# Patient Record
Sex: MALE | Race: WHITE | NOT HISPANIC OR LATINO | Employment: OTHER | URBAN - NONMETROPOLITAN AREA
[De-identification: names, ages, dates, MRNs, and addresses within clinical notes are randomized per-mention and may not be internally consistent; named-entity substitution may affect disease eponyms.]

---

## 2017-01-02 ENCOUNTER — HOSPITAL ENCOUNTER (EMERGENCY)
Facility: HOSPITAL | Age: 82
Discharge: HOME | End: 2017-01-02
Attending: NURSE PRACTITIONER | Admitting: NURSE PRACTITIONER
Payer: MEDICARE

## 2017-01-02 VITALS
TEMPERATURE: 96.6 F | HEART RATE: 61 BPM | SYSTOLIC BLOOD PRESSURE: 152 MMHG | RESPIRATION RATE: 18 BRPM | OXYGEN SATURATION: 98 % | DIASTOLIC BLOOD PRESSURE: 73 MMHG

## 2017-01-02 DIAGNOSIS — S93.401A SPRAIN OF RIGHT ANKLE, UNSPECIFIED LIGAMENT, INITIAL ENCOUNTER: ICD-10-CM

## 2017-01-02 LAB
BASOPHILS # BLD AUTO: 0.1 10E9/L (ref 0–0.2)
BASOPHILS NFR BLD AUTO: 0.5 %
CRP SERPL-MCNC: <2.9 MG/L (ref 0–8)
DIFFERENTIAL METHOD BLD: NORMAL
EOSINOPHIL # BLD AUTO: 0.2 10E9/L (ref 0–0.7)
EOSINOPHIL NFR BLD AUTO: 2.1 %
ERYTHROCYTE [DISTWIDTH] IN BLOOD BY AUTOMATED COUNT: 13.1 % (ref 10–15)
GLUCOSE BLDC GLUCOMTR-MCNC: 67 MG/DL (ref 70–99)
HCT VFR BLD AUTO: 44.3 % (ref 40–53)
HGB BLD-MCNC: 15 G/DL (ref 13.3–17.7)
IMM GRANULOCYTES # BLD: 0 10E9/L (ref 0–0.4)
IMM GRANULOCYTES NFR BLD: 0.3 %
LYMPHOCYTES # BLD AUTO: 3.6 10E9/L (ref 0.8–5.3)
LYMPHOCYTES NFR BLD AUTO: 37.4 %
MCH RBC QN AUTO: 31 PG (ref 26.5–33)
MCHC RBC AUTO-ENTMCNC: 33.9 G/DL (ref 31.5–36.5)
MCV RBC AUTO: 92 FL (ref 78–100)
MONOCYTES # BLD AUTO: 1.1 10E9/L (ref 0–1.3)
MONOCYTES NFR BLD AUTO: 11.8 %
NEUTROPHILS # BLD AUTO: 4.5 10E9/L (ref 1.6–8.3)
NEUTROPHILS NFR BLD AUTO: 47.9 %
NRBC # BLD AUTO: 0 10*3/UL
NRBC BLD AUTO-RTO: 0 /100
PLATELET # BLD AUTO: 251 10E9/L (ref 150–450)
RBC # BLD AUTO: 4.84 10E12/L (ref 4.4–5.9)
URATE SERPL-MCNC: 3.2 MG/DL (ref 3.5–7.2)
WBC # BLD AUTO: 9.5 10E9/L (ref 4–11)

## 2017-01-02 PROCEDURE — 85025 COMPLETE CBC W/AUTO DIFF WBC: CPT | Performed by: NURSE PRACTITIONER

## 2017-01-02 PROCEDURE — 99214 OFFICE O/P EST MOD 30 MIN: CPT

## 2017-01-02 PROCEDURE — 36415 COLL VENOUS BLD VENIPUNCTURE: CPT | Performed by: NURSE PRACTITIONER

## 2017-01-02 PROCEDURE — 84550 ASSAY OF BLOOD/URIC ACID: CPT | Performed by: NURSE PRACTITIONER

## 2017-01-02 PROCEDURE — 86140 C-REACTIVE PROTEIN: CPT | Performed by: NURSE PRACTITIONER

## 2017-01-02 PROCEDURE — 73630 X-RAY EXAM OF FOOT: CPT | Mod: TC,RT

## 2017-01-02 PROCEDURE — 99213 OFFICE O/P EST LOW 20 MIN: CPT | Performed by: NURSE PRACTITIONER

## 2017-01-02 PROCEDURE — 73610 X-RAY EXAM OF ANKLE: CPT | Mod: TC,RT

## 2017-01-02 PROCEDURE — 00000146 ZZHCL STATISTIC GLUCOSE BY METER IP

## 2017-01-02 NOTE — ED PROVIDER NOTES
History     Chief Complaint   Patient presents with     Foot Pain     right sided. middle of foot. no injury. awoke with sx this am of aching     The history is provided by the patient. No  was used.     James Grant is a 89 year old male who presents with right ankle pain. Pain started this am when he got out of bed. Denies injury. He is able to bear weight. No interventions for symptoms. Denies fever, chills, or night sweats. Eating and drinking well. Bowel and bladder are working well.     I have reviewed the Medications, Allergies, Past Medical and Surgical History, and Social History in the Epic system.    Review of Systems   Constitutional: Positive for activity change. Negative for fever, chills and appetite change.   Gastrointestinal: Negative for nausea and vomiting.   Genitourinary: Negative for dysuria.   Musculoskeletal: Negative for joint swelling.        Right ankle pain.    Skin: Negative for rash and wound.   Psychiatric/Behavioral: Negative.        Physical Exam   BP: 152/73 mmHg  Pulse: 61  Temp: 96.6  F (35.9  C)  Resp: 18  SpO2: 98 %  Physical Exam   Constitutional: He is oriented to person, place, and time. He appears well-developed and well-nourished. No distress.   HENT:   Mouth/Throat: Oropharynx is clear and moist.   Cardiovascular: Normal rate, regular rhythm, normal heart sounds and intact distal pulses.    Pulmonary/Chest: Effort normal. No respiratory distress. He has no wheezes. He has no rales.   Abdominal: Soft.   Musculoskeletal: He exhibits tenderness. He exhibits no edema.   Right medial ankle pain with movement. ROM and CMS intact to right ankle/foot. No swelling or redness to right ankle or foot. Dorsalis pedal pulse +2 to right foot.    Neurological: He is alert and oriented to person, place, and time.   Skin: Skin is warm and dry. No rash noted. He is not diaphoretic. No erythema.   Psychiatric: He has a normal mood and affect. His behavior is normal.    Nursing note and vitals reviewed.      ED Course   Procedures  I personally reviewed the x-rays and there is NO fracture or dislocation. Radiology review pending and nurse will notify patient if there is any change in the treatment plan.    Results for orders placed or performed during the hospital encounter of 01/02/17   Foot  XR, G/E 3 views, right    Narrative    RIGHT FOOT THREE VIEWS    FINDINGS:  No acute fracture or dislocation is seen.  There is no  focal osseous lesion.  Exam Date: Jan 02, 2017 10:12:00 AM  Author: ZANE RIVERA  This report is final and signed     Ankle XR, G/E 3 views, right    Narrative    RIGHT ANKLE THREE VIEWS    FINDINGS:  No acute fracture or dislocation is seen.  Ankle mortise  appears congruent and no focal bony lesion is seen.  Exam Date: Jan 02, 2017 10:50:00 AM  Author: ZANE RIVERA  This report is final and signed     CRP inflammation   Result Value Ref Range    CRP Inflammation <2.9 0.0 - 8.0 mg/L   CBC with platelets differential   Result Value Ref Range    WBC 9.5 4.0 - 11.0 10e9/L    RBC Count 4.84 4.4 - 5.9 10e12/L    Hemoglobin 15.0 13.3 - 17.7 g/dL    Hematocrit 44.3 40.0 - 53.0 %    MCV 92 78 - 100 fl    MCH 31.0 26.5 - 33.0 pg    MCHC 33.9 31.5 - 36.5 g/dL    RDW 13.1 10.0 - 15.0 %    Platelet Count 251 150 - 450 10e9/L    Diff Method Automated Method     % Neutrophils 47.9 %    % Lymphocytes 37.4 %    % Monocytes 11.8 %    % Eosinophils 2.1 %    % Basophils 0.5 %    % Immature Granulocytes 0.3 %    Nucleated RBCs 0 0 /100    Absolute Neutrophil 4.5 1.6 - 8.3 10e9/L    Absolute Lymphocytes 3.6 0.8 - 5.3 10e9/L    Absolute Monocytes 1.1 0.0 - 1.3 10e9/L    Absolute Eosinophils 0.2 0.0 - 0.7 10e9/L    Absolute Basophils 0.1 0.0 - 0.2 10e9/L    Abs Immature Granulocytes 0.0 0 - 0.4 10e9/L    Absolute Nucleated RBC 0.0    Glucose by meter   Result Value Ref Range    Glucose 67 (L) 70 - 99 mg/dL   Uric acid   Result Value Ref Range    Uric Acid 3.2 (L) 3.5 - 7.2  mg/dL      Apple juice and a snack given for glucose.       Assessments & Plan (with Medical Decision Making)     Discussed XRAY and lab findings. Discussed that pain is a sprain or possible arthritis that is causing the pain. Recommended wearing an ankle stir-up to support his ankle and decrease fall risk. He will follow up with PCP in 10 days for re-evaluation. He verbalized understanding of plan.     I have reviewed the nursing notes.    I have reviewed the findings, diagnosis, plan and need for follow up with the patient.  Discharged in stable condition.     Discharge Medication List as of 1/2/2017 11:34 AM          Final diagnoses:   Sprain of right ankle, unspecified ligament, initial encounter     Wear ankle splint when ambulating.   You can take tylenol for discomfort.   Elevate right leg as much as able.   Use ice for 20 minutes every 1-2 hours.   Follow up with PCP in 10 days for re-evaluation. Sooner with an increase in symptoms.   Return to urgent care and or emergency department with an increase in symptoms or concerns.     JAMES Simpson  1/2/2017  10:25 AM  [facility] CL[facility]        Mely Hyde NP  01/05/17 [id number]

## 2017-01-02 NOTE — DISCHARGE INSTRUCTIONS
Wear splint when ambulating.   You can take tylenol for discomfort.   Elevate right leg as much as able.   Use ice for 20 minutes every 1-2 hours.   Follow up with PCP in 10 days for re-evaluation. Sooner with an increase in symptoms.   Return to urgent care and or emergency department with an increase in symptoms or concerns.

## 2017-01-02 NOTE — ED AVS SNAPSHOT
[facility]    [address]    [place] [address]    Phone:  [phone]                                       James Grant   MRN: [id number]    Department:  [facility]   Date of Visit:  1/2/2017           After Visit Summary Signature Page     I have received my discharge instructions, and my questions have been answered. I have discussed any challenges I see with this plan with the nurse or doctor.    ..........................................................................................................................................  Patient/Patient Representative Signature      ..........................................................................................................................................  Patient Representative Print Name and Relationship to Patient    ..................................................               ................................................  Date                                            Time    ..........................................................................................................................................  Reviewed by Signature/Title    ...................................................              ..............................................  Date                                                            Time

## 2017-01-02 NOTE — ED AVS SNAPSHOT
[facility]    [address]    [place] [address]    Phone:  [phone]                                       James Grant   MRN: [id number]    Department:  [facility]   Date of Visit:  1/2/2017           Patient Information     Date Of Birth          3/5/1927        Your diagnoses for this visit were:     Sprain of right ankle, unspecified ligament, initial encounter        You were seen by Mely Hyde NP.      Follow-up Information     Follow up with Broderick Flowers MD In 10 days.    Specialty:  Family Practice    Why:  For re-evaluation.     Contact information:    [facility]  [address] [place] [address] [phone]          Follow up with [facility].    Specialty:  EMERGENCY MEDICINE    Why:  As needed, If symptoms worsen    Contact information:    [address]  [place] [address] [phone]    Additional information:    From [facility]: Take [address]. Turn left at [address]. Turn left at the first stoplight on [address]. At the first stop sign, take a right onto [address]. Take a left into the parking lot and continue through until you reach the North enterance of the building.       From [place]: Take [address]. Take the [address] ramp towards [place]. Turn left onto [address]. Take a slight right onto [address]. Turn left at the first stoplight on [address]. At the first stop sign, take a right onto [address]. Take a left into the parking lot and continue through until you reach the North enterance of the building.       From [place]: Take [address]. Take a right at [address]. At the first stop sign, take a right onto [address]. Take a left into the parking lot and continue through until you reach the North enterance of the building.         Discharge Instructions       Wear splint when ambulating.   You can take  tylenol for discomfort.   Elevate right leg as much as able.   Use ice for 20 minutes every 1-2 hours.   Follow up with PCP in 10 days for re-evaluation. Sooner with an increase in symptoms.   Return to urgent care and or emergency department with an increase in symptoms or concerns.     Discharge References/Attachments     AIRCAST SPLINT AND BOOT (ENGLISH)    SPRAIN, ANKLE (ADULT) (ENGLISH)         Review of your medicines      Our records show that you are taking the medicines listed below. If these are incorrect, please call your family doctor or clinic.        Dose / Directions Last dose taken    aspirin 325 MG tablet   Dose:  0.5 tablet        Take 0.5 tablets by mouth daily.   Refills:  0        famotidine 20 MG tablet   Commonly known as:  PEPCID   Dose:  20 mg   Quantity:  90 tablet        Take 1 tablet (20 mg) by mouth daily   Refills:  2        losartan 50 MG tablet   Commonly known as:  COZAAR   Dose:  50 mg   Quantity:  30 tablet        Take 1 tablet (50 mg) by mouth daily   Refills:  7        metoprolol 25 MG 24 hr tablet   Commonly known as:  TOPROL-XL   Quantity:  45 tablet        Take 0.5 Tabs by mouth at bedtime. Do not crush or chew.   Refills:  1        simvastatin 20 MG tablet   Commonly known as:  ZOCOR   Dose:  20 mg   Quantity:  90 tablet        Take 1 tablet (20 mg) by mouth every evening   Refills:  2        vitamin B complex with vitamin C Tabs tablet   Dose:  1 tablet        Take 1 tablet by mouth daily   Refills:  0                Procedures and tests performed during your visit     Ankle XR, G/E 3 views, right    CBC with platelets differential    CRP inflammation    Foot  XR, G/E 3 views, right    Glucose by meter    Glucose monitor nursing POCT    Uric acid      Orders Needing Specimen Collection     None      Pending Results     Date and Time Order Name Status Description    1/2/2017 1033 Ankle XR, G/E 3 views, right In process     1/2/2017 1004 Foot  XR, G/E 3 views, right In process  "            Thank you for choosing [facility]       Thank you for choosing [facility] for your care. Our goal is always to provide you with excellent care. Hearing back from our patients is one way we can continue to improve our services. Please take a few minutes to complete the written survey that you may receive in the mail after you visit with us. Thank you!        [employer] Information     [employer] lets you send messages to your doctor, view your test results, renew your prescriptions, schedule appointments and more. To sign up, go to [email or web address] . Click on \"Log in\" on the left side of the screen, which will take you to the Welcome page. Then click on \"Sign up Now\" on the right side of the page.     You will be asked to enter the access code listed below, as well as some personal information. Please follow the directions to create your username and password.     Your access code is: [email or web address]  Expires: 3/26/2017  6:18 AM     Your access code will  in 90 days. If you need help or a new code, please call your [facility] clinic or [phone].        After Visit Summary       This is your record. Keep this with you and show to your community pharmacist(s) and doctor(s) at your next visit.                  "

## 2017-01-04 ENCOUNTER — OFFICE VISIT (OUTPATIENT)
Dept: OTOLARYNGOLOGY | Facility: OTHER | Age: 82
End: 2017-01-04
Attending: PHYSICIAN ASSISTANT
Payer: MEDICARE

## 2017-01-04 VITALS
HEART RATE: 77 BPM | SYSTOLIC BLOOD PRESSURE: 118 MMHG | BODY MASS INDEX: 25.9 KG/M2 | HEIGHT: 71 IN | TEMPERATURE: 95.5 F | DIASTOLIC BLOOD PRESSURE: 60 MMHG | WEIGHT: 185 LBS | OXYGEN SATURATION: 97 %

## 2017-01-04 DIAGNOSIS — H90.5 SNHL (SENSORINEURAL HEARING LOSS): ICD-10-CM

## 2017-01-04 DIAGNOSIS — H61.23 IMPACTED CERUMEN OF BOTH EARS: ICD-10-CM

## 2017-01-04 DIAGNOSIS — H69.91 ETD (EUSTACHIAN TUBE DYSFUNCTION), RIGHT: ICD-10-CM

## 2017-01-04 PROCEDURE — 99213 OFFICE O/P EST LOW 20 MIN: CPT | Mod: 25 | Performed by: PHYSICIAN ASSISTANT

## 2017-01-04 PROCEDURE — 92504 EAR MICROSCOPY EXAMINATION: CPT

## 2017-01-04 PROCEDURE — 99212 OFFICE O/P EST SF 10 MIN: CPT

## 2017-01-04 PROCEDURE — 92504 EAR MICROSCOPY EXAMINATION: CPT | Performed by: PHYSICIAN ASSISTANT

## 2017-01-04 ASSESSMENT — PAIN SCALES - GENERAL: PAINLEVEL: NO PAIN (0)

## 2017-01-04 NOTE — MR AVS SNAPSHOT
"              After Visit Summary   1/4/2017    James Grant    MRN: [id number]           Patient Information     Date Of Birth          3/5/1927        Visit Information        Provider Department      1/4/2017 11:30 AM Lizzeth Harris PA-C [facility]        Today's Diagnoses     Other infective acute otitis externa of right ear    -  1     SNHL (sensorineural hearing loss)         ETD (eustachian tube dysfunction), right         Impacted cerumen of both ears           Care Instructions    Follow up with Rae for audiogram and hearing aid check    If there are concerns or questions, Call [phone] and ask for nurse        Follow-ups after your visit        Who to contact     If you have questions or need follow up information about today's clinic visit or your schedule please contact [facility] directly at [phone].  Normal or non-critical lab and imaging results will be communicated to you by [employer], letter or phone within 4 business days after the clinic has received the results. If you do not hear from us within 7 days, please contact the clinic through [employer] or phone. If you have a critical or abnormal lab result, we will notify you by phone as soon as possible.  Submit refill requests through [employer] or call your pharmacy and they will forward the refill request to us. Please allow 3 business days for your refill to be completed.          Additional Information About Your Visit        [employer] Information     [employer] lets you send messages to your doctor, view your test results, renew your prescriptions, schedule appointments and more. To sign up, go to [email or web address] . Click on \"Log in\" on the left side of the screen, which will take you to the Welcome page. Then click on \"Sign up Now\" on the right side of the page.     You will be asked to enter the access code listed below, as well as some personal information. Please follow the directions to create your username and " "password.     Your access code is: [email or web address]  Expires: 3/26/2017  6:18 AM     Your access code will  in 90 days. If you need help or a new code, please call your [facility] clinic or [phone].        Care EveryWhere ID     This is your Care EveryWhere ID. This could be used by other organizations to access your [facility] medical records  [id number]        Your Vitals Were     Pulse Temperature Height BMI (Body Mass Index) Pulse Oximetry       77 95.5  F (35.3  C) (Tympanic) 5' 11\" (1.803 m) 25.81 kg/m2 97%        Blood Pressure from Last 3 Encounters:   17 118/60   17 152/73   16 118/60    Weight from Last 3 Encounters:   17 185 lb (83.915 kg)   16 188 lb (85.276 kg)   16 192 lb (87.091 kg)              Today, you had the following     No orders found for display       Primary Care Provider Office Phone # Fax #    Broderick Flowers [phone]       [facility] [address]  [place] [address]        Thank you!     Thank you for choosing [facility]  for your care. Our goal is always to provide you with excellent care. Hearing back from our patients is one way we can continue to improve our services. Please take a few minutes to complete the written survey that you may receive in the mail after your visit with us. Thank you!             Your Updated Medication List - Protect others around you: Learn how to safely use, store and throw away your medicines at www.disposemymeds.org.          This list is accurate as of: 17 11:30 AM.  Always use your most recent med list.                   Brand Name Dispense Instructions for use    aspirin 325 MG tablet      Take 0.5 tablets by mouth daily.       famotidine 20 MG tablet    PEPCID    90 tablet    Take 1 tablet (20 mg) by mouth daily       losartan 50 MG tablet    COZAAR    30 tablet    Take 1 tablet (50 mg) by mouth daily       metoprolol 25 MG 24 hr tablet    TOPROL-XL    45 tablet    " Take 0.5 Tabs by mouth at bedtime. Do not crush or chew.       simvastatin 20 MG tablet    ZOCOR    90 tablet    Take 1 tablet (20 mg) by mouth every evening       vitamin B complex with vitamin C Tabs tablet      Take 1 tablet by mouth daily

## 2017-01-04 NOTE — PROGRESS NOTES
"Chief Complaint   Patient presents with     Cerumen (impacted)     ear cleaning     Tomasz presents with right fullness ear. He feels like drainage and aid not working well. Reports no otalgia. Denies recent illness.  he did have granulation tissue and used drops after hisvisit last year.   Denies otorrhea. His aid is giving him feedback      Past Medical History   Diagnosis Date     Other symptoms involving digestive system(787.99) 10/20/2006     HTN (hypertension) 11/26/2010     Dyslipidemia 11/26/2010     GERD (gastroesophageal reflux disease) 11/26/2010     Benign localized hyperplasia of prostate without urinary obstruction and other lower urinary tract symptoms (LUTS) 11/26/2010     Old myocardial infarction 11/26/2010     CHD (coronary heart disease) 11/26/2010        Allergies   Allergen Reactions     Lisinopril Cough     Morphine      Current Outpatient Prescriptions   Medication     losartan (COZAAR) 50 MG tablet     simvastatin (ZOCOR) 20 MG tablet     famotidine (PEPCID) 20 MG tablet     metoprolol (TOPROL-XL) 25 MG 24 hr tablet     vitamin  B complex with vitamin C (VITAMIN  B COMPLEX) TABS     aspirin 325 MG tablet     No current facility-administered medications for this visit.      ROS: 10 point ROS neg other than the symptoms noted above in the HPI.  /60 mmHg  Pulse 77  Temp(Src) 95.5  F (35.3  C) (Tympanic)  Ht 5' 11\" (1.803 m)  Wt 185 lb (83.915 kg)  BMI 25.81 kg/m2  SpO2 97%    General Appearance: healthy, alert, active and no distress  Head: Normocephalic. No masses, lesions, tenderness or abnormalities  Eyes: conjuctiva clear, PERRL, EOM intact  Ears: External ears normal. Canals with edema, right. Otorrhea. Left canal intact with mild cerumen.   Nose: Nares normal  Mouth: normal  Neck: Supple, no cervical adenopathy, no thyromegaly, Full range of motion in all planes      The lips appear normal and without lesion. The dentition is in good condition The patient has a normal appearing " and functioning hard and soft palate without bifid uvula or submucosal cleft. The tongue is normal in appearance without erosive lesion or fungating mass. The oral mucosa is soft and normal in appearance. The patient also has a soft floor of mouth and base of tongue.     The ear canals were examined underneath the operating microscope and with an otologic speculum. The cavity is cleaned of all debris with gently suctioning and use of alligator forceps. There is no granulation tissue or sign of cholesteatoma. The patient tolerates this well and has a brief amount of dizziness.   Canals are with cerumen, R>L. Canal was cleaned. Tolerated well. TM's are intact without effusion. AC>BC      ASSESSMENT:    ICD-10-CM    1. SNHL (sensorineural hearing loss) H90.5    2. ETD (eustachian tube dysfunction), right H69.81    3. Impacted cerumen of both ears H61.23        Ears were cleaned. He noted improvement.       Audiogram to be completed. HAC with audiology. He had resolution of hearing aid feedback following cleaning.   Follow up in 6 months- 1 year       Lizzeth Harris PA-C  ENT  [facility], [place]  [phone]

## 2017-01-04 NOTE — PATIENT INSTRUCTIONS
Follow up with Rae for audiogram and hearing aid check    If there are concerns or questions, Call [phone] and ask for nurse

## 2017-01-04 NOTE — NURSING NOTE
"Chief Complaint   Patient presents with     Cerumen (impacted)     ear cleaning       Initial /60 mmHg  Pulse 77  Temp(Src) 95.5  F (35.3  C) (Tympanic)  Ht 5' 11\" (1.803 m)  Wt 185 lb (83.915 kg)  BMI 25.81 kg/m2  SpO2 97% Estimated body mass index is 25.81 kg/(m^2) as calculated from the following:    Height as of this encounter: 5' 11\" (1.803 m).    Weight as of this encounter: 185 lb (83.915 kg).  BP completed using cuff size: duong Gonzalez LPN         "

## 2017-01-05 ASSESSMENT — ENCOUNTER SYMPTOMS
APPETITE CHANGE: 0
JOINT SWELLING: 0
ACTIVITY CHANGE: 1
DYSURIA: 0
NAUSEA: 0
PSYCHIATRIC NEGATIVE: 1
FEVER: 0
WOUND: 0
CHILLS: 0
VOMITING: 0

## 2017-01-12 ENCOUNTER — OFFICE VISIT (OUTPATIENT)
Dept: AUDIOLOGY | Facility: OTHER | Age: 82
End: 2017-01-12
Attending: AUDIOLOGIST
Payer: MEDICARE

## 2017-01-12 DIAGNOSIS — H90.3 SENSORINEURAL HEARING LOSS, BILATERAL: Primary | ICD-10-CM

## 2017-01-12 PROCEDURE — 92593 ZZHC HEARING AID CHECK, BINAURAL: CPT | Performed by: AUDIOLOGIST

## 2017-01-12 NOTE — PROGRESS NOTES
Background; Patient seen for hearing aid check reporting not hearing well in groups. He reports they are not dead and are functioning.  Results: Hearing aid reprogramming recommended to include hearing aid conformity evaluation. ABN form reviewed and patient in agreement/signed. Both hearing aids [employer] Latitude 16 FS Full Shell In-The-Ear  Aids right #[id number] and left # [id number] are out of warranty and are 6 years old. Hearing adjusted to soft, average and MPO targets/real ear measurement.  Recommendations: If further difficulties recommend repair or consider updated amplificaiton.  Rae Terry M.S., [facility]  Audiologist #[id number]

## 2017-02-06 DIAGNOSIS — K21.9 GASTROESOPHAGEAL REFLUX DISEASE WITHOUT ESOPHAGITIS: Primary | ICD-10-CM

## 2017-02-07 RX ORDER — FAMOTIDINE 20 MG/1
TABLET, FILM COATED ORAL
Qty: 90 TABLET | Refills: 2 | Status: SHIPPED | OUTPATIENT
Start: 2017-02-07 | End: 2017-12-01

## 2017-03-06 DIAGNOSIS — I10 BENIGN ESSENTIAL HYPERTENSION: ICD-10-CM

## 2017-03-06 NOTE — TELEPHONE ENCOUNTER
Metoprolol      Last Written Prescription Date: 3/4/16  Last Fill Quantity: 45, # refills: 1    Last Office Visit with G, P or [facility] prescribing provider:  12/20/16   Future Office Visit:        BP Readings from Last 3 Encounters:   01/04/17 118/60   01/02/17 152/73   12/20/16 118/60

## 2017-03-07 DIAGNOSIS — I10 BENIGN ESSENTIAL HYPERTENSION: ICD-10-CM

## 2017-03-07 RX ORDER — METOPROLOL SUCCINATE 25 MG/1
TABLET, EXTENDED RELEASE ORAL
Qty: 45 TABLET | Refills: 1 | Status: SHIPPED | OUTPATIENT
Start: 2017-03-07 | End: 2017-09-09

## 2017-03-08 RX ORDER — METOPROLOL SUCCINATE 25 MG/1
TABLET, EXTENDED RELEASE ORAL
Refills: 0 | OUTPATIENT
Start: 2017-03-08

## 2017-03-14 ENCOUNTER — OFFICE VISIT (OUTPATIENT)
Dept: FAMILY MEDICINE | Facility: OTHER | Age: 82
End: 2017-03-14
Attending: FAMILY MEDICINE
Payer: MEDICARE

## 2017-03-14 VITALS
WEIGHT: 190 LBS | RESPIRATION RATE: 18 BRPM | SYSTOLIC BLOOD PRESSURE: 124 MMHG | TEMPERATURE: 97.5 F | BODY MASS INDEX: 26.5 KG/M2 | DIASTOLIC BLOOD PRESSURE: 72 MMHG | OXYGEN SATURATION: 95 % | HEART RATE: 53 BPM

## 2017-03-14 DIAGNOSIS — I87.2 VENOUS STASIS DERMATITIS OF BOTH LOWER EXTREMITIES: Primary | ICD-10-CM

## 2017-03-14 PROCEDURE — 99212 OFFICE O/P EST SF 10 MIN: CPT

## 2017-03-14 PROCEDURE — 99213 OFFICE O/P EST LOW 20 MIN: CPT | Performed by: FAMILY MEDICINE

## 2017-03-14 RX ORDER — BENZOCAINE/MENTHOL 6 MG-10 MG
LOZENGE MUCOUS MEMBRANE AT BEDTIME
Qty: 30 G | Refills: 0 | Status: SHIPPED | OUTPATIENT
Start: 2017-03-14 | End: 2018-10-12

## 2017-03-14 ASSESSMENT — PAIN SCALES - GENERAL: PAINLEVEL: NO PAIN (0)

## 2017-03-14 NOTE — NURSING NOTE
"Chief Complaint   Patient presents with     Derm Problem     itching on bilateral legs       Initial /72 (BP Location: Left arm, Patient Position: Chair, Cuff Size: Adult Regular)  Pulse 53  Temp 97.5  F (36.4  C)  Resp 18  Wt 190 lb (86.2 kg)  SpO2 95%  BMI 26.5 kg/m2 Estimated body mass index is 26.5 kg/(m^2) as calculated from the following:    Height as of 1/4/17: 5' 11\" (1.803 m).    Weight as of this encounter: 190 lb (86.2 kg).  Medication Reconciliation: complete     Betsy Lan        "

## 2017-03-14 NOTE — MR AVS SNAPSHOT
"              After Visit Summary   3/14/2017    James Grant    MRN: [id number]           Patient Information     Date Of Birth          3/5/1927        Visit Information        Provider Department      3/14/2017 10:40 AM Broderick Flowers MD [facility]        Today's Diagnoses     Venous stasis dermatitis of both lower extremities    -  1      Care Instructions    Apply hydrocortisone cream at bedtime.        Follow-ups after your visit        Follow-up notes from your care team     Return if symptoms worsen or fail to improve.      Your next 10 appointments already scheduled     Mar 31, 2017 10:45 AM CDT   (Arrive by 10:30 AM)   Return Visit with Lizzeth Harris PA-C   [facility] ([facility])    [address]  [facility] [address]   [phone]              Who to contact     If you have questions or need follow up information about today's clinic visit or your schedule please contact [facility] directly at [phone].  Normal or non-critical lab and imaging results will be communicated to you by MyChart, letter or phone within 4 business days after the clinic has received the results. If you do not hear from us within 7 days, please contact the clinic through Fisgohart or phone. If you have a critical or abnormal lab result, we will notify you by phone as soon as possible.  Submit refill requests through Functional Neuromodulation or call your pharmacy and they will forward the refill request to us. Please allow 3 business days for your refill to be completed.          Additional Information About Your Visit        MyChart Information     Functional Neuromodulation lets you send messages to your doctor, view your test results, renew your prescriptions, schedule appointments and more. To sign up, go to [email or web address] . Click on \"Log in\" on the left side of the screen, which will take you to the Welcome page. Then click on \"Sign up Now\" on the right side of the page.     You will be asked to " enter the access code listed below, as well as some personal information. Please follow the directions to create your username and password.     Your access code is: [email or web address]  Expires: 3/26/2017  7:18 AM     Your access code will  in 90 days. If you need help or a new code, please call your [place] clinic or [phone].        Care EveryWhere ID     This is your Care EveryWhere ID. This could be used by other organizations to access your [place] medical records  [id number]        Your Vitals Were     Pulse Temperature Respirations Pulse Oximetry BMI (Body Mass Index)       53 97.5  F (36.4  C) 18 95% 26.5 kg/m2        Blood Pressure from Last 3 Encounters:   17 124/72   17 118/60   17 152/73    Weight from Last 3 Encounters:   17 190 lb (86.2 kg)   17 185 lb (83.9 kg)   16 188 lb (85.3 kg)              Today, you had the following     No orders found for display         Today's Medication Changes          These changes are accurate as of: 3/14/17 11:59 PM.  If you have any questions, ask your nurse or doctor.               Start taking these medicines.        Dose/Directions    hydrocortisone 1 % cream   Commonly known as:  CORTAID   Used for:  Venous stasis dermatitis of both lower extremities   Started by:  Broderick Flowers MD        Apply topically At Bedtime   Quantity:  30 g   Refills:  0            Where to get your medicines      These medications were sent to [facility] [place], [address] [address]     Phone:  [phone]     hydrocortisone 1 % cream                Primary Care Provider Office Phone # Fax #    Broderick Flowers [phone]       [address]  [place] [address]        Thank you!     [facility]. Our goal is always to provide you with excellent care. Hearing back from our patients is one way we can continue to  improve our services. Please take a few minutes to complete the written survey that you may receive in the mail after your visit with us. Thank you!             Your Updated Medication List - Protect others around you: Learn how to safely use, store and throw away your medicines at www.disposemymeds.org.          This list is accurate as of: 3/14/17 11:59 PM.  Always use your most recent med list.                   Brand Name Dispense Instructions for use    aspirin 325 MG tablet      Take 0.5 tablets by mouth daily.       famotidine 20 MG tablet    PEPCID    90 tablet    Take one (1) tablet BY MOUTH daily       hydrocortisone 1 % cream    CORTAID    30 g    Apply topically At Bedtime       losartan 50 MG tablet    COZAAR    30 tablet    Take 1 tablet (50 mg) by mouth daily       metoprolol 25 MG 24 hr tablet    TOPROL-XL    45 tablet    Take 0.5 Tabs by mouth at bedtime. Do not crush or chew.       simvastatin 20 MG tablet    ZOCOR    90 tablet    Take 1 tablet (20 mg) by mouth every evening       vitamin B complex with vitamin C Tabs tablet      Take 1 tablet by mouth daily

## 2017-03-14 NOTE — PROGRESS NOTES
SUBJECTIVE:  James Grant, 89 year old, male is seen with bilateral lower leg itching.  Present over the last several months.  Located primarily on the ankles and tibia.  He originally thought this was secondary to his Lipitor so he switched the timing with minimal improvement.    Denies fever, hives, hair loss, sun sensitivity, or nail changes.      Current Outpatient Prescriptions   Medication Sig Dispense Refill     metoprolol (TOPROL-XL) 25 MG 24 hr tablet Take 0.5 Tabs by mouth at bedtime. Do not crush or chew. 45 tablet 1     famotidine (PEPCID) 20 MG tablet Take one (1) tablet BY MOUTH daily 90 tablet 2     losartan (COZAAR) 50 MG tablet Take 1 tablet (50 mg) by mouth daily 30 tablet 7     simvastatin (ZOCOR) 20 MG tablet Take 1 tablet (20 mg) by mouth every evening 90 tablet 2     vitamin  B complex with vitamin C (VITAMIN  B COMPLEX) TABS Take 1 tablet by mouth daily       aspirin 325 MG tablet Take 0.5 tablets by mouth daily.          Allergies   Allergen Reactions     Lisinopril Cough     Morphine        Past Medical History   Diagnosis Date     Benign localized hyperplasia of prostate without urinary obstruction and other lower urinary tract symptoms (LUTS) 11/26/2010     CHD (coronary heart disease) 11/26/2010     Dyslipidemia 11/26/2010     GERD (gastroesophageal reflux disease) 11/26/2010     HTN (hypertension) 11/26/2010     Old myocardial infarction 11/26/2010     Other symptoms involving digestive system(787.99) 10/20/2006     Past Surgical History   Procedure Laterality Date     Hernia repair       Left arm surgery       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 Sister 91     Heart Failure Mother 86     Congestive - Cause of Death     CEREBROVASCULAR DISEASE Father      Social History     Social History     Marital status:      Spouse name: N/A     Number of children: N/A     Years of education: N/A     Occupational  History      and  [employer]     01/01/1987 - RETIRED ([place] [employer])     Social History Main Topics     Smoking status: Former Smoker     Types: Cigarettes     Smokeless tobacco: Never Used      Comment: Tried to Quit (YES); YR QUIT (1962); Passive Exposure (NO)     Alcohol use No      Comment: 3 Drinks (BEER & LIQUOR) ~ OCCASIONALLY (QUIT IN 2000)     Drug use: No     Sexual activity: Not on file     Other Topics Concern     Blood Transfusions Yes     PERMITS     Caffeine Concern Yes     COFFEE - 4 CUPS DAILY     Exercise Yes     WALKING / HOUSEWORK - OCCASIONAL     Parent/Sibling W/ Cabg, Mi Or Angioplasty Before 65f 55m? No     Social History Narrative         Review Of Systems  Constitutional, HEENT, cardiovascular, pulmonary, gi and gu systems are negative, except as otherwise noted.    OBJECTIVE:  Vitals: B/P: 124/72, T: 97.5, P: 53, R: 18    Exam:  Physical Exam   Constitutional: He is oriented to person, place, and time. He appears well-developed and well-nourished. No distress.   Musculoskeletal:   There are varicose veins noted bilaterally   Neurological: He is alert and oriented to person, place, and time.   Skin: Skin is warm and dry.   There are venous stasis changes noted bilaterally   Psychiatric: He has a normal mood and affect.     Other exam not repeated    Labs:  Admission on 01/02/2017, Discharged on 01/02/2017   Component Date Value Ref Range Status     CRP Inflammation 01/02/2017 <2.9  0.0 - 8.0 mg/L Final     WBC 01/02/2017 9.5  4.0 - 11.0 10e9/L Final     RBC Count 01/02/2017 4.84  4.4 - 5.9 10e12/L Final     Hemoglobin 01/02/2017 15.0  13.3 - 17.7 g/dL Final     Hematocrit 01/02/2017 44.3  40.0 - 53.0 % Final     MCV 01/02/2017 92  78 - 100 fl Final     MCH 01/02/2017 31.0  26.5 - 33.0 pg Final     MCHC 01/02/2017 33.9  31.5 - 36.5 g/dL Final     RDW 01/02/2017 13.1  10.0 - 15.0 % Final     Platelet Count 01/02/2017 251  150 - 450 10e9/L Final     Diff Method  01/02/2017 Automated Method   Final     % Neutrophils 01/02/2017 47.9  % Final     % Lymphocytes 01/02/2017 37.4  % Final     % Monocytes 01/02/2017 11.8  % Final     % Eosinophils 01/02/2017 2.1  % Final     % Basophils 01/02/2017 0.5  % Final     % Immature Granulocytes 01/02/2017 0.3  % Final     Nucleated RBCs 01/02/2017 0  0 /100 Final     Absolute Neutrophil 01/02/2017 4.5  1.6 - 8.3 10e9/L Final     Absolute Lymphocytes 01/02/2017 3.6  0.8 - 5.3 10e9/L Final     Absolute Monocytes 01/02/2017 1.1  0.0 - 1.3 10e9/L Final     Absolute Eosinophils 01/02/2017 0.2  0.0 - 0.7 10e9/L Final     Absolute Basophils 01/02/2017 0.1  0.0 - 0.2 10e9/L Final     Abs Immature Granulocytes 01/02/2017 0.0  0 - 0.4 10e9/L Final     Absolute Nucleated RBC 01/02/2017 0.0   Final     Glucose 01/02/2017 67* 70 - 99 mg/dL Final     Uric Acid 01/02/2017 3.2* 3.5 - 7.2 mg/dL Final       ASSESSMENT/PLAN:  Venous stasis dermatitis of both lower extremities  Discussed natural history.  Begin hydrocortisone cream at bedtime.  Follow up with persistent symptoms.  - hydrocortisone (CORTAID) 1 % cream; Apply topically At Bedtime            Broderick Flowers MD

## 2017-03-16 PROBLEM — I87.2 VENOUS STASIS DERMATITIS OF BOTH LOWER EXTREMITIES: Status: ACTIVE | Noted: 2017-03-16

## 2017-03-31 ENCOUNTER — OFFICE VISIT (OUTPATIENT)
Dept: OTOLARYNGOLOGY | Facility: OTHER | Age: 82
End: 2017-03-31
Attending: PHYSICIAN ASSISTANT
Payer: MEDICARE

## 2017-03-31 VITALS
HEART RATE: 80 BPM | SYSTOLIC BLOOD PRESSURE: 118 MMHG | DIASTOLIC BLOOD PRESSURE: 58 MMHG | TEMPERATURE: 97.7 F | HEIGHT: 71 IN | WEIGHT: 190 LBS | BODY MASS INDEX: 26.6 KG/M2

## 2017-03-31 DIAGNOSIS — H73.20 MYRINGITIS: ICD-10-CM

## 2017-03-31 DIAGNOSIS — H60.391 OTHER INFECTIVE ACUTE OTITIS EXTERNA OF RIGHT EAR: Primary | ICD-10-CM

## 2017-03-31 DIAGNOSIS — L92.9 GRANULATION TISSUE OF EAR CANAL: ICD-10-CM

## 2017-03-31 DIAGNOSIS — H90.5 SNHL (SENSORINEURAL HEARING LOSS): ICD-10-CM

## 2017-03-31 DIAGNOSIS — H69.91 ETD (EUSTACHIAN TUBE DYSFUNCTION), RIGHT: ICD-10-CM

## 2017-03-31 LAB
GRAM STN SPEC: ABNORMAL
MICRO REPORT STATUS: ABNORMAL
SPECIMEN SOURCE: ABNORMAL

## 2017-03-31 PROCEDURE — 31231 NASAL ENDOSCOPY DX: CPT | Performed by: PHYSICIAN ASSISTANT

## 2017-03-31 PROCEDURE — 87205 SMEAR GRAM STAIN: CPT | Performed by: PHYSICIAN ASSISTANT

## 2017-03-31 PROCEDURE — 87102 FUNGUS ISOLATION CULTURE: CPT | Performed by: PHYSICIAN ASSISTANT

## 2017-03-31 PROCEDURE — 92504 EAR MICROSCOPY EXAMINATION: CPT | Mod: TC | Performed by: PHYSICIAN ASSISTANT

## 2017-03-31 PROCEDURE — 99214 OFFICE O/P EST MOD 30 MIN: CPT | Mod: 25 | Performed by: PHYSICIAN ASSISTANT

## 2017-03-31 PROCEDURE — 99214 OFFICE O/P EST MOD 30 MIN: CPT | Mod: 25

## 2017-03-31 PROCEDURE — 87070 CULTURE OTHR SPECIMN AEROBIC: CPT | Performed by: PHYSICIAN ASSISTANT

## 2017-03-31 RX ORDER — NEOMYCIN SULFATE, POLYMYXIN B SULFATE AND HYDROCORTISONE 10; 3.5; 1 MG/ML; MG/ML; [USP'U]/ML
4 SUSPENSION/ DROPS AURICULAR (OTIC) 3 TIMES DAILY
Qty: 9 ML | Refills: 0 | Status: SHIPPED | OUTPATIENT
Start: 2017-03-31 | End: 2017-04-04

## 2017-03-31 ASSESSMENT — PAIN SCALES - GENERAL: PAINLEVEL: NO PAIN (0)

## 2017-03-31 NOTE — PATIENT INSTRUCTIONS
Keep right ear dry  Leave hearing aid out on right.   Follow up in 2-3 weeks  Ear culture is pending. Will call with results    Start Cortisporin ear drops 3 times a day for 2 weeks    If there are concerns or questions, Call [phone] and ask for nurse

## 2017-03-31 NOTE — MR AVS SNAPSHOT
"              After Visit Summary   3/31/2017    James Grant    MRN: [id number]           Patient Information     Date Of Birth          3/5/1927        Visit Information        Provider Department      3/31/2017 10:45 AM Lizzeth Harris PA-C [facility]        Today's Diagnoses     Other infective acute otitis externa of right ear    -  1    Myringitis        Granulation tissue of ear canal          Care Instructions    Keep right ear dry  Leave hearing aid out on right.   Follow up in 2-3 weeks  Ear culture is pending. Will call with results    Start Cortisporin ear drops 3 times a day for 2 weeks    If there are concerns or questions, Call [phone] and ask for nurse        Follow-ups after your visit        Who to contact     If you have questions or need follow up information about today's clinic visit or your schedule please contact [facility] at [phone].  Normal or non-critical lab and imaging results will be communicated to you by NodePinghart, letter or phone within 4 business days after the clinic has received the results. If you do not hear from us within 7 days, please contact the clinic through NodePinghart or phone. If you have a critical or abnormal lab result, we will notify you by phone as soon as possible.  Submit refill requests through Writer.ly or call your pharmacy and they will forward the refill request to us. Please allow 3 business days for your refill to be completed.          Additional Information About Your Visit        MyChart Information     Writer.ly lets you send messages to your doctor, view your test results, renew your prescriptions, schedule appointments and more. To sign up, go to [email or web address] . Click on \"Log in\" on the left side of the screen, which will take you to the Welcome page. Then click on \"Sign up Now\" on the right side of the page.     You will be asked to enter the access code listed below, as well as some personal information. " "Please follow the directions to create your username and password.     Your access code is: [email or web address]  Expires: 2017 10:59 AM     Your access code will  in 90 days. If you need help or a new code, please call your [facility] or [phone].        Care EveryWhere ID     This is your Care EveryWhere ID. This could be used by other organizations to access your [place] medical records  [id number]        Your Vitals Were     Pulse Temperature Height BMI (Body Mass Index)          80 97.7  F (36.5  C) (Tympanic) 5' 11\" (1.803 m) 26.5 kg/m2         Blood Pressure from Last 3 Encounters:   17 118/58   17 124/72   17 118/60    Weight from Last 3 Encounters:   17 190 lb (86.2 kg)   17 190 lb (86.2 kg)   17 185 lb (83.9 kg)              We Performed the Following     Ear Culture Aerobic Bacterial     Fungus Culture, non-blood     Gram stain          Today's Medication Changes          These changes are accurate as of: 3/31/17 10:59 AM.  If you have any questions, ask your nurse or doctor.               Start taking these medicines.        Dose/Directions    neomycin-polymyxin-hydrocortisone 3.5-73785-7 otic suspension   Commonly known as:  CORTISPORIN   Used for:  Other infective acute otitis externa of right ear, Myringitis, Granulation tissue of ear canal        Dose:  4 drop   Place 4 drops into the right ear 3 times daily for 14 days   Quantity:  9 mL   Refills:  0            Where to get your medicines      These medications were sent to [facility] [place], [address] [address]     Phone:  [phone]     neomycin-polymyxin-hydrocortisone 3.5-03438-5 otic suspension                Primary Care Provider Office Phone # Fax #    Broderick Flowers [phone]       [facility] [address]  [place] [address]        Thank you!     Thank you for choosing [facility]  for your care. Our " goal is always to provide you with excellent care. Hearing back from our patients is one way we can continue to improve our services. Please take a few minutes to complete the written survey that you may receive in the mail after your visit with us. Thank you!             Your Updated Medication List - Protect others around you: Learn how to safely use, store and throw away your medicines at www.disposemymeds.org.          This list is accurate as of: 3/31/17 10:59 AM.  Always use your most recent med list.                   Brand Name Dispense Instructions for use    aspirin 325 MG tablet      Take 0.5 tablets by mouth daily.       famotidine 20 MG tablet    PEPCID    90 tablet    Take one (1) tablet BY MOUTH daily       hydrocortisone 1 % cream    CORTAID    30 g    Apply topically At Bedtime       losartan 50 MG tablet    COZAAR    30 tablet    Take 1 tablet (50 mg) by mouth daily       metoprolol 25 MG 24 hr tablet    TOPROL-XL    45 tablet    Take 0.5 Tabs by mouth at bedtime. Do not crush or chew.       neomycin-polymyxin-hydrocortisone 3.5-44471-5 otic suspension    CORTISPORIN    9 mL    Place 4 drops into the right ear 3 times daily for 14 days       simvastatin 20 MG tablet    ZOCOR    90 tablet    Take 1 tablet (20 mg) by mouth every evening       vitamin B complex with vitamin C Tabs tablet      Take 1 tablet by mouth daily

## 2017-03-31 NOTE — PROGRESS NOTES
"Chief Complaint   Patient presents with     Cerumen Impaction     ear cleaning      Patient has noticed increase in ear drainage from right ear. He has decreased hearing, aid feels plugged.   Denies otalgia. No vertigo.   No recent Audiogram. Last audiogram was in 2016.   He had hx of OE in the past few years, treated with otics and clears.     Past Medical History:   Diagnosis Date     Benign localized hyperplasia of prostate without urinary obstruction and other lower urinary tract symptoms (LUTS) 11/26/2010     CHD (coronary heart disease) 11/26/2010     Dyslipidemia 11/26/2010     GERD (gastroesophageal reflux disease) 11/26/2010     HTN (hypertension) 11/26/2010     Old myocardial infarction 11/26/2010     Other symptoms involving digestive system(787.99) 10/20/2006        Allergies   Allergen Reactions     Lisinopril Cough     Morphine      Current Outpatient Prescriptions   Medication     hydrocortisone (CORTAID) 1 % cream     metoprolol (TOPROL-XL) 25 MG 24 hr tablet     famotidine (PEPCID) 20 MG tablet     losartan (COZAAR) 50 MG tablet     simvastatin (ZOCOR) 20 MG tablet     vitamin  B complex with vitamin C (VITAMIN  B COMPLEX) TABS     aspirin 325 MG tablet     No current facility-administered medications for this visit.       ROS: 10 point ROS neg other than the symptoms noted above in the HPI.  /58 (BP Location: Right arm, Patient Position: Chair, Cuff Size: Adult Regular)  Pulse 80  Temp 97.7  F (36.5  C) (Tympanic)  Ht 5' 11\" (1.803 m)  Wt 190 lb (86.2 kg)  BMI 26.5 kg/m2       General Appearance: healthy, alert, active and no distress  Head: Normocephalic. No masses, lesions, tenderness or abnormalities  Eyes: conjuctiva clear, PERRL, EOM intact  Ears: External ears normal. Canals with edema, right. Otorrhea. Left canal intact with mild cerumen.   Nose: Nares normal  Mouth: normal  Neck: Supple, no cervical adenopathy, no thyromegaly, Full range of motion in all planes        The lips " appear normal and without lesion. The dentition is in good condition The patient has a normal appearing and functioning hard and soft palate without bifid uvula or submucosal cleft. The tongue is normal in appearance without erosive lesion or fungating mass. The oral mucosa is soft and normal in appearance. The patient also has a soft floor of mouth and base of tongue.      The ear canals were examined underneath the operating microscope and with an otologic speculum. The cavity is cleaned of all debris with gently suctioning and use of alligator forceps. There is no granulation tissue or sign of cholesteatoma. The patient tolerates this well and has a brief amount of dizziness.   Canals cleaned of cerumen. Left TM appears intact without effusion. No retraction  RIGHT TM with otorrhea, granulation tissue, polyps, hypervascular. No effusion noted, however limited due to myringitis   Culture obtained.       To further evaluate the nasal cavity, I performed rigid nasal endoscopy, and color photographs were taken for the permanent medical record.  I first sprayed the nasal cavity bilaterally with a mix of lidocaine and neosynephrine.  I then began on the left side using a 2.7mm, 30 degree rigid nasal endoscope.  The septum was fairly straight and the nasal airway was open.  No abnormal secretions, purulence, or polyps were noted. The left middle turbinate and middle meatus were clearly visualized and normal in appearance.  Looking up, the olfactory cleft was unobstructed.  Going further back, the sphenoethmoid recess was normal in appearance, with healthy appearing mucosa on the face of the sphenoid.  The nasopharynx was unremarkable, and the eustachian tube opening on this side was unobstructed.    I then turned my attention to the right side.  Once again, the septum was fairly straight, and the airway was open.  No abnormal secretions, purulence, polyps were noted.  The right middle turbinate and middle meatus were  clearly visualized and normal in appearance.  Looking up, the olfactory cleft was unobstructed.  Going further back the right sphenoethmoid recess was normal in appearance, and eustachian tube opening was unobstructed.               ASSESSMENT:    ICD-10-CM    1. Other infective acute otitis externa of right ear H60.391 Ear Culture Aerobic Bacterial     Fungus Culture, non-blood     Gram stain     neomycin-polymyxin-hydrocortisone (CORTISPORIN) 3.5-07219-7 otic suspension   2. Myringitis H73.20 Ear Culture Aerobic Bacterial     Fungus Culture, non-blood     Gram stain     neomycin-polymyxin-hydrocortisone (CORTISPORIN) 3.5-06226-4 otic suspension   3. Granulation tissue of ear canal L92.9 Ear Culture Aerobic Bacterial     Fungus Culture, non-blood     Gram stain     neomycin-polymyxin-hydrocortisone (CORTISPORIN) 3.5-95063-2 otic suspension   4. SNHL (sensorineural hearing loss) H90.5    5. ETD (eustachian tube dysfunction), right H69.81      Keep right ear dry  Leave hearing aid out on right.   Follow up in 2-3 weeks  Ear culture is pending. Will call with results    Start Cortisporin ear drops 3 times a day for 2 weeks    Audiogram after completion of otics.   He was informed regarding granulation tissue, and should resolve with otics.       Lizzeth Harris PA-C  ENT  [facility], [place]  [phone]

## 2017-04-02 LAB
BACTERIA SPEC CULT: ABNORMAL
MICRO REPORT STATUS: ABNORMAL
SPECIMEN SOURCE: ABNORMAL

## 2017-04-04 RX ORDER — NEOMYCIN SULFATE, POLYMYXIN B SULFATE AND HYDROCORTISONE 10; 3.5; 1 MG/ML; MG/ML; [USP'U]/ML
4 SUSPENSION/ DROPS AURICULAR (OTIC) 3 TIMES DAILY
Qty: 9 ML | Refills: 1 | Status: SHIPPED | OUTPATIENT
Start: 2017-04-04 | End: 2017-04-17

## 2017-04-17 ENCOUNTER — OFFICE VISIT (OUTPATIENT)
Dept: OTOLARYNGOLOGY | Facility: OTHER | Age: 82
End: 2017-04-17
Attending: PHYSICIAN ASSISTANT
Payer: MEDICARE

## 2017-04-17 VITALS
BODY MASS INDEX: 25.9 KG/M2 | WEIGHT: 185 LBS | HEIGHT: 71 IN | HEART RATE: 54 BPM | SYSTOLIC BLOOD PRESSURE: 130 MMHG | DIASTOLIC BLOOD PRESSURE: 64 MMHG | TEMPERATURE: 97.6 F

## 2017-04-17 DIAGNOSIS — H90.5 SNHL (SENSORINEURAL HEARING LOSS): ICD-10-CM

## 2017-04-17 DIAGNOSIS — H91.93 DECREASED HEARING OF BOTH EARS: Primary | ICD-10-CM

## 2017-04-17 DIAGNOSIS — H60.391 OTHER INFECTIVE ACUTE OTITIS EXTERNA OF RIGHT EAR: ICD-10-CM

## 2017-04-17 DIAGNOSIS — H73.20 MYRINGITIS: Primary | ICD-10-CM

## 2017-04-17 PROCEDURE — 92504 EAR MICROSCOPY EXAMINATION: CPT | Performed by: PHYSICIAN ASSISTANT

## 2017-04-17 PROCEDURE — 92504 EAR MICROSCOPY EXAMINATION: CPT

## 2017-04-17 PROCEDURE — 99212 OFFICE O/P EST SF 10 MIN: CPT

## 2017-04-17 PROCEDURE — 99213 OFFICE O/P EST LOW 20 MIN: CPT | Mod: 25 | Performed by: PHYSICIAN ASSISTANT

## 2017-04-17 ASSESSMENT — PAIN SCALES - GENERAL: PAINLEVEL: NO PAIN (0)

## 2017-04-17 NOTE — PATIENT INSTRUCTIONS
Canals are clear  Right ear improved   No infection remaining.      Follow up for audiogram.     If there are concerns or questions, Call [phone] and ask for nurse

## 2017-04-17 NOTE — PROGRESS NOTES
"Chief Complaint   Patient presents with     Ear Problem     Pt is here for a f/u right otitis externa.  Pt had an ear culture completed and was placed on cortisporin.  Pt is having a hard time hearing.     Doing well.   Reports sneezing attack following his last exam (patient had nasopharyngoscope). No current concerns.     He has continued loss with right ear. He has not been using aid due to drainage, drops.   Last audiogram was in 2/2016.       Past Medical History:   Diagnosis Date     Benign localized hyperplasia of prostate without urinary obstruction and other lower urinary tract symptoms (LUTS) 11/26/2010     CHD (coronary heart disease) 11/26/2010     Dyslipidemia 11/26/2010     GERD (gastroesophageal reflux disease) 11/26/2010     HTN (hypertension) 11/26/2010     Old myocardial infarction 11/26/2010     Other symptoms involving digestive system(787.99) 10/20/2006        Allergies   Allergen Reactions     Lisinopril Cough     Morphine      Current Outpatient Prescriptions   Medication     hydrocortisone (CORTAID) 1 % cream     famotidine (PEPCID) 20 MG tablet     losartan (COZAAR) 50 MG tablet     simvastatin (ZOCOR) 20 MG tablet     vitamin  B complex with vitamin C (VITAMIN  B COMPLEX) TABS     aspirin 325 MG tablet     metoprolol (TOPROL-XL) 25 MG 24 hr tablet     No current facility-administered medications for this visit.       ROS: 10 point ROS neg other than the symptoms noted above in the HPI.  /64 (BP Location: Left arm, Cuff Size: Adult Regular)  Pulse 54  Temp 97.6  F (36.4  C) (Tympanic)  Ht 5' 11\" (1.803 m)  Wt 185 lb (83.9 kg)  BMI 25.8 kg/m2    General Appearance: healthy, alert, active and no distress  Head: Normocephalic. No masses, lesions, tenderness or abnormalities  Eyes: conjuctiva clear, PERRL, EOM intact  Ears: External ears normal. Canals clear. TM's are intact.   Nose: Nares normal  Mouth: normal  Neck: Supple, no cervical adenopathy, no thyromegaly, Full range of " motion in all planes        The lips appear normal and without lesion. The dentition is in good condition The patient has a normal appearing and functioning hard and soft palate without bifid uvula or submucosal cleft. The tongue is normal in appearance without erosive lesion or fungating mass. The oral mucosa is soft and normal in appearance. The patient also has a soft floor of mouth and base of tongue.      The ear canals were examined underneath the operating microscope and with an otologic speculum.   Right canal scant moisture/ residue from otics removed. Tolerated well.   Right TM appears without polyp/ granulation tissue. Otorrhea resolved. There is a small hypervascular area in posterior-inferior quadrant.   Tympanosclerosis.     ASSESSMENT:    ICD-10-CM    1. Myringitis- Resolved H73.20    2. Other infective acute otitis externa of right ear- REsolved H60.391    3. SNHL (sensorineural hearing loss) H90.5      Right ear improved. NO evidence of OE/OM.   Negative nasal exam at his last visit.     He will proceed with audiogram as he is due for his HAC and audiogram.       Lizzeth Harris PA-C  ENT  [facility]  [phone]

## 2017-04-17 NOTE — NURSING NOTE
"Chief Complaint   Patient presents with     Ear Problem     Pt is here for a f/u right otitis externa.  Pt had an ear culture completed and was placed on cortisporin.  Pt is having a hard time hearing.       Initial /64 (BP Location: Left arm, Cuff Size: Adult Regular)  Pulse 54  Temp 97.6  F (36.4  C) (Tympanic)  Ht 5' 11\" (1.803 m)  Wt 185 lb (83.9 kg)  BMI 25.8 kg/m2 Estimated body mass index is 25.8 kg/(m^2) as calculated from the following:    Height as of this encounter: 5' 11\" (1.803 m).    Weight as of this encounter: 185 lb (83.9 kg).  Medication Reconciliation: complete   Mili Guzman LPN      "

## 2017-04-17 NOTE — MR AVS SNAPSHOT
"              After Visit Summary   4/17/2017    James Grant    MRN: [id number]           Patient Information     Date Of Birth          3/5/1927        Visit Information        Provider Department      4/17/2017 11:15 AM Lizzeth Harris PA-C [facility]        Today's Diagnoses     Myringitis- Resolved    -  1    Other infective acute otitis externa of right ear- REsolved        SNHL (sensorineural hearing loss)          Care Instructions    Canals are clear  Right ear improved   No infection remaining.      Follow up for audiogram.     If there are concerns or questions, Call [phone] and ask for nurse        Follow-ups after your visit        Who to contact     If you have questions or need follow up information about today's clinic visit or your schedule please contact [facility] directly at [phone].  Normal or non-critical lab and imaging results will be communicated to you by MyChart, letter or phone within 4 business days after the clinic has received the results. If you do not hear from us within 7 days, please contact the clinic through MyChart or phone. If you have a critical or abnormal lab result, we will notify you by phone as soon as possible.  Submit refill requests through Klout or call your pharmacy and they will forward the refill request to us. Please allow 3 business days for your refill to be completed.          Additional Information About Your Visit        Nuvotronicshart Information     Klout lets you send messages to your doctor, view your test results, renew your prescriptions, schedule appointments and more. To sign up, go to [email or web address] . Click on \"Log in\" on the left side of the screen, which will take you to the Welcome page. Then click on \"Sign up Now\" on the right side of the page.     You will be asked to enter the access code listed below, as well as some personal information. Please follow the directions to create your username and " "password.     Your access code is: [email or web address]  Expires: 2017 10:59 AM     Your access code will  in 90 days. If you need help or a new code, please call your [place] clinic or [phone].        Care EveryWhere ID     This is your Care EveryWhere ID. This could be used by other organizations to access your [place] medical records  [id number]        Your Vitals Were     Pulse Temperature Height BMI (Body Mass Index)          54 97.6  F (36.4  C) (Tympanic) 5' 11\" (1.803 m) 25.8 kg/m2         Blood Pressure from Last 3 Encounters:   17 130/64   17 118/58   17 124/72    Weight from Last 3 Encounters:   17 185 lb (83.9 kg)   17 190 lb (86.2 kg)   17 190 lb (86.2 kg)              Today, you had the following     No orders found for display       Primary Care Provider Office Phone # Fax #    Broderick Flowers [phone]       [facility] [address] [address]        Thank you!     Thank you for choosing [facility]  for your care. Our goal is always to provide you with excellent care. Hearing back from our patients is one way we can continue to improve our services. Please take a few minutes to complete the written survey that you may receive in the mail after your visit with us. Thank you!             Your Updated Medication List - Protect others around you: Learn how to safely use, store and throw away your medicines at www.disposemymeds.org.          This list is accurate as of: 17 11:15 AM.  Always use your most recent med list.                   Brand Name Dispense Instructions for use    aspirin 325 MG tablet      Take 0.5 tablets by mouth daily.       famotidine 20 MG tablet    PEPCID    90 tablet    Take one (1) tablet BY MOUTH daily       hydrocortisone 1 % cream    CORTAID    30 g    Apply topically At Bedtime       losartan 50 MG tablet    COZAAR    30 tablet    Take 1 tablet (50 mg) by mouth daily       " metoprolol 25 MG 24 hr tablet    TOPROL-XL    45 tablet    Take 0.5 Tabs by mouth at bedtime. Do not crush or chew.       simvastatin 20 MG tablet    ZOCOR    90 tablet    Take 1 tablet (20 mg) by mouth every evening       vitamin B complex with vitamin C Tabs tablet      Take 1 tablet by mouth daily

## 2017-04-28 LAB
FUNGUS SPEC CULT: NORMAL
MICRO REPORT STATUS: NORMAL
SPECIMEN SOURCE: NORMAL

## 2017-05-09 DIAGNOSIS — E78.5 DYSLIPIDEMIA: ICD-10-CM

## 2017-05-09 DIAGNOSIS — I10 BENIGN ESSENTIAL HYPERTENSION: ICD-10-CM

## 2017-05-09 RX ORDER — SIMVASTATIN 20 MG
20 TABLET ORAL EVERY EVENING
Qty: 90 TABLET | Refills: 0 | Status: SHIPPED | OUTPATIENT
Start: 2017-05-09 | End: 2017-08-02

## 2017-05-09 RX ORDER — LOSARTAN POTASSIUM 50 MG/1
50 TABLET ORAL DAILY
Qty: 30 TABLET | Refills: 5 | Status: SHIPPED | OUTPATIENT
Start: 2017-05-09 | End: 2017-10-27

## 2017-05-31 ENCOUNTER — OFFICE VISIT (OUTPATIENT)
Dept: AUDIOLOGY | Facility: OTHER | Age: 82
End: 2017-05-31
Attending: AUDIOLOGIST
Payer: MEDICARE

## 2017-05-31 DIAGNOSIS — H90.3 SENSORINEURAL HEARING LOSS, BILATERAL: Primary | ICD-10-CM

## 2017-05-31 PROCEDURE — V5299 HEARING SERVICE: HCPCS | Performed by: AUDIOLOGIST

## 2017-05-31 NOTE — PROGRESS NOTES
Background: Patient reports both aids stopped working. He is struggling to hear. He was last seen 1/12/17 and hearing aid conformity performed.  Results: Hearing aid coverage and ABN reviewed. He chose to proceed with in-office hearing aid repair. Otoscopy shows clear ear canals. Hearing aid inspection with magnification suggests cerumen which was suctioned/vacuumed. Batteries test good. There is a hole in the helix are of left shell. No feedback at this time. Reviewed remake costs but patient does not want to proceed at this time. Patient reports he is hearing really well now.  Recommendations: Follow up as needed or per schedule.  Rae Terry M.S., [facility]  Audiologist #[id number]

## 2017-05-31 NOTE — MR AVS SNAPSHOT
"              After Visit Summary   5/31/2017    James Grant    MRN: [id number]           Patient Information     Date Of Birth          3/5/1927        Visit Information        Provider Department      5/31/2017 11:15 AM Rae Terry AuD [facility]        Today's Diagnoses     Sensorineural hearing loss, bilateral    -  1       Follow-ups after your visit        Your next 10 appointments already scheduled     May 31, 2017  6:15 PM CDT   Xray with HC XRAY   [facility] [place] [facility] [place] [facility]    [address]  [place] [address]   [phone]              Who to contact     If you have questions or need follow up information about today's clinic visit or your schedule please contact [facility] directly at [phone].  Normal or non-critical lab and imaging results will be communicated to you by MyChart, letter or phone within 4 business days after the clinic has received the results. If you do not hear from us within 7 days, please contact the clinic through MyChart or phone. If you have a critical or abnormal lab result, we will notify you by phone as soon as possible.  Submit refill requests through Kimble or call your pharmacy and they will forward the refill request to us. Please allow 3 business days for your refill to be completed.          Additional Information About Your Visit        Symplerhart Information     Kimble lets you send messages to your doctor, view your test results, renew your prescriptions, schedule appointments and more. To sign up, go to [email or web address] . Click on \"Log in\" on the left side of the screen, which will take you to the Welcome page. Then click on \"Sign up Now\" on the right side of the page.     You will be asked to enter the access code listed below, as well as some personal information. Please follow the directions to create your username and password.     Your access code is: [email or web address]  Expires: 6/29/2017 10:59 AM   "   Your access code will  in 90 days. If you need help or a new code, please call your [place] clinic or [phone].        Care EveryWhere ID     This is your Care EveryWhere ID. This could be used by other organizations to access your [place] medical records  [id number]         Blood Pressure from Last 3 Encounters:   17 130/64   17 118/58   17 124/72    Weight from Last 3 Encounters:   17 185 lb (83.9 kg)   17 190 lb (86.2 kg)   17 190 lb (86.2 kg)              Today, you had the following     No orders found for display       Primary Care Provider Office Phone # Fax #    Broderick Flowers [phone]       [facility] [address]  [place] [address]        Thank you!     Thank you for choosing [facility]  for your care. Our goal is always to provide you with excellent care. Hearing back from our patients is one way we can continue to improve our services. Please take a few minutes to complete the written survey that you may receive in the mail after your visit with us. Thank you!             Your Updated Medication List - Protect others around you: Learn how to safely use, store and throw away your medicines at www.disposemymeds.org.          This list is accurate as of: 17 11:15 AM.  Always use your most recent med list.                   Brand Name Dispense Instructions for use    aspirin 325 MG tablet      Take 0.5 tablets by mouth daily.       famotidine 20 MG tablet    PEPCID    90 tablet    Take one (1) tablet BY MOUTH daily       hydrocortisone 1 % cream    CORTAID    30 g    Apply topically At Bedtime       losartan 50 MG tablet    COZAAR    30 tablet    Take 1 tablet (50 mg) by mouth daily       metoprolol 25 MG 24 hr tablet    TOPROL-XL    45 tablet    Take 0.5 Tabs by mouth at bedtime. Do not crush or chew.       simvastatin 20 MG tablet    ZOCOR    90 tablet    Take 1 tablet (20 mg) by mouth every evening        vitamin B complex with vitamin C Tabs tablet      Take 1 tablet by mouth daily

## 2017-08-04 ENCOUNTER — OFFICE VISIT (OUTPATIENT)
Dept: OTOLARYNGOLOGY | Facility: OTHER | Age: 82
End: 2017-08-04
Attending: NURSE PRACTITIONER
Payer: MEDICARE

## 2017-08-04 VITALS
HEIGHT: 72 IN | OXYGEN SATURATION: 98 % | HEART RATE: 68 BPM | SYSTOLIC BLOOD PRESSURE: 126 MMHG | WEIGHT: 185 LBS | BODY MASS INDEX: 25.06 KG/M2 | DIASTOLIC BLOOD PRESSURE: 74 MMHG | TEMPERATURE: 97 F

## 2017-08-04 DIAGNOSIS — H92.11 OTORRHEA OF RIGHT EAR: Primary | ICD-10-CM

## 2017-08-04 DIAGNOSIS — H60.391 OTHER INFECTIVE ACUTE OTITIS EXTERNA OF RIGHT EAR: ICD-10-CM

## 2017-08-04 DIAGNOSIS — H90.5 SENSORINEURAL HEARING LOSS (SNHL), UNSPECIFIED LATERALITY: ICD-10-CM

## 2017-08-04 PROCEDURE — 92504 EAR MICROSCOPY EXAMINATION: CPT | Performed by: NURSE PRACTITIONER

## 2017-08-04 PROCEDURE — 99213 OFFICE O/P EST LOW 20 MIN: CPT | Mod: 25 | Performed by: NURSE PRACTITIONER

## 2017-08-04 PROCEDURE — 69210 REMOVE IMPACTED EAR WAX UNI: CPT | Performed by: NURSE PRACTITIONER

## 2017-08-04 PROCEDURE — 99213 OFFICE O/P EST LOW 20 MIN: CPT

## 2017-08-04 ASSESSMENT — PAIN SCALES - GENERAL: PAINLEVEL: NO PAIN (0)

## 2017-08-04 NOTE — MR AVS SNAPSHOT
"              After Visit Summary   8/4/2017    James Grant    MRN: [id number]           Patient Information     Date Of Birth          3/5/1927        Visit Information        Provider Department      8/4/2017 1:15 PM Tamie Muller APRN CNP [facility]        Today's Diagnoses     Otorrhea of right ear    -  1    Sensorineural hearing loss (SNHL), unspecified laterality          Care Instructions    Use your Cortisporin ear drops to right ear as directed x 1 week.    Keep hearing aide out of right ear as able for the next 1-2 weeks.    Keep right ear clean and dry.    Follow up in 1-2 weeks for re-evaluation, sooner as needed.    If there are questions or concerns, call [phone] and ask for nurse.            Follow-ups after your visit        Follow-up notes from your care team     Return for 1-2 weeks recheck right ear.      Who to contact     If you have questions or need follow up information about today's clinic visit or your schedule please contact [facility] directly at [phone].  Normal or non-critical lab and imaging results will be communicated to you by [employer], letter or phone within 4 business days after the clinic has received the results. If you do not hear from us within 7 days, please contact the clinic through [employer] or phone. If you have a critical or abnormal lab result, we will notify you by phone as soon as possible.  Submit refill requests through Chunyu or call your pharmacy and they will forward the refill request to us. Please allow 3 business days for your refill to be completed.          Additional Information About Your Visit        [employer] Information     Chunyu lets you send messages to your doctor, view your test results, renew your prescriptions, schedule appointments and more. To sign up, go to [email or web address] . Click on \"Log in\" on the left side of the screen, which will take you to the Welcome page. Then click on \"Sign up Now\" on " the right side of the page.     You will be asked to enter the access code listed below, as well as some personal information. Please follow the directions to create your username and password.     Your access code is: [email or web address]  Expires: 2017  1:44 PM     Your access code will  in 90 days. If you need help or a new code, please call your [facility] or [phone].        Care EveryWhere ID     This is your Care EveryWhere ID. This could be used by other organizations to access your [place] medical records  [id number]        Your Vitals Were     Pulse Temperature Height Pulse Oximetry BMI (Body Mass Index)       68 97  F (36.1  C) (Tympanic) 6' (1.829 m) 98% 25.09 kg/m2        Blood Pressure from Last 3 Encounters:   17 126/74   17 130/64   17 118/58    Weight from Last 3 Encounters:   17 185 lb (83.9 kg)   17 185 lb (83.9 kg)   17 190 lb (86.2 kg)              Today, you had the following     No orders found for display       Primary Care Provider Office Phone # Fax #    Broderick Flowers [phone]       [facility] [address] [address]        Equal Access to Services     TRISTA CHRISTIANSEN : Hadii aad ku hadasho Soomaali, waaxda luqadaha, qaybta kaalmada adeegyada, clayton kaufman . So [facility] [phone].    ATENCIÓN: Si habla español, tiene a mendes disposición servicios gratuitos de asistencia lingüística. Llame al [phone].    We comply with applicable federal civil rights laws and [place] laws. We do not discriminate on the basis of race, color, national origin, age, disability sex, sexual orientation or gender identity.            Thank you!     Thank you for choosing [facility]  for your care. Our goal is always to provide you with excellent care. Hearing back from our patients is one way we can continue to improve our services. Please take a few minutes to complete the written  survey that you may receive in the mail after your visit with us. Thank you!             Your Updated Medication List - Protect others around you: Learn how to safely use, store and throw away your medicines at www.disposemymeds.org.          This list is accurate as of: 8/4/17  1:44 PM.  Always use your most recent med list.                   Brand Name Dispense Instructions for use Diagnosis    aspirin 325 MG tablet      Take 0.5 tablets by mouth daily.        famotidine 20 MG tablet    PEPCID    90 tablet    Take one (1) tablet BY MOUTH daily    Gastroesophageal reflux disease without esophagitis       hydrocortisone 1 % cream    CORTAID    30 g    Apply topically At Bedtime    Venous stasis dermatitis of both lower extremities       losartan 50 MG tablet    COZAAR    30 tablet    Take 1 tablet (50 mg) by mouth daily    Benign essential hypertension       metoprolol 25 MG 24 hr tablet    TOPROL-XL    45 tablet    Take 0.5 Tabs by mouth at bedtime. Do not crush or chew.    Benign essential hypertension       simvastatin 20 MG tablet    ZOCOR    90 tablet    Take 1 tablet (20 mg) by mouth every evening    Dyslipidemia       vitamin B complex with vitamin C Tabs tablet      Take 1 tablet by mouth daily

## 2017-08-04 NOTE — PATIENT INSTRUCTIONS
Use your Cortisporin ear drops to right ear as directed x 1 week.    Keep hearing aide out of right ear as able for the next 1-2 weeks.    Keep right ear clean and dry.    Follow up in 1-2 weeks for re-evaluation, sooner as needed.    If there are questions or concerns, call [phone] and ask for nurse.

## 2017-08-04 NOTE — NURSING NOTE
Chief Complaint   Patient presents with     Cerumen Impaction     Ear cleaning List        Initial /74 (BP Location: Right arm, Patient Position: Chair, Cuff Size: Adult Regular)  Pulse 68  Temp 97  F (36.1  C) (Tympanic)  Ht 6' (1.829 m)  Wt 185 lb (83.9 kg)  SpO2 98%  BMI 25.09 kg/m2 Estimated body mass index is 25.09 kg/(m^2) as calculated from the following:    Height as of this encounter: 6' (1.829 m).    Weight as of this encounter: 185 lb (83.9 kg).  Medication Reconciliation: complete     Dory Hummel

## 2017-08-04 NOTE — PROGRESS NOTES
Otolaryngology Progress Note           Chief Complaint:     Patient presents with:  Cerumen Impaction: Ear cleaning List          History of Present Illness:     James Grant is a 89 year old male here today for ear cleaning. Last seen in ENT by JONI Seth 4/17/17, right OE and myringitis resolved. He denies any otalgia or otorrhea. No changes in his hearing. No fluctuating hearing loss. Wears bilateral hearing aides. No vertigo or tinnitus. No aural fullness. No facial pain, weakness, paresthesia or dysphagia.          Physical Exam:     /74 (BP Location: Right arm, Patient Position: Chair, Cuff Size: Adult Regular)  Pulse 68  Temp 97  F (36.1  C) (Tympanic)  Ht 6' (1.829 m)  Wt 185 lb (83.9 kg)  SpO2 98%  BMI 25.09 kg/m2  General - The patient is well nourished and well developed, and appears to have good nutritional status.  Alert and oriented to person and place, interactive.  Head and Face - Normocephalic and atraumatic, with no gross asymmetry noted of the contour of the facial features.  The facial nerve is intact, with strong symmetric movements.  Neck-no palpable lymphadenopathy or thyroid mass.  Trachea is midline.  Eyes - Extraocular movements intact.   Ears- External ears normal. Ears examined under microscope. Left EAC with minimal cerumen removed with cerumen loop. TM intact without effusion, retraction, perforation or mass. Right EAC with minimal white/yellow drainage, suctioned with #5 suction. Mild canal erythema, no swelling. TM intact with the hypervascular area posterior-inferior quadrant. TM otherwise intact without worrisome findings.   Nose - Nares normal.  Mouth - Examination of the oral cavity shows pink, healthy, moist mucosa. Dentition in good condition.  No lesions or ulceration noted. The tongue is mobile and midline.  Throat - The walls of the oropharynx were smooth, pink, moist, symmetric, and had no lesions or ulcerations.  The tonsillar pillars and soft palate were  symmetric. The uvula was midline on elevation.           Assessment and Plan:       ICD-10-CM    1. Otorrhea of right ear H92.11    2. Other infective acute otitis externa of right ear H60.391    3. Sensorineural hearing loss (SNHL), unspecified laterality H90.5      Ear suctioned today.  He still has Cortisporin drops at home. Instructed to use as directed to right ear x 1 week.  Keep right hearing aide out as able. Keep ear clean and dry.  Follow up in 1-2 weeks for re-evaluation, sooner as needed.     Tamie Muller NP  ENT  [facility] [place]  [phone]

## 2017-09-09 DIAGNOSIS — E78.5 DYSLIPIDEMIA: ICD-10-CM

## 2017-09-09 DIAGNOSIS — I10 BENIGN ESSENTIAL HYPERTENSION: ICD-10-CM

## 2017-09-11 RX ORDER — METOPROLOL SUCCINATE 25 MG/1
TABLET, EXTENDED RELEASE ORAL
Qty: 45 TABLET | Refills: 1 | Status: SHIPPED | OUTPATIENT
Start: 2017-09-11 | End: 2018-02-24

## 2017-09-11 RX ORDER — SIMVASTATIN 20 MG
TABLET ORAL
Qty: 30 TABLET | Refills: 0 | Status: SHIPPED | OUTPATIENT
Start: 2017-09-11 | End: 2017-10-09

## 2017-10-09 DIAGNOSIS — E78.5 DYSLIPIDEMIA: ICD-10-CM

## 2017-10-09 NOTE — TELEPHONE ENCOUNTER
Zocor      Last Written Prescription Date: 09/11/2017  Last Fill Quantity: 30, # refills: 0  Last Office Visit with G, [facility] or [facility] prescribing provider: 03/14/2017       Lab Results   Component Value Date    CHOL 108 06/07/2016     Lab Results   Component Value Date    HDL 36 06/07/2016     Lab Results   Component Value Date    LDL 54 06/07/2016     Lab Results   Component Value Date    TRIG 92 06/07/2016     Lab Results   Component Value Date    CHOLHDLRATIO 2.7 01/08/2014

## 2017-10-10 RX ORDER — SIMVASTATIN 20 MG
TABLET ORAL
Qty: 30 TABLET | Refills: 0 | Status: SHIPPED | OUTPATIENT
Start: 2017-10-10 | End: 2017-10-27

## 2017-10-27 DIAGNOSIS — I10 BENIGN ESSENTIAL HYPERTENSION: ICD-10-CM

## 2017-10-27 DIAGNOSIS — E78.5 DYSLIPIDEMIA: ICD-10-CM

## 2017-10-31 RX ORDER — LOSARTAN POTASSIUM 50 MG/1
TABLET ORAL
Qty: 30 TABLET | Refills: 2 | Status: SHIPPED | OUTPATIENT
Start: 2017-10-31 | End: 2018-01-28

## 2017-10-31 RX ORDER — SIMVASTATIN 20 MG
TABLET ORAL
Qty: 30 TABLET | Refills: 0 | Status: SHIPPED | OUTPATIENT
Start: 2017-10-31 | End: 2017-12-01

## 2017-11-14 ENCOUNTER — ALLIED HEALTH/NURSE VISIT (OUTPATIENT)
Dept: FAMILY MEDICINE | Facility: OTHER | Age: 82
End: 2017-11-14
Attending: FAMILY MEDICINE
Payer: MEDICARE

## 2017-11-14 DIAGNOSIS — Z23 NEED FOR PROPHYLACTIC VACCINATION AND INOCULATION AGAINST INFLUENZA: Primary | ICD-10-CM

## 2017-11-14 PROCEDURE — 90662 IIV NO PRSV INCREASED AG IM: CPT

## 2017-11-14 PROCEDURE — 90471 IMMUNIZATION ADMIN: CPT

## 2017-11-14 NOTE — PROGRESS NOTES

## 2017-11-14 NOTE — MR AVS SNAPSHOT
"              After Visit Summary   2017    James Grant    MRN: [id number]           Patient Information     Date Of Birth          3/5/1927        Visit Information        Provider Department      2017 9:45 AM HC FP NURSE Loretto Dandy Ramírez        Today's Diagnoses     Need for prophylactic vaccination and inoculation against influenza    -  1       Follow-ups after your visit        Who to contact     If you have questions or need follow up information about today's clinic visit or your schedule please contact [facility] directly at [phone].  Normal or non-critical lab and imaging results will be communicated to you by [employer], letter or phone within 4 business days after the clinic has received the results. If you do not hear from us within 7 days, please contact the clinic through [employer] or phone. If you have a critical or abnormal lab result, we will notify you by phone as soon as possible.  Submit refill requests through [employer] or call your pharmacy and they will forward the refill request to us. Please allow 3 business days for your refill to be completed.          Additional Information About Your Visit        MyChart Information     [employer] lets you send messages to your doctor, view your test results, renew your prescriptions, schedule appointments and more. To sign up, go to [email or web address] . Click on \"Log in\" on the left side of the screen, which will take you to the Welcome page. Then click on \"Sign up Now\" on the right side of the page.     You will be asked to enter the access code listed below, as well as some personal information. Please follow the directions to create your username and password.     Your access code is: [email or web address]  Expires: 2018  9:47 AM     Your access code will  in 90 days. If you need help or a new code, please call your [facility] or [phone].        Care EveryWhere ID     This is your Care EveryWhere ID. " This could be used by other organizations to access your [facility] medical records  [id number]         Blood Pressure from Last 3 Encounters:   08/04/17 126/74   04/17/17 130/64   03/31/17 118/58    Weight from Last 3 Encounters:   08/04/17 185 lb (83.9 kg)   04/17/17 185 lb (83.9 kg)   03/31/17 190 lb (86.2 kg)              We Performed the Following     HC FLU VACCINE, INCREASED ANTIGEN, PRESV FREE     Vaccine Administration, Initial [[id number]]        Primary Care Provider Office Phone # Fax #    Broderick Flowers [phone]       [facility] [address]  [facility] [address]        Equal Access to Services     JOSE DE JESUS CHRISTIANSEN : Hadii asya rodriguezo Soюлия, waaxda luqadaha, qaybta kaalmada adeegyada, clayton kaufman . So [facility] [phone].    ATENCIÓN: Si habla español, tiene a mendes disposición servicios gratuitos de asistencia lingüística. [facility] [phone].    We comply with applicable federal civil rights laws and [place] laws. We do not discriminate on the basis of race, color, national origin, age, disability, sex, sexual orientation, or gender identity.            Thank you!     Thank you for choosing [facility]  for your care. Our goal is always to provide you with excellent care. Hearing back from our patients is one way we can continue to improve our services. Please take a few minutes to complete the written survey that you may receive in the mail after your visit with us. Thank you!             Your Updated Medication List - Protect others around you: Learn how to safely use, store and throw away your medicines at www.disposemymeds.org.          This list is accurate as of: 11/14/17  9:47 AM.  Always use your most recent med list.                   Brand Name Dispense Instructions for use Diagnosis    aspirin 325 MG tablet      Take 0.5 tablets by mouth daily.        famotidine 20 MG tablet    PEPCID    90 tablet    Take one (1) tablet BY  MOUTH daily    Gastroesophageal reflux disease without esophagitis       hydrocortisone 1 % cream    CORTAID    30 g    Apply topically At Bedtime    Venous stasis dermatitis of both lower extremities       losartan 50 MG tablet    COZAAR    30 tablet    Take 1 tablet (50 mg) by mouth daily COZAAR    Benign essential hypertension       metoprolol 25 MG 24 hr tablet    TOPROL-XL    45 tablet    Take 0.5 Tabs by mouth at bedtime. Do not crush or chew.    Benign essential hypertension       simvastatin 20 MG tablet    ZOCOR    30 tablet    Take 1 tablet (20 mg) by mouth every evening    Dyslipidemia       vitamin B complex with vitamin C Tabs tablet      Take 1 tablet by mouth daily

## 2017-12-01 DIAGNOSIS — K21.9 GASTROESOPHAGEAL REFLUX DISEASE WITHOUT ESOPHAGITIS: ICD-10-CM

## 2017-12-01 DIAGNOSIS — E78.5 DYSLIPIDEMIA: ICD-10-CM

## 2017-12-06 RX ORDER — SIMVASTATIN 20 MG
TABLET ORAL
Qty: 30 TABLET | Refills: 0 | Status: SHIPPED | OUTPATIENT
Start: 2017-12-06 | End: 2017-12-30

## 2017-12-06 RX ORDER — FAMOTIDINE 20 MG/1
TABLET, FILM COATED ORAL
Qty: 30 TABLET | Refills: 0 | Status: SHIPPED | OUTPATIENT
Start: 2017-12-06 | End: 2017-12-30

## 2017-12-11 ENCOUNTER — OFFICE VISIT (OUTPATIENT)
Dept: FAMILY MEDICINE | Facility: OTHER | Age: 82
End: 2017-12-11
Attending: FAMILY MEDICINE
Payer: MEDICARE

## 2017-12-11 VITALS
SYSTOLIC BLOOD PRESSURE: 122 MMHG | TEMPERATURE: 97.7 F | OXYGEN SATURATION: 98 % | WEIGHT: 185 LBS | HEIGHT: 71 IN | BODY MASS INDEX: 25.9 KG/M2 | DIASTOLIC BLOOD PRESSURE: 62 MMHG | HEART RATE: 58 BPM

## 2017-12-11 DIAGNOSIS — K62.5 RECTAL BLEEDING: Primary | ICD-10-CM

## 2017-12-11 LAB
BASOPHILS # BLD AUTO: 0 10E9/L (ref 0–0.2)
BASOPHILS NFR BLD AUTO: 0.4 %
DIFFERENTIAL METHOD BLD: NORMAL
EOSINOPHIL # BLD AUTO: 0.2 10E9/L (ref 0–0.7)
EOSINOPHIL NFR BLD AUTO: 2.2 %
ERYTHROCYTE [DISTWIDTH] IN BLOOD BY AUTOMATED COUNT: 13.2 % (ref 10–15)
HCT VFR BLD AUTO: 44.7 % (ref 40–53)
HGB BLD-MCNC: 15.2 G/DL (ref 13.3–17.7)
IMM GRANULOCYTES # BLD: 0 10E9/L (ref 0–0.4)
IMM GRANULOCYTES NFR BLD: 0.1 %
LYMPHOCYTES # BLD AUTO: 2.9 10E9/L (ref 0.8–5.3)
LYMPHOCYTES NFR BLD AUTO: 37.8 %
MCH RBC QN AUTO: 30.9 PG (ref 26.5–33)
MCHC RBC AUTO-ENTMCNC: 34 G/DL (ref 31.5–36.5)
MCV RBC AUTO: 91 FL (ref 78–100)
MONOCYTES # BLD AUTO: 1 10E9/L (ref 0–1.3)
MONOCYTES NFR BLD AUTO: 12.7 %
NEUTROPHILS # BLD AUTO: 3.5 10E9/L (ref 1.6–8.3)
NEUTROPHILS NFR BLD AUTO: 46.8 %
NRBC # BLD AUTO: 0 10*3/UL
NRBC BLD AUTO-RTO: 0 /100
PLATELET # BLD AUTO: 234 10E9/L (ref 150–450)
RBC # BLD AUTO: 4.92 10E12/L (ref 4.4–5.9)
WBC # BLD AUTO: 7.6 10E9/L (ref 4–11)

## 2017-12-11 PROCEDURE — 36415 COLL VENOUS BLD VENIPUNCTURE: CPT | Mod: ZL | Performed by: FAMILY MEDICINE

## 2017-12-11 PROCEDURE — 99212 OFFICE O/P EST SF 10 MIN: CPT

## 2017-12-11 PROCEDURE — 99213 OFFICE O/P EST LOW 20 MIN: CPT | Performed by: FAMILY MEDICINE

## 2017-12-11 PROCEDURE — 85025 COMPLETE CBC W/AUTO DIFF WBC: CPT | Mod: ZL | Performed by: FAMILY MEDICINE

## 2017-12-11 ASSESSMENT — ANXIETY QUESTIONNAIRES
1. FEELING NERVOUS, ANXIOUS, OR ON EDGE: SEVERAL DAYS
IF YOU CHECKED OFF ANY PROBLEMS ON THIS QUESTIONNAIRE, HOW DIFFICULT HAVE THESE PROBLEMS MADE IT FOR YOU TO DO YOUR WORK, TAKE CARE OF THINGS AT HOME, OR GET ALONG WITH OTHER PEOPLE: NOT DIFFICULT AT ALL
GAD7 TOTAL SCORE: 4
7. FEELING AFRAID AS IF SOMETHING AWFUL MIGHT HAPPEN: NOT AT ALL
5. BEING SO RESTLESS THAT IT IS HARD TO SIT STILL: NOT AT ALL
2. NOT BEING ABLE TO STOP OR CONTROL WORRYING: SEVERAL DAYS
3. WORRYING TOO MUCH ABOUT DIFFERENT THINGS: SEVERAL DAYS
6. BECOMING EASILY ANNOYED OR IRRITABLE: NOT AT ALL

## 2017-12-11 ASSESSMENT — PATIENT HEALTH QUESTIONNAIRE - PHQ9
5. POOR APPETITE OR OVEREATING: SEVERAL DAYS
SUM OF ALL RESPONSES TO PHQ QUESTIONS 1-9: 11

## 2017-12-11 ASSESSMENT — PAIN SCALES - GENERAL: PAINLEVEL: NO PAIN (0)

## 2017-12-11 NOTE — NURSING NOTE
"Chief Complaint   Patient presents with     Rectal Problem     Thought he was bleeding from rectum (looked like a pinkish red on the stool), but now he does not think so (pinkish red on stool wasn't all over about the size of a dime) - happened twice       Initial /62  Pulse 58  Temp 97.7  F (36.5  C) (Tympanic)  Ht 5' 11\" (1.803 m)  Wt 185 lb (83.9 kg)  SpO2 98%  BMI 25.8 kg/m2 Estimated body mass index is 25.8 kg/(m^2) as calculated from the following:    Height as of this encounter: 5' 11\" (1.803 m).    Weight as of this encounter: 185 lb (83.9 kg).  Medication Reconciliation: complete   MARTINE SANTIAGO LPN  "

## 2017-12-11 NOTE — PROGRESS NOTES
SUBJECTIVE:  James Grant, 89 year old, male is seen with the following medical problems.    Rectal bleeding.  Tomasz had two episodes of blood in the stool. This was a small amount and has not recurred.  No associated symptoms.    Denies fever, shaking, chills, dysphagia, change in appetite, weight loss, nausea, vomiting, diarrhea, melena. No change in bowel habits.    Current Outpatient Prescriptions   Medication Sig Dispense Refill     simvastatin (ZOCOR) 20 MG tablet Take 1 tablet (20 mg) by mouth every evening 30 tablet 0     famotidine (PEPCID) 20 MG tablet Take one (1) tablet BY MOUTH daily 30 tablet 0     losartan (COZAAR) 50 MG tablet Take 1 tablet (50 mg) by mouth daily COZAAR 30 tablet 2     metoprolol (TOPROL-XL) 25 MG 24 hr tablet Take 0.5 Tabs by mouth at bedtime. Do not crush or chew. 45 tablet 1     hydrocortisone (CORTAID) 1 % cream Apply topically At Bedtime 30 g 0     vitamin  B complex with vitamin C (VITAMIN  B COMPLEX) TABS Take 1 tablet by mouth daily       aspirin 325 MG tablet Take 0.5 tablets by mouth daily.          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 Sister 91     Social History     Social History     Marital status:  "     Spouse name: N/A     Number of children: N/A     Years of education: N/A     Occupational History      and  [employer]     01/01/1987 - RETIRED ([place] [employer])     Social History Main Topics     Smoking status: Former Smoker     Types: Cigarettes     Smokeless tobacco: Never Used      Comment: Tried to Quit (YES); YR QUIT (1962); Passive Exposure (NO)     Alcohol use No      Comment: 3 Drinks (BEER & LIQUOR) ~ OCCASIONALLY (QUIT IN 2000)     Drug use: No     Sexual activity: Not on file     Other Topics Concern     Blood Transfusions Yes     PERMITS     Caffeine Concern Yes     COFFEE - 4 CUPS DAILY     Exercise Yes     WALKING / HOUSEWORK - OCCASIONAL     Parent/Sibling W/ Cabg, Mi Or Angioplasty Before 65f 55m? No     Social History Narrative         Review Of Systems  Constitutional, HEENT, cardiovascular, pulmonary, gi and gu systems are negative, except as otherwise noted.      OBJECTIVE:/62  Pulse 58  Temp 97.7  F (36.5  C) (Tympanic)  Ht 5' 11\" (1.803 m)  Wt 185 lb (83.9 kg)  SpO2 98%  BMI 25.8 kg/m2      Exam:  Physical Exam   Constitutional: He is oriented to person, place, and time. He appears well-developed and well-nourished. No distress.   Neurological: He is alert and oriented to person, place, and time.   Psychiatric: He has a normal mood and affect.     Other exam not repeated    Labs:  Office Visit on 03/31/2017   Component Date Value Ref Range Status     Specimen Description 03/31/2017 Right Ear   Final     Culture Micro 03/31/2017 *  Final                    Value:Heavy growth Diphtheroids No further identification or sensitivity done  Susceptibility testing not routinely done       Micro Report Status 03/31/2017 FINAL 04/02/2017   Final     Specimen Description 03/31/2017 Right Ear   Final     Culture Micro 03/31/2017 Culture negative after 4 weeks   Final     Micro Report Status 03/31/2017 FINAL 04/28/2017   Final     Specimen Description " 03/31/2017 Right Ear   Final     Gram Stain 03/31/2017 *  Final                    Value:Many Epithelial cells seen  Few PMNs seen  Many Gram positive rods  Moderate Gram positive cocci       Micro Report Status 03/31/2017 FINAL 03/31/2017   Final       ASSESSMENT/PLAN:  Rectal bleeding  Hemoglobin stable.  Discussed potential causes.  He is not a candidate for colonoscopy at this time and we have elected to observe.  Follow up with persistent symptoms.  - CBC with platelets differential            Broderick Flowers MD

## 2017-12-11 NOTE — MR AVS SNAPSHOT
"              After Visit Summary   2017    James Grant    MRN: [id number]           Patient Information     Date Of Birth          3/5/1927        Visit Information        Provider Department      2017 11:20 AM Broderick Flowers MD [facility]        Today's Diagnoses     Rectal bleeding    -  1      Care Instructions    Observe rectal bleeding          Follow-ups after your visit        Follow-up notes from your care team     Return if symptoms worsen or fail to improve.      Who to contact     If you have questions or need follow up information about today's clinic visit or your schedule please contact [facility] directly at [phone].  Normal or non-critical lab and imaging results will be communicated to you by MyChart, letter or phone within 4 business days after the clinic has received the results. If you do not hear from us within 7 days, please contact the clinic through [employer] or phone. If you have a critical or abnormal lab result, we will notify you by phone as soon as possible.  Submit refill requests through D[employer] or call your pharmacy and they will forward the refill request to us. Please allow 3 business days for your refill to be completed.          Additional Information About Your Visit        MyChart Information     [employer] lets you send messages to your doctor, view your test results, renew your prescriptions, schedule appointments and more. To sign up, go to [email or web address] . Click on \"Log in\" on the left side of the screen, which will take you to the Welcome page. Then click on \"Sign up Now\" on the right side of the page.     You will be asked to enter the access code listed below, as well as some personal information. Please follow the directions to create your username and password.     Your access code is: [email or web address]  Expires: 2018  9:47 AM     Your access code will  in 90 days. If you need help or a new code, please call " "your [facility] clinic or [phone].        Care EveryWhere ID     This is your Care EveryWhere ID. This could be used by other organizations to access your [facility] medical records  [id number]        Your Vitals Were     Pulse Temperature Height Pulse Oximetry BMI (Body Mass Index)       58 97.7  F (36.5  C) (Tympanic) 5' 11\" (1.803 m) 98% 25.8 kg/m2        Blood Pressure from Last 3 Encounters:   12/11/17 122/62   08/04/17 126/74   04/17/17 130/64    Weight from Last 3 Encounters:   12/11/17 185 lb (83.9 kg)   08/04/17 185 lb (83.9 kg)   04/17/17 185 lb (83.9 kg)              We Performed the Following     CBC with platelets differential        Primary Care Provider Office Phone # Fax #    Broderick Flowers [phone]       [facility] [address]  [place] [address]        Equal Access to Services     [facility] Hospital[facility] : Hadii aad ku hadasho Soomaali, waaxda luqadaha, qaybta kaalmada adeegyada, waxay idiin hayaan fatuma kaufman . So [facility] [phone].    ATENCIÓN: Si habla español, tiene a mendes disposición servicios gratuitos de asistencia lingüística. Llame al [phone].    We comply with applicable federal civil rights laws and [place] laws. We do not discriminate on the basis of race, color, national origin, age, disability, sex, sexual orientation, or gender identity.            Thank you!     Thank you for choosing [facility] [place]  for your care. Our goal is always to provide you with excellent care. Hearing back from our patients is one way we can continue to improve our services. Please take a few minutes to complete the written survey that you may receive in the mail after your visit with us. Thank you!             Your Updated Medication List - Protect others around you: Learn how to safely use, store and throw away your medicines at www.disposemymeds.org.          This list is accurate as of: 12/11/17 11:59 PM.  Always use your most recent med list.             "       Brand Name Dispense Instructions for use Diagnosis    aspirin 325 MG tablet      Take 0.5 tablets by mouth daily.        famotidine 20 MG tablet    PEPCID    30 tablet    Take one (1) tablet BY MOUTH daily    Gastroesophageal reflux disease without esophagitis       hydrocortisone 1 % cream    CORTAID    30 g    Apply topically At Bedtime    Venous stasis dermatitis of both lower extremities       losartan 50 MG tablet    COZAAR    30 tablet    Take 1 tablet (50 mg) by mouth daily COZAAR    Benign essential hypertension       metoprolol 25 MG 24 hr tablet    TOPROL-XL    45 tablet    Take 0.5 Tabs by mouth at bedtime. Do not crush or chew.    Benign essential hypertension       simvastatin 20 MG tablet    ZOCOR    30 tablet    Take 1 tablet (20 mg) by mouth every evening    Dyslipidemia       vitamin B complex with vitamin C Tabs tablet      Take 1 tablet by mouth daily

## 2017-12-12 ASSESSMENT — ANXIETY QUESTIONNAIRES: GAD7 TOTAL SCORE: 4

## 2017-12-30 DIAGNOSIS — E78.5 DYSLIPIDEMIA: ICD-10-CM

## 2017-12-30 DIAGNOSIS — K21.9 GASTROESOPHAGEAL REFLUX DISEASE WITHOUT ESOPHAGITIS: ICD-10-CM

## 2018-01-02 NOTE — TELEPHONE ENCOUNTER
pepcid  Last Written Prescription Date: 12/6/17  Last Fill Quantity: 30,  # refills: 0   Last Office Visit with [facility] or [facility] prescribing provider: 12/11/17    zocor   Last Written Prescription Date: 12/6/17  Last Fill Quantity: 30,  # refills: 0   Last Office Visit with [facility], [facility] or [facility] prescribing provider: 12/11/17

## 2018-01-03 RX ORDER — FAMOTIDINE 20 MG/1
TABLET, FILM COATED ORAL
Qty: 30 TABLET | Refills: 5 | Status: SHIPPED | OUTPATIENT
Start: 2018-01-03 | End: 2018-07-11

## 2018-01-03 RX ORDER — SIMVASTATIN 20 MG
TABLET ORAL
Qty: 30 TABLET | Refills: 0 | Status: SHIPPED | OUTPATIENT
Start: 2018-01-03 | End: 2018-01-28

## 2018-01-03 NOTE — TELEPHONE ENCOUNTER
Zocor  Please see note below.  Patient due for lab.    LDL on file in past 12 months           Recent Labs   Lab Test  06/07/16   0923   LDL  54           Lab pended for you if you would like.  Please advise.  Thank you.

## 2018-02-02 DIAGNOSIS — E78.5 DYSLIPIDEMIA: ICD-10-CM

## 2018-02-02 LAB
CHOLEST SERPL-MCNC: 126 MG/DL
HDLC SERPL-MCNC: 46 MG/DL
LDLC SERPL CALC-MCNC: 62 MG/DL
NONHDLC SERPL-MCNC: 80 MG/DL
TRIGL SERPL-MCNC: 90 MG/DL

## 2018-02-02 PROCEDURE — 36415 COLL VENOUS BLD VENIPUNCTURE: CPT | Mod: ZL | Performed by: FAMILY MEDICINE

## 2018-02-02 PROCEDURE — 80061 LIPID PANEL: CPT | Mod: ZL | Performed by: FAMILY MEDICINE

## 2018-02-24 DIAGNOSIS — I10 BENIGN ESSENTIAL HYPERTENSION: ICD-10-CM

## 2018-02-24 DIAGNOSIS — E78.5 DYSLIPIDEMIA: ICD-10-CM

## 2018-02-26 RX ORDER — METOPROLOL SUCCINATE 25 MG/1
TABLET, EXTENDED RELEASE ORAL
Qty: 45 TABLET | Refills: 1 | Status: SHIPPED | OUTPATIENT
Start: 2018-02-26 | End: 2018-08-13

## 2018-02-26 RX ORDER — SIMVASTATIN 20 MG
TABLET ORAL
Qty: 30 TABLET | Refills: 5 | Status: SHIPPED | OUTPATIENT
Start: 2018-02-26 | End: 2018-07-11

## 2018-03-25 ENCOUNTER — HOSPITAL ENCOUNTER (EMERGENCY)
Facility: HOSPITAL | Age: 83
Discharge: HOME | End: 2018-03-25
Attending: PHYSICIAN ASSISTANT | Admitting: PHYSICIAN ASSISTANT
Payer: MEDICARE

## 2018-03-25 VITALS
SYSTOLIC BLOOD PRESSURE: 153 MMHG | OXYGEN SATURATION: 97 % | RESPIRATION RATE: 18 BRPM | TEMPERATURE: 98 F | HEART RATE: 72 BPM | DIASTOLIC BLOOD PRESSURE: 96 MMHG

## 2018-03-25 DIAGNOSIS — M54.50 ACUTE RIGHT-SIDED LOW BACK PAIN WITHOUT SCIATICA: ICD-10-CM

## 2018-03-25 LAB
ALBUMIN UR-MCNC: 10 MG/DL
APPEARANCE UR: CLEAR
BACTERIA #/AREA URNS HPF: ABNORMAL /HPF
BILIRUB UR QL STRIP: NEGATIVE
COLOR UR AUTO: YELLOW
GLUCOSE UR STRIP-MCNC: NEGATIVE MG/DL
HGB UR QL STRIP: NEGATIVE
KETONES UR STRIP-MCNC: NEGATIVE MG/DL
LEUKOCYTE ESTERASE UR QL STRIP: NEGATIVE
MUCOUS THREADS #/AREA URNS LPF: PRESENT /LPF
NITRATE UR QL: NEGATIVE
PH UR STRIP: 6.5 PH (ref 4.7–8)
RBC #/AREA URNS AUTO: 5 /HPF (ref 0–2)
SOURCE: ABNORMAL
SP GR UR STRIP: 1.02 (ref 1–1.03)
UROBILINOGEN UR STRIP-MCNC: NORMAL MG/DL (ref 0–2)
WBC #/AREA URNS AUTO: 1 /HPF (ref 0–5)

## 2018-03-25 PROCEDURE — 99283 EMERGENCY DEPT VISIT LOW MDM: CPT

## 2018-03-25 PROCEDURE — 99283 EMERGENCY DEPT VISIT LOW MDM: CPT | Performed by: PHYSICIAN ASSISTANT

## 2018-03-25 PROCEDURE — 81001 URINALYSIS AUTO W/SCOPE: CPT | Performed by: FAMILY MEDICINE

## 2018-03-25 RX ORDER — MOMETASONE FUROATE MONOHYDRATE 50 UG/1
2 SPRAY, METERED NASAL DAILY
COMMUNITY
End: 2018-07-24

## 2018-03-25 RX ORDER — METHOCARBAMOL 500 MG/1
1000 TABLET, FILM COATED ORAL 3 TIMES DAILY PRN
Qty: 21 TABLET | Refills: 1 | Status: SHIPPED | OUTPATIENT
Start: 2018-03-25 | End: 2018-07-24

## 2018-03-25 ASSESSMENT — ENCOUNTER SYMPTOMS
VOMITING: 0
BACK PAIN: 1
CHILLS: 0
NECK PAIN: 0
SHORTNESS OF BREATH: 0
NAUSEA: 0
FEVER: 0
ABDOMINAL PAIN: 0

## 2018-03-25 NOTE — ED PROVIDER NOTES
History     Chief Complaint   Patient presents with     Back Pain     states he bent over to shave on friday and felt a catch in his mid to lower mid back area. states hes toughed it out for a couple of days and its starting to be more painful.STATES FEELS LIKE WHEN HE HAD GALLSTONES, wondering if he has kidney stones, no urinary sxs     The history is provided by the patient.     James Grant is a 89 year old male who presents to the emergency department ambulatory for evaluation of right-sided low back pain.  The pain began 2 days ago abruptly while bending over to shave.  Denies any fevers or chills.  Denies any abdominal pain.  Denies any lower urinary tract symptoms.  Denies any bowel or bladder dysfunction.  Denies any history of cancer.  Denies any falls.    Problem List:    Patient Active Problem List    Diagnosis Date Noted     Venous stasis dermatitis of both lower extremities 03/16/2017     Priority: Medium     ACP (advance care planning) 06/06/2016     Priority: Medium     Advance Care Planning 6/6/2016: ACP Review of Chart / Resources Provided:  Reviewed chart for advance care plan.  James Grant has no plan or code status on file. Discussed available resources and provided with information. Confirmed code status reflects current choices pending further ACP discussions.  Confirmed/documented legally designated decision makers.  Added by Yani Anderson             Advance care planning 02/08/2016     Priority: Medium     Advance Care Planning 2/8/2016: Receipt of ACP document:  Received: POLST which was signed and dated by provider on 1/18/16.  Document previously scanned on 1/22/16.  Order reviewed and found to be valid.  Code Status reflects choices in most recent ACP document.  Confirmed/documented designated decision maker(s).  Added by Trinh Garces             BPH (benign prostatic hyperplasia) 01/08/2014     Priority: Medium     SNHL (sensorineural hearing loss) 07/17/2013      Priority: Medium     ETD (eustachian tube dysfunction) 07/17/2013     Priority: Medium     HTN (hypertension) 11/26/2010     Priority: Medium     Dyslipidemia 11/26/2010     Priority: Medium     GERD (gastroesophageal reflux disease) 11/26/2010     Priority: Medium     CHD (coronary heart disease) 11/26/2010     Priority: Medium     07/2010 S/P inferior wall MI  Angioplasty and stenting X 2 RCA  08/2010 angioplasty and stenting (LIDIA) LAD          Past Medical History:    Past Medical History:   Diagnosis Date     Benign localized hyperplasia of prostate without urinary obstruction and other lower urinary tract symptoms (LUTS) 11/26/2010     CHD (coronary heart disease) 11/26/2010     Dyslipidemia 11/26/2010     GERD (gastroesophageal reflux disease) 11/26/2010     HTN (hypertension) 11/26/2010     Old myocardial infarction 11/26/2010     Other symptoms involving digestive system(787.99) 10/20/2006       Past Surgical History:    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 Sister 91       Social History:  Marital Status:   [2]  Social History   Substance Use Topics     Smoking status: Former Smoker     Types: Cigarettes     Smokeless tobacco: Never Used      Comment: Tried to Quit (YES); YR QUIT (1962); Passive Exposure (NO)     Alcohol use Yes      Comment: 3 Drinks (BEER & LIQUOR) ~ OCCASIONALLY (QUIT IN 2000)        Medications:      Acetaminophen (TYLENOL PO)   Fluticasone Propionate (FLONASE NA)   Loratadine (CLARITIN PO)   mometasone (NASONEX) 50 MCG/ACT spray   Nitroglycerin (NITROSTAT SL)   methocarbamol (ROBAXIN) 500 MG tablet   metoprolol succinate (TOPROL-XL) 25 MG 24 hr tablet   simvastatin (ZOCOR) 20 MG tablet    losartan (COZAAR) 50 MG tablet   famotidine (PEPCID) 20 MG tablet   vitamin  B complex with vitamin C (VITAMIN  B COMPLEX) TABS   aspirin 325 MG tablet   hydrocortisone (CORTAID) 1 % cream         Review of Systems   Constitutional: Negative for chills and fever.   Respiratory: Negative for shortness of breath.    Gastrointestinal: Negative for abdominal pain, nausea and vomiting.   Genitourinary: Negative.    Musculoskeletal: Positive for back pain. Negative for neck pain.   Skin: Negative.        Physical Exam   BP: 132/71  Pulse: 72  Heart Rate: 81  Temp: 98  F (36.7  C)  Resp: 18  SpO2: 96 %      Physical Exam   Constitutional: He is oriented to person, place, and time. He appears well-developed and well-nourished.   Resting comfortably on bed.   Cardiovascular: Normal rate and regular rhythm.    Pulmonary/Chest: Effort normal.   Musculoskeletal:   He has acute focal tenderness with right paravertebral spasms at approximately L2.  No midline spinal tenderness. He has normal active and passive range of motion of the right leg.  Normal sensation.   Neurological: He is alert and oriented to person, place, and time.   Skin: Skin is warm and dry.   Psychiatric: He has a normal mood and affect.   Nursing note and vitals reviewed.      ED Course     ED Course     Procedures               Critical Care time:  none               Results for orders placed or performed during the hospital encounter of 03/25/18 (from the past 24 hour(s))   UA reflex to Microscopic   Result Value Ref Range    Color Urine Yellow     Appearance Urine Clear     Glucose Urine Negative NEG^Negative mg/dL    Bilirubin Urine Negative NEG^Negative    Ketones Urine Negative NEG^Negative mg/dL    Specific Gravity Urine 1.016 1.003 - 1.035    Blood Urine Negative NEG^Negative    pH Urine 6.5 4.7 - 8.0 pH    Protein Albumin Urine 10 (A) NEG^Negative mg/dL    Urobilinogen mg/dL Normal 0.0 - 2.0 mg/dL    Nitrite Urine Negative NEG^Negative    Leukocyte  Esterase Urine Negative NEG^Negative    Source Midstream Urine     RBC Urine 5 (H) 0 - 2 /HPF    WBC Urine 1 0 - 5 /HPF    Bacteria Urine None (A) NEG^Negative /HPF    Mucous Urine Present (A) NEG^Negative /LPF       Medications - No data to display    Assessments & Plan (with Medical Decision Making)   I had a long detailed discussion with James regarding his pain.  The pain began abruptly while bending over shaving 2 days ago.  It is sharp and nonradiating.  He has focal tenderness to the right paravertebral area of his lumbar spine, but no midline spinal pain, and tells me that this is the exact location of the pain.  The pain entirely resolves when he is not moving or bending.  He has no abdominal pain or vomiting.  He has no leg weakness or paresthesias.  No history of cancer. No long term steroid use. His risk of spinal fracture, epidural abscess, and other spinal catastrophe is very low. A urine was obtained at triage, that showed a 5 RBCs.  However in reviewing his previous urinalysis, the microscopic hematuria is not new.  He has no symptoms that are consistent with ureteral colic.  He has no red flags.  I can find no reasonable or compelling medical indication for imaging at this time.  He is comfortable, pleasant, and talkative. We long detailed discussion regarding low back pain.  We will trial a small dose of Robaxin and he can use ibuprofen and Tylenol as needed.  I stressed that he return to the emergency department for any changes in his pain, intolerable pain, difficulty walking, bowel or bladder dysfunction, gross hematuria, fevers, or any other questions or concerns.    Please see discharge instructions.     (Please note that this note was completed with a voice recognition program.  Every attempt was made to edit the dictations, but inevitably there remain words that are mis-transcribed.)    I have reviewed the nursing notes.    I have reviewed the findings, diagnosis, plan and need for follow  up with the patient.       New Prescriptions    METHOCARBAMOL (ROBAXIN) 500 MG TABLET    Take 2 tablets (1,000 mg) by mouth 3 times daily as needed (back pain)       Final diagnoses:   Acute right-sided low back pain without sciatica       3/25/2018   [facility]     Clarke Atkins PA-C  03/25/18 1022       Clarke Atkins PA-C  03/25/18 1033

## 2018-03-25 NOTE — ED NOTES
Condition stable, understands discharge instructions.  Prescription x 1 e-scribed to Pharmacy of choice, discharged home.

## 2018-03-25 NOTE — ED AVS SNAPSHOT
[facility]    [address]    [place] [address]    Phone:  [phone]                                       James Grant   MRN: [id number]    Department:  [facility]   Date of Visit:  3/25/2018           After Visit Summary Signature Page     I have received my discharge instructions, and my questions have been answered. I have discussed any challenges I see with this plan with the nurse or doctor.    ..........................................................................................................................................  Patient/Patient Representative Signature      ..........................................................................................................................................  Patient Representative Print Name and Relationship to Patient    ..................................................               ................................................  Date                                            Time    ..........................................................................................................................................  Reviewed by Signature/Title    ...................................................              ..............................................  Date                                                            Time

## 2018-03-25 NOTE — DISCHARGE INSTRUCTIONS
Your symptoms are consistent with pain that is originating from your back.      You have no symptoms of kidney stones or other pathology.     There has been more research done on acute low back pain than nearly anything else in medicine.    It is one of the most common complaints seen in the ER and clinic.    They have found that there is virtually no proven treatment that is any better than simply time.     You may trial Robaxin for muscle spasms.  You may take Tylenol and Ibuprofen for pain.      Follow-up with Dr. Flowers for continued pain.      Return here for any intolerable pain, fevers, leg weakness, or any other concerns.

## 2018-03-25 NOTE — ED AVS SNAPSHOT
[facility]    [address] [address]    Phone:  [phone]                                       James Grant   MRN: [id number]    Department:  [facility]   Date of Visit:  3/25/2018           Patient Information     Date Of Birth          3/5/1927        Your diagnoses for this visit were:     Acute right-sided low back pain without sciatica        You were seen by Clarke Atkins PA-C.      Follow-up Information     Follow up with Broderick Flowers MD.    Specialty:  Family Practice    Why:  As needed    Contact information:    [address]  [facility] [address] [phone]          Follow up with [facility].    Specialty:  EMERGENCY MEDICINE    Why:  If symptoms worsen    Contact information:    [address] [address] [phone]    Additional information:    From [facility]: Take [address]. Turn left at [address]. Turn left at the first stoplight on [address]. At the first stop sign, take a right onto [address]. Take a left into the parking lot and continue through until you reach the North enterance of the building.       From [place]: Take [address]. Take the [address] ramp towards [place]. Turn left onto [address]. Take a slight right onto [address]. Turn left at the first stoplight on [address]. At the first stop sign, take a right onto [address]. Take a left into the parking lot and continue through until you reach the North enterance of the building.       From [place]: Take [address]. Take a right at [address]. At the first stop sign, take a right onto [address]. Take a left into the parking lot and continue through until you reach the North enterance of the building.         Discharge Instructions       Your symptoms are consistent with pain that is originating from your back.      You have no symptoms of  kidney stones or other pathology.     There has been more research done on acute low back pain than nearly anything else in medicine.    It is one of the most common complaints seen in the ER and clinic.    They have found that there is virtually no proven treatment that is any better than simply time.     You may trial Robaxin for muscle spasms.  You may take Tylenol and Ibuprofen for pain.      Follow-up with Dr. Flowers for continued pain.      Return here for any intolerable pain, fevers, leg weakness, or any other concerns.     Discharge References/Attachments     BACK PAIN (ACUTE OR CHRONIC) (ENGLISH)         Review of your medicines      START taking        Dose / Directions Last dose taken    methocarbamol 500 MG tablet   Commonly known as:  ROBAXIN   Dose:  1000 mg   Quantity:  21 tablet        Take 2 tablets (1,000 mg) by mouth 3 times daily as needed (back pain)   Refills:  1          Our records show that you are taking the medicines listed below. If these are incorrect, please call your family doctor or clinic.        Dose / Directions Last dose taken    aspirin 325 MG tablet   Dose:  0.5 tablet        Take 0.5 tablets by mouth daily.   Refills:  0        CLARITIN PO   Dose:  10 mg        Take 10 mg by mouth daily   Refills:  0        famotidine 20 MG tablet   Commonly known as:  PEPCID   Quantity:  30 tablet        Take one (1) tablet BY MOUTH daily   Refills:  5        FLONASE NA        50mcq/act : 1 spray into both nostrils daily   Refills:  0        hydrocortisone 1 % cream   Commonly known as:  CORTAID   Quantity:  30 g        Apply topically At Bedtime   Refills:  0        losartan 50 MG tablet   Commonly known as:  COZAAR   Quantity:  30 tablet        Take 1 tablet (50 mg) by mouth daily COZAAR   Refills:  1        metoprolol succinate 25 MG 24 hr tablet   Commonly known as:  TOPROL-XL   Quantity:  45 tablet        Take 0.5 Tabs by mouth at bedtime. Do not crush or chew.   Refills:  1         "mometasone 50 MCG/ACT spray   Commonly known as:  NASONEX   Dose:  2 spray        Spray 2 sprays into both nostrils daily   Refills:  0        NITROSTAT SL   Dose:  0.4 mg        Place 0.4 mg under the tongue every 5 minutes as needed for chest pain   Refills:  0        simvastatin 20 MG tablet   Commonly known as:  ZOCOR   Quantity:  30 tablet        Take 1 tablet (20 mg) by mouth every evening   Refills:  5        TYLENOL PO   Dose:  650 mg        Take 650 mg by mouth every 4 hours as needed for mild pain or fever   Refills:  0        vitamin B complex with vitamin C Tabs tablet   Dose:  1 tablet        Take 1 tablet by mouth daily   Refills:  0                Prescriptions were sent or printed at these locations (1 Prescription)                   [facility] - [place], [place] - [address] AT [facility] [address], [place] [address]    Telephone:  [phone]   Fax:  [phone]   Hours:                  E-Prescribed (1 of 1)         methocarbamol (ROBAXIN) 500 MG tablet                Procedures and tests performed during your visit     UA reflex to Microscopic      Orders Needing Specimen Collection     None      Pending Results     No orders found from 3/23/2018 to 3/26/2018.            Pending Culture Results     No orders found from 3/23/2018 to 3/26/2018.            Thank you for choosing [facility]       Thank you for choosing [facility] for your care. Our goal is always to provide you with excellent care. Hearing back from our patients is one way we can continue to improve our services. Please take a few minutes to complete the written survey that you may receive in the mail after you visit with us. Thank you!        [facility] Information     [facility] lets you send messages to your doctor, view your test results, renew your prescriptions, schedule appointments and more. To sign up, go to [email or web address] . Click on \"Log in\" on the left side of the screen, which will " "take you to the Welcome page. Then click on \"Sign up Now\" on the right side of the page.     You will be asked to enter the access code listed below, as well as some personal information. Please follow the directions to create your username and password.     Your access code is: [email or web address]  Expires: 2018 10:15 AM     Your access code will  in 90 days. If you need help or a new code, please call your [facility] clinic or [phone].        Care EveryWhere ID     This is your Care EveryWhere ID. This could be used by other organizations to access your [facility] medical records  [id number]        Equal Access to Services     JOSE DE JESUS CHRISTIANSEN : Paolo Trevino, jacqueline cee, wilfredo lo, clayton lainez. So [facility] [phone].    ATENCIÓN: Si habla español, tiene a mendes disposición servicios gratuitos de asistencia lingüística. Llame al [phone].    We comply with applicable federal civil rights laws and [place] laws. We do not discriminate on the basis of race, color, national origin, age, disability, sex, sexual orientation, or gender identity.            After Visit Summary       This is your record. Keep this with you and show to your community pharmacist(s) and doctor(s) at your next visit.                  "

## 2018-03-25 NOTE — ED NOTES
"In ED for \"having a back ache which started this past Friday morning while bending over shaving.\"  "

## 2018-03-29 DIAGNOSIS — I10 BENIGN ESSENTIAL HYPERTENSION: ICD-10-CM

## 2018-03-29 RX ORDER — LOSARTAN POTASSIUM 50 MG/1
TABLET ORAL
Qty: 30 TABLET | Refills: 3 | Status: SHIPPED | OUTPATIENT
Start: 2018-03-29 | End: 2018-06-29

## 2018-06-29 DIAGNOSIS — I10 BENIGN ESSENTIAL HYPERTENSION: ICD-10-CM

## 2018-07-02 RX ORDER — LOSARTAN POTASSIUM 50 MG/1
TABLET ORAL
Qty: 30 TABLET | Refills: 1 | Status: SHIPPED | OUTPATIENT
Start: 2018-07-02 | End: 2018-10-01

## 2018-07-02 NOTE — TELEPHONE ENCOUNTER
Please advise on Losartan.  Last office visit: 12/11/17  Last signed: 3/29/18 #30, 3 R  Patient due for labs.  Please advise.  Thank you.       Normal serum creatinine on file in past 12 months           Recent Labs   Lab Test  01/18/16   0605   CR  0.80                  Normal serum potassium on file in past 12 months           Recent Labs   Lab Test  01/18/16   0605   POTASSIUM  3.7

## 2018-07-05 DIAGNOSIS — H92.11 OTORRHEA OF RIGHT EAR: Primary | ICD-10-CM

## 2018-07-05 NOTE — TELEPHONE ENCOUNTER
Cortisporin  Last Written Prescription Date: 9/1/15,   Last Fill Quantity: 2ml # of Refills: 0  Last Office Visit: 17

## 2018-07-06 NOTE — TELEPHONE ENCOUNTER
Please see note below on Cortisporin.  Medication discontinued on 4/17/17 for reason therapy completed.  Please advise.  Thank you.

## 2018-07-09 RX ORDER — NEOMYCIN SULFATE, POLYMYXIN B SULFATE AND HYDROCORTISONE 10; 3.5; 1 MG/ML; MG/ML; [USP'U]/ML
SUSPENSION/ DROPS AURICULAR (OTIC)
Qty: 10 ML | Refills: 1 | OUTPATIENT
Start: 2018-07-09

## 2018-07-10 RX ORDER — NEOMYCIN SULFATE, POLYMYXIN B SULFATE AND HYDROCORTISONE 10; 3.5; 1 MG/ML; MG/ML; [USP'U]/ML
4 SUSPENSION/ DROPS AURICULAR (OTIC) 3 TIMES DAILY
Qty: 10 ML | Refills: 0 | Status: SHIPPED | OUTPATIENT
Start: 2018-07-10 | End: 2018-10-12

## 2018-07-24 ENCOUNTER — OFFICE VISIT (OUTPATIENT)
Dept: OTOLARYNGOLOGY | Facility: OTHER | Age: 83
End: 2018-07-24
Attending: NURSE PRACTITIONER
Payer: MEDICARE

## 2018-07-24 VITALS
WEIGHT: 185 LBS | SYSTOLIC BLOOD PRESSURE: 128 MMHG | HEART RATE: 55 BPM | DIASTOLIC BLOOD PRESSURE: 60 MMHG | BODY MASS INDEX: 25.9 KG/M2 | HEIGHT: 71 IN | TEMPERATURE: 98.1 F

## 2018-07-24 DIAGNOSIS — R05.9 COUGH: ICD-10-CM

## 2018-07-24 DIAGNOSIS — J31.0 CHRONIC RHINITIS, UNSPECIFIED TYPE: ICD-10-CM

## 2018-07-24 DIAGNOSIS — J34.3 NASAL TURBINATE HYPERTROPHY: Primary | ICD-10-CM

## 2018-07-24 DIAGNOSIS — K21.9 LPRD (LARYNGOPHARYNGEAL REFLUX DISEASE): ICD-10-CM

## 2018-07-24 DIAGNOSIS — R09.82 PND (POST-NASAL DRIP): ICD-10-CM

## 2018-07-24 DIAGNOSIS — H69.93 DYSFUNCTION OF BOTH EUSTACHIAN TUBES: ICD-10-CM

## 2018-07-24 DIAGNOSIS — H73.892 RETRACTION OF TYMPANIC MEMBRANE, LEFT: ICD-10-CM

## 2018-07-24 DIAGNOSIS — H90.3 SENSORINEURAL HEARING LOSS (SNHL) OF BOTH EARS: ICD-10-CM

## 2018-07-24 PROCEDURE — G0463 HOSPITAL OUTPT CLINIC VISIT: HCPCS | Mod: 25

## 2018-07-24 PROCEDURE — 99213 OFFICE O/P EST LOW 20 MIN: CPT | Mod: 25 | Performed by: NURSE PRACTITIONER

## 2018-07-24 PROCEDURE — G0463 HOSPITAL OUTPT CLINIC VISIT: HCPCS

## 2018-07-24 PROCEDURE — 31575 DIAGNOSTIC LARYNGOSCOPY: CPT | Performed by: NURSE PRACTITIONER

## 2018-07-24 RX ORDER — FLUTICASONE PROPIONATE 50 MCG
2 SPRAY, SUSPENSION (ML) NASAL DAILY
Qty: 1 BOTTLE | Refills: 11 | Status: ON HOLD | OUTPATIENT
Start: 2018-07-24 | End: 2019-10-23

## 2018-07-24 RX ORDER — CETIRIZINE HYDROCHLORIDE 10 MG/1
10 TABLET ORAL EVERY EVENING
Qty: 30 TABLET | Refills: 11 | Status: ON HOLD | OUTPATIENT
Start: 2018-07-24 | End: 2019-10-23

## 2018-07-24 ASSESSMENT — PAIN SCALES - GENERAL: PAINLEVEL: NO PAIN (0)

## 2018-07-24 NOTE — PATIENT INSTRUCTIONS
Start Zyrtec and Flonase, use as directed.    Use high volume kobe med saline irrigation.  Use warm distilled water and 2 packets of the salt solution that comes with the bottle, dissolve in bottle up to 240 ml pauly.  Irrigate each side of your nose leaning over the sink, using 1/3 to 1/2 the volume of the bottle in each nostril every irrigation.  Irrigate 2-3 times daily and as needed.    Follow up in 4-6 weeks with Tamie in ENT with an audiogram.    Recommends annual audiograms.    Thank you for allowing Tamie Muller CNP and our ENT team to participate in your care.  If your medications are too expensive, please give the nurse a call.  We can possibly change this medication.  If you have a scheduling or an appointment question please contact [facility] Coordinator at their direct line [phone].   ALL nursing questions or concerns can be directed to your ENT nurse at: [phone] - [place]

## 2018-07-24 NOTE — LETTER
7/24/2018         RE: James Grant  [address] [facility] [address]        Dear Colleague,    Thank you for referring your patient, James Grant, to the [facility]. Please see a copy of my visit note below.    Otolaryngology Progress Note           Chief Complaint:     Patient presents with:  Ear Problem: Pt is her for a f/u right otorrhea and right OE.  Pt was placed on cortisporin.  Pt states that at times it feels like there is water in his ears.         History of Present Illness:     James Grant is a 89 year old male here with concerns regarding his right ear. He called into clinic 7/5/18 wanting a refill of his Cortisporin otic drops, that were previously prescribed 8/4/17. He wanted them refilled as he felt he has had water in his right ear.    Today James tells me he completed the week of the Cortisporin drops to his right ear, made no difference. He is wondering if his ear symptoms are due to his 'sinuses'. Tells me that he gets a lot of PND, causing him to cough. He will constantly blow his nose, clear secretions. He denies sinus pain/pressure/fever. Denies history of chronic sinusitis. No past history of sinus surgery or nasal trauma. Does get some sneezing, intermittent seasonal allergies. Has never been allergy tested. States he has been seen for this before and told he has allergies and to do salt water nasal rinses, which he does once daily. Not taking any antihistamines or nasal steroids.    History of GERD, on daily famotidine. Reports controlled reflux. Throat clearing, thinks due to drainage. No dysphagia or referred otalgia. No hoarseness. No globus sensation. Intermittent pharyngitis after coughing. Drinks 1 pot coffee/day. Quit smoking > 20 years ago. Denies ETOH use. Minimal water intake. Denies unintentional weight loss, fever or night sweats. No recent EGD.    Audiogram 2/26/18: Mild to profound bilateral SNHL. WRS 80% right and 88% left. SRT 70 dB HL right  "and 60 dB HL left.          Review of Systems:     See HPI         Physical Exam:     /60 (BP Location: Right arm, Cuff Size: Adult Regular)  Pulse 55  Temp 98.1  F (36.7  C) (Tympanic)  Ht 5' 11\" (1.803 m)  Wt 185 lb (83.9 kg)  BMI 25.8 kg/m2  General - The patient is well nourished and well developed, and appears to have good nutritional status.  Alert and oriented to person and place, interactive.  Head and Face - Normocephalic and atraumatic, with no gross asymmetry noted of the contour of the facial features.  The facial nerve is intact, with strong symmetric movements.  Neck-no palpable lymphadenopathy or thyroid mass.  Trachea is midline.  Eyes - Extraocular movements intact.   Ears- External ears normal. The ears were examined under microscopy bilaterally. Right EAC patent, TM intact without effusion/retraction. Left EAC patent, TM with pars flaccida retraction, no signs of cholesteatoma, no effusion.   Nose - External nasal contour symmetric. See below for nasal examination.  Mouth - Examination of the oral cavity shows pink, healthy, moist mucosa. Dentition in good condition.  No lesions or ulceration noted. The tongue is mobile and midline.    Throat - The walls of the oropharynx were smooth, pink, moist, symmetric, and had no lesions or ulcerations.  The tonsillar pillars and soft palate were symmetric.  The uvula was midline on elevation.  Clear secretions seeing in posterior oropharynx.     Attempts at mirror laryngoscopy were not possible due to gag reflex.  Therefore I proceeded with a fiberoptic examination after informed consent.  First I sprayed both sides of the nose with a mixture of lidocaine and neosynephrine.  I then passed the scope through the nasal cavity.     The nasal cavity was remarkable for pale/boggy mucosa L>R, bilateral turbinate hypertrophy, caudal DNS left. No purulence or polyps.    The nasopharynx was mucosally covered and symmetric. Increased amount of thick/clear " secretions in nasopharynx.  The eustachian tube openings were edematous with clear secretions surrounding, but no obstructing mass/turmmor noted.  Going further down I had a clear view of the base of tongue which had normal appearing lingual tonsillar tissue.  The base of tongue was free of lesions, and the vallecula was open.  The epiglottis was smooth and mucosally covered.  Bilateral arytenoids with erythema, no swelling. The supraglottic larynx was then clearly visualized.  The vocal cords moved smoothly and symmetrically and were pearly white.  The pyriform sinuses were open and without surjit mass or pooling of secretions upon valsalva, and the limited view of the postcricoid region did not show any lesions.  The patient tolerated the procedure well.         Assessment and Plan:       ICD-10-CM    1. Nasal turbinate hypertrophy J34.3 cetirizine (ZYRTEC) 10 MG tablet     fluticasone (FLONASE) 50 MCG/ACT spray   2. PND (post-nasal drip) R09.82 fluticasone (FLONASE) 50 MCG/ACT spray   3. Chronic rhinitis, unspecified type J31.0 cetirizine (ZYRTEC) 10 MG tablet     fluticasone (FLONASE) 50 MCG/ACT spray   4. Sensorineural hearing loss (SNHL) of both ears H90.3 AUDIOLOGY ADULT REFERRAL   5. LPRD (laryngopharyngeal reflux disease) K21.9    6. Cough R05    7. Dysfunction of both eustachian tubes H69.83    8. 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    Cough related to PND vs LPR.  Discussed starting omeprazole daily x 4-6 weeks, but patient deferred, stating symptoms are due to PND, which is causing the cough, which I agree could be, so we will focus on the allergic rhinitis/PND factor first.    Increase Dick Med sinus irrigations to 2-3 times daily.  Start Zyrtec 10 mg daily, stop and notify me if any symptoms of urinary retention.  Start Flonase 2 sprays to each nostril once daily.    Follow LPR precautions.    Return in 4-6 weeks for re-evaluation, sooner as  needed.     If no improvement in PND, consider serum allergy/IgE testing or trial of Astelin.  Consider trial of omeprazole if any further concerns regarding LPRD.    Adult lifestyle changes to prevent LPR reviewed      Avoid eating and drinking within two to three hours prior to bedtime    Do not drink alcohol    Eat small meals and slowly    Limit problem foods:    o Caffeine  o Carbonated drinks  o Chocolate  o Peppermint  o Tomato  o Citrus fruits  o Fatty and fried foods      Lose weight    Quit smoking    Wear loose clothing      Tamie Muller NP  [facility], [place]  [phone]      Again, thank you for allowing me to participate in the care of your patient.        Sincerely,        PRATIMA Lott CNP

## 2018-07-24 NOTE — NURSING NOTE
"Chief Complaint   Patient presents with     Ear Problem     Pt is her for a f/u right otorrhea and right OE.  Pt was placed on cortisporin.  Pt states that at times it feels like there is water in his ears.       Initial /60 (BP Location: Right arm, Cuff Size: Adult Regular)  Pulse 55  Temp 98.1  F (36.7  C) (Tympanic)  Ht 5' 11\" (1.803 m)  Wt 185 lb (83.9 kg)  BMI 25.8 kg/m2 Estimated body mass index is 25.8 kg/(m^2) as calculated from the following:    Height as of this encounter: 5' 11\" (1.803 m).    Weight as of this encounter: 185 lb (83.9 kg).  Medication Reconciliation: complete    Mili Guzman LPN    "

## 2018-07-24 NOTE — PROGRESS NOTES
"Otolaryngology Progress Note           Chief Complaint:     Patient presents with:  Ear Problem: Pt is her for a f/u right otorrhea and right OE.  Pt was placed on cortisporin.  Pt states that at times it feels like there is water in his ears.         History of Present Illness:     James Grant is a 89 year old male here with concerns regarding his right ear. He called into clinic 7/5/18 wanting a refill of his Cortisporin otic drops, that were previously prescribed 8/4/17. He wanted them refilled as he felt he has had water in his right ear.    Today James tells me he completed the week of the Cortisporin drops to his right ear, made no difference. He is wondering if his ear symptoms are due to his 'sinuses'. Tells me that he gets a lot of PND, causing him to cough. He will constantly blow his nose, clear secretions. He denies sinus pain/pressure/fever. Denies history of chronic sinusitis. No past history of sinus surgery or nasal trauma. Does get some sneezing, intermittent seasonal allergies. Has never been allergy tested. States he has been seen for this before and told he has allergies and to do salt water nasal rinses, which he does once daily. Not taking any antihistamines or nasal steroids.    History of GERD, on daily famotidine. Reports controlled reflux. Throat clearing, thinks due to drainage. No dysphagia or referred otalgia. No hoarseness. No globus sensation. Intermittent pharyngitis after coughing. Drinks 1 pot coffee/day. Quit smoking > 20 years ago. Denies ETOH use. Minimal water intake. Denies unintentional weight loss, fever or night sweats. No recent EGD.    Audiogram 2/26/18: Mild to profound bilateral SNHL. WRS 80% right and 88% left. SRT 70 dB HL right and 60 dB HL left.          Review of Systems:     See HPI         Physical Exam:     /60 (BP Location: Right arm, Cuff Size: Adult Regular)  Pulse 55  Temp 98.1  F (36.7  C) (Tympanic)  Ht 5' 11\" (1.803 m)  Wt 185 lb (83.9 kg)  " BMI 25.8 kg/m2  General - The patient is well nourished and well developed, and appears to have good nutritional status.  Alert and oriented to person and place, interactive.  Head and Face - Normocephalic and atraumatic, with no gross asymmetry noted of the contour of the facial features.  The facial nerve is intact, with strong symmetric movements.  Neck-no palpable lymphadenopathy or thyroid mass.  Trachea is midline.  Eyes - Extraocular movements intact.   Ears- External ears normal. The ears were examined under microscopy bilaterally. Right EAC patent, TM intact without effusion/retraction. Left EAC patent, TM with pars flaccida retraction, no signs of cholesteatoma, no effusion.   Nose - External nasal contour symmetric. See below for nasal examination.  Mouth - Examination of the oral cavity shows pink, healthy, moist mucosa. Dentition in good condition.  No lesions or ulceration noted. The tongue is mobile and midline.    Throat - The walls of the oropharynx were smooth, pink, moist, symmetric, and had no lesions or ulcerations.  The tonsillar pillars and soft palate were symmetric.  The uvula was midline on elevation.  Clear secretions seeing in posterior oropharynx.     Attempts at mirror laryngoscopy were not possible due to gag reflex.  Therefore I proceeded with a fiberoptic examination after informed consent.  First I sprayed both sides of the nose with a mixture of lidocaine and neosynephrine.  I then passed the scope through the nasal cavity.     The nasal cavity was remarkable for pale/boggy mucosa L>R, bilateral turbinate hypertrophy, caudal DNS left. No purulence or polyps.    The nasopharynx was mucosally covered and symmetric. Increased amount of thick/clear secretions in nasopharynx.  The eustachian tube openings were edematous with clear secretions surrounding, but no obstructing mass/turmmor noted.  Going further down I had a clear view of the base of tongue which had normal appearing lingual  tonsillar tissue.  The base of tongue was free of lesions, and the vallecula was open.  The epiglottis was smooth and mucosally covered.  Bilateral arytenoids with erythema, no swelling. The supraglottic larynx was then clearly visualized.  The vocal cords moved smoothly and symmetrically and were pearly white.  The pyriform sinuses were open and without surjit mass or pooling of secretions upon valsalva, and the limited view of the postcricoid region did not show any lesions.  The patient tolerated the procedure well.         Assessment and Plan:       ICD-10-CM    1. Nasal turbinate hypertrophy J34.3 cetirizine (ZYRTEC) 10 MG tablet     fluticasone (FLONASE) 50 MCG/ACT spray   2. PND (post-nasal drip) R09.82 fluticasone (FLONASE) 50 MCG/ACT spray   3. Chronic rhinitis, unspecified type J31.0 cetirizine (ZYRTEC) 10 MG tablet     fluticasone (FLONASE) 50 MCG/ACT spray   4. Sensorineural hearing loss (SNHL) of both ears H90.3 AUDIOLOGY ADULT REFERRAL   5. LPRD (laryngopharyngeal reflux disease) K21.9    6. Cough R05    7. Dysfunction of both eustachian tubes H69.83    8. 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    Cough related to PND vs LPR.  Discussed starting omeprazole daily x 4-6 weeks, but patient deferred, stating symptoms are due to PND, which is causing the cough, which I agree could be, so we will focus on the allergic rhinitis/PND factor first.    Increase Dick Med sinus irrigations to 2-3 times daily.  Start Zyrtec 10 mg daily, stop and notify me if any symptoms of urinary retention.  Start Flonase 2 sprays to each nostril once daily.    Follow LPR precautions.    Return in 4-6 weeks for re-evaluation, sooner as needed.     If no improvement in PND, consider serum allergy/IgE testing or trial of Astelin.  Consider trial of omeprazole if any further concerns regarding LPRD.    Adult lifestyle changes to prevent LPR reviewed      Avoid eating and drinking  within two to three hours prior to bedtime    Do not drink alcohol    Eat small meals and slowly    Limit problem foods:    o Caffeine  o Carbonated drinks  o Chocolate  o Peppermint  o Tomato  o Citrus fruits  o Fatty and fried foods      Lose weight    Quit smoking    Wear loose clothing      Tamie Muller NP  [facility], [place]  [phone]

## 2018-07-24 NOTE — MR AVS SNAPSHOT
After Visit Summary   7/24/2018    James Grant    MRN: [id number]           Patient Information     Date Of Birth          3/5/1927        Visit Information        Provider Department      7/24/2018 10:45 AM Tamie Muller APRN CNP [facility]        Care Instructions    Start Zyrtec and Flonase, use as directed.    Use high volume kobe med saline irrigation.  Use warm distilled water and 2 packets of the salt solution that comes with the bottle, dissolve in bottle up to 240 ml pauly.  Irrigate each side of your nose leaning over the sink, using 1/3 to 1/2 the volume of the bottle in each nostril every irrigation.  Irrigate 2-3 times daily and as needed.    Follow up in 4-6 weeks with Tamie in ENT with an audiogram.    Recommends annual audiograms.    Thank you for allowing Tamie Muller CNP and our ENT team to participate in your care.  If your medications are too expensive, please give the nurse a call.  We can possibly change this medication.  If you have a scheduling or an appointment question please contact [facility] Coordinator at their direct line [phone].   ALL nursing questions or concerns can be directed to your ENT nurse at: [phone] - alex            Follow-ups after your visit        Who to contact     If you have questions or need follow up information about today's clinic visit or your schedule please contact [facility] directly at [phone].  Normal or non-critical lab and imaging results will be communicated to you by MyChart, letter or phone within 4 business days after the clinic has received the results. If you do not hear from us within 7 days, please contact the clinic through MyChart or phone. If you have a critical or abnormal lab result, we will notify you by phone as soon as possible.  Submit refill requests through [facility] or call your pharmacy and they will forward the refill request to us. Please allow 3  "business days for your refill to be completed.          Additional Information About Your Visit        Care EveryWhere ID     This is your Care EveryWhere ID. This could be used by other organizations to access your [facility] medical records  [id number]        Your Vitals Were     Pulse Temperature Height BMI (Body Mass Index)          55 98.1  F (36.7  C) (Tympanic) 5' 11\" (1.803 m) 25.8 kg/m2         Blood Pressure from Last 3 Encounters:   07/24/18 128/60   03/25/18 153/96   12/11/17 122/62    Weight from Last 3 Encounters:   07/24/18 185 lb (83.9 kg)   12/11/17 185 lb (83.9 kg)   08/04/17 185 lb (83.9 kg)              Today, you had the following     No orders found for display       Primary Care Provider Office Phone # Fax #    Broderick Flowers [phone]       [address] [place] [address]        Equal Access to Services     [facility]: Hadii aad ku hadasho Soomaali, waaxda luqadaha, qaybta kaalmada adeegyada, waxay joannein hayanalisan fatuma kaufman . So [facility] [phone].    ATENCIÓN: Si habla español, tiene a mendes disposición servicios gratuitos de asistencia lingüística. Llame al [phone].    We comply with applicable federal civil rights laws and [place] laws. We do not discriminate on the basis of race, color, national origin, age, disability, sex, sexual orientation, or gender identity.            Thank you!     Thank you for choosing [facility]  for your care. Our goal is always to provide you with excellent care. Hearing back from our patients is one way we can continue to improve our services. Please take a few minutes to complete the written survey that you may receive in the mail after your visit with us. Thank you!             Your Updated Medication List - Protect others around you: Learn how to safely use, store and throw away your medicines at www.disposemymeds.org.          This list is accurate as of 7/24/18 11:32 AM.  Always use your most " recent med list.                   Brand Name Dispense Instructions for use Diagnosis    aspirin 325 MG tablet      Take 0.5 tablets by mouth daily.        famotidine 20 MG tablet    PEPCID    30 tablet    TAKE ONE (1) TABLET BY MOUTH DAILY    Gastroesophageal reflux disease without esophagitis       FLONASE NA      50mcq/act : 1 spray into both nostrils daily        hydrocortisone 1 % cream    CORTAID    30 g    Apply topically At Bedtime    Venous stasis dermatitis of both lower extremities       losartan 50 MG tablet    COZAAR    30 tablet    TAKE 1 TABLET (50 MG) BY MOUTH DAILY COZAAR    Benign essential hypertension       metoprolol succinate 25 MG 24 hr tablet    TOPROL-XL    45 tablet    Take 0.5 Tabs by mouth at bedtime. Do not crush or chew.    Benign essential hypertension       neomycin-polymyxin-hydrocortisone 3.5-15798-9 otic suspension    CORTISPORIN    10 mL    Place 4 drops into the right ear 3 times daily    Otorrhea of right ear       simvastatin 20 MG tablet    ZOCOR    30 tablet    TAKE 1 TABLET (20 MG) BY MOUTH EVERY EVENING    Dyslipidemia       vitamin B complex with vitamin C Tabs tablet      Take 1 tablet by mouth daily

## 2018-08-13 DIAGNOSIS — I10 BENIGN ESSENTIAL HYPERTENSION: ICD-10-CM

## 2018-08-14 RX ORDER — METOPROLOL SUCCINATE 25 MG/1
TABLET, EXTENDED RELEASE ORAL
Qty: 45 TABLET | Refills: 0 | Status: SHIPPED | OUTPATIENT
Start: 2018-08-14 | End: 2018-12-14

## 2018-08-14 NOTE — TELEPHONE ENCOUNTER
metoprolol      Last Written Prescription Date:  2/26/18  Last Fill Quantity: 45,   # refills: 1  Last Office Visit: 12/11/17  Future Office visit:  none

## 2018-10-01 DIAGNOSIS — I10 BENIGN ESSENTIAL HYPERTENSION: ICD-10-CM

## 2018-10-03 RX ORDER — LOSARTAN POTASSIUM 50 MG/1
TABLET ORAL
Qty: 30 TABLET | Refills: 0 | Status: SHIPPED | OUTPATIENT
Start: 2018-10-03 | End: 2018-10-26

## 2018-10-03 NOTE — TELEPHONE ENCOUNTER
LOSARTAN POTASSIUM 50 MG TABLET        Last Written Prescription Date:  7/2/18  Last Fill Quantity: 30,   # refills: 1  Last Office Visit: 12/11/17  Future Office visit:

## 2018-10-08 ENCOUNTER — TELEPHONE (OUTPATIENT)
Dept: FAMILY MEDICINE | Facility: OTHER | Age: 83
End: 2018-10-08

## 2018-10-08 ENCOUNTER — HOSPITAL ENCOUNTER (EMERGENCY)
Facility: HOSPITAL | Age: 83
Discharge: HOME | End: 2018-10-08
Attending: PHYSICIAN ASSISTANT | Admitting: PHYSICIAN ASSISTANT
Payer: MEDICARE

## 2018-10-08 VITALS — HEART RATE: 69 BPM | OXYGEN SATURATION: 98 % | TEMPERATURE: 98.4 F | RESPIRATION RATE: 18 BRPM

## 2018-10-08 DIAGNOSIS — K13.79: ICD-10-CM

## 2018-10-08 PROCEDURE — G0463 HOSPITAL OUTPT CLINIC VISIT: HCPCS

## 2018-10-08 PROCEDURE — 99213 OFFICE O/P EST LOW 20 MIN: CPT | Performed by: PHYSICIAN ASSISTANT

## 2018-10-08 ASSESSMENT — ENCOUNTER SYMPTOMS
FEVER: 0
COUGH: 0
ABDOMINAL PAIN: 0
CHILLS: 0

## 2018-10-08 NOTE — TELEPHONE ENCOUNTER
Please schedule patient for date/time: Please call patient and schedule for next available this week. Thank you    Have patient go to ER/Urgent Care Center. Urgent Care hours are 9:30 am to 8 pm, open 7 days a week. No.    Provider will call patient.No.    Other:

## 2018-10-08 NOTE — ED AVS SNAPSHOT
[facility]    [address]    [place] [address]    Phone:  [phone]                                       James Grant   MRN: [id number]    Department:  [facility]   Date of Visit:  10/8/2018           After Visit Summary Signature Page     I have received my discharge instructions, and my questions have been answered. I have discussed any challenges I see with this plan with the nurse or doctor.    ..........................................................................................................................................  Patient/Patient Representative Signature      ..........................................................................................................................................  Patient Representative Print Name and Relationship to Patient    ..................................................               ................................................  Date                                   Time    ..........................................................................................................................................  Reviewed by Signature/Title    ...................................................              ..............................................  Date                                               Time          22EPIC Rev 08/18

## 2018-10-08 NOTE — ED AVS SNAPSHOT
[facility]    [address] [address]    Phone:  [phone]                                       James Grant   MRN: [id number]    Department:  [facility]   Date of Visit:  10/8/2018           Patient Information     Date Of Birth          3/5/1927        Your diagnoses for this visit were:     Stomatorrhagia        You were seen by Clarke Atkins PA-C.      Follow-up Information     Follow up with Broderick Flowers MD.    Specialty:  Family Practice    Why:  As needed    Contact information:    [address]  [facility] [address]  [phone]          Follow up with [facility].    Specialty:  EMERGENCY MEDICINE    Why:  If symptoms worsen    Contact information:    [address]  [facility] [address] [phone]    Additional information:    From [facility]: Take [address]. Turn left at [address]. Turn left at the first stoplight on [address] [place] [address]. At the first stop sign, take a right onto [address]. Take a left into the parking lot and continue through until you reach the North enterance of the building.       From [place]: Take [address]. Take the [address] ramp towards [place]. Turn left onto [address]. Take a slight right onto [address]. Turn left at the first stoplight on [address]. At the first stop sign, take a right onto [address]. Take a left into the parking lot and continue through until you reach the North enterance of the building.       From [place]: Take [address]. Take a right at [address]. At the first stop sign, take a right onto [address]. Take a left into the parking lot and continue through until you reach the North enterance of the building.         Discharge Instructions       As we discussed, your exam did not reveal any evidence of bleeding or other lesions.    There are a multitude of etiologies that can cause  bloody spit.     There is no intervention required at this time.     Monitor for any changes.     Follow-up in the clinic as needed.     Return here for any other concerns.     Your next 10 appointments already scheduled     Oct 12, 2018 11:00 AM CDT   (Arrive by 10:45 AM)   SHORT with Broderick Flowers MD   [facility] )    [place] [address]   [phone]                 Review of your medicines      Our records show that you are taking the medicines listed below. If these are incorrect, please call your family doctor or clinic.        Dose / Directions Last dose taken    aspirin 325 MG tablet   Dose:  0.5 tablet        Take 0.5 tablets by mouth daily.   Refills:  0        cetirizine 10 MG tablet   Commonly known as:  zyrTEC   Dose:  10 mg   Quantity:  30 tablet        Take 1 tablet (10 mg) by mouth every evening   Refills:  11        famotidine 20 MG tablet   Commonly known as:  PEPCID   Quantity:  30 tablet        TAKE ONE (1) TABLET BY MOUTH DAILY   Refills:  4        * FLONASE NA        50mcq/act : 1 spray into both nostrils daily   Refills:  0        * fluticasone 50 MCG/ACT spray   Commonly known as:  FLONASE   Dose:  2 spray   Quantity:  1 Bottle        Spray 2 sprays into both nostrils daily   Refills:  11        hydrocortisone 1 % cream   Commonly known as:  CORTAID   Quantity:  30 g        Apply topically At Bedtime   Refills:  0        losartan 50 MG tablet   Commonly known as:  COZAAR   Quantity:  30 tablet        TAKE 1 TABLET (50 MG) BY MOUTH DAILY COZAAR   Refills:  0        metoprolol succinate 25 MG 24 hr tablet   Commonly known as:  TOPROL-XL   Quantity:  45 tablet        TAKE 0.5 TABS BY MOUTH AT BEDTIME. DO NOT CRUSH OR CHEW.   Refills:  0        neomycin-polymyxin-hydrocortisone 3.5-97286-0 otic suspension   Commonly known as:  CORTISPORIN   Dose:  4 drop   Quantity:  10 mL        Place 4 drops into the right ear 3 times  daily   Refills:  0        simvastatin 20 MG tablet   Commonly known as:  ZOCOR   Quantity:  30 tablet        TAKE 1 TABLET (20 MG) BY MOUTH EVERY EVENING   Refills:  5        vitamin B complex with vitamin C Tabs tablet   Dose:  1 tablet        Take 1 tablet by mouth daily   Refills:  0        * Notice:  This list has 2 medication(s) that are the same as other medications prescribed for you. Read the directions carefully, and ask your doctor or other care provider to review them with you.            Orders Needing Specimen Collection     None      Pending Results     No orders found from 10/6/2018 to 10/9/2018.            Pending Culture Results     No orders found from 10/6/2018 to 10/9/2018.            Thank you for choosing [facility]       Thank you for choosing [facility] for your care. Our goal is always to provide you with excellent care. Hearing back from our patients is one way we can continue to improve our services. Please take a few minutes to complete the written survey that you may receive in the mail after you visit with us. Thank you!        Care EveryWhere ID     This is your Care EveryWhere ID. This could be used by other organizations to access your [facility] medical records  [id number]        Equal Access to Services     JOSE DE JESUS CHRISTIANSEN : Hadsade Trevino, jacqueline cee, wilfredo lo, clayton lainez. So [facility] [phone].    ATENCIÓN: Si habla español, tiene a mendes disposición servicios gratuitos de asistencia lingüística. Llame al [phone].    We comply with applicable federal civil rights laws and [place] laws. We do not discriminate on the basis of race, color, national origin, age, disability, sex, sexual orientation, or gender identity.            After Visit Summary       This is your record. Keep this with you and show to your community pharmacist(s) and doctor(s) at your next visit.

## 2018-10-08 NOTE — TELEPHONE ENCOUNTER
10:30 AM    Reason for Call: OVERBOOK    Patient is having the following symptoms: leg cramping for about a week    The patient is requesting an appointment for this week with Dr Flowers    Was an appointment offered for this call?no   If yes : Appointment type              Date    Preferred method for responding to this message: telephone  What is your phone number ?[phone]    If we cannot reach you directly, may we leave a detailed response at the number you provided? {YES /     Can this message wait until your PCP/provider returns, if unavailable today? NO    Joselyn Leal

## 2018-10-09 NOTE — DISCHARGE INSTRUCTIONS
As we discussed, your exam did not reveal any evidence of bleeding or other lesions.    There are a multitude of etiologies that can cause bloody spit.     There is no intervention required at this time.     Monitor for any changes.     Follow-up in the clinic as needed.     Return here for any other concerns.    While pt is in MRI conversation is had with daughter in law of pt.  She states she lives at home with her son and his wife who help her from time to time in the house.  She feels that the pt wont be able to return home safely as she uses a walker, has to step into a tub that is not safe for her, and is a serious fall risk due to her weakness especially in the left leg. Daughter is also unaware of anyone having a conversation about her code status with the pt.  This discussion will be started with the pt returns from MRI.   MD to be notified.

## 2018-10-09 NOTE — ED TRIAGE NOTES
Pt presents today with c/o pt was spitting blood PTA. Denies any feeling of cuts in his mouth or tooth pain.

## 2018-10-09 NOTE — ED PROVIDER NOTES
History     Chief Complaint   Patient presents with     Dental Problem     The history is provided by the patient.     James Grant is a 89 year old male who presented to the urgent care ambulatory along with son for evaluation of possible mouth bleeding.  James tells me that he ate pork chops for supper and went to the bathroom to urinate, when he spit in the toilet and noticed blood-tinged sputum.  This happened twice.  Denies any mouth pain.  Bleeding has stopped.  No blood thinners other than regular strength aspirin.    Problem List:    Patient Active Problem List    Diagnosis Date Noted     Venous stasis dermatitis of both lower extremities 03/16/2017     Priority: Medium     ACP (advance care planning) 06/06/2016     Priority: Medium     Advance Care Planning 6/6/2016: ACP Review of Chart / Resources Provided:  Reviewed chart for advance care plan.  James Grant has no plan or code status on file. Discussed available resources and provided with information. Confirmed code status reflects current choices pending further ACP discussions.  Confirmed/documented legally designated decision makers.  Added by Yani Anderson             Advance care planning 02/08/2016     Priority: Medium     Advance Care Planning 2/8/2016: Receipt of ACP document:  Received: POLST which was signed and dated by provider on 1/18/16.  Document previously scanned on 1/22/16.  Order reviewed and found to be valid.  Code Status reflects choices in most recent ACP document.  Confirmed/documented designated decision maker(s).  Added by Trinh Garces             BPH (benign prostatic hyperplasia) 01/08/2014     Priority: Medium     SNHL (sensorineural hearing loss) 07/17/2013     Priority: Medium     ETD (eustachian tube dysfunction) 07/17/2013     Priority: Medium     HTN (hypertension) 11/26/2010     Priority: Medium     Dyslipidemia 11/26/2010     Priority: Medium     GERD (gastroesophageal reflux disease) 11/26/2010      Priority: Medium     CHD (coronary heart disease) 11/26/2010     Priority: Medium     07/2010 S/P inferior wall MI  Angioplasty and stenting X 2 RCA  08/2010 angioplasty and stenting (LIDIA) LAD          Past Medical History:    Past Medical History:   Diagnosis Date     Benign localized hyperplasia of prostate without urinary obstruction and other lower urinary tract symptoms (LUTS) 11/26/2010     CHD (coronary heart disease) 11/26/2010     Dyslipidemia 11/26/2010     GERD (gastroesophageal reflux disease) 11/26/2010     HTN (hypertension) 11/26/2010     Old myocardial infarction 11/26/2010     Other symptoms involving digestive system(787.99) 10/20/2006       Past Surgical History:    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 Sister 91       Social History:  Marital Status:   [2]  Social History   Substance Use Topics     Smoking status: Former Smoker     Types: Cigarettes     Smokeless tobacco: Never Used      Comment: Tried to Quit (YES); YR QUIT (1962); Passive Exposure (NO)     Alcohol use Yes      Comment: 3 Drinks (BEER & LIQUOR) ~ OCCASIONALLY (QUIT IN 2000)        Medications:      aspirin 325 MG tablet   cetirizine (ZYRTEC) 10 MG tablet   famotidine (PEPCID) 20 MG tablet   fluticasone (FLONASE) 50 MCG/ACT spray   Fluticasone Propionate (FLONASE NA)   hydrocortisone (CORTAID) 1 % cream   losartan (COZAAR) 50 MG tablet   metoprolol succinate (TOPROL-XL) 25 MG 24 hr tablet   neomycin-polymyxin-hydrocortisone (CORTISPORIN) 3.5-02977-7 otic suspension   simvastatin (ZOCOR) 20 MG tablet   vitamin  B complex with vitamin C (VITAMIN  B COMPLEX) TABS         Review of Systems   Constitutional: Negative for chills and fever.    HENT: Negative for dental problem and drooling.         See HPI   Respiratory: Negative for cough.    Gastrointestinal: Negative for abdominal pain.   Skin: Negative.    Hematological:        On 325 mg aspirin       Physical Exam   Pulse: 69  Temp: 98.4  F (36.9  C)  Resp: 18  SpO2: 98 %      Physical Exam   Constitutional: He is oriented to person, place, and time. He appears well-developed and well-nourished.   HENT:   Examination of the oropharynx shows no evidence of lesions, ulcers, or other areas of bleeding.  Dentition is stable and shows no gingival bleeding.  Tongue is unremarkable.  Posterior pharynx is unremarkable.  I can find no evidence of any bleeding whatsoever.   Cardiovascular: Normal rate and regular rhythm.    Pulmonary/Chest: Effort normal.   Neurological: He is alert and oriented to person, place, and time.   Skin: Skin is warm and dry.   Psychiatric: He has a normal mood and affect.   Nursing note and vitals reviewed.      ED Course     ED Course     Procedures               Critical Care time:  none               No results found for this or any previous visit (from the past 24 hour(s)).    Medications - No data to display    Assessments & Plan (with Medical Decision Making)   Long and detailed discussion.  I can find no evidence of active bleeding whatsoever.  I can find no evidence of prior bleeding.  There is no reasonable or compelling medical indication for workup at this point.  There was 2 episodes of blood-tinged spit in the toilet that is now resolved.  He can follow-up in the clinic as needed.  Return here for any other concerns or questions.    (Please note that this note was completed with a voice recognition program.  Every attempt was made to edit the dictations, but inevitably there remain words that are mis-transcribed.)    I have reviewed the nursing notes.    I have reviewed the findings, diagnosis, plan and need for follow up with the patient.       Discharge Medication  List as of 10/8/2018  7:28 PM          Final diagnoses:   Stomatorrhagia       10/8/2018   [facility]     Clarke Atkins PA-C  10/08/18 1936

## 2018-10-12 ENCOUNTER — OFFICE VISIT (OUTPATIENT)
Dept: FAMILY MEDICINE | Facility: OTHER | Age: 83
End: 2018-10-12
Attending: FAMILY MEDICINE
Payer: MEDICARE

## 2018-10-12 VITALS
DIASTOLIC BLOOD PRESSURE: 72 MMHG | WEIGHT: 188.8 LBS | TEMPERATURE: 98.1 F | HEART RATE: 59 BPM | BODY MASS INDEX: 26.33 KG/M2 | OXYGEN SATURATION: 98 % | SYSTOLIC BLOOD PRESSURE: 132 MMHG

## 2018-10-12 DIAGNOSIS — M79.661 RIGHT CALF PAIN: ICD-10-CM

## 2018-10-12 DIAGNOSIS — Z23 NEED FOR PROPHYLACTIC VACCINATION AND INOCULATION AGAINST INFLUENZA: Primary | ICD-10-CM

## 2018-10-12 PROCEDURE — G0463 HOSPITAL OUTPT CLINIC VISIT: HCPCS | Mod: 25

## 2018-10-12 PROCEDURE — 90682 RIV4 VACC RECOMBINANT DNA IM: CPT | Performed by: FAMILY MEDICINE

## 2018-10-12 PROCEDURE — 90471 IMMUNIZATION ADMIN: CPT | Performed by: FAMILY MEDICINE

## 2018-10-12 PROCEDURE — 99213 OFFICE O/P EST LOW 20 MIN: CPT | Performed by: FAMILY MEDICINE

## 2018-10-12 PROCEDURE — G0008 ADMIN INFLUENZA VIRUS VAC: HCPCS | Performed by: FAMILY MEDICINE

## 2018-10-12 RX ORDER — NABUMETONE 500 MG/1
500-1000 TABLET, FILM COATED ORAL 2 TIMES DAILY PRN
Qty: 30 TABLET | Refills: 1 | Status: ON HOLD | OUTPATIENT
Start: 2018-10-12 | End: 2019-10-23

## 2018-10-12 ASSESSMENT — ANXIETY QUESTIONNAIRES
IF YOU CHECKED OFF ANY PROBLEMS ON THIS QUESTIONNAIRE, HOW DIFFICULT HAVE THESE PROBLEMS MADE IT FOR YOU TO DO YOUR WORK, TAKE CARE OF THINGS AT HOME, OR GET ALONG WITH OTHER PEOPLE: NOT DIFFICULT AT ALL
7. FEELING AFRAID AS IF SOMETHING AWFUL MIGHT HAPPEN: MORE THAN HALF THE DAYS
1. FEELING NERVOUS, ANXIOUS, OR ON EDGE: NOT AT ALL
6. BECOMING EASILY ANNOYED OR IRRITABLE: NOT AT ALL
GAD7 TOTAL SCORE: 3
5. BEING SO RESTLESS THAT IT IS HARD TO SIT STILL: NOT AT ALL
3. WORRYING TOO MUCH ABOUT DIFFERENT THINGS: SEVERAL DAYS
2. NOT BEING ABLE TO STOP OR CONTROL WORRYING: NOT AT ALL

## 2018-10-12 ASSESSMENT — PAIN SCALES - GENERAL: PAINLEVEL: MODERATE PAIN (5)

## 2018-10-12 ASSESSMENT — PATIENT HEALTH QUESTIONNAIRE - PHQ9: 5. POOR APPETITE OR OVEREATING: NOT AT ALL

## 2018-10-12 NOTE — MR AVS SNAPSHOT
After Visit Summary   10/12/2018    James Grant    MRN: [id number]           Patient Information     Date Of Birth          3/5/1927        Visit Information        Provider Department      10/12/2018 11:00 AM Broderick Flowers MD [facility]        Today's Diagnoses     Need for prophylactic vaccination and inoculation against influenza    -  1    Right calf pain          Care Instructions    Take nabumetone twice daily.  Observe calf pain          Follow-ups after your visit        Follow-up notes from your care team     Return if symptoms worsen or fail to improve.      Who to contact     If you have questions or need follow up information about today's clinic visit or your schedule please contact [facility] directly at [phone].  Normal or non-critical lab and imaging results will be communicated to you by MyChart, letter or phone within 4 business days after the clinic has received the results. If you do not hear from us within 7 days, please contact the clinic through MyChart or phone. If you have a critical or abnormal lab result, we will notify you by phone as soon as possible.  Submit refill requests through [employer] or call your pharmacy and they will forward the refill request to us. Please allow 3 business days for your refill to be completed.          Additional Information About Your Visit        Care EveryWhere ID     This is your Care EveryWhere ID. This could be used by other organizations to access your [facility] medical records  [id number]        Your Vitals Were     Pulse Temperature Pulse Oximetry BMI (Body Mass Index)          59 98.1  F (36.7  C) (Tympanic) 98% 26.33 kg/m2         Blood Pressure from Last 3 Encounters:   10/12/18 132/72   07/24/18 128/60   03/25/18 153/96    Weight from Last 3 Encounters:   10/12/18 188 lb 12.8 oz (85.6 kg)   07/24/18 185 lb (83.9 kg)   12/11/17 185 lb (83.9 kg)              We Performed the  Following     ADMIN INFLUENZA (For MEDICARE Patients ONLY) []     C RIV4 (FLUBLOK) VACCINE RECOMBINANT DNA PRSRV ANTIBIO FREE, IM     Vaccine Administration, Initial [10345]          Today's Medication Changes          These changes are accurate as of 10/12/18 11:59 PM.  If you have any questions, ask your nurse or doctor.               Start taking these medicines.        Dose/Directions    nabumetone 500 MG tablet   Commonly known as:  RELAFEN   Used for:  Right calf pain   Started by:  Broderick Flowers MD        Dose:  500-1000 mg   Take 1-2 tablets (500-1,000 mg) by mouth 2 times daily as needed for moderate pain   Quantity:  30 tablet   Refills:  1         These medicines have changed or have updated prescriptions.        Dose/Directions    fluticasone 50 MCG/ACT spray   Commonly known as:  FLONASE   This may have changed:  Another medication with the same name was removed. Continue taking this medication, and follow the directions you see here.   Used for:  Nasal turbinate hypertrophy, PND (post-nasal drip), Chronic rhinitis, unspecified type   Changed by:  Broderick Flowers MD        Dose:  2 spray   Spray 2 sprays into both nostrils daily   Quantity:  1 Bottle   Refills:  11            Where to get your medicines      [place] [facility] [place] [address] [address]     Phone:  [phone]     nabumetone 500 MG tablet                Primary Care Provider Office Phone # Fax #    Broderick Flowers [phone]       [facility] [address]        Equal Access to Services     [facility] Hospital[facility] AH: Hadii asya francisco Soюлия, waaxda luqadaha, qaybta kaalmada adekimyaada, clayton lainez. So [facility] [phone].    ATENCIÓN: Si habla español, tiene a mendes disposición servicios gratuitos de asistencia lingüística. Llame al [phone].    We comply with applicable federal  civil rights laws and [place] laws. We do not discriminate on the basis of race, color, national origin, age, disability, sex, sexual orientation, or gender identity.            Thank you!     Thank you for choosing [facility]  for your care. Our goal is always to provide you with excellent care. Hearing back from our patients is one way we can continue to improve our services. Please take a few minutes to complete the written survey that you may receive in the mail after your visit with us. Thank you!             Your Updated Medication List - Protect others around you: Learn how to safely use, store and throw away your medicines at www.disposemymeds.org.          This list is accurate as of 10/12/18 11:59 PM.  Always use your most recent med list.                   Brand Name Dispense Instructions for use Diagnosis    aspirin 325 MG tablet      Take 0.5 tablets by mouth daily.        cetirizine 10 MG tablet    zyrTEC    30 tablet    Take 1 tablet (10 mg) by mouth every evening    Nasal turbinate hypertrophy, Chronic rhinitis, unspecified type       famotidine 20 MG tablet    PEPCID    30 tablet    TAKE ONE (1) TABLET BY MOUTH DAILY    Gastroesophageal reflux disease without esophagitis       fluticasone 50 MCG/ACT spray    FLONASE    1 Bottle    Spray 2 sprays into both nostrils daily    Nasal turbinate hypertrophy, PND (post-nasal drip), Chronic rhinitis, unspecified type       losartan 50 MG tablet    COZAAR    30 tablet    TAKE 1 TABLET (50 MG) BY MOUTH DAILY COZAAR    Benign essential hypertension       metoprolol succinate 25 MG 24 hr tablet    TOPROL-XL    45 tablet    TAKE 0.5 TABS BY MOUTH AT BEDTIME. DO NOT CRUSH OR CHEW.    Benign essential hypertension       nabumetone 500 MG tablet    RELAFEN    30 tablet    Take 1-2 tablets (500-1,000 mg) by mouth 2 times daily as needed for moderate pain    Right calf pain       simvastatin 20 MG tablet    ZOCOR    30 tablet    TAKE 1 TABLET  (20 MG) BY MOUTH EVERY EVENING    Dyslipidemia       vitamin B complex with vitamin C Tabs tablet      Take 1 tablet by mouth daily

## 2018-10-12 NOTE — PROGRESS NOTES
"  SUBJECTIVE:   James Grant is a 89 year old male who presents to clinic today for the following health issues:    Musculoskeletal problem/pain      Duration: 2 weeks prior    Description  Location: Right lower extremity; medial calf/shin, just below knee    Intensity:  Severe depending on position and use of leg.    Accompanying signs and symptoms: weakness of right leg and painful, \"nat horse\" type cramping    History  Previous similar problem: no   Previous evaluation:  none    Precipitating or alleviating factors:  Trauma or overuse: no, just walking from car to house and got a cramp   Aggravating factors include: walking and climbing stairs    Therapies tried and outcome: rest/inactivity, heat and massage; which hurts    Tomasz has at the symptoms as described above. There was no trauma.      Problem list and histories reviewed & adjusted, as indicated.  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 Sister 91         Current Outpatient " Prescriptions   Medication Sig Dispense Refill     aspirin 325 MG tablet Take 0.5 tablets by mouth daily.       cetirizine (ZYRTEC) 10 MG tablet Take 1 tablet (10 mg) by mouth every evening 30 tablet 11     famotidine (PEPCID) 20 MG tablet TAKE ONE (1) TABLET BY MOUTH DAILY 30 tablet 4     fluticasone (FLONASE) 50 MCG/ACT spray Spray 2 sprays into both nostrils daily 1 Bottle 11     Fluticasone Propionate (FLONASE NA) 50mcq/act : 1 spray into both nostrils daily       hydrocortisone (CORTAID) 1 % cream Apply topically At Bedtime 30 g 0     losartan (COZAAR) 50 MG tablet TAKE 1 TABLET (50 MG) BY MOUTH DAILY COZAAR 30 tablet 0     metoprolol succinate (TOPROL-XL) 25 MG 24 hr tablet TAKE 0.5 TABS BY MOUTH AT BEDTIME. DO NOT CRUSH OR CHEW. 45 tablet 0     neomycin-polymyxin-hydrocortisone (CORTISPORIN) 3.5-02367-6 otic suspension Place 4 drops into the right ear 3 times daily 10 mL 0     simvastatin (ZOCOR) 20 MG tablet TAKE 1 TABLET (20 MG) BY MOUTH EVERY EVENING 30 tablet 5     vitamin  B complex with vitamin C (VITAMIN  B COMPLEX) TABS Take 1 tablet by mouth daily       Allergies   Allergen Reactions     Lisinopril Cough     Morphine      BP Readings from Last 3 Encounters:   07/24/18 128/60   03/25/18 153/96   12/11/17 122/62    Wt Readings from Last 3 Encounters:   07/24/18 185 lb (83.9 kg)   12/11/17 185 lb (83.9 kg)   08/04/17 185 lb (83.9 kg)                    Reviewed and updated as needed this visit by clinical staff       Reviewed and updated as needed this visit by Provider         ROS:  Constitutional, HEENT, cardiovascular, pulmonary, gi and gu systems are negative, except as otherwise noted.    OBJECTIVE:     There were no vitals taken for this visit.  There is no height or weight on file to calculate BMI.  Physical Exam   Constitutional: He is oriented to person, place, and time. He appears well-developed and well-nourished. No distress.   Neurological: He is alert and oriented to person, place, and  time.   Psychiatric: He has a normal mood and affect.       Other exam not repeated.  Diagnostic Test Results:  Results for orders placed or performed during the hospital encounter of 03/25/18   UA reflex to Microscopic   Result Value Ref Range    Color Urine Yellow     Appearance Urine Clear     Glucose Urine Negative NEG^Negative mg/dL    Bilirubin Urine Negative NEG^Negative    Ketones Urine Negative NEG^Negative mg/dL    Specific Gravity Urine 1.016 1.003 - 1.035    Blood Urine Negative NEG^Negative    pH Urine 6.5 4.7 - 8.0 pH    Protein Albumin Urine 10 (A) NEG^Negative mg/dL    Urobilinogen mg/dL Normal 0.0 - 2.0 mg/dL    Nitrite Urine Negative NEG^Negative    Leukocyte Esterase Urine Negative NEG^Negative    Source Midstream Urine     RBC Urine 5 (H) 0 - 2 /HPF    WBC Urine 1 0 - 5 /HPF    Bacteria Urine None (A) NEG^Negative /HPF    Mucous Urine Present (A) NEG^Negative /LPF       ASSESSMENT/PLAN:     Right calf pain  Suspect partial tear of gastrocnemius or plantaris musculature.  Begin cautious nabumetone as written.  Follow up with persistent symptoms.  - nabumetone (RELAFEN) 500 MG tablet; Take 1-2 tablets (500-1,000 mg) by mouth 2 times daily as needed for moderate pain    Need for prophylactic vaccination and inoculation against influenza  Immunization updated  - C RIV4 (FLUBLOK) VACCINE RECOMBINANT DNA PRSRV ANTIBIO FREE, IM  - Vaccine Administration, Initial [86797]  - ADMIN INFLUENZA (For MEDICARE Patients ONLY) []            Broderick Flowers MD  [facility] - [place]

## 2018-10-12 NOTE — NURSING NOTE
"Chief Complaint   Patient presents with     Musculoskeletal Problem     Leg Cramping       Initial /72 (BP Location: Left arm, Patient Position: Sitting, Cuff Size: Adult Regular)  Pulse 59  Temp 98.1  F (36.7  C) (Tympanic)  Wt 188 lb 12.8 oz (85.6 kg)  SpO2 98%  BMI 26.33 kg/m2 Estimated body mass index is 26.33 kg/(m^2) as calculated from the following:    Height as of 7/24/18: 5' 11\" (1.803 m).    Weight as of this encounter: 188 lb 12.8 oz (85.6 kg).  Medication Reconciliation: complete     Receiving Influenza vaccine, declined PVC 13    Lizzeth Rai LPN  "

## 2018-10-12 NOTE — PROGRESS NOTES

## 2018-10-13 ASSESSMENT — PATIENT HEALTH QUESTIONNAIRE - PHQ9: SUM OF ALL RESPONSES TO PHQ QUESTIONS 1-9: 8

## 2018-10-13 ASSESSMENT — ANXIETY QUESTIONNAIRES: GAD7 TOTAL SCORE: 3

## 2018-10-26 DIAGNOSIS — I10 BENIGN ESSENTIAL HYPERTENSION: ICD-10-CM

## 2018-10-26 RX ORDER — LOSARTAN POTASSIUM 50 MG/1
TABLET ORAL
Qty: 30 TABLET | Refills: 0 | Status: SHIPPED | OUTPATIENT
Start: 2018-10-26 | End: 2018-11-27

## 2018-10-26 NOTE — TELEPHONE ENCOUNTER
Losartan  Last office visit: 10/12/18  Last refill: 10/3/18 #30, 0 R   Normal serum creatinine on file in past 12 months           Recent Labs   Lab Test  01/18/16   0605   CR  0.80                  Normal serum potassium on file in past 12 months           Recent Labs   Lab Test  01/18/16   0605   POTASSIUM  3.7             Thank you.

## 2018-11-27 DIAGNOSIS — I10 BENIGN ESSENTIAL HYPERTENSION: ICD-10-CM

## 2018-11-28 NOTE — TELEPHONE ENCOUNTER
losartan (COZAAR) 50 MG tablet    Last Written Prescription Date:  10/26/18  Last Fill Quantity: 30,   # refills: 0  Last Office Visit: 10/12/18  Future Office visit:

## 2018-11-29 RX ORDER — LOSARTAN POTASSIUM 50 MG/1
TABLET ORAL
Qty: 30 TABLET | Refills: 0 | Status: SHIPPED | OUTPATIENT
Start: 2018-11-29 | End: 2018-12-29

## 2018-11-29 NOTE — TELEPHONE ENCOUNTER
Protocol failed due to    Normal serum creatinine on file in past 12 months    Normal serum potassium on file in past 12 months     Please advise. Thank you!

## 2018-12-10 NOTE — TELEPHONE ENCOUNTER
Left message, awaiting call back. Please notify patient he is due for a BMP lab. Please place order if he calls back   WALLY PERSAUD

## 2018-12-14 DIAGNOSIS — I10 BENIGN ESSENTIAL HYPERTENSION: ICD-10-CM

## 2018-12-17 RX ORDER — METOPROLOL SUCCINATE 25 MG/1
TABLET, EXTENDED RELEASE ORAL
Qty: 45 TABLET | Refills: 0 | Status: SHIPPED | OUTPATIENT
Start: 2018-12-17 | End: 2019-02-05

## 2018-12-29 DIAGNOSIS — E78.5 DYSLIPIDEMIA: ICD-10-CM

## 2018-12-29 DIAGNOSIS — I10 BENIGN ESSENTIAL HYPERTENSION: ICD-10-CM

## 2018-12-31 RX ORDER — LOSARTAN POTASSIUM 50 MG/1
TABLET ORAL
Qty: 30 TABLET | Refills: 1 | Status: SHIPPED | OUTPATIENT
Start: 2018-12-31 | End: 2019-02-04

## 2018-12-31 RX ORDER — SIMVASTATIN 20 MG
TABLET ORAL
Qty: 30 TABLET | Refills: 2 | Status: SHIPPED | OUTPATIENT
Start: 2018-12-31 | End: 2019-02-04

## 2018-12-31 RX ORDER — METOPROLOL SUCCINATE 25 MG/1
TABLET, EXTENDED RELEASE ORAL
Qty: 45 TABLET | Refills: 0 | OUTPATIENT
Start: 2018-12-31

## 2018-12-31 NOTE — TELEPHONE ENCOUNTER
LOSARTAN POTASSIUM 50 MG TABLET      Last Written Prescription Date:  11/29/18  Last Fill Quantity: 30,   # refills: 0  Last Office Visit: 10/12/18  Future Office visit:

## 2018-12-31 NOTE — TELEPHONE ENCOUNTER
METOPROLOL SUCCINATE 25 MG TAB ER 24H      Last Written Prescription Date:  12/17/18  Last Fill Quantity: 45,   # refills: 0  Last Office Visit: 10/12/18  Future Office visit:       SIMVASTATIN 20 MG TABLET      Last Written Prescription Date:  7/12/18  Last Fill Quantity: 30,   # refills: 5  Last Office Visit: 10/12/18  Future Office visit:

## 2019-02-04 ENCOUNTER — OFFICE VISIT (OUTPATIENT)
Dept: FAMILY MEDICINE | Facility: OTHER | Age: 84
End: 2019-02-04
Attending: FAMILY MEDICINE
Payer: MEDICARE

## 2019-02-04 VITALS
WEIGHT: 194 LBS | DIASTOLIC BLOOD PRESSURE: 78 MMHG | RESPIRATION RATE: 18 BRPM | TEMPERATURE: 97.3 F | HEART RATE: 55 BPM | BODY MASS INDEX: 27.16 KG/M2 | HEIGHT: 71 IN | OXYGEN SATURATION: 98 % | SYSTOLIC BLOOD PRESSURE: 132 MMHG

## 2019-02-04 DIAGNOSIS — I10 BENIGN ESSENTIAL HYPERTENSION: ICD-10-CM

## 2019-02-04 DIAGNOSIS — I10 BENIGN ESSENTIAL HYPERTENSION: Primary | ICD-10-CM

## 2019-02-04 DIAGNOSIS — E78.2 MIXED HYPERLIPIDEMIA: ICD-10-CM

## 2019-02-04 DIAGNOSIS — I25.10 CORONARY ARTERY DISEASE INVOLVING NATIVE CORONARY ARTERY OF NATIVE HEART WITHOUT ANGINA PECTORIS: ICD-10-CM

## 2019-02-04 PROCEDURE — G0463 HOSPITAL OUTPT CLINIC VISIT: HCPCS

## 2019-02-04 PROCEDURE — 99214 OFFICE O/P EST MOD 30 MIN: CPT | Performed by: FAMILY MEDICINE

## 2019-02-04 RX ORDER — SIMVASTATIN 20 MG
20 TABLET ORAL AT BEDTIME
Qty: 90 TABLET | Refills: 3 | Status: SHIPPED | OUTPATIENT
Start: 2019-02-04 | End: 2020-01-10

## 2019-02-04 ASSESSMENT — MIFFLIN-ST. JEOR: SCORE: 1557.11

## 2019-02-04 ASSESSMENT — PAIN SCALES - GENERAL: PAINLEVEL: NO PAIN (0)

## 2019-02-04 NOTE — NURSING NOTE
"Chief Complaint   Patient presents with     Lipids     Hypertension     Heart Problem       Initial /78 (BP Location: Right arm, Patient Position: Sitting, Cuff Size: Adult Regular)   Pulse 55   Temp 97.3  F (36.3  C) (Tympanic)   Resp 18   Ht 1.803 m (5' 11\")   Wt 88 kg (194 lb)   SpO2 98%   BMI 27.06 kg/m   Estimated body mass index is 27.06 kg/m  as calculated from the following:    Height as of this encounter: 1.803 m (5' 11\").    Weight as of this encounter: 88 kg (194 lb).  Medication Reconciliation: complete    Dasha Salazar LPN  "

## 2019-02-04 NOTE — TELEPHONE ENCOUNTER
Losartan  Last Written Prescription Date: 12/31*/18  Last Fill Quantity: 30 # of Refills: 1  Last Office Visit: 2/4/19

## 2019-02-04 NOTE — PROGRESS NOTES
SUBJECTIVE:   James Grant is a 89 year old male who presents to clinic today for the following health issues:    Hyperlipidemia Follow-Up      Rate your low fat/cholesterol diet?: not monitoring fat    Taking statin?  Yes, no muscle aches from statin    Other lipid medications/supplements?:  none    Hypertension Follow-up      Outpatient blood pressures are not being checked.    Low Salt Diet: not monitoring salt    Vascular Disease Follow-up:  Coronary Artery Disease (CAD)      Chest pain or pressure, left side neck or arm pain: No    Shortness of breath/increased sweats/nausea with exertion: No    Pain in calves walking 1-2 blocks: No    Worsened or new symptoms since last visit: No    Nitroglycerin use: no    Daily aspirin use: Yes      Amount of exercise or physical activity: 4-5 days/week for an average of 15-30 minutes    Problems taking medications regularly: No    Medication side effects: none    Diet: regular (no restrictions)    Problem list and histories reviewed & adjusted, as indicated.  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 Sister 91         Current Outpatient Medications   Medication Sig Dispense Refill     aspirin 325 MG tablet Take 0.5 tablets by mouth daily.       cetirizine (ZYRTEC) 10 MG tablet Take 1 tablet (10 mg) by mouth every evening 30 tablet 11     famotidine (PEPCID) 20 MG tablet TAKE ONE (1) TABLET BY MOUTH DAILY 30 tablet 5     fluticasone (FLONASE) 50 MCG/ACT spray Spray 2 sprays into both nostrils daily 1 Bottle 11     losartan (COZAAR) 50 MG tablet TAKE 1 TABLET (50 MG) BY MOUTH DAILY COZAAR 30 tablet 1     metoprolol succinate ER (TOPROL-XL) 25 MG 24 hr tablet TAKE 1/2 TAB BY MOUTH AT BEDTIME. DO NOT CRUSH OR CHEW. 45 tablet 0     nabumetone (RELAFEN) 500 MG tablet Take 1-2 tablets (500-1,000 mg) by mouth 2 times daily as needed for moderate pain 30 tablet 1     simvastatin (ZOCOR) 20 MG tablet Take 1 tablet (20 mg) by mouth At Bedtime 90 tablet 3     vitamin  B complex with vitamin C (VITAMIN  B COMPLEX) TABS Take 1 tablet by mouth daily       Allergies   Allergen Reactions     Lisinopril Cough     Morphine      BP Readings from Last 3 Encounters:   02/04/19 132/78   10/12/18 132/72   07/24/18 128/60    Wt Readings from Last 3 Encounters:   02/04/19 88 kg (194 lb)   10/12/18 85.6 kg (188 lb 12.8 oz)   07/24/18 83.9 kg (185 lb)                    Reviewed and updated as needed this visit by clinical staff  Tobacco  Allergies  Meds  Problems  Med Hx  Surg Hx  Fam Hx       Reviewed and updated as needed this visit by Provider         ROS:  Constitutional, HEENT, cardiovascular, pulmonary, gi and gu systems are negative, except as otherwise noted.    OBJECTIVE:     /78 (BP Location: Right arm, Patient Position: Sitting, Cuff Size: Adult Regular)   Pulse 55   Temp 97.3  F (36.3  C) (Tympanic)   Resp 18   Ht 1.803 m (5' 11\")   Wt 88 kg (194 lb)   SpO2 98%   BMI 27.06 " kg/m    Body mass index is 27.06 kg/m .  Physical Exam   Constitutional: He is oriented to person, place, and time. He appears well-developed and well-nourished. No distress.   HENT:   Head: Normocephalic.   Right Ear: Tympanic membrane, external ear and ear canal normal.   Left Ear: Tympanic membrane, external ear and ear canal normal.   Nose: Nose normal.   Mouth/Throat: Oropharynx is clear and moist.   Eyes: Conjunctivae are normal.   Neck: Neck supple. No JVD present. No thyromegaly present.   Cardiovascular: Normal rate, regular rhythm, normal heart sounds and intact distal pulses. Exam reveals no gallop and no friction rub.   No murmur heard.  Pulmonary/Chest: Effort normal and breath sounds normal. He has no rales.   Musculoskeletal: He exhibits no edema.   Neurological: He is alert and oriented to person, place, and time.   Skin: Skin is warm and dry.   Psychiatric: He has a normal mood and affect.       Other exam not repeated.  Diagnostic Test Results:  none     ASSESSMENT/PLAN:     HTN (hypertension)  Goals reviewed.  Continue home BP monitoring and same medication regimen.  Follow up 6 months.   - metoprolol succinate ER (TOPROL-XL) 25 MG 24 hr tablet; TAKE 1/2 TAB BY MOUTH AT BEDTIME. DO NOT CRUSH OR CHEW.    CHD (coronary heart disease)  Stable. Warning symptoms of recurrent CHD and risk factor reduction discussed.  Continue same medication regimen.  Instructed in the use of SL NTG should symptoms recur.  Follow up 12 months.   - CBC with platelets differential; Future    Dyslipidemia  Fasting labs reviewed.  Goals discussed.  Discussed the importance of diet, exercise, and weight reduction.  Follow up 12 months.   - simvastatin (ZOCOR) 20 MG tablet; Take 1 tablet (20 mg) by mouth At Bedtime  - Comprehensive metabolic panel; Future  - Lipid Profile; Future  - TSH with free T4 reflex; Future  - metoprolol succinate ER (TOPROL-XL) 25 MG 24 hr tablet; TAKE 1/2 TAB BY MOUTH AT BEDTIME. DO NOT CRUSH OR  CYN.            Broderick Flowers MD  [facility] [place]

## 2019-02-05 RX ORDER — METOPROLOL SUCCINATE 25 MG/1
TABLET, EXTENDED RELEASE ORAL
Qty: 45 TABLET | Refills: 0 | Status: SHIPPED | OUTPATIENT
Start: 2019-02-05 | End: 2019-07-12

## 2019-02-05 RX ORDER — LOSARTAN POTASSIUM 50 MG/1
TABLET ORAL
Qty: 30 TABLET | Refills: 1 | Status: SHIPPED | OUTPATIENT
Start: 2019-02-05 | End: 2019-04-12

## 2019-02-05 NOTE — TELEPHONE ENCOUNTER
Failed protocol.  Needs labs.    Creatinine   Date Value Ref Range Status   01/18/2016 0.80 0.66 - 1.25 mg/dL Final     Potassium   Date Value Ref Range Status   01/18/2016 3.7 3.4 - 5.3 mmol/L Final       losartan (COZAAR) 50 MG table      Last Written Prescription Date:  12/31/18  Last Fill Quantity: 30,   # refills: 1  Last Office Visit: 2/4/19  Future Office visit:       Routing refill request to provider for review/approval because:  Drug not on the G, P or [facility] refill protocol or controlled substance

## 2019-02-07 DIAGNOSIS — E78.2 MIXED HYPERLIPIDEMIA: ICD-10-CM

## 2019-02-07 DIAGNOSIS — I25.10 CORONARY ARTERY DISEASE INVOLVING NATIVE CORONARY ARTERY OF NATIVE HEART WITHOUT ANGINA PECTORIS: ICD-10-CM

## 2019-02-07 LAB
ALBUMIN SERPL-MCNC: 3.7 G/DL (ref 3.4–5)
ALP SERPL-CCNC: 64 U/L (ref 40–150)
ALT SERPL W P-5'-P-CCNC: 29 U/L (ref 0–70)
ANION GAP SERPL CALCULATED.3IONS-SCNC: 3 MMOL/L (ref 3–14)
AST SERPL W P-5'-P-CCNC: 24 U/L (ref 0–45)
BASOPHILS # BLD AUTO: 0.1 10E9/L (ref 0–0.2)
BASOPHILS NFR BLD AUTO: 0.6 %
BILIRUB SERPL-MCNC: 0.6 MG/DL (ref 0.2–1.3)
BUN SERPL-MCNC: 16 MG/DL (ref 7–30)
CALCIUM SERPL-MCNC: 8.5 MG/DL (ref 8.5–10.1)
CHLORIDE SERPL-SCNC: 109 MMOL/L (ref 94–109)
CHOLEST SERPL-MCNC: 118 MG/DL
CO2 SERPL-SCNC: 30 MMOL/L (ref 20–32)
CREAT SERPL-MCNC: 0.85 MG/DL (ref 0.66–1.25)
DIFFERENTIAL METHOD BLD: NORMAL
EOSINOPHIL # BLD AUTO: 0.3 10E9/L (ref 0–0.7)
EOSINOPHIL NFR BLD AUTO: 3.8 %
ERYTHROCYTE [DISTWIDTH] IN BLOOD BY AUTOMATED COUNT: 13 % (ref 10–15)
GFR SERPL CREATININE-BSD FRML MDRD: 76 ML/MIN/{1.73_M2}
GLUCOSE SERPL-MCNC: 98 MG/DL (ref 70–99)
HCT VFR BLD AUTO: 44.3 % (ref 40–53)
HDLC SERPL-MCNC: 39 MG/DL
HGB BLD-MCNC: 14.8 G/DL (ref 13.3–17.7)
IMM GRANULOCYTES # BLD: 0 10E9/L (ref 0–0.4)
IMM GRANULOCYTES NFR BLD: 0.5 %
LDLC SERPL CALC-MCNC: 62 MG/DL
LYMPHOCYTES # BLD AUTO: 3.6 10E9/L (ref 0.8–5.3)
LYMPHOCYTES NFR BLD AUTO: 46.4 %
MCH RBC QN AUTO: 30.7 PG (ref 26.5–33)
MCHC RBC AUTO-ENTMCNC: 33.4 G/DL (ref 31.5–36.5)
MCV RBC AUTO: 92 FL (ref 78–100)
MONOCYTES # BLD AUTO: 1 10E9/L (ref 0–1.3)
MONOCYTES NFR BLD AUTO: 13 %
NEUTROPHILS # BLD AUTO: 2.8 10E9/L (ref 1.6–8.3)
NEUTROPHILS NFR BLD AUTO: 35.7 %
NONHDLC SERPL-MCNC: 79 MG/DL
NRBC # BLD AUTO: 0 10*3/UL
NRBC BLD AUTO-RTO: 0 /100
PLATELET # BLD AUTO: 239 10E9/L (ref 150–450)
POTASSIUM SERPL-SCNC: 4.4 MMOL/L (ref 3.4–5.3)
PROT SERPL-MCNC: 6.8 G/DL (ref 6.8–8.8)
RBC # BLD AUTO: 4.82 10E12/L (ref 4.4–5.9)
SODIUM SERPL-SCNC: 142 MMOL/L (ref 133–144)
TRIGL SERPL-MCNC: 85 MG/DL
TSH SERPL DL<=0.005 MIU/L-ACNC: 2.95 MU/L (ref 0.4–4)
WBC # BLD AUTO: 7.8 10E9/L (ref 4–11)

## 2019-02-07 PROCEDURE — 36415 COLL VENOUS BLD VENIPUNCTURE: CPT | Mod: ZL | Performed by: FAMILY MEDICINE

## 2019-02-07 PROCEDURE — 80053 COMPREHEN METABOLIC PANEL: CPT | Mod: ZL | Performed by: FAMILY MEDICINE

## 2019-02-07 PROCEDURE — 80061 LIPID PANEL: CPT | Mod: ZL | Performed by: FAMILY MEDICINE

## 2019-02-07 PROCEDURE — 84443 ASSAY THYROID STIM HORMONE: CPT | Mod: ZL | Performed by: FAMILY MEDICINE

## 2019-02-07 PROCEDURE — 85025 COMPLETE CBC W/AUTO DIFF WBC: CPT | Mod: ZL | Performed by: FAMILY MEDICINE

## 2019-02-16 ENCOUNTER — APPOINTMENT (OUTPATIENT)
Dept: GENERAL RADIOLOGY | Facility: HOSPITAL | Age: 84
End: 2019-02-16
Attending: PHYSICIAN ASSISTANT
Payer: MEDICARE

## 2019-02-16 ENCOUNTER — HOSPITAL ENCOUNTER (OUTPATIENT)
Facility: HOSPITAL | Age: 84
Setting detail: OBSERVATION
Discharge: HOME | End: 2019-02-17
Attending: PHYSICIAN ASSISTANT | Admitting: STUDENT IN AN ORGANIZED HEALTH CARE EDUCATION/TRAINING PROGRAM
Payer: MEDICARE

## 2019-02-16 DIAGNOSIS — R68.83 SHAKING CHILLS: ICD-10-CM

## 2019-02-16 DIAGNOSIS — M62.81 GENERALIZED MUSCLE WEAKNESS: ICD-10-CM

## 2019-02-16 PROBLEM — R68.89 RIGORS: Status: ACTIVE | Noted: 2019-02-16

## 2019-02-16 LAB
ALBUMIN SERPL-MCNC: 3.7 G/DL (ref 3.4–5)
ALP SERPL-CCNC: 57 U/L (ref 40–150)
ALT SERPL W P-5'-P-CCNC: 32 U/L (ref 0–70)
ANION GAP SERPL CALCULATED.3IONS-SCNC: 10 MMOL/L (ref 3–14)
AST SERPL W P-5'-P-CCNC: ABNORMAL U/L (ref 0–45)
BASOPHILS # BLD AUTO: 0.2 10E9/L (ref 0–0.2)
BASOPHILS NFR BLD AUTO: 2 %
BILIRUB SERPL-MCNC: 0.4 MG/DL (ref 0.2–1.3)
BUN SERPL-MCNC: 21 MG/DL (ref 7–30)
CALCIUM SERPL-MCNC: 8.6 MG/DL (ref 8.5–10.1)
CHLORIDE SERPL-SCNC: 107 MMOL/L (ref 94–109)
CO2 SERPL-SCNC: 25 MMOL/L (ref 20–32)
CREAT SERPL-MCNC: 0.71 MG/DL (ref 0.66–1.25)
DIFFERENTIAL METHOD BLD: ABNORMAL
EOSINOPHIL # BLD AUTO: 0.3 10E9/L (ref 0–0.7)
EOSINOPHIL NFR BLD AUTO: 3 %
ERYTHROCYTE [DISTWIDTH] IN BLOOD BY AUTOMATED COUNT: 13 % (ref 10–15)
FLUAV+FLUBV RNA SPEC QL NAA+PROBE: NEGATIVE
FLUAV+FLUBV RNA SPEC QL NAA+PROBE: NEGATIVE
GFR SERPL CREATININE-BSD FRML MDRD: 80 ML/MIN/{1.73_M2}
GLUCOSE SERPL-MCNC: 111 MG/DL (ref 70–99)
HCT VFR BLD AUTO: 43.8 % (ref 40–53)
HGB BLD-MCNC: 15 G/DL (ref 13.3–17.7)
LACTATE BLD-SCNC: 1.5 MMOL/L (ref 0.7–2)
LACTATE BLD-SCNC: 2.6 MMOL/L (ref 0.7–2)
LYMPHOCYTES # BLD AUTO: 6.3 10E9/L (ref 0.8–5.3)
LYMPHOCYTES NFR BLD AUTO: 66 %
MCH RBC QN AUTO: 31.1 PG (ref 26.5–33)
MCHC RBC AUTO-ENTMCNC: 34.2 G/DL (ref 31.5–36.5)
MCV RBC AUTO: 91 FL (ref 78–100)
MONOCYTES # BLD AUTO: 0.9 10E9/L (ref 0–1.3)
MONOCYTES NFR BLD AUTO: 9 %
NEUTROPHILS # BLD AUTO: 1.9 10E9/L (ref 1.6–8.3)
NEUTROPHILS NFR BLD AUTO: 20 %
PLATELET # BLD AUTO: 249 10E9/L (ref 150–450)
PLATELET # BLD EST: ABNORMAL 10*3/UL
POTASSIUM SERPL-SCNC: ABNORMAL MMOL/L (ref 3.4–5.3)
PROCALCITONIN SERPL-MCNC: <0.05 NG/ML
PROT SERPL-MCNC: 7.2 G/DL (ref 6.8–8.8)
RBC # BLD AUTO: 4.83 10E12/L (ref 4.4–5.9)
RBC MORPH BLD: NORMAL
RSV RNA SPEC NAA+PROBE: NEGATIVE
SODIUM SERPL-SCNC: 142 MMOL/L (ref 133–144)
SPECIMEN SOURCE: NORMAL
TROPONIN I SERPL-MCNC: <0.015 UG/L (ref 0–0.04)
VARIANT LYMPHS BLD QL SMEAR: PRESENT
WBC # BLD AUTO: 9.5 10E9/L (ref 4–11)

## 2019-02-16 PROCEDURE — 25000128 H RX IP 250 OP 636: Performed by: PHYSICIAN ASSISTANT

## 2019-02-16 PROCEDURE — 93005 ELECTROCARDIOGRAM TRACING: CPT

## 2019-02-16 PROCEDURE — G0378 HOSPITAL OBSERVATION PER HR: HCPCS

## 2019-02-16 PROCEDURE — 80053 COMPREHEN METABOLIC PANEL: CPT | Performed by: PHYSICIAN ASSISTANT

## 2019-02-16 PROCEDURE — 85025 COMPLETE CBC W/AUTO DIFF WBC: CPT | Performed by: PHYSICIAN ASSISTANT

## 2019-02-16 PROCEDURE — 25800030 ZZH RX IP 258 OP 636: Performed by: PHYSICIAN ASSISTANT

## 2019-02-16 PROCEDURE — 93010 ELECTROCARDIOGRAM REPORT: CPT | Performed by: INTERNAL MEDICINE

## 2019-02-16 PROCEDURE — 99220 ZZC INITIAL OBSERVATION CARE,LEVL III: CPT | Performed by: STUDENT IN AN ORGANIZED HEALTH CARE EDUCATION/TRAINING PROGRAM

## 2019-02-16 PROCEDURE — 84484 ASSAY OF TROPONIN QUANT: CPT | Performed by: PHYSICIAN ASSISTANT

## 2019-02-16 PROCEDURE — 87040 BLOOD CULTURE FOR BACTERIA: CPT | Mod: 59 | Performed by: PHYSICIAN ASSISTANT

## 2019-02-16 PROCEDURE — 83605 ASSAY OF LACTIC ACID: CPT | Mod: 91 | Performed by: PHYSICIAN ASSISTANT

## 2019-02-16 PROCEDURE — 84145 PROCALCITONIN (PCT): CPT | Performed by: PHYSICIAN ASSISTANT

## 2019-02-16 PROCEDURE — 71046 X-RAY EXAM CHEST 2 VIEWS: CPT | Mod: TC

## 2019-02-16 PROCEDURE — 96375 TX/PRO/DX INJ NEW DRUG ADDON: CPT

## 2019-02-16 PROCEDURE — 99284 EMERGENCY DEPT VISIT MOD MDM: CPT | Mod: Z6 | Performed by: PHYSICIAN ASSISTANT

## 2019-02-16 PROCEDURE — 96361 HYDRATE IV INFUSION ADD-ON: CPT

## 2019-02-16 PROCEDURE — 96374 THER/PROPH/DIAG INJ IV PUSH: CPT

## 2019-02-16 PROCEDURE — 87631 RESP VIRUS 3-5 TARGETS: CPT | Performed by: PHYSICIAN ASSISTANT

## 2019-02-16 PROCEDURE — 36415 COLL VENOUS BLD VENIPUNCTURE: CPT | Performed by: PHYSICIAN ASSISTANT

## 2019-02-16 PROCEDURE — 40000786 ZZHCL STATISTIC ACTIVE MRSA SURVEILLANCE CULTURE: Performed by: STUDENT IN AN ORGANIZED HEALTH CARE EDUCATION/TRAINING PROGRAM

## 2019-02-16 PROCEDURE — 99285 EMERGENCY DEPT VISIT HI MDM: CPT | Mod: 25

## 2019-02-16 RX ORDER — NABUMETONE 500 MG/1
500-1000 TABLET, FILM COATED ORAL 2 TIMES DAILY PRN
Status: DISCONTINUED | OUTPATIENT
Start: 2019-02-16 | End: 2019-02-17 | Stop reason: HOSPADM

## 2019-02-16 RX ORDER — NALOXONE HYDROCHLORIDE 0.4 MG/ML
.1-.4 INJECTION, SOLUTION INTRAMUSCULAR; INTRAVENOUS; SUBCUTANEOUS
Status: DISCONTINUED | OUTPATIENT
Start: 2019-02-16 | End: 2019-02-17 | Stop reason: HOSPADM

## 2019-02-16 RX ORDER — SODIUM CHLORIDE 9 MG/ML
1000 INJECTION, SOLUTION INTRAVENOUS CONTINUOUS
Status: DISCONTINUED | OUTPATIENT
Start: 2019-02-16 | End: 2019-02-17 | Stop reason: HOSPADM

## 2019-02-16 RX ORDER — CETIRIZINE HYDROCHLORIDE 10 MG/1
10 TABLET ORAL EVERY EVENING
Status: DISCONTINUED | OUTPATIENT
Start: 2019-02-16 | End: 2019-02-17 | Stop reason: HOSPADM

## 2019-02-16 RX ORDER — METOPROLOL SUCCINATE 50 MG/1
50 TABLET, EXTENDED RELEASE ORAL DAILY
Status: DISCONTINUED | OUTPATIENT
Start: 2019-02-17 | End: 2019-02-16

## 2019-02-16 RX ORDER — ONDANSETRON 2 MG/ML
4 INJECTION INTRAMUSCULAR; INTRAVENOUS EVERY 6 HOURS PRN
Status: DISCONTINUED | OUTPATIENT
Start: 2019-02-16 | End: 2019-02-17 | Stop reason: HOSPADM

## 2019-02-16 RX ORDER — ACETAMINOPHEN 650 MG/1
650 SUPPOSITORY RECTAL EVERY 4 HOURS PRN
Status: DISCONTINUED | OUTPATIENT
Start: 2019-02-16 | End: 2019-02-17 | Stop reason: HOSPADM

## 2019-02-16 RX ORDER — FLUTICASONE PROPIONATE 50 MCG
2 SPRAY, SUSPENSION (ML) NASAL DAILY
Status: DISCONTINUED | OUTPATIENT
Start: 2019-02-17 | End: 2019-02-17 | Stop reason: HOSPADM

## 2019-02-16 RX ORDER — ONDANSETRON 4 MG/1
4 TABLET, ORALLY DISINTEGRATING ORAL EVERY 6 HOURS PRN
Status: DISCONTINUED | OUTPATIENT
Start: 2019-02-16 | End: 2019-02-17 | Stop reason: HOSPADM

## 2019-02-16 RX ORDER — ACETAMINOPHEN 325 MG/1
650 TABLET ORAL EVERY 4 HOURS PRN
Status: DISCONTINUED | OUTPATIENT
Start: 2019-02-16 | End: 2019-02-17 | Stop reason: HOSPADM

## 2019-02-16 RX ORDER — ASPIRIN 81 MG/1
162 TABLET, CHEWABLE ORAL DAILY
Status: DISCONTINUED | OUTPATIENT
Start: 2019-02-17 | End: 2019-02-17 | Stop reason: HOSPADM

## 2019-02-16 RX ORDER — ONDANSETRON 2 MG/ML
4 INJECTION INTRAMUSCULAR; INTRAVENOUS EVERY 30 MIN PRN
Status: DISCONTINUED | OUTPATIENT
Start: 2019-02-16 | End: 2019-02-17 | Stop reason: HOSPADM

## 2019-02-16 RX ORDER — SIMVASTATIN 20 MG
20 TABLET ORAL AT BEDTIME
Status: DISCONTINUED | OUTPATIENT
Start: 2019-02-16 | End: 2019-02-17 | Stop reason: HOSPADM

## 2019-02-16 RX ORDER — KETOROLAC TROMETHAMINE 15 MG/ML
15 INJECTION, SOLUTION INTRAMUSCULAR; INTRAVENOUS ONCE
Status: COMPLETED | OUTPATIENT
Start: 2019-02-16 | End: 2019-02-16

## 2019-02-16 RX ADMIN — KETOROLAC TROMETHAMINE 15 MG: 15 INJECTION, SOLUTION INTRAMUSCULAR; INTRAVENOUS at 18:33

## 2019-02-16 RX ADMIN — SODIUM CHLORIDE 1000 ML: 9 INJECTION, SOLUTION INTRAVENOUS at 19:21

## 2019-02-16 RX ADMIN — ONDANSETRON 4 MG: 2 INJECTION INTRAMUSCULAR; INTRAVENOUS at 17:46

## 2019-02-16 RX ADMIN — SODIUM CHLORIDE 1000 ML: 9 INJECTION, SOLUTION INTRAVENOUS at 18:23

## 2019-02-16 ASSESSMENT — ENCOUNTER SYMPTOMS
WEAKNESS: 1
FEVER: 0
BRUISES/BLEEDS EASILY: 1
LIGHT-HEADEDNESS: 1
CHILLS: 1
DIARRHEA: 0
BACK PAIN: 0
NAUSEA: 1
SHORTNESS OF BREATH: 0
VOMITING: 0
PALPITATIONS: 0
SORE THROAT: 0
DIZZINESS: 1
ABDOMINAL PAIN: 0
COUGH: 1
CHEST TIGHTNESS: 0

## 2019-02-16 ASSESSMENT — MIFFLIN-ST. JEOR: SCORE: 1512.5

## 2019-02-16 NOTE — ED NOTES
DATE:  2/16/2019   TIME OF RECEIPT FROM LAB:  175  LAB TEST: lactic acid  LAB VALUE:  Lactic acid 2.6  RESULTS GIVEN WITH READ-BACK TO (PROVIDER):  shawna HILL LAB VALUE REPORTED TO PROVIDER:   1750

## 2019-02-16 NOTE — ED NOTES
Pt to ED via ambulance. Pt stated about 2 hours he felt like he was going to puke, pt has been nauseated but no emesis. Pt denies any diarrhea or fever.  Deneis any pains except a slight headache.  Rigors are noted.  IV was started with labs and blood cultures. Provider at bedside on pt arrival

## 2019-02-16 NOTE — ED PROVIDER NOTES
"  History     Chief Complaint   Patient presents with     Nausea     Chills     Flu Symptoms     The history is provided by the patient.     James Grant is a 89 year old male who presented to the emergency department via EMS for evaluation of approximate 2-hour history of nausea, chills, nonproductive cough, as well as \"not feeling well.\"  Symptoms began abruptly.  Denies any chest pains or pressures.  Denies any abdominal pain.  Denies any diarrhea.  Denies any vomiting.  Denies any melena, hematochezia, hematemesis.  Denies any unusual rashes.  Denies sore throat.  Denies any recent falls.    Allergies:  Allergies   Allergen Reactions     Lisinopril Cough     Morphine        Problem List:    Patient Active Problem List    Diagnosis Date Noted     Venous stasis dermatitis of both lower extremities 03/16/2017     Priority: Medium     ACP (advance care planning) 06/06/2016     Priority: Medium     Advance Care Planning 6/6/2016: ACP Review of Chart / Resources Provided:  Reviewed chart for advance care plan.  James Grant has no plan or code status on file. Discussed available resources and provided with information. Confirmed code status reflects current choices pending further ACP discussions.  Confirmed/documented legally designated decision makers.  Added by Yani Anderson             Advance care planning 02/08/2016     Priority: Medium     Advance Care Planning 2/8/2016: Receipt of ACP document:  Received: POLST which was signed and dated by provider on 1/18/16.  Document previously scanned on 1/22/16.  Order reviewed and found to be valid.  Code Status reflects choices in most recent ACP document.  Confirmed/documented designated decision maker(s).  Added by Trinh Garces             BPH (benign prostatic hyperplasia) 01/08/2014     Priority: Medium     SNHL (sensorineural hearing loss) 07/17/2013     Priority: Medium     ETD (eustachian tube dysfunction) 07/17/2013     Priority: Medium     HTN " (hypertension) 11/26/2010     Priority: Medium     Dyslipidemia 11/26/2010     Priority: Medium     GERD (gastroesophageal reflux disease) 11/26/2010     Priority: Medium     CHD (coronary heart disease) 11/26/2010     Priority: Medium     07/2010 S/P inferior wall MI  Angioplasty and stenting X 2 RCA  08/2010 angioplasty and stenting (LIDIA) LAD          Past Medical History:    Past Medical History:   Diagnosis Date     Benign localized hyperplasia of prostate without urinary obstruction and other lower urinary tract symptoms (LUTS) 11/26/2010     CHD (coronary heart disease) 11/26/2010     Dyslipidemia 11/26/2010     GERD (gastroesophageal reflux disease) 11/26/2010     HTN (hypertension) 11/26/2010     Old myocardial infarction 11/26/2010     Other symptoms involving digestive system(787.99) 10/20/2006       Past Surgical History:    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 Sister 91       Social History:  Marital Status:   [2]  Social History     Tobacco Use     Smoking status: Former Smoker     Types: Cigarettes     Smokeless tobacco: Never Used     Tobacco comment: Tried to Quit (YES); YR QUIT (1962); Passive Exposure (NO)   Substance Use Topics     Alcohol use: Yes     Comment: 3 Drinks (BEER & LIQUOR) ~ OCCASIONALLY (QUIT IN 2000)     Drug use: No        Medications:      aspirin 325 MG tablet   cetirizine (ZYRTEC) 10 MG tablet   famotidine (PEPCID) 20 MG tablet   fluticasone (FLONASE) 50 MCG/ACT spray   losartan (COZAAR) 50 MG tablet   metoprolol succinate ER (TOPROL-XL) 25 MG 24 hr tablet   nabumetone (RELAFEN) 500 MG tablet   simvastatin (ZOCOR) 20 MG tablet   vitamin  B complex with vitamin C (VITAMIN  B COMPLEX)  TABS         Review of Systems   Constitutional: Positive for chills. Negative for fever.   HENT: Negative for congestion and sore throat.    Respiratory: Positive for cough. Negative for chest tightness and shortness of breath.    Cardiovascular: Negative for chest pain and palpitations.   Gastrointestinal: Positive for nausea. Negative for abdominal pain, diarrhea and vomiting.   Genitourinary: Negative.    Musculoskeletal: Negative for back pain.   Skin: Negative.    Neurological: Positive for dizziness, weakness and light-headedness.   Hematological: Bruises/bleeds easily.        ASA       Physical Exam   BP: 173/93  Heart Rate: 66  Temp: 97.7  F (36.5  C)  Resp: 18  SpO2: 96 %      Physical Exam   Constitutional: He is oriented to person, place, and time. He appears well-developed and well-nourished. No distress.   Cardiovascular: Normal rate.   Pulmonary/Chest: Effort normal and breath sounds normal.   Abdominal: Soft. He exhibits no distension. There is no tenderness. There is no guarding.   Neurological: He is alert and oriented to person, place, and time.   Skin: Skin is warm and dry. Capillary refill takes less than 2 seconds.   Psychiatric: He has a normal mood and affect.   Nursing note and vitals reviewed.      ED Course        Procedures  EKG shows a sinus rhythm with first-degree 63.  Prolonged CT interval of 218 ms.  Normal QRS duration.  Normal QTC.  Normal axis.  Slightly prolonged P wave duration of 144 ms.  I can find no significant ST segment changes.  There are no concerning T waves.        Chest x-ray is negative for focal infiltrate.    Critical Care time:  none     The Lactic acid level is elevated due to a multitude of etiologies, at this time there is no sign of severe sepsis or septic shock.           Results for orders placed or performed during the hospital encounter of 02/16/19 (from the past 24 hour(s))   CBC with platelets differential   Result Value Ref Range    WBC 9.5 4.0 - 11.0  10e9/L    RBC Count 4.83 4.4 - 5.9 10e12/L    Hemoglobin 15.0 13.3 - 17.7 g/dL    Hematocrit 43.8 40.0 - 53.0 %    MCV 91 78 - 100 fl    MCH 31.1 26.5 - 33.0 pg    MCHC 34.2 31.5 - 36.5 g/dL    RDW 13.0 10.0 - 15.0 %    Platelet Count 249 150 - 450 10e9/L    Diff Method Manual Differential     % Neutrophils 20.0 %    % Lymphocytes 66.0 %    % Monocytes 9.0 %    % Eosinophils 3.0 %    % Basophils 2.0 %    Absolute Neutrophil 1.9 1.6 - 8.3 10e9/L    Absolute Lymphocytes 6.3 (H) 0.8 - 5.3 10e9/L    Absolute Monocytes 0.9 0.0 - 1.3 10e9/L    Absolute Eosinophils 0.3 0.0 - 0.7 10e9/L    Absolute Basophils 0.2 0.0 - 0.2 10e9/L    Reactive Lymphs Present     RBC Morphology Normal     Platelet Estimate       Automated count confirmed.  Platelet morphology is normal.   Comprehensive metabolic panel   Result Value Ref Range    Sodium 142 133 - 144 mmol/L    Potassium Specimen slightly hemolyzed 3.4 - 5.3 mmol/L    Chloride 107 94 - 109 mmol/L    Carbon Dioxide 25 20 - 32 mmol/L    Anion Gap 10 3 - 14 mmol/L    Glucose 111 (H) 70 - 99 mg/dL    Urea Nitrogen 21 7 - 30 mg/dL    Creatinine 0.71 0.66 - 1.25 mg/dL    GFR Estimate 80 >60 mL/min/[1.73_m2]    GFR Estimate If Black 92 >60 mL/min/[1.73_m2]    Calcium 8.6 8.5 - 10.1 mg/dL    Bilirubin Total 0.4 0.2 - 1.3 mg/dL    Albumin 3.7 3.4 - 5.0 g/dL    Protein Total 7.2 6.8 - 8.8 g/dL    Alkaline Phosphatase 57 40 - 150 U/L    ALT 32 0 - 70 U/L    AST  0 - 45 U/L     Specimen is hemolyzed which can falsely elevate AST. Analysis of a non-hemolyzed specimen   may result in a lower value.     Lactic acid whole blood   Result Value Ref Range    Lactic Acid 2.6 (H) 0.7 - 2.0 mmol/L   Influenza A and B and RSV PCR   Result Value Ref Range    Specimen Description Nasopharyngeal     Influenza A PCR Negative NEG^Negative    Influenza B PCR Negative NEG^Negative    Resp Syncytial Virus Negative NEG^Negative   Troponin I   Result Value Ref Range    Troponin I ES <0.015 0.000 - 0.045 ug/L    Procalcitonin   Result Value Ref Range    Procalcitonin <0.05 ng/ml   XR Chest 2 Views    Narrative    PROCEDURE:  XR CHEST 2 VW    HISTORY:  chills and cough.     COMPARISON:  2016    FINDINGS:   The cardiac silhouette is normal in size. The pulmonary vasculature is  normal.  The lungs are clear. No pleural effusion or pneumothorax.      Impression    IMPRESSION:  No acute cardiopulmonary disease.      NIKOLAS MUNOZ MD       Medications   ondansetron (ZOFRAN) injection 4 mg (4 mg Intravenous Given 2/16/19 1746)   0.9% sodium chloride BOLUS (0 mLs Intravenous Stopped 2/16/19 1920)     Followed by   sodium chloride 0.9% infusion (not administered)   ketorolac (TORADOL) injection 15 mg (15 mg Intravenous Given 2/16/19 1833)       Assessments & Plan (with Medical Decision Making)   Concerning symptoms of abrupt onset of shaking chills and profound weakness.  Workup is otherwise negative other than the slight elevation in acid which can certainly be multifactorial.  However the patient's age and the fact that he lives alone, including his persistent symptoms that have not improved after IV hydration would likely fit any reasonable criteria for observation.  Graciously accepted by Dr. Eagle.     I have reviewed the nursing notes.    I have reviewed the findings, diagnosis, plan and need for follow up with the patient.          Medication List      There are no discharge medications for this visit.         Final diagnoses:   Shaking chills   Generalized muscle weakness       2/16/2019   [facility]     Clarke Atkins PA-C  02/16/19 1922

## 2019-02-17 ENCOUNTER — APPOINTMENT (OUTPATIENT)
Dept: CT IMAGING | Facility: HOSPITAL | Age: 84
End: 2019-02-17
Attending: INTERNAL MEDICINE
Payer: MEDICARE

## 2019-02-17 VITALS
DIASTOLIC BLOOD PRESSURE: 60 MMHG | TEMPERATURE: 98.5 F | SYSTOLIC BLOOD PRESSURE: 125 MMHG | WEIGHT: 194.67 LBS | BODY MASS INDEX: 29.5 KG/M2 | OXYGEN SATURATION: 93 % | HEIGHT: 68 IN | RESPIRATION RATE: 16 BRPM

## 2019-02-17 LAB
ALBUMIN UR-MCNC: NEGATIVE MG/DL
ANION GAP SERPL CALCULATED.3IONS-SCNC: 7 MMOL/L (ref 3–14)
APPEARANCE UR: CLEAR
BILIRUB UR QL STRIP: NEGATIVE
BUN SERPL-MCNC: 20 MG/DL (ref 7–30)
CALCIUM SERPL-MCNC: 7.6 MG/DL (ref 8.5–10.1)
CHLORIDE SERPL-SCNC: 112 MMOL/L (ref 94–109)
CO2 SERPL-SCNC: 27 MMOL/L (ref 20–32)
COLOR UR AUTO: YELLOW
CREAT SERPL-MCNC: 0.73 MG/DL (ref 0.66–1.25)
ERYTHROCYTE [DISTWIDTH] IN BLOOD BY AUTOMATED COUNT: 12.9 % (ref 10–15)
GFR SERPL CREATININE-BSD FRML MDRD: 81 ML/MIN/{1.73_M2}
GLUCOSE SERPL-MCNC: 93 MG/DL (ref 70–99)
GLUCOSE UR STRIP-MCNC: NEGATIVE MG/DL
HCT VFR BLD AUTO: 37.1 % (ref 40–53)
HGB BLD-MCNC: 12.5 G/DL (ref 13.3–17.7)
HGB UR QL STRIP: NEGATIVE
KETONES UR STRIP-MCNC: NEGATIVE MG/DL
LEUKOCYTE ESTERASE UR QL STRIP: NEGATIVE
MCH RBC QN AUTO: 30.9 PG (ref 26.5–33)
MCHC RBC AUTO-ENTMCNC: 33.7 G/DL (ref 31.5–36.5)
MCV RBC AUTO: 92 FL (ref 78–100)
NITRATE UR QL: NEGATIVE
PH UR STRIP: 6.5 PH (ref 4.7–8)
PLATELET # BLD AUTO: 223 10E9/L (ref 150–450)
POTASSIUM SERPL-SCNC: 4.1 MMOL/L (ref 3.4–5.3)
RBC # BLD AUTO: 4.05 10E12/L (ref 4.4–5.9)
SODIUM SERPL-SCNC: 146 MMOL/L (ref 133–144)
SOURCE: NORMAL
SP GR UR STRIP: 1.02 (ref 1–1.03)
UROBILINOGEN UR STRIP-MCNC: NORMAL MG/DL (ref 0–2)
WBC # BLD AUTO: 7.1 10E9/L (ref 4–11)

## 2019-02-17 PROCEDURE — 99217 ZZC OBSERVATION CARE DISCHARGE: CPT | Performed by: INTERNAL MEDICINE

## 2019-02-17 PROCEDURE — 70450 CT HEAD/BRAIN W/O DYE: CPT | Mod: TC

## 2019-02-17 PROCEDURE — 36415 COLL VENOUS BLD VENIPUNCTURE: CPT | Performed by: STUDENT IN AN ORGANIZED HEALTH CARE EDUCATION/TRAINING PROGRAM

## 2019-02-17 PROCEDURE — 80048 BASIC METABOLIC PNL TOTAL CA: CPT | Performed by: STUDENT IN AN ORGANIZED HEALTH CARE EDUCATION/TRAINING PROGRAM

## 2019-02-17 PROCEDURE — 25000132 ZZH RX MED GY IP 250 OP 250 PS 637: Mod: GY | Performed by: STUDENT IN AN ORGANIZED HEALTH CARE EDUCATION/TRAINING PROGRAM

## 2019-02-17 PROCEDURE — A9270 NON-COVERED ITEM OR SERVICE: HCPCS | Mod: GY | Performed by: STUDENT IN AN ORGANIZED HEALTH CARE EDUCATION/TRAINING PROGRAM

## 2019-02-17 PROCEDURE — 25800030 ZZH RX IP 258 OP 636: Performed by: PHYSICIAN ASSISTANT

## 2019-02-17 PROCEDURE — 81003 URINALYSIS AUTO W/O SCOPE: CPT | Performed by: INTERNAL MEDICINE

## 2019-02-17 PROCEDURE — G0378 HOSPITAL OBSERVATION PER HR: HCPCS

## 2019-02-17 PROCEDURE — 85027 COMPLETE CBC AUTOMATED: CPT | Performed by: STUDENT IN AN ORGANIZED HEALTH CARE EDUCATION/TRAINING PROGRAM

## 2019-02-17 RX ADMIN — ASPIRIN 81 MG 162 MG: 81 TABLET ORAL at 09:02

## 2019-02-17 RX ADMIN — METOPROLOL SUCCINATE 12.5 MG: 25 TABLET, EXTENDED RELEASE ORAL at 09:02

## 2019-02-17 RX ADMIN — SODIUM CHLORIDE 1000 ML: 9 INJECTION, SOLUTION INTRAVENOUS at 03:27

## 2019-02-17 RX ADMIN — RANITIDINE 150 MG: 150 TABLET ORAL at 09:02

## 2019-02-17 RX ADMIN — ACETAMINOPHEN 650 MG: 325 TABLET, FILM COATED ORAL at 09:16

## 2019-02-17 NOTE — PROGRESS NOTES
Assessment completed see flowsheet.    LOC: alert, oriented   Others present: Patient      Dx: chills, weakness     Lives with: alone   Living at:  Home   Transportation: YES Self, reliable vehicle     Primary PCP: Broderick Flowers  Support System:  Family   Homecare/PCA: not connected   /[facility]:   Not connected   : YES [employer]; does not have [facility]. Obtains hearing aides      [facility] Referral line called: n/a     Health Care Directive: NO      Pharmacy: [facility]   Meds management: YES independently manages and utilizes pill box      Adequate Resources for needs (housing, utilities, food/med): YES  Household chores: self, family does help as well with things like snow removal   Work/community/social activity: YES active in the community as desired     ADLs: independent   Ambulation:independent   Falls: denies   Nutrition: no concern   Sleep: no concerns   Equipment used: none       Able to Return to Prior Living Arrangements: YES      Plan: return home via family member     Barriers: no barriers identified     SONIA: none

## 2019-02-17 NOTE — PLAN OF CARE
Pt admitted last evening around 2030 for chills/rigors and an elevated lactic acid that was WNL prior to arrival to floor. Pt and daughter unable to do PTA med list on admit as pt does not know names and dosages of his medications. Daughter was asked to bring in a med list, will also pass along to call pharmacy today for medication list. Upon arrival pt had a headache he was rating a 5/10 but denied any medication to help. Continuously nauseous but after a few hours of sleep pt stated he felt a little better. Otherwise he is a healthy looking man. Steady on feet, stand by assist. Using call light appropriately. Lung sounds are clear. Bowel sounds active. NS at 125mL/hr    Face to face report given with opportunity to observe patient.    Report given to SARA Allen   2/17/2019  6:58 AM

## 2019-02-17 NOTE — PROGRESS NOTES
Name: James Grant    MRN#: [id number]    Reason for Hospitalization: Shaking chills [R68.83]  Generalized muscle weakness [M62.81]    SONIA: none     Discharge Date: 2/17/2019    Patient / Family response to discharge plan: agreeable     Follow-Up Appt: No future appointments.    Other Providers (Care Coordinator, County Services, PCA services etc): No    Discharge Disposition: home    Tamie Bonilla

## 2019-02-17 NOTE — ED NOTES
"Denies headache at this time.   C/o \"either hunger pains or the nausea is coming back.\"  Daughter at bedside. Personal belongings sent up with pt.   "

## 2019-02-17 NOTE — DISCHARGE SUMMARY
"Admit Date:     02/16/2019   Discharge Date:           PRIMARY CARE PHYSICIAN:  Broderick Flowers MD       ADMISSION DIAGNOSIS:  Nausea, chills.      DISCHARGE DIAGNOSES:   1.  Nausea, chills, etiology unknown.   2.  Headache, normal head CT, resolved.   3.  History of myocardial infarction.   4.  History of hypertension.   5.  History of gastroesophageal reflux disease.    6.  History of dyslipidemia.      PENDING TEST RESULTS AT TIME OF DISCHARGE:  None.      PROCEDURES AND CONSULTATIONS:   1.  Head CT.   2.  Chest x-ray.   3.  EKG.      DISPOSITION:  Home, self-care.      HISTORY OF PRESENT ILLNESS:  Please see admission H and P.      PAST MEDICAL HISTORY:  Please see admission H and P.      HOSPITAL COURSE:  Mr. Grant is a 89-year-old male who presented to the Emergency Department yesterday evening due to a report of nausea, chills, nonproductive cough, \"not feeling well.\"  Due to his age and the fact that he lives home alone, it was decided the patient would be hospitalized overnight for observation.  No antibiotics were started.  The patient has not had recurrent nausea and no record of fever during this stay.  His procalcitonin was less than 0.05, essentially negative.  The patient's cardiac status remained stable here.  EKG was normal sinus with first-degree AV block.  Troponin was negative.  Chest x-ray was clear of any infiltrate.  Urinalysis was bland.  In addition, influenza was negative and no white count was noted during this hospital stay.      Earlier this morning, Mr. Grant did complain of slight headache or tight sensation in his posterior scalp.  Hence, we did do a CT of the head which was also normal.  The patient will be discharged home and followup with primary care physician as needed.      DISCHARGE MEDICATIONS:  No changes were made to his medications during this hospitalization and he will resume his medications as previous.      PHYSICAL EXAMINATION AT TIME OF DISCHARGE:   VITAL SIGNS:  " Blood pressure is 125/60, respirations 16, O2 sat 93%, temperature 98.5, pulse of 64.   GENERAL:  The patient is alert and oriented x 3.   HEART:  Regular rate and rhythm, S1, S2, no murmurs, rubs or gallops.   LUNGS:  Clear to auscultation.   ABDOMEN:  Soft with positive bowel sounds.   EXTREMITIES:  Without any edema.   NEUROLOGIC:  No focal neurologic deficits noted.   PSYCHIATRIC:  Pleasant mood appropriate.      DISCHARGE INSTRUCTIONS:   1.  CODE STATUS:  DNR/DNI.   2.  Activity as tolerated.  Diet regular.      FOLLOWUP APPOINTMENTS AND REFERRALS:  The patient should follow up with his primary care physician, Dr. Broderick Flowers is any recurrence of symptoms are noted.         LADARIUS LOPEZ DO             D: 2019   T: 2019   MT: MALLY      Name:     JENNIFER RODRIGUEZ   MRN:      [id number]        Account:        [id number]   :      1927           Admit Date:     2019                                  Discharge Date:       Document: [id number]       cc: Broderick Flowers MD

## 2019-02-17 NOTE — H&P
"[facility]    History and Physical - Hospitalist Service       Date of Admission:  2/16/2019    Assessment & Plan   James Grant is a 89 year old male admitted on 2/16/2019. He presented with nausea, chills and a dry cough. His initia llactate was mildly elevated.  Out of concern for the presence of \" chills\", mildly elevated lactic acid concerning for an early presentation of sepsis, we will admit the patient overnight. The duration of his hospital stay will be determined by his clinical trajectory and lab results. Given the presentation, with a relative hemodynamic stability and unrevealing initial labs, it is not unreasonable to await additional studies before instating any antibiotic treatment. This is to determine the clinical picture. The patient will be observed overnight, receive IV fluids and close monitoring while his cultures are pending.       Diet: Regular Diet Adult    DVT Prophylaxis: Enoxaparin (Lovenox) SQ  Prater Catheter: not present  Code Status: full code.    Disposition Plan   Expected discharge: 2 - 3 days, recommended to prior living arrangement once antibiotic plan established and SIRS/Sepsis treated.  Entered: Marge Eagle MD 02/16/2019, 10:32 PM     The patient's care was discussed with the Bedside Nurse.    Marge Eagle MD  [facility]    ______________________________________________________________________    Chief Complaint   Chills.    History is obtained from the patient and the patient's chart review.    History of Present Illness  This is a 89 year old male who presented to the emergency department via EMS for evaluation of approximate 2-hour history of nausea, chills, nonproductive cough, as well as generally \"not feeling well.\" The patient appears to have been in his usual state of health until this afternoon. His symptoms began abruptly.  He denies any other significant symptoms. He denies any chest pains or pressures. There is no abdominal pain, " diarrhea, vomiting. He denies any melena, hematochezia, hematemesis.  Denies any unusual rashes.  Denies sore throat.  Denies any recent falls. His labs were remarkable for a mild elevation of the lactic acid. A repeat lactate came back normal.            Review of Systems    The 5 point Review of Systems is negative other than noted in the HPI.    Past Medical History    I have reviewed this patient's medical history and updated it with pertinent information if needed.   Past Medical History:   Diagnosis Date     Benign localized hyperplasia of prostate without urinary obstruction and other lower urinary tract symptoms (LUTS) 11/26/2010     CHD (coronary heart disease) 11/26/2010     Dyslipidemia 11/26/2010     GERD (gastroesophageal reflux disease) 11/26/2010     HTN (hypertension) 11/26/2010     Old myocardial infarction 11/26/2010     Other symptoms involving digestive system(787.99) 10/20/2006       Past Surgical History   I have reviewed this patient's surgical history and updated it with pertinent information if needed.  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  Social History     Tobacco Use     Smoking status: Former Smoker     Types: Cigarettes     Smokeless tobacco: Never Used     Tobacco comment: Tried to Quit (YES); YR QUIT (1962); Passive Exposure (NO)   Substance Use Topics     Alcohol use: Yes     Comment: 3 Drinks (BEER & LIQUOR) ~ OCCASIONALLY (QUIT IN 2000)     Drug use: No       Family History   I have reviewed this patient's family history and updated it with pertinent information if needed.   Family History   Problem Relation Age of Onset     Heart Failure Mother 86        Congestive - Cause of Death     Cerebrovascular Disease Father      C.A.D. Sister 91       Prior  to Admission Medications   Prior to Admission Medications   Prescriptions Last Dose Informant Patient Reported? Taking?   aspirin 325 MG tablet  Self Yes No   Sig: Take 0.5 tablets by mouth daily.   cetirizine (ZYRTEC) 10 MG tablet  Self No No   Sig: Take 1 tablet (10 mg) by mouth every evening   famotidine (PEPCID) 20 MG tablet  Self No No   Sig: TAKE ONE (1) TABLET BY MOUTH DAILY   fluticasone (FLONASE) 50 MCG/ACT spray  Self No No   Sig: Spray 2 sprays into both nostrils daily   losartan (COZAAR) 50 MG tablet   No No   Sig: TAKE 1 TABLET (50 MG) BY MOUTH DAILY COZAAR   metoprolol succinate ER (TOPROL-XL) 25 MG 24 hr tablet   No No   Sig: TAKE 1/2 TAB BY MOUTH AT BEDTIME. DO NOT CRUSH OR CHEW.   nabumetone (RELAFEN) 500 MG tablet  Self No No   Sig: Take 1-2 tablets (500-1,000 mg) by mouth 2 times daily as needed for moderate pain   simvastatin (ZOCOR) 20 MG tablet   No No   Sig: Take 1 tablet (20 mg) by mouth At Bedtime   vitamin  B complex with vitamin C (VITAMIN  B COMPLEX) TABS  Self Yes No   Sig: Take 1 tablet by mouth daily      Facility-Administered Medications: None     Allergies   Allergies   Allergen Reactions     Lisinopril Cough     Morphine        Physical Exam   Vital Signs: Temp: 97  F (36.1  C) Temp src: Oral BP: 123/68   Heart Rate: 66 Resp: 16 SpO2: 95 % O2 Device: None (Room air)    Weight: 194 lbs 10.66 oz    GENERAL:  The patient is alert and oriented x 3. No acute distress.  HEART:  Regular rate and rhythm, S1, S2, normal. no murmurs, rubs or gallops.   LUNGS:  Clear to auscultation. No wheezing, crackles or rhonchi.  ABDOMEN:  Soft, not tender with positive bowel sounds.   EXTREMITIES:  No edema.   NEUROLOGIC:  Grossly: no focal neurologic deficits noted.   PSYCHIATRIC:  Pleasant mood; affect appropriate        Data   Data reviewed today: I reviewed all medications, new labs and imaging results over the last 24 hours. I personally reviewed the abdominal x-ray image(s) showing noacute  disease..    Recent Labs   Lab 02/16/19  1741   WBC 9.5   HGB 15.0   MCV 91         POTASSIUM Specimen slightly hemolyzed   CHLORIDE 107   CO2 25   BUN 21   CR 0.71   ANIONGAP 10   VANIA 8.6   *   ALBUMIN 3.7   PROTTOTAL 7.2   BILITOTAL 0.4   ALKPHOS 57   ALT 32   AST Specimen is hemolyzed which can falsely elevate AST. Analysis of a non-hemolyzed specimen   may result in a lower value.     TROPI <0.015     Recent Results (from the past 24 hour(s))   XR Chest 2 Views    Narrative    PROCEDURE:  XR CHEST 2 VW    HISTORY:  chills and cough.     COMPARISON:  2016    FINDINGS:   The cardiac silhouette is normal in size. The pulmonary vasculature is  normal.  The lungs are clear. No pleural effusion or pneumothorax.      Impression    IMPRESSION:  No acute cardiopulmonary disease.      NIKOLAS MUNOZ MD

## 2019-02-17 NOTE — PLAN OF CARE
"[facility] Observation Note:  Patient is registered to observation and is in 3218/3218-1 at approximately 2025 via cart accompanied by daughter from emergency room . Report received from Rossana in SBAR format at 2000 via telephone. Patient transferred to bed via self.. Patient is alert and oriented X 3, reports pain; rates at 5 on 0-10 scale.  Patient oriented to room, unit, hourly rounding, and plan of care. Explained the admission packet including \"What is Observation Status\" and patient handbook with patient bill of rights brochure. Will continue to monitor and document as needed.  Nursing Observation criteria listed below was met:  Health care directives status obtained and documented: No  Care Everywhere authorization completed Yes    MRSA swab completed for patient 65 years and older: Yes    If initial lactic acid >2.0, repeat lactic acid drawn within one hour of arrival to unit: NA. If no, state reason:     Patient identifies a surrogate decision maker: Yes If yes, who:priya Campbell Contact Information:see facesheet  Vaccination assessment and education completed: Yes   Vaccinations received prior to hospitalization: Pneumovax yes  Influenza(seasonal)  YES   Vaccination(s) ordered: does not meet criteria    Skin issues/needs documented:NA  Isolation Patient: no Education given and documented, correct sign in place and documentation row added to PCS:      Fall Prevention: Observation fall risk completed:  Yes Education given and documented, sticker and magnet in place: Yes    General Care Plan initiated with observation goal(s): Yes  Education (including assessment) Documented: Yes  New medication information given to patient and documented: n/a    Patient elects to use own medications from home during hospitalization:  No If yes, a MD order was obtained to use Medications from Home:  No and home medications were sent to Pharmacy for verification for use during hospitalization: No  Patient has " discharge needs (If yes, please explain): No

## 2019-02-17 NOTE — PLAN OF CARE
Patient discharged at 1244 PM via ambulation accompanied by son and staff. Prescriptions - None ordered for discharge. All belongings sent with patient.     Discharge instructions reviewed with pateint. Listed belongings gathered and returned to patient. clothing    Patient discharged to home.   Report called to n/a    Core Measures and Vaccines  Core Measures applicable during stay: No. If yes, state diagnosis: n/a  Pneumonia and Influenza given prior to discharge, if indicated: N/A    Surgical Patient   Surgical Procedures during stay: no    Did patient receive discharge instruction on wound care and recognition of infection symptoms? N/A    MISC  Follow up appointment made:  Yes  Home and hospital aquired medications returned to patient: N/A  Patient reports pain was well managed at discharge: Yes

## 2019-02-17 NOTE — PHARMACY
Vitamin B Stress Tabs discontinued per [facility] policy.     Kleber Cotton, Pharm D.

## 2019-02-17 NOTE — PLAN OF CARE
VSS.  Afebrile.  Tylenol tabs two for c/o headache rated 2/10 with relief.  Ambulated in hallway entire 3rd floor loop without problems.

## 2019-02-17 NOTE — ED NOTES
EKG attempted.  Unobtainable at this time due to patients uncontrollable tremors.  Provider notified

## 2019-02-18 LAB
BACTERIA SPEC CULT: NORMAL
SPECIMEN SOURCE: NORMAL

## 2019-02-19 ENCOUNTER — TELEPHONE (OUTPATIENT)
Dept: CASE MANAGEMENT | Facility: HOSPITAL | Age: 84
End: 2019-02-19

## 2019-02-19 NOTE — TELEPHONE ENCOUNTER
James Grant, was discharged to home on  2/17/19  from [facility]. I spoke today with him regarding his  discharge.   He indicates he has receive(d) sufficient information upon discharge. Medications were reviewed in full on discharge, including:  medications to be continued from preadmission and any side effects. Prescriptions  - None received at discharge.  Medications are being taken as prescribed.   He indicates he has an appointment scheduled for Feb 22  and has  transportation available. He was  able to confirm his  appointment time and has  it written down.  He did not have any questions regarding discharge instructions or condition.  Per their request, the following employee (s) can be recognized for their outstanding services delivered:  Staff did well with my care. No complaints.   Suggestions to improve service: Nothing indicated.   He was informed he  may receive a survey in the mail from the hospital. Asked if he  would kindly complete the survey in order for [facility] to know if services fully met patient needs.

## 2019-02-20 NOTE — PROGRESS NOTES
SUBJECTIVE:   James Grant is a 89 year old male who presents to clinic today for the following health issues:        Hospital Follow-up Visit:    Hospital/Nursing Home/IP Rehab Facility: [facility]  Date of Admission: 02/16/19  Date of Discharge:02/17/19  Reason(s) for Admission: nausea, chills            Problems taking medications regularly:  None       Medication changes since discharge: None       Problems adhering to non-medication therapy:  None    Summary of hospitalization:  [facility] discharge summary reviewed  Diagnostic Tests/Treatments reviewed.  Follow up needed: none  Other Healthcare Providers Involved in Patient s Care:         None  Update since discharge: improved.       Post Discharge Medication Reconciliation: discharge medications reconciled, continue medications without change.  Plan of care communicated with patient     Coding guidelines for this visit:  Type of Medical   Decision Making Face-to-Face Visit       within 7 Days of discharge Face-to-Face Visit        within 14 days of discharge   Moderate Complexity [id number]   High Complexity 33285 [id number]              Viral syndrome      Duration: symptoms last for a few days but have improved    Description (location/character/radiation): nausea, chills, weak and headache    Intensity:  moderate    Accompanying signs and symptoms: see above    History (similar episodes/previous evaluation): None    Precipitating or alleviating factors: None    Therapies tried and outcome: stayed overnight in hospital. Resolved in a few days.       Problem list and histories reviewed & adjusted, as indicated.  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 Sister 91         Current Outpatient Medications   Medication Sig Dispense Refill     aspirin 325 MG tablet Take 0.5 tablets by mouth daily.       cetirizine (ZYRTEC) 10 MG tablet Take 1 tablet (10 mg) by mouth every evening 30 tablet 11     famotidine (PEPCID) 20 MG tablet TAKE ONE (1) TABLET BY MOUTH DAILY 30 tablet 5     fluticasone (FLONASE) 50 MCG/ACT spray Spray 2 sprays into both nostrils daily 1 Bottle 11     losartan (COZAAR) 50 MG tablet TAKE 1 TABLET (50 MG) BY MOUTH DAILY COZAAR 30 tablet 1     metoprolol succinate ER (TOPROL-XL) 25 MG 24 hr tablet TAKE 1/2 TAB BY MOUTH AT BEDTIME. DO NOT CRUSH OR CHEW. 45 tablet 0     nabumetone (RELAFEN) 500 MG tablet Take 1-2 tablets (500-1,000 mg) by mouth 2 times daily as needed for moderate pain 30 tablet 1     simvastatin (ZOCOR) 20 MG tablet Take 1 tablet (20 mg) by mouth At Bedtime 90 tablet 3     vitamin  B complex with vitamin C (VITAMIN  B COMPLEX) TABS Take 1 tablet by mouth daily       Allergies   Allergen Reactions     Lisinopril Cough     Morphine      BP Readings from Last 3 Encounters:   19 102/58   19 125/60   19 132/78    Wt Readings from  "Last 3 Encounters:   02/22/19 88 kg (194 lb)   02/16/19 88.3 kg (194 lb 10.7 oz)   02/04/19 88 kg (194 lb)                    Reviewed and updated as needed this visit by clinical staff  Tobacco  Allergies  Meds  Problems  Med Hx  Surg Hx  Fam Hx       Reviewed and updated as needed this visit by Provider         ROS:  Constitutional, HEENT, cardiovascular, pulmonary, gi and gu systems are negative, except as otherwise noted.    OBJECTIVE:     /58 (BP Location: Left arm, Patient Position: Sitting, Cuff Size: Adult Regular)   Pulse 67   Temp 97.7  F (36.5  C) (Tympanic)   Resp 16   Ht 1.727 m (5' 8\")   Wt 88 kg (194 lb)   SpO2 96%   BMI 29.50 kg/m    Body mass index is 29.5 kg/m .  Physical Exam   Constitutional: He is oriented to person, place, and time. He appears well-developed and well-nourished. No distress.   Neurological: He is alert and oriented to person, place, and time.   Psychiatric: He has a normal mood and affect.     Other exam not repeated    Diagnostic Test Results:  Results for orders placed or performed during the hospital encounter of 02/16/19   XR Chest 2 Views    Narrative    PROCEDURE:  XR CHEST 2 VW    HISTORY:  chills and cough.     COMPARISON:  2016    FINDINGS:   The cardiac silhouette is normal in size. The pulmonary vasculature is  normal.  The lungs are clear. No pleural effusion or pneumothorax.      Impression    IMPRESSION:  No acute cardiopulmonary disease.      NIKOLAS MUNOZ MD   CT Head w/o Contrast    Narrative    PROCEDURE: CT HEAD W/O CONTRAST     HISTORY: Headache, acute, normal neuro exam.    COMPARISON: None.    TECHNIQUE:  Helical images of the head from the foramen magnum to the  vertex were obtained without contrast.    FINDINGS: The ventricles and sulci are normal in volume. No acute  intracranial hemorrhage, mass effect, midline shift, hydrocephalus or  basilar cystern effacement are present.    The grey-white matter interface is preserved.    The " calvarium is intact. The mastoid air cells are clear.  There is  mild mucosal thickening in the left maxillary sinus      Impression    IMPRESSION: Normal brain      NIKOLAS MUNOZ MD   CBC with platelets differential   Result Value Ref Range    WBC 9.5 4.0 - 11.0 10e9/L    RBC Count 4.83 4.4 - 5.9 10e12/L    Hemoglobin 15.0 13.3 - 17.7 g/dL    Hematocrit 43.8 40.0 - 53.0 %    MCV 91 78 - 100 fl    MCH 31.1 26.5 - 33.0 pg    MCHC 34.2 31.5 - 36.5 g/dL    RDW 13.0 10.0 - 15.0 %    Platelet Count 249 150 - 450 10e9/L    Diff Method Manual Differential     % Neutrophils 20.0 %    % Lymphocytes 66.0 %    % Monocytes 9.0 %    % Eosinophils 3.0 %    % Basophils 2.0 %    Absolute Neutrophil 1.9 1.6 - 8.3 10e9/L    Absolute Lymphocytes 6.3 (H) 0.8 - 5.3 10e9/L    Absolute Monocytes 0.9 0.0 - 1.3 10e9/L    Absolute Eosinophils 0.3 0.0 - 0.7 10e9/L    Absolute Basophils 0.2 0.0 - 0.2 10e9/L    Reactive Lymphs Present     RBC Morphology Normal     Platelet Estimate       Automated count confirmed.  Platelet morphology is normal.   Comprehensive metabolic panel   Result Value Ref Range    Sodium 142 133 - 144 mmol/L    Potassium Specimen slightly hemolyzed 3.4 - 5.3 mmol/L    Chloride 107 94 - 109 mmol/L    Carbon Dioxide 25 20 - 32 mmol/L    Anion Gap 10 3 - 14 mmol/L    Glucose 111 (H) 70 - 99 mg/dL    Urea Nitrogen 21 7 - 30 mg/dL    Creatinine 0.71 0.66 - 1.25 mg/dL    GFR Estimate 80 >60 mL/min/[1.73_m2]    GFR Estimate If Black 92 >60 mL/min/[1.73_m2]    Calcium 8.6 8.5 - 10.1 mg/dL    Bilirubin Total 0.4 0.2 - 1.3 mg/dL    Albumin 3.7 3.4 - 5.0 g/dL    Protein Total 7.2 6.8 - 8.8 g/dL    Alkaline Phosphatase 57 40 - 150 U/L    ALT 32 0 - 70 U/L    AST  0 - 45 U/L     Specimen is hemolyzed which can falsely elevate AST. Analysis of a non-hemolyzed specimen   may result in a lower value.     Lactic acid whole blood   Result Value Ref Range    Lactic Acid 2.6 (H) 0.7 - 2.0 mmol/L   Troponin I   Result Value Ref Range     Troponin I ES <0.015 0.000 - 0.045 ug/L   Procalcitonin   Result Value Ref Range    Procalcitonin <0.05 ng/ml   Lactic acid whole blood   Result Value Ref Range    Lactic Acid 1.5 0.7 - 2.0 mmol/L   CBC with platelets   Result Value Ref Range    WBC 7.1 4.0 - 11.0 10e9/L    RBC Count 4.05 (L) 4.4 - 5.9 10e12/L    Hemoglobin 12.5 (L) 13.3 - 17.7 g/dL    Hematocrit 37.1 (L) 40.0 - 53.0 %    MCV 92 78 - 100 fl    MCH 30.9 26.5 - 33.0 pg    MCHC 33.7 31.5 - 36.5 g/dL    RDW 12.9 10.0 - 15.0 %    Platelet Count 223 150 - 450 10e9/L   Basic metabolic panel   Result Value Ref Range    Sodium 146 (H) 133 - 144 mmol/L    Potassium 4.1 3.4 - 5.3 mmol/L    Chloride 112 (H) 94 - 109 mmol/L    Carbon Dioxide 27 20 - 32 mmol/L    Anion Gap 7 3 - 14 mmol/L    Glucose 93 70 - 99 mg/dL    Urea Nitrogen 20 7 - 30 mg/dL    Creatinine 0.73 0.66 - 1.25 mg/dL    GFR Estimate 81 >60 mL/min/[1.73_m2]    GFR Estimate If Black >90 >60 mL/min/[1.73_m2]    Calcium 7.6 (L) 8.5 - 10.1 mg/dL   UA reflex to Microscopic and Culture   Result Value Ref Range    Color Urine Yellow     Appearance Urine Clear     Glucose Urine Negative NEG^Negative mg/dL    Bilirubin Urine Negative NEG^Negative    Ketones Urine Negative NEG^Negative mg/dL    Specific Gravity Urine 1.022 1.003 - 1.035    Blood Urine Negative NEG^Negative    pH Urine 6.5 4.7 - 8.0 pH    Protein Albumin Urine Negative NEG^Negative mg/dL    Urobilinogen mg/dL Normal 0.0 - 2.0 mg/dL    Nitrite Urine Negative NEG^Negative    Leukocyte Esterase Urine Negative NEG^Negative    Source Midstream Urine    Influenza A and B and RSV PCR   Result Value Ref Range    Specimen Description Nasopharyngeal     Influenza A PCR Negative NEG^Negative    Influenza B PCR Negative NEG^Negative    Resp Syncytial Virus Negative NEG^Negative   Blood culture   Result Value Ref Range    Specimen Description Blood Right Arm     Culture Micro No growth after 6 days    Blood culture   Result Value Ref Range    Specimen  Description Blood     Special Requests Left Arm     Culture Micro No growth after 6 days    Active MRSA Surveillance Culture   Result Value Ref Range    Specimen Description Nares     Culture Micro No MRSA isolated        ASSESSMENT/PLAN:     Viral syndrome  Suspect KIRK.  His symptoms have now resolved.  He will continue same medications.  Routine follow up.      Broderick Flowers MD  [facility] [place]

## 2019-02-22 ENCOUNTER — OFFICE VISIT (OUTPATIENT)
Dept: FAMILY MEDICINE | Facility: OTHER | Age: 84
End: 2019-02-22
Attending: FAMILY MEDICINE
Payer: MEDICARE

## 2019-02-22 VITALS
DIASTOLIC BLOOD PRESSURE: 58 MMHG | OXYGEN SATURATION: 96 % | RESPIRATION RATE: 16 BRPM | SYSTOLIC BLOOD PRESSURE: 102 MMHG | BODY MASS INDEX: 29.4 KG/M2 | HEART RATE: 67 BPM | WEIGHT: 194 LBS | HEIGHT: 68 IN | TEMPERATURE: 97.7 F

## 2019-02-22 DIAGNOSIS — B34.9 VIRAL SYNDROME: Primary | ICD-10-CM

## 2019-02-22 LAB
BACTERIA SPEC CULT: NORMAL
BACTERIA SPEC CULT: NORMAL
Lab: NORMAL
SPECIMEN SOURCE: NORMAL
SPECIMEN SOURCE: NORMAL

## 2019-02-22 PROCEDURE — 99213 OFFICE O/P EST LOW 20 MIN: CPT | Performed by: FAMILY MEDICINE

## 2019-02-22 PROCEDURE — G0463 HOSPITAL OUTPT CLINIC VISIT: HCPCS

## 2019-02-22 ASSESSMENT — MIFFLIN-ST. JEOR: SCORE: 1509.48

## 2019-02-22 ASSESSMENT — PAIN SCALES - GENERAL: PAINLEVEL: NO PAIN (0)

## 2019-02-22 NOTE — NURSING NOTE
"Chief Complaint   Patient presents with     Hospital F/U       Initial /58 (BP Location: Left arm, Patient Position: Sitting, Cuff Size: Adult Regular)   Pulse 67   Temp 97.7  F (36.5  C) (Tympanic)   Resp 16   Ht 1.727 m (5' 8\")   Wt 88 kg (194 lb)   SpO2 96%   BMI 29.50 kg/m   Estimated body mass index is 29.5 kg/m  as calculated from the following:    Height as of this encounter: 1.727 m (5' 8\").    Weight as of this encounter: 88 kg (194 lb).  Medication Reconciliation: complete    Dasha Salazar LPN  "

## 2019-04-12 DIAGNOSIS — I10 BENIGN ESSENTIAL HYPERTENSION: ICD-10-CM

## 2019-04-12 RX ORDER — LOSARTAN POTASSIUM 50 MG/1
TABLET ORAL
Qty: 30 TABLET | Refills: 8 | Status: SHIPPED | OUTPATIENT
Start: 2019-04-12 | End: 2020-01-10

## 2019-06-10 DIAGNOSIS — K21.9 GASTROESOPHAGEAL REFLUX DISEASE WITHOUT ESOPHAGITIS: ICD-10-CM

## 2019-06-10 NOTE — TELEPHONE ENCOUNTER
Pepcid       Last Written Prescription Date:  11/27/2018  Last Fill Quantity: 30,   # refills: 5  Last Office Visit: 2/22/2019  Future Office visit:

## 2019-06-11 RX ORDER — FAMOTIDINE 20 MG/1
TABLET, FILM COATED ORAL
Qty: 30 TABLET | Refills: 3 | Status: SHIPPED | OUTPATIENT
Start: 2019-06-11 | End: 2019-09-04

## 2019-07-12 DIAGNOSIS — I10 BENIGN ESSENTIAL HYPERTENSION: ICD-10-CM

## 2019-07-12 RX ORDER — METOPROLOL SUCCINATE 25 MG/1
TABLET, EXTENDED RELEASE ORAL
Qty: 45 TABLET | Refills: 2 | Status: SHIPPED | OUTPATIENT
Start: 2019-07-12 | End: 2020-04-06

## 2019-07-12 NOTE — TELEPHONE ENCOUNTER
Metoprolol  Last Written Prescription Date: 2/5/19  Last Fill Quantity: 45 # of Refills: 0  Last Office Visit: 2/22/19

## 2019-09-04 DIAGNOSIS — K21.9 GASTROESOPHAGEAL REFLUX DISEASE WITHOUT ESOPHAGITIS: ICD-10-CM

## 2019-09-05 RX ORDER — FAMOTIDINE 20 MG/1
TABLET, FILM COATED ORAL
Qty: 30 TABLET | Refills: 3 | Status: SHIPPED | OUTPATIENT
Start: 2019-09-05 | End: 2020-01-10

## 2019-09-09 DIAGNOSIS — I10 BENIGN ESSENTIAL HYPERTENSION: ICD-10-CM

## 2019-09-11 RX ORDER — METOPROLOL SUCCINATE 25 MG/1
TABLET, EXTENDED RELEASE ORAL
OUTPATIENT
Start: 2019-09-11

## 2019-09-24 ENCOUNTER — TELEPHONE (OUTPATIENT)
Dept: FAMILY MEDICINE | Facility: OTHER | Age: 84
End: 2019-09-24

## 2019-09-24 ENCOUNTER — HOSPITAL ENCOUNTER (EMERGENCY)
Facility: HOSPITAL | Age: 84
Discharge: HOME | End: 2019-09-24
Attending: NURSE PRACTITIONER | Admitting: NURSE PRACTITIONER
Payer: MEDICARE

## 2019-09-24 VITALS
TEMPERATURE: 98.3 F | DIASTOLIC BLOOD PRESSURE: 90 MMHG | OXYGEN SATURATION: 98 % | RESPIRATION RATE: 16 BRPM | SYSTOLIC BLOOD PRESSURE: 166 MMHG

## 2019-09-24 DIAGNOSIS — R31.0 GROSS HEMATURIA: Primary | ICD-10-CM

## 2019-09-24 LAB
ALBUMIN SERPL-MCNC: 4.1 G/DL (ref 3.4–5)
ALBUMIN UR-MCNC: 10 MG/DL
ALP SERPL-CCNC: 67 U/L (ref 40–150)
ALT SERPL W P-5'-P-CCNC: 37 U/L (ref 0–70)
ANION GAP SERPL CALCULATED.3IONS-SCNC: 5 MMOL/L (ref 3–14)
APPEARANCE UR: ABNORMAL
AST SERPL W P-5'-P-CCNC: 22 U/L (ref 0–45)
BACTERIA #/AREA URNS HPF: ABNORMAL /HPF
BASOPHILS # BLD AUTO: 0.1 10E9/L (ref 0–0.2)
BASOPHILS NFR BLD AUTO: 0.7 %
BILIRUB SERPL-MCNC: 0.3 MG/DL (ref 0.2–1.3)
BILIRUB UR QL STRIP: NEGATIVE
BUN SERPL-MCNC: 18 MG/DL (ref 7–30)
CALCIUM SERPL-MCNC: 8.8 MG/DL (ref 8.5–10.1)
CHLORIDE SERPL-SCNC: 108 MMOL/L (ref 94–109)
CO2 SERPL-SCNC: 30 MMOL/L (ref 20–32)
COLOR UR AUTO: ABNORMAL
CREAT SERPL-MCNC: 0.79 MG/DL (ref 0.66–1.25)
DIFFERENTIAL METHOD BLD: NORMAL
EOSINOPHIL # BLD AUTO: 0.3 10E9/L (ref 0–0.7)
EOSINOPHIL NFR BLD AUTO: 3.6 %
ERYTHROCYTE [DISTWIDTH] IN BLOOD BY AUTOMATED COUNT: 13.2 % (ref 10–15)
GFR SERPL CREATININE-BSD FRML MDRD: 78 ML/MIN/{1.73_M2}
GLUCOSE SERPL-MCNC: 116 MG/DL (ref 70–99)
GLUCOSE UR STRIP-MCNC: NEGATIVE MG/DL
HCT VFR BLD AUTO: 45.5 % (ref 40–53)
HGB BLD-MCNC: 15.3 G/DL (ref 13.3–17.7)
HGB UR QL STRIP: ABNORMAL
IMM GRANULOCYTES # BLD: 0 10E9/L (ref 0–0.4)
IMM GRANULOCYTES NFR BLD: 0.3 %
KETONES UR STRIP-MCNC: NEGATIVE MG/DL
LEUKOCYTE ESTERASE UR QL STRIP: NEGATIVE
LYMPHOCYTES # BLD AUTO: 3.2 10E9/L (ref 0.8–5.3)
LYMPHOCYTES NFR BLD AUTO: 36.9 %
MCH RBC QN AUTO: 31 PG (ref 26.5–33)
MCHC RBC AUTO-ENTMCNC: 33.6 G/DL (ref 31.5–36.5)
MCV RBC AUTO: 92 FL (ref 78–100)
MONOCYTES # BLD AUTO: 1.1 10E9/L (ref 0–1.3)
MONOCYTES NFR BLD AUTO: 12.3 %
NEUTROPHILS # BLD AUTO: 4 10E9/L (ref 1.6–8.3)
NEUTROPHILS NFR BLD AUTO: 46.2 %
NITRATE UR QL: NEGATIVE
NRBC # BLD AUTO: 0 10*3/UL
NRBC BLD AUTO-RTO: 0 /100
PH UR STRIP: 5.5 PH (ref 4.7–8)
PLATELET # BLD AUTO: 254 10E9/L (ref 150–450)
POTASSIUM SERPL-SCNC: 4 MMOL/L (ref 3.4–5.3)
PROT SERPL-MCNC: 7.5 G/DL (ref 6.8–8.8)
PSA SERPL-ACNC: 6.9 UG/L (ref 0–4)
RBC # BLD AUTO: 4.94 10E12/L (ref 4.4–5.9)
RBC #/AREA URNS AUTO: >182 /HPF (ref 0–2)
SODIUM SERPL-SCNC: 143 MMOL/L (ref 133–144)
SOURCE: ABNORMAL
SP GR UR STRIP: 1.01 (ref 1–1.03)
UROBILINOGEN UR STRIP-MCNC: NORMAL MG/DL (ref 0–2)
WBC # BLD AUTO: 8.7 10E9/L (ref 4–11)
WBC #/AREA URNS AUTO: 1 /HPF (ref 0–5)

## 2019-09-24 PROCEDURE — 81001 URINALYSIS AUTO W/SCOPE: CPT | Performed by: NURSE PRACTITIONER

## 2019-09-24 PROCEDURE — 36415 COLL VENOUS BLD VENIPUNCTURE: CPT | Performed by: NURSE PRACTITIONER

## 2019-09-24 PROCEDURE — 85025 COMPLETE CBC W/AUTO DIFF WBC: CPT | Performed by: NURSE PRACTITIONER

## 2019-09-24 PROCEDURE — 80053 COMPREHEN METABOLIC PANEL: CPT | Performed by: NURSE PRACTITIONER

## 2019-09-24 PROCEDURE — 99213 OFFICE O/P EST LOW 20 MIN: CPT | Mod: Z6 | Performed by: NURSE PRACTITIONER

## 2019-09-24 PROCEDURE — 84153 ASSAY OF PSA TOTAL: CPT | Performed by: NURSE PRACTITIONER

## 2019-09-24 PROCEDURE — G0103 PSA SCREENING: HCPCS | Performed by: NURSE PRACTITIONER

## 2019-09-24 PROCEDURE — G0463 HOSPITAL OUTPT CLINIC VISIT: HCPCS

## 2019-09-24 PROCEDURE — G0463 HOSPITAL OUTPT CLINIC VISIT: HCPCS | Mod: 25

## 2019-09-24 NOTE — ED TRIAGE NOTES
Pt is here today with c/o blood in urine, says its more red than anything. Noticed this yesterday. No other symptoms pt states.

## 2019-09-24 NOTE — TELEPHONE ENCOUNTER
1:41 PM    Reason for Call: OVERBOOK    Patient is having the following symptoms: physical  for 1 year.    The patient is requesting an appointment for ASAP with .    Was an appointment offered for this call? Yes  If yes : Appointment type              Date 11/04/19 does not want to wait this long     Preferred method for responding to this message: Telephone Call  What is your phone number ?[phone]    If we cannot reach you directly, may we leave a detailed response at the number you provided? Yes    Can this message wait until your PCP/provider returns, if unavailable today? Not applicable, pcp is in     [facility]

## 2019-09-24 NOTE — ED AVS SNAPSHOT
[facility]  [address]  [place] [address]  Phone:  [phone]                                    James Grant   MRN: [id number]    Department:  [facility]   Date of Visit:  9/24/2019           After Visit Summary Signature Page    I have received my discharge instructions, and my questions have been answered. I have discussed any challenges I see with this plan with the nurse or doctor.    ..........................................................................................................................................  Patient/Patient Representative Signature      ..........................................................................................................................................  Patient Representative Print Name and Relationship to Patient    ..................................................               ................................................  Date                                   Time    ..........................................................................................................................................  Reviewed by Signature/Title    ...................................................              ..............................................  Date                                               Time          22EPIC Rev 08/18

## 2019-09-24 NOTE — ED PROVIDER NOTES
History     Chief Complaint   Patient presents with     Hematuria     notes blood in his urine. states symptoms since yesterday     HPI  James Grant is a 89 year old male who presents ambulatory accompanied by his sons with reports of gross hematuria.  Hematuria started yesterday.  He denies associated dysuria.  He has had long-standing issues with urinary frequency and incomplete emptying of bladder.  Reports that he will have a large void, wait a few seconds, and be able to void more.  He denies abdominal pain or pressure.  Denies nausea, vomiting, diarrhea, constipation, fever, chills, lightheadedness, dizziness. He used to see Dr. Mendieta in [place] his prostate--been at least 10 years ago.  He is uncertain of specifics regarding this.    Allergies:  Allergies   Allergen Reactions     Lisinopril Cough     Morphine        Problem List:    Patient Active Problem List    Diagnosis Date Noted     Rigors 02/16/2019     Priority: Medium     Venous stasis dermatitis of both lower extremities 03/16/2017     Priority: Medium     ACP (advance care planning) 06/06/2016     Priority: Medium     Advance Care Planning 6/6/2016: ACP Review of Chart / Resources Provided:  Reviewed chart for advance care plan.  James Grant has no plan or code status on file. Discussed available resources and provided with information. Confirmed code status reflects current choices pending further ACP discussions.  Confirmed/documented legally designated decision makers.  Added by Yani Anderson             Advance care planning 02/08/2016     Priority: Medium     Advance Care Planning 2/8/2016: Receipt of ACP document:  Received: POLST which was signed and dated by provider on 1/18/16.  Document previously scanned on 1/22/16.  Order reviewed and found to be valid.  Code Status reflects choices in most recent ACP document.  Confirmed/documented designated decision maker(s).  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  Angioplasty and stenting X 2 RCA  2010 angioplasty and stenting (LIDIA) LAD          Past Medical History:    Past Medical History:   Diagnosis Date     Benign localized hyperplasia of prostate without urinary obstruction and other lower urinary tract symptoms (LUTS) 2010     CHD (coronary heart disease) 2010     Dyslipidemia 2010     GERD (gastroesophageal reflux disease) 2010     HTN (hypertension) 2010     Old myocardial infarction 2010     Other symptoms involving digestive system(787.99) 10/20/2006       Past Surgical History:    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 Sister 91       Social History:  Marital Status:   [2]  Social History     Tobacco Use     Smoking status: Former Smoker     Packs/day: 1.00     Years: 13.00     Pack years: 13.00     Types: Cigarettes     Start date: 1949     Last attempt to quit: 1962     Years since quittin.1     Smokeless tobacco: Never Used   Substance Use Topics     Alcohol use: Yes     Comment: 3 Drinks (BEER & LIQUOR) ~ OCCASIONALLY (QUIT IN )     Drug use: No        Medications:    famotidine (PEPCID) 20 MG tablet  losartan (COZAAR) 50 MG tablet  metoprolol  succinate ER (TOPROL-XL) 25 MG 24 hr tablet  simvastatin (ZOCOR) 20 MG tablet  aspirin 325 MG tablet  cetirizine (ZYRTEC) 10 MG tablet  fluticasone (FLONASE) 50 MCG/ACT spray  nabumetone (RELAFEN) 500 MG tablet  vitamin  B complex with vitamin C (VITAMIN  B COMPLEX) TABS          Review of Systems   Constitutional: Negative for chills and fever.   Gastrointestinal: Negative for abdominal pain, constipation, diarrhea, nausea and vomiting.   Genitourinary: Positive for frequency, hematuria and urgency. Negative for dysuria.   Neurological: Negative for dizziness, syncope, weakness and light-headedness.       Physical Exam   BP: 166/90  Heart Rate: 67  Temp: 98.3  F (36.8  C)  Resp: 16  SpO2: 98 %      Physical Exam  Constitutional:       Appearance: He is not ill-appearing, toxic-appearing or diaphoretic.   HENT:      Right Ear: Decreased hearing noted.      Left Ear: Decreased hearing noted.   Cardiovascular:      Rate and Rhythm: Normal rate and regular rhythm.      Heart sounds: S1 normal and S2 normal.   Pulmonary:      Effort: Pulmonary effort is normal. No tachypnea or respiratory distress.      Breath sounds: Normal breath sounds and air entry. No wheezing, rhonchi or rales.   Abdominal:      General: Abdomen is flat. Bowel sounds are normal. There is no distension.      Palpations: Abdomen is soft. There is no hepatomegaly, splenomegaly or mass.      Tenderness: There is no tenderness. There is no right CVA tenderness, left CVA tenderness, guarding or rebound.   Skin:     General: Skin is warm and dry.      Capillary Refill: Capillary refill takes less than 2 seconds.      Coloration: Skin is not ashen or pale.   Neurological:      Mental Status: He is alert and oriented to person, place, and time.   Psychiatric:         Mood and Affect: Mood normal.         Speech: Speech normal.         Behavior: Behavior normal. Behavior is cooperative.         ED Course        Procedures               Results for orders  placed or performed during the hospital encounter of 09/24/19 (from the past 24 hour(s))   CBC with platelets differential   Result Value Ref Range    WBC 8.7 4.0 - 11.0 10e9/L    RBC Count 4.94 4.4 - 5.9 10e12/L    Hemoglobin 15.3 13.3 - 17.7 g/dL    Hematocrit 45.5 40.0 - 53.0 %    MCV 92 78 - 100 fl    MCH 31.0 26.5 - 33.0 pg    MCHC 33.6 31.5 - 36.5 g/dL    RDW 13.2 10.0 - 15.0 %    Platelet Count 254 150 - 450 10e9/L    Diff Method Automated Method     % Neutrophils 46.2 %    % Lymphocytes 36.9 %    % Monocytes 12.3 %    % Eosinophils 3.6 %    % Basophils 0.7 %    % Immature Granulocytes 0.3 %    Nucleated RBCs 0 0 /100    Absolute Neutrophil 4.0 1.6 - 8.3 10e9/L    Absolute Lymphocytes 3.2 0.8 - 5.3 10e9/L    Absolute Monocytes 1.1 0.0 - 1.3 10e9/L    Absolute Eosinophils 0.3 0.0 - 0.7 10e9/L    Absolute Basophils 0.1 0.0 - 0.2 10e9/L    Abs Immature Granulocytes 0.0 0 - 0.4 10e9/L    Absolute Nucleated RBC 0.0    Comprehensive metabolic panel   Result Value Ref Range    Sodium 143 133 - 144 mmol/L    Potassium 4.0 3.4 - 5.3 mmol/L    Chloride 108 94 - 109 mmol/L    Carbon Dioxide 30 20 - 32 mmol/L    Anion Gap 5 3 - 14 mmol/L    Glucose 116 (H) 70 - 99 mg/dL    Urea Nitrogen 18 7 - 30 mg/dL    Creatinine 0.79 0.66 - 1.25 mg/dL    GFR Estimate 78 >60 mL/min/[1.73_m2]    GFR Estimate If Black 90 >60 mL/min/[1.73_m2]    Calcium 8.8 8.5 - 10.1 mg/dL    Bilirubin Total 0.3 0.2 - 1.3 mg/dL    Albumin 4.1 3.4 - 5.0 g/dL    Protein Total 7.5 6.8 - 8.8 g/dL    Alkaline Phosphatase 67 40 - 150 U/L    ALT 37 0 - 70 U/L    AST 22 0 - 45 U/L   Prostate spec antigen screen   Result Value Ref Range    PSA 6.90 (H) 0 - 4 ug/L       Medications - No data to display    Assessments & Plan (with Medical Decision Making)     I have reviewed the nursing notes.    I have reviewed the findings, diagnosis, plan and need for follow up with the patient.  (R31.0) Gross hematuria  (primary encounter diagnosis)  Comment: Acute,  asymptomatic  Plan: UROLOGY ADULT REFERRAL- Arrive at 8:35 am on 9/24/2019 to register for Urology appointment with Dr. Olivia        UA negative for infection as etiology. No abdominal pain or flank pain- low suspicion for renal calculi. He does have a history of BPH and used to follow Dr. Mendieta in [place]. It has been several years since he has seen urologist. He is hemodynamically stable, hemoglobin normal. Appointment made for him to be evaluated by urology.      Discharge Medication List as of 9/24/2019  4:39 PM          Final diagnoses:   Gross hematuria     Lesly Valverde CNP   9/24/2019   [facility]     Lesly Valverde, [place]  09/25/19 [id number]

## 2019-09-24 NOTE — DISCHARGE INSTRUCTIONS
(R31.0) Gross hematuria  (primary encounter diagnosis)  Comment: Acute, asymptomatic  Plan: UROLOGY ADULT REFERRAL- Arrive at 8:35 am on 9/24/2019 to register for Urology appointment with Dr. Olivia        UA negative for infection as etiology. No abdominal pain or flank pain- low suspicion for renal calculi. He does have a history of BPH and used to follow Dr. Mendieta in [place]. It has been several years since he has seen urologist. He is hemodynamically stable, hemoglobin normal. Appointment made for him to be evaluated by urology.      Lesly Valverde, CNP

## 2019-09-25 ENCOUNTER — OFFICE VISIT (OUTPATIENT)
Dept: UROLOGY | Facility: OTHER | Age: 84
End: 2019-09-25
Attending: UROLOGY
Payer: MEDICARE

## 2019-09-25 VITALS
SYSTOLIC BLOOD PRESSURE: 136 MMHG | HEART RATE: 66 BPM | OXYGEN SATURATION: 96 % | BODY MASS INDEX: 28.13 KG/M2 | RESPIRATION RATE: 16 BRPM | WEIGHT: 185 LBS | TEMPERATURE: 98.4 F | DIASTOLIC BLOOD PRESSURE: 72 MMHG

## 2019-09-25 DIAGNOSIS — R31.0 GROSS HEMATURIA: Primary | ICD-10-CM

## 2019-09-25 PROCEDURE — 99204 OFFICE O/P NEW MOD 45 MIN: CPT | Performed by: UROLOGY

## 2019-09-25 PROCEDURE — G0463 HOSPITAL OUTPT CLINIC VISIT: HCPCS

## 2019-09-25 ASSESSMENT — PAIN SCALES - GENERAL: PAINLEVEL: NO PAIN (0)

## 2019-09-25 ASSESSMENT — ENCOUNTER SYMPTOMS
LIGHT-HEADEDNESS: 0
ABDOMINAL PAIN: 0
WEAKNESS: 0
FREQUENCY: 1
FEVER: 0
DIARRHEA: 0
DYSURIA: 0
CHILLS: 0
HEMATURIA: 1
CONSTIPATION: 0
DIZZINESS: 0
NAUSEA: 0
VOMITING: 0

## 2019-09-25 NOTE — PROGRESS NOTES
Type of Visit  NPV    Chief Complaint  Hematuria    HPI  Mr. Grant is a 89 year old male who presents with hematuria.  Gross hematuria started 1 week ago.  Episode lasted about 1-2 voids and then resolve spontaneously.  This cycle has repeated multiple times  Patient denies associated dysuria at the time of onset.  Patient has passed clots associated with hematuria.    Hematuria-related signs/symptoms  History of smoking?    Yes - remote  History of chemotherapy?   No  History of pelvic radiation?   No  History of kidney or bladder stones?  No  History of frequent urinary tract infections? No      Past Medical History  He  has a past medical history of Benign localized hyperplasia of prostate without urinary obstruction and other lower urinary tract symptoms (LUTS) (11/26/2010), CHD (coronary heart disease) (11/26/2010), Dyslipidemia (11/26/2010), GERD (gastroesophageal reflux disease) (11/26/2010), HTN (hypertension) (11/26/2010), Old myocardial infarction (11/26/2010), and Other symptoms involving digestive system(787.99) (10/20/2006).  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    Medications  He has a current medication list which includes the following prescription(s): aspirin, cetirizine, famotidine, fluticasone, losartan, metoprolol succinate er, nabumetone, simvastatin, and vitamin b complex with vitamin c.    Allergies  Allergies   Allergen Reactions     Lisinopril Cough     Morphine        Social  "History  He  reports that he quit smoking about 57 years ago. His smoking use included cigarettes. He started smoking about 70 years ago. He has a 13.00 pack-year smoking history. He has never used smokeless tobacco. He reports current alcohol use. He reports that he does not use drugs.  No drug abuse.    Family History  Family History   Problem Relation Age of Onset     Heart Failure Mother 86        Congestive - Cause of Death     Cerebrovascular Disease Father      C.A.D. Sister 91       Review of Systems  I personally reviewed the ROS with the patient.    Nursing Notes:   Fauzia Livingston LPN  9/25/2019  8:41 AM  Signed  Chief Complaint   Patient presents with     Consult     hematuria per urgent care.       Initial /72   Pulse 66   Temp 98.4  F (36.9  C) (Tympanic)   Resp 16   Wt 83.9 kg (185 lb)   SpO2 96%   BMI 28.13 kg/m    Estimated body mass index is 28.13 kg/m  as calculated from the following:    Height as of 2/22/19: 1.727 m (5' 8\").    Weight as of this encounter: 83.9 kg (185 lb).  Medication Reconciliation: complete   Review of Systems:    Weight loss:    No     Recent fever/chills:  No   Night sweats:   No  Current skin rash:  No   Recent hair loss:  No  Heat intolerance:  No   Cold intolerance:  No  Chest pain:   No   Palpitations:   No  Shortness of breath:  No   Wheezing:   No  Constipation:    No   Diarrhea:   No   Nausea:   No   Vomiting:   No   Kidney/side pain:  No   Back pain:   No  Frequent headaches:  No   Dizziness:     No  Leg swelling:   No   Calf pain:    yes    Parents, brothers or sisters with history of kidney cancer:   No  Parents, brothers or sisters with history of bladder cancer: no    Fauzia Livingston LPN      Physical Exam  Vitals:    09/25/19 0833   BP: 136/72   Pulse: 66   Resp: 16   Temp: 98.4  F (36.9  C)   TempSrc: Tympanic   SpO2: 96%   Weight: 83.9 kg (185 lb)     Constitutional: No acute distress.  Alert and cooperative   Head: NCAT  Eyes: " Conjunctivae normal  Cardiovascular: Regular rate.  Pulmonary/Chest: Respirations are even and non-labored bilaterally, no audible wheezing  Abdominal: Soft. No distension, tenderness, masses or guarding.   Neurological: A + O x 3.  Cranial Nerves II-XII grossly intact.  Extremities: TERRANCE x 4, Warm. No clubbing.  No cyanosis.    Skin: Pink, warm and dry.  No visible rashes noted.  Psychiatric:  Normal mood and affect  Back:  No left CVA tenderness.  No right CVA tenderness.  Genitourinary:  Nonpalpable bladder    Labs  Results for orders placed or performed during the hospital encounter of 09/24/19   UA with Microscopic reflex to Culture   Result Value Ref Range    Color Urine Orange     Appearance Urine Slightly Cloudy     Glucose Urine Negative NEG^Negative mg/dL    Bilirubin Urine Negative NEG^Negative    Ketones Urine Negative NEG^Negative mg/dL    Specific Gravity Urine 1.013 1.003 - 1.035    Blood Urine Moderate (A) NEG^Negative    pH Urine 5.5 4.7 - 8.0 pH    Protein Albumin Urine 10 (A) NEG^Negative mg/dL    Urobilinogen mg/dL Normal 0.0 - 2.0 mg/dL    Nitrite Urine Negative NEG^Negative    Leukocyte Esterase Urine Negative NEG^Negative    Source Midstream Urine     WBC Urine 1 0 - 5 /HPF    RBC Urine >182 (H) 0 - 2 /HPF    Bacteria Urine None (A) NEG^Negative /HPF   CBC with platelets differential   Result Value Ref Range    WBC 8.7 4.0 - 11.0 10e9/L    RBC Count 4.94 4.4 - 5.9 10e12/L    Hemoglobin 15.3 13.3 - 17.7 g/dL    Hematocrit 45.5 40.0 - 53.0 %    MCV 92 78 - 100 fl    MCH 31.0 26.5 - 33.0 pg    MCHC 33.6 31.5 - 36.5 g/dL    RDW 13.2 10.0 - 15.0 %    Platelet Count 254 150 - 450 10e9/L    Diff Method Automated Method     % Neutrophils 46.2 %    % Lymphocytes 36.9 %    % Monocytes 12.3 %    % Eosinophils 3.6 %    % Basophils 0.7 %    % Immature Granulocytes 0.3 %    Nucleated RBCs 0 0 /100    Absolute Neutrophil 4.0 1.6 - 8.3 10e9/L    Absolute Lymphocytes 3.2 0.8 - 5.3 10e9/L    Absolute Monocytes  1.1 0.0 - 1.3 10e9/L    Absolute Eosinophils 0.3 0.0 - 0.7 10e9/L    Absolute Basophils 0.1 0.0 - 0.2 10e9/L    Abs Immature Granulocytes 0.0 0 - 0.4 10e9/L    Absolute Nucleated RBC 0.0    Comprehensive metabolic panel   Result Value Ref Range    Sodium 143 133 - 144 mmol/L    Potassium 4.0 3.4 - 5.3 mmol/L    Chloride 108 94 - 109 mmol/L    Carbon Dioxide 30 20 - 32 mmol/L    Anion Gap 5 3 - 14 mmol/L    Glucose 116 (H) 70 - 99 mg/dL    Urea Nitrogen 18 7 - 30 mg/dL    Creatinine 0.79 0.66 - 1.25 mg/dL    GFR Estimate 78 >60 mL/min/[1.73_m2]    GFR Estimate If Black 90 >60 mL/min/[1.73_m2]    Calcium 8.8 8.5 - 10.1 mg/dL    Bilirubin Total 0.3 0.2 - 1.3 mg/dL    Albumin 4.1 3.4 - 5.0 g/dL    Protein Total 7.5 6.8 - 8.8 g/dL    Alkaline Phosphatase 67 40 - 150 U/L    ALT 37 0 - 70 U/L    AST 22 0 - 45 U/L   Prostate spec antigen screen   Result Value Ref Range    PSA 6.90 (H) 0 - 4 ug/L       Imaging  None    Assessment  Mr. Grant is a 89 year old male with gross hematuria    Discussed rationale for work up.  Discussed potential findings of hematuria work up including, but not limited to, kidney stones, bladder tumors and/or kidney tumors.  Also discussed the potential for a normal work up.    Plan  CT Urogram with follow up for cystoscopy

## 2019-09-25 NOTE — NURSING NOTE
"Chief Complaint   Patient presents with     Consult     hematuria per urgent care.       Initial /72   Pulse 66   Temp 98.4  F (36.9  C) (Tympanic)   Resp 16   Wt 83.9 kg (185 lb)   SpO2 96%   BMI 28.13 kg/m   Estimated body mass index is 28.13 kg/m  as calculated from the following:    Height as of 2/22/19: 1.727 m (5' 8\").    Weight as of this encounter: 83.9 kg (185 lb).  Medication Reconciliation: complete   Review of Systems:    Weight loss:    No     Recent fever/chills:  No   Night sweats:   No  Current skin rash:  No   Recent hair loss:  No  Heat intolerance:  No   Cold intolerance:  No  Chest pain:   No   Palpitations:   No  Shortness of breath:  No   Wheezing:   No  Constipation:    No   Diarrhea:   No   Nausea:   No   Vomiting:   No   Kidney/side pain:  No   Back pain:   No  Frequent headaches:  No   Dizziness:     No  Leg swelling:   No   Calf pain:    yes    Parents, brothers or sisters with history of kidney cancer:   No  Parents, brothers or sisters with history of bladder cancer: no    Fauzia Livingston LPN    "

## 2019-10-02 ENCOUNTER — HOSPITAL ENCOUNTER (OUTPATIENT)
Dept: CT IMAGING | Facility: HOSPITAL | Age: 84
Discharge: HOME | End: 2019-10-02
Attending: UROLOGY | Admitting: UROLOGY
Payer: MEDICARE

## 2019-10-02 DIAGNOSIS — R31.0 GROSS HEMATURIA: ICD-10-CM

## 2019-10-02 PROCEDURE — 25500064 ZZH RX 255 OP 636: Performed by: RADIOLOGY

## 2019-10-02 PROCEDURE — 74178 CT ABD&PLV WO CNTR FLWD CNTR: CPT | Mod: TC

## 2019-10-02 RX ORDER — IOPAMIDOL 612 MG/ML
30 INJECTION, SOLUTION INTRAVASCULAR ONCE
Status: COMPLETED | OUTPATIENT
Start: 2019-10-02 | End: 2019-10-02

## 2019-10-02 RX ADMIN — IOPAMIDOL 30 ML: 612 INJECTION, SOLUTION INTRAVENOUS at 14:35

## 2019-10-02 RX ADMIN — IOHEXOL 100 ML: 300 INJECTION, SOLUTION INTRAVENOUS at 14:35

## 2019-10-08 NOTE — TELEPHONE ENCOUNTER
Please call patient and schedule physical with him for next available appointment. Please do not use 1:20/1:40 spots.

## 2019-10-08 NOTE — TELEPHONE ENCOUNTER
LVM for patient advising no earlier physicals available. Reminded him of the date and time of the physical he has scheduled on 11/04/19.

## 2019-10-09 ENCOUNTER — OFFICE VISIT (OUTPATIENT)
Dept: UROLOGY | Facility: OTHER | Age: 84
End: 2019-10-09
Attending: UROLOGY
Payer: MEDICARE

## 2019-10-09 VITALS
WEIGHT: 194 LBS | BODY MASS INDEX: 27.16 KG/M2 | SYSTOLIC BLOOD PRESSURE: 120 MMHG | OXYGEN SATURATION: 93 % | HEART RATE: 85 BPM | HEIGHT: 71 IN | DIASTOLIC BLOOD PRESSURE: 70 MMHG | TEMPERATURE: 99.1 F

## 2019-10-09 DIAGNOSIS — R31.0 GROSS HEMATURIA: ICD-10-CM

## 2019-10-09 DIAGNOSIS — D49.4 BLADDER TUMOR: Primary | ICD-10-CM

## 2019-10-09 PROCEDURE — 99214 OFFICE O/P EST MOD 30 MIN: CPT | Mod: 25 | Performed by: UROLOGY

## 2019-10-09 PROCEDURE — G0463 HOSPITAL OUTPT CLINIC VISIT: HCPCS | Mod: 25

## 2019-10-09 PROCEDURE — 52000 CYSTOURETHROSCOPY: CPT | Performed by: UROLOGY

## 2019-10-09 PROCEDURE — G0463 HOSPITAL OUTPT CLINIC VISIT: HCPCS

## 2019-10-09 ASSESSMENT — MIFFLIN-ST. JEOR: SCORE: 1552.11

## 2019-10-09 ASSESSMENT — PAIN SCALES - GENERAL: PAINLEVEL: NO PAIN (0)

## 2019-10-09 NOTE — NURSING NOTE
"Chief Complaint   Patient presents with     Cystoscopy     Gross Hematuria       Initial /70 (BP Location: Left arm, Patient Position: Chair, Cuff Size: Adult Regular)   Pulse 85   Temp 99.1  F (37.3  C) (Tympanic)   Ht 1.803 m (5' 11\")   Wt 88 kg (194 lb)   SpO2 93%   BMI 27.06 kg/m   Estimated body mass index is 27.06 kg/m  as calculated from the following:    Height as of this encounter: 1.803 m (5' 11\").    Weight as of this encounter: 88 kg (194 lb).  Medication Reconciliation: complete  KEARA CHARLES LPN    Review of Systems:    Weight loss:    No     Recent fever/chills:  No   Night sweats:   No  Current skin rash:  No   Recent hair loss:  No  Heat intolerance:  No   Cold intolerance:  No  Chest pain:   No   Palpitations:   No  Shortness of breath:  No   Wheezing:   No  Constipation:    No   Diarrhea:   No   Nausea:   No   Vomiting:   No   Kidney/side pain:  No   Back pain:   yes  Frequent headaches:  No   Dizziness:     No  Leg swelling:   No   Calf pain:    yes    KEARA CHARLES LPN      "

## 2019-10-09 NOTE — NURSING NOTE
Patient positioned in supine position, perineum area prepped with chlorhexidene Gluconate and patient draped per sterile technique. Per verbal order read back by Andrzej Olivia MD, Urojet 10mL 2% lidocaine jelly to be instilled into urethra.  Urojet- 10ml 2% Lidocaine jelly instilled into the urethra.    Urojet 2%  Lot#: [id number]  Expiration date: 2/21  : [employer]  NDC: [address]-2    Eddy Protocol    A. Pre-procedure verification complete Yes  1-relevant information / documentation available, reviewed and properly matched to the patient; 2-consent accurate and complete, 3-equipment and supplies available    B. Site marking complete N/A  Site marked if not in continuous attendance with patient    C. TIME OUT completed Yes  Time Out was conducted just prior to starting procedure to verify the eight required elements: 1-patient identity, 2-consent accurate and complete, 3-position, 4-correct side/site marked (if applicable), 5-procedure, 6-relevant images / results properly labeled and displayed (if applicable), 7-antibiotics / irrigation fluids (if applicable), 8-safety precautions.    After procedure perineum area rinsed. Discharge instructions reviewed with patient. Patient verbalized understanding of discharge instructions and discharged ambulatory.  Maria Antonia Lan RN..................10/9/2019  9:47 AM

## 2019-10-09 NOTE — PATIENT INSTRUCTIONS

## 2019-10-09 NOTE — PROGRESS NOTES
"Type of Visit  EST    Chief Complaint  Hematuria    HPI  Mr. Grant is a 89 year old male who follows up to complete a gross hematuria work-up.  Patient recently underwent a CT urogram revealing some bladder calcification.  He plans to undergo cystoscopy today to complete the work-up.  He continues to deny fevers and dysuria.      Family History  Family History   Problem Relation Age of Onset     Heart Failure Mother 86        Congestive - Cause of Death     Cerebrovascular Disease Father      C.A.D. Sister 91       Review of Systems  I personally reviewed the ROS with the patient.    Nursing Notes:   Keara Charles LPN  10/9/2019  9:32 AM  Signed  Chief Complaint   Patient presents with     Cystoscopy     Gross Hematuria       Initial /70 (BP Location: Left arm, Patient Position: Chair, Cuff Size: Adult Regular)   Pulse 85   Temp 99.1  F (37.3  C) (Tympanic)   Ht 1.803 m (5' 11\")   Wt 88 kg (194 lb)   SpO2 93%   BMI 27.06 kg/m    Estimated body mass index is 27.06 kg/m  as calculated from the following:    Height as of this encounter: 1.803 m (5' 11\").    Weight as of this encounter: 88 kg (194 lb).  Medication Reconciliation: complete  KEARA CHARLES LPN    Review of Systems:    Weight loss:    No     Recent fever/chills:  No   Night sweats:   No  Current skin rash:  No   Recent hair loss:  No  Heat intolerance:  No   Cold intolerance:  No  Chest pain:   No   Palpitations:   No  Shortness of breath:  No   Wheezing:   No  Constipation:    No   Diarrhea:   No   Nausea:   No   Vomiting:   No   Kidney/side pain:  No   Back pain:   yes  Frequent headaches:  No   Dizziness:     No  Leg swelling:   No   Calf pain:    yes    NATHALIA CHATMAN Kayla K, RN  10/9/2019  9:48 AM  Signed  Patient positioned in supine position, perineum area prepped with chlorhexidene Gluconate and patient draped per sterile technique. Per verbal order read back by Andrzej Olivia MD, Urojet 10mL 2% lidocaine jelly " "to be instilled into urethra.  Urojet- 10ml 2% Lidocaine jelly instilled into the urethra.    Urojet 2%  Lot#: [id number]  Expiration date: 2/21  : Patricia  NDC: [address]-7    [place] Protocol    A. Pre-procedure verification complete Yes  1-relevant information / documentation available, reviewed and properly matched to the patient; 2-consent accurate and complete, 3-equipment and supplies available    B. Site marking complete N/A  Site marked if not in continuous attendance with patient    C. TIME OUT completed Yes  Time Out was conducted just prior to starting procedure to verify the eight required elements: 1-patient identity, 2-consent accurate and complete, 3-position, 4-correct side/site marked (if applicable), 5-procedure, 6-relevant images / results properly labeled and displayed (if applicable), 7-antibiotics / irrigation fluids (if applicable), 8-safety precautions.    After procedure perineum area rinsed. Discharge instructions reviewed with patient. Patient verbalized understanding of discharge instructions and discharged ambulatory.  Maria Antonia Lan RN..................10/9/2019  9:47 AM    Physical Exam  Vitals:    10/09/19 0904   BP: 120/70   BP Location: Left arm   Patient Position: Chair   Cuff Size: Adult Regular   Pulse: 85   Temp: 99.1  F (37.3  C)   TempSrc: Tympanic   SpO2: 93%   Weight: 88 kg (194 lb)   Height: 1.803 m (5' 11\")     Constitutional: No acute distress.  Alert and cooperative   Head: NCAT  Eyes: Conjunctivae normal  Cardiovascular: Regular rate.  Pulmonary/Chest: Respirations are even and non-labored bilaterally, no audible wheezing  Abdominal: Soft. No distension, tenderness, masses or guarding.   Neurological: A + O x 3.  Cranial Nerves II-XII grossly intact.  Extremities: TERRANCE x 4, Warm. No clubbing.  No cyanosis.    Skin: Pink, warm and dry.  No visible rashes noted.  Psychiatric:  Normal mood and affect  Back:  No left CVA tenderness.  No right CVA " tenderness.  Genitourinary:  Nonpalpable bladder    ^^^^^^^^^^^^^^^^^^^^^^^^^^^^^^^^^^^^^^^^^^^^^^^^^^^^^^^^^^^^^^^^^^^^^^^^^^^^^^^^  Preprocedure diagnosis  Hematuria    Postprocedure diagnosis  Hematuria  Bladder tumor    Procedure  Flexible Cystourethroscopy    Surgeon  Andrzej Olivia MD    Anesthesia  2% lidocaine jelly intraurethrally    Complications  None    Indications  89 year old male undergoing a flexible cystoscopy for the above mentioned indications.    Findings  Cystoscopic findings included a normal anterior urethra.    The bladder appeared to be normal capacity.    He has a bladder tumor with associated calcifications located at the base of the bladder.  There were no stones or foreign bodies.    The orifices were slit-shaped and in their normal location.    Procedure  The patient was placed in supine position and prepped and draped in sterile fashion with lidocaine jelly per urethra for anesthesia.    I passed a lubricated 14F flexible cystoscope through the penile urethra and into the bladder and the bladder was completely visualized.  The cystoscope was retroflexed and the bladder neck and prostate visualized.    The cystoscope was slowly withdrawn while visualizing the urethra and the procedure terminated.    The patient tolerated the procedure well.      ^^^^^^^^^^^^^^^^^^^^^^^^^^^^^^^^^^^^^^^^^^^^^^^^^^^^^^^^^^^^^^^^^^^^^^^^^^^^^^^^    Imaging  Results for orders placed or performed during the hospital encounter of 10/02/19   CT Urogram wo & w Contrast    Narrative    PROCEDURE: CT UROGRAM WO & W CONTRAST 10/2/2019 2:56 PM    HISTORY: gross hematuria; Gross hematuria    COMPARISONS: None.    Meds/Dose Given: Omnipaque 300 130ml Given    TECHNIQUE: CT scan of the abdomen and pelvis with both with and  without IV contrast    FINDINGS: Moderate coronary artery calcifications are noted. There is  dependent atelectasis or scarring at the lung bases. The liver is free  of masses or biliary ductal  enlargement. The gallbladder has been  removed. Spleen and pancreas appear normal. There are some duodenal  diverticulum seen arising from the second and third portions of the  duodenum. The adrenal glands are normal. There are no renal or  ureteric calculi. There is a small cyst seen in the right kidney. The  calyces are delicate and nondisplaced. The ureters show no evidence of  obstruction following normal course the bladder. The prostate is  markedly enlarged. There is some focal thickening along the left  lateral wall the bladder the possibility of gallbladder malignancy  should be considered. Multiple of bladder calculi are noted. The  periaortic lymph nodes are normal in caliber. No intraperitoneal  masses or inflammatory changes are noted. Degenerative changes are  present in the thoracic and lumbar spine         Impression    IMPRESSION: Markedly enlarged prostate. Multiple bladder calculi. Some  focal thickening along the left lateral bladder wall. The possibility  of bladder malignancy exists.    NIKOLAS MUNOZ MD       Assessment  Mr. Grant is a 89 year old male with hematuria who presents to undergo cystoscopy to complete his hematuria work up today which was notable for bladder tumor.  I recommended TURBT for treatment of the tumor.    Discussed rationale for work up.  Discussed potential findings of hematuria work up including, but not limited to, kidney stones, bladder tumors and/or kidney tumors.  Also discussed the potential for a normal work up.    The transurethral procedure was explained to the patient.    The tumor is then resected using a loop.   The specimen will be sent for pathological examination that usually takes about 3-5 days.     Possible complications and side effects were discussed with the patient, including but not limited to, infection or sepsis, bleeding, bladder perforation that may need for a more extensive surgery, injury to the ureter, injury to adjacent organs,  "incontinence, urinary retention and cardiovascular/pulmonary complications.    All patient's questions were answered and the patient was consented.     Plan  The patient was scheduled and consented for \"transurethral resection of bladder tumor and bilateral retrograde pyelograms\"   Muscle paralysis is not requested during anesthesia due to the size/location of tumor.   We will plan for extended stay   Through PCP for perioperative medication management is appreciated  "

## 2019-10-15 ENCOUNTER — OFFICE VISIT (OUTPATIENT)
Dept: FAMILY MEDICINE | Facility: OTHER | Age: 84
End: 2019-10-15
Attending: FAMILY MEDICINE
Payer: MEDICARE

## 2019-10-15 VITALS
BODY MASS INDEX: 27.72 KG/M2 | SYSTOLIC BLOOD PRESSURE: 118 MMHG | RESPIRATION RATE: 16 BRPM | HEART RATE: 74 BPM | OXYGEN SATURATION: 98 % | WEIGHT: 198 LBS | DIASTOLIC BLOOD PRESSURE: 70 MMHG | HEIGHT: 71 IN

## 2019-10-15 DIAGNOSIS — I25.10 CORONARY ARTERY DISEASE INVOLVING NATIVE CORONARY ARTERY OF NATIVE HEART WITHOUT ANGINA PECTORIS: ICD-10-CM

## 2019-10-15 DIAGNOSIS — Z01.818 PREOP GENERAL PHYSICAL EXAM: Primary | ICD-10-CM

## 2019-10-15 DIAGNOSIS — I10 BENIGN ESSENTIAL HYPERTENSION: ICD-10-CM

## 2019-10-15 DIAGNOSIS — N32.89 BLADDER MASS: ICD-10-CM

## 2019-10-15 DIAGNOSIS — E78.2 MIXED HYPERLIPIDEMIA: ICD-10-CM

## 2019-10-15 LAB
ANION GAP SERPL CALCULATED.3IONS-SCNC: 5 MMOL/L (ref 3–14)
BASOPHILS # BLD AUTO: 0.1 10E9/L (ref 0–0.2)
BASOPHILS NFR BLD AUTO: 0.6 %
BUN SERPL-MCNC: 17 MG/DL (ref 7–30)
CALCIUM SERPL-MCNC: 9 MG/DL (ref 8.5–10.1)
CHLORIDE SERPL-SCNC: 108 MMOL/L (ref 94–109)
CO2 SERPL-SCNC: 28 MMOL/L (ref 20–32)
CREAT SERPL-MCNC: 0.77 MG/DL (ref 0.66–1.25)
DIFFERENTIAL METHOD BLD: NORMAL
EOSINOPHIL # BLD AUTO: 0.2 10E9/L (ref 0–0.7)
EOSINOPHIL NFR BLD AUTO: 2.7 %
ERYTHROCYTE [DISTWIDTH] IN BLOOD BY AUTOMATED COUNT: 13.1 % (ref 10–15)
GFR SERPL CREATININE-BSD FRML MDRD: 79 ML/MIN/{1.73_M2}
GLUCOSE SERPL-MCNC: 72 MG/DL (ref 70–99)
HCT VFR BLD AUTO: 45.3 % (ref 40–53)
HGB BLD-MCNC: 15.2 G/DL (ref 13.3–17.7)
IMM GRANULOCYTES # BLD: 0 10E9/L (ref 0–0.4)
IMM GRANULOCYTES NFR BLD: 0.2 %
LYMPHOCYTES # BLD AUTO: 3.5 10E9/L (ref 0.8–5.3)
LYMPHOCYTES NFR BLD AUTO: 39.4 %
MCH RBC QN AUTO: 31.1 PG (ref 26.5–33)
MCHC RBC AUTO-ENTMCNC: 33.6 G/DL (ref 31.5–36.5)
MCV RBC AUTO: 93 FL (ref 78–100)
MONOCYTES # BLD AUTO: 1.3 10E9/L (ref 0–1.3)
MONOCYTES NFR BLD AUTO: 14.4 %
NEUTROPHILS # BLD AUTO: 3.8 10E9/L (ref 1.6–8.3)
NEUTROPHILS NFR BLD AUTO: 42.7 %
NRBC # BLD AUTO: 0 10*3/UL
NRBC BLD AUTO-RTO: 0 /100
PLATELET # BLD AUTO: 239 10E9/L (ref 150–450)
POTASSIUM SERPL-SCNC: 3.8 MMOL/L (ref 3.4–5.3)
RBC # BLD AUTO: 4.89 10E12/L (ref 4.4–5.9)
SODIUM SERPL-SCNC: 141 MMOL/L (ref 133–144)
WBC # BLD AUTO: 8.8 10E9/L (ref 4–11)

## 2019-10-15 PROCEDURE — 85025 COMPLETE CBC W/AUTO DIFF WBC: CPT | Mod: ZL | Performed by: FAMILY MEDICINE

## 2019-10-15 PROCEDURE — 99214 OFFICE O/P EST MOD 30 MIN: CPT | Performed by: FAMILY MEDICINE

## 2019-10-15 PROCEDURE — 80048 BASIC METABOLIC PNL TOTAL CA: CPT | Mod: ZL | Performed by: FAMILY MEDICINE

## 2019-10-15 PROCEDURE — G0463 HOSPITAL OUTPT CLINIC VISIT: HCPCS

## 2019-10-15 PROCEDURE — 36415 COLL VENOUS BLD VENIPUNCTURE: CPT | Mod: ZL | Performed by: FAMILY MEDICINE

## 2019-10-15 ASSESSMENT — PAIN SCALES - GENERAL: PAINLEVEL: NO PAIN (0)

## 2019-10-15 ASSESSMENT — PATIENT HEALTH QUESTIONNAIRE - PHQ9: SUM OF ALL RESPONSES TO PHQ QUESTIONS 1-9: 11

## 2019-10-15 ASSESSMENT — MIFFLIN-ST. JEOR: SCORE: 1570.25

## 2019-10-15 NOTE — NURSING NOTE
"Chief Complaint   Patient presents with     Pre-Op Exam       Initial /70 (BP Location: Left arm, Patient Position: Sitting, Cuff Size: Adult Regular)   Pulse 74   Resp 16   Ht 1.803 m (5' 11\")   Wt 89.8 kg (198 lb)   SpO2 98%   BMI 27.62 kg/m   Estimated body mass index is 27.62 kg/m  as calculated from the following:    Height as of this encounter: 1.803 m (5' 11\").    Weight as of this encounter: 89.8 kg (198 lb).  Medication Reconciliation: complete  Dasha Salazar LPN  "

## 2019-10-15 NOTE — H&P (VIEW-ONLY)
[facility] [place]  [address]  [place] [address]  [phone]  Dept: [phone]    PRE-OP EVALUATION:  Today's date: 10/15/2019    James Grant (: 3/5/1927) presents for pre-operative evaluation assessment as requested by Dr. Olivia.  He requires evaluation and anesthesia risk assessment prior to undergoing surgery/procedure for treatment of bladder tumor .    Proposed Surgery/ Procedure: Transurethral resection of bladder tumor  Date of Surgery/ Procedure: 10/22/19  Time of Surgery/ Procedure: to be determined  Hospital/Surgical Facility: [facility]  Fax number for surgical facility:2  Primary Physician: Broderick Flowers  Type of Anesthesia Anticipated: to be determined    Patient has a Health Care Directive or Living Will:  YES     1. YES - DO YOU HAVE A HISTORY OF HEART ATTACK, STROKE, STENT, BYPASS OR SURGERY ON AN ARTERY IN THE HEAD, NECK, HEART OR LEG? Previous MI  2. NO - Do you ever have any pain or discomfort in your chest?  3. NO - Do you have a history of  Heart Failure?  4. NO - Are you troubled by shortness of breath when: walking on the level, up a slight hill or at night?  5. NO - Do you currently have a cold, bronchitis or other respiratory infection?  6. YES - DO YOU HAVE A COUGH, SHORTNESS OF BREATH OR WHEEZING? Occasional  7. YES - DO YOU SOMETIMES GET PAINS IN THE CALVES OF YOUR LEGS WHEN YOU WALK? Occasional  8. NO - Do you or anyone in your family have previous history of blood clots?  9. NO - Do you or does anyone in your family have a serious bleeding problem such as prolonged bleeding following surgeries or cuts?  10. NO - Have you ever had problems with anemia or been told to take iron pills?  11. NO - Have you had any abnormal blood loss such as black, tarry or bloody stools, or abnormal vaginal bleeding?  12. NO - Have you ever had a blood transfusion?  13. NO - Have you or any of your relatives ever had problems with anesthesia?  14. NO - Do you have sleep  apnea, excessive snoring or daytime drowsiness?  15. NO - Do you have any prosthetic heart valves?  16. NO - Do you have prosthetic joints?  17. NO - Is there any chance that you may be pregnant?      HPI:     HPI related to upcoming procedure: Tomasz has a history of hematuria as well as benign prostatic hypertrophy and has had cystoscopy which showed a bladder mass.  He was seen by Urology where it was felt the patient would benefit from surgical management.  I was asked to see him for preoperative medical clearance.        CAD - Patient has a longstanding history of moderate-severe CAD. Patient denies recent chest pain or NTG use, denies exercise induced dyspnea or PND. Last Stress test prior, EKG reviewed.     HYPERLIPIDEMIA - Patient has a long history of significant Hyperlipidemia requiring medication for treatment with recent good control. Patient reports no problems or side effects with the medication.     HYPERTENSION - Patient has longstanding history of HTN , currently denies any symptoms referable to elevated blood pressure. Specifically denies chest pain, palpitations, dyspnea, orthopnea, PND or peripheral edema. Blood pressure readings have been in normal range. Current medication regimen is as listed below. Patient denies any side effects of medication.       MEDICAL HISTORY:     Patient Active Problem List    Diagnosis Date Noted     Rigors 02/16/2019     Priority: Medium     Venous stasis dermatitis of both lower extremities 03/16/2017     Priority: Medium     ACP (advance care planning) 06/06/2016     Priority: Medium     Advance Care Planning 6/6/2016: ACP Review of Chart / Resources Provided:  Reviewed chart for advance care plan.  James DILEEP Grant has no plan or code status on file. Discussed available resources and provided with information. Confirmed code status reflects current choices pending further ACP discussions.  Confirmed/documented legally designated decision makers.  Added by Yani Lucas  Duker             Advance care planning 02/08/2016     Priority: Medium     Advance Care Planning 2/8/2016: Receipt of ACP document:  Received: POLST which was signed and dated by provider on 1/18/16.  Document previously scanned on 1/22/16.  Order reviewed and found to be valid.  Code Status reflects choices in most recent ACP document.  Confirmed/documented designated decision maker(s).  Added by Trinh Garces             BPH (benign prostatic hyperplasia) 01/08/2014     Priority: Medium     SNHL (sensorineural hearing loss) 07/17/2013     Priority: Medium     ETD (eustachian tube dysfunction) 07/17/2013     Priority: Medium     HTN (hypertension) 11/26/2010     Priority: Medium     Dyslipidemia 11/26/2010     Priority: Medium     GERD (gastroesophageal reflux disease) 11/26/2010     Priority: Medium     CHD (coronary heart disease) 11/26/2010     Priority: Medium     07/2010 S/P inferior wall MI  Angioplasty and stenting X 2 RCA  08/2010 angioplasty and stenting (LIDIA) LAD        Past Medical History:   Diagnosis Date     Benign localized hyperplasia of prostate without urinary obstruction and other lower urinary tract symptoms (LUTS) 11/26/2010     CHD (coronary heart disease) 11/26/2010     Dyslipidemia 11/26/2010     GERD (gastroesophageal reflux disease) 11/26/2010     HTN (hypertension) 11/26/2010     Old myocardial infarction 11/26/2010     Other symptoms involving digestive system(787.99) 10/20/2006     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       cetirizine (ZYRTEC) 10 MG tablet Take 1 tablet (10 mg) by mouth every evening 30 tablet 11     famotidine (PEPCID) 20 MG tablet TAKE ONE (1) TABLET BY MOUTH DAILY 30 tablet 3     fluticasone (FLONASE) 50 MCG/ACT spray Spray 2  "sprays into both nostrils daily 1 Bottle 11     losartan (COZAAR) 50 MG tablet TAKE 1 TABLET (50 MG) BY MOUTH DAILY COZAAR 30 tablet 8     metoprolol succinate ER (TOPROL-XL) 25 MG 24 hr tablet TAKE 1/2 TAB BY MOUTH AT BEDTIME. DO NOT CRUSH OR CHEW. 45 tablet 2     nabumetone (RELAFEN) 500 MG tablet Take 1-2 tablets (500-1,000 mg) by mouth 2 times daily as needed for moderate pain 30 tablet 1     simvastatin (ZOCOR) 20 MG tablet Take 1 tablet (20 mg) by mouth At Bedtime 90 tablet 3     vitamin  B complex with vitamin C (VITAMIN  B COMPLEX) TABS Take 1 tablet by mouth daily       OTC products: None, except as noted above    Allergies   Allergen Reactions     Lisinopril Cough     Morphine       Latex Allergy: NO    Social History     Tobacco Use     Smoking status: Former Smoker     Packs/day: 1.00     Years: 13.00     Pack years: 13.00     Types: Cigarettes     Start date: 1949     Last attempt to quit: 1962     Years since quittin.2     Smokeless tobacco: Never Used   Substance Use Topics     Alcohol use: Yes     Comment: 3 Drinks (BEER & LIQUOR) ~ OCCASIONALLY (QUIT IN )     History   Drug Use No       REVIEW OF SYSTEMS:   Constitutional, neuro, ENT, endocrine, pulmonary, cardiac, gastrointestinal, genitourinary, musculoskeletal, integument and psychiatric systems are negative, except as otherwise noted.    EXAM:   /70 (BP Location: Left arm, Patient Position: Sitting, Cuff Size: Adult Regular)   Pulse 74   Resp 16   Ht 1.803 m (5' 11\")   Wt 89.8 kg (198 lb)   SpO2 98%   BMI 27.62 kg/m      GENERAL APPEARANCE: healthy, alert and no distress     EYES: EOMI,  PERRL     HENT: ear canals and TM's normal and nose and mouth without ulcers or lesions     NECK: no adenopathy, no asymmetry, masses, or scars and thyroid normal to palpation     RESP: lungs clear to auscultation - no rales, rhonchi or wheezes     CV: regular rates and rhythm, normal S1 S2, no S3 or S4 and no murmur, click or rub    "  ABDOMEN:  soft, nontender, no HSM or masses and bowel sounds normal     MS: extremities normal- no gross deformities noted, no evidence of inflammation in joints, FROM in all extremities.     SKIN: no suspicious lesions or rashes     NEURO: Normal strength and tone, sensory exam grossly normal, mentation intact and speech normal     PSYCH: mentation appears normal. and affect normal/bright     LYMPHATICS: No cervical adenopathy    DIAGNOSTICS:     EKG: appears normal, NSR, normal axis, normal intervals, no acute ST/T changes c/w ischemia, no LVH by voltage criteria, unchanged from previous tracings  Labs Resulted Today:   Results for orders placed or performed in visit on 10/15/19   CBC with platelets differential   Result Value Ref Range    WBC 8.8 4.0 - 11.0 10e9/L    RBC Count 4.89 4.4 - 5.9 10e12/L    Hemoglobin 15.2 13.3 - 17.7 g/dL    Hematocrit 45.3 40.0 - 53.0 %    MCV 93 78 - 100 fl    MCH 31.1 26.5 - 33.0 pg    MCHC 33.6 31.5 - 36.5 g/dL    RDW 13.1 10.0 - 15.0 %    Platelet Count 239 150 - 450 10e9/L    Diff Method Automated Method     % Neutrophils 42.7 %    % Lymphocytes 39.4 %    % Monocytes 14.4 %    % Eosinophils 2.7 %    % Basophils 0.6 %    % Immature Granulocytes 0.2 %    Nucleated RBCs 0 0 /100    Absolute Neutrophil 3.8 1.6 - 8.3 10e9/L    Absolute Lymphocytes 3.5 0.8 - 5.3 10e9/L    Absolute Monocytes 1.3 0.0 - 1.3 10e9/L    Absolute Eosinophils 0.2 0.0 - 0.7 10e9/L    Absolute Basophils 0.1 0.0 - 0.2 10e9/L    Abs Immature Granulocytes 0.0 0 - 0.4 10e9/L    Absolute Nucleated RBC 0.0    Basic metabolic panel   Result Value Ref Range    Sodium 141 133 - 144 mmol/L    Potassium 3.8 3.4 - 5.3 mmol/L    Chloride 108 94 - 109 mmol/L    Carbon Dioxide 28 20 - 32 mmol/L    Anion Gap 5 3 - 14 mmol/L    Glucose 72 70 - 99 mg/dL    Urea Nitrogen 17 7 - 30 mg/dL    Creatinine 0.77 0.66 - 1.25 mg/dL    GFR Estimate 79 >60 mL/min/[1.73_m2]    GFR Estimate If Black >90 >60 mL/min/[1.73_m2]    Calcium 9.0  8.5 - 10.1 mg/dL       Recent Labs   Lab Test 09/24/19  1627 02/17/19  0524  02/08/14  1200   HGB 15.3 12.5*   < > 16.0    223   < > 248   INR  --   --   --  1.06    146*   < > 139   POTASSIUM 4.0 4.1   < > 3.6   CR 0.79 0.73   < > 0.94    < > = values in this interval not displayed.        IMPRESSION:   Reason for surgery/procedure: Bladder mass    The proposed surgical procedure is considered INTERMEDIATE risk.    REVISED CARDIAC RISK INDEX  The patient has the following serious cardiovascular risks for perioperative complications such as (MI, PE, VFib and 3  AV Block):  Coronary Artery Disease (MI, positive stress test, angina, Qs on EKG)  INTERPRETATION: 1 risks: Class II (low risk - 0.9% complication rate)    The patient has the following additional risks for perioperative complications:  No identified additional risks      ICD-10-CM    1. Preop general physical exam Z01.818        RECOMMENDATIONS:       Cardiovascular Risk  Performs 4 METs exercise without symptoms (Light housework (dusting, washing dishes), Climb a flight of stairs and Walk on level ground at 15 minutes per mile (4 miles/hour)) .       --Patient is to take all scheduled medications on the day of surgery EXCEPT for modifications listed below.    Anticoagulant or Antiplatelet Medication Use  ASPIRIN: Discontinue ASA 7-10 days prior to procedure to reduce bleeding risk.  It should be resumed post-operatively.        APPROVAL GIVEN to proceed with proposed procedure, without further diagnostic evaluation       Signed Electronically by: Broderick Flowers MD    Copy of this evaluation report is provided to requesting physician.    [facility] Preop Guidelines    Revised Cardiac Risk Index

## 2019-10-15 NOTE — PROGRESS NOTES
[facility] - [place]  [address]  [place] [address]  [phone]  Dept: [phone]    PRE-OP EVALUATION:  Today's date: 10/15/2019    James Grant (: 3/5/1927) presents for pre-operative evaluation assessment as requested by Dr. Olivia.  He requires evaluation and anesthesia risk assessment prior to undergoing surgery/procedure for treatment of bladder tumor .    Proposed Surgery/ Procedure: Transurethral resection of bladder tumor  Date of Surgery/ Procedure: 10/22/19  Time of Surgery/ Procedure: to be determined  Hospital/Surgical Facility: [facility] [place]  Fax number for surgical facility:[phone]  Primary Physician: Broderick Flowers  Type of Anesthesia Anticipated: to be determined    Patient has a Health Care Directive or Living Will:  YES     1. YES - DO YOU HAVE A HISTORY OF HEART ATTACK, STROKE, STENT, BYPASS OR SURGERY ON AN ARTERY IN THE HEAD, NECK, HEART OR LEG? Previous MI  2. NO - Do you ever have any pain or discomfort in your chest?  3. NO - Do you have a history of  Heart Failure?  4. NO - Are you troubled by shortness of breath when: walking on the level, up a slight hill or at night?  5. NO - Do you currently have a cold, bronchitis or other respiratory infection?  6. YES - DO YOU HAVE A COUGH, SHORTNESS OF BREATH OR WHEEZING? Occasional  7. YES - DO YOU SOMETIMES GET PAINS IN THE CALVES OF YOUR LEGS WHEN YOU WALK? Occasional  8. NO - Do you or anyone in your family have previous history of blood clots?  9. NO - Do you or does anyone in your family have a serious bleeding problem such as prolonged bleeding following surgeries or cuts?  10. NO - Have you ever had problems with anemia or been told to take iron pills?  11. NO - Have you had any abnormal blood loss such as black, tarry or bloody stools, or abnormal vaginal bleeding?  12. NO - Have you ever had a blood transfusion?  13. NO - Have you or any of your relatives ever had problems with anesthesia?  14. NO - Do you have sleep  apnea, excessive snoring or daytime drowsiness?  15. NO - Do you have any prosthetic heart valves?  16. NO - Do you have prosthetic joints?  17. NO - Is there any chance that you may be pregnant?      HPI:     HPI related to upcoming procedure: Tomasz has a history of hematuria as well as benign prostatic hypertrophy and has had cystoscopy which showed a bladder mass.  He was seen by Urology where it was felt the patient would benefit from surgical management.  I was asked to see him for preoperative medical clearance.        CAD - Patient has a longstanding history of moderate-severe CAD. Patient denies recent chest pain or NTG use, denies exercise induced dyspnea or PND. Last Stress test prior, EKG reviewed.     HYPERLIPIDEMIA - Patient has a long history of significant Hyperlipidemia requiring medication for treatment with recent good control. Patient reports no problems or side effects with the medication.     HYPERTENSION - Patient has longstanding history of HTN , currently denies any symptoms referable to elevated blood pressure. Specifically denies chest pain, palpitations, dyspnea, orthopnea, PND or peripheral edema. Blood pressure readings have been in normal range. Current medication regimen is as listed below. Patient denies any side effects of medication.       MEDICAL HISTORY:     Patient Active Problem List    Diagnosis Date Noted     Rigors 02/16/2019     Priority: Medium     Venous stasis dermatitis of both lower extremities 03/16/2017     Priority: Medium     ACP (advance care planning) 06/06/2016     Priority: Medium     Advance Care Planning 6/6/2016: ACP Review of Chart / Resources Provided:  Reviewed chart for advance care plan.  James DILEEP Grant has no plan or code status on file. Discussed available resources and provided with information. Confirmed code status reflects current choices pending further ACP discussions.  Confirmed/documented legally designated decision makers.  Added by Yani Lucas  Duker             Advance care planning 02/08/2016     Priority: Medium     Advance Care Planning 2/8/2016: Receipt of ACP document:  Received: POLST which was signed and dated by provider on 1/18/16.  Document previously scanned on 1/22/16.  Order reviewed and found to be valid.  Code Status reflects choices in most recent ACP document.  Confirmed/documented designated decision maker(s).  Added by Trinh Garces             BPH (benign prostatic hyperplasia) 01/08/2014     Priority: Medium     SNHL (sensorineural hearing loss) 07/17/2013     Priority: Medium     ETD (eustachian tube dysfunction) 07/17/2013     Priority: Medium     HTN (hypertension) 11/26/2010     Priority: Medium     Dyslipidemia 11/26/2010     Priority: Medium     GERD (gastroesophageal reflux disease) 11/26/2010     Priority: Medium     CHD (coronary heart disease) 11/26/2010     Priority: Medium     07/2010 S/P inferior wall MI  Angioplasty and stenting X 2 RCA  08/2010 angioplasty and stenting (LIDIA) LAD        Past Medical History:   Diagnosis Date     Benign localized hyperplasia of prostate without urinary obstruction and other lower urinary tract symptoms (LUTS) 11/26/2010     CHD (coronary heart disease) 11/26/2010     Dyslipidemia 11/26/2010     GERD (gastroesophageal reflux disease) 11/26/2010     HTN (hypertension) 11/26/2010     Old myocardial infarction 11/26/2010     Other symptoms involving digestive system(787.99) 10/20/2006     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       cetirizine (ZYRTEC) 10 MG tablet Take 1 tablet (10 mg) by mouth every evening 30 tablet 11     famotidine (PEPCID) 20 MG tablet TAKE ONE (1) TABLET BY MOUTH DAILY 30 tablet 3     fluticasone (FLONASE) 50 MCG/ACT spray Spray 2  "sprays into both nostrils daily 1 Bottle 11     losartan (COZAAR) 50 MG tablet TAKE 1 TABLET (50 MG) BY MOUTH DAILY COZAAR 30 tablet 8     metoprolol succinate ER (TOPROL-XL) 25 MG 24 hr tablet TAKE 1/2 TAB BY MOUTH AT BEDTIME. DO NOT CRUSH OR CHEW. 45 tablet 2     nabumetone (RELAFEN) 500 MG tablet Take 1-2 tablets (500-1,000 mg) by mouth 2 times daily as needed for moderate pain 30 tablet 1     simvastatin (ZOCOR) 20 MG tablet Take 1 tablet (20 mg) by mouth At Bedtime 90 tablet 3     vitamin  B complex with vitamin C (VITAMIN  B COMPLEX) TABS Take 1 tablet by mouth daily       OTC products: None, except as noted above    Allergies   Allergen Reactions     Lisinopril Cough     Morphine       Latex Allergy: NO    Social History     Tobacco Use     Smoking status: Former Smoker     Packs/day: 1.00     Years: 13.00     Pack years: 13.00     Types: Cigarettes     Start date: 1949     Last attempt to quit: 1962     Years since quittin.2     Smokeless tobacco: Never Used   Substance Use Topics     Alcohol use: Yes     Comment: 3 Drinks (BEER & LIQUOR) ~ OCCASIONALLY (QUIT IN )     History   Drug Use No       REVIEW OF SYSTEMS:   Constitutional, neuro, ENT, endocrine, pulmonary, cardiac, gastrointestinal, genitourinary, musculoskeletal, integument and psychiatric systems are negative, except as otherwise noted.    EXAM:   /70 (BP Location: Left arm, Patient Position: Sitting, Cuff Size: Adult Regular)   Pulse 74   Resp 16   Ht 1.803 m (5' 11\")   Wt 89.8 kg (198 lb)   SpO2 98%   BMI 27.62 kg/m      GENERAL APPEARANCE: healthy, alert and no distress     EYES: EOMI,  PERRL     HENT: ear canals and TM's normal and nose and mouth without ulcers or lesions     NECK: no adenopathy, no asymmetry, masses, or scars and thyroid normal to palpation     RESP: lungs clear to auscultation - no rales, rhonchi or wheezes     CV: regular rates and rhythm, normal S1 S2, no S3 or S4 and no murmur, click or rub    "  ABDOMEN:  soft, nontender, no HSM or masses and bowel sounds normal     MS: extremities normal- no gross deformities noted, no evidence of inflammation in joints, FROM in all extremities.     SKIN: no suspicious lesions or rashes     NEURO: Normal strength and tone, sensory exam grossly normal, mentation intact and speech normal     PSYCH: mentation appears normal. and affect normal/bright     LYMPHATICS: No cervical adenopathy    DIAGNOSTICS:     EKG: appears normal, NSR, normal axis, normal intervals, no acute ST/T changes c/w ischemia, no LVH by voltage criteria, unchanged from previous tracings  Labs Resulted Today:   Results for orders placed or performed in visit on 10/15/19   CBC with platelets differential   Result Value Ref Range    WBC 8.8 4.0 - 11.0 10e9/L    RBC Count 4.89 4.4 - 5.9 10e12/L    Hemoglobin 15.2 13.3 - 17.7 g/dL    Hematocrit 45.3 40.0 - 53.0 %    MCV 93 78 - 100 fl    MCH 31.1 26.5 - 33.0 pg    MCHC 33.6 31.5 - 36.5 g/dL    RDW 13.1 10.0 - 15.0 %    Platelet Count 239 150 - 450 10e9/L    Diff Method Automated Method     % Neutrophils 42.7 %    % Lymphocytes 39.4 %    % Monocytes 14.4 %    % Eosinophils 2.7 %    % Basophils 0.6 %    % Immature Granulocytes 0.2 %    Nucleated RBCs 0 0 /100    Absolute Neutrophil 3.8 1.6 - 8.3 10e9/L    Absolute Lymphocytes 3.5 0.8 - 5.3 10e9/L    Absolute Monocytes 1.3 0.0 - 1.3 10e9/L    Absolute Eosinophils 0.2 0.0 - 0.7 10e9/L    Absolute Basophils 0.1 0.0 - 0.2 10e9/L    Abs Immature Granulocytes 0.0 0 - 0.4 10e9/L    Absolute Nucleated RBC 0.0    Basic metabolic panel   Result Value Ref Range    Sodium 141 133 - 144 mmol/L    Potassium 3.8 3.4 - 5.3 mmol/L    Chloride 108 94 - 109 mmol/L    Carbon Dioxide 28 20 - 32 mmol/L    Anion Gap 5 3 - 14 mmol/L    Glucose 72 70 - 99 mg/dL    Urea Nitrogen 17 7 - 30 mg/dL    Creatinine 0.77 0.66 - 1.25 mg/dL    GFR Estimate 79 >60 mL/min/[1.73_m2]    GFR Estimate If Black >90 >60 mL/min/[1.73_m2]    Calcium 9.0  8.5 - 10.1 mg/dL       Recent Labs   Lab Test 09/24/19  1627 02/17/19  0524  02/08/14  1200   HGB 15.3 12.5*   < > 16.0    223   < > 248   INR  --   --   --  1.06    146*   < > 139   POTASSIUM 4.0 4.1   < > 3.6   CR 0.79 0.73   < > 0.94    < > = values in this interval not displayed.        IMPRESSION:   Reason for surgery/procedure: Bladder mass    The proposed surgical procedure is considered INTERMEDIATE risk.    REVISED CARDIAC RISK INDEX  The patient has the following serious cardiovascular risks for perioperative complications such as (MI, PE, VFib and 3  AV Block):  Coronary Artery Disease (MI, positive stress test, angina, Qs on EKG)  INTERPRETATION: 1 risks: Class II (low risk - 0.9% complication rate)    The patient has the following additional risks for perioperative complications:  No identified additional risks      ICD-10-CM    1. Preop general physical exam Z01.818        RECOMMENDATIONS:       Cardiovascular Risk  Performs 4 METs exercise without symptoms (Light housework (dusting, washing dishes), Climb a flight of stairs and Walk on level ground at 15 minutes per mile (4 miles/hour)) .       --Patient is to take all scheduled medications on the day of surgery EXCEPT for modifications listed below.    Anticoagulant or Antiplatelet Medication Use  ASPIRIN: Discontinue ASA 7-10 days prior to procedure to reduce bleeding risk.  It should be resumed post-operatively.        APPROVAL GIVEN to proceed with proposed procedure, without further diagnostic evaluation       Signed Electronically by: Broderick Flowers MD    Copy of this evaluation report is provided to requesting physician.    [facility] Preop Guidelines    Revised Cardiac Risk Index

## 2019-10-21 ENCOUNTER — ANESTHESIA EVENT (OUTPATIENT)
Dept: SURGERY | Facility: OTHER | Age: 84
End: 2019-10-21
Payer: MEDICARE

## 2019-10-21 RX ORDER — HYDROCODONE BITARTRATE AND ACETAMINOPHEN 5; 325 MG/1; MG/1
1-2 TABLET ORAL EVERY 4 HOURS PRN
Status: CANCELLED | OUTPATIENT
Start: 2019-10-21

## 2019-10-22 ENCOUNTER — HOSPITAL ENCOUNTER (OUTPATIENT)
Facility: OTHER | Age: 84
Discharge: HOME | End: 2019-10-23
Attending: UROLOGY | Admitting: UROLOGY
Payer: MEDICARE

## 2019-10-22 ENCOUNTER — ANESTHESIA (OUTPATIENT)
Dept: SURGERY | Facility: OTHER | Age: 84
End: 2019-10-22
Payer: MEDICARE

## 2019-10-22 ENCOUNTER — HOSPITAL ENCOUNTER (OUTPATIENT)
Dept: GENERAL RADIOLOGY | Facility: OTHER | Age: 84
End: 2019-10-22
Attending: UROLOGY | Admitting: UROLOGY
Payer: MEDICARE

## 2019-10-22 DIAGNOSIS — D49.4 BLADDER TUMOR: Primary | ICD-10-CM

## 2019-10-22 DIAGNOSIS — D49.4 BLADDER TUMOR: ICD-10-CM

## 2019-10-22 PROCEDURE — 40000278 XR SURGERY CARM FLUORO LESS THAN 5 MIN

## 2019-10-22 PROCEDURE — 37000009 ZZH ANESTHESIA TECHNICAL FEE, EACH ADDTL 15 MIN: Performed by: UROLOGY

## 2019-10-22 PROCEDURE — 37000008 ZZH ANESTHESIA TECHNICAL FEE, 1ST 30 MIN: Performed by: UROLOGY

## 2019-10-22 PROCEDURE — 74420 UROGRAPHY RTRGR +-KUB: CPT

## 2019-10-22 PROCEDURE — 25800030 ZZH RX IP 258 OP 636: Performed by: NURSE ANESTHETIST, CERTIFIED REGISTERED

## 2019-10-22 PROCEDURE — 25000128 H RX IP 250 OP 636: Performed by: UROLOGY

## 2019-10-22 PROCEDURE — 88305 TISSUE EXAM BY PATHOLOGIST: CPT

## 2019-10-22 PROCEDURE — 40000306 ZZH STATISTIC PRE PROC ASSESS II: Performed by: UROLOGY

## 2019-10-22 PROCEDURE — 25000132 ZZH RX MED GY IP 250 OP 250 PS 637: Mod: GY | Performed by: UROLOGY

## 2019-10-22 PROCEDURE — 52317 REMOVE BLADDER STONE: CPT | Mod: 51 | Performed by: UROLOGY

## 2019-10-22 PROCEDURE — C1758 CATHETER, URETERAL: HCPCS | Performed by: UROLOGY

## 2019-10-22 PROCEDURE — 96374 THER/PROPH/DIAG INJ IV PUSH: CPT | Mod: XU

## 2019-10-22 PROCEDURE — 27210794 ZZH OR GENERAL SUPPLY STERILE: Performed by: UROLOGY

## 2019-10-22 PROCEDURE — 88307 TISSUE EXAM BY PATHOLOGIST: CPT

## 2019-10-22 PROCEDURE — 74420 UROGRAPHY RTRGR +-KUB: CPT | Mod: 26 | Performed by: UROLOGY

## 2019-10-22 PROCEDURE — 52235 CYSTOSCOPY AND TREATMENT: CPT | Mod: 51 | Performed by: UROLOGY

## 2019-10-22 PROCEDURE — 25000128 H RX IP 250 OP 636: Performed by: NURSE ANESTHETIST, CERTIFIED REGISTERED

## 2019-10-22 PROCEDURE — 52601 PROSTATECTOMY (TURP): CPT | Performed by: UROLOGY

## 2019-10-22 PROCEDURE — 71000014 ZZH RECOVERY PHASE 1 LEVEL 2 FIRST HR: Performed by: UROLOGY

## 2019-10-22 PROCEDURE — 25000125 ZZHC RX 250: Performed by: UROLOGY

## 2019-10-22 PROCEDURE — 36000058 ZZH SURGERY LEVEL 3 EA 15 ADDTL MIN: Performed by: UROLOGY

## 2019-10-22 PROCEDURE — 25500064 ZZH RX 255 OP 636: Performed by: UROLOGY

## 2019-10-22 PROCEDURE — 36000056 ZZH SURGERY LEVEL 3 1ST 30 MIN: Performed by: UROLOGY

## 2019-10-22 PROCEDURE — 76499 UNLISTED DX RADIOGRAPHIC PX: CPT

## 2019-10-22 PROCEDURE — 25800030 ZZH RX IP 258 OP 636: Performed by: UROLOGY

## 2019-10-22 PROCEDURE — 25800025 ZZH RX 258: Performed by: UROLOGY

## 2019-10-22 PROCEDURE — 25000125 ZZHC RX 250: Performed by: NURSE ANESTHETIST, CERTIFIED REGISTERED

## 2019-10-22 PROCEDURE — 25000564 ZZH DESFLURANE, EA 15 MIN: Performed by: UROLOGY

## 2019-10-22 RX ORDER — FENTANYL CITRATE 50 UG/ML
25-50 INJECTION, SOLUTION INTRAMUSCULAR; INTRAVENOUS EVERY 5 MIN PRN
Status: DISCONTINUED | OUTPATIENT
Start: 2019-10-22 | End: 2019-10-22 | Stop reason: HOSPADM

## 2019-10-22 RX ORDER — SODIUM CHLORIDE 9 MG/ML
INJECTION, SOLUTION INTRAVENOUS CONTINUOUS
Status: DISCONTINUED | OUTPATIENT
Start: 2019-10-22 | End: 2019-10-23 | Stop reason: HOSPADM

## 2019-10-22 RX ORDER — NALOXONE HYDROCHLORIDE 0.4 MG/ML
.1-.4 INJECTION, SOLUTION INTRAMUSCULAR; INTRAVENOUS; SUBCUTANEOUS
Status: DISCONTINUED | OUTPATIENT
Start: 2019-10-22 | End: 2019-10-23 | Stop reason: HOSPADM

## 2019-10-22 RX ORDER — SODIUM CHLORIDE 9 MG/ML
INJECTION, SOLUTION INTRAVENOUS CONTINUOUS
Status: DISCONTINUED | OUTPATIENT
Start: 2019-10-22 | End: 2019-10-22 | Stop reason: HOSPADM

## 2019-10-22 RX ORDER — ONDANSETRON 4 MG/1
4 TABLET, ORALLY DISINTEGRATING ORAL EVERY 30 MIN PRN
Status: DISCONTINUED | OUTPATIENT
Start: 2019-10-22 | End: 2019-10-22 | Stop reason: HOSPADM

## 2019-10-22 RX ORDER — ONDANSETRON 2 MG/ML
INJECTION INTRAMUSCULAR; INTRAVENOUS PRN
Status: DISCONTINUED | OUTPATIENT
Start: 2019-10-22 | End: 2019-10-22

## 2019-10-22 RX ORDER — ONDANSETRON 2 MG/ML
4 INJECTION INTRAMUSCULAR; INTRAVENOUS EVERY 30 MIN PRN
Status: DISCONTINUED | OUTPATIENT
Start: 2019-10-22 | End: 2019-10-22 | Stop reason: HOSPADM

## 2019-10-22 RX ORDER — NALOXONE HYDROCHLORIDE 0.4 MG/ML
.1-.4 INJECTION, SOLUTION INTRAMUSCULAR; INTRAVENOUS; SUBCUTANEOUS
Status: DISCONTINUED | OUTPATIENT
Start: 2019-10-22 | End: 2019-10-22 | Stop reason: HOSPADM

## 2019-10-22 RX ORDER — CEFTRIAXONE SODIUM 1 G/50ML
1 INJECTION, SOLUTION INTRAVENOUS
Status: COMPLETED | OUTPATIENT
Start: 2019-10-22 | End: 2019-10-22

## 2019-10-22 RX ORDER — HYDROMORPHONE HYDROCHLORIDE 1 MG/ML
0.2 INJECTION, SOLUTION INTRAMUSCULAR; INTRAVENOUS; SUBCUTANEOUS
Status: DISCONTINUED | OUTPATIENT
Start: 2019-10-22 | End: 2019-10-23 | Stop reason: HOSPADM

## 2019-10-22 RX ORDER — ATROPA BELLADONNA AND OPIUM 16.2; 3 MG/1; MG/1
30 SUPPOSITORY RECTAL 3 TIMES DAILY PRN
Status: DISCONTINUED | OUTPATIENT
Start: 2019-10-22 | End: 2019-10-23 | Stop reason: HOSPADM

## 2019-10-22 RX ORDER — FENTANYL CITRATE 50 UG/ML
INJECTION, SOLUTION INTRAMUSCULAR; INTRAVENOUS PRN
Status: DISCONTINUED | OUTPATIENT
Start: 2019-10-22 | End: 2019-10-22

## 2019-10-22 RX ORDER — LIDOCAINE 40 MG/G
CREAM TOPICAL
Status: DISCONTINUED | OUTPATIENT
Start: 2019-10-22 | End: 2019-10-23 | Stop reason: HOSPADM

## 2019-10-22 RX ORDER — NEOMYCIN/BACITRACIN/POLYMYXINB 3.5-400-5K
OINTMENT (GRAM) TOPICAL 4 TIMES DAILY PRN
Status: DISCONTINUED | OUTPATIENT
Start: 2019-10-22 | End: 2019-10-23 | Stop reason: HOSPADM

## 2019-10-22 RX ORDER — MEPERIDINE HYDROCHLORIDE 50 MG/ML
12.5 INJECTION INTRAMUSCULAR; INTRAVENOUS; SUBCUTANEOUS
Status: DISCONTINUED | OUTPATIENT
Start: 2019-10-22 | End: 2019-10-22 | Stop reason: HOSPADM

## 2019-10-22 RX ORDER — DEXAMETHASONE SODIUM PHOSPHATE 4 MG/ML
INJECTION, SOLUTION INTRA-ARTICULAR; INTRALESIONAL; INTRAMUSCULAR; INTRAVENOUS; SOFT TISSUE PRN
Status: DISCONTINUED | OUTPATIENT
Start: 2019-10-22 | End: 2019-10-22

## 2019-10-22 RX ORDER — ATROPA BELLADONNA AND OPIUM 16.2; 3 MG/1; MG/1
SUPPOSITORY RECTAL PRN
Status: DISCONTINUED | OUTPATIENT
Start: 2019-10-22 | End: 2019-10-22 | Stop reason: HOSPADM

## 2019-10-22 RX ORDER — PROPOFOL 10 MG/ML
INJECTION, EMULSION INTRAVENOUS PRN
Status: DISCONTINUED | OUTPATIENT
Start: 2019-10-22 | End: 2019-10-22

## 2019-10-22 RX ORDER — LIDOCAINE 40 MG/G
CREAM TOPICAL
Status: DISCONTINUED | OUTPATIENT
Start: 2019-10-22 | End: 2019-10-22 | Stop reason: HOSPADM

## 2019-10-22 RX ORDER — EPHEDRINE SULFATE 50 MG/ML
INJECTION, SOLUTION INTRAMUSCULAR; INTRAVENOUS; SUBCUTANEOUS PRN
Status: DISCONTINUED | OUTPATIENT
Start: 2019-10-22 | End: 2019-10-22

## 2019-10-22 RX ORDER — METOPROLOL TARTRATE 25 MG/1
25 TABLET, FILM COATED ORAL AT BEDTIME
Status: DISCONTINUED | OUTPATIENT
Start: 2019-10-22 | End: 2019-10-23 | Stop reason: HOSPADM

## 2019-10-22 RX ORDER — HYDROCODONE BITARTRATE AND ACETAMINOPHEN 5; 325 MG/1; MG/1
1-2 TABLET ORAL EVERY 4 HOURS PRN
Status: DISCONTINUED | OUTPATIENT
Start: 2019-10-22 | End: 2019-10-23 | Stop reason: HOSPADM

## 2019-10-22 RX ORDER — ONDANSETRON 4 MG/1
4 TABLET, ORALLY DISINTEGRATING ORAL EVERY 6 HOURS PRN
Status: DISCONTINUED | OUTPATIENT
Start: 2019-10-22 | End: 2019-10-23 | Stop reason: HOSPADM

## 2019-10-22 RX ORDER — LIDOCAINE HYDROCHLORIDE 20 MG/ML
INJECTION, SOLUTION INFILTRATION; PERINEURAL PRN
Status: DISCONTINUED | OUTPATIENT
Start: 2019-10-22 | End: 2019-10-22

## 2019-10-22 RX ORDER — ONDANSETRON 2 MG/ML
4 INJECTION INTRAMUSCULAR; INTRAVENOUS EVERY 6 HOURS PRN
Status: DISCONTINUED | OUTPATIENT
Start: 2019-10-22 | End: 2019-10-23 | Stop reason: HOSPADM

## 2019-10-22 RX ADMIN — FENTANYL CITRATE 50 MCG: 50 INJECTION, SOLUTION INTRAMUSCULAR; INTRAVENOUS at 08:26

## 2019-10-22 RX ADMIN — SODIUM CHLORIDE 3000 ML: 900 IRRIGANT IRRIGATION at 15:15

## 2019-10-22 RX ADMIN — SODIUM CHLORIDE 3000 ML: 900 IRRIGANT IRRIGATION at 12:21

## 2019-10-22 RX ADMIN — FENTANYL CITRATE 50 MCG: 50 INJECTION, SOLUTION INTRAMUSCULAR; INTRAVENOUS at 08:40

## 2019-10-22 RX ADMIN — FENTANYL CITRATE 50 MCG: 50 INJECTION, SOLUTION INTRAMUSCULAR; INTRAVENOUS at 09:15

## 2019-10-22 RX ADMIN — PROPOFOL 180 MG: 10 INJECTION, EMULSION INTRAVENOUS at 08:26

## 2019-10-22 RX ADMIN — SODIUM CHLORIDE: 9 INJECTION, SOLUTION INTRAVENOUS at 22:00

## 2019-10-22 RX ADMIN — SODIUM CHLORIDE 3000 ML: 900 IRRIGANT IRRIGATION at 12:01

## 2019-10-22 RX ADMIN — LIDOCAINE HYDROCHLORIDE 60 MG: 20 INJECTION, SOLUTION INFILTRATION; PERINEURAL at 08:26

## 2019-10-22 RX ADMIN — SODIUM CHLORIDE 3000 ML: 900 IRRIGANT IRRIGATION at 22:35

## 2019-10-22 RX ADMIN — Medication 10 MG: at 08:45

## 2019-10-22 RX ADMIN — ATROPA BELLADONNA AND OPIUM 1 SUPPOSITORY: 16.2; 3 SUPPOSITORY RECTAL at 22:41

## 2019-10-22 RX ADMIN — WATER 3000 ML: 1 IRRIGANT IRRIGATION at 10:02

## 2019-10-22 RX ADMIN — HYDROCODONE BITARTRATE AND ACETAMINOPHEN 1 TABLET: 5; 325 TABLET ORAL at 21:50

## 2019-10-22 RX ADMIN — ONDANSETRON 4 MG: 2 INJECTION INTRAMUSCULAR; INTRAVENOUS at 08:57

## 2019-10-22 RX ADMIN — SODIUM CHLORIDE 3000 ML: 900 IRRIGANT IRRIGATION at 16:29

## 2019-10-22 RX ADMIN — SODIUM CHLORIDE: 9 INJECTION, SOLUTION INTRAVENOUS at 08:00

## 2019-10-22 RX ADMIN — SODIUM CHLORIDE 3000 ML: 900 IRRIGANT IRRIGATION at 13:41

## 2019-10-22 RX ADMIN — SODIUM CHLORIDE 3000 ML: 900 IRRIGANT IRRIGATION at 11:09

## 2019-10-22 RX ADMIN — SODIUM CHLORIDE 3000 ML: 900 IRRIGANT IRRIGATION at 23:53

## 2019-10-22 RX ADMIN — SODIUM CHLORIDE 3000 ML: 900 IRRIGANT IRRIGATION at 19:12

## 2019-10-22 RX ADMIN — SODIUM CHLORIDE 3000 ML: 900 IRRIGANT IRRIGATION at 12:54

## 2019-10-22 RX ADMIN — SODIUM CHLORIDE 3000 ML: 900 IRRIGANT IRRIGATION at 21:53

## 2019-10-22 RX ADMIN — HYDROMORPHONE HYDROCHLORIDE 0.2 MG: 1 INJECTION, SOLUTION INTRAMUSCULAR; INTRAVENOUS; SUBCUTANEOUS at 22:35

## 2019-10-22 RX ADMIN — LIDOCAINE HYDROCHLORIDE 40 MG: 20 INJECTION, SOLUTION INFILTRATION; PERINEURAL at 09:30

## 2019-10-22 RX ADMIN — SODIUM CHLORIDE: 9 INJECTION, SOLUTION INTRAVENOUS at 11:29

## 2019-10-22 RX ADMIN — METOPROLOL TARTRATE 25 MG: 25 TABLET ORAL at 21:54

## 2019-10-22 RX ADMIN — SODIUM CHLORIDE 3000 ML: 900 IRRIGANT IRRIGATION at 18:20

## 2019-10-22 RX ADMIN — SODIUM CHLORIDE 3000 ML: 900 IRRIGANT IRRIGATION at 17:02

## 2019-10-22 RX ADMIN — SODIUM CHLORIDE 3000 ML: 900 IRRIGANT IRRIGATION at 14:26

## 2019-10-22 RX ADMIN — DEXAMETHASONE SODIUM PHOSPHATE 4 MG: 4 INJECTION, SOLUTION INTRA-ARTICULAR; INTRALESIONAL; INTRAMUSCULAR; INTRAVENOUS; SOFT TISSUE at 08:57

## 2019-10-22 RX ADMIN — SODIUM CHLORIDE 3000 ML: 900 IRRIGANT IRRIGATION at 17:39

## 2019-10-22 RX ADMIN — CEFTRIAXONE SODIUM 1 G: 1 INJECTION, SOLUTION INTRAVENOUS at 08:21

## 2019-10-22 RX ADMIN — Medication 10 MG: at 08:34

## 2019-10-22 RX ADMIN — Medication 10 MG: at 08:59

## 2019-10-22 RX ADMIN — SODIUM CHLORIDE 3000 ML: 900 IRRIGANT IRRIGATION at 20:22

## 2019-10-22 ASSESSMENT — ACTIVITIES OF DAILY LIVING (ADL)
AMBULATION: 0-->INDEPENDENT
DRESS: 0-->INDEPENDENT
TRANSFERRING: 0-->INDEPENDENT
RETIRED_COMMUNICATION: 0-->UNDERSTANDS/COMMUNICATES WITHOUT DIFFICULTY
RETIRED_EATING: 0-->INDEPENDENT
BATHING: 0-->INDEPENDENT
SWALLOWING: 0-->SWALLOWS FOODS/LIQUIDS WITHOUT DIFFICULTY
FALL_HISTORY_WITHIN_LAST_SIX_MONTHS: YES
COGNITION: 0 - NO COGNITION ISSUES REPORTED
TOILETING: 0-->INDEPENDENT
NUMBER_OF_TIMES_PATIENT_HAS_FALLEN_WITHIN_LAST_SIX_MONTHS: 1

## 2019-10-22 NOTE — OP NOTE
Preoperative diagnosis  Bladder tumor  Bladder stones    Postoperative diagnosis  Bladder tumor  Obstructive prostate  Bladder stones    Procedure performed  Transurethral resection of bladder tumor, 2-5cm  Transurethral resection of prostate  Removal of bladder stones  Cystoscopy with bilateral  retrograde pyelogram  Interpretation of retrograde, bilateral    Surgeon  Andrzej Olivia MD    Surgeon(s)/Proceduralist(s) and Assistants (if any)  Surgeon(s):  Andrzej Olivia MD  Circulator: Arleen Godfrey RN  Relief Circulator: Giuliana Kaye RN  Scrub Person: Deb Galvan  X-Ray Technologist: Margy Sun  2nd Scrub: Arleen Lan    Specimen(s)  Yes- Bladder tumor and prostate    (EBL) Estimated blood loss (ml)  50    Anesthesia  General    Complications  None    Findings  3cm papillary bladder tumor along the left lateral wall posteriorly.    Left retrograde pyelogram revealed a delicate system with normal caliber ureter with no filling defects.    No filling defects with the renal pelvis or calyces.    Right retrograde pyelogram revealed a delicate system with normal caliber ureter with no filling defects.    No filling defects with the renal pelvis or calyces.    Indications  89 year old male agreed to undergo the above named procedure after discussion of the alternatives, risks and benefits.    The patient was found to have a bladder tumor on cystoscopy for work up of gross hematuria.  Informed consent was obtained.      Procedure  The patient was taken to the operating room and placed supine on the operating table.  Pre-operative antibiotics were administered and bilateral lower extremity SCDs were placed.    After induction of general anesthesia the patient was positioned in dorsal lithotomy.    A time-out was performed and the patient was prepped and draped in a sterile fashion.      Serial dilation of the meatus was performed to 30 [place] in order to allow the scope to safely be passed through the  urethra.    A 22 Danish rigid cystoscope was introduced into the bladder under direct vision and cystoscopy was performed.    A Sensor wire was passed through the right  ureteral orifice and a 5 Danish catheter was passed over the wire and the wire was removed.    A retrograde pyelogram was performed by slowly injecting Omnipaque contrast, 5 mL, through the 5 Danish catheter.    The entire collecting system was fluoroscopically visualized with findings described above.      The 5 Danish catheter was then passed through the left ureteral orifice and the same procedure was performed as described above.    The entire collecting system was fluoroscopically visualized with findings described above.      A 28 Danish continuous flow resectoscope was introduced into the bladder.   I was unable to reach the bladder tumor due to prostate median lobe steric hindrance.    Using the loop working element, the prostate was treated in a systematic fashion.  First the left and right lateral lobes were treated followed by the median lobe.  Hemostasis was assured throughout the case.  The bilateral ureteral orifices and verumontanum were used as landmarks and repeatedly visualized throughout the procedure.  Care was taken to carry the treatment of the prostate up to, but not distal to, the verumontanum.    The numerous bladder stones were next.  Using the loop and Ellik Evacuator the stones were removed.    Next the bladder tumor was treated.  Using a working element, the described mass was resected.    This was felt to be a complete resection of visible tumor.    Hemostasis was confirmed and a 22F three way catheter was placed in a sterile fashion.      The patient tolerated the procedure well, was awakened, extubated and transferred to the recovery room in stable condition.    Plan  Follow up in clinic this upcoming Monday for a void trial and pathology review.

## 2019-10-22 NOTE — ANESTHESIA PREPROCEDURE EVALUATION
Anesthesia Pre-Procedure Evaluation    Patient: James Grant   MRN: [id number] : 3/5/1927          Preoperative Diagnosis: Bladder tumor [D49.4]    Procedure(s):  Transurethral Resection of Bladder Tumor  Bilateral Retrograde Pyelograms    Past Medical History:   Diagnosis Date     Benign localized hyperplasia of prostate without urinary obstruction and other lower urinary tract symptoms (LUTS) 2010     CHD (coronary heart disease) 2010     Dyslipidemia 2010     GERD (gastroesophageal reflux disease) 2010     HTN (hypertension) 2010     Old myocardial infarction 2010     Other symptoms involving digestive system(787.99) 10/20/2006     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 Pt has had prior anesthetic. Type: General and MAC           ROS/MED HX    ENT/Pulmonary:  - neg pulmonary ROS     Neurologic:  - neg neurologic ROS     Cardiovascular:     (+) hypertension--CAD, --. : . . . :. .       METS/Exercise Tolerance:  4 - Raking leaves, gardening   Hematologic:         Musculoskeletal:  - neg musculoskeletal ROS       GI/Hepatic:     (+) GERD       Renal/Genitourinary: Comment: Bladder cancer        Endo:  - neg endo ROS       Psychiatric:  - neg psychiatric ROS       Infectious Disease:  - neg infectious disease ROS       Malignancy:   (+) Malignancy History of Other          Other:    - neg other ROS                      Physical Exam  Normal systems: cardiovascular, pulmonary and dental    Airway   Mallampati: II  TM distance: >3 FB  Neck ROM: full    Dental   (+) caps    Cardiovascular   Rhythm and rate: regular and normal      Pulmonary    breath sounds clear to auscultation            Lab Results   Component Value Date    WBC 8.8 10/15/2019    HGB 15.2 10/15/2019    HCT 45.3 10/15/2019     10/15/2019    CRP <2.9 2017     " 10/15/2019    POTASSIUM 3.8 10/15/2019    CHLORIDE 108 10/15/2019    CO2 28 10/15/2019    BUN 17 10/15/2019    CR 0.77 10/15/2019    GLC 72 10/15/2019    VANIA 9.0 10/15/2019    MAG 2.1 02/08/2014    ALBUMIN 4.1 09/24/2019    PROTTOTAL 7.5 09/24/2019    ALT 37 09/24/2019    AST 22 09/24/2019    ALKPHOS 67 09/24/2019    BILITOTAL 0.3 09/24/2019    LIPASE 186 02/08/2014    PTT 33 02/08/2014    INR 1.06 02/08/2014    TSH 2.95 02/07/2019    TROPN 0.06 02/08/2014       Preop Vitals  BP Readings from Last 3 Encounters:   10/15/19 118/70   10/09/19 120/70   09/25/19 136/72    Pulse Readings from Last 3 Encounters:   10/15/19 74   10/09/19 85   09/25/19 66      Resp Readings from Last 3 Encounters:   10/15/19 16   09/25/19 16   09/24/19 16    SpO2 Readings from Last 3 Encounters:   10/15/19 98%   10/09/19 93%   09/25/19 96%      Temp Readings from Last 1 Encounters:   10/09/19 99.1  F (37.3  C) (Tympanic)    Ht Readings from Last 1 Encounters:   10/15/19 1.803 m (5' 11\")      Wt Readings from Last 1 Encounters:   10/15/19 89.8 kg (198 lb)    Estimated body mass index is 27.62 kg/m  as calculated from the following:    Height as of 10/15/19: 1.803 m (5' 11\").    Weight as of 10/15/19: 89.8 kg (198 lb).       Anesthesia Plan      History & Physical Review      ASA Status:  3 .    NPO Status:  > 8 hours    Plan for General and LMA with Propofol and Intravenous induction. Maintenance will be Balanced.           Postoperative Care      Consents  Anesthetic plan, risks, benefits and alternatives discussed with:  Patient..                 David Kellerman, PRATIMA CRNA  "

## 2019-10-22 NOTE — PROGRESS NOTES
"0350/0350-01  James Grant 89 year old male   Admission date:10/22/2019   Residence: Home alone  Code status: Prior   Isolation:No active isolations   Principal Problem: Transurethral resection of bladder tumor, Transurethral resection of prostate  Removal of bladder stones  Post Op Day #: 0  Diet: regular  Mobility Status: SBA  Discharge timeline & plan: unsure of discharge date and/or discharge needs at this time.  Vital signs:  Temp: 98.8  F (37.1  C) Temp src: Tympanic BP: 121/60 Pulse: 68 Heart Rate: 72 Resp: 18 SpO2: 92 % O2 Device: None (Room air)     Weight: 89.8 kg (198 lb)  Estimated body mass index is 27.62 kg/m  as calculated from the following:    Height as of 10/15/19: 1.803 m (5' 11\").    Weight as of this encounter: 89.8 kg (198 lb).  Abnormal Physical Assessment:Urine cherry red, small clots present. Moderate bloody drainage around tip of penis, gauze wrapped around.   Last Pain/PRN Medication given:na    Labs: CBC  Telemetry: No  Pending tests/procedures planned: Continuous bladder irrigation, on bag # 12  Comments:      SAFETY CHECKLIST  Arm Bands/Risk clasps correct and in place (DNR, Fall risk, Allergy, Latex, Limb):  Yes  IVF and rate ordered correct: Yes  Physical assessment verification(IV site, wounds, dressing intact, incisions, LDA's, neuro, CIWA...): Yes  Environmental assessment (bed/chair alarm on, call light, side rails, restraints, sitter....): Yes  Whiteboard updated by oncoming RN:Yes  Luke Hall RN on 10/22/2019 at 6:15 PM    Bedside Handoff complete, safety checks verified by writer and are correct.  Report by Luke Ratliff RN on 10/22/2019 at 7:18 PM          "

## 2019-10-22 NOTE — OR NURSING
PACU Transfer Note    James Grant was transferred to [facility] via cart.  Equipment used for transport:  none.  Accompanied by:  RN x2  Prescriptions were: none    PACU Respiratory Event Documentation     1) Episodes of Apnea greater than or equal to 10 seconds: 0    2) Bradypnea - less than 8 breaths per minute: 0    3) Pain score on 0 to 10 scale: 3    4) Pain-sedation mismatch (yes or no): no    5) Repeated 02 desaturation less than 90% (yes or no): no    Anesthesia notified? (yes or no): no    Any of the above events occuring repeatedly in separate 30 minute intervals may be considered recurrent PACU respiratory events.    Patient stable and meets phase 1 discharge criteria for transport from PACU.

## 2019-10-22 NOTE — PROGRESS NOTES
"NS ADMISSION NOTE    Patient admitted to room 350 at approximately 1045 via cart from surgery. Patient was accompanied by son.     Verbal SBAR report received from SARA Talamantes prior to patient arrival.     Patient ambulated to bed slip. Patient alert and oriented X 3. The patient is not having any pain. 0-10 Pain Scale: 3(\"tolerable\"). Admission vital signs: Blood pressure (!) 140/75, pulse 68, temperature 96.5  F (35.8  C), temperature source Tympanic, resp. rate 20, weight 89.8 kg (198 lb), SpO2 93 %. Patient and son was oriented to plan of care, call light, bed controls, tv, telephone, bathroom and visiting hours.     Risk Assessment    The following safety risks were identified during admission: fall. Yellow risk band applied: YES.       Luke Hall RN    "

## 2019-10-23 VITALS
WEIGHT: 198 LBS | OXYGEN SATURATION: 92 % | RESPIRATION RATE: 18 BRPM | TEMPERATURE: 98.8 F | BODY MASS INDEX: 27.62 KG/M2 | HEART RATE: 55 BPM | DIASTOLIC BLOOD PRESSURE: 50 MMHG | SYSTOLIC BLOOD PRESSURE: 106 MMHG

## 2019-10-23 PROCEDURE — 25800030 ZZH RX IP 258 OP 636: Performed by: UROLOGY

## 2019-10-23 PROCEDURE — 25000128 H RX IP 250 OP 636: Performed by: UROLOGY

## 2019-10-23 PROCEDURE — G0008 ADMIN INFLUENZA VIRUS VAC: HCPCS

## 2019-10-23 PROCEDURE — 90662 IIV NO PRSV INCREASED AG IM: CPT | Performed by: UROLOGY

## 2019-10-23 PROCEDURE — 25000132 ZZH RX MED GY IP 250 OP 250 PS 637: Mod: GY | Performed by: UROLOGY

## 2019-10-23 PROCEDURE — 25800025 ZZH RX 258: Performed by: UROLOGY

## 2019-10-23 RX ORDER — HYDROCODONE BITARTRATE AND ACETAMINOPHEN 5; 325 MG/1; MG/1
1 TABLET ORAL EVERY 4 HOURS PRN
Qty: 20 TABLET | Refills: 0 | Status: SHIPPED | OUTPATIENT
Start: 2019-10-23 | End: 2019-10-28

## 2019-10-23 RX ADMIN — SODIUM CHLORIDE 3000 ML: 900 IRRIGANT IRRIGATION at 05:51

## 2019-10-23 RX ADMIN — INFLUENZA A VIRUS A/MICHIGAN/45/2015 X-275 (H1N1) ANTIGEN (FORMALDEHYDE INACTIVATED), INFLUENZA A VIRUS A/SINGAPORE/INFIMH-16-0019/2016 IVR-186 (H3N2) ANTIGEN (FORMALDEHYDE INACTIVATED), AND INFLUENZA B VIRUS B/MARYLAND/15/2016 BX-69A (A B/COLORADO/6/2017-LIKE VIRUS) ANTIGEN (FORMALDEHYDE INACTIVATED) 0.5 ML: 60; 60; 60 INJECTION, SUSPENSION INTRAMUSCULAR at 09:23

## 2019-10-23 RX ADMIN — SODIUM CHLORIDE: 9 INJECTION, SOLUTION INTRAVENOUS at 08:02

## 2019-10-23 RX ADMIN — SODIUM CHLORIDE 3000 ML: 900 IRRIGANT IRRIGATION at 04:08

## 2019-10-23 RX ADMIN — SODIUM CHLORIDE 3000 ML: 900 IRRIGANT IRRIGATION at 02:00

## 2019-10-23 RX ADMIN — BACITRACIN, NEOMYCIN, POLYMYXIN B 1 G: 400; 3.5; 5 OINTMENT TOPICAL at 12:17

## 2019-10-23 NOTE — PLAN OF CARE
Pt. CBI is functioning correctly, light pink output. Moderate amounts of bloody drainage from tip of penis after ambulating to bathroom on two occasions. Pt reported pain in groin and abdomen controlled by pain meds. Pt. Was fearful and shaking in association with pain, managed well with dilaudid on 10/22 @ 2235, been sleeping on and off since then.

## 2019-10-23 NOTE — PLAN OF CARE
Pt and son educated on catheter care, leg bag to bedside bag and , applying ointment to tip of penis, both pt and son verbalized understanding and educational  handouts given. All questions answered, will continue to monitor. Luke Hall RN on 10/23/2019 at 11:30 AM

## 2019-10-23 NOTE — PHARMACY-ADMISSION MEDICATION HISTORY
Pharmacy -- Admission Medication Reconciliation    Prior to admission (PTA) medications were reviewed and the patient's PTA medication list was updated.    Sources Consulted: [employer] Scripts, notes, patient    The reliability of this Medication Reconciliation is: Reliability: Reliable    The following significant changes were made:  Removed cetirizine  Removed fluticasone  Removed nabumetone  Did not add ipratropium    In addition, the patient's allergies were reviewed with the patient and left as follows:   Allergies: Lisinopril and Morphine    The pharmacist has reviewed with the patient that all personal medications should be removed from the building or locked in the belongings safe.  Patient shall only take medications ordered by the physician and administered by the nursing staff.  Patient does have medications with him. He is leaving today. Dory Khan RPH and nurse repeated that he is not to take his own medications while here.     Medication barriers identified: none noted   Medication adherence concerns: none noted   Understanding of emergency medications: n/a    Dory Khan RPH, 10/23/2019,  10:43 AM

## 2019-10-23 NOTE — PROGRESS NOTES
[facility] DISCHARGE NOTE    Patient discharged to home at 12:40 PM via wheel chair. Accompanied by son and staff. Discharge instructions reviewed with patient and son, opportunity offered to ask questions. Prescriptions sent with patient to fill Percocet . All belongings sent with patient.    Luke Hall RN

## 2019-10-23 NOTE — PLAN OF CARE
Pt A & O x 4, afebrile, vital signs stable. Lung sounds clear,no sob noted, O2 92% on RA. Bowel sounds active, denies nausea, tolerating regular diet. Bladder irrigation running slow, urine light to pale pink/red, pt tolerating well, reports urgency to urinate at times. Moderate bright red bloody drainage from tip of penis, new 4 x4 applied, will continue to monitor. Luke Hall RN on 10/23/2019 at 9:18 AM

## 2019-10-23 NOTE — PROGRESS NOTES
"0350/0350-01  James Grant 89 year old male   Admission date:10/22/2019   Residence: Unknown   Code status: Prior   Isolation:No active isolations   Principal Problem:<principal problem not specified>   Post Op Day #: 1  Peds:Not applicable  [place]: NA  Diet: advance as tolerated  Mobility Status: Assist of 1   Discharge timeline & plan: unsure of discharge date and/or discharge needs at this time.  Vital signs:  Temp: 98.9  F (37.2  C) Temp src: Tympanic BP: 100/46 Pulse: 55 Heart Rate: 72 Resp: 16 SpO2: 92 % O2 Device: None (Room air)     Weight: 89.8 kg (198 lb)  Estimated body mass index is 27.62 kg/m  as calculated from the following:    Height as of 10/15/19: 1.803 m (5' 11\").    Weight as of this encounter: 89.8 kg (198 lb).  Abnormal Physical Assessment:  Minor bleeding from tip of penis at beginning of shift. CBI continuous with light pink drainage.      Last Pain/PRN Medication given: Narco on 10/22 at 2150. Dilaudid on 10/22 at 3335   Finger stick POCT blood sugars:NA  Labs: None   Telemetry: No  Pending tests/procedures planned: unknown   Comments: CBI draining well, light pink. Patient reported pain in abd at beginning of shift but currently reports no pain.     SAFETY CHECKLIST  Arm Bands/Risk clasps correct and in place (DNR, Fall risk, Allergy, Latex, Limb):  Yes  IVF and rate ordered correct: Yes  Physical assessment verification(IV site, wounds, dressing intact, incisions, LDA's, neuro, CIWA...): Yes  Environmental assessment (bed/chair alarm on, call light, side rails, restraints, sitter....): Yes  Whiteboard updated by oncoming RN:Yes    Antonio Ratliff RN on 10/23/2019 at 6:55 AM  Bedside Handoff complete, safety checks verified by writer and are correct.  Luke Hall RN on 10/23/2019 at 7:07 AM              "

## 2019-10-23 NOTE — PROGRESS NOTES
Patient now resting comfortably, eyes closed.  CBI running moderate pace with light, pink-tinged urine.  PRN pain medication was effective for relief.  Will continue to monitor.

## 2019-10-25 ENCOUNTER — HOSPITAL ENCOUNTER (EMERGENCY)
Facility: HOSPITAL | Age: 84
Discharge: HOME | End: 2019-10-25
Attending: NURSE PRACTITIONER | Admitting: NURSE PRACTITIONER
Payer: MEDICARE

## 2019-10-25 VITALS
OXYGEN SATURATION: 98 % | TEMPERATURE: 98.1 F | RESPIRATION RATE: 16 BRPM | SYSTOLIC BLOOD PRESSURE: 128 MMHG | DIASTOLIC BLOOD PRESSURE: 75 MMHG

## 2019-10-25 DIAGNOSIS — R31.0 GROSS HEMATURIA: Primary | ICD-10-CM

## 2019-10-25 DIAGNOSIS — N48.89 PENILE PAIN: ICD-10-CM

## 2019-10-25 PROCEDURE — 51798 US URINE CAPACITY MEASURE: CPT

## 2019-10-25 PROCEDURE — 99213 OFFICE O/P EST LOW 20 MIN: CPT | Mod: Z6 | Performed by: NURSE PRACTITIONER

## 2019-10-25 PROCEDURE — G0463 HOSPITAL OUTPT CLINIC VISIT: HCPCS

## 2019-10-25 NOTE — ED NOTES
Pt is was bladder scanned post void and the scan showed a volume of 39ml, scanned 3 times with same results.

## 2019-10-25 NOTE — ED TRIAGE NOTES
"Pt is here today with family member with c/o moderated bleeding and unsure of the origin of bleeding \"thinks coming from around the catheter\" mild pain on the \"penis\" feels \"sore. Pt states he has a feeling when he urinates that he also feels like he needs to have a BM at the same time. Pt stated he had bladder surgery last tuesday.   "

## 2019-10-25 NOTE — DISCHARGE INSTRUCTIONS
(R31.0) Gross hematuria  (primary encounter diagnosis)  Comment: Acute, symptomatic  Plan: Red urine without clots or sediment in leg bag.  Bladder scan showed 39 mL. Recommend increasing fluids. Keep post-op follow-up appointment on Monday, October 28.       (N48.89) Penile pain  Comment: Acute, symptomatic  Plan: Suspecting this is related to procedure and having haji catheter in place. There is some sanguinous drainage in brief. You can take Tylenol 500-1,000 mg four times daily to see if this helps discomfort.      Keep appointment with urology on Monday, October 28 2019.     Lesly Valverde, CNP

## 2019-10-25 NOTE — ED PROVIDER NOTES
"  History     Chief Complaint   Patient presents with     Post-op Problem     notes bladder surg on tuesday with dr tello, has questions if the pain and bleeding are normal. notes \"minor pain\" at present. wearing a leg bag . vitals stable.      HPI  James Grant is a 89 year old male who presents ambulatory 3 days postop transurethral resection of bladder tumor, transurethral resection of prostate with concerns of \"every once in a while I get the urge to urinate and then I have to have a bowel movement.\" He is feeling the urge to have a bowel movement several times per day.  Prior to surgery he had more of an issue with constipation.  He denies fever, chills, abdominal pain.  He has had decreased appetite but this is not new since surgery this is been ongoing.  He drinks coffee but does not drink much fluids otherwise.         Allergies:  Allergies   Allergen Reactions     Lisinopril Cough     Morphine        Problem List:    Patient Active Problem List    Diagnosis Date Noted     Bladder tumor 10/22/2019     Priority: Medium     Rigors 02/16/2019     Priority: Medium     Venous stasis dermatitis of both lower extremities 03/16/2017     Priority: Medium     ACP (advance care planning) 06/06/2016     Priority: Medium     Advance Care Planning 6/6/2016: ACP Review of Chart / Resources Provided:  Reviewed chart for advance care plan.  James Grant has no plan or code status on file. Discussed available resources and provided with information. Confirmed code status reflects current choices pending further ACP discussions.  Confirmed/documented legally designated decision makers.  Added by Yani Anderson             Advance care planning 02/08/2016     Priority: Medium     Advance Care Planning 2/8/2016: Receipt of ACP document:  Received: POLST which was signed and dated by provider on 1/18/16.  Document previously scanned on 1/22/16.  Order reviewed and found to be valid.  Code Status reflects choices in most " recent ACP document.  Confirmed/documented designated decision maker(s).  Added by Trinh Garces             BPH (benign prostatic hyperplasia) 01/08/2014     Priority: Medium     SNHL (sensorineural hearing loss) 07/17/2013     Priority: Medium     ETD (eustachian tube dysfunction) 07/17/2013     Priority: Medium     HTN (hypertension) 11/26/2010     Priority: Medium     Dyslipidemia 11/26/2010     Priority: Medium     GERD (gastroesophageal reflux disease) 11/26/2010     Priority: Medium     CHD (coronary heart disease) 11/26/2010     Priority: Medium     07/2010 S/P inferior wall MI  Angioplasty and stenting X 2 RCA  08/2010 angioplasty and stenting (LIDIA) LAD          Past Medical History:    Past Medical History:   Diagnosis Date     Benign localized hyperplasia of prostate without urinary obstruction and other lower urinary tract symptoms (LUTS) 11/26/2010     CHD (coronary heart disease) 11/26/2010     Dyslipidemia 11/26/2010     GERD (gastroesophageal reflux disease) 11/26/2010     HTN (hypertension) 11/26/2010     Old myocardial infarction 11/26/2010     Other symptoms involving digestive system(787.99) 10/20/2006       Past Surgical History:    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       CHOLECYSTECTOMY       CYSTOSCOPY, RETROGRADES, COMBINED Bilateral 10/22/2019    Procedure: Bilateral Retrograde Pyelograms;  Surgeon: Andrzej Olivia MD;  Location:  OR     CYSTOSCOPY, TRANSURETHRAL RESECTION (TUR) TUMOR BLADDER, COMBINED N/A 10/22/2019    Procedure: Transurethral Resection of Bladder Tumor and transurethral resection of prostate and bladder stone removal;  Surgeon: Andrzej Olivia MD;  Location:  OR     HERNIA REPAIR       left arm surgery         Family History:    Family History   Problem Relation Age of Onset     Heart Failure Mother 86        Congestive - Cause of Death     Cerebrovascular Disease Father      C.A.D. Sister 91       Social  History:  Marital Status:   [2]  Social History     Tobacco Use     Smoking status: Former Smoker     Packs/day: 1.00     Years: 13.00     Pack years: 13.00     Types: Cigarettes     Start date: 1949     Last attempt to quit: 1962     Years since quittin.2     Smokeless tobacco: Never Used   Substance Use Topics     Alcohol use: Yes     Comment: 3 Drinks (BEER & LIQUOR) ~ OCCASIONALLY (QUIT IN )     Drug use: No        Medications:    famotidine (PEPCID) 20 MG tablet  losartan (COZAAR) 50 MG tablet  metoprolol succinate ER (TOPROL-XL) 25 MG 24 hr tablet  simvastatin (ZOCOR) 20 MG tablet  aspirin 325 MG tablet  HYDROcodone-acetaminophen (NORCO) 5-325 MG tablet  vitamin  B complex with vitamin C (VITAMIN  B COMPLEX) TABS          Review of Systems   All other systems reviewed and are negative.      Physical Exam   BP: 128/75  Heart Rate: 92  Temp: 98.1  F (36.7  C)  Resp: 16  SpO2: 98 %      Physical Exam  Constitutional:       General: He is not in acute distress.     Appearance: He is not ill-appearing, toxic-appearing or diaphoretic.   Cardiovascular:      Rate and Rhythm: Normal rate and regular rhythm.      Heart sounds: S1 normal and S2 normal. No murmur. No friction rub. No gallop.    Pulmonary:      Effort: Pulmonary effort is normal. No tachypnea.      Breath sounds: Normal breath sounds. No decreased breath sounds, wheezing, rhonchi or rales.   Abdominal:      General: Abdomen is flat. Bowel sounds are normal. There is no distension.      Palpations: Abdomen is soft. There is no mass.      Tenderness: There is no tenderness. There is no right CVA tenderness, left CVA tenderness, guarding or rebound.   Genitourinary:     Penis: Discharge (small amount of bloody discharge around meatus and catheter) present. No erythema, tenderness, swelling or lesions.       Comments: Catheter drainage light red urine without clots or sediment  Skin:     General: Skin is warm and dry.      Capillary  Refill: Capillary refill takes less than 2 seconds.      Coloration: Skin is not pale.   Neurological:      Mental Status: He is alert and oriented to person, place, and time.   Psychiatric:         Mood and Affect: Mood normal.         Speech: Speech normal.         Behavior: Behavior normal. Behavior is cooperative.         ED Course        Procedures         No results found for this or any previous visit (from the past 24 hour(s)).    Medications - No data to display    Assessments & Plan (with Medical Decision Making)     I have reviewed the nursing notes.    I have reviewed the findings, diagnosis, plan and need for follow up with the patient.  (R31.0) Gross hematuria  (primary encounter diagnosis)  Comment: Acute, symptomatic  Plan: Red urine without clots or sediment in leg bag.  Bladder scan showed 39 mL, urinary catheter is draining - no concerns for retention contributing to sensation to have bowel movement. Discussed with patient this could be due to inflammation from recent prostate/bladder surgery. Recommend increasing fluids. Keep post-op follow-up appointment on Monday, October 28.       (N48.89) Penile pain  Comment: Acute, symptomatic  Plan: Suspecting this is related to procedure and having haji catheter in place. There is some sanguinous drainage in brief. You can take Tylenol 500-1,000 mg four times daily to see if this helps discomfort.    Unable to reach urology at [facility] or [facility].  Consulted with ED provider who did not have any further recommendations on evaluation or treatment.    Keep appointment with urology on Monday, October 28 2019    Discharge Medication List as of 10/25/2019 12:43 PM          Final diagnoses:   Gross hematuria   Penile pain     Lesly Valverde CNP   10/25/2019   [facility]     Lesly Valverde CNP  10/26/19 2036     Detail Level: Zone Text: The above diagnosis and findings were noted on the exam but not discussed at this time with the patient.

## 2019-10-25 NOTE — ED AVS SNAPSHOT
[facility]  [address]  [place] [address]  Phone:  [phone]                                    James Grant   MRN: [id number]    Department:  [facility]   Date of Visit:  10/25/2019           After Visit Summary Signature Page    I have received my discharge instructions, and my questions have been answered. I have discussed any challenges I see with this plan with the nurse or doctor.    ..........................................................................................................................................  Patient/Patient Representative Signature      ..........................................................................................................................................  Patient Representative Print Name and Relationship to Patient    ..................................................               ................................................  Date                                   Time    ..........................................................................................................................................  Reviewed by Signature/Title    ...................................................              ..............................................  Date                                               Time          22EPIC Rev 08/18

## 2019-10-28 ENCOUNTER — OFFICE VISIT (OUTPATIENT)
Dept: UROLOGY | Facility: OTHER | Age: 84
End: 2019-10-28
Attending: UROLOGY
Payer: MEDICARE

## 2019-10-28 ENCOUNTER — HOSPITAL ENCOUNTER (EMERGENCY)
Facility: HOSPITAL | Age: 84
Discharge: HOME | End: 2019-10-28
Attending: NURSE PRACTITIONER | Admitting: NURSE PRACTITIONER
Payer: MEDICARE

## 2019-10-28 ENCOUNTER — TELEPHONE (OUTPATIENT)
Dept: UROLOGY | Facility: OTHER | Age: 84
End: 2019-10-28

## 2019-10-28 VITALS
RESPIRATION RATE: 16 BRPM | DIASTOLIC BLOOD PRESSURE: 76 MMHG | WEIGHT: 193.2 LBS | HEART RATE: 64 BPM | SYSTOLIC BLOOD PRESSURE: 118 MMHG | BODY MASS INDEX: 26.95 KG/M2

## 2019-10-28 VITALS
SYSTOLIC BLOOD PRESSURE: 130 MMHG | OXYGEN SATURATION: 95 % | TEMPERATURE: 97.8 F | BODY MASS INDEX: 25.8 KG/M2 | RESPIRATION RATE: 16 BRPM | DIASTOLIC BLOOD PRESSURE: 72 MMHG | WEIGHT: 185 LBS

## 2019-10-28 DIAGNOSIS — K59.00 CONSTIPATION: Primary | ICD-10-CM

## 2019-10-28 DIAGNOSIS — Z85.51 HISTORY OF BLADDER CANCER: Primary | ICD-10-CM

## 2019-10-28 DIAGNOSIS — R39.15 URINARY URGENCY: ICD-10-CM

## 2019-10-28 PROBLEM — D49.4 BLADDER TUMOR: Status: RESOLVED | Noted: 2019-10-22 | Resolved: 2019-10-28

## 2019-10-28 PROCEDURE — 51700 IRRIGATION OF BLADDER: CPT | Performed by: UROLOGY

## 2019-10-28 PROCEDURE — G0463 HOSPITAL OUTPT CLINIC VISIT: HCPCS | Mod: 25

## 2019-10-28 PROCEDURE — 51798 US URINE CAPACITY MEASURE: CPT

## 2019-10-28 PROCEDURE — 99214 OFFICE O/P EST MOD 30 MIN: CPT | Mod: 24 | Performed by: UROLOGY

## 2019-10-28 PROCEDURE — G0463 HOSPITAL OUTPT CLINIC VISIT: HCPCS

## 2019-10-28 PROCEDURE — 99213 OFFICE O/P EST LOW 20 MIN: CPT | Mod: Z6 | Performed by: NURSE PRACTITIONER

## 2019-10-28 RX ORDER — TAMSULOSIN HYDROCHLORIDE 0.4 MG/1
0.4 CAPSULE ORAL DAILY
Qty: 10 CAPSULE | Refills: 0 | Status: SHIPPED | OUTPATIENT
Start: 2019-10-28 | End: 2020-01-02

## 2019-10-28 ASSESSMENT — ENCOUNTER SYMPTOMS
CHILLS: 0
DIFFICULTY URINATING: 1
DIARRHEA: 0
FEVER: 0
CONSTIPATION: 1
ABDOMINAL PAIN: 0
VOMITING: 0
APPETITE CHANGE: 1
NAUSEA: 0

## 2019-10-28 ASSESSMENT — PAIN SCALES - GENERAL: PAINLEVEL: NO PAIN (0)

## 2019-10-28 NOTE — ED TRIAGE NOTES
Pt presents with being unable to urinate since he had his catheter out this morning. States it feels like he has to urinate but he can't. States he feels as though he has to have a bm also but can't.

## 2019-10-28 NOTE — TELEPHONE ENCOUNTER
Patient states they have the urge to urinate, although are unable, and have pain associated with this. Catheter was removed this morning, please call.

## 2019-10-28 NOTE — ED AVS SNAPSHOT
[facility]  [address]  [place] [address]  Phone:  [phone]                                    James Grant   MRN: [id number]    Department:  [facility]   Date of Visit:  10/28/2019           After Visit Summary Signature Page    I have received my discharge instructions, and my questions have been answered. I have discussed any challenges I see with this plan with the nurse or doctor.    ..........................................................................................................................................  Patient/Patient Representative Signature      ..........................................................................................................................................  Patient Representative Print Name and Relationship to Patient    ..................................................               ................................................  Date                                   Time    ..........................................................................................................................................  Reviewed by Signature/Title    ...................................................              ..............................................  Date                                               Time          22EPIC Rev 08/18

## 2019-10-28 NOTE — DISCHARGE INSTRUCTIONS
Take the Flomax prescribed.  Take magnesium citrate to help with constipation.    Return to emergency department for worsening or concerning symptoms within the next 24 hours.    Call and update Dr. Olivia tomorrow on your current symptoms.

## 2019-10-28 NOTE — NURSING NOTE
Void Trial  Verbal order read back by Andrzej Olivia MD to perform void trial and post void residual bladder scan.  Patient's bladder was filled under gravity with 75mL of sterile saline.  Patient voided 25mL.  Post-void residual was measured by ultrasonic bladder scanner: 63 mL

## 2019-10-28 NOTE — NURSING NOTE
Pt presents to clinic for  Void trial and path review    Review of Systems:    Weight loss:    No     Recent fever/chills:  No   Night sweats:   No  Current skin rash:  No   Recent hair loss:  No  Heat intolerance:  No   Cold intolerance:  Yes  Chest pain:   No   Palpitations:   No  Shortness of breath:  No   Wheezing:   No  Constipation:    No   Diarrhea:   No   Nausea:   No   Vomiting:   No   Kidney/side pain:  No   Back pain:   No  Frequent headaches:  No   Dizziness:     No  Leg swelling:   No   Calf pain:    No

## 2019-10-28 NOTE — ED PROVIDER NOTES
History     Chief Complaint   Patient presents with     Urinary Retention     The history is provided by the patient.     James Grant is a 89 year old male who presents with his son to  for urinary retention.  He had transurethral resection of bladder tumor and transurethral resection of prostate and bladder stone removal on 10/22/2019.  He had a Prater catheter removed earlier today at the urology office.  He states that he was not able to urinate at all prior to leaving the urology office.  He reports an urge to urinate but is unable to get any urine out.  He reports intermittent intermittent pain to penis.  His last bowel movement was several days ago.  He is scheduled to see Dr. Olivia again in about 4 months.  He did call Dr. Olivia's office prior to coming here to update him on his symptoms.  Denies fever/chills, N/V, abdominal pain or flank pain.  He does have a decreased appetite but is drinking fluids okay.  He reports having had one cup of water and 2 cups of coffee today.    Allergies:  Allergies   Allergen Reactions     Lisinopril Cough     Morphine        Problem List:    Patient Active Problem List    Diagnosis Date Noted     History of bladder cancer 10/28/2019     Priority: Medium     Rigors 02/16/2019     Priority: Medium     Venous stasis dermatitis of both lower extremities 03/16/2017     Priority: Medium     ACP (advance care planning) 06/06/2016     Priority: Medium     Advance Care Planning 6/6/2016: ACP Review of Chart / Resources Provided:  Reviewed chart for advance care plan.  James Grant has no plan or code status on file. Discussed available resources and provided with information. Confirmed code status reflects current choices pending further ACP discussions.  Confirmed/documented legally designated decision makers.  Added by Yani Anderson             Advance care planning 02/08/2016     Priority: Medium     Advance Care Planning 2/8/2016: Receipt of ACP document:  Received:  POLST which was signed and dated by provider on 1/18/16.  Document previously scanned on 1/22/16.  Order reviewed and found to be valid.  Code Status reflects choices in most recent ACP document.  Confirmed/documented designated decision maker(s).  Added by Trinh Garces             BPH (benign prostatic hyperplasia) 01/08/2014     Priority: Medium     SNHL (sensorineural hearing loss) 07/17/2013     Priority: Medium     ETD (eustachian tube dysfunction) 07/17/2013     Priority: Medium     HTN (hypertension) 11/26/2010     Priority: Medium     Dyslipidemia 11/26/2010     Priority: Medium     GERD (gastroesophageal reflux disease) 11/26/2010     Priority: Medium     CHD (coronary heart disease) 11/26/2010     Priority: Medium     07/2010 S/P inferior wall MI  Angioplasty and stenting X 2 RCA  08/2010 angioplasty and stenting (LIDIA) LAD          Past Medical History:    Past Medical History:   Diagnosis Date     Benign localized hyperplasia of prostate without urinary obstruction and other lower urinary tract symptoms (LUTS) 11/26/2010     CHD (coronary heart disease) 11/26/2010     Dyslipidemia 11/26/2010     GERD (gastroesophageal reflux disease) 11/26/2010     HTN (hypertension) 11/26/2010     Old myocardial infarction 11/26/2010     Other symptoms involving digestive system(787.99) 10/20/2006       Past Surgical History:    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       CHOLECYSTECTOMY       CYSTOSCOPY, RETROGRADES, COMBINED Bilateral 10/22/2019    Procedure: Bilateral Retrograde Pyelograms;  Surgeon: Andrzej Olivia MD;  Location:  OR     CYSTOSCOPY, TRANSURETHRAL RESECTION (TUR) TUMOR BLADDER, COMBINED N/A 10/22/2019    Procedure: Transurethral Resection of Bladder Tumor and transurethral resection of prostate and bladder stone removal;  Surgeon: Andrzej Olivia MD;  Location:  OR     HERNIA REPAIR       left arm surgery         Family History:    Family History    Problem Relation Age of Onset     Heart Failure Mother 86        Congestive - Cause of Death     Cerebrovascular Disease Father      C.A.D. Sister 91       Social History:  Marital Status:   [2]  Social History     Tobacco Use     Smoking status: Former Smoker     Packs/day: 1.00     Years: 13.00     Pack years: 13.00     Types: Cigarettes     Start date: 1949     Last attempt to quit: 1962     Years since quittin.2     Smokeless tobacco: Never Used   Substance Use Topics     Alcohol use: Yes     Comment: 3 Drinks (BEER & LIQUOR) ~ OCCASIONALLY (QUIT IN )     Drug use: No        Medications:    aspirin 325 MG tablet  famotidine (PEPCID) 20 MG tablet  losartan (COZAAR) 50 MG tablet  magnesium citrate solution  metoprolol succinate ER (TOPROL-XL) 25 MG 24 hr tablet  simvastatin (ZOCOR) 20 MG tablet  tamsulosin (FLOMAX) 0.4 MG capsule      Review of Systems   Constitutional: Positive for appetite change. Negative for chills and fever.   Gastrointestinal: Positive for constipation. Negative for abdominal pain, diarrhea, nausea and vomiting.   Genitourinary: Positive for difficulty urinating, penile pain and urgency.   All other systems reviewed and are negative.      Physical Exam   BP: 130/72  Heart Rate: 72  Temp: 97.8  F (36.6  C)  Resp: 16  Weight: 83.9 kg (185 lb)  SpO2: 95 %      Physical Exam  Vitals signs and nursing note reviewed.   Constitutional:       Appearance: He is not ill-appearing, toxic-appearing or diaphoretic.   Cardiovascular:      Rate and Rhythm: Normal rate.      Heart sounds: Normal heart sounds.   Pulmonary:      Effort: Pulmonary effort is normal.      Breath sounds: Normal breath sounds.   Abdominal:      General: Bowel sounds are normal.      Palpations: Abdomen is soft.      Tenderness: There is tenderness in the right lower quadrant and suprapubic area. There is no guarding or rebound.   Genitourinary:     Penis: Normal.    Skin:     General: Skin is warm and  dry.   Neurological:      Mental Status: He is alert and oriented to person, place, and time.         ED Course     ED Course as of Oct 28 2308   Mon Oct 28, 2019   1722 This provider called to the room.  Patient did apparently urinate when he stated he was having pain to his penis, states he was unaware that he did it while he was lying on the table.        Procedures          No results found for this or any previous visit (from the past 24 hour(s)).    Medications - No data to display    Assessments & Plan (with Medical Decision Making)   Urinary urgency:  Patient presents to urgent care for urinary urgency and inability to urinate.  Prater catheter removed earlier in the day at the urology office and patient reports inability to void ever since.    Bladder scan done in urgent care showing 237ml.    Patient appears to be having penile pain when he is about to urinate.  He did have an episode of the intermittent penile pain during my assessment.  When patient did get up from the table he noticed that he had actually urinated without being aware of it.  Discussed with patient that he most likely is having some irritation to his penis following removal of a Prater catheter.  It may take some time for his body to readjust to not having a Prater catheter in place.  Questioning if patient is also likely constipated which may be interfering with him being able to urinate.  Will prescribe Flomax.    Discussed with patient and son to call Dr. Olivia's office tomorrow morning and update him on his current symptoms and today's visit.  Will also treat for constipation (see below).  Advised patient to return to emergency department over the next 24 hours if symptoms worsen.    Constipation  Patient reports not having had a bowel movement for several days.  Questioning if this is part of the reason he feels he is unable to urinate.  Will prescribe magnesium citrate.  Discussed with patient to drink magnesium citrate in order to  have a good bowel movement.  See if this will also help him be able to urinate.  Return to emergency department in the next 24 hours if no improvement in symptoms or worsening of symptoms.    Patient and son verbalized understanding and agreeable with plan of care.    I have reviewed the nursing notes.    I have reviewed the findings, diagnosis, plan and need for follow up with the patient.    Final diagnoses:   Constipation   Urinary urgency       10/28/2019   [facility]     Tena, Desiree, CNP  10/28/19 [id number]

## 2019-10-28 NOTE — TELEPHONE ENCOUNTER
Patient lives an hour away and [facility] is closest.  Per Andrzej Olivia MD patient should go to the [facility] now to be evaluated for retention.  If patient gets a catheter put in he would need another void trial in 1-2 weeks.  Will call patient tomorrow to coordinate appointment if needed.  Maria Antonia Lan RN......October 28, 2019...2:59 PM

## 2019-10-28 NOTE — PROGRESS NOTES
Type of Visit  Established    Chief Complaint  Bladder cancer    HPI  Mr. Grant is a 89 year old male who is status post TURBT of a bladder mass.  He is doing well and denies significant gross hematuria.   He denies fevers or chills.  Patient follows up today for pathology review.      Review of Systems  I reviewed the ROS with the patient.    Nursing Notes:   Lesly Manzanares LPN  10/28/2019  9:05 AM  Signed  Pt presents to clinic for  Void trial and path review    Review of Systems:    Weight loss:    No     Recent fever/chills:  No   Night sweats:   No  Current skin rash:  No   Recent hair loss:  No  Heat intolerance:  No   Cold intolerance:  Yes  Chest pain:   No   Palpitations:   No  Shortness of breath:  No   Wheezing:   No  Constipation:    No   Diarrhea:   No   Nausea:   No   Vomiting:   No   Kidney/side pain:  No   Back pain:   No  Frequent headaches:  No   Dizziness:     No  Leg swelling:   No   Calf pain:    No          Lesly Manzanares LPN  10/28/2019  9:10 AM  Sign at exiting of workspace  Void Trial  Verbal order read back by Andrzej Olivia MD to perform void trial and post void residual bladder scan.  Patient's bladder was filled under gravity with 75mL of sterile saline.  Patient voided 25mL.  Post-void residual was measured by ultrasonic bladder scanner: 63 mL      Family History  Family History   Problem Relation Age of Onset     Heart Failure Mother 86        Congestive - Cause of Death     Cerebrovascular Disease Father      C.A.D. Sister 91       Physical Exam  /76 (BP Location: Right arm, Patient Position: Sitting, Cuff Size: Adult Regular)   Pulse 64   Resp 16   Wt 87.6 kg (193 lb 3.2 oz)   BMI 26.95 kg/m    Constitutional: NAD, WDWN.  Cardiovascular: Regular rate.  Pulmonary/Chest: Respirations are even and non-labored bilaterally.  Abdominal: Soft. No distension, tenderness, masses or guarding. No CVA tenderness.  Extremities: TERRANCE x 4, Warm. No clubbing.  No cyanosis.     Skin: Pink, warm and dry.  No rashes noted.    Labs  Results for GLORIA RODRIGUEZ (MRN [id number]) as of 10/28/2019 09:05   10/15/2019 11:45   Sodium 141   Potassium 3.8   Chloride 108   Carbon Dioxide 28   Urea Nitrogen 17   Creatinine 0.77   GFR Estimate 79   GFR Estimate If Black >90   Calcium 9.0   Anion Gap 5       Pathology  I personally reviewed the pathology report  10/22/2019  Low grade Ta (muscularis propria was present and uninvolved in specimen)    Assessment  Mr. Rodriguez is a 89 year old male who is status post TURBT revealing bladder cancer.    Based on the EORTC tables I quoted the following risks:     Recurrence                 1 year              5 years                                      24%                 46%     Progression                 1 year              5 years                                      1%                   6%    Plan  Follow up for surveillance cystoscopy, 4 months following date of TURBT.          I spent 26 minutes on this patient's visit and over 50% of this time was spent in face-to-face discussion and counseling regarding the diagnosis of bladder cancer (completely unrelated to the TURP procedure), risks of recurrence and progression, rationale for surveillance with cystoscopy, indications for intravesical therapy, risks and benefits of each option.

## 2019-10-30 NOTE — TELEPHONE ENCOUNTER
Per Andrzej Olivia MD patient doesn't need to follow back up any sooner than his original plan unless patient is requesting.  Patient notified of this.  He states he has been taking medication for his constipation and recently he has been shivering and has had burning with urination.  I notified him that he should be seen to be evaluated for possible UTI.  He said his temp this morning was 98.6 and he will wait it out a couple more hours before deciding what to do.  Maria Antonia Lan RN......October 30, 2019...9:46 AM

## 2019-11-04 ENCOUNTER — OFFICE VISIT (OUTPATIENT)
Dept: FAMILY MEDICINE | Facility: OTHER | Age: 84
End: 2019-11-04
Attending: FAMILY MEDICINE
Payer: MEDICARE

## 2019-11-04 VITALS
OXYGEN SATURATION: 96 % | SYSTOLIC BLOOD PRESSURE: 112 MMHG | HEART RATE: 74 BPM | WEIGHT: 189.6 LBS | HEIGHT: 69 IN | TEMPERATURE: 99.1 F | DIASTOLIC BLOOD PRESSURE: 66 MMHG | BODY MASS INDEX: 28.08 KG/M2

## 2019-11-04 DIAGNOSIS — C67.8 MALIGNANT NEOPLASM OF OVERLAPPING SITES OF BLADDER (H): ICD-10-CM

## 2019-11-04 DIAGNOSIS — K13.0 LIP LESION: Primary | ICD-10-CM

## 2019-11-04 PROCEDURE — G0463 HOSPITAL OUTPT CLINIC VISIT: HCPCS

## 2019-11-04 PROCEDURE — 99213 OFFICE O/P EST LOW 20 MIN: CPT | Performed by: FAMILY MEDICINE

## 2019-11-04 RX ORDER — HYDROCODONE BITARTRATE AND ACETAMINOPHEN 5; 325 MG/1; MG/1
1 TABLET ORAL EVERY 6 HOURS PRN
COMMUNITY
End: 2019-12-29

## 2019-11-04 RX ORDER — MAGNESIUM CARB/ALUMINUM HYDROX 105-160MG
TABLET,CHEWABLE ORAL
Refills: 0 | COMMUNITY
Start: 2019-10-28 | End: 2019-12-29

## 2019-11-04 RX ORDER — TRIAMCINOLONE ACETONIDE 5 MG/G
CREAM TOPICAL 2 TIMES DAILY
Qty: 15 G | Refills: 0 | Status: SHIPPED | OUTPATIENT
Start: 2019-11-04 | End: 2020-01-02

## 2019-11-04 ASSESSMENT — PAIN SCALES - GENERAL: PAINLEVEL: NO PAIN (0)

## 2019-11-04 ASSESSMENT — MIFFLIN-ST. JEOR: SCORE: 1492.46

## 2019-11-04 NOTE — NURSING NOTE
"Chief Complaint   Patient presents with     Physical       Initial /66 (BP Location: Right arm, Patient Position: Sitting, Cuff Size: Adult Large)   Pulse 74   Temp 99.1  F (37.3  C) (Tympanic)   Ht 1.74 m (5' 8.5\")   Wt 86 kg (189 lb 9.6 oz)   SpO2 96%   BMI 28.41 kg/m   Estimated body mass index is 28.41 kg/m  as calculated from the following:    Height as of this encounter: 1.74 m (5' 8.5\").    Weight as of this encounter: 86 kg (189 lb 9.6 oz).  Medication Reconciliation: complete  Nicol Dubois LPN  "

## 2019-11-04 NOTE — PROGRESS NOTES
"Subjective     James Grant is a 89 year old male who presents to clinic today for the following health issues:    HPI   Annual Wellness Visit    Are you in the first 12 months of your Medicare Part B coverage?  No    Physical Health:    In general, how would you rate your overall physical health? good    If you drink alcohol do you typically have >3 drinks per day or >7 drinks per week? No    Do you usually eat at least 4 servings of fruit and vegetables a day, include whole grains & fiber and avoid regularly eating high fat or \"junk\" foods? Yes    Needs assistance for the following daily activities: no assistance needed    Which of the following safety concerns are present in your home?  throw rugs in the hallway and lack of grab bars in the bathroom     Hearing impairment: Yes, Need to ask people to speak up or repeat themselves.    In the past 6 months, have you been bothered by leaking of urine? yes    Mental Health:    In general, how would you rate your overall mental or emotional health? good  PHQ-2 Score:      Do you feel safe in your environment? Yes    Do you have a Health Care Directive? No, advance care planning information given to patient to review.  Patient declined advance care planning discussion at this time.    Fall risk:  Fallen 2 or more times in the past year?: No  Any fall with injury in the past year?: No  click delete button to remove this line now  Cognitive Screening: { :576366}    Current providers sharing in care for this patient include:   Patient Care Team:  Broderick Flowers MD as PCP - General  Broderick Flowers MD as Assigned PCP    {Rooming staff:  Please add the appropriate gender phrase <dot>AWVPATIENTINSTRUCTIONS to Patient Instructions and then delete this statement.}    {Do you have sleep apnea, excessive snoring or daytime drowsiness? (Optional):568895}  {additonal problems for provider to add (Optional):589302}    {HIST REVIEW/ LINKS 2 (Optional):800192}    Reviewed and " "updated as needed this visit by Provider         Review of Systems   {ROS COMP (Optional):322570}      Objective    /66 (BP Location: Right arm, Patient Position: Sitting, Cuff Size: Adult Large)   Pulse 74   Temp 99.1  F (37.3  C) (Tympanic)   Ht 1.74 m (5' 8.5\")   Wt 86 kg (189 lb 9.6 oz)   SpO2 96%   BMI 28.41 kg/m    Body mass index is 28.41 kg/m .  Physical Exam   {Exam List (Optional):796973}    {Diagnostic Test Results (Optional):273134::\"Diagnostic Test Results:\",\"Labs reviewed in Epic\"}        {PROVIDER CHARTING PREFERENCE:846128}    "

## 2019-11-14 DIAGNOSIS — I25.10 CORONARY ARTERY DISEASE INVOLVING NATIVE CORONARY ARTERY OF NATIVE HEART WITHOUT ANGINA PECTORIS: Primary | ICD-10-CM

## 2019-11-14 RX ORDER — NITROGLYCERIN 0.4 MG/1
TABLET SUBLINGUAL
Qty: 25 TABLET | Refills: 3 | Status: SHIPPED | OUTPATIENT
Start: 2019-11-14 | End: 2023-12-05

## 2019-11-14 NOTE — TELEPHONE ENCOUNTER
nitroglycerin (NITROSTAT) 0.4 MG SL   Last visit date with prescribing provider: 10-  Last refill date: 6-6-2016  Quantity: 25, Refills: 0    Candis Lan LPN

## 2019-11-15 NOTE — PROGRESS NOTES
SUBJECTIVE:   Jmaes Grant is a 89 year old male who presents to clinic today for the following health issues:    Bladder cancer    Adrian recently was diagnosed with bladder cancer and underwent transurethral resection of bladder tumor by Dr Henson at [facility].  His post op course was complicated by hematuria and was seen in the Emergency Room.  His symptoms are improved.  Pathology is reviewed with patient and son in detail.        Problem list and histories reviewed & adjusted, as indicated.  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       CHOLECYSTECTOMY       CYSTOSCOPY, RETROGRADES, COMBINED Bilateral 10/22/2019    Procedure: Bilateral Retrograde Pyelograms;  Surgeon: Andrzej Olivia MD;  Location:  OR     CYSTOSCOPY, TRANSURETHRAL RESECTION (TUR) TUMOR BLADDER, COMBINED N/A 10/22/2019    Procedure: Transurethral Resection of Bladder Tumor and transurethral resection of prostate and bladder stone removal;  Surgeon: Andrzej Olivia MD;  Location:  OR     HERNIA REPAIR       left arm surgery         Social History     Tobacco Use     Smoking status: Former Smoker     Packs/day: 1.00     Years: 13.00     Pack years: 13.00     Types: Cigarettes     Start date: 1949     Last attempt to quit: 1962     Years since quittin.3     Smokeless tobacco: Never Used   Substance Use Topics     Alcohol use: Yes     Comment: 3 Drinks (BEER & LIQUOR) ~ OCCASIONALLY (QUIT IN )     Family History   Problem  "Relation Age of Onset     Heart Failure Mother 86        Congestive - Cause of Death     Cerebrovascular Disease Father      LASHAY.A.D. Sister 91         Current Outpatient Medications   Medication Sig Dispense Refill     famotidine (PEPCID) 20 MG tablet TAKE ONE (1) TABLET BY MOUTH DAILY 30 tablet 3     losartan (COZAAR) 50 MG tablet TAKE 1 TABLET (50 MG) BY MOUTH DAILY COZAAR 30 tablet 8     metoprolol succinate ER (TOPROL-XL) 25 MG 24 hr tablet TAKE 1/2 TAB BY MOUTH AT BEDTIME. DO NOT CRUSH OR CHEW. 45 tablet 2     simvastatin (ZOCOR) 20 MG tablet Take 1 tablet (20 mg) by mouth At Bedtime 90 tablet 3     triamcinolone (ARISTOCORT HP) 0.5 % external cream Apply topically 2 times daily 15 g 0     aspirin 325 MG tablet Take 0.5 tablets by mouth daily.       HYDROcodone-acetaminophen (NORCO) 5-325 MG tablet Take 1 tablet by mouth every 6 hours as needed for severe pain       magnesium citrate 1.745 GM/30ML solution TK 296ML PO ONCE FOR 1 DOSE  0     NITROSTAT 0.4 MG sublingual tablet PLACE 1 TABLET (0.4 MG) UNDER THE TONGUE EVERY 5 MINUTES AS NEEDED 25 tablet 3     Allergies   Allergen Reactions     Lisinopril Cough     Morphine      BP Readings from Last 3 Encounters:   11/04/19 112/66   10/28/19 130/72   10/28/19 118/76    Wt Readings from Last 3 Encounters:   11/04/19 86 kg (189 lb 9.6 oz)   10/28/19 83.9 kg (185 lb)   10/28/19 87.6 kg (193 lb 3.2 oz)                    Reviewed and updated as needed this visit by clinical staff  Tobacco  Allergies  Meds  Med Hx  Surg Hx  Fam Hx  Soc Hx      Reviewed and updated as needed this visit by Provider         ROS:  Constitutional, HEENT, cardiovascular, pulmonary, gi and gu systems are negative, except as otherwise noted.    OBJECTIVE:     /66 (BP Location: Right arm, Patient Position: Sitting, Cuff Size: Adult Large)   Pulse 74   Temp 99.1  F (37.3  C) (Tympanic)   Ht 1.74 m (5' 8.5\")   Wt 86 kg (189 lb 9.6 oz)   SpO2 96%   BMI 28.41 kg/m    Body mass " index is 28.41 kg/m .  Physical Exam   Constitutional: He is oriented to person, place, and time. He appears well-developed and well-nourished. No distress.   Neurological: He is alert and oriented to person, place, and time.   Psychiatric: He has a normal mood and affect.       Other exam not repeated.  Diagnostic Test Results:  none     ASSESSMENT/PLAN:     Malignant neoplasm of overlapping sites of bladder (H)  Records are reviewed.  His hematuria is resolving.  Adrian is scheduled for follow up with Urology.      Lip lesion  Refilled.  - triamcinolone (ARISTOCORT HP) 0.5 % external cream; Apply topically 2 times daily            Broderick Flowers MD  [facility]

## 2019-11-22 PROBLEM — C67.8 MALIGNANT NEOPLASM OF OVERLAPPING SITES OF BLADDER (H): Status: ACTIVE | Noted: 2019-11-22

## 2019-12-29 ENCOUNTER — HOSPITAL ENCOUNTER (EMERGENCY)
Facility: HOSPITAL | Age: 84
Discharge: HOME | End: 2019-12-29
Attending: NURSE PRACTITIONER | Admitting: NURSE PRACTITIONER
Payer: MEDICARE

## 2019-12-29 VITALS
SYSTOLIC BLOOD PRESSURE: 155 MMHG | DIASTOLIC BLOOD PRESSURE: 82 MMHG | RESPIRATION RATE: 16 BRPM | TEMPERATURE: 98.4 F | OXYGEN SATURATION: 97 %

## 2019-12-29 VITALS
TEMPERATURE: 98.4 F | SYSTOLIC BLOOD PRESSURE: 165 MMHG | HEIGHT: 71 IN | DIASTOLIC BLOOD PRESSURE: 96 MMHG | BODY MASS INDEX: 25.9 KG/M2 | OXYGEN SATURATION: 95 % | WEIGHT: 185 LBS | RESPIRATION RATE: 16 BRPM | HEART RATE: 72 BPM

## 2019-12-29 DIAGNOSIS — I49.9 IRREGULARLY IRREGULAR CARDIAC RHYTHM: ICD-10-CM

## 2019-12-29 DIAGNOSIS — R31.9 HEMATURIA: ICD-10-CM

## 2019-12-29 DIAGNOSIS — N30.01 ACUTE CYSTITIS WITH HEMATURIA: ICD-10-CM

## 2019-12-29 LAB
ALBUMIN SERPL-MCNC: 3.5 G/DL (ref 3.4–5)
ALBUMIN UR-MCNC: 30 MG/DL
ALP SERPL-CCNC: 69 U/L (ref 40–150)
ALT SERPL W P-5'-P-CCNC: 28 U/L (ref 0–70)
ANION GAP SERPL CALCULATED.3IONS-SCNC: 4 MMOL/L (ref 3–14)
APPEARANCE UR: ABNORMAL
AST SERPL W P-5'-P-CCNC: 20 U/L (ref 0–45)
BACTERIA #/AREA URNS HPF: ABNORMAL /HPF
BASOPHILS # BLD AUTO: 0.1 10E9/L (ref 0–0.2)
BASOPHILS NFR BLD AUTO: 0.6 %
BILIRUB SERPL-MCNC: 0.3 MG/DL (ref 0.2–1.3)
BILIRUB UR QL STRIP: NEGATIVE
BUN SERPL-MCNC: 18 MG/DL (ref 7–30)
CALCIUM SERPL-MCNC: 8.6 MG/DL (ref 8.5–10.1)
CHLORIDE SERPL-SCNC: 110 MMOL/L (ref 94–109)
CO2 SERPL-SCNC: 28 MMOL/L (ref 20–32)
COLOR UR AUTO: YELLOW
CREAT SERPL-MCNC: 0.73 MG/DL (ref 0.66–1.25)
DIFFERENTIAL METHOD BLD: NORMAL
EOSINOPHIL # BLD AUTO: 0.6 10E9/L (ref 0–0.7)
EOSINOPHIL NFR BLD AUTO: 5.9 %
ERYTHROCYTE [DISTWIDTH] IN BLOOD BY AUTOMATED COUNT: 13.1 % (ref 10–15)
GFR SERPL CREATININE-BSD FRML MDRD: 80 ML/MIN/{1.73_M2}
GLUCOSE SERPL-MCNC: 101 MG/DL (ref 70–99)
GLUCOSE UR STRIP-MCNC: NEGATIVE MG/DL
HCT VFR BLD AUTO: 43.4 % (ref 40–53)
HGB BLD-MCNC: 14.4 G/DL (ref 13.3–17.7)
HGB UR QL STRIP: ABNORMAL
IMM GRANULOCYTES # BLD: 0 10E9/L (ref 0–0.4)
IMM GRANULOCYTES NFR BLD: 0.4 %
INR PPP: 1.07 (ref 0.86–1.14)
KETONES UR STRIP-MCNC: NEGATIVE MG/DL
LEUKOCYTE ESTERASE UR QL STRIP: ABNORMAL
LYMPHOCYTES # BLD AUTO: 3.5 10E9/L (ref 0.8–5.3)
LYMPHOCYTES NFR BLD AUTO: 36.9 %
MCH RBC QN AUTO: 30.1 PG (ref 26.5–33)
MCHC RBC AUTO-ENTMCNC: 33.2 G/DL (ref 31.5–36.5)
MCV RBC AUTO: 91 FL (ref 78–100)
MONOCYTES # BLD AUTO: 1.2 10E9/L (ref 0–1.3)
MONOCYTES NFR BLD AUTO: 12.9 %
MUCOUS THREADS #/AREA URNS LPF: PRESENT /LPF
NEUTROPHILS # BLD AUTO: 4.1 10E9/L (ref 1.6–8.3)
NEUTROPHILS NFR BLD AUTO: 43.3 %
NITRATE UR QL: NEGATIVE
NRBC # BLD AUTO: 0 10*3/UL
NRBC BLD AUTO-RTO: 0 /100
PH UR STRIP: 6.5 PH (ref 4.7–8)
PLATELET # BLD AUTO: 244 10E9/L (ref 150–450)
POTASSIUM SERPL-SCNC: 4.2 MMOL/L (ref 3.4–5.3)
PROT SERPL-MCNC: 7.1 G/DL (ref 6.8–8.8)
RBC # BLD AUTO: 4.78 10E12/L (ref 4.4–5.9)
RBC #/AREA URNS AUTO: >182 /HPF (ref 0–2)
SODIUM SERPL-SCNC: 142 MMOL/L (ref 133–144)
SOURCE: ABNORMAL
SP GR UR STRIP: 1.02 (ref 1–1.03)
UROBILINOGEN UR STRIP-MCNC: NORMAL MG/DL (ref 0–2)
WBC # BLD AUTO: 9.4 10E9/L (ref 4–11)
WBC #/AREA URNS AUTO: >182 /HPF (ref 0–5)

## 2019-12-29 PROCEDURE — 80053 COMPREHEN METABOLIC PANEL: CPT | Performed by: NURSE PRACTITIONER

## 2019-12-29 PROCEDURE — 99213 OFFICE O/P EST LOW 20 MIN: CPT | Mod: Z6 | Performed by: NURSE PRACTITIONER

## 2019-12-29 PROCEDURE — 85025 COMPLETE CBC W/AUTO DIFF WBC: CPT | Performed by: NURSE PRACTITIONER

## 2019-12-29 PROCEDURE — 85610 PROTHROMBIN TIME: CPT | Performed by: NURSE PRACTITIONER

## 2019-12-29 PROCEDURE — 36415 COLL VENOUS BLD VENIPUNCTURE: CPT | Performed by: NURSE PRACTITIONER

## 2019-12-29 PROCEDURE — 99284 EMERGENCY DEPT VISIT MOD MDM: CPT | Mod: Z6 | Performed by: NURSE PRACTITIONER

## 2019-12-29 PROCEDURE — 99283 EMERGENCY DEPT VISIT LOW MDM: CPT

## 2019-12-29 PROCEDURE — 87086 URINE CULTURE/COLONY COUNT: CPT | Performed by: NURSE PRACTITIONER

## 2019-12-29 PROCEDURE — 81001 URINALYSIS AUTO W/SCOPE: CPT | Performed by: NURSE PRACTITIONER

## 2019-12-29 PROCEDURE — 25000132 ZZH RX MED GY IP 250 OP 250 PS 637: Mod: GY | Performed by: NURSE PRACTITIONER

## 2019-12-29 PROCEDURE — G0463 HOSPITAL OUTPT CLINIC VISIT: HCPCS

## 2019-12-29 RX ORDER — CEFDINIR 300 MG/1
300 CAPSULE ORAL 2 TIMES DAILY
Qty: 8 CAPSULE | Refills: 0 | Status: SHIPPED | OUTPATIENT
Start: 2019-12-29 | End: 2019-12-29

## 2019-12-29 RX ORDER — CEFDINIR 300 MG/1
300 CAPSULE ORAL EVERY 12 HOURS SCHEDULED
Status: DISCONTINUED | OUTPATIENT
Start: 2019-12-29 | End: 2019-12-29

## 2019-12-29 RX ORDER — CEFDINIR 300 MG/1
300 CAPSULE ORAL EVERY 12 HOURS
Status: DISCONTINUED | OUTPATIENT
Start: 2019-12-29 | End: 2019-12-29 | Stop reason: HOSPADM

## 2019-12-29 RX ORDER — CEFDINIR 300 MG/1
300 CAPSULE ORAL 2 TIMES DAILY
Qty: 8 CAPSULE | Refills: 0 | Status: SHIPPED | OUTPATIENT
Start: 2019-12-29 | End: 2020-01-02

## 2019-12-29 RX ADMIN — CEFDINIR 300 MG: 300 CAPSULE ORAL at 03:13

## 2019-12-29 ASSESSMENT — ENCOUNTER SYMPTOMS
FEVER: 0
DIFFICULTY URINATING: 0
WEAKNESS: 0
FATIGUE: 0
CHILLS: 0
APPETITE CHANGE: 0
HEADACHES: 0
ABDOMINAL PAIN: 0
DYSURIA: 0
DIARRHEA: 0
VOMITING: 0
DIZZINESS: 0
LIGHT-HEADEDNESS: 0
FLANK PAIN: 0
PALPITATIONS: 0
NAUSEA: 0
HEMATURIA: 1
SHORTNESS OF BREATH: 0
CONSTIPATION: 1
FREQUENCY: 0

## 2019-12-29 ASSESSMENT — MIFFLIN-ST. JEOR: SCORE: 1511.28

## 2019-12-29 NOTE — DISCHARGE INSTRUCTIONS
(R31.9) Hematuria  Comment: Acute, symptomatic  Plan: Discussed possible etiologies including renal and/or bladder calculi given his history of these, acute cystitis with hematuria. Given how far post operative he is and no pain low suspicion of any necrosis. Discussed option of obtaining CT scan - declined at this time.  We will treat for acute cystitis with omnicef 300 mg twice daily x 5 days (first dose given in the ED)  - Ensure you are drinking plenty of fluids, emptying your bladder when you feel the urge versus holding urine, after urinating you can bear-down to empty bladder completely, after peeing wipe front to back to avoid introducing bacteria towards vaginal area.     - Take entire course of antibiotic even if you start to feel better.  - Antibiotics can cause stomach upset including nausea and diarrhea. Read your bottle or ask the pharmacist if antibiotic can be taken with food to help prevent nausea. If you have symptoms of diarrhea you can take an over-the-counter probiotic and/or increase foods with probiotics such as yogurt, Arg[place], sauerkraut.      - RETURN TO THE ED WITH NEW OR WORSENING SYMPTOMS -  You could also call and see if Dr. Olivia would like you to follow-up sooner than scheduled follow-up appointment.       (I49.9) Irregularly irregular cardiac rhythm  Comment: New, asymptomatic - no history of irregular heart beat/atrial fibrillation  Plan: Declined EKG today. Risks and benefits discussed. Can follow-up with primary care provider.         Lesly Valverde, CNP

## 2019-12-29 NOTE — ED PROVIDER NOTES
"  History     Chief Complaint   Patient presents with     Hematuria     \"tumor removed from his bladder approx 1 month ago and tonight he noted bright red blood with urination\"      HPI  James Grant is a 89 year old male who presents with concerns of hematuria. Earlier today he passed 1 small blood clot in his urine. This evening after sleeping for about 3 hours he woke up and voided bright red blood - no clots. When he got to ED he voided and urine had clear. He denies fever, chills, dysuria, urinary frequency, urinary urgency, diarrhea. He does have issues with chronic constipation. Last BM was today. \"I feel fine other than the blood.\" Denies weakness, lightheadedness, near-syncope, syncope, palpitations, chest pain, shortness of breath, dyspnea on exertion. Denies abdominal or flank pain currently or in the last few days. Denies history of renal calculi.      He had TURP and transurethral resection of bladder tumor with Dr. Olivia in 10/2019. Next urology follow-up is scheduled for March 2019.       Allergies:  Allergies   Allergen Reactions     Lisinopril Cough     Morphine        Problem List:    Patient Active Problem List    Diagnosis Date Noted     Malignant neoplasm of overlapping sites of bladder (H) 11/22/2019     Priority: Medium     History of bladder cancer 10/28/2019     Priority: Medium     Rigors 02/16/2019     Priority: Medium     Venous stasis dermatitis of both lower extremities 03/16/2017     Priority: Medium     ACP (advance care planning) 06/06/2016     Priority: Medium     Advance Care Planning 6/6/2016: ACP Review of Chart / Resources Provided:  Reviewed chart for advance care plan.  James Grant has no plan or code status on file. Discussed available resources and provided with information. Confirmed code status reflects current choices pending further ACP discussions.  Confirmed/documented legally designated decision makers.  Added by Yani Anderson             Advance care " planning 02/08/2016     Priority: Medium     Advance Care Planning 2/8/2016: Receipt of ACP document:  Received: POLST which was signed and dated by provider on 1/18/16.  Document previously scanned on 1/22/16.  Order reviewed and found to be valid.  Code Status reflects choices in most recent ACP document.  Confirmed/documented designated decision maker(s).  Added by Trinh Garces             BPH (benign prostatic hyperplasia) 01/08/2014     Priority: Medium     SNHL (sensorineural hearing loss) 07/17/2013     Priority: Medium     ETD (eustachian tube dysfunction) 07/17/2013     Priority: Medium     HTN (hypertension) 11/26/2010     Priority: Medium     Dyslipidemia 11/26/2010     Priority: Medium     GERD (gastroesophageal reflux disease) 11/26/2010     Priority: Medium     CHD (coronary heart disease) 11/26/2010     Priority: Medium     07/2010 S/P inferior wall MI  Angioplasty and stenting X 2 RCA  08/2010 angioplasty and stenting ([facility]IDAI) LAD          Past Medical History:    Past Medical History:   Diagnosis Date     Benign localized hyperplasia of prostate without urinary obstruction and other lower urinary tract symptoms (LUTS) 11/26/2010     CHD (coronary heart disease) 11/26/2010     Dyslipidemia 11/26/2010     GERD (gastroesophageal reflux disease) 11/26/2010     HTN (hypertension) 11/26/2010     Old myocardial infarction 11/26/2010     Other symptoms involving digestive system(787.99) 10/20/2006       Past Surgical History:    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       CHOLECYSTECTOMY       CYSTOSCOPY, RETROGRADES, COMBINED Bilateral 10/22/2019    Procedure: Bilateral Retrograde Pyelograms;  Surgeon: Andrzej Olivia MD;  Location:  OR     CYSTOSCOPY, TRANSURETHRAL RESECTION (TUR) TUMOR BLADDER, COMBINED N/A 10/22/2019    Procedure: Transurethral Resection of Bladder Tumor and transurethral resection of prostate and bladder stone removal;  Surgeon: Corwin  "Andrzej MOJICA MD;  Location: [facility] OR     HERNIA REPAIR       left arm surgery         Family History:    Family History   Problem Relation Age of Onset     Heart Failure Mother 86        Congestive - Cause of Death     Cerebrovascular Disease Father      C.A.D. Sister 91       Social History:  Marital Status:   [2]  Social History     Tobacco Use     Smoking status: Former Smoker     Packs/day: 1.00     Years: 13.00     Pack years: 13.00     Types: Cigarettes     Start date: 1949     Last attempt to quit: 1962     Years since quittin.4     Smokeless tobacco: Never Used   Substance Use Topics     Alcohol use: Yes     Comment: 3 Drinks (BEER & LIQUOR) ~ OCCASIONALLY (QUIT IN )     Drug use: No        Medications:    aspirin 325 MG tablet  famotidine (PEPCID) 20 MG tablet  losartan (COZAAR) 50 MG tablet  metoprolol succinate ER (TOPROL-XL) 25 MG 24 hr tablet  simvastatin (ZOCOR) 20 MG tablet  NITROSTAT 0.4 MG sublingual tablet  triamcinolone (ARISTOCORT HP) 0.5 % external cream          Review of Systems   Constitutional: Negative for appetite change, chills, fatigue and fever.   Respiratory: Negative for shortness of breath.    Cardiovascular: Negative for chest pain and palpitations.   Gastrointestinal: Positive for constipation. Negative for abdominal pain, diarrhea, nausea and vomiting.   Genitourinary: Positive for hematuria. Negative for difficulty urinating, dysuria, flank pain and frequency.   Neurological: Negative for dizziness, syncope, weakness, light-headedness and headaches.       Physical Exam   BP: 156/87  Heart Rate: 76  Temp: 97.7  F (36.5  C)  Resp: 16  Height: 180.3 cm (5' 11\")  Weight: 83.9 kg (185 lb)  SpO2: 94 %      Physical Exam  Constitutional:       General: He is not in acute distress.     Appearance: He is not ill-appearing, toxic-appearing or diaphoretic.      Comments: Pleasant, elderly   Cardiovascular:      Rate and Rhythm: Normal rate. Rhythm irregularly irregular.      " Heart sounds: S1 normal and S2 normal. No murmur. No friction rub. No gallop.    Pulmonary:      Effort: Pulmonary effort is normal. No tachypnea.      Breath sounds: Normal breath sounds. No wheezing, rhonchi or rales.   Abdominal:      General: Abdomen is flat. Bowel sounds are normal. There is no distension.      Palpations: Abdomen is soft.      Tenderness: There is no abdominal tenderness. There is no right CVA tenderness, left CVA tenderness, guarding or rebound.   Skin:     General: Skin is warm and dry.      Capillary Refill: Capillary refill takes less than 2 seconds.      Coloration: Skin is not pale.   Neurological:      Mental Status: He is alert and oriented to person, place, and time.      Motor: Motor function is intact. No weakness.      Coordination: Coordination is intact.      Gait: Gait is intact.   Psychiatric:         Mood and Affect: Mood normal.         Speech: Speech normal.         Behavior: Behavior normal. Behavior is cooperative.         ED Course     ED Course as of Dec 29 0303   Sun Dec 29, 2019   0233 Discussed irregularly irregular rhythm auscultated with patient. He declines EKG at this time. He is asymptomatic, normotensive.   Lesly Valverde CNP on 12/29/2019 at 2:34 AM        Procedures          EKG - patient declined       Results for orders placed or performed during the hospital encounter of 12/29/19 (from the past 24 hour(s))   CBC with platelets differential   Result Value Ref Range    WBC 9.4 4.0 - 11.0 10e9/L    RBC Count 4.78 4.4 - 5.9 10e12/L    Hemoglobin 14.4 13.3 - 17.7 g/dL    Hematocrit 43.4 40.0 - 53.0 %    MCV 91 78 - 100 fl    MCH 30.1 26.5 - 33.0 pg    MCHC 33.2 31.5 - 36.5 g/dL    RDW 13.1 10.0 - 15.0 %    Platelet Count 244 150 - 450 10e9/L    Diff Method Automated Method     % Neutrophils 43.3 %    % Lymphocytes 36.9 %    % Monocytes 12.9 %    % Eosinophils 5.9 %    % Basophils 0.6 %    % Immature Granulocytes 0.4 %    Nucleated RBCs 0 0 /100    Absolute  Neutrophil 4.1 1.6 - 8.3 10e9/L    Absolute Lymphocytes 3.5 0.8 - 5.3 10e9/L    Absolute Monocytes 1.2 0.0 - 1.3 10e9/L    Absolute Eosinophils 0.6 0.0 - 0.7 10e9/L    Absolute Basophils 0.1 0.0 - 0.2 10e9/L    Abs Immature Granulocytes 0.0 0 - 0.4 10e9/L    Absolute Nucleated RBC 0.0        Medications - No data to display    Assessments & Plan (with Medical Decision Making)     I have reviewed the nursing notes.    I have reviewed the findings, diagnosis, plan and need for follow up with the patient.  (R31.9) Hematuria  Comment: Acute, symptomatic  Plan: Discussed possible etiologies including renal and/or bladder calculi given his history of bladder calculi, acute cystitis with hematuria, others. Given how far post operative he is and no pain low suspicion of any necrosis. Discussed option of obtaining CT scan - declined at this time. CBC normal - no elevation in WBC, hemoglobin normal.   We will treat for acute cystitis with omnicef 300 mg twice daily x 5 days (first dose given in the ED)  - Ensure you are drinking plenty of fluids, emptying your bladder when you feel the urge versus holding urine, after urinating you can bear-down to empty bladder completely, after peeing wipe front to back to avoid introducing bacteria towards vaginal area.     - Take entire course of antibiotic even if you start to feel better.  - Antibiotics can cause stomach upset including nausea and diarrhea. Read your bottle or ask the pharmacist if antibiotic can be taken with food to help prevent nausea. If you have symptoms of diarrhea you can take an over-the-counter probiotic and/or increase foods with probiotics such as yogurt, Juan, sauerkraut.      - RETURN TO THE ED WITH NEW OR WORSENING SYMPTOMS -  You could also call and see if Dr. Olivia would like you to follow-up sooner than scheduled follow-up appointment.       (I49.9) Irregularly irregular cardiac rhythm  Comment: New, asymptomatic - no history of irregular heart  beat/atrial fibrillation  Plan: Declined EKG today. Risks and benefits discussed. Can follow-up with primary care provider.       New Prescriptions    No medications on file       Final diagnoses:   Hematuria     Lesly Valverde CNP   12/29/2019   [facility]     Lesly Valverde CNP  12/29/19 030

## 2019-12-29 NOTE — ED AVS SNAPSHOT
[facility]  [address]  [place] [address]  Phone:  [phone]                                    James Grant   MRN: [id number]    Department:  [facility]   Date of Visit:  12/29/2019           After Visit Summary Signature Page    I have received my discharge instructions, and my questions have been answered. I have discussed any challenges I see with this plan with the nurse or doctor.    ..........................................................................................................................................  Patient/Patient Representative Signature      ..........................................................................................................................................  Patient Representative Print Name and Relationship to Patient    ..................................................               ................................................  Date                                   Time    ..........................................................................................................................................  Reviewed by Signature/Title    ...................................................              ..............................................  Date                                               Time          22EPIC Rev 08/18

## 2019-12-29 NOTE — ED AVS SNAPSHOT
[facility]  [address]  [place] [address]  Phone:  [phone]                                    James Grant   MRN: [id number]    Department:  [facility]   Date of Visit:  12/29/2019           After Visit Summary Signature Page    I have received my discharge instructions, and my questions have been answered. I have discussed any challenges I see with this plan with the nurse or doctor.    ..........................................................................................................................................  Patient/Patient Representative Signature      ..........................................................................................................................................  Patient Representative Print Name and Relationship to Patient    ..................................................               ................................................  Date                                   Time    ..........................................................................................................................................  Reviewed by Signature/Title    ...................................................              ..............................................  Date                                               Time          22EPIC Rev 08/18

## 2019-12-29 NOTE — ED NOTES
Patient discharged home at this time. Patient given written and verbal instructions regaring f/u, home care and medications. Patient verbalized understanding of all discharge instructions.Copy of AVS provided prior to discharge. Instructed to  prescriptions at [facility] pharmacy.

## 2019-12-29 NOTE — ED NOTES
"Pt presents with c/o hematuria without pain, pt is on antibiotics for UTI. Pt reports the hematuria, \"went away but came back after I was snow blowing.\"  "

## 2019-12-29 NOTE — ED NOTES
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 While waiting in lobby pt states he went to bathroom and his \"urine was almost clear, with one small blood clot.\"Also reports he coughed up \" a small blood clot yesterday and has been having no resp symptoms.\"  See assessments.   "

## 2019-12-31 ENCOUNTER — TELEPHONE (OUTPATIENT)
Dept: UROLOGY | Facility: OTHER | Age: 84
End: 2019-12-31

## 2019-12-31 LAB
BACTERIA SPEC CULT: ABNORMAL
SPECIMEN SOURCE: ABNORMAL

## 2019-12-31 NOTE — TELEPHONE ENCOUNTER
Patient has been scheduled for 01/02/20 at 3:00. Patient is aware.    Cherie Linder on 12/31/2019 at 10:45 AM

## 2019-12-31 NOTE — PROGRESS NOTES
"Subjective     James Grant is a 89 year old male who presents to clinic today for the following health issues:    HPI   Musculoskeletal problem/pain      Duration: a couple weeks    Description  Location: right lower leg    Intensity:  mild    Accompanying signs and symptoms: swelling, itches and \"feels like lead\"    History  Previous similar problem: no   Previous evaluation:  none    Precipitating or alleviating factors:  Trauma or overuse: no   Aggravating factors include: bothers him mostly at bedtime     Therapies tried and outcome: cortisone 10- no relief     Adiran has had right lower leg swelling overt the last several weeks.  No associated symptoms.  Denies chest pain, dyspnea, decreased exercise tolerance, dizzyness, nausea, vomiting, diaphoresis, palpitations, numbness, tingling, or paresthesias.      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       CHOLECYSTECTOMY       CYSTOSCOPY, RETROGRADES, COMBINED Bilateral 10/22/2019    Procedure: Bilateral Retrograde Pyelograms;  Surgeon: Andrzej Olivia MD;  Location:  OR     CYSTOSCOPY, TRANSURETHRAL RESECTION (TUR) TUMOR BLADDER, COMBINED N/A 10/22/2019    Procedure: Transurethral Resection of Bladder Tumor and transurethral resection of prostate and bladder stone removal;  Surgeon: Andrzej Olivia MD;  Location:  OR     HERNIA REPAIR       left arm surgery         Social History     Tobacco Use     Smoking status: Former Smoker "     Packs/day: 1.00     Years: 13.00     Pack years: 13.00     Types: Cigarettes     Start date: 1949     Last attempt to quit: 1962     Years since quittin.4     Smokeless tobacco: Never Used   Substance Use Topics     Alcohol use: Not Currently     Comment: 3 Drinks (BEER & LIQUOR) ~ OCCASIONALLY (QUIT IN )     Family History   Problem Relation Age of Onset     Heart Failure Mother 86        Congestive - Cause of Death     Cerebrovascular Disease Father      C.A.D. Sister 91         Current Outpatient Medications   Medication Sig Dispense Refill     cefdinir (OMNICEF) 300 MG capsule Take 1 capsule (300 mg) by mouth 2 times daily 6 capsule 0     famotidine (PEPCID) 20 MG tablet TAKE ONE (1) TABLET BY MOUTH DAILY 30 tablet 3     losartan (COZAAR) 50 MG tablet TAKE 1 TABLET (50 MG) BY MOUTH DAILY COZAAR 30 tablet 8     metoprolol succinate ER (TOPROL-XL) 25 MG 24 hr tablet TAKE 1/2 TAB BY MOUTH AT BEDTIME. DO NOT CRUSH OR CHEW. 45 tablet 2     simvastatin (ZOCOR) 20 MG tablet Take 1 tablet (20 mg) by mouth At Bedtime 90 tablet 3     NITROSTAT 0.4 MG sublingual tablet PLACE 1 TABLET (0.4 MG) UNDER THE TONGUE EVERY 5 MINUTES AS NEEDED (Patient not taking: Reported on 1/3/2020) 25 tablet 3     Allergies   Allergen Reactions     Lisinopril Cough     Morphine      BP Readings from Last 3 Encounters:   20 120/70   20 (!) 150/80   19 155/82    Wt Readings from Last 3 Encounters:   20 87.1 kg (192 lb)   20 86.5 kg (190 lb 9.6 oz)   19 83.9 kg (185 lb)           Reviewed and updated as needed this visit by Provider         Review of Systems   ROS COMP: Constitutional, HEENT, cardiovascular, pulmonary, gi and gu systems are negative, except as otherwise noted.      Objective    There were no vitals taken for this visit.  There is no height or weight on file to calculate BMI.  Physical Exam  Vitals signs and nursing note reviewed.   Constitutional:       Appearance: He is  "well-developed.   Musculoskeletal:      Comments: There is 2+ pitting edema of the right lower extremity. 1+ on the left.  There in no tenderness or palpable cords.  Nany sign is negative   Neurological:      Mental Status: He is alert and oriented to person, place, and time.   Psychiatric:         Mood and Affect: Mood normal.         Behavior: Behavior normal.         Thought Content: Thought content normal.          Diagnostic Test Results:  Labs reviewed in Epic  Venous duplex or right lower extremity is negative for DVT        Assessment & Plan     1. Leg edema, right  Discussed probable venous insufficiency. Will observe.  Warning signs and symptoms discussed.   - US Lower Extremity Venous Duplex Right; Future     BMI:   Estimated body mass index is 26.78 kg/m  as calculated from the following:    Height as of 12/29/19: 1.803 m (5' 11\").    Weight as of this encounter: 87.1 kg (192 lb).           See Patient Instructions    No follow-ups on file.    Broderick Flowers MD  [facility] [place]      "

## 2019-12-31 NOTE — TELEPHONE ENCOUNTER
Attempted to reach pt.  Line is busy    Should be seen by Dr Olivia,  Thursday 1/2 there are available apts

## 2020-01-02 ENCOUNTER — OFFICE VISIT (OUTPATIENT)
Dept: UROLOGY | Facility: OTHER | Age: 85
End: 2020-01-02
Attending: UROLOGY
Payer: MEDICARE

## 2020-01-02 VITALS
DIASTOLIC BLOOD PRESSURE: 80 MMHG | WEIGHT: 190.6 LBS | BODY MASS INDEX: 26.58 KG/M2 | SYSTOLIC BLOOD PRESSURE: 150 MMHG | HEART RATE: 64 BPM | RESPIRATION RATE: 16 BRPM

## 2020-01-02 DIAGNOSIS — R31.9 URINARY TRACT INFECTION WITH HEMATURIA, SITE UNSPECIFIED: ICD-10-CM

## 2020-01-02 DIAGNOSIS — N39.0 URINARY TRACT INFECTION WITH HEMATURIA, SITE UNSPECIFIED: ICD-10-CM

## 2020-01-02 DIAGNOSIS — Z85.51 HISTORY OF BLADDER CANCER: Primary | ICD-10-CM

## 2020-01-02 DIAGNOSIS — R31.0 GROSS HEMATURIA: ICD-10-CM

## 2020-01-02 DIAGNOSIS — K59.00 CONSTIPATION, UNSPECIFIED CONSTIPATION TYPE: ICD-10-CM

## 2020-01-02 PROCEDURE — G0463 HOSPITAL OUTPT CLINIC VISIT: HCPCS

## 2020-01-02 PROCEDURE — 99214 OFFICE O/P EST MOD 30 MIN: CPT | Mod: 24 | Performed by: UROLOGY

## 2020-01-02 RX ORDER — CEFDINIR 300 MG/1
300 CAPSULE ORAL 2 TIMES DAILY
Qty: 6 CAPSULE | Refills: 0 | Status: SHIPPED | OUTPATIENT
Start: 2020-01-02 | End: 2020-02-12

## 2020-01-02 ASSESSMENT — ENCOUNTER SYMPTOMS
CHILLS: 0
FEVER: 0
NEUROLOGICAL NEGATIVE: 1
MUSCULOSKELETAL NEGATIVE: 1
ACTIVITY CHANGE: 1
DYSURIA: 0
HEMATURIA: 1
GASTROINTESTINAL NEGATIVE: 1

## 2020-01-02 ASSESSMENT — PAIN SCALES - GENERAL: PAINLEVEL: NO PAIN (0)

## 2020-01-02 NOTE — NURSING NOTE
James Grant is a 89 year old male presenting today for: follow up on blood in the urine  Medication Reconciliation: complete    Sara Post LPN 1/2/2020 2:58 PM          Review of Systems:    Weight loss:    No     Recent fever/chills:  No   Night sweats:   No  Current skin rash:  yes   Recent hair loss:  No  Heat intolerance:  No   Cold intolerance:  yes  Chest pain:   No   Palpitations:   No  Shortness of breath:  No   Wheezing:   No  Constipation:    yes   Diarrhea:   No   Nausea:   No   Vomiting:   No   Kidney/side pain:  No   Back pain:   No  Frequent headaches:  No   Dizziness:     No  Leg swelling:   yes   Calf pain:    yes

## 2020-01-02 NOTE — ED PROVIDER NOTES
History     Chief Complaint   Patient presents with     Hematuria     c/o hematuria, notes seen for same early this am. denies pain. pt notes he was sent home with antibiotics and took one today at 0300 and a second tablet at 1500. pt denies difficulty with voiding.      HPI  James Grant is a 89 year old male who is accompanied per his son. He is currently being treated for UTI. He had bloody urine that had cleared up with treatment but after snow-blowing his sidewalks this morning, his urine is again bloody. He had a TURP in October of this year for bladder cancer. Denies fevers, chills, nausea, vomiting, diarrhea, and shortness of breath.      Allergies:  Allergies   Allergen Reactions     Lisinopril Cough     Morphine        Problem List:    Patient Active Problem List    Diagnosis Date Noted     Malignant neoplasm of overlapping sites of bladder (H) 11/22/2019     Priority: Medium     History of bladder cancer 10/28/2019     Priority: Medium     Rigors 02/16/2019     Priority: Medium     Venous stasis dermatitis of both lower extremities 03/16/2017     Priority: Medium     ACP (advance care planning) 06/06/2016     Priority: Medium     Advance Care Planning 6/6/2016: ACP Review of Chart / Resources Provided:  Reviewed chart for advance care plan.  James Grant has no plan or code status on file. Discussed available resources and provided with information. Confirmed code status reflects current choices pending further ACP discussions.  Confirmed/documented legally designated decision makers.  Added by Yani Anderson             Advance care planning 02/08/2016     Priority: Medium     Advance Care Planning 2/8/2016: Receipt of ACP document:  Received: POLST which was signed and dated by provider on 1/18/16.  Document previously scanned on 1/22/16.  Order reviewed and found to be valid.  Code Status reflects choices in most recent ACP document.  Confirmed/documented designated decision maker(s).  Added  by Trinh Garces             BPH (benign prostatic hyperplasia) 01/08/2014     Priority: Medium     SNHL (sensorineural hearing loss) 07/17/2013     Priority: Medium     ETD (eustachian tube dysfunction) 07/17/2013     Priority: Medium     HTN (hypertension) 11/26/2010     Priority: Medium     Dyslipidemia 11/26/2010     Priority: Medium     GERD (gastroesophageal reflux disease) 11/26/2010     Priority: Medium     CHD (coronary heart disease) 11/26/2010     Priority: Medium     07/2010 S/P inferior wall MI  Angioplasty and stenting X 2 RCA  08/2010 angioplasty and stenting (LIDIA) LAD          Past Medical History:    Past Medical History:   Diagnosis Date     Benign localized hyperplasia of prostate without urinary obstruction and other lower urinary tract symptoms (LUTS) 11/26/2010     CHD (coronary heart disease) 11/26/2010     Dyslipidemia 11/26/2010     GERD (gastroesophageal reflux disease) 11/26/2010     HTN (hypertension) 11/26/2010     Old myocardial infarction 11/26/2010     Other symptoms involving digestive system(787.99) 10/20/2006       Past Surgical History:    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       CHOLECYSTECTOMY       CYSTOSCOPY, RETROGRADES, COMBINED Bilateral 10/22/2019    Procedure: Bilateral Retrograde Pyelograms;  Surgeon: Andrzej Olivia MD;  Location:  OR     CYSTOSCOPY, TRANSURETHRAL RESECTION (TUR) TUMOR BLADDER, COMBINED N/A 10/22/2019    Procedure: Transurethral Resection of Bladder Tumor and transurethral resection of prostate and bladder stone removal;  Surgeon: Andrzej Olivia MD;  Location: [facility] OR     HERNIA REPAIR       left arm surgery         Family History:    Family History   Problem Relation Age of Onset     Heart Failure Mother 86        Congestive - Cause of Death     Cerebrovascular Disease Father      C.A.D. Sister 91       Social History:  Marital Status:   [2]  Social History     Tobacco Use     Smoking status:  Former Smoker     Packs/day: 1.00     Years: 13.00     Pack years: 13.00     Types: Cigarettes     Start date: 1949     Last attempt to quit: 1962     Years since quittin.4     Smokeless tobacco: Never Used   Substance Use Topics     Alcohol use: Yes     Comment: 3 Drinks (BEER & LIQUOR) ~ OCCASIONALLY (QUIT IN )     Drug use: No        Medications:    aspirin 325 MG tablet  cefdinir (OMNICEF) 300 MG capsule  famotidine (PEPCID) 20 MG tablet  losartan (COZAAR) 50 MG tablet  metoprolol succinate ER (TOPROL-XL) 25 MG 24 hr tablet  NITROSTAT 0.4 MG sublingual tablet  simvastatin (ZOCOR) 20 MG tablet  triamcinolone (ARISTOCORT HP) 0.5 % external cream          Review of Systems   Constitutional: Positive for activity change. Negative for chills and fever.   Gastrointestinal: Negative.    Genitourinary: Positive for hematuria. Negative for dysuria.   Musculoskeletal: Negative.    Neurological: Negative.        Physical Exam   BP: 155/82  Heart Rate: 75  Temp: 98.4  F (36.9  C)  Resp: 16  SpO2: 97 %      Physical Exam  Vitals signs and nursing note reviewed.   Constitutional:       General: He is in acute distress.      Appearance: He is normal weight.   Cardiovascular:      Rate and Rhythm: Normal rate.   Pulmonary:      Effort: Pulmonary effort is normal.   Abdominal:      General: Bowel sounds are normal. There is no distension.      Palpations: Abdomen is soft.      Tenderness: There is no abdominal tenderness.   Skin:     General: Skin is warm and dry.   Neurological:      Mental Status: He is alert and oriented to person, place, and time.   Psychiatric:         Behavior: Behavior normal.         ED Course        Procedures            No results found for this or any previous visit (from the past 24 hour(s)).    Medications - No data to display    Assessments & Plan (with Medical Decision Making)     I have reviewed the nursing notes.    I have reviewed the findings, diagnosis, plan and need for  follow up with the patient.  (N30.01) Acute cystitis with hematuria    Comment: currently being treated for UTI.Head hematuria initially, and that had cleared up. He noted hematuria again after snowblowing. He had a TURP in October of this year for bladder cancer. He and his son are very distraught when informed his hematuria could be related to his current UTI, he could have caused physical damage to his current surgical interventions while he was snowblowing, or his cancer could have returned.    Plan: he was verbally instructed to follow-up with PCP and urology. Stop aspirin at this time. Complete current antibiotic. Increase fluids. Return for worsening of symptoms, high fevers, confusion, and inability to void.  These discharge instructions and medications were reviewed with him and understanding verbalized.           Discharge Medication List as of 12/29/2019  5:54 PM          Final diagnoses:   Acute cystitis with hematuria       12/29/2019   [facility]       Ami Estes, CNP  01/02/20 0859

## 2020-01-02 NOTE — PROGRESS NOTES
Type of Visit  Established    Chief Complaint  Gross hematuria   History of bladder cancer  Constipation    HPI  Mr. Grant is a 89 year old male who follows up with gross hematuria.  Approximately 2 months ago the patient underwent bladder tumor resection for bladder cancer.  Postoperatively his course was uneventful and he was doing well.  About a week ago he developed gross hematuria for 2 days.  He was seen in the ED for this complaint.  He was prescribed a 4-day course of Ceftin.  He also has constipation.  This is not being treated.  The gross hematuria was worse with Valsalva.  He denies fevers and chills.      Review of Systems  I reviewed the ROS with the patient.    Nursing Notes:   Sara Mojica LPN  1/2/2020  3:22 PM  Signed  James Grant is a 89 year old male presenting today for: follow up on blood in the urine  Medication Reconciliation: complete    Sara Post LPN 1/2/2020 2:58 PM          Review of Systems:    Weight loss:    No     Recent fever/chills:  No   Night sweats:   No  Current skin rash:  yes   Recent hair loss:  No  Heat intolerance:  No   Cold intolerance:  yes  Chest pain:   No   Palpitations:   No  Shortness of breath:  No   Wheezing:   No  Constipation:    yes   Diarrhea:   No   Nausea:   No   Vomiting:   No   Kidney/side pain:  No   Back pain:   No  Frequent headaches:  No   Dizziness:     No  Leg swelling:   yes   Calf pain:    yes              Family History  Family History   Problem Relation Age of Onset     Heart Failure Mother 86        Congestive - Cause of Death     Cerebrovascular Disease Father      C.A.D. Sister 91       Physical Exam  BP (!) 150/80   Pulse 64   Resp 16   Wt 86.5 kg (190 lb 9.6 oz)   BMI 26.58 kg/m    Constitutional: NAD, WDWN.  Cardiovascular: Regular rate.  Pulmonary/Chest: Respirations are even and non-labored bilaterally.  Abdominal: Soft. No distension, tenderness, masses or guarding. No CVA tenderness.  Extremities: TERRANCE  x 4, Warm. No clubbing.  No cyanosis.    Skin: Pink, warm and dry.  No rashes noted.    Labs  Results for GLORIA RODRIGUEZ (MRN [id number]) as of 10/28/2019 09:05   10/15/2019 11:45   Sodium 141   Potassium 3.8   Chloride 108   Carbon Dioxide 28   Urea Nitrogen 17   Creatinine 0.77   GFR Estimate 79   GFR Estimate If Black >90   Calcium 9.0   Anion Gap 5     Results for GLORIA RODRIGUEZ (MRN [id number]) as of 1/2/2020 15:22   12/29/2019 02:30   Color Urine Yellow   Appearance Urine Slightly Cloudy   Glucose Urine Negative   Bilirubin Urine Negative   Ketones Urine Negative   Specific Gravity Urine 1.016   pH Urine 6.5   Protein Albumin Urine 30 (A)   Urobilinogen mg/dL Normal   Nitrite Urine Negative   Blood Urine Moderate (A)   Leukocyte Esterase Urine Large (A)   Source Midstream Urine   WBC Urine >182 (H)   RBC Urine >182 (H)   Bacteria Urine Few (A)   Mucous Urine Present (A)   Specimen Description Midstream Urine   Culture Micro >100,000 colonies... (A)       Pathology  I personally reviewed the pathology report  10/22/2019  Low grade Ta (muscularis propria was present and uninvolved in specimen)    Assessment  Mr. Rodriguez is a 89 year old male with a history of bladder cancer status post TURBT who has recurrent gross hematuria associated with a Valsalva.  The patient has constipation.  We discussed treatment of constipation with MiraLAX and the dosing strategy was described.    We also discussed bladder cancer and how this clinical piece fits into his overall clinical picture.  I have recommended we extend the course of antibiotics so he has a full week of Ceftin.    Plan  Extended course of Ceftin for 3 additional days for a total of 1 week.  MiraLAX for constipation-dosing strategy described  Follow up for surveillance cystoscopy, 4 months following date of TURBT.

## 2020-01-03 ENCOUNTER — HOSPITAL ENCOUNTER (OUTPATIENT)
Dept: ULTRASOUND IMAGING | Facility: HOSPITAL | Age: 85
Discharge: HOME | End: 2020-01-03
Attending: FAMILY MEDICINE | Admitting: FAMILY MEDICINE
Payer: MEDICARE

## 2020-01-03 ENCOUNTER — OFFICE VISIT (OUTPATIENT)
Dept: FAMILY MEDICINE | Facility: OTHER | Age: 85
End: 2020-01-03
Attending: FAMILY MEDICINE
Payer: MEDICARE

## 2020-01-03 VITALS
SYSTOLIC BLOOD PRESSURE: 120 MMHG | OXYGEN SATURATION: 97 % | WEIGHT: 192 LBS | BODY MASS INDEX: 26.78 KG/M2 | DIASTOLIC BLOOD PRESSURE: 70 MMHG | HEART RATE: 85 BPM | TEMPERATURE: 98.1 F

## 2020-01-03 DIAGNOSIS — R60.0 LEG EDEMA, RIGHT: Primary | ICD-10-CM

## 2020-01-03 DIAGNOSIS — R60.0 LEG EDEMA, RIGHT: ICD-10-CM

## 2020-01-03 PROCEDURE — G0463 HOSPITAL OUTPT CLINIC VISIT: HCPCS | Mod: 25

## 2020-01-03 PROCEDURE — 99214 OFFICE O/P EST MOD 30 MIN: CPT | Performed by: FAMILY MEDICINE

## 2020-01-03 PROCEDURE — 93971 EXTREMITY STUDY: CPT | Mod: TC,RT

## 2020-01-03 ASSESSMENT — PATIENT HEALTH QUESTIONNAIRE - PHQ9: SUM OF ALL RESPONSES TO PHQ QUESTIONS 1-9: 0

## 2020-01-03 ASSESSMENT — ANXIETY QUESTIONNAIRES
5. BEING SO RESTLESS THAT IT IS HARD TO SIT STILL: NOT AT ALL
3. WORRYING TOO MUCH ABOUT DIFFERENT THINGS: NOT AT ALL
6. BECOMING EASILY ANNOYED OR IRRITABLE: NOT AT ALL
7. FEELING AFRAID AS IF SOMETHING AWFUL MIGHT HAPPEN: NOT AT ALL
GAD7 TOTAL SCORE: 0
2. NOT BEING ABLE TO STOP OR CONTROL WORRYING: NOT AT ALL
4. TROUBLE RELAXING: NOT AT ALL
1. FEELING NERVOUS, ANXIOUS, OR ON EDGE: NOT AT ALL

## 2020-01-03 ASSESSMENT — PAIN SCALES - GENERAL: PAINLEVEL: NO PAIN (0)

## 2020-01-03 NOTE — NURSING NOTE
"Chief Complaint   Patient presents with     Musculoskeletal Problem       Initial /70   Pulse 85   Temp 98.1  F (36.7  C) (Tympanic)   Wt 87.1 kg (192 lb)   SpO2 97%   BMI 26.78 kg/m   Estimated body mass index is 26.78 kg/m  as calculated from the following:    Height as of 12/29/19: 1.803 m (5' 11\").    Weight as of this encounter: 87.1 kg (192 lb).  Medication Reconciliation: complete  Estee Patel LPN    "

## 2020-01-04 ASSESSMENT — ANXIETY QUESTIONNAIRES: GAD7 TOTAL SCORE: 0

## 2020-01-08 DIAGNOSIS — K21.9 GASTROESOPHAGEAL REFLUX DISEASE WITHOUT ESOPHAGITIS: ICD-10-CM

## 2020-01-08 DIAGNOSIS — I10 BENIGN ESSENTIAL HYPERTENSION: ICD-10-CM

## 2020-01-08 DIAGNOSIS — E78.2 MIXED HYPERLIPIDEMIA: ICD-10-CM

## 2020-01-10 RX ORDER — FAMOTIDINE 20 MG/1
TABLET, FILM COATED ORAL
Qty: 30 TABLET | Refills: 4 | Status: SHIPPED | OUTPATIENT
Start: 2020-01-10 | End: 2020-04-07

## 2020-01-10 RX ORDER — LOSARTAN POTASSIUM 50 MG/1
TABLET ORAL
Qty: 30 TABLET | Refills: 1 | Status: SHIPPED | OUTPATIENT
Start: 2020-01-10 | End: 2020-04-06

## 2020-01-10 RX ORDER — SIMVASTATIN 20 MG
20 TABLET ORAL AT BEDTIME
Qty: 90 TABLET | Refills: 0 | Status: SHIPPED | OUTPATIENT
Start: 2020-01-10 | End: 2020-05-05

## 2020-02-12 ENCOUNTER — TELEPHONE (OUTPATIENT)
Dept: UROLOGY | Facility: OTHER | Age: 85
End: 2020-02-12

## 2020-02-12 ENCOUNTER — HOSPITAL ENCOUNTER (EMERGENCY)
Facility: HOSPITAL | Age: 85
Discharge: ANOTHER HOSPITAL | End: 2020-02-13
Attending: INTERNAL MEDICINE | Admitting: INTERNAL MEDICINE
Payer: MEDICARE

## 2020-02-12 DIAGNOSIS — R31.9 HEMATURIA, UNSPECIFIED TYPE: ICD-10-CM

## 2020-02-12 PROCEDURE — 25000125 ZZHC RX 250

## 2020-02-12 PROCEDURE — 51702 INSERT TEMP BLADDER CATH: CPT

## 2020-02-12 PROCEDURE — 99284 EMERGENCY DEPT VISIT MOD MDM: CPT | Mod: Z6 | Performed by: INTERNAL MEDICINE

## 2020-02-12 PROCEDURE — 99283 EMERGENCY DEPT VISIT LOW MDM: CPT

## 2020-02-12 RX ORDER — LIDOCAINE HYDROCHLORIDE 20 MG/ML
JELLY TOPICAL
Status: COMPLETED
Start: 2020-02-12 | End: 2020-02-12

## 2020-02-12 RX ADMIN — LIDOCAINE HYDROCHLORIDE: 20 JELLY TOPICAL at 22:43

## 2020-02-12 NOTE — TELEPHONE ENCOUNTER
Patient states he has blood in his urine.  States he had surgery in November to remove a tumor from his bladder.

## 2020-02-13 ENCOUNTER — APPOINTMENT (OUTPATIENT)
Dept: CARDIOLOGY | Facility: OTHER | Age: 85
End: 2020-02-13
Attending: FAMILY MEDICINE
Payer: MEDICARE

## 2020-02-13 ENCOUNTER — HOSPITAL ENCOUNTER (EMERGENCY)
Facility: OTHER | Age: 85
End: 2020-02-13
Payer: MEDICARE

## 2020-02-13 ENCOUNTER — HOSPITAL ENCOUNTER (OUTPATIENT)
Facility: OTHER | Age: 85
Setting detail: OBSERVATION
Discharge: HOME | End: 2020-02-18
Attending: FAMILY MEDICINE | Admitting: INTERNAL MEDICINE
Payer: MEDICARE

## 2020-02-13 VITALS
HEART RATE: 62 BPM | DIASTOLIC BLOOD PRESSURE: 81 MMHG | TEMPERATURE: 97.9 F | SYSTOLIC BLOOD PRESSURE: 111 MMHG | RESPIRATION RATE: 20 BRPM | OXYGEN SATURATION: 95 %

## 2020-02-13 DIAGNOSIS — R33.8 CLOT RETENTION OF URINE: ICD-10-CM

## 2020-02-13 DIAGNOSIS — R33.8 CLOT RETENTION OF URINE: Primary | ICD-10-CM

## 2020-02-13 PROBLEM — R31.9 HEMATURIA: Status: ACTIVE | Noted: 2020-02-13

## 2020-02-13 PROBLEM — Z85.51 HISTORY OF BLADDER CANCER: Status: ACTIVE | Noted: 2019-10-28

## 2020-02-13 LAB
ANION GAP SERPL CALCULATED.3IONS-SCNC: 6 MMOL/L (ref 3–14)
BASOPHILS # BLD AUTO: 0 10E9/L (ref 0–0.2)
BASOPHILS NFR BLD AUTO: 0.4 %
BUN SERPL-MCNC: 17 MG/DL (ref 7–30)
CALCIUM SERPL-MCNC: 8.6 MG/DL (ref 8.5–10.1)
CHLORIDE SERPL-SCNC: 108 MMOL/L (ref 94–109)
CO2 SERPL-SCNC: 26 MMOL/L (ref 20–32)
CREAT SERPL-MCNC: 0.78 MG/DL (ref 0.66–1.25)
DIFFERENTIAL METHOD BLD: ABNORMAL
EOSINOPHIL # BLD AUTO: 0.1 10E9/L (ref 0–0.7)
EOSINOPHIL NFR BLD AUTO: 1.2 %
ERYTHROCYTE [DISTWIDTH] IN BLOOD BY AUTOMATED COUNT: 13.4 % (ref 10–15)
GFR SERPL CREATININE-BSD FRML MDRD: 78 ML/MIN/{1.73_M2}
GLUCOSE SERPL-MCNC: 115 MG/DL (ref 70–99)
HCT VFR BLD AUTO: 39.3 % (ref 40–53)
HGB BLD-MCNC: 13.1 G/DL (ref 13.3–17.7)
HGB BLD-MCNC: 13.2 G/DL (ref 13.3–17.7)
IMM GRANULOCYTES # BLD: 0.1 10E9/L (ref 0–0.4)
IMM GRANULOCYTES NFR BLD: 0.5 %
LYMPHOCYTES # BLD AUTO: 1.6 10E9/L (ref 0.8–5.3)
LYMPHOCYTES NFR BLD AUTO: 17.2 %
MAGNESIUM SERPL-MCNC: 2.1 MG/DL (ref 1.9–2.7)
MCH RBC QN AUTO: 30.2 PG (ref 26.5–33)
MCHC RBC AUTO-ENTMCNC: 33.6 G/DL (ref 31.5–36.5)
MCV RBC AUTO: 90 FL (ref 78–100)
MONOCYTES # BLD AUTO: 1 10E9/L (ref 0–1.3)
MONOCYTES NFR BLD AUTO: 10.9 %
NEUTROPHILS # BLD AUTO: 6.7 10E9/L (ref 1.6–8.3)
NEUTROPHILS NFR BLD AUTO: 69.8 %
NRBC # BLD AUTO: 0 10*3/UL
NRBC BLD AUTO-RTO: 0 /100
PLATELET # BLD AUTO: 207 10E9/L (ref 150–450)
POTASSIUM SERPL-SCNC: 3.9 MMOL/L (ref 3.5–5.1)
POTASSIUM SERPL-SCNC: 4.2 MMOL/L (ref 3.4–5.3)
RBC # BLD AUTO: 4.37 10E12/L (ref 4.4–5.9)
SODIUM SERPL-SCNC: 140 MMOL/L (ref 133–144)
WBC # BLD AUTO: 9.6 10E9/L (ref 4–11)

## 2020-02-13 PROCEDURE — 93306 TTE W/DOPPLER COMPLETE: CPT

## 2020-02-13 PROCEDURE — 93306 TTE W/DOPPLER COMPLETE: CPT | Mod: 26 | Performed by: INTERNAL MEDICINE

## 2020-02-13 PROCEDURE — 36415 COLL VENOUS BLD VENIPUNCTURE: CPT | Performed by: INTERNAL MEDICINE

## 2020-02-13 PROCEDURE — 36415 COLL VENOUS BLD VENIPUNCTURE: CPT | Performed by: FAMILY MEDICINE

## 2020-02-13 PROCEDURE — 51700 IRRIGATION OF BLADDER: CPT

## 2020-02-13 PROCEDURE — 25000132 ZZH RX MED GY IP 250 OP 250 PS 637: Mod: GY | Performed by: FAMILY MEDICINE

## 2020-02-13 PROCEDURE — 12000000 ZZH R&B MED SURG/OB

## 2020-02-13 PROCEDURE — 99220 ZZC INITIAL OBSERVATION CARE,LEVL III: CPT | Performed by: FAMILY MEDICINE

## 2020-02-13 PROCEDURE — 85025 COMPLETE CBC W/AUTO DIFF WBC: CPT | Performed by: INTERNAL MEDICINE

## 2020-02-13 PROCEDURE — 83735 ASSAY OF MAGNESIUM: CPT | Performed by: FAMILY MEDICINE

## 2020-02-13 PROCEDURE — 51700 IRRIGATION OF BLADDER: CPT | Performed by: UROLOGY

## 2020-02-13 PROCEDURE — 85018 HEMOGLOBIN: CPT | Performed by: FAMILY MEDICINE

## 2020-02-13 PROCEDURE — 84132 ASSAY OF SERUM POTASSIUM: CPT | Performed by: FAMILY MEDICINE

## 2020-02-13 PROCEDURE — 99214 OFFICE O/P EST MOD 30 MIN: CPT | Mod: 25 | Performed by: UROLOGY

## 2020-02-13 PROCEDURE — 80048 BASIC METABOLIC PNL TOTAL CA: CPT | Performed by: INTERNAL MEDICINE

## 2020-02-13 PROCEDURE — 25800030 ZZH RX IP 258 OP 636: Performed by: FAMILY MEDICINE

## 2020-02-13 PROCEDURE — 25800025 ZZH RX 258: Performed by: UROLOGY

## 2020-02-13 RX ORDER — NALOXONE HYDROCHLORIDE 0.4 MG/ML
.1-.4 INJECTION, SOLUTION INTRAMUSCULAR; INTRAVENOUS; SUBCUTANEOUS
Status: DISCONTINUED | OUTPATIENT
Start: 2020-02-13 | End: 2020-02-18 | Stop reason: HOSPADM

## 2020-02-13 RX ORDER — MAGNESIUM SULFATE HEPTAHYDRATE 40 MG/ML
4 INJECTION, SOLUTION INTRAVENOUS EVERY 4 HOURS PRN
Status: DISCONTINUED | OUTPATIENT
Start: 2020-02-13 | End: 2020-02-18 | Stop reason: HOSPADM

## 2020-02-13 RX ORDER — AMOXICILLIN 250 MG
2 CAPSULE ORAL 2 TIMES DAILY PRN
Status: DISCONTINUED | OUTPATIENT
Start: 2020-02-13 | End: 2020-02-18 | Stop reason: HOSPADM

## 2020-02-13 RX ORDER — ATORVASTATIN CALCIUM 10 MG/1
10 TABLET, FILM COATED ORAL AT BEDTIME
Status: DISCONTINUED | OUTPATIENT
Start: 2020-02-13 | End: 2020-02-18 | Stop reason: HOSPADM

## 2020-02-13 RX ORDER — AMOXICILLIN 250 MG
1 CAPSULE ORAL 2 TIMES DAILY PRN
Status: DISCONTINUED | OUTPATIENT
Start: 2020-02-13 | End: 2020-02-18 | Stop reason: HOSPADM

## 2020-02-13 RX ORDER — POLYETHYLENE GLYCOL 3350 17 G/17G
17 POWDER, FOR SOLUTION ORAL DAILY PRN
Status: DISCONTINUED | OUTPATIENT
Start: 2020-02-13 | End: 2020-02-18 | Stop reason: HOSPADM

## 2020-02-13 RX ORDER — PROCHLORPERAZINE MALEATE 5 MG
5 TABLET ORAL EVERY 6 HOURS PRN
Status: DISCONTINUED | OUTPATIENT
Start: 2020-02-13 | End: 2020-02-18 | Stop reason: HOSPADM

## 2020-02-13 RX ORDER — LOSARTAN POTASSIUM 50 MG/1
50 TABLET ORAL DAILY
Status: DISCONTINUED | OUTPATIENT
Start: 2020-02-13 | End: 2020-02-18 | Stop reason: HOSPADM

## 2020-02-13 RX ORDER — ACETAMINOPHEN 325 MG/1
650 TABLET ORAL EVERY 4 HOURS PRN
Status: DISCONTINUED | OUTPATIENT
Start: 2020-02-13 | End: 2020-02-18 | Stop reason: HOSPADM

## 2020-02-13 RX ORDER — ONDANSETRON 2 MG/ML
4 INJECTION INTRAMUSCULAR; INTRAVENOUS EVERY 6 HOURS PRN
Status: DISCONTINUED | OUTPATIENT
Start: 2020-02-13 | End: 2020-02-18 | Stop reason: HOSPADM

## 2020-02-13 RX ORDER — PROCHLORPERAZINE 25 MG
12.5 SUPPOSITORY, RECTAL RECTAL EVERY 12 HOURS PRN
Status: DISCONTINUED | OUTPATIENT
Start: 2020-02-13 | End: 2020-02-18 | Stop reason: HOSPADM

## 2020-02-13 RX ORDER — LIDOCAINE 40 MG/G
CREAM TOPICAL
Status: DISCONTINUED | OUTPATIENT
Start: 2020-02-13 | End: 2020-02-18 | Stop reason: HOSPADM

## 2020-02-13 RX ORDER — POTASSIUM CHLORIDE 7.45 MG/ML
10 INJECTION INTRAVENOUS
Status: DISCONTINUED | OUTPATIENT
Start: 2020-02-13 | End: 2020-02-18 | Stop reason: HOSPADM

## 2020-02-13 RX ORDER — POTASSIUM CHLORIDE 1500 MG/1
20-40 TABLET, EXTENDED RELEASE ORAL
Status: DISCONTINUED | OUTPATIENT
Start: 2020-02-13 | End: 2020-02-18 | Stop reason: HOSPADM

## 2020-02-13 RX ORDER — SODIUM CHLORIDE 9 MG/ML
INJECTION, SOLUTION INTRAVENOUS CONTINUOUS
Status: DISCONTINUED | OUTPATIENT
Start: 2020-02-13 | End: 2020-02-13

## 2020-02-13 RX ORDER — ONDANSETRON 4 MG/1
4 TABLET, ORALLY DISINTEGRATING ORAL EVERY 6 HOURS PRN
Status: DISCONTINUED | OUTPATIENT
Start: 2020-02-13 | End: 2020-02-18 | Stop reason: HOSPADM

## 2020-02-13 RX ADMIN — SODIUM CHLORIDE, PRESERVATIVE FREE: 5 INJECTION INTRAVENOUS at 05:36

## 2020-02-13 RX ADMIN — LOSARTAN POTASSIUM 50 MG: 50 TABLET, FILM COATED ORAL at 10:36

## 2020-02-13 RX ADMIN — SODIUM CHLORIDE 3000 ML: 900 IRRIGANT IRRIGATION at 13:00

## 2020-02-13 RX ADMIN — ATORVASTATIN CALCIUM 10 MG: 10 TABLET, FILM COATED ORAL at 21:26

## 2020-02-13 RX ADMIN — SODIUM CHLORIDE 3000 ML: 900 IRRIGANT IRRIGATION at 19:30

## 2020-02-13 RX ADMIN — SODIUM CHLORIDE 3000 ML: 900 IRRIGANT IRRIGATION at 13:50

## 2020-02-13 RX ADMIN — SODIUM CHLORIDE 3000 ML: 900 IRRIGANT IRRIGATION at 18:04

## 2020-02-13 RX ADMIN — SODIUM CHLORIDE 3000 ML: 900 IRRIGANT IRRIGATION at 11:27

## 2020-02-13 RX ADMIN — SODIUM CHLORIDE 3000 ML: 900 IRRIGANT IRRIGATION at 15:34

## 2020-02-13 RX ADMIN — SODIUM CHLORIDE 3000 ML: 900 IRRIGANT IRRIGATION at 09:40

## 2020-02-13 ASSESSMENT — ACTIVITIES OF DAILY LIVING (ADL)
FALL_HISTORY_WITHIN_LAST_SIX_MONTHS: YES
RETIRED_COMMUNICATION: 0-->UNDERSTANDS/COMMUNICATES WITHOUT DIFFICULTY
TOILETING: 0-->INDEPENDENT
ADLS_ACUITY_SCORE: 14
RETIRED_EATING: 0-->INDEPENDENT
NUMBER_OF_TIMES_PATIENT_HAS_FALLEN_WITHIN_LAST_SIX_MONTHS: 1
ADLS_ACUITY_SCORE: 15
ADLS_ACUITY_SCORE: 14
AMBULATION: 0-->INDEPENDENT
DRESS: 0-->INDEPENDENT
COGNITION: 0 - NO COGNITION ISSUES REPORTED
SWALLOWING: 0-->SWALLOWS FOODS/LIQUIDS WITHOUT DIFFICULTY
ADLS_ACUITY_SCORE: 14
TRANSFERRING: 0-->INDEPENDENT
BATHING: 0-->INDEPENDENT

## 2020-02-13 ASSESSMENT — ENCOUNTER SYMPTOMS
PALPITATIONS: 0
BLOOD IN STOOL: 0
CONFUSION: 0
ABDOMINAL DISTENTION: 0
ANAL BLEEDING: 0
HEADACHES: 0
SLEEP DISTURBANCE: 0
SHORTNESS OF BREATH: 0
NUMBNESS: 0
WHEEZING: 0
DYSURIA: 0
VOMITING: 0
COLOR CHANGE: 0
LIGHT-HEADEDNESS: 0
VOICE CHANGE: 0
FREQUENCY: 0
CHILLS: 0
MYALGIAS: 0
COUGH: 0
BACK PAIN: 0
DIZZINESS: 0
NAUSEA: 0
ABDOMINAL PAIN: 0
FLANK PAIN: 0
CHEST TIGHTNESS: 0
DIAPHORESIS: 0
FEVER: 0
WEAKNESS: 0
DIFFICULTY URINATING: 1
NECK PAIN: 0
HEMATURIA: 1

## 2020-02-13 NOTE — ED PROVIDER NOTES
History     Chief Complaint   Patient presents with     Hematuria     states had bladder surgery in November.  now has bright red blood when he goes to the bathroom and is concerned there is no urine there. States blood has been since this morning. C/o groin pain rated 9/10.     The history is provided by the patient.   Hematuria   This is a recurrent problem. The current episode started today. The problem has been rapidly worsening since onset. He describes the hematuria as gross hematuria. The hematuria occurs during the initial portion of his urinary stream. The pain is moderate. He describes his urine color as dark red. Irritative symptoms do not include frequency. Obstructive symptoms include incomplete emptying. Associated symptoms include inability to urinate. Pertinent negatives include no abdominal pain, chills, dysuria, fever, flank pain, nausea or vomiting.       Allergies:  Allergies   Allergen Reactions     Lisinopril Cough     Morphine Other (See Comments) and Visual Disturbance     confusion       Problem List:    Patient Active Problem List    Diagnosis Date Noted     Anemia due to blood loss, acute 02/14/2020     Priority: Medium     Hematuria 02/13/2020     Priority: Medium     Clot retention of urine 02/13/2020     Priority: Medium     Added automatically from request for surgery [id number]       Malignant neoplasm of overlapping sites of bladder (H) 11/22/2019     Priority: Medium     History of bladder cancer 10/28/2019     Priority: Medium     Rigors 02/16/2019     Priority: Medium     Venous stasis dermatitis of both lower extremities 03/16/2017     Priority: Medium     ACP (advance care planning) 06/06/2016     Priority: Medium     Advance Care Planning 6/6/2016: ACP Review of Chart / Resources Provided:  Reviewed chart for advance care plan.  James Grant has no plan or code status on file. Discussed available resources and provided with information. Confirmed code status reflects  current choices pending further ACP discussions.  Confirmed/documented legally designated decision makers.  Added by Yani Anderson             Advance care planning 02/08/2016     Priority: Medium     Advance Care Planning 2/8/2016: Receipt of ACP document:  Received: POLST which was signed and dated by provider on 1/18/16.  Document previously scanned on 1/22/16.  Order reviewed and found to be valid.  Code Status reflects choices in most recent ACP document.  Confirmed/documented designated decision maker(s).  Added by Trinh Garces             BPH (benign prostatic hyperplasia) 01/08/2014     Priority: Medium     SNHL (sensorineural hearing loss) 07/17/2013     Priority: Medium     ETD (eustachian tube dysfunction) 07/17/2013     Priority: Medium     Dyslipidemia 11/26/2010     Priority: Medium     GERD (gastroesophageal reflux disease) 11/26/2010     Priority: Medium     CHD (coronary heart disease) 11/26/2010     Priority: Medium     07/2010 S/P inferior wall MI  Angioplasty and stenting X 2 RCA  08/2010 angioplasty and stenting (LIDIA) LAD       Essential hypertension, benign 11/08/2010     Priority: Medium     Overview:   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       CHOLECYSTECTOMY       CYSTOSCOPY, FULGURATE BLEEDERS, EVACUATE CLOT(S), COMBINED N/A 2/17/2020    Procedure: Clot Evacuation;  Surgeon: Andrzej Olivia MD;  Location: [facility] OR     CYSTOSCOPY,  RETROGRADES, COMBINED Bilateral 10/22/2019    Procedure: Bilateral Retrograde Pyelograms;  Surgeon: Andrzej Olivia MD;  Location: [facility]     CYSTOSCOPY, TRANSURETHRAL RESECTION (TUR) TUMOR BLADDER, COMBINED N/A 10/22/2019    Procedure: Transurethral Resection of Bladder Tumor and transurethral resection of prostate and bladder stone removal;  Surgeon: Andrzej Olivia MD;  Location: [facility]     HERNIA REPAIR       left arm surgery         Family History:    Family History   Problem Relation Age of Onset     Heart Failure Mother 86        Congestive - Cause of Death     Cerebrovascular Disease Father      C.A.D. Sister 91       Social History:  Marital Status:   [2]  Social History     Tobacco Use     Smoking status: Former Smoker     Packs/day: 1.00     Years: 13.00     Pack years: 13.00     Types: Cigarettes     Start date: 1949     Last attempt to quit: 1962     Years since quittin.5     Smokeless tobacco: Never Used   Substance Use Topics     Alcohol use: Not Currently     Comment: 3 Drinks (BEER & LIQUOR) ~ OCCASIONALLY (QUIT IN )     Drug use: No        Medications:    famotidine (PEPCID) 20 MG tablet  losartan (COZAAR) 50 MG tablet  metoprolol succinate ER (TOPROL-XL) 25 MG 24 hr tablet  simvastatin (ZOCOR) 20 MG tablet  NITROSTAT 0.4 MG sublingual tablet          Review of Systems   Constitutional: Negative for chills, diaphoresis and fever.   HENT: Negative for voice change.    Eyes: Negative for visual disturbance.   Respiratory: Negative for cough, chest tightness, shortness of breath and wheezing.    Cardiovascular: Negative for chest pain, palpitations and leg swelling.   Gastrointestinal: Negative for abdominal distention, abdominal pain, anal bleeding, blood in stool, nausea and vomiting.   Genitourinary: Positive for decreased urine volume, difficulty urinating, hematuria and incomplete emptying. Negative for dysuria, flank pain and frequency.   Musculoskeletal: Negative for back  pain, gait problem, myalgias and neck pain.   Skin: Negative for color change, pallor and rash.   Neurological: Negative for dizziness, syncope, weakness, light-headedness, numbness and headaches.   Psychiatric/Behavioral: Negative for confusion, sleep disturbance and suicidal ideas.       Physical Exam   BP: 126/78  Pulse: (!) 46  Temp: 98  F (36.7  C)  Resp: 18  SpO2: 98 %      Physical Exam  Vitals signs and nursing note reviewed.   Constitutional:       Appearance: He is well-developed.   HENT:      Head: Normocephalic and atraumatic.   Eyes:      Conjunctiva/sclera: Conjunctivae normal.      Pupils: Pupils are equal, round, and reactive to light.   Neck:      Musculoskeletal: Normal range of motion and neck supple.      Thyroid: No thyromegaly.      Vascular: No JVD.      Trachea: No tracheal deviation.   Cardiovascular:      Rate and Rhythm: Normal rate and regular rhythm.      Heart sounds: Normal heart sounds. No murmur. No gallop.    Pulmonary:      Effort: Pulmonary effort is normal. No respiratory distress.      Breath sounds: Normal breath sounds. No stridor. No wheezing or rales.   Chest:      Chest wall: No tenderness.   Abdominal:      General: Bowel sounds are normal. There is no distension.      Palpations: Abdomen is soft. There is no mass.      Tenderness: There is abdominal tenderness in the suprapubic area. There is no guarding or rebound.   Musculoskeletal: Normal range of motion.         General: No tenderness.   Lymphadenopathy:      Cervical: No cervical adenopathy.   Skin:     General: Skin is warm.      Coloration: Skin is not pale.      Findings: No erythema or rash.   Neurological:      Mental Status: He is alert and oriented to person, place, and time.   Psychiatric:         Behavior: Behavior normal.         ED Course        Procedures                   No results found for this or any previous visit (from the past 24 hour(s)).    Medications   lidocaine (XYLOCAINE) 2 % external gel (   Given 2/12/20 2243)       Assessments & Plan (with Medical Decision Making)   Gross hematuria, hx of Bladder CA, surgery was done on November  Pt not able to urinate   Bladder scan showed 500 ml urine in bladder  3 way catheter placed, irrigated with 6 lit NS, pt continued to have red blood urine  Labs reviewed, Hb 13 , 1 unit(s) drop compared to his baseline      Spoke to Dr Faith , recommned transfer to a center with urology coverage, spoke to Dr Olivia accepted for transfer, spoke to Dr Armstrong accepted for transfer  I have reviewed the nursing notes.    I have reviewed the findings, diagnosis, plan and need for follow up with the patient.      Discharge Medication List as of 2/13/2020  3:54 AM          Final diagnoses:   Hematuria, unspecified type       2/12/2020   [facility]     Pepe Herrera MD  02/13/20 0221       Pepe Herrera MD  02/19/20 0660

## 2020-02-13 NOTE — ED NOTES
States he had a bladder surgery in November 2019. Had been voiding wdl after surgery until Monday, had blood clots but urine would clear intermittently but pt has not been able to void today and only passing clots. Pain in penis and groin.

## 2020-02-13 NOTE — CONSULTS
I was asked to see this patient by Dr Alberto and provide my opinion about the following:  Gross hematuria  Clot retention    Type of Visit  Consult    Chief Complaint  Gross hematuria  Clot retention    HPI  Mr. Grant is a 89 year old male with history of bladder cancer s/p resection 4 months ago who was admitted through the emergency department for gross hematuria and clot retention.  The patient initially presented to his local emergency department in [place] and was subsequently transferred overnight to [place].  The patient reports spontaneous development of gross hematuria and inability to void which prompted his visit to the emergency department.  A catheter was placed and hand irrigation and CBI was started.  They were unable to clear the patient and they were concerned he may need ongoing urology management and so he was transferred.    The patient has been using an exercise bike at home and he also reports a significant cough that he has been dealing with for the last few days.  The patient denies any other associated symptoms such as nausea vomiting fevers or chills.      Past Medical History  He  has a past medical history of Benign localized hyperplasia of prostate without urinary obstruction and other lower urinary tract symptoms (LUTS) (11/26/2010), CHD (coronary heart disease) (11/26/2010), Dyslipidemia (11/26/2010), GERD (gastroesophageal reflux disease) (11/26/2010), HTN (hypertension) (11/26/2010), Old myocardial infarction (11/26/2010), and Other symptoms involving digestive system(787.99) (10/20/2006).  Patient Active Problem List   Diagnosis     SNHL (sensorineural hearing loss)     ETD (eustachian tube dysfunction)     Essential hypertension, benign     Dyslipidemia     GERD (gastroesophageal reflux disease)     CHD (coronary heart disease)     BPH (benign prostatic hyperplasia)     Advance care planning     ACP (advance care planning)     Venous stasis dermatitis of both lower extremities      Rigors     History of bladder cancer     Malignant neoplasm of overlapping sites of bladder (H)     Hematuria       Past Surgical History  He  has a past surgical history that includes hernia repair; left arm surgery; 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    Medications    Current Facility-Administered Medications:      acetaminophen (TYLENOL) tablet 650 mg, 650 mg, Oral, Q4H PRN, Sheba Alberto, DO     atorvastatin (LIPITOR) tablet 10 mg, 10 mg, Oral, At Bedtime, Sheba Alberto, DO     lidocaine (LMX4) cream, , Topical, Q1H PRN, Sheba Alberto, DO     lidocaine 1 % 0.1-1 mL, 0.1-1 mL, Other, Q1H PRN, Sheba Alberto, DO     losartan (COZAAR) tablet 50 mg, 50 mg, Oral, Daily, Sheba Alberto, DO     magnesium sulfate 4 g in 100 mL sterile water (premade), 4 g, Intravenous, Q4H PRN, Sheba Alberto, DO     naloxone (NARCAN) injection 0.1-0.4 mg, 0.1-0.4 mg, Intravenous, Q2 Min PRN, Sheba Alberto,      ondansetron (ZOFRAN-ODT) ODT tab 4 mg, 4 mg, Oral, Q6H PRN **OR** ondansetron (ZOFRAN) injection 4 mg, 4 mg, Intravenous, Q6H PRN, Sheba Alberto, DO     polyethylene glycol (MIRALAX/GLYCOLAX) Packet 17 g, 17 g, Oral, Daily PRN, Sheba Alberto, DO     potassium chloride 10 mEq in 100 mL sterile water intermittent infusion (premix), 10 mEq, Intravenous, Q1H PRN, Sheba Alberto, DO     potassium chloride ER (K-DUR/KLOR-CON M) CR tablet 20-40 mEq, 20-40 mEq, Oral, Q2H PRN, Sheba Alberto, DO     prochlorperazine (COMPAZINE) injection 5 mg, 5 mg, Intravenous, Q6H PRN **OR** prochlorperazine (COMPAZINE) tablet 5 mg, 5 mg, Oral, Q6H PRN **OR** prochlorperazine (COMPAZINE) Suppository 12.5 mg, 12.5 mg, Rectal, Q12H PRN, Sheba Alberto DO     senna-docusate (SENOKOT-S/PERICOLACE) 8.6-50 MG per tablet 1 tablet, 1 tablet, Oral, BID PRN **OR** senna-docusate  (SENOKOT-S/PERICOLACE) 8.6-50 MG per tablet 2 tablet, 2 tablet, Oral, BID PRN, Sheba Alberto, DO     sodium chloride (PF) 0.9% PF flush 3 mL, 3 mL, Intracatheter, q1 min prn, Cory Albertoika M, DO     sodium chloride (PF) 0.9% PF flush 3 mL, 3 mL, Intracatheter, Q8H, Sheba Alberto,      sodium chloride 0.9% (bag) irrigation, , Irrigation, Continuous, Andrzej Olivia MD, 3,000 mL at 02/13/20 0940     sodium chloride 0.9% infusion, , Intravenous, Continuous, Sheba Alberto DO, Last Rate: 75 mL/hr at 02/13/20 0536      Allergies  Allergies   Allergen Reactions     Lisinopril Cough     Morphine        Social History  He  reports that he quit smoking about 57 years ago. His smoking use included cigarettes. He started smoking about 71 years ago. He has a 13.00 pack-year smoking history. He has never used smokeless tobacco. He reports previous alcohol use. He reports that he does not use drugs.  No drug abuse.    Family History  Family History   Problem Relation Age of Onset     Heart Failure Mother 86        Congestive - Cause of Death     Cerebrovascular Disease Father      C.A.D. Sister 91       Review of Systems  I personally reviewed the ROS with the patient.    Unintentional weight loss:  No  Recent fever/chills: No  Night sweats: No   Current skin rash: No   Recent hair loss: No   Heat intolerance: No   Cold intolerance: Yes  Chest pain: No   Palpitations: No   Shortness of breath: No  Wheezing: No   Constipation: No   Diarrhea: No   Nausea: No    Vomiting: No   Kidney/side pain: No    Back pain: No  Frequent headaches: No  Dizziness: No   Leg swelling: Yes  Calf pain: Yes        Physical Exam  Vitals:    02/13/20 0445 02/13/20 0731   BP: (!) 156/70 (!) 179/81   Pulse: (!) 37 60   Resp: 20 18   Temp: 98  F (36.7  C) 97.8  F (36.6  C)   TempSrc: Tympanic Tympanic   SpO2: 96% 96%   Weight: 87.7 kg (193 lb 5.5 oz)      Constitutional: No acute distress.  Alert and cooperative   Head: NCAT  Eyes: Conjunctivae  normal  Cardiovascular: Regular rate.  Pulmonary/Chest: Respirations are even and non-labored bilaterally, no audible wheezing  Abdominal: Soft. No distension, tenderness, masses or guarding.   Neurological: A + O x 3.  Cranial Nerves II-XII grossly intact.  Extremities: TERRANCE x 4, Warm. No clubbing.  No cyanosis.    Skin: Pink, warm and dry.  No visible rashes noted.  Psychiatric:  Normal mood and affect  Back:  No left CVA tenderness.  No right CVA tenderness.  Genitourinary:  Nonpalpable bladder  Three-way catheter in position draining cherry red urine    Labs  Results for orders placed or performed during the hospital encounter of 02/13/20   Potassium     Status: None   Result Value Ref Range    Potassium 3.9 3.5 - 5.1 mmol/L   Magnesium     Status: None   Result Value Ref Range    Magnesium 2.1 1.9 - 2.7 mg/dL   Hemoglobin     Status: Abnormal   Result Value Ref Range    Hemoglobin 13.1 (L) 13.3 - 17.7 g/dL   Results for orders placed or performed during the hospital encounter of 02/12/20   Basic metabolic panel     Status: Abnormal   Result Value Ref Range    Sodium 140 133 - 144 mmol/L    Potassium 4.2 3.4 - 5.3 mmol/L    Chloride 108 94 - 109 mmol/L    Carbon Dioxide 26 20 - 32 mmol/L    Anion Gap 6 3 - 14 mmol/L    Glucose 115 (H) 70 - 99 mg/dL    Urea Nitrogen 17 7 - 30 mg/dL    Creatinine 0.78 0.66 - 1.25 mg/dL    GFR Estimate 78 >60 mL/min/[1.73_m2]    GFR Estimate If Black 90 >60 mL/min/[1.73_m2]    Calcium 8.6 8.5 - 10.1 mg/dL   CBC with platelets differential     Status: Abnormal   Result Value Ref Range    WBC 9.6 4.0 - 11.0 10e9/L    RBC Count 4.37 (L) 4.4 - 5.9 10e12/L    Hemoglobin 13.2 (L) 13.3 - 17.7 g/dL    Hematocrit 39.3 (L) 40.0 - 53.0 %    MCV 90 78 - 100 fl    MCH 30.2 26.5 - 33.0 pg    MCHC 33.6 31.5 - 36.5 g/dL    RDW 13.4 10.0 - 15.0 %    Platelet Count 207 150 - 450 10e9/L    Diff Method Automated Method     % Neutrophils 69.8 %    % Lymphocytes 17.2 %    % Monocytes 10.9 %    %  Eosinophils 1.2 %    % Basophils 0.4 %    % Immature Granulocytes 0.5 %    Nucleated RBCs 0 0 /100    Absolute Neutrophil 6.7 1.6 - 8.3 10e9/L    Absolute Lymphocytes 1.6 0.8 - 5.3 10e9/L    Absolute Monocytes 1.0 0.0 - 1.3 10e9/L    Absolute Eosinophils 0.1 0.0 - 0.7 10e9/L    Absolute Basophils 0.0 0.0 - 0.2 10e9/L    Abs Immature Granulocytes 0.1 0 - 0.4 10e9/L    Absolute Nucleated RBC 0.0      Pathology  I personally reviewed the pathology report  10/22/2019  Low grade Ta (muscularis propria was present and uninvolved in specimen)    Procedure  I hand irrigated the patient's bladder at the bedside with 300 mL of normal saline and removed some small clot    Assessment & Plan  Mr. Grant is a 89 year old male who was admitted with gross hematuria and clot retention.  Patient has been experiencing intermittent gross hematuria.  Primary risk factor is his history of bladder cancer and the cough that he has been experiencing the last few days.  Hand irrigation revealed minimal clot in the bladder.  Recommend setting up CBI and weaning to clear.

## 2020-02-13 NOTE — PHARMACY-ADMISSION MEDICATION HISTORY
Pharmacy -- Admission Medication Reconciliation    Prior to admission (PTA) medications were reviewed and the patient's PTA medication list was updated.    Sources Consulted: [employer], [place] paper work, patient    The reliability of this Medication Reconciliation is: Reliability: Reliable    The following significant changes were made:  Added last doses (no medications today, patient wants his medications today/now Note: takes metoprolol at hs)    In addition, the patient's allergies were reviewed with the patient and left as follows:   Allergies: Lisinopril and Morphine    The pharmacist has reviewed with the patient that all personal medications should be removed from the building or locked in the belongings safe.  Patient shall only take medications ordered by the physician and administered by the nursing staff.       Medication barriers identified: none noted   Medication adherence concerns: none noted   Understanding of emergency medications: reviewed ntg, patient says he has never taken    Dory Khan [facility], 2/13/2020,  10:33 AM

## 2020-02-13 NOTE — ED NOTES
Pt premedicated with Urojet lidocaine. 24Fr three way CBI haji inserted with little resistance. Pt tolerated well. Relief of pain and pressure felt immediately after urine/blood returned. Returned 250cc of dark red blood/urine immediately returned.   CBI started with NS.  No c/o discomfort at this time. Will continue to monitor at this time

## 2020-02-13 NOTE — PROGRESS NOTES
[facility] ADMISSION NOTE    Patient admitted to room 305 at approximately 0433 via bed from direct admit from [place]. Patient was accompanied by other: Transport.     Verbal SBAR report received from Shayy prior to patient arrival.     Patient trasferred to bed via slip. Patient alert and oriented X 3. The patient is not having any pain. 0-10 Pain Scale: 0. Admission vital signs: Blood pressure (!) 156/70, pulse (!) 37, temperature 98  F (36.7  C), temperature source Tympanic, resp. rate 20, weight 87.7 kg (193 lb 5.5 oz), SpO2 96 %. Patient was oriented to plan of care, call light, bed controls, tv, telephone, bathroom and visiting hours.     Risk Assessment    The following safety risks were identified during admission: fall. Yellow risk band applied: YES.         Andreea Weeks RN

## 2020-02-13 NOTE — PROGRESS NOTES
Doctor Olivia gave verbal order to start CBI. Connected to saline bags, tolerated well, rate is set at a fast flow, output is light pink in color and draining w/o difficulty.     Odalis Pelaez RN

## 2020-02-13 NOTE — PLAN OF CARE
"  Problem: Adult Inpatient Plan of Care  Goal: Optimal Comfort and Wellbeing  Outcome: Improving     Problem: Pain Acute  Goal: Optimal Pain Control  Outcome: Improving       Patient has been vitally stable this shift. Complains of no pain, haji remains patent and draining light pink clear fluid. Some very small clots draining.     Vital signs:  Temp: 98  F (36.7  C) Temp src: Tympanic BP: (!) 165/80 Pulse: 62 Heart Rate: 79 Resp: 18 SpO2: 97 % O2 Device: None (Room air)     Weight: 87.7 kg (193 lb 5.5 oz)  Estimated body mass index is 26.97 kg/m  as calculated from the following:    Height as of 12/29/19: 1.803 m (5' 11\").    Weight as of this encounter: 87.7 kg (193 lb 5.5 oz).      Odalis Pelaez RN    "

## 2020-02-13 NOTE — ED TRIAGE NOTES
Ambulated into triage with c/o blood in urine since this morning and groin pain rated 9/10. States had bladder surgery in November. Denies fevers. Denies nausea or vomiting. States urine is bright red blood and he is concerned there is no urine coming out. States it feels like he really needs to urinate all of the time and there is a pressure there whenever he does urinate.

## 2020-02-13 NOTE — ED NOTES
3000ml NS irrigated at this time. Urine remains red in color but is clear with small occasional clots. Second 3000ml bag started. Pt continues to be comfortable. MD updated.

## 2020-02-13 NOTE — H&P
[facility]    History and Physical  Hospitalist       Date of Admission:  2/13/2020    Assessment & Plan   James Grant is a 89 year old male who presents with hematuria.  Transferred from [facility] emergency room to our facility as he is a patient of Dr. Olivia.    Principal Problem:    Hematuria.  History of bladder cancer.  He had a follow-up cystoscopy scheduled for Monday.  However about 2 days ago he noticed onset of blood in his urine.  It cleared throughout the day.  However it came back yesterday and he was passing dark red clots so he presented to the emergency room for further evaluation.  -admit obs  -CBI  -urology consult  -ok to eat as no plan for procedure today  -ambulate with assistance  -vital signs per unit    ? Bradycardia on admission. resolved  -beta blocker on hold. Can restart as needed    DVT Prophylaxis: Pneumatic Compression Devices  Code Status: DNR/DNI    Sheba Alberto    Primary Care Physician   Broderick Flowers    Chief Complaint   Hematuria.     History is obtained from the patient and chart review.    History of Present Illness   James Grant is a 89 year old male who presents with hematuria.     He had a follow-up cystoscopy scheduled for Monday.  However about 2 days ago he noticed onset of blood in his urine.  It cleared throughout the day.  However it came back yesterday and he was passing dark red clots so he presented to the emergency room for further evaluation.  He was unable to void due to clot in the bladder.  He was bladder scanned for greater than 3000mL.  A Prater was placed and he was irrigated.  We accepted transfer for further care here.  At this time he is on continuous bladder irrigation.  He is not normally on any anticoagulation has not been taking aspirin daily.  No nausea or vomiting.  Normal appetite.  No abdominal pain after placement of the Prater catheter.    Additionally, on admission there was question about a low heart rate of 37.  I  placed him on telemetry.  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 This is currently on hold.       Past Medical History    I have reviewed this patient's medical history and updated it with pertinent information if needed.   Past Medical History:   Diagnosis Date     Benign localized hyperplasia of prostate without urinary obstruction and other lower urinary tract symptoms (LUTS) 11/26/2010     CHD (coronary heart disease) 11/26/2010     Dyslipidemia 11/26/2010     GERD (gastroesophageal reflux disease) 11/26/2010     HTN (hypertension) 11/26/2010     Old myocardial infarction 11/26/2010     Other symptoms involving digestive system(787.99) 10/20/2006       Past Surgical History   I have reviewed this patient's surgical history and updated it with pertinent information if needed.  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 Taking?   NITROSTAT 0.4 MG sublingual tablet never at never Self No No   Sig: PLACE 1 TABLET (0.4 MG) UNDER THE TONGUE EVERY 5 MINUTES AS NEEDED   Patient not taking: Reported on 1/3/2020   famotidine (PEPCID) 20 MG tablet 2/12/2020 at Unknown time  No Yes   Sig: TAKE ONE (1) TABLET BY MOUTH DAILY   losartan (COZAAR) 50 MG tablet 2/12/2020 at am  No Yes   Sig: TAKE 1 TABLET (50 MG) BY MOUTH DAILY  COZAAR   metoprolol succinate ER (TOPROL-XL) 25 MG 24 hr tablet 2/12/2020 at 2100 Self No Yes   Sig: TAKE 1/2 TAB BY MOUTH AT BEDTIME. DO NOT CRUSH OR CHEW.   simvastatin (ZOCOR) 20 MG tablet 2/12/2020 at Unknown time  No Yes   Sig: Take 1 tablet (20 mg) by mouth At Bedtime      Facility-Administered Medications: None     Allergies   Allergies   Allergen Reactions     Lisinopril Cough     Morphine Other (See Comments) and Visual Disturbance     confusion       Social History   I have reviewed this patient's social history and updated it with pertinent information if needed. James Grant  reports that he quit smoking about 57 years ago. His smoking use included cigarettes. He started smoking about 71 years ago. He has a 13.00 pack-year smoking history. He has never used smokeless tobacco. He reports previous alcohol use. He reports that he does not use drugs.    Family History   I have reviewed this patient's family history and updated it with pertinent information if needed.   Family History   Problem Relation Age of Onset     Heart Failure Mother 86        Congestive - Cause of Death     Cerebrovascular Disease Father      C.A.D. Sister 91       Review of Systems     REVIEW OF SYSTEMS:    Constitutional: normal energy and appetite, no recent sick contacts  Eyes: no changes in vision  Ears, nose, mouth, throat, and face: no mouth sores, dysphagia, or odynophagia  Respiratory: no shortness of breath, cough, or wheezing. No aspiration symptoms.   Cardiovascular: no chest pain, palpitations, orthopnea, increased lower extremity edema, or syncope.   Gastrointestinal: no constipation, diarrhea, nausea, vomiting or abdominal pain.  Genitourinary: no dysuria, urgency or frequency. +hematuria  Hematologic/lymphatic: no unintentional weight loss or night sweats.  Musculoskeletal: no pain to extremities or falls.   Neurological: no new weakness, tingling, numbness.   Psychiatric: no hallucinations ordelusions.  Endocrine:  not a known diabetic.      Additions to the above include: see HPI.     Physical Exam   Temp: 97.8  F (36.6  C) Temp src: Tympanic BP: (!) 179/81 Pulse: 60 Heart Rate: 75 Resp: 18 SpO2: 96 % O2 Device: None (Room air)    Vital Signs with Ranges  Temp:  [97.8  F (36.6  C)-98  F (36.7  C)] 97.8  F (36.6  C)  Pulse:  [37-62] 60  Heart Rate:  [65-75] 75  Resp:  [16-20] 18  BP: (111-179)/(70-87) 179/81  SpO2:  [95 %-98 %] 96 %  193 lbs 5.49 oz    Constitutional: Wake alert and oriented.  Appears stated age.  Well hydrated and well nourished.  Eyes: Extraocular muscles intact.  Nonicteric, noninjected.  Lids normal  HEENT: Moist mucous membranes.  Fair dentition.  No oral pharyngeal lesions.  Respiratory: Clear to auscultation bilaterally.  No wheezing, rhonchi, rales.  Cardiovascular: Regular on my exam.  Positive systolic ejection murmur.  No lower extremity edema.  GI: Abdomen soft, nontender, nondistended.  Bowel sounds are present.  Genitourinary: Prater catheter in place draining red urine.  Skin: Warm, dry, intact.  No concerning rashes or bruising.  Musculoskeletal: Moves arms and legs equally normally.  Normal tone and strength.  Neurologic: No focal deficits.  Psychiatric: Appropriate affect and insight.  Asks appropriate questions.    Data   Data reviewed today:  I personally reviewed the EKG tracing showing NSR with 1st degree AV block. tele showing NSR.  Recent Labs   Lab 02/13/20  0622 02/13/20  0615 02/13/20  0002   WBC  --   --  9.6   HGB 13.1*  --  13.2*   MCV  --   --  90   PLT  --   --  207   NA  --   --  140   POTASSIUM  --  3.9 4.2   CHLORIDE  --   --  108   CO2  --   --  26   BUN  --   --  17   CR  --   --  0.78   ANIONGAP  --   --  6   VANIA  --   --  8.6   GLC  --   --  115*       Recent Results (from the past 24 hour(s))   Echocardiogram Complete    Narrative    [id number]  [id number]  [id number]  [id number]^RADHA^CHEN^LAURA        [facility]  [address] [place] [address]      Name: JENNIFER RODIRGUEZ  MRN: [id number]  : 1927  Study Date: 2020 08:03 AM  Age: 89 yrs  Gender: Male  Patient Location: [facility]  Reason For Study: Bradycardia - Sinus  Ordering Physician: CHEN GARCIA  Performed By: Charlee Harp CHRIST, T     BSA: 2.1 m2  Height: 71 in  Weight: 193 lb  HR: 48  BP: 179/81 mmHg  _____________________________________________________________________________  __        Procedure  Complete Portable Echo Adult.  _____________________________________________________________________________  __        Interpretation Summary  Global and regional left ventricular function is normal with an EF of 60-65%.  Right ventricular function, chamber size, wall motion, and thickness are  normal.  No significant valvular abnormalities were noted.  Previous study not available for comparison.  _____________________________________________________________________________  __        Left Ventricle  Left ventricular size is normal. Left ventricular wall thickness is normal.  Global and regional left ventricular function is normal with an EF of 60-65%.  Left ventricular diastolic function is normal.     Right Ventricle  Right ventricular function, chamber size, wall motion, and thickness are  normal.     Atria  Both atria appear normal.     Mitral Valve  Mild mitral annular calcification is present. Trace mitral insufficiency is  present.     Aortic Valve  Aortic valve is normal in structure and function. The aortic valve is  tricuspid.        Tricuspid Valve  The tricuspid valve is normal. Trace tricuspid insufficiency is present. The  right ventricular systolic pressure is approximated at 33.0 mmHg plus the  right atrial pressure.     Pulmonic Valve  The pulmonic valve is normal.     Vessels  The inferior vena cava was normal in size with preserved respiratory  variability. The aorta root is normal. IVC diameter <2.1 cm collapsing >50%  with sniff suggests a normal RA pressure of 3 mmHg.      Compared to Previous Study  Previous study not available for comparison.     _____________________________________________________________________________  __  MMode/2D Measurements & Calculations  IVSd: 1.1 cm  LVIDd: 4.9 cm  LVIDs: 2.8 cm  LVPWd: 0.99 cm  FS: 42.9 %  LV mass(C)d: 190.5 grams  LV mass(C)dI: 91.7 grams/m2  Ao root diam: 3.9 cm  asc Aorta Diam: 3.5 cm     LVOT diam: 2.5 cm  LVOT area: 4.9 cm2  LA Volume (BP): 55.6 ml  LA Volume Index (BP): 26.7 ml/m2  RWT: 0.40        Doppler Measurements & Calculations  MV E max farshad: 91.2 cm/sec  MV A max farshad: 90.6 cm/sec  MV E/A: 1.0  MV dec time: 0.19 sec  Ao V2 max: 102.0 cm/sec  Ao max P.0 mmHg  ASHER(V,D): 4.0 cm2  LV V1 max P.8 mmHg  LV V1 max: 83.2 cm/sec     TR max farshad: 287.3 cm/sec  TR max P.0 mmHg  AV Farshad Ratio (DI): 0.82  E/E' av.6  Lateral E/e': 11.3  Medial E/e': 12.0           _____________________________________________________________________________  __           Report approved by: Lulu MARTINEZ 2020 10:17 AM

## 2020-02-13 NOTE — PROGRESS NOTES
"  SAFETY CHECKLIST  ID Bands and Risk clasps correct and in place (DNR, Fall risk, Allergy, Latex, Limb):  Yes  All Lines Reconciled and labeled correctly: Yes  Whiteboard updated:Yes  Environmental interventions (bed/chair alarm on, call light, side rails, restraints, sitter....): Yes    Vital signs:  Temp: 97.8  F (36.6  C) Temp src: Tympanic BP: (!) 179/81 Pulse: 60 Heart Rate: 75 Resp: 18 SpO2: 96 % O2 Device: None (Room air)     Weight: 87.7 kg (193 lb 5.5 oz)  Estimated body mass index is 26.97 kg/m  as calculated from the following:    Height as of 12/29/19: 1.803 m (5' 11\").    Weight as of this encounter: 87.7 kg (193 lb 5.5 oz).      Odalis Pelaez RN        "

## 2020-02-14 PROBLEM — D62 ANEMIA DUE TO BLOOD LOSS, ACUTE: Status: ACTIVE | Noted: 2020-02-14

## 2020-02-14 LAB
ANION GAP SERPL CALCULATED.3IONS-SCNC: 7 MMOL/L (ref 3–14)
BUN SERPL-MCNC: 16 MG/DL (ref 7–25)
CALCIUM SERPL-MCNC: 8.3 MG/DL (ref 8.6–10.3)
CHLORIDE SERPL-SCNC: 108 MMOL/L (ref 98–107)
CO2 SERPL-SCNC: 25 MMOL/L (ref 21–31)
CREAT SERPL-MCNC: 0.78 MG/DL (ref 0.7–1.3)
ERYTHROCYTE [DISTWIDTH] IN BLOOD BY AUTOMATED COUNT: 13.3 % (ref 10–15)
GFR SERPL CREATININE-BSD FRML MDRD: >90 ML/MIN/{1.73_M2}
GLUCOSE SERPL-MCNC: 101 MG/DL (ref 70–105)
HCT VFR BLD AUTO: 40.1 % (ref 40–53)
HGB BLD-MCNC: 13 G/DL (ref 13.3–17.7)
MAGNESIUM SERPL-MCNC: 2 MG/DL (ref 1.9–2.7)
MCH RBC QN AUTO: 29.5 PG (ref 26.5–33)
MCHC RBC AUTO-ENTMCNC: 32.4 G/DL (ref 31.5–36.5)
MCV RBC AUTO: 91 FL (ref 78–100)
PLATELET # BLD AUTO: 211 10E9/L (ref 150–450)
POTASSIUM SERPL-SCNC: 4 MMOL/L (ref 3.5–5.1)
RBC # BLD AUTO: 4.41 10E12/L (ref 4.4–5.9)
SODIUM SERPL-SCNC: 140 MMOL/L (ref 134–144)
WBC # BLD AUTO: 9.7 10E9/L (ref 4–11)

## 2020-02-14 PROCEDURE — 80048 BASIC METABOLIC PNL TOTAL CA: CPT | Performed by: FAMILY MEDICINE

## 2020-02-14 PROCEDURE — 36415 COLL VENOUS BLD VENIPUNCTURE: CPT | Performed by: FAMILY MEDICINE

## 2020-02-14 PROCEDURE — G0378 HOSPITAL OBSERVATION PER HR: HCPCS

## 2020-02-14 PROCEDURE — 25000132 ZZH RX MED GY IP 250 OP 250 PS 637: Mod: GY | Performed by: FAMILY MEDICINE

## 2020-02-14 PROCEDURE — 99225 ZZC SUBSEQUENT OBSERVATION CARE,LEVEL II: CPT | Performed by: INTERNAL MEDICINE

## 2020-02-14 PROCEDURE — 83735 ASSAY OF MAGNESIUM: CPT | Performed by: FAMILY MEDICINE

## 2020-02-14 PROCEDURE — 85027 COMPLETE CBC AUTOMATED: CPT | Performed by: FAMILY MEDICINE

## 2020-02-14 PROCEDURE — 25800025 ZZH RX 258: Performed by: UROLOGY

## 2020-02-14 RX ADMIN — LOSARTAN POTASSIUM 50 MG: 50 TABLET, FILM COATED ORAL at 11:05

## 2020-02-14 RX ADMIN — ACETAMINOPHEN 650 MG: 325 TABLET, FILM COATED ORAL at 20:56

## 2020-02-14 RX ADMIN — ACETAMINOPHEN 650 MG: 325 TABLET, FILM COATED ORAL at 02:54

## 2020-02-14 RX ADMIN — SODIUM CHLORIDE 3000 ML: 900 IRRIGANT IRRIGATION at 00:00

## 2020-02-14 RX ADMIN — SODIUM CHLORIDE 3000 ML: 900 IRRIGANT IRRIGATION at 03:00

## 2020-02-14 RX ADMIN — ATORVASTATIN CALCIUM 10 MG: 10 TABLET, FILM COATED ORAL at 20:56

## 2020-02-14 RX ADMIN — SODIUM CHLORIDE 3000 ML: 900 IRRIGANT IRRIGATION at 11:07

## 2020-02-14 ASSESSMENT — ACTIVITIES OF DAILY LIVING (ADL)
ADLS_ACUITY_SCORE: 15

## 2020-02-14 NOTE — PLAN OF CARE
CBI running at slow speed, urine is very pale pink with no clots. Denies any pain or discomfort, catheter insertion site continues to leak, VSS. Reports 4/10 back pain and headache, request tylenol, was given and was effective.    /71   Pulse 62   Temp 98.6  F (37  C) (Tympanic)   Resp 18   Wt 85.9 kg (189 lb 6.4 oz)   SpO2 92%   BMI 26.42 kg/m      Gracie Parada RN on 2/14/2020 at 5:47 AM

## 2020-02-14 NOTE — PROGRESS NOTES
SAFETY CHECKLIST  ID Bands and Risk clasps correct and in place (DNR, Fall risk, Allergy, Latex, Limb):  Yes  All Lines Reconciled and labeled correctly: Yes  Whiteboard updated:Yes  Environmental interventions (bed/chair alarm on, call light, side rails, restraints, sitter....): Yes    Gracie Parada RN on 2/13/2020 at 7:08 PM

## 2020-02-14 NOTE — PROGRESS NOTES
CBI running light pink. After getting to to the bathroom, urine turned red. No pain. Independent, quiet personality.

## 2020-02-14 NOTE — PROGRESS NOTES
[facility]  Hospitalist Progress Note    Assessment & Plan   James Grant is a 89 year old male who was admitted on 2/13/2020.      > Hematuria, gross  > History of bladder cancer  > Acute blood loss anemia   Slight drop in hemoglobin which has remained stable with observation.    -- Appreciate Urology consult   -- If urine is clear/pink tinged, hold CBI   -- If urine remains clear/pink tinged after CBI off for several hours, okay to pull haji cather   -- Then will monitor for trial of void   -- Follow CBC    > Bradycardia, resolved  Echo normal.   -- Monitor    > Essential hypertension, chronic   -- Continue losartain    > Hyperlipid, chronic   -- Continue atorvastatin    DVT Prophylaxis: Anti-embolisim stockings (TEDs)  Code Status: DNR/DNI    Luis Baptiste Valdivia    Interval History   He is tired of laying around flat on his back.  He is having no abdominal pain.  No chest pains or shortness of breath.    -Data reviewed today: I reviewed all new labs and imaging results over the last 24 hours. I personally reviewed no images or EKG's today.    Physical Exam   Temp: 97.5  F (36.4  C) Temp src: Tympanic BP: (!) 156/75 Pulse: 67 Heart Rate: 76 Resp: 16 SpO2: 93 % O2 Device: None (Room air)    Vitals:    02/13/20 0445 02/14/20 0050   Weight: 87.7 kg (193 lb 5.5 oz) 85.9 kg (189 lb 6.4 oz)     Vital Signs with Ranges  Temp:  [97.5  F (36.4  C)-98.6  F (37  C)] 97.5  F (36.4  C)  Pulse:  [67] 67  Heart Rate:  [76-82] 76  Resp:  [16-18] 16  BP: (120-156)/(62-75) 156/75  SpO2:  [92 %-95 %] 93 %  I/O last 3 completed shifts:  In: 786 [P.O.:780; I.V.:6]  Out: 2225 [Urine:2225]    Gen: Alert, NAD.  HEENT: MMM  Neck: Supple  CV: RRR no m/r/g  Pulm: CTAB, no w/r/r. No increased work of breathing  Msk: No LE edema  Abd: Soft  Neuro: Grossly intact  Skin: No concerning lesions.  Psychiatric: Normal affect and insight. Does not appear anxious or depressed.        Medications     sodium chloride 0.9%  (bag)         atorvastatin  10 mg Oral At Bedtime     losartan  50 mg Oral Daily     sodium chloride (PF)  3 mL Intracatheter Q8H       Data   Recent Labs   Lab 02/14/20  0436 02/13/20  0622 02/13/20  0615 02/13/20  0002   WBC 9.7  --   --  9.6   HGB 13.0* 13.1*  --  13.2*   MCV 91  --   --  90     --   --  207     --   --  140   POTASSIUM 4.0  --  3.9 4.2   CHLORIDE 108*  --   --  108   CO2 25  --   --  26   BUN 16  --   --  17   CR 0.78  --   --  0.78   ANIONGAP 7  --   --  6   VANIA 8.3*  --   --  8.6     --   --  115*       No results found for this or any previous visit (from the past 24 hour(s)).

## 2020-02-15 LAB
ANION GAP SERPL CALCULATED.3IONS-SCNC: 8 MMOL/L (ref 3–14)
BUN SERPL-MCNC: 13 MG/DL (ref 7–25)
CALCIUM SERPL-MCNC: 8.4 MG/DL (ref 8.6–10.3)
CHLORIDE SERPL-SCNC: 105 MMOL/L (ref 98–107)
CO2 SERPL-SCNC: 24 MMOL/L (ref 21–31)
CREAT SERPL-MCNC: 0.7 MG/DL (ref 0.7–1.3)
ERYTHROCYTE [DISTWIDTH] IN BLOOD BY AUTOMATED COUNT: 13.3 % (ref 10–15)
GFR SERPL CREATININE-BSD FRML MDRD: >90 ML/MIN/{1.73_M2}
GLUCOSE SERPL-MCNC: 105 MG/DL (ref 70–105)
HCT VFR BLD AUTO: 39.9 % (ref 40–53)
HGB BLD-MCNC: 13.2 G/DL (ref 13.3–17.7)
MAGNESIUM SERPL-MCNC: 2 MG/DL (ref 1.9–2.7)
MCH RBC QN AUTO: 29.7 PG (ref 26.5–33)
MCHC RBC AUTO-ENTMCNC: 33.1 G/DL (ref 31.5–36.5)
MCV RBC AUTO: 90 FL (ref 78–100)
PLATELET # BLD AUTO: 211 10E9/L (ref 150–450)
POTASSIUM SERPL-SCNC: 3.9 MMOL/L (ref 3.5–5.1)
RBC # BLD AUTO: 4.44 10E12/L (ref 4.4–5.9)
SODIUM SERPL-SCNC: 137 MMOL/L (ref 134–144)
WBC # BLD AUTO: 8.6 10E9/L (ref 4–11)

## 2020-02-15 PROCEDURE — 36415 COLL VENOUS BLD VENIPUNCTURE: CPT | Performed by: INTERNAL MEDICINE

## 2020-02-15 PROCEDURE — 85027 COMPLETE CBC AUTOMATED: CPT | Performed by: INTERNAL MEDICINE

## 2020-02-15 PROCEDURE — 83735 ASSAY OF MAGNESIUM: CPT | Performed by: INTERNAL MEDICINE

## 2020-02-15 PROCEDURE — 25000132 ZZH RX MED GY IP 250 OP 250 PS 637: Mod: GY | Performed by: FAMILY MEDICINE

## 2020-02-15 PROCEDURE — G0378 HOSPITAL OBSERVATION PER HR: HCPCS

## 2020-02-15 PROCEDURE — 25800025 ZZH RX 258: Performed by: UROLOGY

## 2020-02-15 PROCEDURE — 99225 ZZC SUBSEQUENT OBSERVATION CARE,LEVEL II: CPT | Performed by: INTERNAL MEDICINE

## 2020-02-15 PROCEDURE — 80048 BASIC METABOLIC PNL TOTAL CA: CPT | Performed by: INTERNAL MEDICINE

## 2020-02-15 RX ADMIN — SODIUM CHLORIDE 3000 ML: 900 IRRIGANT IRRIGATION at 14:27

## 2020-02-15 RX ADMIN — SODIUM CHLORIDE 3000 ML: 900 IRRIGANT IRRIGATION at 18:26

## 2020-02-15 RX ADMIN — LOSARTAN POTASSIUM 50 MG: 50 TABLET, FILM COATED ORAL at 11:57

## 2020-02-15 RX ADMIN — ATORVASTATIN CALCIUM 10 MG: 10 TABLET, FILM COATED ORAL at 21:11

## 2020-02-15 RX ADMIN — SODIUM CHLORIDE 3000 ML: 900 IRRIGANT IRRIGATION at 05:30

## 2020-02-15 RX ADMIN — SODIUM CHLORIDE 3000 ML: 900 IRRIGANT IRRIGATION at 23:03

## 2020-02-15 RX ADMIN — SODIUM CHLORIDE 3000 ML: 900 IRRIGANT IRRIGATION at 05:27

## 2020-02-15 RX ADMIN — SODIUM CHLORIDE 3000 ML: 900 IRRIGANT IRRIGATION at 08:32

## 2020-02-15 NOTE — PROGRESS NOTES
SAFETY CHECKLIST  ID Bands and Risk clasps correct and in place (DNR, Fall risk, Allergy, Latex, Limb):  Yes  All Lines Reconciled and labeled correctly: Yes  Whiteboard updated:Yes  Environmental interventions (bed/chair alarm on, call light, side rails, restraints, sitter....): Yes

## 2020-02-15 NOTE — PLAN OF CARE
Pt walked all the halls for 30 minutes. Steady gait. Unable to wean CBI. Output bright fruit punch color. Irrigated several times and removed 10 quarter size clots. Prater noted to leak when clotted off. Flowing freely without issues after irrigation. Pt has no other complaints.

## 2020-02-15 NOTE — PLAN OF CARE
Pt is A&O x4. Is pleasant and cooperative. VSS. BP (!) 165/78   Pulse 68   Temp 100.1  F (37.8  C) (Tympanic)   Resp 20   Wt 85.9 kg (189 lb 6.4 oz)   SpO2 94%   BMI 26.42 kg/m    O2 is 94% on RA. LS diminished. BS active. HR irregular. Scattered bruises. Denies pain. CBI started back up at 1745. Prater was irrigated due to clot in tubing. Prater is patent, pink in color. Pt encouraged to drink fluids. Per Dr. Coles continue to wean off CBI until urine is clear. Will continue to monitor.    Kateryna Diamond RN on 2/14/2020 at 6:31 PM

## 2020-02-15 NOTE — PLAN OF CARE
Pt admitted 2/14/20 with gross hematuria. CBI in place, running slow. Urine pale pink. Cath leaked large amount this evening, per report it did this periodically during the day when haji would get pushed further into bladder, when haji is pulled out until resistance it drains without leaking. Will continue to wean as able.

## 2020-02-15 NOTE — PLAN OF CARE
Urine output adequate this shift when subtracted from CBI intake. Unable to wean CBI. Urine fruit punch colored with clots.

## 2020-02-15 NOTE — PROGRESS NOTES
[facility]  Hospitalist Progress Note    Assessment & Plan   James Grant is a 89 year old male who was admitted on 2/13/2020.      > Hematuria, gross  > History of bladder cancer  > Acute blood loss anemia   Slight drop in hemoglobin which has remained stable with observation.  He continues to have intermittent clots in his urine.  I think most likely he will require ongoing observation.  If still having clots in the urine by Monday will discuss the case with Dr. Olivia of urology.   -- Appreciate Urology consult   -- If urine is clear/pink tinged, hold CBI   -- If urine remains clear/pink tinged after CBI off for several hours, okay to pull haji cather   -- Then will monitor for trial of void   -- Follow CBC    > Bradycardia, resolved  Echo normal.   -- Monitor    > Essential hypertension, chronic   -- Continue losartain    > Hyperlipid, chronic   -- Continue atorvastatin    DVT Prophylaxis: Anti-embolisim stockings (TEDs)  Code Status: DNR/DNI    Luis Valdivia    Interval History   No abdominal pain. Nursing reports ongoing intermittent clotting with pink urine.    -Data reviewed today: I reviewed all new labs and imaging results over the last 24 hours. I personally reviewed no images or EKG's today.    Physical Exam   Temp: 98.8  F (37.1  C) Temp src: Tympanic BP: 115/59 Pulse: 68 Heart Rate: 63 Resp: 16 SpO2: 93 % O2 Device: None (Room air)    Vitals:    02/13/20 0445 02/14/20 0050   Weight: 87.7 kg (193 lb 5.5 oz) 85.9 kg (189 lb 6.4 oz)     Vital Signs with Ranges  Temp:  [98.8  F (37.1  C)-100.1  F (37.8  C)] 98.8  F (37.1  C)  Pulse:  [41-68] 68  Heart Rate:  [63-77] 63  Resp:  [16-20] 16  BP: (115-165)/(59-78) 115/59  SpO2:  [93 %-94 %] 93 %  I/O last 3 completed shifts:  In: 1605 [P.O.:1600; I.V.:5]  Out: 1225 [Urine:1225]    Gen: Alert, NAD.  HEENT: MMM  Neck: Supple  CV: RRR no m/r/g  Pulm: CTAB, no w/r/r. No increased work of breathing  Msk: No LE edema  Abd: Soft  Neuro:  Grossly intact  Skin: No concerning lesions.  Psychiatric: Normal affect and insight. Does not appear anxious or depressed.        Medications     sodium chloride 0.9% (bag)         atorvastatin  10 mg Oral At Bedtime     losartan  50 mg Oral Daily     sodium chloride (PF)  3 mL Intracatheter Q8H       Data   Recent Labs   Lab 02/15/20  0503 02/14/20  0436 02/13/20  0622 02/13/20  0615 02/13/20  0002   WBC 8.6 9.7  --   --  9.6   HGB 13.2* 13.0* 13.1*  --  13.2*   MCV 90 91  --   --  90    211  --   --  207    140  --   --  140   POTASSIUM 3.9 4.0  --  3.9 4.2   CHLORIDE 105 108*  --   --  108   CO2 24 25  --   --  26   BUN 13 16  --   --  17   CR 0.70 0.78  --   --  0.78   ANIONGAP 8 7  --   --  6   VANIA 8.4* 8.3*  --   --  8.6    101  --   --  115*       No results found for this or any previous visit (from the past 24 hour(s)).

## 2020-02-16 LAB
ANION GAP SERPL CALCULATED.3IONS-SCNC: 7 MMOL/L (ref 3–14)
BUN SERPL-MCNC: 13 MG/DL (ref 7–25)
CALCIUM SERPL-MCNC: 8.6 MG/DL (ref 8.6–10.3)
CHLORIDE SERPL-SCNC: 106 MMOL/L (ref 98–107)
CO2 SERPL-SCNC: 24 MMOL/L (ref 21–31)
CREAT SERPL-MCNC: 0.67 MG/DL (ref 0.7–1.3)
ERYTHROCYTE [DISTWIDTH] IN BLOOD BY AUTOMATED COUNT: 13.3 % (ref 10–15)
GFR SERPL CREATININE-BSD FRML MDRD: >90 ML/MIN/{1.73_M2}
GLUCOSE SERPL-MCNC: 98 MG/DL (ref 70–105)
HCT VFR BLD AUTO: 41.7 % (ref 40–53)
HGB BLD-MCNC: 13.9 G/DL (ref 13.3–17.7)
MAGNESIUM SERPL-MCNC: 1.9 MG/DL (ref 1.9–2.7)
MCH RBC QN AUTO: 30 PG (ref 26.5–33)
MCHC RBC AUTO-ENTMCNC: 33.3 G/DL (ref 31.5–36.5)
MCV RBC AUTO: 90 FL (ref 78–100)
PLATELET # BLD AUTO: 222 10E9/L (ref 150–450)
POTASSIUM SERPL-SCNC: 3.9 MMOL/L (ref 3.5–5.1)
RBC # BLD AUTO: 4.63 10E12/L (ref 4.4–5.9)
SODIUM SERPL-SCNC: 137 MMOL/L (ref 134–144)
WBC # BLD AUTO: 9 10E9/L (ref 4–11)

## 2020-02-16 PROCEDURE — 80048 BASIC METABOLIC PNL TOTAL CA: CPT | Performed by: INTERNAL MEDICINE

## 2020-02-16 PROCEDURE — 99225 ZZC SUBSEQUENT OBSERVATION CARE,LEVEL II: CPT | Performed by: INTERNAL MEDICINE

## 2020-02-16 PROCEDURE — G0378 HOSPITAL OBSERVATION PER HR: HCPCS

## 2020-02-16 PROCEDURE — 36415 COLL VENOUS BLD VENIPUNCTURE: CPT | Performed by: INTERNAL MEDICINE

## 2020-02-16 PROCEDURE — 83735 ASSAY OF MAGNESIUM: CPT | Performed by: INTERNAL MEDICINE

## 2020-02-16 PROCEDURE — 25800025 ZZH RX 258: Performed by: UROLOGY

## 2020-02-16 PROCEDURE — 25000132 ZZH RX MED GY IP 250 OP 250 PS 637: Mod: GY | Performed by: FAMILY MEDICINE

## 2020-02-16 PROCEDURE — 85027 COMPLETE CBC AUTOMATED: CPT | Performed by: INTERNAL MEDICINE

## 2020-02-16 RX ADMIN — SODIUM CHLORIDE 3000 ML: 900 IRRIGANT IRRIGATION at 10:27

## 2020-02-16 RX ADMIN — ATORVASTATIN CALCIUM 10 MG: 10 TABLET, FILM COATED ORAL at 21:24

## 2020-02-16 RX ADMIN — SODIUM CHLORIDE 3000 ML: 900 IRRIGANT IRRIGATION at 05:13

## 2020-02-16 RX ADMIN — LOSARTAN POTASSIUM 50 MG: 50 TABLET, FILM COATED ORAL at 10:58

## 2020-02-16 NOTE — PROGRESS NOTES
[facility]  Hospitalist Progress Note    Assessment & Plan   James Grant is a 89 year old male who was admitted on 2/13/2020.      > Hematuria, gross  > History of bladder cancer  > Acute blood loss anemia   Continues to have intermittent red urine with clots.  Will continue CBI and discuss case with Urology tomorrow.    -- Appreciate Urology consult   -- continue CBI   -- Follow CBC    > Bradycardia, resolved  Echo normal.   -- Monitor    > Essential hypertension, chronic   -- Continue losartain    > Hyperlipid, chronic   -- Continue atorvastatin    DVT Prophylaxis: Anti-embolisim stockings (TEDs)  Code Status: DNR/DNI    Luis Valdivia    Interval History   No abdominal pain. Nursing reports ongoing intermittent clotting with red urine. He's getting bored.    -Data reviewed today: I reviewed all new labs and imaging results over the last 24 hours. I personally reviewed no images or EKG's today.    Physical Exam   Temp: 98.7  F (37.1  C) Temp src: Tympanic BP: 137/70 Pulse: 67 Heart Rate: 69 Resp: 16 SpO2: 95 % O2 Device: None (Room air)    Vitals:    02/13/20 0445 02/14/20 0050 02/16/20 0013   Weight: 87.7 kg (193 lb 5.5 oz) 85.9 kg (189 lb 6.4 oz) 90 kg (198 lb 6.6 oz)     Vital Signs with Ranges  Temp:  [97.9  F (36.6  C)-99.2  F (37.3  C)] 98.7  F (37.1  C)  Pulse:  [64-67] 67  Heart Rate:  [69-77] 69  Resp:  [16] 16  BP: (117-137)/(66-70) 137/70  SpO2:  [95 %] 95 %  I/O last 3 completed shifts:  In: 300 [P.O.:300]  Out: 800 [Urine:800]    Gen: Alert, NAD.  HEENT: MMM  Neck: Supple  CV: RRR no m/r/g  Pulm: CTAB, no w/r/r. No increased work of breathing  Msk: No LE edema  Abd: Soft  Neuro: Grossly intact  Skin: No concerning lesions.  Psychiatric: Normal affect and insight. Does not appear anxious or depressed.        Medications     sodium chloride 0.9% (bag)         atorvastatin  10 mg Oral At Bedtime     losartan  50 mg Oral Daily     sodium chloride (PF)  3 mL Intracatheter Q8H        Data   Recent Labs   Lab 02/16/20  0505 02/15/20  0503 02/14/20  0436   WBC 9.0 8.6 9.7   HGB 13.9 13.2* 13.0*   MCV 90 90 91    211 211    137 140   POTASSIUM 3.9 3.9 4.0   CHLORIDE 106 105 108*   CO2 24 24 25   BUN 13 13 16   CR 0.67* 0.70 0.78   ANIONGAP 7 8 7   VANIA 8.6 8.4* 8.3*   GLC 98 105 101       No results found for this or any previous visit (from the past 24 hour(s)).

## 2020-02-16 NOTE — PLAN OF CARE
Pt is comfortable and reports no pain. LS are clear, BS are active. CBI running slow to moderate pace - draining light bloody output with few clots - leaks at times - repositioning helps such as pulling catheter lightly to bring internal balloon to wall of bladder. Hx of bladder cancer.

## 2020-02-16 NOTE — PROGRESS NOTES
SAFETY CHECKLIST  ID Bands and Risk clasps correct and in place (DNR, Fall risk, Allergy, Latex, Limb):  Yes  All Lines Reconciled and labeled correctly: Yes  Whiteboard updated:Yes  Environmental interventions (bed/chair alarm on, call light, side rails, restraints, sitter....): Yes        Patient will continue CBI until Dr Olivia decides on the plan for this patient in the morning.  No clots but red at times with activity.

## 2020-02-16 NOTE — PLAN OF CARE
VSS. /66   Pulse 67   Temp 99.2  F (37.3  C) (Tympanic)   Resp 16   Wt 85.9 kg (189 lb 6.4 oz)   SpO2 95%   BMI 26.42 kg/m    LS crackles BLL. HR irregular. BS active. Denies pain or shortness of breath. Ambulated halls with SBA. Prater is patent. Leaking at times. Fluid is light pink in color with no clots. Scattered bruises throughout. Will continue to monitor.    Kateryna Diamond RN on 2/15/2020 at 6:31 PM

## 2020-02-17 ENCOUNTER — ANESTHESIA EVENT (OUTPATIENT)
Dept: SURGERY | Facility: OTHER | Age: 85
End: 2020-02-17
Payer: MEDICARE

## 2020-02-17 ENCOUNTER — ANESTHESIA (OUTPATIENT)
Dept: SURGERY | Facility: OTHER | Age: 85
End: 2020-02-17
Payer: MEDICARE

## 2020-02-17 PROBLEM — R33.8 CLOT RETENTION OF URINE: Status: ACTIVE | Noted: 2020-02-13

## 2020-02-17 LAB
ANION GAP SERPL CALCULATED.3IONS-SCNC: 7 MMOL/L (ref 3–14)
BUN SERPL-MCNC: 22 MG/DL (ref 7–25)
CALCIUM SERPL-MCNC: 8.3 MG/DL (ref 8.6–10.3)
CHLORIDE SERPL-SCNC: 106 MMOL/L (ref 98–107)
CO2 SERPL-SCNC: 26 MMOL/L (ref 21–31)
CREAT SERPL-MCNC: 0.87 MG/DL (ref 0.7–1.3)
ERYTHROCYTE [DISTWIDTH] IN BLOOD BY AUTOMATED COUNT: 13.6 % (ref 10–15)
GFR SERPL CREATININE-BSD FRML MDRD: 82 ML/MIN/{1.73_M2}
GLUCOSE SERPL-MCNC: 99 MG/DL (ref 70–105)
HCT VFR BLD AUTO: 39.3 % (ref 40–53)
HGB BLD-MCNC: 12.7 G/DL (ref 13.3–17.7)
MAGNESIUM SERPL-MCNC: 2.1 MG/DL (ref 1.9–2.7)
MCH RBC QN AUTO: 29.5 PG (ref 26.5–33)
MCHC RBC AUTO-ENTMCNC: 32.3 G/DL (ref 31.5–36.5)
MCV RBC AUTO: 91 FL (ref 78–100)
PLATELET # BLD AUTO: 228 10E9/L (ref 150–450)
POTASSIUM SERPL-SCNC: 3.9 MMOL/L (ref 3.5–5.1)
RBC # BLD AUTO: 4.3 10E12/L (ref 4.4–5.9)
SODIUM SERPL-SCNC: 139 MMOL/L (ref 134–144)
WBC # BLD AUTO: 9.7 10E9/L (ref 4–11)

## 2020-02-17 PROCEDURE — 52001 CYSTO W/IRRG&EVAC MLT CLOTS: CPT | Performed by: NURSE ANESTHETIST, CERTIFIED REGISTERED

## 2020-02-17 PROCEDURE — 85027 COMPLETE CBC AUTOMATED: CPT | Performed by: INTERNAL MEDICINE

## 2020-02-17 PROCEDURE — 99224 ZZC SUBSEQUENT OBSERVATION CARE,LEVEL I: CPT | Performed by: FAMILY MEDICINE

## 2020-02-17 PROCEDURE — 83735 ASSAY OF MAGNESIUM: CPT | Performed by: INTERNAL MEDICINE

## 2020-02-17 PROCEDURE — 25000125 ZZHC RX 250: Performed by: UROLOGY

## 2020-02-17 PROCEDURE — 80048 BASIC METABOLIC PNL TOTAL CA: CPT | Performed by: INTERNAL MEDICINE

## 2020-02-17 PROCEDURE — 99100 ANES PT EXTEME AGE<1 YR&>70: CPT | Performed by: NURSE ANESTHETIST, CERTIFIED REGISTERED

## 2020-02-17 PROCEDURE — 25000128 H RX IP 250 OP 636: Performed by: FAMILY MEDICINE

## 2020-02-17 PROCEDURE — 25000128 H RX IP 250 OP 636: Performed by: NURSE ANESTHETIST, CERTIFIED REGISTERED

## 2020-02-17 PROCEDURE — 25800025 ZZH RX 258: Performed by: UROLOGY

## 2020-02-17 PROCEDURE — 37000008 ZZH ANESTHESIA TECHNICAL FEE, 1ST 30 MIN: Performed by: UROLOGY

## 2020-02-17 PROCEDURE — 71000012 ZZH RECOVERY PHASE 1 LEVEL 1 FIRST HR: Performed by: UROLOGY

## 2020-02-17 PROCEDURE — 52001 CYSTO W/IRRG&EVAC MLT CLOTS: CPT | Performed by: UROLOGY

## 2020-02-17 PROCEDURE — 99213 OFFICE O/P EST LOW 20 MIN: CPT | Mod: 25 | Performed by: UROLOGY

## 2020-02-17 PROCEDURE — 37000009 ZZH ANESTHESIA TECHNICAL FEE, EACH ADDTL 15 MIN: Performed by: UROLOGY

## 2020-02-17 PROCEDURE — 36000050 ZZH SURGERY LEVEL 2 1ST 30 MIN: Performed by: UROLOGY

## 2020-02-17 PROCEDURE — 25800030 ZZH RX IP 258 OP 636: Performed by: NURSE ANESTHETIST, CERTIFIED REGISTERED

## 2020-02-17 PROCEDURE — 27210794 ZZH OR GENERAL SUPPLY STERILE: Performed by: UROLOGY

## 2020-02-17 PROCEDURE — G0378 HOSPITAL OBSERVATION PER HR: HCPCS

## 2020-02-17 PROCEDURE — 36415 COLL VENOUS BLD VENIPUNCTURE: CPT | Performed by: INTERNAL MEDICINE

## 2020-02-17 PROCEDURE — 36000052 ZZH SURGERY LEVEL 2 EA 15 ADDTL MIN: Performed by: UROLOGY

## 2020-02-17 PROCEDURE — 25000132 ZZH RX MED GY IP 250 OP 250 PS 637: Mod: GY | Performed by: FAMILY MEDICINE

## 2020-02-17 RX ORDER — FENTANYL CITRATE 50 UG/ML
25-50 INJECTION, SOLUTION INTRAMUSCULAR; INTRAVENOUS
Status: DISCONTINUED | OUTPATIENT
Start: 2020-02-17 | End: 2020-02-17 | Stop reason: HOSPADM

## 2020-02-17 RX ORDER — SODIUM CHLORIDE, SODIUM LACTATE, POTASSIUM CHLORIDE, CALCIUM CHLORIDE 600; 310; 30; 20 MG/100ML; MG/100ML; MG/100ML; MG/100ML
INJECTION, SOLUTION INTRAVENOUS CONTINUOUS
Status: DISCONTINUED | OUTPATIENT
Start: 2020-02-17 | End: 2020-02-17 | Stop reason: HOSPADM

## 2020-02-17 RX ORDER — ONDANSETRON 4 MG/1
4 TABLET, ORALLY DISINTEGRATING ORAL EVERY 30 MIN PRN
Status: DISCONTINUED | OUTPATIENT
Start: 2020-02-17 | End: 2020-02-17 | Stop reason: HOSPADM

## 2020-02-17 RX ORDER — FENTANYL CITRATE 50 UG/ML
INJECTION, SOLUTION INTRAMUSCULAR; INTRAVENOUS PRN
Status: DISCONTINUED | OUTPATIENT
Start: 2020-02-17 | End: 2020-02-17

## 2020-02-17 RX ORDER — NALOXONE HYDROCHLORIDE 0.4 MG/ML
.1-.4 INJECTION, SOLUTION INTRAMUSCULAR; INTRAVENOUS; SUBCUTANEOUS
Status: DISCONTINUED | OUTPATIENT
Start: 2020-02-17 | End: 2020-02-17 | Stop reason: HOSPADM

## 2020-02-17 RX ORDER — SODIUM CHLORIDE 9 MG/ML
INJECTION, SOLUTION INTRAVENOUS CONTINUOUS PRN
Status: DISCONTINUED | OUTPATIENT
Start: 2020-02-17 | End: 2020-02-17

## 2020-02-17 RX ORDER — ONDANSETRON 2 MG/ML
4 INJECTION INTRAMUSCULAR; INTRAVENOUS EVERY 30 MIN PRN
Status: DISCONTINUED | OUTPATIENT
Start: 2020-02-17 | End: 2020-02-17 | Stop reason: HOSPADM

## 2020-02-17 RX ORDER — CEFTRIAXONE SODIUM 1 G/50ML
1 INJECTION, SOLUTION INTRAVENOUS
Status: COMPLETED | OUTPATIENT
Start: 2020-02-17 | End: 2020-02-17

## 2020-02-17 RX ORDER — PROPOFOL 10 MG/ML
INJECTION, EMULSION INTRAVENOUS PRN
Status: DISCONTINUED | OUTPATIENT
Start: 2020-02-17 | End: 2020-02-17

## 2020-02-17 RX ORDER — SODIUM CHLORIDE 9 MG/ML
INJECTION, SOLUTION INTRAVENOUS CONTINUOUS
Status: DISCONTINUED | OUTPATIENT
Start: 2020-02-17 | End: 2020-02-17

## 2020-02-17 RX ORDER — PROPOFOL 10 MG/ML
INJECTION, EMULSION INTRAVENOUS CONTINUOUS PRN
Status: DISCONTINUED | OUTPATIENT
Start: 2020-02-17 | End: 2020-02-17

## 2020-02-17 RX ORDER — MEPERIDINE HYDROCHLORIDE 50 MG/ML
12.5 INJECTION INTRAMUSCULAR; INTRAVENOUS; SUBCUTANEOUS
Status: DISCONTINUED | OUTPATIENT
Start: 2020-02-17 | End: 2020-02-17 | Stop reason: HOSPADM

## 2020-02-17 RX ORDER — LIDOCAINE HYDROCHLORIDE 20 MG/ML
JELLY TOPICAL PRN
Status: DISCONTINUED | OUTPATIENT
Start: 2020-02-17 | End: 2020-02-17 | Stop reason: HOSPADM

## 2020-02-17 RX ADMIN — PROPOFOL 110 MCG/KG/MIN: 10 INJECTION, EMULSION INTRAVENOUS at 11:32

## 2020-02-17 RX ADMIN — ACETAMINOPHEN 650 MG: 325 TABLET, FILM COATED ORAL at 20:23

## 2020-02-17 RX ADMIN — METOPROLOL SUCCINATE 12.5 MG: 25 TABLET, EXTENDED RELEASE ORAL at 21:32

## 2020-02-17 RX ADMIN — PROPOFOL 30 MG: 10 INJECTION, EMULSION INTRAVENOUS at 11:32

## 2020-02-17 RX ADMIN — SODIUM CHLORIDE 3000 ML: 900 IRRIGANT IRRIGATION at 01:24

## 2020-02-17 RX ADMIN — FENTANYL CITRATE 25 MCG: 50 INJECTION, SOLUTION INTRAMUSCULAR; INTRAVENOUS at 11:39

## 2020-02-17 RX ADMIN — CEFTRIAXONE SODIUM 1 G: 1 INJECTION, SOLUTION INTRAVENOUS at 11:27

## 2020-02-17 RX ADMIN — SODIUM CHLORIDE: 0.9 INJECTION, SOLUTION INTRAVENOUS at 11:27

## 2020-02-17 RX ADMIN — LOSARTAN POTASSIUM 50 MG: 50 TABLET, FILM COATED ORAL at 10:23

## 2020-02-17 RX ADMIN — ATORVASTATIN CALCIUM 10 MG: 10 TABLET, FILM COATED ORAL at 21:32

## 2020-02-17 NOTE — PLAN OF CARE
CBI turned off by Dr. Olivia at 0800 to evaluate output. Has been running slow with pale pink urine output,  continues to leak at meatus but patient reports is much better. Denies any pain or discomfort at this time.     /81   Pulse 72   Temp 96.9  F (36.1  C) (Tympanic)   Resp 18   Wt 90 kg (198 lb 6.6 oz)   SpO2 94%   BMI 27.67 kg/m      Gracie Parada RN on 2/17/2020 at 8:52 AM

## 2020-02-17 NOTE — PLAN OF CARE
Pt denies pain. LS clear, BS active. CBI running at moderate pace - light red output - no leaking as of yet. A&O x 4.     /52   Pulse 76   Temp 98.7  F (37.1  C) (Tympanic)   Resp 20   Wt 90 kg (198 lb 6.6 oz)   SpO2 93%   BMI 27.67 kg/m

## 2020-02-17 NOTE — ANESTHESIA POSTPROCEDURE EVALUATION
Patient: James Grant    Procedure(s):  Clot Evacuation    Diagnosis:Clot retention of urine [R33.8]  Diagnosis Additional Information: No value filed.    Anesthesia Type:  MAC    Note:  Anesthesia Post Evaluation    Patient location during evaluation: Phase 2  Patient participation: Able to fully participate in evaluation  Level of consciousness: awake and alert  Pain management: adequate  Airway patency: patent  Cardiovascular status: acceptable  Respiratory status: acceptable  Hydration status: acceptable  PONV: none     Anesthetic complications: None          Last vitals:  Vitals:    02/17/20 1215 02/17/20 1220 02/17/20 1225   BP: 127/75 132/69    Pulse: 67 64    Resp: 16 9    Temp: 98.2  F (36.8  C)     SpO2: 95% 94% 94%         Electronically Signed By: PRATIMA COHEN CRNA  February 17, 2020  1:23 PM

## 2020-02-17 NOTE — OR NURSING
PACU Transfer Note    James Grant was transferred to MED SURG via bed.  Equipment used for transport:  .  Accompanied by:  RN  Prescriptions were: none    PACU Respiratory Event Documentation     1) Episodes of Apnea greater than or equal to 10 seconds: no    2) Bradypnea - less than 8 breaths per minute: no    3) Pain score on 0 to 10 scale: none    4) Pain-sedation mismatch (yes or no): no    5) Repeated 02 desaturation less than 90% (yes or no): no    Anesthesia notified? (yes or no): no report to Gracie Marcus Rn Both aides in ears     Any of the above events occuring repeatedly in separate 30 minute intervals may be considered recurrent PACU respiratory events.    Patient stable and meets phase 1 discharge criteria for transport from PACU.

## 2020-02-17 NOTE — PROGRESS NOTES
SAFETY CHECKLIST  ID Bands and Risk clasps correct and in place (DNR, Fall risk, Allergy, Latex, Limb):  Yes  All Lines Reconciled and labeled correctly: Yes  Whiteboard updated:Yes  Environmental interventions (bed/chair alarm on, call light, side rails, restraints, sitter....): Yes    Gracie Parada RN on 2/17/2020 at 7:22 AM

## 2020-02-17 NOTE — ANESTHESIA PREPROCEDURE EVALUATION
Anesthesia Pre-Procedure Evaluation    Patient: James Grant   MRN: [id number] : 3/5/1927          Preoperative Diagnosis: Clot retention of urine [R33.8]    Procedure(s):  Clot Evacuation    Past Medical History:   Diagnosis Date     Benign localized hyperplasia of prostate without urinary obstruction and other lower urinary tract symptoms (LUTS) 2010     CHD (coronary heart disease) 2010     Dyslipidemia 2010     GERD (gastroesophageal reflux disease) 2010     HTN (hypertension) 2010     Old myocardial infarction 2010     Other symptoms involving digestive system(787.99) 10/20/2006     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 Pt has had prior anesthetic.            ROS/MED HX    ENT/Pulmonary:  - neg pulmonary ROS     Neurologic: Comment: Hard or hearing      Cardiovascular:     (+) Dyslipidemia, hypertension--CAD, --. : . . . :. .       METS/Exercise Tolerance:  3 - Able to walk 1-2 blocks without stopping   Hematologic:  - neg hematologic  ROS       Musculoskeletal:  - neg musculoskeletal ROS       GI/Hepatic:     (+) GERD       Renal/Genitourinary: Comment: Hematuria        Endo:  - neg endo ROS       Psychiatric:  - neg psychiatric ROS       Infectious Disease:  - neg infectious disease ROS       Malignancy:   (+) Malignancy History of Other  Bladder        Other:                          Physical Exam  Normal systems: cardiovascular  "and pulmonary    Airway   Mallampati: II  TM distance: >3 FB  Neck ROM: limited    Dental   (+) missing and chipped    Cardiovascular   Rhythm and rate: regular and normal      Pulmonary    breath sounds clear to auscultation            Lab Results   Component Value Date    WBC 9.7 02/17/2020    HGB 12.7 (L) 02/17/2020    HCT 39.3 (L) 02/17/2020     02/17/2020    CRP <2.9 01/02/2017     02/17/2020    POTASSIUM 3.9 02/17/2020    CHLORIDE 106 02/17/2020    CO2 26 02/17/2020    BUN 22 02/17/2020    CR 0.87 02/17/2020    GLC 99 02/17/2020    VANIA 8.3 (L) 02/17/2020    MAG 2.1 02/17/2020    ALBUMIN 3.5 12/29/2019    PROTTOTAL 7.1 12/29/2019    ALT 28 12/29/2019    AST 20 12/29/2019    ALKPHOS 69 12/29/2019    BILITOTAL 0.3 12/29/2019    LIPASE 186 02/08/2014    PTT 33 02/08/2014    INR 1.07 12/29/2019    TSH 2.95 02/07/2019    TROPN 0.06 02/08/2014       Preop Vitals  BP Readings from Last 3 Encounters:   02/17/20 135/81   02/13/20 111/81   01/03/20 120/70    Pulse Readings from Last 3 Encounters:   02/17/20 72   02/13/20 62   01/03/20 85      Resp Readings from Last 3 Encounters:   02/17/20 18   02/13/20 20   01/02/20 16    SpO2 Readings from Last 3 Encounters:   02/17/20 94%   02/13/20 95%   01/03/20 97%      Temp Readings from Last 1 Encounters:   02/17/20 96.9  F (36.1  C) (Tympanic)    Ht Readings from Last 1 Encounters:   12/29/19 1.803 m (5' 11\")      Wt Readings from Last 1 Encounters:   02/16/20 90 kg (198 lb 6.6 oz)    Estimated body mass index is 27.67 kg/m  as calculated from the following:    Height as of 12/29/19: 1.803 m (5' 11\").    Weight as of this encounter: 90 kg (198 lb 6.6 oz).       Anesthesia Plan      History & Physical Review      ASA Status:  3 .    NPO Status:  > 8 hours    Plan for MAC (Patient okay if needed with general with LMA if needed)          Postoperative Care      Consents  Anesthetic plan, risks, benefits and alternatives discussed with:  Patient..                 Lenny " Kellerman, APRN CRNA

## 2020-02-17 NOTE — PROGRESS NOTES
[facility]  Hospitalist Progress Note    Assessment & Plan   James Grant is a 89 year old male who was admitted on 2/13/2020.      > Hematuria, gross  > History of bladder cancer  > Acute blood loss anemia   Continues to have intermittent red urine with clots.  Will continue CBI and discuss case with Urology.  Patient had outpatient cystoscopy scheduled for today.  Me need to be rescheduled depending on urology recommendations.   -- Appreciate Urology consult   -- continue CBI   -- Follow CBC    > Bradycardia, resolved  Echo normal.   -- Monitor    > Essential hypertension, chronic   -- Continue losartain    > Hyperlipid, chronic   -- Continue atorvastatin    DVT Prophylaxis: Anti-embolisim stockings (TEDs)  Code Status: DNR/DNI    Sheba Alberto    Interval History   No abdominal pain. Nursing reports ongoing intermittent clotting with red urine.  Son at bedside.  Sleeping well.  Has been tolerating diet well over the weekend but is currently n.p.o. in case of need for procedure later today.  No fever or chills.  Has been up walking in the halls.    -Data reviewed today: I reviewed all new labs and imaging results over the last 24 hours. I personally reviewed no images or EKG's today.    Physical Exam   Temp: 96.9  F (36.1  C) Temp src: Tympanic BP: 135/81 Pulse: 72 Heart Rate: 71 Resp: 18 SpO2: 94 % O2 Device: None (Room air)    Vitals:    02/13/20 0445 02/14/20 0050 02/16/20 0013   Weight: 87.7 kg (193 lb 5.5 oz) 85.9 kg (189 lb 6.4 oz) 90 kg (198 lb 6.6 oz)     Vital Signs with Ranges  Temp:  [96.6  F (35.9  C)-98.7  F (37.1  C)] 96.9  F (36.1  C)  Pulse:  [72-77] 72  Heart Rate:  [64-78] 71  Resp:  [16-20] 18  BP: (100-135)/(43-81) 135/81  SpO2:  [93 %-97 %] 94 %  I/O last 3 completed shifts:  In: 980 [P.O.:980]  Out: 75 [Urine:75]    Gen: Alert, NAD.  HEENT: MMM  Neck: Supple  CV: RRR no m/r/g  Pulm: CTAB, no w/r/r. No increased work of breathing  Msk: No LE edema  Abd: Soft  : Prater  draining pink urine   neuro: Grossly intact  Skin: No concerning lesions.  Psychiatric: Normal affect and insight. Does not appear anxious or depressed.        Medications     sodium chloride 0.9% (bag)         atorvastatin  10 mg Oral At Bedtime     losartan  50 mg Oral Daily     sodium chloride (PF)  3 mL Intracatheter Q8H       Data   Recent Labs   Lab 02/17/20  0440 02/16/20  0505 02/15/20  0503   WBC 9.7 9.0 8.6   HGB 12.7* 13.9 13.2*   MCV 91 90 90    222 211    137 137   POTASSIUM 3.9 3.9 3.9   CHLORIDE 106 106 105   CO2 26 24 24   BUN 22 13 13   CR 0.87 0.67* 0.70   ANIONGAP 7 7 8   VANIA 8.3* 8.6 8.4*   GLC 99 98 105       No results found for this or any previous visit (from the past 24 hour(s)).

## 2020-02-17 NOTE — PROGRESS NOTES
Pt denies pain. LS clear - dim in left lower. BS active. CBI running slow with light pink output with clots. Consult with Dr. Olivia planned for this morning.

## 2020-02-17 NOTE — PROGRESS NOTES
Urology Progress Note    HD# 5 gross hematuria with CBI    SUBJECTIVE  Hematuria continues to wax and wane depending on degree of CBI  Multiple clots irrigated over the weekend.  Patient denies fevers and chills or other symptoms    OBJECTIVE    Intake/Output Summary (Last 24 hours) at 2/17/2020 1014  Last data filed at 2/17/2020 0100  Gross per 24 hour   Intake 480 ml   Output 75 ml   Net 405 ml       /81   Pulse 72   Temp 96.9  F (36.1  C) (Tympanic)   Resp 18   Wt 90 kg (198 lb 6.6 oz)   SpO2 94%   BMI 27.67 kg/m    ABDOMEN: S/NT/ND  Urine light pink with CBI running, held CBI for 2 hours and hematuria worsened    LABS  Results for orders placed or performed during the hospital encounter of 02/13/20   Potassium     Status: None   Result Value Ref Range    Potassium 3.9 3.5 - 5.1 mmol/L   Magnesium     Status: None   Result Value Ref Range    Magnesium 2.1 1.9 - 2.7 mg/dL   Hemoglobin     Status: Abnormal   Result Value Ref Range    Hemoglobin 13.1 (L) 13.3 - 17.7 g/dL   Basic metabolic panel     Status: Abnormal   Result Value Ref Range    Sodium 140 134 - 144 mmol/L    Potassium 4.0 3.5 - 5.1 mmol/L    Chloride 108 (H) 98 - 107 mmol/L    Carbon Dioxide 25 21 - 31 mmol/L    Anion Gap 7 3 - 14 mmol/L    Glucose 101 70 - 105 mg/dL    Urea Nitrogen 16 7 - 25 mg/dL    Creatinine 0.78 0.70 - 1.30 mg/dL    GFR Estimate >90 >60 mL/min/[1.73_m2]    GFR Estimate If Black >90 >60 mL/min/[1.73_m2]    Calcium 8.3 (L) 8.6 - 10.3 mg/dL   CBC with platelets     Status: Abnormal   Result Value Ref Range    WBC 9.7 4.0 - 11.0 10e9/L    RBC Count 4.41 4.4 - 5.9 10e12/L    Hemoglobin 13.0 (L) 13.3 - 17.7 g/dL    Hematocrit 40.1 40.0 - 53.0 %    MCV 91 78 - 100 fl    MCH 29.5 26.5 - 33.0 pg    MCHC 32.4 31.5 - 36.5 g/dL    RDW 13.3 10.0 - 15.0 %    Platelet Count 211 150 - 450 10e9/L   Magnesium     Status: None   Result Value Ref Range    Magnesium 2.0 1.9 - 2.7 mg/dL   CBC with platelets     Status: Abnormal   Result  Value Ref Range    WBC 8.6 4.0 - 11.0 10e9/L    RBC Count 4.44 4.4 - 5.9 10e12/L    Hemoglobin 13.2 (L) 13.3 - 17.7 g/dL    Hematocrit 39.9 (L) 40.0 - 53.0 %    MCV 90 78 - 100 fl    MCH 29.7 26.5 - 33.0 pg    MCHC 33.1 31.5 - 36.5 g/dL    RDW 13.3 10.0 - 15.0 %    Platelet Count 211 150 - 450 10e9/L   Basic metabolic panel     Status: Abnormal   Result Value Ref Range    Sodium 137 134 - 144 mmol/L    Potassium 3.9 3.5 - 5.1 mmol/L    Chloride 105 98 - 107 mmol/L    Carbon Dioxide 24 21 - 31 mmol/L    Anion Gap 8 3 - 14 mmol/L    Glucose 105 70 - 105 mg/dL    Urea Nitrogen 13 7 - 25 mg/dL    Creatinine 0.70 0.70 - 1.30 mg/dL    GFR Estimate >90 >60 mL/min/[1.73_m2]    GFR Estimate If Black >90 >60 mL/min/[1.73_m2]    Calcium 8.4 (L) 8.6 - 10.3 mg/dL   Magnesium     Status: None   Result Value Ref Range    Magnesium 2.0 1.9 - 2.7 mg/dL   CBC with platelets     Status: None   Result Value Ref Range    WBC 9.0 4.0 - 11.0 10e9/L    RBC Count 4.63 4.4 - 5.9 10e12/L    Hemoglobin 13.9 13.3 - 17.7 g/dL    Hematocrit 41.7 40.0 - 53.0 %    MCV 90 78 - 100 fl    MCH 30.0 26.5 - 33.0 pg    MCHC 33.3 31.5 - 36.5 g/dL    RDW 13.3 10.0 - 15.0 %    Platelet Count 222 150 - 450 10e9/L   Basic metabolic panel     Status: Abnormal   Result Value Ref Range    Sodium 137 134 - 144 mmol/L    Potassium 3.9 3.5 - 5.1 mmol/L    Chloride 106 98 - 107 mmol/L    Carbon Dioxide 24 21 - 31 mmol/L    Anion Gap 7 3 - 14 mmol/L    Glucose 98 70 - 105 mg/dL    Urea Nitrogen 13 7 - 25 mg/dL    Creatinine 0.67 (L) 0.70 - 1.30 mg/dL    GFR Estimate >90 >60 mL/min/[1.73_m2]    GFR Estimate If Black >90 >60 mL/min/[1.73_m2]    Calcium 8.6 8.6 - 10.3 mg/dL   Magnesium     Status: None   Result Value Ref Range    Magnesium 1.9 1.9 - 2.7 mg/dL   CBC with platelets     Status: Abnormal   Result Value Ref Range    WBC 9.7 4.0 - 11.0 10e9/L    RBC Count 4.30 (L) 4.4 - 5.9 10e12/L    Hemoglobin 12.7 (L) 13.3 - 17.7 g/dL    Hematocrit 39.3 (L) 40.0 - 53.0 %     MCV 91 78 - 100 fl    MCH 29.5 26.5 - 33.0 pg    MCHC 32.3 31.5 - 36.5 g/dL    RDW 13.6 10.0 - 15.0 %    Platelet Count 228 150 - 450 10e9/L   Basic metabolic panel     Status: Abnormal   Result Value Ref Range    Sodium 139 134 - 144 mmol/L    Potassium 3.9 3.5 - 5.1 mmol/L    Chloride 106 98 - 107 mmol/L    Carbon Dioxide 26 21 - 31 mmol/L    Anion Gap 7 3 - 14 mmol/L    Glucose 99 70 - 105 mg/dL    Urea Nitrogen 22 7 - 25 mg/dL    Creatinine 0.87 0.70 - 1.30 mg/dL    GFR Estimate 82 >60 mL/min/[1.73_m2]    GFR Estimate If Black >90 >60 mL/min/[1.73_m2]    Calcium 8.3 (L) 8.6 - 10.3 mg/dL   Magnesium     Status: None   Result Value Ref Range    Magnesium 2.1 1.9 - 2.7 mg/dL   Echocardiogram Complete     Status: None    [facility]    [id number]  [id number]  [id number]^RADHA^CHEN^M        [facility]  [address] [place] [address]     Name: JENNIFER RODRIGUEZ  MRN: [id number]  : 1927  Study Date: 2020 08:03 AM  Age: 89 yrs  Gender: Male  Patient Location: [facility]  Reason For Study: Bradycardia - Sinus  Ordering Physician: CHEN GARCIA  Performed By: Charlee Harp, MEDHAT, RVT     BSA: 2.1 m2  Height: 71 in  Weight: 193 lb  HR: 48  BP: 179/81 mmHg  _____________________________________________________________________________  __        Procedure  Complete Portable Echo Adult.  _____________________________________________________________________________  __        Interpretation Summary  Global and regional left ventricular function is normal with an EF of 60-65%.  Right ventricular function, chamber size, wall motion, and thickness are  normal.  No significant valvular abnormalities were noted.  Previous study not available for comparison.  _____________________________________________________________________________  __        Left Ventricle  Left ventricular size is normal. Left ventricular wall thickness is normal.  Global and regional left ventricular function is  normal with an EF of 60-65%.  Left ventricular diastolic function is normal.     Right Ventricle  Right ventricular function, chamber size, wall motion, and thickness are  normal.     Atria  Both atria appear normal.     Mitral Valve  Mild mitral annular calcification is present. Trace mitral insufficiency is  present.     Aortic Valve  Aortic valve is normal in structure and function. The aortic valve is  tricuspid.        Tricuspid Valve  The tricuspid valve is normal. Trace tricuspid insufficiency is present. The  right ventricular systolic pressure is approximated at 33.0 mmHg plus the  right atrial pressure.     Pulmonic Valve  The pulmonic valve is normal.     Vessels  The inferior vena cava was normal in size with preserved respiratory  variability. The aorta root is normal. IVC diameter <2.1 cm collapsing >50%  with sniff suggests a normal RA pressure of 3 mmHg.     Compared to Previous Study  Previous study not available for comparison.     _____________________________________________________________________________  __  MMode/2D Measurements & Calculations  IVSd: 1.1 cm  LVIDd: 4.9 cm  LVIDs: 2.8 cm  LVPWd: 0.99 cm  FS: 42.9 %  LV mass(C)d: 190.5 grams  LV mass(C)dI: 91.7 grams/m2  Ao root diam: 3.9 cm  asc Aorta Diam: 3.5 cm     LVOT diam: 2.5 cm  LVOT area: 4.9 cm2  LA Volume (BP): 55.6 ml  LA Volume Index (BP): 26.7 ml/m2  RWT: 0.40        Doppler Measurements & Calculations  MV E max farshad: 91.2 cm/sec  MV A max farshad: 90.6 cm/sec  MV E/A: 1.0  MV dec time: 0.19 sec  Ao V2 max: 102.0 cm/sec  Ao max P.0 mmHg  ASHER(V,D): 4.0 cm2  LV V1 max P.8 mmHg  LV V1 max: 83.2 cm/sec     TR max farshad: 287.3 cm/sec  TR max P.0 mmHg  AV Farshad Ratio (DI): 0.82  E/E' av.6  Lateral E/e': 11.3  Medial E/e': 12.0           _____________________________________________________________________________  __           Report approved by: Lulu MARTINEZ 2020 10:17 AM            ASSESSMENT & PLAN  Refractory  gross hematuria.  Discussed cystoscopy in the OR with clot evacuation.  Discussed the risk benefits and alternatives.  Conservative approach has not been beneficial.  Patient would like to proceed with this plan.

## 2020-02-17 NOTE — OP NOTE
Preoperative diagnosis  Gross hematuria  Clot retention    Postoperative diagnosis  Gross hematuria  Clot retention    Procedure performed  Cystoscopy with clot evacuation and fulguration of sites of bleeding.    Surgeon  Andrzej Olivia MD    Surgeon(s)/Proceduralist(s) and Assistants (if any)  Surgeon(s):  Andrzej Olivia MD  Circulator: Giuliana Kaye RN  Relief Circulator: Sophie Jensen RN  Scrub Person: Deb Galvan  First Assistant: Sara Humphreys RN    Specimen(s)  No    (EBL) Estimated blood loss (ml)  25    Anesthesia  MAC    Complications  None    Findings  Large clot in bladder lumen  Very high bladder neck with a large amount of bleeding from the prostatic urethra.    Indications  89 year old male agreed to undergo the above named procedure after discussion of the alternatives, risks and benefits.    Informed consent was obtained.      Procedure  The patient was taken to the operating room and placed supine on the operating table.    Pre-operative antibiotics were administered and bilateral lower extremity SCDs were placed.    After induction of general anesthesia the patient was positioned in dorsal lithotomy.    A time-out was performed and the patient was prepped and draped in a sterile fashion.      A 26 Filipino resectoscope was introduced into the bladder under direct vision and cystoscopy was performed.  The clot was removed using a combination of irrigation and manipulation using the loop.  The sites of bleeding were fulgurated.  After removing the clot cystoscopy was again performed.    The patient tolerated the procedure well was awakened and transferred to the recovery room in stable condition.    Plan  Med/Surg- CBI, wean to clear

## 2020-02-17 NOTE — PHARMACY-CONSULT NOTE
Pharmacy - Transfer Medication Reconciliation     The patient's transfer medication orders have been compared to the medication administration record and to the Prior to Admissions Medications list - any noted discrepancies were resolved with the MD.     Thank you. Pharmacy will continue to monitor.     Jodee Awad RPH ....................  2/17/2020   1:17 PM

## 2020-02-17 NOTE — PROGRESS NOTES
Transferred back to room # 305 at 1235 after surgical procedure, alert and oriented, able to use call light and make needs known. Denies any pain or discomfort, reports no appetite and is not thirsty at this time, encouraged increase fluid intake.     BP (!) 146/80   Pulse 64   Temp 97.8  F (36.6  C) (Tympanic)   Resp 16   Wt 90 kg (198 lb 6.6 oz)   SpO2 96%   BMI 27.67 kg/m      Gracie Parada RN on 2/17/2020 at 2:25 PM

## 2020-02-17 NOTE — PROGRESS NOTES
Spoke with Dr. Alberto about IV fluids, ok to saline lock.    Gracie Parada RN on 2/17/2020 at 2:38 PM

## 2020-02-17 NOTE — ANESTHESIA CARE TRANSFER NOTE
Patient: James Grant    Procedure(s):  Clot Evacuation    Diagnosis: Clot retention of urine [R33.8]  Diagnosis Additional Information: No value filed.    Anesthesia Type:   MAC     Note:  Airway :Room Air (Patient spont breathing well. RR 23. SPo2 97%. Patient transfer on room air. Patient on room air during care transfer. Patient responded appropriately to verbal stimulation.)  Patient transferred to:PACU  Handoff Report: Identifed the Patient, Identified the Reponsible Provider, Reviewed the pertinent medical history, Discussed the surgical course, Reviewed Intra-OP anesthesia mangement and issues during anesthesia, Set expectations for post-procedure period and Allowed opportunity for questions and acknowledgement of understanding      Vitals: (Last set prior to Anesthesia Care Transfer)    CRNA VITALS  2/17/2020 1128 - 2/17/2020 1207      2/17/2020             Resp Rate (set):  10                Electronically Signed By: David Kellerman, APRN CRNA  February 17, 2020  12:07 PM

## 2020-02-18 VITALS
BODY MASS INDEX: 27.67 KG/M2 | RESPIRATION RATE: 16 BRPM | SYSTOLIC BLOOD PRESSURE: 118 MMHG | HEART RATE: 75 BPM | WEIGHT: 198.41 LBS | DIASTOLIC BLOOD PRESSURE: 54 MMHG | OXYGEN SATURATION: 94 % | TEMPERATURE: 96.5 F

## 2020-02-18 LAB
ANION GAP SERPL CALCULATED.3IONS-SCNC: 7 MMOL/L (ref 3–14)
BUN SERPL-MCNC: 19 MG/DL (ref 7–25)
CALCIUM SERPL-MCNC: 8.3 MG/DL (ref 8.6–10.3)
CHLORIDE SERPL-SCNC: 105 MMOL/L (ref 98–107)
CO2 SERPL-SCNC: 26 MMOL/L (ref 21–31)
CREAT SERPL-MCNC: 0.73 MG/DL (ref 0.7–1.3)
ERYTHROCYTE [DISTWIDTH] IN BLOOD BY AUTOMATED COUNT: 13.4 % (ref 10–15)
GFR SERPL CREATININE-BSD FRML MDRD: >90 ML/MIN/{1.73_M2}
GLUCOSE SERPL-MCNC: 104 MG/DL (ref 70–105)
HCT VFR BLD AUTO: 38.5 % (ref 40–53)
HGB BLD-MCNC: 12.5 G/DL (ref 13.3–17.7)
MAGNESIUM SERPL-MCNC: 2 MG/DL (ref 1.9–2.7)
MCH RBC QN AUTO: 29.5 PG (ref 26.5–33)
MCHC RBC AUTO-ENTMCNC: 32.5 G/DL (ref 31.5–36.5)
MCV RBC AUTO: 91 FL (ref 78–100)
PLATELET # BLD AUTO: 243 10E9/L (ref 150–450)
POTASSIUM SERPL-SCNC: 4 MMOL/L (ref 3.5–5.1)
RBC # BLD AUTO: 4.24 10E12/L (ref 4.4–5.9)
SODIUM SERPL-SCNC: 138 MMOL/L (ref 134–144)
WBC # BLD AUTO: 10.5 10E9/L (ref 4–11)

## 2020-02-18 PROCEDURE — G0378 HOSPITAL OBSERVATION PER HR: HCPCS

## 2020-02-18 PROCEDURE — 83735 ASSAY OF MAGNESIUM: CPT | Performed by: INTERNAL MEDICINE

## 2020-02-18 PROCEDURE — 25000132 ZZH RX MED GY IP 250 OP 250 PS 637: Mod: GY | Performed by: FAMILY MEDICINE

## 2020-02-18 PROCEDURE — 80048 BASIC METABOLIC PNL TOTAL CA: CPT | Performed by: INTERNAL MEDICINE

## 2020-02-18 PROCEDURE — 85027 COMPLETE CBC AUTOMATED: CPT | Performed by: INTERNAL MEDICINE

## 2020-02-18 PROCEDURE — 36415 COLL VENOUS BLD VENIPUNCTURE: CPT | Performed by: INTERNAL MEDICINE

## 2020-02-18 PROCEDURE — 99217 ZZC OBSERVATION CARE DISCHARGE: CPT | Performed by: FAMILY MEDICINE

## 2020-02-18 RX ADMIN — LOSARTAN POTASSIUM 50 MG: 50 TABLET, FILM COATED ORAL at 10:25

## 2020-02-18 NOTE — PHARMACY - DISCHARGE MEDICATION RECONCILIATION AND EDUCATION
Pharmacy:  Discharge Counseling and Medication Reconciliation    James Grant  [address]  [place] [address]  [phone] (home)   89 year old male  PCP: Broderick Flowers    Allergies: Lisinopril and Morphine    Discharge Counseling:    Pharmacist met with patient (and/or family) today to review the medication portion of the After Visit Summary (with an emphasis on NEW medications) and to address patient's questions/concerns.    Summary of Education: Did not meet with patient, no changes to his medications at discharge.    Materials Provided:  MedCounselor sheets printed from Clinical Pharmacology on: NA    Discharge Medication Reconciliation:    It has been determined that the patient has an adequate supply of medications available or which can be obtained from the patient's preferred pharmacy.    Thank you for the consult.    Jodee Awad RPH........February 18, 2020 9:44 AM       Right arm;

## 2020-02-18 NOTE — PROGRESS NOTES
SAFETY CHECKLIST  ID Bands and Risk clasps correct and in place (DNR, Fall risk, Allergy, Latex, Limb):  Yes  All Lines Reconciled and labeled correctly: Yes  Whiteboard updated:Yes  Environmental interventions (bed/chair alarm on, call light, side rails, restraints, sitter....): Yes    Andreea Weeks RN on 2/18/2020 at 3:51 AM

## 2020-02-18 NOTE — DISCHARGE SUMMARY
[facility]    Discharge Summary  Hospitalist    Date of Admission:  2/13/2020  Date of Discharge:  2/18/2020  Discharging Provider: Sheba Alberto  Date of Service (when I saw the patient): 02/18/20    Discharge Diagnoses   Principal Problem:    Hematuria (2/13/2020), present on admission.   Active Problems:    Essential hypertension, benign (11/8/2010)    History of bladder cancer (10/28/2019)    Anemia due to blood loss, acute (2/14/2020), present on admission.     Clot retention of urine (2/13/2020), present on admission.       History of Present Illness   James Grant is an 89 year old male who presented with hematuria.     Hospital Course   James Grant was admitted on 2/13/2020.  The following problems were addressed during his hospitalization:    Hematuria, gross   History of bladder cancer  Acute blood loss anemia, due to hematuria. Stable.  Patient was put on continuous bladder irrigation.  Initially had improvement but then again worsening of symptoms.  Urology was consulted.  He underwent cystoscopy on 2/17/2020.  Prater catheter was removed and he was able to void without difficulty.  Etiology of hematuria is not identified.  He was advised to cut down on the amount of cycling he did for the next few weeks until he sees Dr. Olivia in follow-up.     Bradycardia, resolved.  When patient arrived to the floor from the [place] emergency room there was initial concern for bradycardia with a heart rate of 37.  He was never symptomatic.  He was placed on telemetry and continued to have a heart rate in the 60s and 70s and had no further bradycardic episodes.  Of note he is on metoprolol and this was held for 1 day.  Medications were restarted prior to discharge and heart rate remained stable.  Echocardiogram was done and came back normal.     Essential hypertension, chronic. Stable. Continue home meds.      Hyperlipid, chronic  Continue atorvastatin    Sheba Alberto, DO    Significant  Results and Procedures   Cystoscopy with clot evacuation and fulguration of sites of bleeding on 2/17/2020 with Dr. Olivia.   Pending Results   These results will be followed up by n/a  Unresulted Labs Ordered in the Past 30 Days of this Admission     No orders found from 1/14/2020 to 2/14/2020.          Code Status   DNR / DNI       Primary Care Physician   Broderick Flowers    Physical Exam   Temp: 98.9  F (37.2  C) Temp src: Tympanic BP: 139/74 Pulse: 75 Heart Rate: 73 Resp: 16 SpO2: 94 % O2 Device: None (Room air)    Vitals:    02/13/20 0445 02/14/20 0050 02/16/20 0013   Weight: 87.7 kg (193 lb 5.5 oz) 85.9 kg (189 lb 6.4 oz) 90 kg (198 lb 6.6 oz)     Vital Signs with Ranges  Temp:  [95.2  F (35.1  C)-98.9  F (37.2  C)] 98.9  F (37.2  C)  Pulse:  [64-75] 75  Heart Rate:  [62-74] 73  Resp:  [9-18] 16  BP: (113-159)/(54-81) 139/74  SpO2:  [93 %-97 %] 94 %  I/O last 3 completed shifts:  In: -   Out: 3400 [Urine:3400]    Constitutional: AAO, NAD. Well hydrated, well groomed.   Eyes: EOMI. Lids normal.   ENT: MMM.   Respiratory: CTA B/L -w/r/r  Cardiovascular: RR. +KENYATTA  GI: abd soft, NT, ND    Discharge Disposition   Discharged to home  Condition at discharge: Stable    Consultations This Hospital Stay   None    Time Spent on this Encounter   ISheba DO, personally saw the patient today and spent less than or equal to 30 minutes discharging this patient.    Discharge Orders   No discharge procedures on file.  Discharge Medications   Current Discharge Medication List      CONTINUE these medications which have NOT CHANGED    Details   famotidine (PEPCID) 20 MG tablet TAKE ONE (1) TABLET BY MOUTH DAILY  Qty: 30 tablet, Refills: 4    Comments: This prescription was filled on 1/8/2020. Any refills authorized will be placed on file.  Associated Diagnoses: Gastroesophageal reflux disease without esophagitis      losartan (COZAAR) 50 MG tablet TAKE 1 TABLET (50 MG) BY MOUTH DAILY COZAAR  Qty: 30 tablet, Refills: 1     Comments: This prescription was filled on 1/8/2020. Any refills authorized will be placed on file.  Associated Diagnoses: Benign essential hypertension      metoprolol succinate ER (TOPROL-XL) 25 MG 24 hr tablet TAKE 1/2 TAB BY MOUTH AT BEDTIME. DO NOT CRUSH OR CHEW.  Qty: 45 tablet, Refills: 2    Associated Diagnoses: Benign essential hypertension      simvastatin (ZOCOR) 20 MG tablet Take 1 tablet (20 mg) by mouth At Bedtime  Qty: 90 tablet, Refills: 0    Comments: This prescription was filled on 1/8/2020. Any refills authorized will be placed on file.  Associated Diagnoses: Mixed hyperlipidemia      NITROSTAT 0.4 MG sublingual tablet PLACE 1 TABLET (0.4 MG) UNDER THE TONGUE EVERY 5 MINUTES AS NEEDED  Qty: 25 tablet, Refills: 3    Associated Diagnoses: Coronary artery disease involving native coronary artery of native heart without angina pectoris           Allergies   Allergies   Allergen Reactions     Lisinopril Cough     Morphine Other (See Comments) and Visual Disturbance     confusion     Data   Most Recent 3 CBC's:  Recent Labs   Lab Test 02/18/20  0428 02/17/20  0440 02/16/20  0505   WBC 10.5 9.7 9.0   HGB 12.5* 12.7* 13.9   MCV 91 91 90    228 222      Most Recent 3 BMP's:  Recent Labs   Lab Test 02/18/20  0428 02/17/20  0440 02/16/20  0505    139 137   POTASSIUM 4.0 3.9 3.9   CHLORIDE 105 106 106   CO2 26 26 24   BUN 19 22 13   CR 0.73 0.87 0.67*   ANIONGAP 7 7 7   VANIA 8.3* 8.3* 8.6    99 98     Most Recent 2 LFT's:  Recent Labs   Lab Test 12/29/19  0224 09/24/19  1627   AST 20 22   ALT 28 37   ALKPHOS 69 67   BILITOTAL 0.3 0.3     Most Recent INR's and Anticoagulation Dosing History:  Anticoagulation Dose History     Recent Dosing and Labs Latest Ref Rng & Units 2/8/2014 12/29/2019    INR 0.86 - 1.14 1.06 1.07        Most Recent 3 Troponin's:  Recent Labs   Lab Test 02/16/19  1741 01/18/16  0605 08/23/15  0130   TROPI <0.015 <0.015  The 99th percentile for upper reference range is  0.045 ug/L.  Troponin values in   the range of 0.045 - 0.120 ug/L may be associated with risks of adverse   clinical events.   <0.015  The 99th percentile for upper reference range is 0.045 ug/L.  Troponin values in   the range of 0.045 - 0.120 ug/L may be associated with risks of adverse   clinical events.       Most Recent Cholesterol Panel:  Recent Labs   Lab Test 19  0819   CHOL 118   LDL 62   HDL 39*   TRIG 85     Most Recent 6 Bacteria Isolates From Any Culture (See EPIC Reports for Culture Details):  Recent Labs   Lab Test 19  0230 19  2045 19  1818 19  1740 17  1050 16  1334   CULT >100,000 colonies/mL  Coagulase negative Staphylococcus  No further identification or sensitivity done  * No MRSA isolated No growth after 6 days No growth after 6 days Culture negative after 4 weeks  Heavy growth Diphtheroids No further identification or sensitivity done  Susceptibility testing not routinely done  * No MRSA isolated     Most Recent TSH, T4 and A1c Labs:  Recent Labs   Lab Test 19  08   TSH 2.95     Results for orders placed or performed during the hospital encounter of 20   Echocardiogram Complete    Narrative    [id number]  [id number]  [id number]  [id number]^RADHA^CHEN^M        [facility]  [address] [place] [address]     Name: JENNIFER RODRIGUEZ  MRN: [id number]  : 1927  Study Date: 2020 08:03 AM  Age: 89 yrs  Gender: Male  Patient Location: [facility]  Reason For Study: Bradycardia - Sinus  Ordering Physician: CHEN GARCIA  Performed By: Charlee Harp, MEDHAT, RVT     BSA: 2.1 m2  Height: 71 in  Weight: 193 lb  HR: 48  BP: 179/81 mmHg  _____________________________________________________________________________  __        Procedure  Complete Portable Echo Adult.  _____________________________________________________________________________  __        Interpretation Summary  Global and regional left ventricular function is  normal with an EF of 60-65%.  Right ventricular function, chamber size, wall motion, and thickness are  normal.  No significant valvular abnormalities were noted.  Previous study not available for comparison.  _____________________________________________________________________________  __        Left Ventricle  Left ventricular size is normal. Left ventricular wall thickness is normal.  Global and regional left ventricular function is normal with an EF of 60-65%.  Left ventricular diastolic function is normal.     Right Ventricle  Right ventricular function, chamber size, wall motion, and thickness are  normal.     Atria  Both atria appear normal.     Mitral Valve  Mild mitral annular calcification is present. Trace mitral insufficiency is  present.     Aortic Valve  Aortic valve is normal in structure and function. The aortic valve is  tricuspid.        Tricuspid Valve  The tricuspid valve is normal. Trace tricuspid insufficiency is present. The  right ventricular systolic pressure is approximated at 33.0 mmHg plus the  right atrial pressure.     Pulmonic Valve  The pulmonic valve is normal.     Vessels  The inferior vena cava was normal in size with preserved respiratory  variability. The aorta root is normal. IVC diameter <2.1 cm collapsing >50%  with sniff suggests a normal RA pressure of 3 mmHg.     Compared to Previous Study  Previous study not available for comparison.     _____________________________________________________________________________  __  MMode/2D Measurements & Calculations  IVSd: 1.1 cm  LVIDd: 4.9 cm  LVIDs: 2.8 cm  LVPWd: 0.99 cm  FS: 42.9 %  LV mass(C)d: 190.5 grams  LV mass(C)dI: 91.7 grams/m2  Ao root diam: 3.9 cm  asc Aorta Diam: 3.5 cm     LVOT diam: 2.5 cm  LVOT area: 4.9 cm2  LA Volume (BP): 55.6 ml  LA Volume Index (BP): 26.7 ml/m2  RWT: 0.40        Doppler Measurements & Calculations  MV E max sosa: 91.2 cm/sec  MV A max sosa: 90.6 cm/sec  MV E/A: 1.0  MV dec time: 0.19 sec  Ao V2  max: 102.0 cm/sec  Ao max P.0 mmHg  ASHER(V,D): 4.0 cm2  LV V1 max P.8 mmHg  LV V1 max: 83.2 cm/sec     TR max farshad: 287.3 cm/sec  TR max P.0 mmHg  AV Farshad Ratio (DI): 0.82  E/E' av.6  Lateral E/e': 11.3  Medial E/e': 12.0           _____________________________________________________________________________  __           Report approved by: Lulu MARTINEZ 2020 10:17 AM

## 2020-02-18 NOTE — PLAN OF CARE
Denies any pain or discomfort, urine is clear yellow into drain bag, small amount of leaking from haji insertion site, scant amount of drainage from meatus. LS fine crackles to bilateral bases that did not clear with cough, educated and encouraged to cough and deep breath and ambulate as much as possible today. Call out to Dr. Olivia to clarify removal of Haji and trail void this AM awaiting return call.    /74   Pulse 75   Temp 98.9  F (37.2  C) (Tympanic)   Resp 16   Wt 90 kg (198 lb 6.6 oz)   SpO2 94%   BMI 27.67 kg/m      Gracie Parada, RN on 2/18/2020 at 7:39 AM    Removed Haji at 0900, very small amount of urine with hematuria noted, small amount of bleeding from urethra, has had large amount of fluid intake, up ambulating in halls, burning with urination.     /54   Pulse 75   Temp 96.5  F (35.8  C) (Tympanic)   Resp 16   Wt 90 kg (198 lb 6.6 oz)   SpO2 94%   BMI 27.67 kg/m      Gracie Parada RN on 2/18/2020 at 11:09 AM

## 2020-02-20 NOTE — PROGRESS NOTES
Subjective     James Grant is a 89 year old male who presents to clinic today for the following health issues:    HPI       Hospital Follow-up Visit:    Hospital/Nursing Home/IP Rehab Facility: [facility]  Date of Admission: 02/13/2020  Date of Discharge: 02/18/2020  Reason(s) for Admission: Clot retention of urine            Problems taking medications regularly:  None       Medication changes since discharge: None       Problems adhering to non-medication therapy:  None    Summary of hospitalization:  CareEverywhere information obtained and reviewed  Diagnostic Tests/Treatments reviewed.  Follow up needed: none  Other Healthcare Providers Involved in Patient s Care:         Urology  Update since discharge: improved.       Post Discharge Medication Reconciliation: discharge medications reconciled, continue medications without change.  Plan of care communicated with patient            Adrian was hospitalized with gross hematuria at [facility].  He was noted to be anemic and was seen by Urology. He underwent cystoscopy with clot removal with no obvious source.  Adrian continues to void however his hematuria persists.     Patient Active Problem List   Diagnosis     SNHL (sensorineural hearing loss)     ETD (eustachian tube dysfunction)     Essential hypertension, benign     Dyslipidemia     GERD (gastroesophageal reflux disease)     CHD (coronary heart disease)     BPH (benign prostatic hyperplasia)     Advance care planning     ACP (advance care planning)     Venous stasis dermatitis of both lower extremities     Rigors     History of bladder cancer     Malignant neoplasm of overlapping sites of bladder (H)     Hematuria     Anemia due to blood loss, acute     Clot retention of urine     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       CHOLECYSTECTOMY       CYSTOSCOPY, FULGURATE BLEEDERS, EVACUATE CLOT(S), COMBINED N/A 2/17/2020    Procedure: Clot Evacuation;  Surgeon:  Andrzej Olivia MD;  Location:  OR     CYSTOSCOPY, RETROGRADES, COMBINED Bilateral 10/22/2019    Procedure: Bilateral Retrograde Pyelograms;  Surgeon: Andrzej Olivia MD;  Location: [facility] OR     CYSTOSCOPY, TRANSURETHRAL RESECTION (TUR) TUMOR BLADDER, COMBINED N/A 10/22/2019    Procedure: Transurethral Resection of Bladder Tumor and transurethral resection of prostate and bladder stone removal;  Surgeon: Andrzej Olivia MD;  Location: [facility] OR     HERNIA REPAIR       left arm surgery         Social History     Tobacco Use     Smoking status: Former Smoker     Packs/day: 1.00     Years: 13.00     Pack years: 13.00     Types: Cigarettes     Start date: 1949     Last attempt to quit: 1962     Years since quittin.5     Smokeless tobacco: Never Used   Substance Use Topics     Alcohol use: Not Currently     Comment: 3 Drinks (BEER & LIQUOR) ~ OCCASIONALLY (QUIT IN )     Family History   Problem Relation Age of Onset     Heart Failure Mother 86        Congestive - Cause of Death     Cerebrovascular Disease Father      C.A.D. Sister 91         Current Outpatient Medications   Medication Sig Dispense Refill     famotidine (PEPCID) 20 MG tablet TAKE ONE (1) TABLET BY MOUTH DAILY 30 tablet 4     losartan (COZAAR) 50 MG tablet TAKE 1 TABLET (50 MG) BY MOUTH DAILY COZAAR 30 tablet 1     metoprolol succinate ER (TOPROL-XL) 25 MG 24 hr tablet TAKE 1/2 TAB BY MOUTH AT BEDTIME. DO NOT CRUSH OR CHEW. 45 tablet 2     NITROSTAT 0.4 MG sublingual tablet PLACE 1 TABLET (0.4 MG) UNDER THE TONGUE EVERY 5 MINUTES AS NEEDED (Patient not taking: Reported on 1/3/2020) 25 tablet 3     simvastatin (ZOCOR) 20 MG tablet Take 1 tablet (20 mg) by mouth At Bedtime 90 tablet 0     Allergies   Allergen Reactions     Lisinopril Cough     Morphine Other (See Comments) and Visual Disturbance     confusion     Recent Labs   Lab Test 20  0428 20  0440  19  0224  19  1627  19  1741 19  0819 18  0927  06/07/16  0923  01/08/14  0958   LDL  --   --   --   --   --   --   --   --  62 62 54  --  57   HDL  --   --   --   --   --   --   --   --  39* 46 36*  --  40   TRIG  --   --   --   --   --   --   --   --  85 90 92  --  61   ALT  --   --   --  28  --  37  --  32 29  --   --    < > 29   CR 0.73 0.87   < > 0.73   < > 0.79   < > 0.71 0.85  --   --    < > 1.01   GFRESTIMATED >90 82   < > 80   < > 78   < > 80 76  --   --    < > 70   GFRESTBLACK >90 >90   < > >90   < > 90   < > 92 88  --   --    < > 85   POTASSIUM 4.0 3.9   < > 4.2   < > 4.0   < > Specimen slightly hemolyzed 4.4  --   --    < > 4.2   TSH  --   --   --   --   --   --   --   --  2.95  --   --   --  2.29    < > = values in this interval not displayed.      BP Readings from Last 3 Encounters:   02/18/20 118/54   02/13/20 111/81   01/03/20 120/70    Wt Readings from Last 3 Encounters:   02/16/20 90 kg (198 lb 6.6 oz)   01/03/20 87.1 kg (192 lb)   01/02/20 86.5 kg (190 lb 9.6 oz)                  Reviewed and updated as needed this visit by Provider         Review of Systems   ROS COMP: Constitutional, HEENT, cardiovascular, pulmonary, gi and gu systems are negative, except as otherwise noted.      Objective    There were no vitals taken for this visit.  There is no height or weight on file to calculate BMI.  Physical Exam  Vitals signs and nursing note reviewed.   Constitutional:       Appearance: He is well-developed.   Neurological:      Mental Status: He is alert and oriented to person, place, and time.   Psychiatric:         Mood and Affect: Mood normal.         Behavior: Behavior normal.         Thought Content: Thought content normal.         Diagnostic Test Results:  Labs reviewed in Epic        Assessment & Plan     1. History of bladder cancer  Will continue cares per Urology    2. Gross hematuria  Persists.  Warning symptoms discussed.           See Patient Instructions    No follow-ups on file.    Broderick Flowers MD  [facility] -  [place]

## 2020-02-21 ENCOUNTER — OFFICE VISIT (OUTPATIENT)
Dept: FAMILY MEDICINE | Facility: OTHER | Age: 85
End: 2020-02-21
Attending: FAMILY MEDICINE
Payer: MEDICARE

## 2020-02-21 VITALS
BODY MASS INDEX: 27.06 KG/M2 | HEART RATE: 75 BPM | TEMPERATURE: 98 F | SYSTOLIC BLOOD PRESSURE: 120 MMHG | OXYGEN SATURATION: 95 % | DIASTOLIC BLOOD PRESSURE: 58 MMHG | RESPIRATION RATE: 19 BRPM | WEIGHT: 194 LBS

## 2020-02-21 DIAGNOSIS — Z85.51 HISTORY OF BLADDER CANCER: Primary | ICD-10-CM

## 2020-02-21 DIAGNOSIS — R31.0 GROSS HEMATURIA: ICD-10-CM

## 2020-02-21 PROCEDURE — 99213 OFFICE O/P EST LOW 20 MIN: CPT | Performed by: FAMILY MEDICINE

## 2020-02-21 PROCEDURE — G0463 HOSPITAL OUTPT CLINIC VISIT: HCPCS

## 2020-02-21 ASSESSMENT — PAIN SCALES - GENERAL: PAINLEVEL: NO PAIN (0)

## 2020-02-26 NOTE — TELEPHONE ENCOUNTER
losartan      Last Written Prescription Date: 5/9/17  Last Fill Quantity: 30,  # refills: 5   Last Office Visit with G, P or [facility] prescribing provider: 8/4/17                                               
Yes

## 2020-03-09 ENCOUNTER — TELEPHONE (OUTPATIENT)
Dept: FAMILY MEDICINE | Facility: OTHER | Age: 85
End: 2020-03-09

## 2020-03-09 NOTE — TELEPHONE ENCOUNTER
Spoke to Patient and informed him that his application for disability parking certificate will be waiting for him at the . He will try to get here today to pick it up.

## 2020-04-06 DIAGNOSIS — I10 BENIGN ESSENTIAL HYPERTENSION: ICD-10-CM

## 2020-04-06 RX ORDER — LOSARTAN POTASSIUM 50 MG/1
TABLET ORAL
Qty: 30 TABLET | Refills: 1 | Status: SHIPPED | OUTPATIENT
Start: 2020-04-06 | End: 2020-05-05

## 2020-04-06 RX ORDER — METOPROLOL SUCCINATE 25 MG/1
TABLET, EXTENDED RELEASE ORAL
Qty: 45 TABLET | Refills: 2 | Status: SHIPPED | OUTPATIENT
Start: 2020-04-06 | End: 2021-01-20

## 2020-04-07 DIAGNOSIS — K21.9 GASTROESOPHAGEAL REFLUX DISEASE WITHOUT ESOPHAGITIS: ICD-10-CM

## 2020-04-07 RX ORDER — FAMOTIDINE 20 MG/1
TABLET, FILM COATED ORAL
Qty: 30 TABLET | Refills: 4 | Status: SHIPPED | OUTPATIENT
Start: 2020-04-07 | End: 2020-09-18

## 2020-05-04 DIAGNOSIS — E78.2 MIXED HYPERLIPIDEMIA: ICD-10-CM

## 2020-05-04 DIAGNOSIS — I10 BENIGN ESSENTIAL HYPERTENSION: ICD-10-CM

## 2020-05-05 RX ORDER — SIMVASTATIN 20 MG
20 TABLET ORAL AT BEDTIME
Qty: 90 TABLET | Refills: 0 | Status: SHIPPED | OUTPATIENT
Start: 2020-05-05 | End: 2020-08-06

## 2020-05-05 RX ORDER — LOSARTAN POTASSIUM 50 MG/1
TABLET ORAL
Qty: 30 TABLET | Refills: 2 | Status: SHIPPED | OUTPATIENT
Start: 2020-05-05 | End: 2020-09-18

## 2020-05-05 NOTE — TELEPHONE ENCOUNTER
simvastatin      Last Written Prescription Date:  1/10/20  Last Fill Quantity: 90,   # refills: 0  Last Office Visit: 2/21/20  Future Office visit:       Routing refill request to provider for review/approval because:  LDL Cholesterol Calculated   Date Value Ref Range Status   02/07/2019 62 <100 mg/dL Final     Comment:     Desirable:       <100 mg/dl

## 2020-05-27 ENCOUNTER — OFFICE VISIT (OUTPATIENT)
Dept: UROLOGY | Facility: OTHER | Age: 85
End: 2020-05-27
Attending: UROLOGY
Payer: MEDICARE

## 2020-05-27 VITALS — HEART RATE: 78 BPM | BODY MASS INDEX: 27.73 KG/M2 | RESPIRATION RATE: 16 BRPM | WEIGHT: 198.8 LBS

## 2020-05-27 DIAGNOSIS — Z85.51 HISTORY OF BLADDER CANCER: Primary | ICD-10-CM

## 2020-05-27 PROCEDURE — G0463 HOSPITAL OUTPT CLINIC VISIT: HCPCS | Mod: 25

## 2020-05-27 PROCEDURE — 52000 CYSTOURETHROSCOPY: CPT | Performed by: UROLOGY

## 2020-05-27 ASSESSMENT — PAIN SCALES - GENERAL: PAINLEVEL: NO PAIN (0)

## 2020-05-27 NOTE — PROGRESS NOTES
Patient positioned in supine position, perineum area prepped with chlorhexidene Gluconate and patient draped per sterile technique. Per verbal order read back by Andrzej Olivia MD, Urojet 10mL 2% lidocaine jelly to be instilled into urethra.  Urojet- 10ml 2% Lidocaine jelly instilled into the urethra.    Urojet 2%  Lot#: [id number]  Expiration date: 01/2022  : [employer]  NDC: [address]-0    [place] Protocol    A. Pre-procedure verification complete Yes  1-relevant information / documentation available, reviewed and properly matched to the patient; 2-consent accurate and complete, 3-equipment and supplies available    B. Site marking complete N/A  Site marked if not in continuous attendance with patient    C. TIME OUT completed Yes  Time Out was conducted just prior to starting procedure to verify the eight required elements: 1-patient identity, 2-consent accurate and complete, 3-position, 4-correct side/site marked (if applicable), 5-procedure, 6-relevant images / results properly labeled and displayed (if applicable), 7-antibiotics / irrigation fluids (if applicable), 8-safety precautions.    After procedure perineum area rinsed. Discharge instructions reviewed with patient. Patient verbalized understanding of discharge instructions and discharged ambulatory.  Aiyana Sarabia RN..................5/27/2020  10:53 AM    
Preprocedure diagnosis  History of bladder cancer    Postprocedure diagnosis  History of bladder cancer    Procedure  Flexible Cystourethroscopy    Surgeon  Andrzej Olivia MD    Anesthesia  2% lidocaine jelly intraurethrally    Complications  None    Indications  The patient is undergoing a flexible cystoscopy for the above mentioned indications.    Findings  Cystoscopic findings included a normal anterior urethra.    There was not a prominent median lobe.    The lateral lobes were not obstructive in appearance.  The bladder appeared to be normal capacity.    There were no tumors, stones or foreign bodies.    The orifices were slit-shaped and in their normal location.    Procedure  The patient was placed in supine position and prepped and draped in sterile fashion with lidocaine jelly per urethra for anesthesia.    I passed a lubricated 14F flexible cystoscope through the penile urethra and into the bladder and the bladder was completely visualized.  The cystoscope was retroflexed and the bladder neck and prostate visualized.    The cystoscope was slowly withdrawn while visualizing the urethra and the procedure terminated.    The patient tolerated the procedure well.      Labs  Results for GLORIA RODRIGUEZ (MRN [id number]) as of 10/28/2019 09:05   10/15/2019 11:45   Sodium 141   Potassium 3.8   Chloride 108   Carbon Dioxide 28   Urea Nitrogen 17   Creatinine 0.77   GFR Estimate 79   GFR Estimate If Black >90   Calcium 9.0   Anion Gap 5     Pathology  I personally reviewed the pathology report  10/22/2019  Low grade Ta (muscularis propria was present and uninvolved in specimen)    Assessment  Mr. Rodriguez is a 89 year old male with a history of bladder cancer.    Plan  MiraLAX for constipation-dosing strategy described  Follow up for surveillance cystoscopy this October then q6 months x 2 then once annually.  
10

## 2020-05-27 NOTE — PATIENT INSTRUCTIONS

## 2020-06-16 DIAGNOSIS — K21.9 GASTROESOPHAGEAL REFLUX DISEASE WITHOUT ESOPHAGITIS: Primary | ICD-10-CM

## 2020-06-16 RX ORDER — CIMETIDINE 400 MG
400 TABLET ORAL DAILY
Qty: 30 TABLET | Refills: 3 | Status: SHIPPED | OUTPATIENT
Start: 2020-06-16 | End: 2020-09-30

## 2020-06-16 NOTE — TELEPHONE ENCOUNTER
Received fax from pharmacy requesting alternative to Pepcid due to this being back ordered. Pharmacy recommended cimetidine. Pended medication for review and approval. PCP out. Please advise, thank you.

## 2020-08-05 DIAGNOSIS — E78.2 MIXED HYPERLIPIDEMIA: ICD-10-CM

## 2020-08-06 RX ORDER — SIMVASTATIN 20 MG
20 TABLET ORAL AT BEDTIME
Qty: 90 TABLET | Refills: 0 | Status: SHIPPED | OUTPATIENT
Start: 2020-08-06 | End: 2020-11-17

## 2020-09-17 DIAGNOSIS — K21.9 GASTROESOPHAGEAL REFLUX DISEASE WITHOUT ESOPHAGITIS: ICD-10-CM

## 2020-09-17 DIAGNOSIS — I10 BENIGN ESSENTIAL HYPERTENSION: ICD-10-CM

## 2020-09-17 NOTE — TELEPHONE ENCOUNTER
losartan      Last Written Prescription Date:  05/05/2020  Last Fill Quantity: 30,   # refills: 2  Last Office Visit: 02/21/2020  Future Office visit:       famotidine      Last Written Prescription Date:  04/07/2020  Last Fill Quantity: 30,   # refills: 4

## 2020-09-18 RX ORDER — FAMOTIDINE 20 MG/1
TABLET, FILM COATED ORAL
Qty: 30 TABLET | Refills: 4 | Status: SHIPPED | OUTPATIENT
Start: 2020-09-18 | End: 2021-02-19

## 2020-09-18 RX ORDER — LOSARTAN POTASSIUM 50 MG/1
TABLET ORAL
Qty: 30 TABLET | Refills: 2 | Status: SHIPPED | OUTPATIENT
Start: 2020-09-18 | End: 2020-12-18

## 2020-09-25 DIAGNOSIS — H92.11 OTORRHEA OF RIGHT EAR: ICD-10-CM

## 2020-09-25 RX ORDER — NEOMYCIN SULFATE, POLYMYXIN B SULFATE AND HYDROCORTISONE 10; 3.5; 1 MG/ML; MG/ML; [USP'U]/ML
4 SUSPENSION/ DROPS AURICULAR (OTIC) 3 TIMES DAILY
Qty: 10 ML | Refills: 0 | OUTPATIENT
Start: 2020-09-25

## 2020-09-25 NOTE — TELEPHONE ENCOUNTER
Cortisporin      Last Written Prescription Date:  Request   Last Fill Quantity: 10mL,   # refills: 0  Last Office Visit: 2/21/2020  Future Office visit:

## 2020-09-30 ENCOUNTER — OFFICE VISIT (OUTPATIENT)
Dept: OTOLARYNGOLOGY | Facility: OTHER | Age: 85
End: 2020-09-30
Attending: PHYSICIAN ASSISTANT
Payer: MEDICARE

## 2020-09-30 VITALS
TEMPERATURE: 96.5 F | SYSTOLIC BLOOD PRESSURE: 130 MMHG | WEIGHT: 185 LBS | BODY MASS INDEX: 25.9 KG/M2 | HEART RATE: 82 BPM | DIASTOLIC BLOOD PRESSURE: 68 MMHG | OXYGEN SATURATION: 98 % | HEIGHT: 71 IN

## 2020-09-30 DIAGNOSIS — H73.21 MYRINGITIS OF RIGHT EAR: ICD-10-CM

## 2020-09-30 DIAGNOSIS — H92.11 OTORRHEA OF RIGHT EAR: Primary | ICD-10-CM

## 2020-09-30 DIAGNOSIS — H90.3 SENSORINEURAL HEARING LOSS (SNHL) OF BOTH EARS: ICD-10-CM

## 2020-09-30 PROCEDURE — 92504 EAR MICROSCOPY EXAMINATION: CPT | Performed by: PHYSICIAN ASSISTANT

## 2020-09-30 PROCEDURE — G0463 HOSPITAL OUTPT CLINIC VISIT: HCPCS

## 2020-09-30 PROCEDURE — 99213 OFFICE O/P EST LOW 20 MIN: CPT | Mod: 25 | Performed by: PHYSICIAN ASSISTANT

## 2020-09-30 PROCEDURE — 92504 EAR MICROSCOPY EXAMINATION: CPT

## 2020-09-30 RX ORDER — CIPROFLOXACIN AND DEXAMETHASONE 3; 1 MG/ML; MG/ML
4 SUSPENSION/ DROPS AURICULAR (OTIC) 2 TIMES DAILY
Qty: 4 ML | Refills: 1 | Status: SHIPPED | OUTPATIENT
Start: 2020-09-30 | End: 2020-10-10

## 2020-09-30 ASSESSMENT — PAIN SCALES - GENERAL: PAINLEVEL: NO PAIN (0)

## 2020-09-30 ASSESSMENT — MIFFLIN-ST. JEOR: SCORE: 1506.28

## 2020-09-30 NOTE — NURSING NOTE
"Chief Complaint   Patient presents with     Cerumen Impaction     Pt is here for an ear cleaning.       Initial /68 (BP Location: Left arm, Cuff Size: Adult Regular)   Pulse 82   Temp 96.5  F (35.8  C) (Tympanic)   Ht 1.803 m (5' 11\")   Wt 83.9 kg (185 lb)   SpO2 98%   BMI 25.80 kg/m   Estimated body mass index is 25.8 kg/m  as calculated from the following:    Height as of this encounter: 1.803 m (5' 11\").    Weight as of this encounter: 83.9 kg (185 lb).  Medication Reconciliation: complete  Mili Guzman LPN    "

## 2020-09-30 NOTE — PROGRESS NOTES
Chief Complaint   Patient presents with     Cerumen Impaction     Pt is here for an ear cleaning.       Patient returns to ENT for repeat exam and cleaning.   He was last seen in 2018 by Tamie Muller NP. He was started on Flonase, Zyrtec due to post nasal drainage.   At that visit, he did not have cerumen or an effusion. He was complete an audiogram.       Today James tells me he has noticed increase in right ear discharge.   He feels like it is waxy.   He used a toothpick/ swab with acohol and water but has not been helping.   He has felt like the discharge ongoing since he was child and occurs intermittently. He feels like to otics usually help it.   [facility]. He reports he does not which year his hearing was tested, but recently.     Audiogram 2/26/18: Mild to profound bilateral SNHL. WRS 80% right and 88% left. SRT 70 dB HL right and 60 dB HL left.   No recent audiogram available.       Past Medical History:   Diagnosis Date     Benign localized hyperplasia of prostate without urinary obstruction and other lower urinary tract symptoms (LUTS) 11/26/2010     CHD (coronary heart disease) 11/26/2010     Dyslipidemia 11/26/2010     GERD (gastroesophageal reflux disease) 11/26/2010     HTN (hypertension) 11/26/2010     Old myocardial infarction 11/26/2010     Other symptoms involving digestive system(787.99) 10/20/2006        Allergies   Allergen Reactions     Lisinopril Cough     Morphine Other (See Comments) and Visual Disturbance     confusion     Current Outpatient Medications   Medication     cimetidine (TAGAMET) 400 MG tablet     famotidine (PEPCID) 20 MG tablet     losartan (COZAAR) 50 MG tablet     metoprolol succinate ER (TOPROL-XL) 25 MG 24 hr tablet     NITROSTAT 0.4 MG sublingual tablet     simvastatin (ZOCOR) 20 MG tablet     No current facility-administered medications for this visit.       ROS: 10 point ROS neg other than the symptoms noted above in the  "HPI.  /68 (BP Location: Left arm, Cuff Size: Adult Regular)   Pulse 82   Temp 96.5  F (35.8  C) (Tympanic)   Ht 1.803 m (5' 11\")   Wt 83.9 kg (185 lb)   SpO2 98%   BMI 25.80 kg/m      General - The patient is well nourished and well developed, and appears to have good nutritional status.  Alert and oriented to person and place, interactive.  Head and Face - Normocephalic and atraumatic, with no gross asymmetry noted of the contour of the facial features.  The facial nerve is intact, with strong symmetric movements.  Neck-no palpable lymphadenopathy or thyroid mass.  Trachea is midline.  Eyes - Extraocular movements intact.   Ears- External ears normal. The ears were examined under microscopy bilaterally. Right EAC with thick drainage and crusting along TM. Canal was cleaned with cupped forceps and #5, #3 suction.   TM is thickened, polypoid/ chronic myringitis appearance.      Left EAC patent, TM with pars flaccida retraction, no signs of cholesteatoma, no effusion.   Nose - External nasal contour symmetric. See below for nasal examination.  Mouth - Examination of the oral cavity shows pink, healthy, moist mucosa. Dentition in good condition.  No lesions or ulceration noted. The tongue is mobile and midline.    Throat - The walls of the oropharynx were smooth, pink, moist, symmetric, and had no lesions or ulcerations.  The tonsillar pillars and soft palate were symmetric.          ASSESSMENT:    ICD-10-CM    1. Otorrhea of right ear  H92.11 ciprofloxacin-dexamethasone (CIPRODEX) 0.3-0.1 % otic suspension   2. Myringitis of right ear  H73.21 ciprofloxacin-dexamethasone (CIPRODEX) 0.3-0.1 % otic suspension   3. Sensorineural hearing loss (SNHL) of both ears  H90.3        Adrian has myringitis on Right. He was started on otics and instructed to return in 2 weeks for repeat examination.   He had polypoid changes throughout with otorrhea.   If no improvement consider ear culture/ powder application.   He was " instructed to keep aid out while on otics.     Recheck in 2 weeks.   He agrees with this plan. He says he has had this issue since childhood and does not bother him.       Lizzeth Harris PA-C  ENT  [facility], [place]  [phone]

## 2020-09-30 NOTE — PATIENT INSTRUCTIONS
Start Ciprodex 4 drops twice a day for 10 days.   Keep ear dry  Return in 2 weeks for recheck.   If no improvement, may need ear culture.     Thank you for allowing JONI Seth and our ENT team to participate in your care.  If your medications are too expensive, please give the nurse a call.  We can possibly change this medication.  If you have a scheduling or an appointment question please contact our Health Unit Coordinator at their direct line [phone].   ALL nursing questions or concerns can be directed to your ENT nurse at: [phone]--Mili

## 2020-09-30 NOTE — LETTER
9/30/2020         RE: James Grant  [address] [facility] [address]        Dear Colleague,    Thank you for referring your patient, James Grant, to the [facility]. Please see a copy of my visit note below.      Chief Complaint   Patient presents with     Cerumen Impaction     Pt is here for an ear cleaning.       Patient returns to ENT for repeat exam and cleaning.   He was last seen in 2018 by Tamie Muller NP. He was started on Flonase, Zyrtec due to post nasal drainage.   At that visit, he did not have cerumen or an effusion. He was complete an audiogram.       Today James tells me he has noticed increase in right ear discharge.   He feels like it is waxy.   He used a toothpick/ swab with acohol and water but has not been helping.   He has felt like the discharge ongoing since he was child and occurs intermittently. He feels like to otics usually help it.   [facility]. He reports he does not which year his hearing was tested, but recently.     Audiogram 2/26/18: Mild to profound bilateral SNHL. WRS 80% right and 88% left. SRT 70 dB HL right and 60 dB HL left.   No recent audiogram available.       Past Medical History:   Diagnosis Date     Benign localized hyperplasia of prostate without urinary obstruction and other lower urinary tract symptoms (LUTS) 11/26/2010     CHD (coronary heart disease) 11/26/2010     Dyslipidemia 11/26/2010     GERD (gastroesophageal reflux disease) 11/26/2010     HTN (hypertension) 11/26/2010     Old myocardial infarction 11/26/2010     Other symptoms involving digestive system(787.99) 10/20/2006        Allergies   Allergen Reactions     Lisinopril Cough     Morphine Other (See Comments) and Visual Disturbance     confusion     Current Outpatient Medications   Medication     cimetidine (TAGAMET) 400 MG tablet     famotidine (PEPCID) 20 MG tablet     losartan (COZAAR) 50 MG tablet     metoprolol succinate ER  "(TOPROL-XL) 25 MG 24 hr tablet     NITROSTAT 0.4 MG sublingual tablet     simvastatin (ZOCOR) 20 MG tablet     No current facility-administered medications for this visit.       ROS: 10 point ROS neg other than the symptoms noted above in the HPI.  /68 (BP Location: Left arm, Cuff Size: Adult Regular)   Pulse 82   Temp 96.5  F (35.8  C) (Tympanic)   Ht 1.803 m (5' 11\")   Wt 83.9 kg (185 lb)   SpO2 98%   BMI 25.80 kg/m      General - The patient is well nourished and well developed, and appears to have good nutritional status.  Alert and oriented to person and place, interactive.  Head and Face - Normocephalic and atraumatic, with no gross asymmetry noted of the contour of the facial features.  The facial nerve is intact, with strong symmetric movements.  Neck-no palpable lymphadenopathy or thyroid mass.  Trachea is midline.  Eyes - Extraocular movements intact.   Ears- External ears normal. The ears were examined under microscopy bilaterally. Right EAC with thick drainage and crusting along TM. Canal was cleaned with cupped forceps and #5, #3 suction.   TM is thickened, polypoid/ chronic myringitis appearance.      Left EAC patent, TM with pars flaccida retraction, no signs of cholesteatoma, no effusion.   Nose - External nasal contour symmetric. See below for nasal examination.  Mouth - Examination of the oral cavity shows pink, healthy, moist mucosa. Dentition in good condition.  No lesions or ulceration noted. The tongue is mobile and midline.    Throat - The walls of the oropharynx were smooth, pink, moist, symmetric, and had no lesions or ulcerations.  The tonsillar pillars and soft palate were symmetric.          ASSESSMENT:    ICD-10-CM    1. Otorrhea of right ear  H92.11 ciprofloxacin-dexamethasone (CIPRODEX) 0.3-0.1 % otic suspension   2. Myringitis of right ear  H73.21 ciprofloxacin-dexamethasone (CIPRODEX) 0.3-0.1 % otic suspension   3. Sensorineural hearing loss (SNHL) of both ears  H90.3  "       Adrian has myringitis on Right. He was started on otics and instructed to return in 2 weeks for repeat examination.   He had polypoid changes throughout with otorrhea.   If no improvement consider ear culture/ powder application.   He was instructed to keep aid out while on otics.     Recheck in 2 weeks.   He agrees with this plan. He says he has had this issue since childhood and does not bother him.       Lizzeth Harris PA-C  ENT  [facility], [place]  [phone]      Again, thank you for allowing me to participate in the care of your patient.        Sincerely,        Lizzeth Harris PA-C

## 2020-10-14 ENCOUNTER — OFFICE VISIT (OUTPATIENT)
Dept: OTOLARYNGOLOGY | Facility: OTHER | Age: 85
End: 2020-10-14
Attending: PHYSICIAN ASSISTANT
Payer: MEDICARE

## 2020-10-14 VITALS
BODY MASS INDEX: 25.9 KG/M2 | DIASTOLIC BLOOD PRESSURE: 74 MMHG | OXYGEN SATURATION: 98 % | WEIGHT: 185 LBS | TEMPERATURE: 97.6 F | SYSTOLIC BLOOD PRESSURE: 126 MMHG | HEIGHT: 71 IN | HEART RATE: 70 BPM

## 2020-10-14 DIAGNOSIS — Z23 NEED FOR PROPHYLACTIC VACCINATION AND INOCULATION AGAINST INFLUENZA: Primary | ICD-10-CM

## 2020-10-14 DIAGNOSIS — H92.11 OTORRHEA OF RIGHT EAR: ICD-10-CM

## 2020-10-14 DIAGNOSIS — H90.3 SENSORINEURAL HEARING LOSS (SNHL) OF BOTH EARS: ICD-10-CM

## 2020-10-14 PROCEDURE — 90662 IIV NO PRSV INCREASED AG IM: CPT

## 2020-10-14 PROCEDURE — 99213 OFFICE O/P EST LOW 20 MIN: CPT | Mod: 25 | Performed by: PHYSICIAN ASSISTANT

## 2020-10-14 PROCEDURE — G0463 HOSPITAL OUTPT CLINIC VISIT: HCPCS | Mod: 25

## 2020-10-14 PROCEDURE — 92504 EAR MICROSCOPY EXAMINATION: CPT

## 2020-10-14 PROCEDURE — 92504 EAR MICROSCOPY EXAMINATION: CPT | Performed by: PHYSICIAN ASSISTANT

## 2020-10-14 ASSESSMENT — MIFFLIN-ST. JEOR: SCORE: 1506.28

## 2020-10-14 ASSESSMENT — PAIN SCALES - GENERAL: PAINLEVEL: NO PAIN (0)

## 2020-10-14 NOTE — NURSING NOTE
"Chief Complaint   Patient presents with     RECHECK     Follow Up Right Otorrhea, Myringitis, Bilateral SNHL       Initial /74 (Cuff Size: Adult Regular)   Pulse 70   Temp 97.6  F (36.4  C) (Tympanic)   Ht 1.803 m (5' 11\")   Wt 83.9 kg (185 lb)   SpO2 98%   BMI 25.80 kg/m   Estimated body mass index is 25.8 kg/m  as calculated from the following:    Height as of this encounter: 1.803 m (5' 11\").    Weight as of this encounter: 83.9 kg (185 lb).  Medication Reconciliation: complete  Eugenia Chicas LPN    "

## 2020-10-14 NOTE — PATIENT INSTRUCTIONS
Right ear improved.   No infection or drainage noted.   Return in 6 months for ear check.    Follow up with Audiology in [place] for hearing test/ hearing aid check.   Trinh Nicole AU.D    [facility] [address] [place] [address]    [phone] (Fax)      Thank you for allowing Lizzeth Harris PA-C and our ENT team to participate in your care.  If your medications are too expensive, please give the nurse a call.  We can possibly change this medication.  If you have a scheduling or an appointment question please contact our Health Unit Coordinator at their direct line [phone].   ALL nursing questions or concerns can be directed to your ENT nurse at: [phone] Mili

## 2020-10-14 NOTE — LETTER
"    10/14/2020         RE: James Grant  [address] [facility] [address]        Dear Colleague,    Thank you for referring your patient, James Grant, to the [facility]. Please see a copy of my visit note below.    Chief Complaint   Patient presents with     RECHECK     Follow Up Right Otorrhea, Myringitis, Bilateral SNHL         Adrian returns to ENT for recheck of his right ear. He was last seen on 9/30/20 and noted to have right otorrhea, myringitis.   He was started on prednisone and recommended follow up. Consider otic powder application if there is no improvement.   Reviewed possible culture.         Today, he has no concerns of active drainage or otalgia.   He has no surjit hearing changes.           Past Medical History:   Diagnosis Date     Benign localized hyperplasia of prostate without urinary obstruction and other lower urinary tract symptoms (LUTS) 11/26/2010     CHD (coronary heart disease) 11/26/2010     Dyslipidemia 11/26/2010     GERD (gastroesophageal reflux disease) 11/26/2010     HTN (hypertension) 11/26/2010     Old myocardial infarction 11/26/2010     Other symptoms involving digestive system(787.99) 10/20/2006        Allergies   Allergen Reactions     Lisinopril Cough     Morphine Other (See Comments) and Visual Disturbance     confusion     Current Outpatient Medications   Medication     famotidine (PEPCID) 20 MG tablet     losartan (COZAAR) 50 MG tablet     metoprolol succinate ER (TOPROL-XL) 25 MG 24 hr tablet     NITROSTAT 0.4 MG sublingual tablet     simvastatin (ZOCOR) 20 MG tablet     No current facility-administered medications for this visit.       ROS: 10 point ROS neg other than the symptoms noted above in the HPI.  /74 (Cuff Size: Adult Regular)   Pulse 70   Temp 97.6  F (36.4  C) (Tympanic)   Ht 1.803 m (5' 11\")   Wt 83.9 kg (185 lb)   SpO2 98%   BMI 25.80 kg/m      General - The patient is well nourished and well developed, and appears " to have good nutritional status.  Alert and oriented to person and place, interactive.  Head and Face - Normocephalic and atraumatic, with no gross asymmetry noted of the contour of the facial features.  The facial nerve is intact, with strong symmetric movements.  Neck-no palpable lymphadenopathy or thyroid mass.  Trachea is midline.  Eyes - Extraocular movements intact.   Ears- External ears normal. The ears were examined under microscopy bilaterally. Right EAC is patent and dry. No active drainage. Right TM appears improved. No otorrhea or polypoid changes. There is scant debris from otics along inferior margins.       Left EAC patent, TM with pars flaccida retraction, no signs of cholesteatoma, no effusion.     ASSESSMENT:    ICD-10-CM    1. Need for prophylactic vaccination and inoculation against influenza  Z23 FLUZONE HIGH DOSE 65+  [14698]     Vaccine Administration, Initial [84787]   2. Otorrhea of right ear  H92.11    3. Sensorineural hearing loss (SNHL) of both ears  H90.3          Right ear improved.   No infection or drainage noted.   Return in 6 months for ear check.    Follow up with Audiology in [place] for hearing test/ hearing aid check.       Lizzeth Harris PA-C  ENT  [facility] [place]  [phone]        Again, thank you for allowing me to participate in the care of your patient.        Sincerely,        Lizzeth Harris PA-C

## 2020-10-14 NOTE — PROGRESS NOTES
"Chief Complaint   Patient presents with     RECHECK     Follow Up Right Otorrhea, Myringitis, Bilateral SNHL         Adrian returns to ENT for recheck of his right ear. He was last seen on 9/30/20 and noted to have right otorrhea, myringitis.   He was started on prednisone and recommended follow up. Consider otic powder application if there is no improvement.   Reviewed possible culture.         Today, he has no concerns of active drainage or otalgia.   He has no surjit hearing changes.           Past Medical History:   Diagnosis Date     Benign localized hyperplasia of prostate without urinary obstruction and other lower urinary tract symptoms (LUTS) 11/26/2010     CHD (coronary heart disease) 11/26/2010     Dyslipidemia 11/26/2010     GERD (gastroesophageal reflux disease) 11/26/2010     HTN (hypertension) 11/26/2010     Old myocardial infarction 11/26/2010     Other symptoms involving digestive system(787.99) 10/20/2006        Allergies   Allergen Reactions     Lisinopril Cough     Morphine Other (See Comments) and Visual Disturbance     confusion     Current Outpatient Medications   Medication     famotidine (PEPCID) 20 MG tablet     losartan (COZAAR) 50 MG tablet     metoprolol succinate ER (TOPROL-XL) 25 MG 24 hr tablet     NITROSTAT 0.4 MG sublingual tablet     simvastatin (ZOCOR) 20 MG tablet     No current facility-administered medications for this visit.       ROS: 10 point ROS neg other than the symptoms noted above in the HPI.  /74 (Cuff Size: Adult Regular)   Pulse 70   Temp 97.6  F (36.4  C) (Tympanic)   Ht 1.803 m (5' 11\")   Wt 83.9 kg (185 lb)   SpO2 98%   BMI 25.80 kg/m      General - The patient is well nourished and well developed, and appears to have good nutritional status.  Alert and oriented to person and place, interactive.  Head and Face - Normocephalic and atraumatic, with no gross asymmetry noted of the contour of the facial features.  The facial nerve is intact, with strong " symmetric movements.  Neck-no palpable lymphadenopathy or thyroid mass.  Trachea is midline.  Eyes - Extraocular movements intact.   Ears- External ears normal. The ears were examined under microscopy bilaterally. Right EAC is patent and dry. No active drainage. Right TM appears improved. No otorrhea or polypoid changes. There is scant debris from otics along inferior margins.       Left EAC patent, TM with pars flaccida retraction, no signs of cholesteatoma, no effusion.     ASSESSMENT:    ICD-10-CM    1. Need for prophylactic vaccination and inoculation against influenza  Z23 FLUZONE HIGH DOSE 65+  [85476]     Vaccine Administration, Initial [93175]   2. Otorrhea of right ear  H92.11    3. Sensorineural hearing loss (SNHL) of both ears  H90.3          Right ear improved.   No infection or drainage noted.   Return in 6 months for ear check.    Follow up with Audiology in [place] for hearing test/ hearing aid check.       Lizzeth Harris PA-C  ENT  [facility]  [phone]

## 2020-10-28 ENCOUNTER — OFFICE VISIT (OUTPATIENT)
Dept: UROLOGY | Facility: OTHER | Age: 85
End: 2020-10-28
Attending: UROLOGY
Payer: MEDICARE

## 2020-10-28 VITALS — WEIGHT: 198.6 LBS | HEART RATE: 68 BPM | RESPIRATION RATE: 16 BRPM | BODY MASS INDEX: 27.7 KG/M2

## 2020-10-28 DIAGNOSIS — Z85.51 HISTORY OF BLADDER CANCER: Primary | ICD-10-CM

## 2020-10-28 PROCEDURE — G0463 HOSPITAL OUTPT CLINIC VISIT: HCPCS | Mod: 25

## 2020-10-28 PROCEDURE — 52000 CYSTOURETHROSCOPY: CPT | Performed by: UROLOGY

## 2020-10-28 ASSESSMENT — PAIN SCALES - GENERAL: PAINLEVEL: NO PAIN (0)

## 2020-10-28 NOTE — NURSING NOTE
Patient positioned in supine position, perineum area prepped with chlorhexidene Gluconate and patient draped per sterile technique. Per verbal order read back by Andrzej Olivia MD, Urojet 10mL 2% lidocaine jelly to be instilled into urethra.  Urojet- 10ml 2% Lidocaine jelly instilled into the urethra.    Urojet 2%  Lot#: [id number]  Expiration date: 6/22  : [employer]  NDC: [address]-4    [place] Protocol    A. Pre-procedure verification complete Yes  1-relevant information / documentation available, reviewed and properly matched to the patient; 2-consent accurate and complete, 3-equipment and supplies available    B. Site marking complete N/A  Site marked if not in continuous attendance with patient    C. TIME OUT completed Yes  Time Out was conducted just prior to starting procedure to verify the eight required elements: 1-patient identity, 2-consent accurate and complete, 3-position, 4-correct side/site marked (if applicable), 5-procedure, 6-relevant images / results properly labeled and displayed (if applicable), 7-antibiotics / irrigation fluids (if applicable), 8-safety precautions.    After procedure perineum area rinsed. Discharge instructions reviewed with patient. Patient verbalized understanding of discharge instructions and discharged ambulatory.  Maria Antonia Lan RN..................10/28/2020  11:09 AM

## 2020-10-28 NOTE — PATIENT INSTRUCTIONS

## 2020-10-28 NOTE — PROGRESS NOTES
Preprocedure diagnosis  History of bladder cancer    Postprocedure diagnosis  History of bladder cancer    Procedure  Flexible Cystourethroscopy    Surgeon  Andrzej Olivia MD    Anesthesia  2% lidocaine jelly intraurethrally    Complications  None    Indications  The patient is undergoing a flexible cystoscopy for the above mentioned indications.    Findings  Cystoscopic findings included a normal anterior urethra.    There was not a prominent median lobe.    The lateral lobes were not obstructive in appearance.  The bladder appeared to be normal capacity.    There were no tumors, stones or foreign bodies.    The orifices were slit-shaped and in their normal location.    Procedure  The patient was placed in supine position and prepped and draped in sterile fashion with lidocaine jelly per urethra for anesthesia.    I passed a lubricated 14F flexible cystoscope through the penile urethra and into the bladder and the bladder was completely visualized.  The cystoscope was retroflexed and the bladder neck and prostate visualized.    The cystoscope was slowly withdrawn while visualizing the urethra and the procedure terminated.    The patient tolerated the procedure well.      Labs  Results for GLORIA RODRIGUEZ (MRN [id number]) as of 10/28/2019 09:05   10/15/2019 11:45   Sodium 141   Potassium 3.8   Chloride 108   Carbon Dioxide 28   Urea Nitrogen 17   Creatinine 0.77   GFR Estimate 79   GFR Estimate If Black >90   Calcium 9.0   Anion Gap 5     Pathology  I personally reviewed the pathology report  10/22/2019  Low grade Ta (muscularis propria was present and uninvolved in specimen)    Assessment  Mr. Rodriguez is a 89 year old male with a history of bladder cancer.    Plan  MiraLAX for constipation-dosing strategy described  Follow up for surveillance cystoscopy q6 months x 2 then once annually.

## 2020-11-14 DIAGNOSIS — E78.2 MIXED HYPERLIPIDEMIA: ICD-10-CM

## 2020-11-16 ENCOUNTER — TELEPHONE (OUTPATIENT)
Dept: FAMILY MEDICINE | Facility: OTHER | Age: 85
End: 2020-11-16
Payer: MEDICARE

## 2020-11-16 NOTE — TELEPHONE ENCOUNTER
Simvastatin 20 mg      Last Written Prescription Date:  08/06/2020  Last Fill Quantity: 90,   # refills: 0  Last Office Visit: 02/21/2020  Future Office visit:

## 2020-11-16 NOTE — TELEPHONE ENCOUNTER
4:05 PM    Reason for Call: future OVERBOOK    Patient is having the following symptoms: back pain    The patient is requesting an appointment for as soon as possible with Dr. Flowers.    Was an appointment offered for this call? No  If yes : Appointment type              Date    Preferred method for responding to this message: Telephone Call     What is your phone number [phone]    If we cannot reach you directly, may we leave a detailed response at the number you provided? Yes    Can this message wait until your PCP/provider returns, if unavailable today? Not applicable, provider is in    Shaunna Heartfin

## 2020-11-17 RX ORDER — SIMVASTATIN 20 MG
20 TABLET ORAL AT BEDTIME
Qty: 90 TABLET | Refills: 0 | Status: SHIPPED | OUTPATIENT
Start: 2020-11-17 | End: 2021-02-19

## 2020-11-30 ENCOUNTER — HOSPITAL ENCOUNTER (OUTPATIENT)
Facility: OTHER | Age: 85
Setting detail: OBSERVATION
Discharge: HOME | End: 2020-12-01
Attending: INTERNAL MEDICINE | Admitting: FAMILY MEDICINE
Payer: MEDICARE

## 2020-11-30 ENCOUNTER — APPOINTMENT (OUTPATIENT)
Dept: CT IMAGING | Facility: HOSPITAL | Age: 85
End: 2020-11-30
Attending: EMERGENCY MEDICINE
Payer: MEDICARE

## 2020-11-30 ENCOUNTER — APPOINTMENT (OUTPATIENT)
Dept: OCCUPATIONAL THERAPY | Facility: OTHER | Age: 85
End: 2020-11-30
Attending: FAMILY MEDICINE
Payer: MEDICARE

## 2020-11-30 ENCOUNTER — APPOINTMENT (OUTPATIENT)
Dept: GENERAL RADIOLOGY | Facility: HOSPITAL | Age: 85
End: 2020-11-30
Attending: EMERGENCY MEDICINE
Payer: MEDICARE

## 2020-11-30 ENCOUNTER — APPOINTMENT (OUTPATIENT)
Dept: PHYSICAL THERAPY | Facility: OTHER | Age: 85
End: 2020-11-30
Attending: FAMILY MEDICINE
Payer: MEDICARE

## 2020-11-30 ENCOUNTER — HOSPITAL ENCOUNTER (EMERGENCY)
Facility: HOSPITAL | Age: 85
Discharge: ANOTHER HOSPITAL | End: 2020-11-30
Attending: EMERGENCY MEDICINE | Admitting: EMERGENCY MEDICINE
Payer: MEDICARE

## 2020-11-30 ENCOUNTER — APPOINTMENT (OUTPATIENT)
Dept: MRI IMAGING | Facility: HOSPITAL | Age: 85
End: 2020-11-30
Attending: EMERGENCY MEDICINE
Payer: MEDICARE

## 2020-11-30 VITALS
HEART RATE: 65 BPM | RESPIRATION RATE: 16 BRPM | OXYGEN SATURATION: 92 % | SYSTOLIC BLOOD PRESSURE: 147 MMHG | TEMPERATURE: 97.6 F | DIASTOLIC BLOOD PRESSURE: 82 MMHG

## 2020-11-30 DIAGNOSIS — R41.0 CONFUSION: ICD-10-CM

## 2020-11-30 DIAGNOSIS — R11.11 VOMITING WITHOUT NAUSEA, INTRACTABILITY OF VOMITING NOT SPECIFIED, UNSPECIFIED VOMITING TYPE: ICD-10-CM

## 2020-11-30 DIAGNOSIS — R51.9 NONINTRACTABLE HEADACHE, UNSPECIFIED CHRONICITY PATTERN, UNSPECIFIED HEADACHE TYPE: ICD-10-CM

## 2020-11-30 PROBLEM — R33.8 CLOT RETENTION OF URINE: Status: RESOLVED | Noted: 2020-02-13 | Resolved: 2020-11-30

## 2020-11-30 PROBLEM — D62 ANEMIA DUE TO BLOOD LOSS, ACUTE: Status: RESOLVED | Noted: 2020-02-14 | Resolved: 2020-11-30

## 2020-11-30 PROBLEM — R68.89 RIGORS: Status: RESOLVED | Noted: 2019-02-16 | Resolved: 2020-11-30

## 2020-11-30 PROBLEM — R31.9 HEMATURIA: Status: RESOLVED | Noted: 2020-02-13 | Resolved: 2020-11-30

## 2020-11-30 LAB
ALBUMIN UR-MCNC: 10 MG/DL
ANION GAP SERPL CALCULATED.3IONS-SCNC: 4 MMOL/L (ref 3–14)
APPEARANCE UR: CLEAR
BACTERIA #/AREA URNS HPF: ABNORMAL /HPF
BASOPHILS # BLD AUTO: 0.1 10E9/L (ref 0–0.2)
BASOPHILS NFR BLD AUTO: 0.5 %
BILIRUB UR QL STRIP: NEGATIVE
BUN SERPL-MCNC: 19 MG/DL (ref 7–30)
CALCIUM SERPL-MCNC: 8.7 MG/DL (ref 8.5–10.1)
CHLORIDE SERPL-SCNC: 110 MMOL/L (ref 94–109)
CO2 SERPL-SCNC: 27 MMOL/L (ref 20–32)
COLOR UR AUTO: ABNORMAL
CREAT SERPL-MCNC: 0.82 MG/DL (ref 0.66–1.25)
DIFFERENTIAL METHOD BLD: NORMAL
EOSINOPHIL # BLD AUTO: 0.3 10E9/L (ref 0–0.7)
EOSINOPHIL NFR BLD AUTO: 3.2 %
ERYTHROCYTE [DISTWIDTH] IN BLOOD BY AUTOMATED COUNT: 12.8 % (ref 10–15)
ETHANOL SERPL-MCNC: <0.01 G/DL
GFR SERPL CREATININE-BSD FRML MDRD: 76 ML/MIN/{1.73_M2}
GLUCOSE SERPL-MCNC: 121 MG/DL (ref 70–99)
GLUCOSE UR STRIP-MCNC: NEGATIVE MG/DL
HCT VFR BLD AUTO: 44.6 % (ref 40–53)
HGB BLD-MCNC: 15.1 G/DL (ref 13.3–17.7)
HGB UR QL STRIP: NEGATIVE
IMM GRANULOCYTES # BLD: 0 10E9/L (ref 0–0.4)
IMM GRANULOCYTES NFR BLD: 0.3 %
INR PPP: 1.03 (ref 0.86–1.14)
KETONES UR STRIP-MCNC: NEGATIVE MG/DL
LABORATORY COMMENT REPORT: NORMAL
LEUKOCYTE ESTERASE UR QL STRIP: NEGATIVE
LYMPHOCYTES # BLD AUTO: 4.3 10E9/L (ref 0.8–5.3)
LYMPHOCYTES NFR BLD AUTO: 46.3 %
MCH RBC QN AUTO: 30.8 PG (ref 26.5–33)
MCHC RBC AUTO-ENTMCNC: 33.9 G/DL (ref 31.5–36.5)
MCV RBC AUTO: 91 FL (ref 78–100)
MONOCYTES # BLD AUTO: 1.1 10E9/L (ref 0–1.3)
MONOCYTES NFR BLD AUTO: 12.2 %
MUCOUS THREADS #/AREA URNS LPF: PRESENT /LPF
NEUTROPHILS # BLD AUTO: 3.5 10E9/L (ref 1.6–8.3)
NEUTROPHILS NFR BLD AUTO: 37.5 %
NITRATE UR QL: NEGATIVE
NRBC # BLD AUTO: 0 10*3/UL
NRBC BLD AUTO-RTO: 0 /100
PH UR STRIP: 7 PH (ref 4.7–8)
PLATELET # BLD AUTO: 254 10E9/L (ref 150–450)
POTASSIUM SERPL-SCNC: 3.9 MMOL/L (ref 3.4–5.3)
RBC # BLD AUTO: 4.91 10E12/L (ref 4.4–5.9)
RBC #/AREA URNS AUTO: 3 /HPF (ref 0–2)
SARS-COV-2 RNA SPEC QL NAA+PROBE: NEGATIVE
SARS-COV-2 RNA SPEC QL NAA+PROBE: NORMAL
SODIUM SERPL-SCNC: 141 MMOL/L (ref 133–144)
SOURCE: ABNORMAL
SP GR UR STRIP: 1.01 (ref 1–1.03)
SPECIMEN SOURCE: NORMAL
SPECIMEN SOURCE: NORMAL
SQUAMOUS #/AREA URNS AUTO: 0 /HPF (ref 0–1)
TROPONIN I SERPL-MCNC: <0.015 UG/L (ref 0–0.04)
UROBILINOGEN UR STRIP-MCNC: NORMAL MG/DL (ref 0–2)
WBC # BLD AUTO: 9.3 10E9/L (ref 4–11)
WBC #/AREA URNS AUTO: 1 /HPF (ref 0–5)

## 2020-11-30 PROCEDURE — 97530 THERAPEUTIC ACTIVITIES: CPT | Mod: GO | Performed by: OCCUPATIONAL THERAPIST

## 2020-11-30 PROCEDURE — 99285 EMERGENCY DEPT VISIT HI MDM: CPT | Performed by: EMERGENCY MEDICINE

## 2020-11-30 PROCEDURE — 99285 EMERGENCY DEPT VISIT HI MDM: CPT | Mod: 25

## 2020-11-30 PROCEDURE — 255N000002 HC RX 255 OP 636: Performed by: RADIOLOGY

## 2020-11-30 PROCEDURE — 250N000011 HC RX IP 250 OP 636: Performed by: EMERGENCY MEDICINE

## 2020-11-30 PROCEDURE — 87635 SARS-COV-2 COVID-19 AMP PRB: CPT | Performed by: INTERNAL MEDICINE

## 2020-11-30 PROCEDURE — 80320 DRUG SCREEN QUANTALCOHOLS: CPT | Performed by: EMERGENCY MEDICINE

## 2020-11-30 PROCEDURE — A9585 GADOBUTROL INJECTION: HCPCS | Performed by: RADIOLOGY

## 2020-11-30 PROCEDURE — 84484 ASSAY OF TROPONIN QUANT: CPT | Performed by: EMERGENCY MEDICINE

## 2020-11-30 PROCEDURE — 250N000011 HC RX IP 250 OP 636

## 2020-11-30 PROCEDURE — G0378 HOSPITAL OBSERVATION PER HR: HCPCS

## 2020-11-30 PROCEDURE — 250N000013 HC RX MED GY IP 250 OP 250 PS 637: Mod: GY | Performed by: FAMILY MEDICINE

## 2020-11-30 PROCEDURE — C9803 HOPD COVID-19 SPEC COLLECT: HCPCS

## 2020-11-30 PROCEDURE — 70496 CT ANGIOGRAPHY HEAD: CPT

## 2020-11-30 PROCEDURE — 71045 X-RAY EXAM CHEST 1 VIEW: CPT

## 2020-11-30 PROCEDURE — 96374 THER/PROPH/DIAG INJ IV PUSH: CPT | Mod: XU

## 2020-11-30 PROCEDURE — 93010 ELECTROCARDIOGRAM REPORT: CPT | Performed by: INTERNAL MEDICINE

## 2020-11-30 PROCEDURE — 81001 URINALYSIS AUTO W/SCOPE: CPT | Mod: 59 | Performed by: EMERGENCY MEDICINE

## 2020-11-30 PROCEDURE — 70450 CT HEAD/BRAIN W/O DYE: CPT

## 2020-11-30 PROCEDURE — 250N000013 HC RX MED GY IP 250 OP 250 PS 637: Mod: GY | Performed by: EMERGENCY MEDICINE

## 2020-11-30 PROCEDURE — 97165 OT EVAL LOW COMPLEX 30 MIN: CPT | Mod: GO | Performed by: OCCUPATIONAL THERAPIST

## 2020-11-30 PROCEDURE — 93005 ELECTROCARDIOGRAM TRACING: CPT

## 2020-11-30 PROCEDURE — 80048 BASIC METABOLIC PNL TOTAL CA: CPT | Performed by: EMERGENCY MEDICINE

## 2020-11-30 PROCEDURE — 85610 PROTHROMBIN TIME: CPT | Performed by: EMERGENCY MEDICINE

## 2020-11-30 PROCEDURE — 99219 PR INITIAL OBSERVATION CARE,LEVEL II: CPT | Performed by: FAMILY MEDICINE

## 2020-11-30 PROCEDURE — 96375 TX/PRO/DX INJ NEW DRUG ADDON: CPT | Mod: XU

## 2020-11-30 PROCEDURE — 97116 GAIT TRAINING THERAPY: CPT | Mod: GP,XU

## 2020-11-30 PROCEDURE — 85025 COMPLETE CBC W/AUTO DIFF WBC: CPT | Performed by: EMERGENCY MEDICINE

## 2020-11-30 PROCEDURE — 36415 COLL VENOUS BLD VENIPUNCTURE: CPT

## 2020-11-30 PROCEDURE — 97161 PT EVAL LOW COMPLEX 20 MIN: CPT | Mod: GP

## 2020-11-30 PROCEDURE — 70553 MRI BRAIN STEM W/O & W/DYE: CPT

## 2020-11-30 RX ORDER — ONDANSETRON 2 MG/ML
INJECTION INTRAMUSCULAR; INTRAVENOUS
Status: COMPLETED
Start: 2020-11-30 | End: 2020-11-30

## 2020-11-30 RX ORDER — MECLIZINE HYDROCHLORIDE 25 MG/1
50 TABLET ORAL ONCE
Status: COMPLETED | OUTPATIENT
Start: 2020-11-30 | End: 2020-11-30

## 2020-11-30 RX ORDER — IOPAMIDOL 755 MG/ML
50 INJECTION, SOLUTION INTRAVASCULAR ONCE
Status: COMPLETED | OUTPATIENT
Start: 2020-11-30 | End: 2020-11-30

## 2020-11-30 RX ORDER — ASPIRIN 81 MG/1
81 TABLET ORAL DAILY
Status: DISCONTINUED | OUTPATIENT
Start: 2020-11-30 | End: 2020-12-01 | Stop reason: HOSPADM

## 2020-11-30 RX ORDER — GADOBUTROL 604.72 MG/ML
2 INJECTION INTRAVENOUS ONCE
Status: COMPLETED | OUTPATIENT
Start: 2020-11-30 | End: 2020-11-30

## 2020-11-30 RX ORDER — ASPIRIN 81 MG/1
81 TABLET, CHEWABLE ORAL DAILY
Status: DISCONTINUED | OUTPATIENT
Start: 2020-11-30 | End: 2020-12-01 | Stop reason: HOSPADM

## 2020-11-30 RX ORDER — LOSARTAN POTASSIUM 50 MG/1
50 TABLET ORAL DAILY
Status: DISCONTINUED | OUTPATIENT
Start: 2020-11-30 | End: 2020-12-01 | Stop reason: HOSPADM

## 2020-11-30 RX ORDER — DIAZEPAM 2 MG
2 TABLET ORAL ONCE
Status: DISCONTINUED | OUTPATIENT
Start: 2020-11-30 | End: 2020-11-30

## 2020-11-30 RX ORDER — ATORVASTATIN CALCIUM 10 MG/1
10 TABLET, FILM COATED ORAL AT BEDTIME
Status: DISCONTINUED | OUTPATIENT
Start: 2020-11-30 | End: 2020-12-01 | Stop reason: HOSPADM

## 2020-11-30 RX ORDER — FAMOTIDINE 20 MG/1
20 TABLET, FILM COATED ORAL DAILY
Status: DISCONTINUED | OUTPATIENT
Start: 2020-11-30 | End: 2020-12-01 | Stop reason: HOSPADM

## 2020-11-30 RX ORDER — GADOBUTROL 604.72 MG/ML
7.5 INJECTION INTRAVENOUS ONCE
Status: COMPLETED | OUTPATIENT
Start: 2020-11-30 | End: 2020-11-30

## 2020-11-30 RX ORDER — ONDANSETRON 2 MG/ML
4 INJECTION INTRAMUSCULAR; INTRAVENOUS ONCE
Status: COMPLETED | OUTPATIENT
Start: 2020-11-30 | End: 2020-11-30

## 2020-11-30 RX ORDER — AMOXICILLIN 250 MG
1-2 CAPSULE ORAL 2 TIMES DAILY
Status: DISCONTINUED | OUTPATIENT
Start: 2020-11-30 | End: 2020-12-01 | Stop reason: HOSPADM

## 2020-11-30 RX ORDER — SIMVASTATIN 20 MG
20 TABLET ORAL AT BEDTIME
Status: DISCONTINUED | OUTPATIENT
Start: 2020-11-30 | End: 2020-11-30 | Stop reason: CLARIF

## 2020-11-30 RX ORDER — METOCLOPRAMIDE HYDROCHLORIDE 5 MG/ML
10 INJECTION INTRAMUSCULAR; INTRAVENOUS ONCE
Status: COMPLETED | OUTPATIENT
Start: 2020-11-30 | End: 2020-11-30

## 2020-11-30 RX ORDER — METOPROLOL SUCCINATE 25 MG/1
25 TABLET, EXTENDED RELEASE ORAL EVERY EVENING
Status: DISCONTINUED | OUTPATIENT
Start: 2020-11-30 | End: 2020-12-01 | Stop reason: HOSPADM

## 2020-11-30 RX ADMIN — GADOBUTROL 7.5 ML: 604.72 INJECTION INTRAVENOUS at 07:05

## 2020-11-30 RX ADMIN — ASPIRIN 81 MG CHEWABLE TABLET 81 MG: 81 TABLET CHEWABLE at 13:14

## 2020-11-30 RX ADMIN — GADOBUTROL 2 ML: 604.72 INJECTION INTRAVENOUS at 07:04

## 2020-11-30 RX ADMIN — DOCUSATE SODIUM 50 MG AND SENNOSIDES 8.6 MG 2 TABLET: 8.6; 5 TABLET, FILM COATED ORAL at 13:15

## 2020-11-30 RX ADMIN — ATORVASTATIN CALCIUM 10 MG: 10 TABLET, FILM COATED ORAL at 22:50

## 2020-11-30 RX ADMIN — METOCLOPRAMIDE 10 MG: 5 INJECTION, SOLUTION INTRAMUSCULAR; INTRAVENOUS at 07:31

## 2020-11-30 RX ADMIN — LOSARTAN POTASSIUM 50 MG: 50 TABLET, FILM COATED ORAL at 13:15

## 2020-11-30 RX ADMIN — METOPROLOL SUCCINATE 25 MG: 25 TABLET, EXTENDED RELEASE ORAL at 19:57

## 2020-11-30 RX ADMIN — ONDANSETRON 4 MG: 2 INJECTION INTRAMUSCULAR; INTRAVENOUS at 05:54

## 2020-11-30 RX ADMIN — MECLIZINE HYDROCHLORIDE 50 MG: 25 TABLET ORAL at 06:29

## 2020-11-30 RX ADMIN — FAMOTIDINE 20 MG: 20 TABLET ORAL at 13:15

## 2020-11-30 RX ADMIN — IOPAMIDOL 50 ML: 755 INJECTION, SOLUTION INTRAVENOUS at 04:39

## 2020-11-30 NOTE — CONSULTS
"[facility]    Stroke Telephone Note    I was called by Dr. Roby Rizo on 11/30/20 at 0422 regarding patient James Grant. The patient is a 89 year old male with past medical history of dyslipidemia, hypertension, CAD, GERD, BPH, and history of bladder cancer who presented with a funny feeling in his head and nonspecific vision changes in his right eye.  Per daughter patient had complained of vision loss in left eye over the phone to her.  Per Dr. Rizo, his current examination is non focal and he was complaining of bilateral blurry vision but no visual field cuts, no loss of vision.     Stroke Code Data  (for stroke code without tele)  Stroke code activated 11/30/20   0419   First stroke provider response  11/30/20    0422   Last known normal 11/29/20   2100   Time of discovery   (or onset of symptoms) 11/30/20   0300   Head CT read by me 11/30/20   0442   Was stroke code de-escalated? Yes 11/30/20            TPA Treatment   Not given due to minor/isolated/quickly resolving symptoms and unclear or unfavorable risk-benefit profile for extended window thrombolysis beyond the conventional 4.5 hour time window.    Endovascular Treatment  Not initiated due to absence of proximal vessel occlusion    Impression  Nonspecific visual complaints    Recommendations  - MRI Brain with and without IV contrast  - Once MRI has been completed, if negative patient does not require any additional stroke work up  - If there are any further questions please page the stroke doctor on call    My recommendations are based on the information provided over the phone by James Grant's in-person providers. They are not intended to replace the clinical judgment of his in-person providers. I was not requested to personally see or examine the patient at this time.    Seema Mcintosh MD   Neurocritical Care  To page me or covering stroke neurology team member, click here: AMCOM   Choose \"On Call\" tab at top, then search " "dropdown box for \"Neurology Adult\", select location, press Enter, then look for stroke/neuro ICU/telestroke.           "

## 2020-11-30 NOTE — PLAN OF CARE
BP (!) 150/63   Pulse 67   Temp 96.4  F (35.8  C) (Oral)   Resp 16   SpO2 95%   Pt is AxO x4, confused at times. VSS, afebrile. Pt denies any pain. Neuro's are intact. Pt denies any nausea at this time. Pt states he has a small dull headache on base of head. Heat applied. Pt declined any further information. Pt passed dysphagia screen. Tolerating reg diet. Adequate output. Will continue to monitor.    Olivia Valencia on 11/30/2020 at 2:17 PM

## 2020-11-30 NOTE — ED NOTES
Face to face report given with opportunity to observe patient.    Report given to SARA Eaton and SARA Devries.    Mel Zepeda RN   11/30/2020  7:13 AM

## 2020-11-30 NOTE — PROGRESS NOTES
11/30/20 1514   Quick Adds   Type of Visit Initial PT Evaluation   Living Environment   People in home alone   Current Living Arrangements house   Home Accessibility stairs to enter home   Number of Stairs, Main Entrance 4   Transportation Anticipated family or friend will provide   Self-Care   Usual Activity Tolerance good   Current Activity Tolerance good   Equipment Currently Used at Home none   Disability/Function   Hearing Difficulty or Deaf yes   Patient's preferred means of communication English speaker with hearing loss, no speech problems.   Describe hearing loss bilateral hearing loss   Use of hearing assistive devices bilateral hearing aids   Wear Glasses or Blind yes   Vision Management glasses   Difficulty Communicating no   Difficulty Eating/Swallowing yes   Walking or Climbing Stairs Difficulty no   Toileting no   Fall history within last six months no   General Information   Referring Physician Francis   Patient/Family Therapy Goals Statement (PT) return home   Weight-Bearing Status - LLE full weight-bearing   Weight-Bearing Status - RLE full weight-bearing   Cognition   Orientation Status (Cognition) oriented x 4   Affect/Mental Status (Cognition) WFL   Follows Commands (Cognition) WFL   Pain Assessment   Patient Currently in Pain No   Integumentary/Edema   Integumentary/Edema Comments no notable LE edema   Posture    Posture Not impaired   Range of Motion (ROM)   ROM Quick Adds ROM WFL   Strength   Manual Muscle Testing Quick Adds Strength WFL   Bed Mobility   Bed Mobility supine-sit;sit-supine   Supine-Sit Rowan (Bed Mobility) supervision   Sit-Supine Rowan (Bed Mobility) supervision   Transfers   Transfers bed-chair transfer;sit-stand transfer   Bed-Chair Transfer   Bed-Chair Rowan (Transfers) supervision   Sit-Stand Transfer   Sit-Stand Rowan (Transfers) supervision   Gait/Stairs (Locomotion)   Assistive Device (Gait)   (no assistive gait device)   Distance in Feet  (Required for LE Total Joints) 325   Pattern (Gait) 2-point   Balance   Balance no deficits were identified   Sensory Examination   Sensory Perception WFL   Coordination   Coordination no deficits were identified   Muscle Tone   Muscle Tone no deficits were identified   Clinical Impression   Criteria for Skilled Therapeutic Intervention yes, treatment indicated   PT Diagnosis (PT) impaired mobility   Influenced by the following impairments fatigue   Functional limitations due to impairments activity/gait tolerance   Clinical Presentation Stable/Uncomplicated   Clinical Decision Making (Complexity) low complexity   Therapy Frequency (PT) Daily   Predicted Duration of Therapy Intervention (days/wks) 1-2 days   Planned Therapy Interventions (PT) gait training   Anticipated Equipment Needs at Discharge (PT)   (no assistive gait devices)   Risk & Benefits of therapy have been explained risks/benefits reviewed;patient   PT Discharge Planning    PT Discharge Recommendation (DC Rec) home   PT Rationale for DC Rec patient is not demonstrating any functional impairments   PT Brief overview of current status  SBA for bed mobilities; SBA for transfers and SBA for ambulation with AD   Total Evaluation Time   Total Evaluation Time (Minutes) 15

## 2020-11-30 NOTE — ED NOTES
Patient sat up at bedside to attempt to use urinal. Patient became dizzy and nauseated. Started dry heaving and then yellow emesis. MD notified, see new orders.

## 2020-11-30 NOTE — ED PROVIDER NOTES
ED Course as of Nov 30 0847   Mon Nov 30, 2020   0425 Spoke with Dr. Clark and brief history provided.  Patient on his way to obtain CT of his head and neck      0449 Spoke with daughter, Shannan.  States that she received a call from patient at 3:15 in the morning stating that he was feeling foggy.  States it started after he woke up to go to the bathroom.  Patient lives by himself and typically goes to bed at about 9:00 in the morning. When she arrived at his house patient stated that he also had left eye vision problems      0511 Spoke with stroke radiologist who states that head CT is negative      0512 Spoke with Dr. Clark of the stroke neurology team who on review of CT feels that patient not candidate for thrombolytics.  Recommends MRI of the brain with and without contrast and if negative can go home.  Code stroke de-escalated escalated      0554 Patient complaining of feeling nauseated. Zofran ordered.       0644 Patient going to MRI. Still complaining of feeling nauseated.       0715 Case discussed with hospitalist at [facility], Dr. Rivers.  He agrees that patient should be admitted.  MRI results pending      0846 SHAD f/ Dr. Rizo, pt  p/w RÍOS, head  CT and  CTA  were negative, MRI pending, pt will be admitted given he  lives alone  and sxs are not improved.        MRI showed no acute changes, patient was transferred to [place] for further care.    MD Isaac Bear Craig, MD  11/30/20 7550

## 2020-11-30 NOTE — H&P
[facility]    History and Physical  Hospitalist       Date of Admission:  11/30/2020    Assessment & Plan   James Grant is a 89 year old male who presents with headache    Principal Problem:    TIA (transient ischemic attack)    Assessment: woek up with headache and left vision changes.  Since resolved.  Had normal CT and MRI at [place].  Stroke neurology also evaluated in [place].    Plan: PT/OT    Active Problems:    SNHL (sensorineural hearing loss)    Assessment: chronic    Plan: hearing aids      Essential hypertension, benign    Assessment: metoprolol, losartan    Plan: continue home regimen      Dyslipidemia    Assessment: chornic    Plan: simvastatin      History of bladder cancer    Assessment: chronic    DVT Prophylaxis: Low Risk/Ambulatory with no VTE prophylaxis indicated  Code Status: Prior    Donnell Blanco    Primary Care Physician   Broderick Flowers    Chief Complaint   Headache, left eye vision changes    History is obtained from the patient and chart review.    History of Present Illness   James Grant is a 89 year old male who woke up this morning to go to the bathroom at 3AM.  He noted a posterior headache and left vision changes.  He called his daughter who found him with same complaints.  He otherwise appeared well to her.  He was brought to the ED in [place] where stroke neurology was consulted.  Normal head CT, they recommended an MRI which also returned normal.    He was transferred for further observation.  He reports now that his symptoms have resolved.  He has had no speech changes, strength changes, nausea, vomiting, diarrhea or urinary changes.  No sick contacts.  No fevers.    History confirmed with daughter Shannan over the phone.  Lives alone in [place].    Past Medical History    I have reviewed this patient's medical history and updated it with pertinent information if needed.   Past Medical History:   Diagnosis Date     Benign localized hyperplasia of  prostate without urinary obstruction and other lower urinary tract symptoms (LUTS) 11/26/2010     CHD (coronary heart disease) 11/26/2010     Dyslipidemia 11/26/2010     GERD (gastroesophageal reflux disease) 11/26/2010     HTN (hypertension) 11/26/2010     Old myocardial infarction 11/26/2010     Other symptoms involving digestive system(787.99) 10/20/2006       Past Surgical History   I have reviewed this patient's surgical history and updated it with pertinent information if needed.  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       CHOLECYSTECTOMY       CYSTOSCOPY, FULGURATE BLEEDERS, EVACUATE CLOT(S), COMBINED N/A 2/17/2020    Procedure: Clot Evacuation;  Surgeon: Andrzej Olivia MD;  Location: [facility] OR     CYSTOSCOPY, RETROGRADES, COMBINED Bilateral 10/22/2019    Procedure: Bilateral Retrograde Pyelograms;  Surgeon: Andrzej Olivia MD;  Location: [facility] OR     CYSTOSCOPY, TRANSURETHRAL RESECTION (TUR) TUMOR BLADDER, COMBINED N/A 10/22/2019    Procedure: Transurethral Resection of Bladder Tumor and transurethral resection of prostate and bladder stone removal;  Surgeon: Andrzej Olivia MD;  Location: [facility] OR     HERNIA REPAIR       left arm surgery         Prior to Admission Medications   Prior to Admission Medications   Prescriptions Last Dose Informant Patient Reported? Taking?   NITROSTAT 0.4 MG sublingual tablet  Self No No   Sig: PLACE 1 TABLET (0.4 MG) UNDER THE TONGUE EVERY 5 MINUTES AS NEEDED   famotidine (PEPCID) 20 MG tablet   No No   Sig: TAKE ONE (1) TABLET BY MOUTH DAILY   losartan (COZAAR) 50 MG tablet   No No   Sig: TAKE 1 TABLET (50 MG) BY MOUTH DAILY COZAAR   metoprolol succinate ER (TOPROL-XL) 25 MG 24 hr tablet   No No   Sig: TAKE 1/2 TAB BY MOUTH AT BEDTIME. DO NOT CRUSH OR CHEW.   simvastatin (ZOCOR) 20 MG tablet   No No   Sig: Take 1 tablet (20 mg) by mouth At Bedtime      Facility-Administered Medications: None     Allergies   Allergies   Allergen Reactions      Lisinopril Cough     Morphine Other (See Comments) and Visual Disturbance     confusion       Social History   I have reviewed this patient's social history and updated it with pertinent information if needed. James Grant  reports that he quit smoking about 58 years ago. His smoking use included cigarettes. He started smoking about 71 years ago. He has a 13.00 pack-year smoking history. He has never used smokeless tobacco. He reports previous alcohol use. He reports that he does not use drugs.    Family History   I have reviewed this patient's family history and updated it with pertinent information if needed.   Family History   Problem Relation Age of Onset     Heart Failure Mother 86        Congestive - Cause of Death     Cerebrovascular Disease Father      C.A.D. Sister 91       Review of Systems     REVIEW OF SYSTEMS:    Constitutional: normal energy and appetite, no recent sick contacts  Eyes: no changes in vision  Ears, nose, mouth, throat, and face: no mouth sores, dysphagia, or odynophagia  Respiratory: no shortness of breath, cough, or wheezing. No aspiration symptoms.   Cardiovascular: no chest pain, palpitations, orthopnea, increased lower extremity edema, or syncope.   Gastrointestinal: no constipation, diarrhea, nausea, vomiting or abdominal pain.  Genitourinary: no dysuria, hematuria, urgency or frequency.   Hematologic/lymphatic: no unintentional weight loss or night sweats.  Musculoskeletal: no pain to extremities or falls.   Neurological: see above  Psychiatric: no hallucinations ordelusions.    Physical Exam   Temp: 96.4  F (35.8  C) Temp src: Oral BP: (!) 150/63 Pulse: 67   Resp: 16 SpO2: 95 % O2 Device: None (Room air)    Vital Signs with Ranges  Temp:  [96.4  F (35.8  C)-97.6  F (36.4  C)] 96.4  F (35.8  C)  Pulse:  [55-67] 67  Resp:  [16] 16  BP: (107-188)/(63-99) 150/63  SpO2:  [92 %-98 %] 95 %  0 lbs 0 oz    Constitutional: comfortable, hard of hearing  Eyes: PERRL  HEENT: complains of a  dry mouth  Respiratory: clear  Cardiovascular: regular  GI: soft, NT, ND  Skin: no rash or bruising  Neurologic: hard of hearing otherwise no focal deficits  Psychiatric: normal mentation, answers questions appropriately    Data   Results for orders placed or performed during the hospital encounter of 11/30/20   Symptomatic COVID-19 Virus (Coronavirus) by PCR     Status: None    Specimen: Nasopharyngeal   Result Value Ref Range    COVID-19 Virus PCR to [facility] - Source Nasopharyngeal     COVID-19 Virus PCR to [facility] - Result       Test received-See reflex to [facility] test SARS CoV2 (COVID-19) Virus RT-PCR   Results for orders placed or performed during the hospital encounter of 11/30/20   Head CT w/o contrast     Status: None    Narrative    PROCEDURE: CT HEAD W/O CONTRAST     HISTORY: Patient states that he feels funny in his head and is having  vision blames in his right eye.  Rule out stroke.    COMPARISON: None.    TECHNIQUE:  Helical images of the head from the foramen magnum to the  vertex were obtained without contrast.    FINDINGS: The ventricles and sulci are normal in volume. No acute  intracranial hemorrhage, mass effect, midline shift, hydrocephalus or  basilar cystern effacement are present.    The grey-white matter interface is preserved.    The calvarium is intact. The mastoid air cells are clear.  The  visualized paranasal sinuses are clear.      Impression    IMPRESSION: Normal brain      NIKOLAS MUNOZ MD   CTA Head Neck with Contrast     Status: None    Narrative    PROCEDURE: CTA  HEAD NECK WITH CONTRAST 11/30/2020 5:43 AM    HISTORY: Patient states that he feels funny in his head and is having  vision blames in his right eye.  Rule out stroke    COMPARISONS: None.    Meds/Dose Given: Isovue 370 (50 mL)    TECHNIQUE: CT angiogram of the brain and CT angiogram of the neck both  with three-dimensional MIPS reconstructions performed on a separate  workstation.    FINDINGS: The brachiocephalic  artery is widely patent. The right  subclavian and right vertebral artery is widely patent. The right  vertebral artery is developmentally small. There is mild  atherosclerotic plaquing at the right carotid bifurcation with less  than 50% stenosis. The internal carotid artery is widely patent to the  skull base. The left common carotid artery is widely patent. There is  calcific plaquing of the left carotid bifurcation with less than 50%  stenosis noted. The left subclavian and left vertebral artery is  widely patent.    CT angiogram of the brain: The basilar artery is supplied via the left  vertebral artery. The basilar artery is widely patent. Both posterior  cerebral and superior cerebellar arteries are normal. The carotid  siphons are normal. The supraclinoid internal carotid arteries are  normal. No intracranial stenoses or aneurysms or vascular shifts are  noted.    The muscles of mastication and the parapharyngeal spaces are normal.  The larynx and upper trachea is normal. The thyroid gland is normal.  The cervical and upper mediastinal lymph nodes are normal in caliber.  There is some mosaic attenuation in the lung apices consistent with  air trapping.           Impression    IMPRESSION: No hemodynamically significant stenoses or occlusions are  identified in the vessels of the neck and brain    NIKOLAS MUNOZ MD   XR Chest Port 1 View     Status: None    Narrative    PROCEDURE: XR CHEST PORT 1 VW 11/30/2020 5:38 AM    HISTORY: Patient with complaints of feeling foggy.  Rule out pneumonia    COMPARISONS: 2/16/2019.    TECHNIQUE: Single view.    FINDINGS: Heart is stable in size. There is stable elongation of the  thoracic aorta. There is some mild linear change at the lung bases.  There is no confluent infiltrate or pleural effusion.         Impression    IMPRESSION: Linear scar or atelectasis at the lung bases. No confluent  infiltrate.    ZANE RIVERA MD   MR Brain w/o & w Contrast     Status: None     Narrative    PROCEDURE: MR BRAIN W/O & W CONTRAST 11/30/2020 7:25 AM    HISTORY: Patient complaining of feeling foggy in his head.  Also  complains of blurry vision in both eyes.  Rule out stroke    COMPARISONS: None.    Meds/Dose Given: Gadavist  9.5   mL    TECHNIQUE: Images were obtained sagittally T1 weighted images were  obtained axially diffusion FLAIR and gradient echo T1 and T2-weighted  images were obtained axially and coronally T1 weighted following  gadolinium administration    FINDINGS: There are no acute infarcts. The ventricular system is  normal in size. There are no masses ventricular shifts or  extracerebral collections. The pituitary and optic chiasm appear  normal. The basal cisterns and internal auditory canals appear normal.  Cranial vault is intact. Mild mucosal thickening is seen in the  frontal and ethmoid sinuses.         Impression    IMPRESSION: No acute infarcts. No enhancing masses or ventricular  shifts    NIKOLAS MUNOZ MD   CBC with platelets differential     Status: None   Result Value Ref Range    WBC 9.3 4.0 - 11.0 10e9/L    RBC Count 4.91 4.4 - 5.9 10e12/L    Hemoglobin 15.1 13.3 - 17.7 g/dL    Hematocrit 44.6 40.0 - 53.0 %    MCV 91 78 - 100 fl    MCH 30.8 26.5 - 33.0 pg    MCHC 33.9 31.5 - 36.5 g/dL    RDW 12.8 10.0 - 15.0 %    Platelet Count 254 150 - 450 10e9/L    Diff Method Automated Method     % Neutrophils 37.5 %    % Lymphocytes 46.3 %    % Monocytes 12.2 %    % Eosinophils 3.2 %    % Basophils 0.5 %    % Immature Granulocytes 0.3 %    Nucleated RBCs 0 0 /100    Absolute Neutrophil 3.5 1.6 - 8.3 10e9/L    Absolute Lymphocytes 4.3 0.8 - 5.3 10e9/L    Absolute Monocytes 1.1 0.0 - 1.3 10e9/L    Absolute Eosinophils 0.3 0.0 - 0.7 10e9/L    Absolute Basophils 0.1 0.0 - 0.2 10e9/L    Abs Immature Granulocytes 0.0 0 - 0.4 10e9/L    Absolute Nucleated RBC 0.0    INR     Status: None   Result Value Ref Range    INR 1.03 0.86 - 1.14   Troponin I     Status: None   Result Value  Ref Range    Troponin I ES <0.015 0.000 - 0.045 ug/L   Basic metabolic panel     Status: Abnormal   Result Value Ref Range    Sodium 141 133 - 144 mmol/L    Potassium 3.9 3.4 - 5.3 mmol/L    Chloride 110 (H) 94 - 109 mmol/L    Carbon Dioxide 27 20 - 32 mmol/L    Anion Gap 4 3 - 14 mmol/L    Glucose 121 (H) 70 - 99 mg/dL    Urea Nitrogen 19 7 - 30 mg/dL    Creatinine 0.82 0.66 - 1.25 mg/dL    GFR Estimate 76 >60 mL/min/[1.73_m2]    GFR Estimate If Black 88 >60 mL/min/[1.73_m2]    Calcium 8.7 8.5 - 10.1 mg/dL   Alcohol ethyl     Status: None   Result Value Ref Range    Ethanol g/dL <0.01 0.01 g/dL   UA with Microscopic     Status: Abnormal   Result Value Ref Range    Color Urine Light Yellow     Appearance Urine Clear     Glucose Urine Negative NEG^Negative mg/dL    Bilirubin Urine Negative NEG^Negative    Ketones Urine Negative NEG^Negative mg/dL    Specific Gravity Urine 1.010 1.003 - 1.035    Blood Urine Negative NEG^Negative    pH Urine 7.0 4.7 - 8.0 pH    Protein Albumin Urine 10 (A) NEG^Negative mg/dL    Urobilinogen mg/dL Normal 0.0 - 2.0 mg/dL    Nitrite Urine Negative NEG^Negative    Leukocyte Esterase Urine Negative NEG^Negative    Source Midstream Urine     WBC Urine 1 0 - 5 /HPF    RBC Urine 3 (H) 0 - 2 /HPF    Bacteria Urine None (A) NEG^Negative /HPF    Squamous Epithelial /HPF Urine 0 0 - 1 /HPF    Mucous Urine Present (A) NEG^Negative /LPF

## 2020-11-30 NOTE — ED PROVIDER NOTES
History     Chief Complaint   Patient presents with     feels funny     HPI  James Grant is a 89 year old male who presents today with complaints of feeling funny in his head.  Also complaining of difficulty seeing from his right eye.  Symptoms were allegedly began at 3:00 in the morning.  Patient has otherwise been at baseline state of health.  Patient denies any additional complaints.    Allergies:  Allergies   Allergen Reactions     Lisinopril Cough     Morphine Other (See Comments) and Visual Disturbance     confusion       Problem List:    Patient Active Problem List    Diagnosis Date Noted     Anemia due to blood loss, acute 02/14/2020     Priority: Medium     Hematuria 02/13/2020     Priority: Medium     Clot retention of urine 02/13/2020     Priority: Medium     Added automatically from request for surgery [id number]       Malignant neoplasm of overlapping sites of bladder (H) 11/22/2019     Priority: Medium     History of bladder cancer 10/28/2019     Priority: Medium     Rigors 02/16/2019     Priority: Medium     Venous stasis dermatitis of both lower extremities 03/16/2017     Priority: Medium     ACP (advance care planning) 06/06/2016     Priority: Medium     Advance Care Planning 6/6/2016: ACP Review of Chart / Resources Provided:  Reviewed chart for advance care plan.  James Grant has no plan or code status on file. Discussed available resources and provided with information. Confirmed code status reflects current choices pending further ACP discussions.  Confirmed/documented legally designated decision makers.  Added by Yani Anderson             Advance care planning 02/08/2016     Priority: Medium     Advance Care Planning 2/8/2016: Receipt of ACP document:  Received: POLST which was signed and dated by provider on 1/18/16.  Document previously scanned on 1/22/16.  Order reviewed and found to be valid.  Code Status reflects choices in most recent ACP document.  Confirmed/documented  designated decision maker(s).  Added by Trinh Garces             BPH (benign prostatic hyperplasia) 01/08/2014     Priority: Medium     SNHL (sensorineural hearing loss) 07/17/2013     Priority: Medium     ETD (eustachian tube dysfunction) 07/17/2013     Priority: Medium     Dyslipidemia 11/26/2010     Priority: Medium     GERD (gastroesophageal reflux disease) 11/26/2010     Priority: Medium     CHD (coronary heart disease) 11/26/2010     Priority: Medium     07/2010 S/P inferior wall MI  Angioplasty and stenting X 2 RCA  08/2010 angioplasty and stenting (LIDIA) LAD       Essential hypertension, benign 11/08/2010     Priority: Medium     Overview:   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       CHOLECYSTECTOMY       CYSTOSCOPY, FULGURATE BLEEDERS, EVACUATE CLOT(S), COMBINED N/A 2/17/2020    Procedure: Clot Evacuation;  Surgeon: Andrzej Olivia MD;  Location:  OR     CYSTOSCOPY, RETROGRADES, COMBINED Bilateral 10/22/2019    Procedure: Bilateral Retrograde Pyelograms;  Surgeon: Andrzej Olivia MD;  Location:  OR     CYSTOSCOPY, TRANSURETHRAL RESECTION (TUR) TUMOR BLADDER, COMBINED N/A 10/22/2019    Procedure: Transurethral Resection of Bladder Tumor and transurethral resection of prostate and bladder stone removal;  Surgeon: Andrzej Olivia MD;  Location:  OR     HERNIA REPAIR       left arm surgery         Family History:    Family History   Problem Relation  Age of Onset     Heart Failure Mother 86        Congestive - Cause of Death     Cerebrovascular Disease Father      LASHAY.BRAIND. Sister 91       Social History:  Marital Status:   [2]  Social History     Tobacco Use     Smoking status: Former Smoker     Packs/day: 1.00     Years: 13.00     Pack years: 13.00     Types: Cigarettes     Start date: 1949     Quit date: 1962     Years since quittin.3     Smokeless tobacco: Never Used   Substance Use Topics     Alcohol use: Not Currently     Comment: 3 Drinks (BEER & LIQUOR) ~ OCCASIONALLY (QUIT IN )     Drug use: No        Medications:         famotidine (PEPCID) 20 MG tablet       losartan (COZAAR) 50 MG tablet       metoprolol succinate ER (TOPROL-XL) 25 MG 24 hr tablet       NITROSTAT 0.4 MG sublingual tablet       simvastatin (ZOCOR) 20 MG tablet          Review of Systems   Unable to perform ROS: Mental status change (Patient confused)       Physical Exam          Physical Exam  Constitutional:       Appearance: Normal appearance.   HENT:      Head: Normocephalic and atraumatic.   Eyes:      Extraocular Movements: Extraocular movements intact.   Neck:      Musculoskeletal: Normal range of motion and neck supple. No neck rigidity.   Cardiovascular:      Rate and Rhythm: Normal rate and regular rhythm.   Pulmonary:      Effort: Pulmonary effort is normal.   Abdominal:      General: Abdomen is flat.   Musculoskeletal: Normal range of motion.   Lymphadenopathy:      Cervical: No cervical adenopathy.   Skin:     General: Skin is warm.      Capillary Refill: Capillary refill takes less than 2 seconds.   Neurological:      General: No focal deficit present.      Mental Status: He is alert.      Cranial Nerves: No cranial nerve deficit.      Gait: Gait normal.      Deep Tendon Reflexes: Reflexes normal.   Psychiatric:         Mood and Affect: Mood normal.         Behavior: Behavior normal.         Thought Content: Thought content normal.         Judgment:  Judgment normal.         ED Course     ED Course as of Nov 30 0904   Mon Nov 30, 2020   0425 Spoke with Dr. Clark and brief history provided.  Patient on his way to obtain CT of his head and neck      0449 Spoke with daughter, Shannan.  States that she received a call from patient at 3:15 in the morning stating that he was feeling foggy.  States it started after he woke up to go to the bathroom.  Patient lives by himself and typically goes to bed at about 9:00 in the morning. When she arrived at his house patient stated that he also had left eye vision problems      0511 Spoke with stroke radiologist who states that head CT is negative      0512 Spoke with Dr. Clark of the stroke neurology team who on review of CT feels that patient not candidate for thrombolytics.  Recommends MRI of the brain with and without contrast and if negative can go home.  Code stroke de-escalated escalated      0554 Patient complaining of feeling nauseated. Zofran ordered.       0644 Patient going to MRI. Still complaining of feeling nauseated.       0715 Case discussed with hospitalist at [facility], Dr. Rivers.  He agrees that patient should be admitted.  MRI results pending      0846 SHAD f/ Dr. Rizo, pt  p/w RÍOS, head  CT and  CTA  were negative, MRI pending, pt will be admitted given he  lives alone  and sxs are not improved.        Procedures    EKG - NSR without ST changes.     CXR - No opacification to suggest bacterial pneumonia.     Head CT and CTA head and neck - no acute changes.        Results for orders placed or performed during the hospital encounter of 11/30/20 (from the past 24 hour(s))   CBC with platelets differential   Result Value Ref Range    WBC 9.3 4.0 - 11.0 10e9/L    RBC Count 4.91 4.4 - 5.9 10e12/L    Hemoglobin 15.1 13.3 - 17.7 g/dL    Hematocrit 44.6 40.0 - 53.0 %    MCV 91 78 - 100 fl    MCH 30.8 26.5 - 33.0 pg    MCHC 33.9 31.5 - 36.5 g/dL    RDW 12.8 10.0 - 15.0 %    Platelet Count 254 150 - 450 10e9/L     Diff Method Automated Method     % Neutrophils 37.5 %    % Lymphocytes 46.3 %    % Monocytes 12.2 %    % Eosinophils 3.2 %    % Basophils 0.5 %    % Immature Granulocytes 0.3 %    Nucleated RBCs 0 0 /100    Absolute Neutrophil 3.5 1.6 - 8.3 10e9/L    Absolute Lymphocytes 4.3 0.8 - 5.3 10e9/L    Absolute Monocytes 1.1 0.0 - 1.3 10e9/L    Absolute Eosinophils 0.3 0.0 - 0.7 10e9/L    Absolute Basophils 0.1 0.0 - 0.2 10e9/L    Abs Immature Granulocytes 0.0 0 - 0.4 10e9/L    Absolute Nucleated RBC 0.0    INR   Result Value Ref Range    INR 1.03 0.86 - 1.14   Troponin I   Result Value Ref Range    Troponin I ES <0.015 0.000 - 0.045 ug/L   Basic metabolic panel   Result Value Ref Range    Sodium 141 133 - 144 mmol/L    Potassium 3.9 3.4 - 5.3 mmol/L    Chloride 110 (H) 94 - 109 mmol/L    Carbon Dioxide 27 20 - 32 mmol/L    Anion Gap 4 3 - 14 mmol/L    Glucose 121 (H) 70 - 99 mg/dL    Urea Nitrogen 19 7 - 30 mg/dL    Creatinine 0.82 0.66 - 1.25 mg/dL    GFR Estimate 76 >60 mL/min/[1.73_m2]    GFR Estimate If Black 88 >60 mL/min/[1.73_m2]    Calcium 8.7 8.5 - 10.1 mg/dL   Alcohol ethyl   Result Value Ref Range    Ethanol g/dL <0.01 0.01 g/dL   Head CT w/o contrast    Narrative    PROCEDURE: CT HEAD W/O CONTRAST     HISTORY: Patient states that he feels funny in his head and is having  vision blames in his right eye.  Rule out stroke.    COMPARISON: None.    TECHNIQUE:  Helical images of the head from the foramen magnum to the  vertex were obtained without contrast.    FINDINGS: The ventricles and sulci are normal in volume. No acute  intracranial hemorrhage, mass effect, midline shift, hydrocephalus or  basilar cystern effacement are present.    The grey-white matter interface is preserved.    The calvarium is intact. The mastoid air cells are clear.  The  visualized paranasal sinuses are clear.      Impression    IMPRESSION: Normal brain      NIKOLAS MUNOZ MD   XR Chest Port 1 View    Narrative    PROCEDURE: XR CHEST  PORT 1 VW 11/30/2020 5:38 AM    HISTORY: Patient with complaints of feeling foggy.  Rule out pneumonia    COMPARISONS: 2/16/2019.    TECHNIQUE: Single view.    FINDINGS: Heart is stable in size. There is stable elongation of the  thoracic aorta. There is some mild linear change at the lung bases.  There is no confluent infiltrate or pleural effusion.         Impression    IMPRESSION: Linear scar or atelectasis at the lung bases. No confluent  infiltrate.    ZANE RIVERA MD   CTA Head Neck with Contrast    Narrative    PROCEDURE: CTA  HEAD NECK WITH CONTRAST 11/30/2020 5:43 AM    HISTORY: Patient states that he feels funny in his head and is having  vision blames in his right eye.  Rule out stroke    COMPARISONS: None.    Meds/Dose Given: Isovue 370 (50 mL)    TECHNIQUE: CT angiogram of the brain and CT angiogram of the neck both  with three-dimensional MIPS reconstructions performed on a separate  workstation.    FINDINGS: The brachiocephalic artery is widely patent. The right  subclavian and right vertebral artery is widely patent. The right  vertebral artery is developmentally small. There is mild  atherosclerotic plaquing at the right carotid bifurcation with less  than 50% stenosis. The internal carotid artery is widely patent to the  skull base. The left common carotid artery is widely patent. There is  calcific plaquing of the left carotid bifurcation with less than 50%  stenosis noted. The left subclavian and left vertebral artery is  widely patent.    CT angiogram of the brain: The basilar artery is supplied via the left  vertebral artery. The basilar artery is widely patent. Both posterior  cerebral and superior cerebellar arteries are normal. The carotid  siphons are normal. The supraclinoid internal carotid arteries are  normal. No intracranial stenoses or aneurysms or vascular shifts are  noted.    The muscles of mastication and the parapharyngeal spaces are normal.  The larynx and upper trachea is  normal. The thyroid gland is normal.  The cervical and upper mediastinal lymph nodes are normal in caliber.  There is some mosaic attenuation in the lung apices consistent with  air trapping.           Impression    IMPRESSION: No hemodynamically significant stenoses or occlusions are  identified in the vessels of the neck and brain    NIKOLAS MUNOZ MD   UA with Microscopic   Result Value Ref Range    Color Urine Light Yellow     Appearance Urine Clear     Glucose Urine Negative NEG^Negative mg/dL    Bilirubin Urine Negative NEG^Negative    Ketones Urine Negative NEG^Negative mg/dL    Specific Gravity Urine 1.010 1.003 - 1.035    Blood Urine Negative NEG^Negative    pH Urine 7.0 4.7 - 8.0 pH    Protein Albumin Urine 10 (A) NEG^Negative mg/dL    Urobilinogen mg/dL Normal 0.0 - 2.0 mg/dL    Nitrite Urine Negative NEG^Negative    Leukocyte Esterase Urine Negative NEG^Negative    Source Midstream Urine     WBC Urine 1 0 - 5 /HPF    RBC Urine 3 (H) 0 - 2 /HPF    Bacteria Urine None (A) NEG^Negative /HPF    Squamous Epithelial /HPF Urine 0 0 - 1 /HPF    Mucous Urine Present (A) NEG^Negative /LPF   MR Brain w/o & w Contrast    Narrative    PROCEDURE: MR BRAIN W/O & W CONTRAST 11/30/2020 7:25 AM    HISTORY: Patient complaining of feeling foggy in his head.  Also  complains of blurry vision in both eyes.  Rule out stroke    COMPARISONS: None.    Meds/Dose Given: Gadavist  9.5   mL    TECHNIQUE: Images were obtained sagittally T1 weighted images were  obtained axially diffusion FLAIR and gradient echo T1 and T2-weighted  images were obtained axially and coronally T1 weighted following  gadolinium administration    FINDINGS: There are no acute infarcts. The ventricular system is  normal in size. There are no masses ventricular shifts or  extracerebral collections. The pituitary and optic chiasm appear  normal. The basal cisterns and internal auditory canals appear normal.  Cranial vault is intact. Mild mucosal thickening is  "seen in the  frontal and ethmoid sinuses.         Impression    IMPRESSION: No acute infarcts. No enhancing masses or ventricular  shifts    NIKOLAS MUNOZ MD       Medications - No data to display    Assessments & Plan (with Medical Decision Making)     89 year old male with complaints of feeling foggy and blurry vision. Came in as a code stroke. Initial head ct and cta of head and neck negative. Code stroke de-escalated.     Labs as above. Patient treated symptomatically but continued to complain of head feeling \"weird\". MR of brain ordered and pending. In light of continued sx and inability for anyone to care for him at home, I contacted hospitalist at [facility], Dr. Rivers, who agrees to admit patient for management. No beds available at [facility]. He agrees to admit patient for observation, IVF and treatment based on sx.    Due to the nature of this electronic medical record, laboratory results, imaging results, diagnosis, other information and medications reported above may not represent information available to me at the the time of my care and disposition. Medications reported above may have not been ordered by me.     Portions of the record may have been created with voice recognition software. Occasional wrong-word or 'sound-a- like' substitution may have occurred due to the inherent limitations of voice recognition software. Though the chart has been reviewed, there may be inadvertent transcription errors. Read the chart carefully and recognize, using context, where substitutions have occurred.       New Prescriptions    No medications on file       Final diagnoses:   Confusion   Vomiting without nausea, intractability of vomiting not specified, unspecified vomiting type   Nonintractable headache, unspecified chronicity pattern, unspecified headache type       11/30/2020   [facility]     Roby Rizo MD  11/30/20 2019    "

## 2020-11-30 NOTE — PROGRESS NOTES
"Neuro's remain intact. Pt denies any blurred vision. Pt states he still has a dull mild headache. Heat applied. Declines interventions. Pt states he thinks headache is related to \" his nerves in neck.\"   "

## 2020-11-30 NOTE — PROGRESS NOTES
11/30/20 1513   Quick Adds   Type of Visit Initial Occupational Therapy Evaluation   Living Environment   People in home alone   Current Living Arrangements house   Home Accessibility stairs to enter home   Number of Stairs, Main Entrance 4   Self-Care   Usual Activity Tolerance good   Current Activity Tolerance moderate   Disability/Function   Hearing Difficulty or Deaf yes   Concentrating, Remembering or Making Decisions Difficulty   (pt had initial visual deficits but reports resolved )   Cognitive Status Examination   Orientation Status orientation to person, place and time   Affect/Mental Status (Cognitive) WNL   Follows Commands WNL   Visual Perception   Visual Impairment/Limitations WNL   Visual Perception Impairment   (reports no visual deficits today )   Pain Assessment   Patient Currently in Pain No   Range of Motion Comprehensive   General Range of Motion no range of motion deficits identified   Coordination   Upper Extremity Coordination No deficits were identified   Bed Mobility   Bed Mobility supine-sit   Supine-Sit [place] (Bed Mobility) supervision   Transfers   Transfers bed-chair transfer   Transfer Skill: Bed to Chair/Chair to Bed   Bed-Chair [place] (Transfers) supervision   Clinical Impression   Criteria for Skilled Therapeutic Interventions Met (OT) yes   OT Diagnosis TIA   Assessment of Occupational Performance 1-3 Performance Deficits   Identified Performance Deficits functional endurace    Clinical Decision Making Complexity (OT) low complexity   Therapy Frequency (OT) Daily   Predicted Duration of Therapy 1-2 days    Risks and Benefits of Treatment have been explained. Yes   Patient, Family & other staff in agreement with plan of care Yes   OT Discharge Planning    OT Discharge Recommendation (DC Rec) home   OT Rationale for DC Rec pt will likely have no therapy needs upon D/C   OT Brief overview of current status  Pt very pleasant, reports symptoms resolved, no visual deficits  reported and ambulated in hallways and room with SBA/CGA only.    Total Evaluation Time (Minutes)   Total Evaluation Time (Minutes) 15

## 2020-11-30 NOTE — ED NOTES
"Arrived to ED room 3 via wheelchair with c/o \"feeling funny in the head.\" Denied pain initially. BEFAST negative initially. States \"I woke up at 0300 and just felt funny, my head felt funny, and I couldn't see right out of my one eye, but it's better.\" While getting patient onto monitors to check vitals he reached up and grabbed his head and states \"oh my head hurts.\" When asked if his head hurt prior or just started states \"it just started now.\" Also states his vision is funny and the \"light won't stop bouncing.\" Code stroke called overhead at this time. Dr. Rizo at bedside.   "

## 2020-11-30 NOTE — PROGRESS NOTES
NS ADMISSION NOTE    Patient admitted to room 306 at approximately 1035 via cart from [facility]  . Patient was accompanied by other:EMS staff .     Verbal SBAR report received from SARA Boone at [facility]  prior to patient arrival.     Patient ambulated to bed with one assist. Patient alert and oriented X 3. The patient is not having any pain.  . Admission vital signs: Blood pressure (!) 150/63, pulse 67, temperature 96.4  F (35.8  C), temperature source Oral, resp. rate 16, SpO2 95 %. Patient was oriented to plan of care, call light, bed controls, tv, telephone, bathroom and visiting hours.     Risk Assessment    The following safety risks were identified during admission: fall. Yellow risk band applied: YES.         Olivia Valencia

## 2020-11-30 NOTE — PROGRESS NOTES
11/30/20 1514   Signing Clinician's Name / Credentials   Signing clinician's name / credentials Adrian Joe MPT   Quick Adds   Rehab Discipline PT   Quick Adds Mobility;Therapeutic Activity   Functional Transfer Training   Treatment Detail SBA for transfers   Gait Training   Symptoms Noted During/After Treatment (Gait Training) fatigue   Distance in Feet (Required for LE Total Joints) 325   Treatment Detail ambulation in hallway   Slope Level (Gait Training) stand-by assist   Physical Assistance Level (Gait Training) 1 person assist   Weight Bearing (Gait Training) full weight-bearing   Assistive Device (Gait Training)   (none)   Therapeutic Activity   Treatment Detail SBA for bed mobilities   PT Discharge Planning    PT Discharge Recommendation (DC Rec) home   PT Rationale for DC Rec patient is not demonstrating any functional impairments   PT Brief overview of current status  SBA for bed mobilities; SBA for transfers and SBA for ambulation with AD   Additional Documentation   Rehab Comments patient appears near his baseline for mobility   PT Plan continue PT   Total Session Time   Total Session Time (minutes) 25 minutes

## 2020-11-30 NOTE — PHARMACY-ADMISSION MEDICATION HISTORY
"Pharmacy -- Admission Medication Reconciliation    Prior to admission (PTA) medications were reviewed and the patient's PTA medication list was updated.    Sources Consulted: patient phone interview, chart review, sure scripts    The reliability of this Medication Reconciliation is: Reliability: Reliable    The following significant changes were made:    Added ASA daily--patient states he takes half of a big aspirin a day, has been taking it \"for quite some time\"        In addition, the patient's allergies were reviewed with the patient and updated as follows:   Allergies: Lisinopril and Morphine    The pharmacist has reviewed with the patient that all personal medications should be removed from the building or locked in the belongings safe.  Patient shall only take medications ordered by the physician and administered by the nursing staff.       Medication barriers identified: none noted   Medication adherence concerns: none noted   Understanding of emergency medications: yes, has ntg tablets and keeps on hand, however there are no recent fills in sure scripts.  Patient unaware when he last filled it.  States \"makes no difference to me if I get or don't get a new rx for them\"    Jodee Awad [facility], 11/30/2020,  12:20 PM     "

## 2020-12-01 VITALS
BODY MASS INDEX: 26.75 KG/M2 | OXYGEN SATURATION: 96 % | DIASTOLIC BLOOD PRESSURE: 71 MMHG | SYSTOLIC BLOOD PRESSURE: 149 MMHG | HEART RATE: 56 BPM | TEMPERATURE: 98.5 F | RESPIRATION RATE: 18 BRPM | WEIGHT: 191.8 LBS

## 2020-12-01 PROCEDURE — G0378 HOSPITAL OBSERVATION PER HR: HCPCS

## 2020-12-01 PROCEDURE — 99217 PR OBSERVATION CARE DISCHARGE: CPT | Performed by: FAMILY MEDICINE

## 2020-12-01 PROCEDURE — 250N000013 HC RX MED GY IP 250 OP 250 PS 637: Mod: GY | Performed by: FAMILY MEDICINE

## 2020-12-01 RX ADMIN — ASPIRIN 81 MG CHEWABLE TABLET 81 MG: 81 TABLET CHEWABLE at 10:01

## 2020-12-01 RX ADMIN — LOSARTAN POTASSIUM 50 MG: 50 TABLET, FILM COATED ORAL at 10:02

## 2020-12-01 RX ADMIN — FAMOTIDINE 20 MG: 20 TABLET ORAL at 10:02

## 2020-12-01 NOTE — PHARMACY - DISCHARGE MEDICATION RECONCILIATION AND EDUCATION
Pharmacy:  Discharge Counseling and Medication Reconciliation    James Grant  [address] [facility] [address]  [phone] (home)   89 year old male  PCP: Broderick Flowers    Allergies: Lisinopril and Morphine    Discharge Counseling:    No new medications.  No changes to medications.  Pharmacist DID NOT MEET with patient today to review the medication portion of the After Visit Summary.    Discharge Medication Reconciliation:    It has been determined that the patient has an adequate supply of medications available or which can be obtained from the patient's preferred pharmacy, which he has confirmed as: [facility].    Thank you for the consult.    Dory Khan [facility]........December 1, 2020 8:21 AM

## 2020-12-01 NOTE — PLAN OF CARE
Patients VSS, afebrile, neuro's intact, ortho's negative. Denies pain. Shaila Lancaster RN 12/1/2020 8:07 AM

## 2020-12-01 NOTE — DISCHARGE SUMMARY
[facility]    Discharge Summary  Hospitalist    Date of Admission:  11/30/2020  Date of Discharge:  12/1/2020  Discharging Provider: Donnell Blanco  Date of Service (when I saw the patient): 12/01/20    Discharge Diagnoses   Principal Problem:    TIA (transient ischemic attack) (11/30/2020)  Active Problems:    SNHL (sensorineural hearing loss) (7/17/2013)    Essential hypertension, benign (11/8/2010)    Dyslipidemia (11/26/2010)    History of bladder cancer (10/28/2019)      History of Present Illness   James Grant is an 89 year old male who presented with posterior headache and left eye problems.  He presented to the ED in [place] where his symptoms had resolved.  He had a normal head CT,. Brain MRI and neuro stroke consult.  Recommendation was for observation, due to bed availability he was transferred to [facility].    Hospital Course   James Grant was admitted on 11/30/2020.  The following problems were addressed during his hospitalization:    TIA   No neuro symptoms since arrival.  No posterior head pain, no visionc ahnge, nausea.  He was seen by PT/OT who felt he did well and would need to additional services at home.  Labs where normal.  He had 1 orthostatic BP reading yesterday that was abnormal, the rest have been normal.  SInce his symptoms resolved and he was back to baseline he will be discharged to day and follow up with his PCP.    HTN, CAD, hyperlipidemia   Stable during his stay, no changes to his home regimen.    Donnell Blanco    Significant Results and Procedures   Results for orders placed or performed during the hospital encounter of 11/30/20   Symptomatic COVID-19 Virus (Coronavirus) by PCR     Status: None    Specimen: Nasopharyngeal   Result Value Ref Range    COVID-19 Virus PCR to [facility] - Source Nasopharyngeal     COVID-19 Virus PCR to [facility] - Result       Test received-See reflex to [facility] test SARS CoV2 (COVID-19) Virus RT-PCR   SARS-CoV-2 COVID-19  Virus (Coronavirus) RT-PCR Nasopharyngeal     Status: None    Specimen: Nasopharyngeal   Result Value Ref Range    SARS-CoV-2 Virus Specimen Source Nasopharyngeal     SARS-CoV-2 PCR Result NEGATIVE     SARS-CoV-2 PCR Comment       Testing was performed using the Xpert Xpress SARS-CoV-2 Assay on the Cepheid Gene-Xpert   Instrument Systems. Additional information about this Emergency Use Authorization (EUA)   assay can be found via the Lab Guide.          Code Status   Full Code       Primary Care Physician   Broderick Flowers    Physical Exam   Temp: 98.5  F (36.9  C) Temp src: Tympanic BP: (!) 149/71 Pulse: 56   Resp: 18 SpO2: 96 % O2 Device: None (Room air)    Vitals:    12/01/20 0419   Weight: 87 kg (191 lb 12.8 oz)     Vital Signs with Ranges  Temp:  [96.4  F (35.8  C)-98.5  F (36.9  C)] 98.5  F (36.9  C)  Pulse:  [56-77] 56  Resp:  [16-20] 18  BP: (110-151)/(42-82) 149/71  SpO2:  [92 %-97 %] 96 %  I/O last 3 completed shifts:  In: 200 [P.O.:200]  Out: 825 [Urine:825]    Constitutional: comfortable in bed, alert and oriented x3   Eyes: PERRL, EOMI  ENT: MMM  Respiratory: clear wuithout wheezing  Cardiovascular: regular, no murmurs  GI: soft, NT, ND  Extremities: no edema  Neuro: CN 2-12 grossly intact, normal gait, no focal motor or sensory deficits, hearing aid in place.    Discharge Disposition   Discharged to home  Condition at discharge: Stable    Consultations This Hospital Stay   SMOKING CESSATION PROGRAM IP CONSULT  PHYSICAL THERAPY ADULT IP CONSULT  OCCUPATIONAL THERAPY ADULT IP CONSULT    Time Spent on this Encounter   IDonnell, personally saw the patient today and spent less than or equal to 30 minutes discharging this patient.    Discharge Orders      Reason for your hospital stay    TIA     Follow-up and recommended labs and tests     Follow up with primary care provider, Broderick Flowers, within 7 days .     Activity    Your activity upon discharge: activity as tolerated     Diet    Follow this  diet upon discharge: Orders Placed This Encounter      Combination Diet Regular Diet Adult     Discharge Medications   Current Discharge Medication List      CONTINUE these medications which have NOT CHANGED    Details   aspirin (ASA) 325 MG EC tablet Take 162 mg by mouth daily      famotidine (PEPCID) 20 MG tablet TAKE ONE (1) TABLET BY MOUTH DAILY  Qty: 30 tablet, Refills: 4    Associated Diagnoses: Gastroesophageal reflux disease without esophagitis      losartan (COZAAR) 50 MG tablet TAKE 1 TABLET (50 MG) BY MOUTH DAILY COZAAR  Qty: 30 tablet, Refills: 2    Associated Diagnoses: Benign essential hypertension      metoprolol succinate ER (TOPROL-XL) 25 MG 24 hr tablet TAKE 1/2 TAB BY MOUTH AT BEDTIME. DO NOT CRUSH OR CHEW.  Qty: 45 tablet, Refills: 2    Associated Diagnoses: Benign essential hypertension      NITROSTAT 0.4 MG sublingual tablet PLACE 1 TABLET (0.4 MG) UNDER THE TONGUE EVERY 5 MINUTES AS NEEDED  Qty: 25 tablet, Refills: 3    Associated Diagnoses: Coronary artery disease involving native coronary artery of native heart without angina pectoris      simvastatin (ZOCOR) 20 MG tablet Take 1 tablet (20 mg) by mouth At Bedtime  Qty: 90 tablet, Refills: 0    Associated Diagnoses: Mixed hyperlipidemia           Allergies   Allergies   Allergen Reactions     Lisinopril Cough     Morphine Other (See Comments) and Visual Disturbance     confusion     Data   Most Recent 3 CBC's:  Recent Labs   Lab Test 11/30/20 0430 02/18/20  0428 02/17/20  0440   WBC 9.3 10.5 9.7   HGB 15.1 12.5* 12.7*   MCV 91 91 91    243 228      Most Recent 3 BMP's:  Recent Labs   Lab Test 11/30/20  0430 02/18/20  0428 02/17/20  0440    138 139   POTASSIUM 3.9 4.0 3.9   CHLORIDE 110* 105 106   CO2 27 26 26   BUN 19 19 22   CR 0.82 0.73 0.87   ANIONGAP 4 7 7   VANIA 8.7 8.3* 8.3*   * 104 99     Most Recent 2 LFT's:  Recent Labs   Lab Test 12/29/19 0224 09/24/19  1627   AST 20 22   ALT 28 37   ALKPHOS 69 67   BILITOTAL  0.3 0.3     Most Recent INR's and Anticoagulation Dosing History:  Anticoagulation Dose History     Recent Dosing and Labs Latest Ref Rng & Units 2/8/2014 12/29/2019 11/30/2020    INR 0.86 - 1.14 1.06 1.07 1.03        Most Recent 3 Troponin's:  Recent Labs   Lab Test 11/30/20  0430 02/16/19  1741 01/18/16  0605   TROPI <0.015 <0.015 <0.015  The 99th percentile for upper reference range is 0.045 ug/L.  Troponin values in   the range of 0.045 - 0.120 ug/L may be associated with risks of adverse   clinical events.       Most Recent Cholesterol Panel:  Recent Labs   Lab Test 02/07/19  0819   CHOL 118   LDL 62   HDL 39*   TRIG 85     Most Recent 6 Bacteria Isolates From Any Culture (See EPIC Reports for Culture Details):  Recent Labs   Lab Test 12/29/19  0230 02/16/19  2045 02/16/19  1818 02/16/19  1740 03/31/17  1050 01/18/16  1334   CULT >100,000 colonies/mL  Coagulase negative Staphylococcus  No further identification or sensitivity done  * No MRSA isolated No growth after 6 days No growth after 6 days Culture negative after 4 weeks  Heavy growth Diphtheroids No further identification or sensitivity done  Susceptibility testing not routinely done  * No MRSA isolated     Most Recent TSH, T4 and A1c Labs:  Recent Labs   Lab Test 02/07/19  0819   TSH 2.95     Results for orders placed or performed during the hospital encounter of 11/30/20   Head CT w/o contrast    Narrative    PROCEDURE: CT HEAD W/O CONTRAST     HISTORY: Patient states that he feels funny in his head and is having  vision blames in his right eye.  Rule out stroke.    COMPARISON: None.    TECHNIQUE:  Helical images of the head from the foramen magnum to the  vertex were obtained without contrast.    FINDINGS: The ventricles and sulci are normal in volume. No acute  intracranial hemorrhage, mass effect, midline shift, hydrocephalus or  basilar cystern effacement are present.    The grey-white matter interface is preserved.    The calvarium is intact. The  mastoid air cells are clear.  The  visualized paranasal sinuses are clear.      Impression    IMPRESSION: Normal brain      NIKOLAS MUNOZ MD   CTA Head Neck with Contrast    Narrative    PROCEDURE: CTA  HEAD NECK WITH CONTRAST 11/30/2020 5:43 AM    HISTORY: Patient states that he feels funny in his head and is having  vision blames in his right eye.  Rule out stroke    COMPARISONS: None.    Meds/Dose Given: Isovue 370 (50 mL)    TECHNIQUE: CT angiogram of the brain and CT angiogram of the neck both  with three-dimensional MIPS reconstructions performed on a separate  workstation.    FINDINGS: The brachiocephalic artery is widely patent. The right  subclavian and right vertebral artery is widely patent. The right  vertebral artery is developmentally small. There is mild  atherosclerotic plaquing at the right carotid bifurcation with less  than 50% stenosis. The internal carotid artery is widely patent to the  skull base. The left common carotid artery is widely patent. There is  calcific plaquing of the left carotid bifurcation with less than 50%  stenosis noted. The left subclavian and left vertebral artery is  widely patent.    CT angiogram of the brain: The basilar artery is supplied via the left  vertebral artery. The basilar artery is widely patent. Both posterior  cerebral and superior cerebellar arteries are normal. The carotid  siphons are normal. The supraclinoid internal carotid arteries are  normal. No intracranial stenoses or aneurysms or vascular shifts are  noted.    The muscles of mastication and the parapharyngeal spaces are normal.  The larynx and upper trachea is normal. The thyroid gland is normal.  The cervical and upper mediastinal lymph nodes are normal in caliber.  There is some mosaic attenuation in the lung apices consistent with  air trapping.           Impression    IMPRESSION: No hemodynamically significant stenoses or occlusions are  identified in the vessels of the neck and  brain    NIKOLAS MUNOZ MD   XR Chest Port 1 View    Narrative    PROCEDURE: XR CHEST PORT 1 VW 11/30/2020 5:38 AM    HISTORY: Patient with complaints of feeling foggy.  Rule out pneumonia    COMPARISONS: 2/16/2019.    TECHNIQUE: Single view.    FINDINGS: Heart is stable in size. There is stable elongation of the  thoracic aorta. There is some mild linear change at the lung bases.  There is no confluent infiltrate or pleural effusion.         Impression    IMPRESSION: Linear scar or atelectasis at the lung bases. No confluent  infiltrate.    ZANE RIVERA MD   MR Brain w/o & w Contrast    Narrative    PROCEDURE: MR BRAIN W/O & W CONTRAST 11/30/2020 7:25 AM    HISTORY: Patient complaining of feeling foggy in his head.  Also  complains of blurry vision in both eyes.  Rule out stroke    COMPARISONS: None.    Meds/Dose Given: Gadavist  9.5   mL    TECHNIQUE: Images were obtained sagittally T1 weighted images were  obtained axially diffusion FLAIR and gradient echo T1 and T2-weighted  images were obtained axially and coronally T1 weighted following  gadolinium administration    FINDINGS: There are no acute infarcts. The ventricular system is  normal in size. There are no masses ventricular shifts or  extracerebral collections. The pituitary and optic chiasm appear  normal. The basal cisterns and internal auditory canals appear normal.  Cranial vault is intact. Mild mucosal thickening is seen in the  frontal and ethmoid sinuses.         Impression    IMPRESSION: No acute infarcts. No enhancing masses or ventricular  shifts    NIKOLAS MUNOZ MD

## 2020-12-01 NOTE — PLAN OF CARE
Pt remains afebrile, VSS, neuro's intact, will continue to monitor.Luke Hall RN on 12/1/2020 at 4:34 AM

## 2020-12-01 NOTE — PROGRESS NOTES
Dr. Rivers notified of positive orthostatic VS. No new orders at this time. Neuro's remains intact. Pt denies blurry vision. Pt denies nausea. Will continue to monitor

## 2020-12-01 NOTE — PROGRESS NOTES
SAFETY CHECKLIST  ID Bands and Risk clasps correct and in place (DNR, Fall risk, Allergy, Latex, Limb):  Yes  All Lines Reconciled and labeled correctly: Yes  Whiteboard updated:Yes  Environmental interventions (bed/chair alarm on, call light, side rails, restraints, sitter....): Yes  Verify Tele #: 2

## 2020-12-01 NOTE — PLAN OF CARE
Pt A & O x 4, neuro's intact, afebrile, VSS, Orthostatic b/p's negative lying 140/68 P 70, sitting 145/68 P 63, standing 132/57 P 67.   Lung sounds diminished in bilateral bases, denies any sob, O2 96% on RA. Infrequent dry cough present. Bowel sounds active, denies any nausea. Pt stood at bedside with SBA to use bedside urinal. Pt denies any pain, will continue to monitor.

## 2020-12-01 NOTE — PLAN OF CARE
[facility] DISCHARGE NOTE    Patient discharged to home at 11:03 AM via wheel chair. Accompanied by son and staff. Discharge instructions reviewed with patient, opportunity offered to ask questions. Prescriptions - None ordered for discharge. All belongings sent with patient.    Shaila Lancaster RN

## 2020-12-07 NOTE — PROGRESS NOTES
"Subjective     James Grant is a 89 year old male who presents to clinic today for the following health issues:    HPI           Hospital Follow-up Visit:    Hospital/Nursing Home/IP Rehab Facility: [facility]  Date of Admission: 11/30/2020  Date of Discharge: 12/1/2020  Reason(s) for Admission: Confusion      Was your hospitalization related to COVID-19? No   Problems taking medications regularly:  None  Medication changes since discharge: None  Problems adhering to non-medication therapy:  None    Summary of hospitalization:  CareEverywhere information obtained and reviewed  Diagnostic Tests/Treatments reviewed.  Follow up needed: none  Other Healthcare Providers Involved in Patient s Care:         None  Update since discharge: improved. Post Discharge Medication Reconciliation: discharge medications reconciled, continue medications without change.  Plan of care communicated with patient          Adrian was seen in her ER with headache.  There was a question of cerebrovascular accident and he was transferred to [facility].  CT and MRI were taken and negative.  His symptoms have improved.   He notes fatigue and decreased appetite.    Review of Systems   Constitutional, HEENT, cardiovascular, pulmonary, gi and gu systems are negative, except as otherwise noted.      Objective    /64 (BP Location: Left arm, Patient Position: Sitting, Cuff Size: Adult Regular)   Pulse 69   Temp 97.4  F (36.3  C) (Tympanic)   Resp 20   Ht 1.803 m (5' 11\")   Wt 89.4 kg (197 lb)   SpO2 98%   BMI 27.48 kg/m    Body mass index is 27.48 kg/m .  Physical Exam  Vitals signs and nursing note reviewed.   Constitutional:       Appearance: He is well-developed.   Neurological:      Mental Status: He is alert and oriented to person, place, and time.   Psychiatric:         Mood and Affect: Mood normal.         Behavior: Behavior normal.         Thought Content: Thought content normal.        Other exam not " repeated    Admission on 11/30/2020, Discharged on 12/01/2020   Component Date Value Ref Range Status     COVID-19 Virus PCR to [facility] - So* 11/30/2020 Nasopharyngeal   Final     COVID-19 Virus PCR to [facility] - Re* 11/30/2020 Test received-See reflex to [facility] test SARS CoV2 (COVID-19) Virus RT-PCR   Final     SARS-CoV-2 Virus Specimen Source 11/30/2020 Nasopharyngeal   Final     SARS-CoV-2 PCR Result 11/30/2020 NEGATIVE   Final    SARS-CoV2 (COVID-19) RNA not detected, presumed negative.     SARS-CoV-2 PCR Comment 11/30/2020    Final                    Value:Testing was performed using the Formot[employer] Xpress SARS-CoV-2 Assay on the Cepheid Gene-Xpert   Instrument Systems. Additional information about this Emergency Use Authorization (EUA)   assay can be found via the Lab Guide.      Comment: This test should be ordered for the detection of SARS-CoV-2 in individuals who   meet SARS-CoV-2 clinical and/or epidemiological criteria. Test performance is   unknown in asymptomatic patients.  This test is for in vitro diagnostic use under the FDA EUA for laboratories   certified under CLIA to perform high complexity testing. This test has not   been FDA cleared or approved.  A negative result does not rule out the presence of PCR inhibitors in the   specimen or target RNA in concentration below the limit of detection for the   assay. The possibility of a false negative should be considered if the   patient's recent exposure or clinical presentation suggests COVID-19.  This test was validated by [facility] and [facility]   [facility]atory. This laboratory is certified under the Clinical Laboratory   Improvement Amendments (CLIA) as qualified to perform high complexity clinical   laboratory testing.               Assessment & Plan     TIA (transient ischemic attack)  Resolved.  Will continue aspirin for prophylaxis    Episodic tension-type headache, not intractable  Discussed potential etiologies.  Will  "follow.       BMI:   Estimated body mass index is 27.48 kg/m  as calculated from the following:    Height as of this encounter: 1.803 m (5' 11\").    Weight as of this encounter: 89.4 kg (197 lb).            See Patient Instructions    No follow-ups on file.    Broderick Flowers MD  [facility] [place]    "

## 2020-12-09 ENCOUNTER — OFFICE VISIT (OUTPATIENT)
Dept: FAMILY MEDICINE | Facility: OTHER | Age: 85
End: 2020-12-09
Attending: FAMILY MEDICINE
Payer: MEDICARE

## 2020-12-09 VITALS
WEIGHT: 197 LBS | BODY MASS INDEX: 27.58 KG/M2 | OXYGEN SATURATION: 98 % | RESPIRATION RATE: 20 BRPM | HEIGHT: 71 IN | TEMPERATURE: 97.4 F | DIASTOLIC BLOOD PRESSURE: 64 MMHG | HEART RATE: 69 BPM | SYSTOLIC BLOOD PRESSURE: 126 MMHG

## 2020-12-09 DIAGNOSIS — G44.219 EPISODIC TENSION-TYPE HEADACHE, NOT INTRACTABLE: ICD-10-CM

## 2020-12-09 DIAGNOSIS — G45.9 TIA (TRANSIENT ISCHEMIC ATTACK): Primary | ICD-10-CM

## 2020-12-09 PROCEDURE — G0463 HOSPITAL OUTPT CLINIC VISIT: HCPCS | Performed by: FAMILY MEDICINE

## 2020-12-09 PROCEDURE — 99213 OFFICE O/P EST LOW 20 MIN: CPT | Performed by: FAMILY MEDICINE

## 2020-12-09 PROCEDURE — 99495 TRANSJ CARE MGMT MOD F2F 14D: CPT | Performed by: FAMILY MEDICINE

## 2020-12-09 ASSESSMENT — MIFFLIN-ST. JEOR: SCORE: 1560.72

## 2020-12-09 ASSESSMENT — PAIN SCALES - GENERAL: PAINLEVEL: NO PAIN (0)

## 2020-12-09 NOTE — NURSING NOTE
"Chief Complaint   Patient presents with     Hospital F/U       Initial /64 (BP Location: Left arm, Patient Position: Sitting, Cuff Size: Adult Regular)   Pulse 69   Temp 97.4  F (36.3  C) (Tympanic)   Resp 20   Ht 1.803 m (5' 11\")   Wt 89.4 kg (197 lb)   SpO2 98%   BMI 27.48 kg/m   Estimated body mass index is 27.48 kg/m  as calculated from the following:    Height as of this encounter: 1.803 m (5' 11\").    Weight as of this encounter: 89.4 kg (197 lb).  Medication Reconciliation: complete  Dasha Salazar LPN  "

## 2020-12-18 DIAGNOSIS — I10 BENIGN ESSENTIAL HYPERTENSION: ICD-10-CM

## 2020-12-18 RX ORDER — LOSARTAN POTASSIUM 50 MG/1
TABLET ORAL
Qty: 30 TABLET | Refills: 2 | Status: SHIPPED | OUTPATIENT
Start: 2020-12-18 | End: 2021-03-23

## 2020-12-18 NOTE — TELEPHONE ENCOUNTER
Losartan 50 mg      Last Written Prescription Date:  9-  Last Fill Quantity: 30,   # refills: 2  Last Office Visit: 12-9-2020

## 2021-01-17 ENCOUNTER — APPOINTMENT (OUTPATIENT)
Dept: GENERAL RADIOLOGY | Facility: HOSPITAL | Age: 86
End: 2021-01-17
Attending: EMERGENCY MEDICINE
Payer: MEDICARE

## 2021-01-17 ENCOUNTER — HOSPITAL ENCOUNTER (EMERGENCY)
Facility: HOSPITAL | Age: 86
Discharge: HOME | End: 2021-01-17
Attending: EMERGENCY MEDICINE | Admitting: EMERGENCY MEDICINE
Payer: MEDICARE

## 2021-01-17 VITALS
DIASTOLIC BLOOD PRESSURE: 90 MMHG | HEART RATE: 72 BPM | WEIGHT: 185 LBS | OXYGEN SATURATION: 96 % | RESPIRATION RATE: 18 BRPM | SYSTOLIC BLOOD PRESSURE: 173 MMHG | BODY MASS INDEX: 25.8 KG/M2 | TEMPERATURE: 98.2 F

## 2021-01-17 DIAGNOSIS — S63.501A WRIST SPRAIN, RIGHT, INITIAL ENCOUNTER: ICD-10-CM

## 2021-01-17 PROCEDURE — 73110 X-RAY EXAM OF WRIST: CPT | Mod: RT

## 2021-01-17 PROCEDURE — 99283 EMERGENCY DEPT VISIT LOW MDM: CPT

## 2021-01-17 PROCEDURE — 73130 X-RAY EXAM OF HAND: CPT | Mod: RT

## 2021-01-17 PROCEDURE — 250N000013 HC RX MED GY IP 250 OP 250 PS 637: Performed by: EMERGENCY MEDICINE

## 2021-01-17 PROCEDURE — 99283 EMERGENCY DEPT VISIT LOW MDM: CPT | Performed by: EMERGENCY MEDICINE

## 2021-01-17 RX ORDER — IBUPROFEN 200 MG
400 TABLET ORAL ONCE
Status: COMPLETED | OUTPATIENT
Start: 2021-01-17 | End: 2021-01-17

## 2021-01-17 RX ORDER — ACETAMINOPHEN 325 MG/1
650 TABLET ORAL ONCE
Status: COMPLETED | OUTPATIENT
Start: 2021-01-17 | End: 2021-01-17

## 2021-01-17 RX ADMIN — IBUPROFEN 400 MG: 200 TABLET, FILM COATED ORAL at 07:23

## 2021-01-17 RX ADMIN — ACETAMINOPHEN 650 MG: 325 TABLET, FILM COATED ORAL at 07:23

## 2021-01-17 NOTE — DISCHARGE INSTRUCTIONS
1) Follow the aftercare instructions provided  2) You must follow up with your doctor tomorrow for a recheck  3) If you develop any fevers, chills, redness or swelling worsening swelling return to the ER immediately

## 2021-01-17 NOTE — ED PROVIDER NOTES
"  History     Chief Complaint   Patient presents with     Wrist Pain     \"Right wrist pain since this past Wed.\"       HPI  James Grant is a 89 year old male who presents today with complaints of wrist pain.  Symptoms began approximately 4 days ago when patient woke up from sleep with wrist pain.  Patient states on Thursday the next day his symptoms seem to be better.  But symptoms have worsened since then.  Patient is right-handed.  Patient denies any additional complaints.  Patient denies any fevers or chills.  No history of trauma.    Allergies:  Allergies   Allergen Reactions     Lisinopril Cough     Morphine Other (See Comments) and Visual Disturbance     confusion       Problem List:    Patient Active Problem List    Diagnosis Date Noted     TIA (transient ischemic attack) 11/30/2020     Priority: Medium     Malignant neoplasm of overlapping sites of bladder (H) 11/22/2019     Priority: Medium     History of bladder cancer 10/28/2019     Priority: Medium     Venous stasis dermatitis of both lower extremities 03/16/2017     Priority: Medium     ACP (advance care planning) 06/06/2016     Priority: Medium     Advance Care Planning 6/6/2016: ACP Review of Chart / Resources Provided:  Reviewed chart for advance care plan.  James Grant has no plan or code status on file. Discussed available resources and provided with information. Confirmed code status reflects current choices pending further ACP discussions.  Confirmed/documented legally designated decision makers.  Added by Yani Anderson             Advance care planning 02/08/2016     Priority: Medium     Advance Care Planning 2/8/2016: Receipt of ACP document:  Received: POLST which was signed and dated by provider on 1/18/16.  Document previously scanned on 1/22/16.  Order reviewed and found to be valid.  Code Status reflects choices in most recent ACP document.  Confirmed/documented designated decision maker(s).  Added by Trinh Garces         "     BPH (benign prostatic hyperplasia) 01/08/2014     Priority: Medium     SNHL (sensorineural hearing loss) 07/17/2013     Priority: Medium     ETD (eustachian tube dysfunction) 07/17/2013     Priority: Medium     Dyslipidemia 11/26/2010     Priority: Medium     GERD (gastroesophageal reflux disease) 11/26/2010     Priority: Medium     CHD (coronary heart disease) 11/26/2010     Priority: Medium     07/2010 S/P inferior wall MI  Angioplasty and stenting X 2 RCA  08/2010 angioplasty and stenting (LIDIA) LAD       Essential hypertension, benign 11/08/2010     Priority: Medium     Overview:   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       CHOLECYSTECTOMY       CYSTOSCOPY, FULGURATE BLEEDERS, EVACUATE CLOT(S), COMBINED N/A 2/17/2020    Procedure: Clot Evacuation;  Surgeon: Andrzej Olivia MD;  Location:  OR     CYSTOSCOPY, RETROGRADES, COMBINED Bilateral 10/22/2019    Procedure: Bilateral Retrograde Pyelograms;  Surgeon: Andrzej Olivia MD;  Location:  OR     CYSTOSCOPY, TRANSURETHRAL RESECTION (TUR) TUMOR BLADDER, COMBINED N/A 10/22/2019    Procedure: Transurethral Resection of Bladder Tumor and transurethral resection of prostate and bladder stone removal;  Surgeon: Andrzej Olivia MD;  Location:  OR     HERNIA REPAIR       left arm surgery         Family History:    Family History   Problem Relation Age of Onset     Heart Failure Mother 86        Congestive - Cause  of Death     Cerebrovascular Disease Father      TRINIDAD. Sister 91       Social History:  Marital Status:   [5]  Social History     Tobacco Use     Smoking status: Former Smoker     Packs/day: 1.00     Years: 13.00     Pack years: 13.00     Types: Cigarettes     Start date: 1949     Quit date: 1962     Years since quittin.4     Smokeless tobacco: Never Used   Substance Use Topics     Alcohol use: Not Currently     Comment: 3 Drinks (BEER & LIQUOR) ~ OCCASIONALLY (QUIT IN )     Drug use: No        Medications:         aspirin (ASA) 325 MG EC tablet       famotidine (PEPCID) 20 MG tablet       losartan (COZAAR) 50 MG tablet       metoprolol succinate ER (TOPROL-XL) 25 MG 24 hr tablet       NITROSTAT 0.4 MG sublingual tablet       simvastatin (ZOCOR) 20 MG tablet          Review of Systems   Constitutional: Negative.  Negative for chills and fever.   HENT: Negative.    Eyes: Negative.    Respiratory: Negative.  Negative for shortness of breath.    Cardiovascular: Negative.    Gastrointestinal: Negative.    Endocrine: Negative.    Genitourinary: Negative.    Musculoskeletal: Negative.  Negative for myalgias.   Skin: Negative.    Allergic/Immunologic: Negative.    Neurological: Negative.    Hematological: Negative.    Psychiatric/Behavioral: Negative.        Physical Exam   BP: 173/90  Pulse: 72  Temp: 98.2  F (36.8  C)  Resp: 18  Weight: 83.9 kg (185 lb)  SpO2: 96 %      Physical Exam  Constitutional:       General: He is not in acute distress.     Appearance: Normal appearance. He is normal weight. He is not toxic-appearing.   HENT:      Head: Normocephalic.   Pulmonary:      Effort: Pulmonary effort is normal.   Musculoskeletal:      Comments: Decreased range of motion of right wrist secondary to pain.  Intact flexion and extension noted.  Full range of motion of thumb.  No point tenderness noted.  Good capillary refill.  No erythema or warmth noted over entire hand or wrist.   Neurological:       General: No focal deficit present.      Mental Status: He is alert.   Psychiatric:         Mood and Affect: Mood normal.         Behavior: Behavior normal.         ED Course     ED Course as of Jan 17 0811   Sun Jan 17, 2021   0810 Patient declining wrist splint.        Procedures                 Results for orders placed or performed during the hospital encounter of 01/17/21 (from the past 24 hour(s))   XR Hand Right G/E 3 Views    Narrative    PROCEDURE:  XR WRIST RT G/E 3 VW, XR HAND RT G/E 3 VW    HISTORY: Patient with complaints of wrist pain since Wednesday. R/o  fx.    COMPARISON:  None.    TECHNIQUE:  4 views right wrist, 3 views right hand.    FINDINGS:  No acute fracture is identified. There is a small  well-corticated body projecting over the proximal carpal row on the  lateral view of the wrist. Osteoarthritic changes are most pronounced  at the first CMC joint. Chronic appearing foreshortening of the third  distal phalanx is seen.       Impression    IMPRESSION: No acute fracture.      JESU SMANUEL SALEH MD   XR Wrist Right G/E 3 Views    Narrative    PROCEDURE:  XR WRIST RT G/E 3 VW, XR HAND RT G/E 3 VW    HISTORY: Patient with complaints of wrist pain since Wednesday. R/o  fx.    COMPARISON:  None.    TECHNIQUE:  4 views right wrist, 3 views right hand.    FINDINGS:  No acute fracture is identified. There is a small  well-corticated body projecting over the proximal carpal row on the  lateral view of the wrist. Osteoarthritic changes are most pronounced  at the first CMC joint. Chronic appearing foreshortening of the third  distal phalanx is seen.       Impression    IMPRESSION: No acute fracture.      JESUS MANUEL SALEH MD       Medications   acetaminophen (TYLENOL) tablet 650 mg (650 mg Oral Given 1/17/21 0723)   ibuprofen (ADVIL/MOTRIN) tablet 400 mg (400 mg Oral Given 1/17/21 0723)       Assessments & Plan (with Medical Decision Making)     89-year-old male who presents today with complaints of  right wrist pain.  Symptoms began acutely Wednesday morning.  Patient denies any trauma.      On exam patient had decreased range of motion of right wrist.  However no erythema or warmth noted.  There was no skin break noted.  Patient had full range of motion of all fingers and elbow.  Patient had x-rays showing no acute fracture.  Patient given Tylenol and Motrin and felt much better.      Patient is being discharged home with aftercare instructions regarding wrist pain.  Patient instructed to return if he develops any new or worsening symptoms.  Patient declining a wrist guard at this time.  Understands to follow-up with his doctor in 12 to 24 hours.    Due to the nature of this electronic medical record, laboratory results, imaging results, diagnosis, other information and medications reported above may not represent information available to me at the the time of my care and disposition. Medications reported above may have not been ordered by me.     Portions of the record may have been created with voice recognition software. Occasional wrong-word or 'sound-a- like' substitution may have occurred due to the inherent limitations of voice recognition software. Though the chart has been reviewed, there may be inadvertent transcription errors. Read the chart carefully and recognize, using context, where substitutions have occurred.       New Prescriptions    No medications on file       Final diagnoses:   Wrist sprain, right, initial encounter       1/17/2021   [facility]     Roby Rizo MD  01/17/21 0827

## 2021-01-20 DIAGNOSIS — I10 BENIGN ESSENTIAL HYPERTENSION: ICD-10-CM

## 2021-01-20 RX ORDER — METOPROLOL SUCCINATE 25 MG/1
TABLET, EXTENDED RELEASE ORAL
Qty: 45 TABLET | Refills: 2 | Status: SHIPPED | OUTPATIENT
Start: 2021-01-20 | End: 2021-09-21

## 2021-01-20 NOTE — TELEPHONE ENCOUNTER
Metoprolol succinate ER      Last Written Prescription Date:  4/6/2020  Last Fill Quantity: 45,   # refills: 2  Last Office Visit: 12/9/2020  Future Office visit:    Next 5 appointments (look out 90 days)    Apr 14, 2021 11:00 AM  (Arrive by 10:45 AM)  Return Visit with Lizzeth Harris PA-C  [facility] [place] ([facility] ) [address] [place] [address]  [phone]           Routing refill request to provider for review/approval because:

## 2021-02-19 DIAGNOSIS — E78.2 MIXED HYPERLIPIDEMIA: ICD-10-CM

## 2021-02-19 DIAGNOSIS — K21.9 GASTROESOPHAGEAL REFLUX DISEASE WITHOUT ESOPHAGITIS: ICD-10-CM

## 2021-02-19 RX ORDER — SIMVASTATIN 20 MG
20 TABLET ORAL AT BEDTIME
Qty: 90 TABLET | Refills: 0 | Status: SHIPPED | OUTPATIENT
Start: 2021-02-19 | End: 2021-05-20

## 2021-02-19 RX ORDER — FAMOTIDINE 20 MG/1
TABLET, FILM COATED ORAL
Qty: 30 TABLET | Refills: 4 | Status: ON HOLD | OUTPATIENT
Start: 2021-02-19 | End: 2021-07-19

## 2021-02-19 NOTE — TELEPHONE ENCOUNTER
Pepcid 20 mg      Last Written Prescription Date:  9/18/20  Last Fill Quantity: 30,   # refills: 4  Last Office Visit: 12/9/20  Future Office visit:    Next 5 appointments (look out 90 days)    Apr 14, 2021 11:00 AM  (Arrive by 10:45 AM)  Return Visit with Lizzeth Harris PA-C  [facility] ([facility] ) [address]  [place] [address]  [phone]           Routing refill request to provider for review/approval because:      Simvastatin 20 mg      Last Written Prescription Date:  11/17/20  Last Fill Quantity: 90,   # refills: 0  Last Office Visit: 12/9/20  Future Office visit:    Next 5 appointments (look out 90 days)    Apr 14, 2021 11:00 AM  (Arrive by 10:45 AM)  Return Visit with Lizzeth Harris PA-C  [facility] ([facility] ) [address]  [place] [address]  [phone]           Routing refill request to provider for review/approval because:

## 2021-03-22 DIAGNOSIS — I10 BENIGN ESSENTIAL HYPERTENSION: ICD-10-CM

## 2021-03-23 RX ORDER — LOSARTAN POTASSIUM 50 MG/1
TABLET ORAL
Qty: 30 TABLET | Refills: 2 | Status: SHIPPED | OUTPATIENT
Start: 2021-03-23 | End: 2021-05-20

## 2021-03-23 NOTE — TELEPHONE ENCOUNTER
losartan      Last Written Prescription Date:  12/18/2020  Last Fill Quantity: 30,   # refills: 2  Last Office Visit: 12/09/2020  Future Office visit:    Next 5 appointments (look out 90 days)    Apr 14, 2021 11:00 AM  (Arrive by 10:45 AM)  Return Visit with Lizzeth Harris PA-C  [facility] [place] ([facility] [place] ) [address]  [place] [address]  [phone]

## 2021-04-14 ENCOUNTER — OFFICE VISIT (OUTPATIENT)
Dept: OTOLARYNGOLOGY | Facility: OTHER | Age: 86
End: 2021-04-14
Attending: PHYSICIAN ASSISTANT
Payer: MEDICARE

## 2021-04-14 VITALS
HEART RATE: 66 BPM | TEMPERATURE: 97.8 F | RESPIRATION RATE: 16 BRPM | SYSTOLIC BLOOD PRESSURE: 132 MMHG | DIASTOLIC BLOOD PRESSURE: 68 MMHG | OXYGEN SATURATION: 96 %

## 2021-04-14 DIAGNOSIS — H61.23 EXCESSIVE CERUMEN IN EAR CANAL, BILATERAL: Primary | ICD-10-CM

## 2021-04-14 DIAGNOSIS — H90.3 SENSORINEURAL HEARING LOSS (SNHL) OF BOTH EARS: ICD-10-CM

## 2021-04-14 PROCEDURE — G0463 HOSPITAL OUTPT CLINIC VISIT: HCPCS | Mod: 25

## 2021-04-14 PROCEDURE — 92504 EAR MICROSCOPY EXAMINATION: CPT

## 2021-04-14 PROCEDURE — 99212 OFFICE O/P EST SF 10 MIN: CPT | Mod: 25 | Performed by: PHYSICIAN ASSISTANT

## 2021-04-14 PROCEDURE — 92504 EAR MICROSCOPY EXAMINATION: CPT | Performed by: PHYSICIAN ASSISTANT

## 2021-04-14 ASSESSMENT — PAIN SCALES - GENERAL: PAINLEVEL: NO PAIN (0)

## 2021-04-14 NOTE — NURSING NOTE
"Chief Complaint   Patient presents with     Cerumen Impaction     Pt is here for an ear cleaning.       Initial /68   Pulse 66   Temp 97.8  F (36.6  C) (Tympanic)   Resp 16   SpO2 96%  Estimated body mass index is 25.8 kg/m  as calculated from the following:    Height as of 12/9/20: 1.803 m (5' 11\").    Weight as of 1/17/21: 83.9 kg (185 lb).  Medication Reconciliation: complete  Fauzia Livingston LPN    "

## 2021-04-14 NOTE — PROGRESS NOTES
Chief Complaint   Patient presents with     Cerumen Impaction     Pt is here for an ear cleaning.       Adrian returns to ENT for recheck. He was last seen on 10/14/20 for ear exam. He was noted to have myringitis, otorrhea on 9/30 and was treated with otics. He had improvement and recommended q 6 month ear cleaning.     Today, he returns for follow up ear cleaning.   He has felt some left ear drainage and mild itching in right ear   Reports left aid does get waxy discharge on aid.     [facility].     Audiogram 2/26/18: Mild to profound bilateral SNHL. WRS 80% right and 88% left. SRT 70 dB HL right and 60 dB HL left.   No recent audiogram available.      Past Medical History:   Diagnosis Date     Benign localized hyperplasia of prostate without urinary obstruction and other lower urinary tract symptoms (LUTS) 11/26/2010     CHD (coronary heart disease) 11/26/2010     Dyslipidemia 11/26/2010     GERD (gastroesophageal reflux disease) 11/26/2010     HTN (hypertension) 11/26/2010     Old myocardial infarction 11/26/2010     Other symptoms involving digestive system(787.99) 10/20/2006        Allergies   Allergen Reactions     Lisinopril Cough     Morphine Other (See Comments) and Visual Disturbance     confusion     Current Outpatient Medications   Medication     aspirin (ASA) 325 MG EC tablet     famotidine (PEPCID) 20 MG tablet     losartan (COZAAR) 50 MG tablet     metoprolol succinate ER (TOPROL-XL) 25 MG 24 hr tablet     NITROSTAT 0.4 MG sublingual tablet     simvastatin (ZOCOR) 20 MG tablet     No current facility-administered medications for this visit.       ROS: 10 point ROS neg other than the symptoms noted above in the HPI.  /68   Pulse 66   Temp 97.8  F (36.6  C) (Tympanic)   Resp 16   SpO2 96%     [place] w/ aids.   General - The patient is well nourished and well developed, and appears to have good nutritional status.  Alert and oriented  to person and place, interactive.  Head and Face - Normocephalic and atraumatic, with no gross asymmetry noted of the contour of the facial features.  The facial nerve is intact, with strong symmetric movements.  Neck-no palpable lymphadenopathy or thyroid mass.  Trachea is midline.  Eyes - Extraocular movements intact.   Ears- External ears normal. The ears were examined under microscopy bilaterally. Right EAC with thick cerumen. Ears were cleaned with cupped forceps and cerumen loops. No otorrhea or polypoid changes seen today. Bilateral tympanic membranes are intact without effusion or retraction.   Nose - External nasal contour symmetric. See below for nasal examination.  Mouth - Examination of the oral cavity shows pink, healthy, moist mucosa. Dentition in good condition.  No lesions or ulceration noted. The tongue is mobile and midline.    Throat - The walls of the oropharynx were smooth, pink, moist, symmetric, and had no lesions or ulcerations.  The tonsillar pillars and soft palate were symmetric.         ASSESSMENT:      ICD-10-CM    1. Excessive cerumen in ear canal, bilateral  H61.23    2. Sensorineural hearing loss (SNHL) of both ears  H90.3          He was encouraged to follow up at Audiology at [facility] for repeat hearing test and HA adjustments. He states he will call to schedule.   No recent audiogram >3 years.     Return in 6 months for cleaning. His ears appear stable at this time.   If right ear develops active drainage, may need powder application.     He was happy with this plan.         Lizzeth Harris PA-C  ENT  [facility], [place]  [phone]

## 2021-04-14 NOTE — PATIENT INSTRUCTIONS
[facility]- [facility] and may need aid adjustments.   Ears were cleaned.   Follow up in 6 months for ear cleaning      Thank you for allowing Lizzeth Harris PA-C and our ENT team to participate in your care.  If your medications are too expensive, please give the nurse a call.  We can possibly change this medication.  If you have a scheduling or an appointment question please contact our Health Unit Coordinator at [phone], Ext. [phone].    ALL nursing questions or concerns can be directed to your ENT nurse at: [phone] [place]

## 2021-04-14 NOTE — LETTER
4/14/2021         RE: James Grant  [address] [facility] [address]        Dear Colleague,    Thank you for referring your patient, James Grant, to the [facility]. Please see a copy of my visit note below.    Chief Complaint   Patient presents with     Cerumen Impaction     Pt is here for an ear cleaning.       Adrian returns to ENT for recheck. He was last seen on 10/14/20 for ear exam. He was noted to have myringitis, otorrhea on 9/30 and was treated with otics. He had improvement and recommended q 6 month ear cleaning.     Today, he returns for follow up ear cleaning.   He has felt some left ear drainage and mild itching in right ear   Reports left aid does get waxy discharge on aid.     [facility].     Audiogram 2/26/18: Mild to profound bilateral SNHL. WRS 80% right and 88% left. SRT 70 dB HL right and 60 dB HL left.   No recent audiogram available.      Past Medical History:   Diagnosis Date     Benign localized hyperplasia of prostate without urinary obstruction and other lower urinary tract symptoms (LUTS) 11/26/2010     CHD (coronary heart disease) 11/26/2010     Dyslipidemia 11/26/2010     GERD (gastroesophageal reflux disease) 11/26/2010     HTN (hypertension) 11/26/2010     Old myocardial infarction 11/26/2010     Other symptoms involving digestive system(787.99) 10/20/2006        Allergies   Allergen Reactions     Lisinopril Cough     Morphine Other (See Comments) and Visual Disturbance     confusion     Current Outpatient Medications   Medication     aspirin (ASA) 325 MG EC tablet     famotidine (PEPCID) 20 MG tablet     losartan (COZAAR) 50 MG tablet     metoprolol succinate ER (TOPROL-XL) 25 MG 24 hr tablet     NITROSTAT 0.4 MG sublingual tablet     simvastatin (ZOCOR) 20 MG tablet     No current facility-administered medications for this visit.       ROS: 10 point ROS neg other than the symptoms  noted above in the HPI.  /68   Pulse 66   Temp 97.8  F (36.6  C) (Tympanic)   Resp 16   SpO2 96%     [place] w/ aids.   General - The patient is well nourished and well developed, and appears to have good nutritional status.  Alert and oriented to person and place, interactive.  Head and Face - Normocephalic and atraumatic, with no gross asymmetry noted of the contour of the facial features.  The facial nerve is intact, with strong symmetric movements.  Neck-no palpable lymphadenopathy or thyroid mass.  Trachea is midline.  Eyes - Extraocular movements intact.   Ears- External ears normal. The ears were examined under microscopy bilaterally. Right EAC with thick cerumen. Ears were cleaned with cupped forceps and cerumen loops. No otorrhea or polypoid changes seen today. Bilateral tympanic membranes are intact without effusion or retraction.   Nose - External nasal contour symmetric. See below for nasal examination.  Mouth - Examination of the oral cavity shows pink, healthy, moist mucosa. Dentition in good condition.  No lesions or ulceration noted. The tongue is mobile and midline.    Throat - The walls of the oropharynx were smooth, pink, moist, symmetric, and had no lesions or ulcerations.  The tonsillar pillars and soft palate were symmetric.         ASSESSMENT:      ICD-10-CM    1. Excessive cerumen in ear canal, bilateral  H61.23    2. Sensorineural hearing loss (SNHL) of both ears  H90.3          He was encouraged to follow up at Audiology at  for repeat hearing test and HA adjustments. He states he will call to schedule.   No recent audiogram >3 years.     Return in 6 months for cleaning. His ears appear stable at this time.   If right ear develops active drainage, may need powder application.     He was happy with this plan.         Lizzeth Harris PA-C  ENT  [facility], [place]  [phone]        Again, thank you for allowing me to participate in the care of your patient.         Sincerely,        Lizzeth Harris PA-C

## 2021-04-28 ENCOUNTER — OFFICE VISIT (OUTPATIENT)
Dept: UROLOGY | Facility: OTHER | Age: 86
End: 2021-04-28
Attending: UROLOGY
Payer: MEDICARE

## 2021-04-28 VITALS — WEIGHT: 205.2 LBS | BODY MASS INDEX: 28.62 KG/M2 | HEART RATE: 76 BPM | RESPIRATION RATE: 20 BRPM

## 2021-04-28 DIAGNOSIS — N48.9 PENILE LESION: ICD-10-CM

## 2021-04-28 DIAGNOSIS — Z85.51 HISTORY OF BLADDER CANCER: Primary | ICD-10-CM

## 2021-04-28 PROCEDURE — 52000 CYSTOURETHROSCOPY: CPT | Performed by: UROLOGY

## 2021-04-28 PROCEDURE — 99212 OFFICE O/P EST SF 10 MIN: CPT | Mod: 25 | Performed by: UROLOGY

## 2021-04-28 PROCEDURE — G0463 HOSPITAL OUTPT CLINIC VISIT: HCPCS | Mod: 25

## 2021-04-28 ASSESSMENT — PAIN SCALES - GENERAL: PAINLEVEL: NO PAIN (0)

## 2021-04-28 NOTE — PATIENT INSTRUCTIONS

## 2021-04-28 NOTE — NURSING NOTE
Patient positioned in supine position, perineum area prepped with chlorhexidene Gluconate and patient draped per sterile technique. Per verbal order read back by Andrzej Olivia MD, Urojet 10mL 2% lidocaine jelly to be instilled into urethra.  Urojet- 10ml 2% Lidocaine jelly instilled into the urethra.    Urojet 2%  Lot#: [id number]  Expiration date: 10/2022  : [employer]  NDC: [address]-9    [facility] Protocol    A. Pre-procedure verification complete Yes  1-relevant information / documentation available, reviewed and properly matched to the patient; 2-consent accurate and complete, 3-equipment and supplies available    B. Site marking complete N/A  Site marked if not in continuous attendance with patient    C. TIME OUT completed Yes  Time Out was conducted just prior to starting procedure to verify the eight required elements: 1-patient identity, 2-consent accurate and complete, 3-position, 4-correct side/site marked (if applicable), 5-procedure, 6-relevant images / results properly labeled and displayed (if applicable), 7-antibiotics / irrigation fluids (if applicable), 8-safety precautions.    After procedure perineum area rinsed. Discharge instructions reviewed with patient. Patient verbalized understanding of discharge instructions and discharged ambulatory.  Lesly Manzanares LPN..................4/28/2021  3:00 PM

## 2021-04-28 NOTE — PROGRESS NOTES
Preprocedure diagnosis  History of bladder cancer    Postprocedure diagnosis  History of bladder cancer    Procedure  Flexible Cystourethroscopy    Surgeon  Andrzej Olivia MD    Anesthesia  2% lidocaine jelly intraurethrally    Complications  None    Indications  The patient is undergoing a flexible cystoscopy for the above mentioned indications.    Findings  Cystoscopic findings included a normal anterior urethra.    There was not a prominent median lobe.    The lateral lobes were not obstructive in appearance.  The bladder appeared to be normal capacity.    There were no tumors, stones or foreign bodies.    The orifices were slit-shaped and in their normal location.    Procedure  The patient was placed in supine position and prepped and draped in sterile fashion with lidocaine jelly per urethra for anesthesia.    I passed a lubricated 14F flexible cystoscope through the penile urethra and into the bladder and the bladder was completely visualized.  The cystoscope was retroflexed and the bladder neck and prostate visualized.    The cystoscope was slowly withdrawn while visualizing the urethra and the procedure terminated.    The patient tolerated the procedure well.      Pathology  I personally reviewed the pathology report  10/22/2019  Low grade Ta (muscularis propria was present and uninvolved in specimen)    Assessment  Mr. Grant is a 89 year old male with a history of bladder cancer.  Patient also had a penile lesion he wanted me to evaluate.  After retracting the foreskin he did have some erythema on the coronal sulcus.  It was minimal with no raised lesions and nothing visually concerning for cancer.    I did explain that a biopsy would be the final diagnostic test but I recommended against it at this time.  If it worsens or progresses I asked him to return for a discussion about a biopsy.  He was happy with this plan    Plan  Penile lesion-observation  Follow up for surveillance cystoscopy in 6 months then once  annually.            A total of 10 minutes (exclusive of separately billed services/procedures) was spent with the patient, reviewing records, tests, ordering medications, tests or procedures, counseling regarding the above described medical concern, recommendations regarding management and documenting clinical information in the EHR.

## 2021-05-19 DIAGNOSIS — I10 BENIGN ESSENTIAL HYPERTENSION: ICD-10-CM

## 2021-05-19 DIAGNOSIS — E78.2 MIXED HYPERLIPIDEMIA: ICD-10-CM

## 2021-05-19 NOTE — TELEPHONE ENCOUNTER
simvastatin      Last Written Prescription Date:  02/19/2021  Last Fill Quantity: 90,   # refills: 0  Last Office Visit: 12/09/2020  Future Office visit:       losartan      Last Written Prescription Date:  03/23/2021  Last Fill Quantity: 30,   # refills: 2

## 2021-05-20 RX ORDER — SIMVASTATIN 20 MG
20 TABLET ORAL AT BEDTIME
Qty: 90 TABLET | Refills: 0 | Status: SHIPPED | OUTPATIENT
Start: 2021-05-20 | End: 2021-09-21

## 2021-05-20 RX ORDER — LOSARTAN POTASSIUM 50 MG/1
TABLET ORAL
Qty: 30 TABLET | Refills: 2 | Status: SHIPPED | OUTPATIENT
Start: 2021-05-20 | End: 2021-08-19

## 2021-07-15 ENCOUNTER — HOSPITAL ENCOUNTER (INPATIENT)
Facility: HOSPITAL | Age: 86
LOS: 13 days | Discharge: NURSING, REHAB OR LONG-TERM CARE | DRG: 329 | End: 2021-07-28
Attending: EMERGENCY MEDICINE | Admitting: INTERNAL MEDICINE
Payer: MEDICARE

## 2021-07-15 ENCOUNTER — APPOINTMENT (OUTPATIENT)
Dept: GENERAL RADIOLOGY | Facility: HOSPITAL | Age: 86
DRG: 329 | End: 2021-07-15
Attending: EMERGENCY MEDICINE
Payer: MEDICARE

## 2021-07-15 ENCOUNTER — APPOINTMENT (OUTPATIENT)
Dept: CT IMAGING | Facility: HOSPITAL | Age: 86
DRG: 329 | End: 2021-07-15
Attending: EMERGENCY MEDICINE
Payer: MEDICARE

## 2021-07-15 DIAGNOSIS — R10.31 ABDOMINAL PAIN, RIGHT LOWER QUADRANT: ICD-10-CM

## 2021-07-15 DIAGNOSIS — K57.20 DIVERTICULITIS OF LARGE INTESTINE WITH ABSCESS WITHOUT BLEEDING: ICD-10-CM

## 2021-07-15 DIAGNOSIS — K57.20 DIVERTICULITIS OF LARGE INTESTINE WITH PERFORATION AND ABSCESS WITHOUT BLEEDING: ICD-10-CM

## 2021-07-15 DIAGNOSIS — N40.0 BENIGN NON-NODULAR PROSTATIC HYPERPLASIA WITHOUT LOWER URINARY TRACT SYMPTOMS: ICD-10-CM

## 2021-07-15 DIAGNOSIS — K57.32 DIVERTICULITIS OF LARGE INTESTINE WITHOUT PERFORATION OR ABSCESS WITHOUT BLEEDING: Primary | ICD-10-CM

## 2021-07-15 DIAGNOSIS — Z43.3 COLOSTOMY CARE (H): ICD-10-CM

## 2021-07-15 PROBLEM — G45.9 TIA (TRANSIENT ISCHEMIC ATTACK): Status: ACTIVE | Noted: 2020-11-30

## 2021-07-15 LAB
ALBUMIN SERPL-MCNC: 4 G/DL (ref 3.4–5)
ALBUMIN UR-MCNC: 10 MG/DL
ALP SERPL-CCNC: 73 U/L (ref 40–150)
ALT SERPL W P-5'-P-CCNC: 25 U/L (ref 0–70)
ANION GAP SERPL CALCULATED.3IONS-SCNC: 3 MMOL/L (ref 3–14)
APPEARANCE UR: CLEAR
AST SERPL W P-5'-P-CCNC: 20 U/L (ref 0–45)
BASOPHILS # BLD AUTO: 0 10E3/UL (ref 0–0.2)
BASOPHILS # BLD AUTO: 0.1 10E3/UL (ref 0–0.2)
BASOPHILS NFR BLD AUTO: 0 %
BASOPHILS NFR BLD AUTO: 0 %
BILIRUB SERPL-MCNC: 0.6 MG/DL (ref 0.2–1.3)
BILIRUB UR QL STRIP: NEGATIVE
BUN SERPL-MCNC: 23 MG/DL (ref 7–30)
CALCIUM SERPL-MCNC: 8.7 MG/DL (ref 8.5–10.1)
CHLORIDE BLD-SCNC: 107 MMOL/L (ref 94–109)
CO2 SERPL-SCNC: 30 MMOL/L (ref 20–32)
COLOR UR AUTO: ABNORMAL
CREAT SERPL-MCNC: 0.87 MG/DL (ref 0.66–1.25)
EOSINOPHIL # BLD AUTO: 0 10E3/UL (ref 0–0.7)
EOSINOPHIL # BLD AUTO: 0.3 10E3/UL (ref 0–0.7)
EOSINOPHIL NFR BLD AUTO: 0 %
EOSINOPHIL NFR BLD AUTO: 2 %
ERYTHROCYTE [DISTWIDTH] IN BLOOD BY AUTOMATED COUNT: 12.8 % (ref 10–15)
ERYTHROCYTE [DISTWIDTH] IN BLOOD BY AUTOMATED COUNT: 13.1 % (ref 10–15)
GFR SERPL CREATININE-BSD FRML MDRD: 74 ML/MIN/1.73M2
GLUCOSE BLD-MCNC: 99 MG/DL (ref 70–99)
GLUCOSE UR STRIP-MCNC: NEGATIVE MG/DL
HCT VFR BLD AUTO: 44.4 % (ref 40–53)
HCT VFR BLD AUTO: 46.1 % (ref 40–53)
HGB BLD-MCNC: 14.9 G/DL (ref 13.3–17.7)
HGB BLD-MCNC: 15.4 G/DL (ref 13.3–17.7)
HGB UR QL STRIP: ABNORMAL
IMM GRANULOCYTES # BLD: 0 10E3/UL
IMM GRANULOCYTES # BLD: 0 10E3/UL
IMM GRANULOCYTES NFR BLD: 0 %
IMM GRANULOCYTES NFR BLD: 0 %
KETONES UR STRIP-MCNC: 10 MG/DL
LACTATE SERPL-SCNC: 1.6 MMOL/L (ref 0.7–2)
LEUKOCYTE ESTERASE UR QL STRIP: NEGATIVE
LIPASE SERPL-CCNC: 154 U/L (ref 73–393)
LYMPHOCYTES # BLD AUTO: 0.7 10E3/UL (ref 0.8–5.3)
LYMPHOCYTES # BLD AUTO: 4.7 10E3/UL (ref 0.8–5.3)
LYMPHOCYTES NFR BLD AUTO: 32 %
LYMPHOCYTES NFR BLD AUTO: 8 %
MCH RBC QN AUTO: 30.8 PG (ref 26.5–33)
MCH RBC QN AUTO: 30.9 PG (ref 26.5–33)
MCHC RBC AUTO-ENTMCNC: 33.4 G/DL (ref 31.5–36.5)
MCHC RBC AUTO-ENTMCNC: 33.6 G/DL (ref 31.5–36.5)
MCV RBC AUTO: 92 FL (ref 78–100)
MCV RBC AUTO: 92 FL (ref 78–100)
MONOCYTES # BLD AUTO: 0.6 10E3/UL (ref 0–1.3)
MONOCYTES # BLD AUTO: 1.3 10E3/UL (ref 0–1.3)
MONOCYTES NFR BLD AUTO: 6 %
MONOCYTES NFR BLD AUTO: 9 %
MUCOUS THREADS #/AREA URNS LPF: PRESENT /LPF
NEUTROPHILS # BLD AUTO: 7.8 10E3/UL (ref 1.6–8.3)
NEUTROPHILS # BLD AUTO: 8.2 10E3/UL (ref 1.6–8.3)
NEUTROPHILS NFR BLD AUTO: 57 %
NEUTROPHILS NFR BLD AUTO: 86 %
NITRATE UR QL: NEGATIVE
NRBC # BLD AUTO: 0 10E3/UL
NRBC # BLD AUTO: 0 10E3/UL
NRBC BLD AUTO-RTO: 0 /100
NRBC BLD AUTO-RTO: 0 /100
PH UR STRIP: 6 [PH] (ref 4.7–8)
PLATELET # BLD AUTO: 201 10E3/UL (ref 150–450)
PLATELET # BLD AUTO: 242 10E3/UL (ref 150–450)
POTASSIUM BLD-SCNC: 4.2 MMOL/L (ref 3.4–5.3)
PROT SERPL-MCNC: 7.6 G/DL (ref 6.8–8.8)
RBC # BLD AUTO: 4.84 10E6/UL (ref 4.4–5.9)
RBC # BLD AUTO: 4.99 10E6/UL (ref 4.4–5.9)
RBC URINE: 98 /HPF
SARS-COV-2 RNA RESP QL NAA+PROBE: NEGATIVE
SODIUM SERPL-SCNC: 140 MMOL/L (ref 133–144)
SP GR UR STRIP: 1.02 (ref 1–1.03)
SQUAMOUS EPITHELIAL: <1 /HPF
UROBILINOGEN UR STRIP-MCNC: NORMAL MG/DL
WBC # BLD AUTO: 14.6 10E3/UL (ref 4–11)
WBC # BLD AUTO: 9.2 10E3/UL (ref 4–11)
WBC URINE: 1 /HPF

## 2021-07-15 PROCEDURE — 255N000002 HC RX 255 OP 636: Performed by: EMERGENCY MEDICINE

## 2021-07-15 PROCEDURE — 250N000011 HC RX IP 250 OP 636: Performed by: INTERNAL MEDICINE

## 2021-07-15 PROCEDURE — 87040 BLOOD CULTURE FOR BACTERIA: CPT | Performed by: INTERNAL MEDICINE

## 2021-07-15 PROCEDURE — 85025 COMPLETE CBC W/AUTO DIFF WBC: CPT | Performed by: INTERNAL MEDICINE

## 2021-07-15 PROCEDURE — 96368 THER/DIAG CONCURRENT INF: CPT

## 2021-07-15 PROCEDURE — 80053 COMPREHEN METABOLIC PANEL: CPT | Performed by: EMERGENCY MEDICINE

## 2021-07-15 PROCEDURE — 74177 CT ABD & PELVIS W/CONTRAST: CPT

## 2021-07-15 PROCEDURE — 258N000003 HC RX IP 258 OP 636: Performed by: EMERGENCY MEDICINE

## 2021-07-15 PROCEDURE — 36415 COLL VENOUS BLD VENIPUNCTURE: CPT | Performed by: EMERGENCY MEDICINE

## 2021-07-15 PROCEDURE — 250N000011 HC RX IP 250 OP 636: Performed by: EMERGENCY MEDICINE

## 2021-07-15 PROCEDURE — 99222 1ST HOSP IP/OBS MODERATE 55: CPT | Mod: AI | Performed by: INTERNAL MEDICINE

## 2021-07-15 PROCEDURE — C9803 HOPD COVID-19 SPEC COLLECT: HCPCS

## 2021-07-15 PROCEDURE — 83605 ASSAY OF LACTIC ACID: CPT | Performed by: EMERGENCY MEDICINE

## 2021-07-15 PROCEDURE — 96365 THER/PROPH/DIAG IV INF INIT: CPT

## 2021-07-15 PROCEDURE — 99285 EMERGENCY DEPT VISIT HI MDM: CPT | Mod: 25

## 2021-07-15 PROCEDURE — 85025 COMPLETE CBC W/AUTO DIFF WBC: CPT | Performed by: EMERGENCY MEDICINE

## 2021-07-15 PROCEDURE — 71045 X-RAY EXAM CHEST 1 VIEW: CPT

## 2021-07-15 PROCEDURE — 36415 COLL VENOUS BLD VENIPUNCTURE: CPT | Performed by: INTERNAL MEDICINE

## 2021-07-15 PROCEDURE — 83690 ASSAY OF LIPASE: CPT | Performed by: EMERGENCY MEDICINE

## 2021-07-15 PROCEDURE — 81001 URINALYSIS AUTO W/SCOPE: CPT | Performed by: EMERGENCY MEDICINE

## 2021-07-15 PROCEDURE — U0003 INFECTIOUS AGENT DETECTION BY NUCLEIC ACID (DNA OR RNA); SEVERE ACUTE RESPIRATORY SYNDROME CORONAVIRUS 2 (SARS-COV-2) (CORONAVIRUS DISEASE [COVID-19]), AMPLIFIED PROBE TECHNIQUE, MAKING USE OF HIGH THROUGHPUT TECHNOLOGIES AS DESCRIBED BY CMS-2020-01-R: HCPCS | Performed by: EMERGENCY MEDICINE

## 2021-07-15 PROCEDURE — 96376 TX/PRO/DX INJ SAME DRUG ADON: CPT

## 2021-07-15 PROCEDURE — 258N000003 HC RX IP 258 OP 636: Performed by: INTERNAL MEDICINE

## 2021-07-15 PROCEDURE — 99285 EMERGENCY DEPT VISIT HI MDM: CPT | Performed by: EMERGENCY MEDICINE

## 2021-07-15 PROCEDURE — 96375 TX/PRO/DX INJ NEW DRUG ADDON: CPT

## 2021-07-15 PROCEDURE — 120N000001 HC R&B MED SURG/OB

## 2021-07-15 RX ORDER — HYDROMORPHONE HYDROCHLORIDE 1 MG/ML
0.3 INJECTION, SOLUTION INTRAMUSCULAR; INTRAVENOUS; SUBCUTANEOUS
Status: DISCONTINUED | OUTPATIENT
Start: 2021-07-15 | End: 2021-07-16

## 2021-07-15 RX ORDER — IOPAMIDOL 612 MG/ML
100 INJECTION, SOLUTION INTRAVASCULAR ONCE
Status: COMPLETED | OUTPATIENT
Start: 2021-07-15 | End: 2021-07-15

## 2021-07-15 RX ORDER — CIPROFLOXACIN 2 MG/ML
400 INJECTION, SOLUTION INTRAVENOUS ONCE
Status: COMPLETED | OUTPATIENT
Start: 2021-07-15 | End: 2021-07-15

## 2021-07-15 RX ORDER — NALOXONE HYDROCHLORIDE 0.4 MG/ML
0.2 INJECTION, SOLUTION INTRAMUSCULAR; INTRAVENOUS; SUBCUTANEOUS
Status: DISCONTINUED | OUTPATIENT
Start: 2021-07-15 | End: 2021-07-28 | Stop reason: HOSPADM

## 2021-07-15 RX ORDER — HYDROMORPHONE HYDROCHLORIDE 1 MG/ML
0.5 INJECTION, SOLUTION INTRAMUSCULAR; INTRAVENOUS; SUBCUTANEOUS EVERY 30 MIN PRN
Status: DISCONTINUED | OUTPATIENT
Start: 2021-07-15 | End: 2021-07-15

## 2021-07-15 RX ORDER — SODIUM CHLORIDE, SODIUM LACTATE, POTASSIUM CHLORIDE, CALCIUM CHLORIDE 600; 310; 30; 20 MG/100ML; MG/100ML; MG/100ML; MG/100ML
INJECTION, SOLUTION INTRAVENOUS CONTINUOUS
Status: DISCONTINUED | OUTPATIENT
Start: 2021-07-15 | End: 2021-07-21

## 2021-07-15 RX ORDER — ONDANSETRON 2 MG/ML
4 INJECTION INTRAMUSCULAR; INTRAVENOUS EVERY 6 HOURS PRN
Status: DISCONTINUED | OUTPATIENT
Start: 2021-07-15 | End: 2021-07-28 | Stop reason: HOSPADM

## 2021-07-15 RX ORDER — ONDANSETRON 4 MG/1
4 TABLET, ORALLY DISINTEGRATING ORAL EVERY 6 HOURS PRN
Status: DISCONTINUED | OUTPATIENT
Start: 2021-07-15 | End: 2021-07-28 | Stop reason: HOSPADM

## 2021-07-15 RX ORDER — CIPROFLOXACIN 2 MG/ML
400 INJECTION, SOLUTION INTRAVENOUS EVERY 12 HOURS
Status: CANCELLED | OUTPATIENT
Start: 2021-07-16

## 2021-07-15 RX ORDER — LIDOCAINE 40 MG/G
CREAM TOPICAL
Status: DISCONTINUED | OUTPATIENT
Start: 2021-07-15 | End: 2021-07-28 | Stop reason: HOSPADM

## 2021-07-15 RX ORDER — METOCLOPRAMIDE HYDROCHLORIDE 5 MG/ML
5 INJECTION INTRAMUSCULAR; INTRAVENOUS ONCE
Status: COMPLETED | OUTPATIENT
Start: 2021-07-15 | End: 2021-07-15

## 2021-07-15 RX ORDER — NALOXONE HYDROCHLORIDE 0.4 MG/ML
0.4 INJECTION, SOLUTION INTRAMUSCULAR; INTRAVENOUS; SUBCUTANEOUS
Status: DISCONTINUED | OUTPATIENT
Start: 2021-07-15 | End: 2021-07-28 | Stop reason: HOSPADM

## 2021-07-15 RX ADMIN — SODIUM CHLORIDE 1000 ML: 9 INJECTION, SOLUTION INTRAVENOUS at 18:30

## 2021-07-15 RX ADMIN — CIPROFLOXACIN 400 MG: 2 INJECTION, SOLUTION INTRAVENOUS at 20:02

## 2021-07-15 RX ADMIN — IOPAMIDOL 100 ML: 612 INJECTION, SOLUTION INTRAVENOUS at 19:06

## 2021-07-15 RX ADMIN — METRONIDAZOLE 500 MG: 500 INJECTION, SOLUTION INTRAVENOUS at 19:59

## 2021-07-15 RX ADMIN — TAZOBACTAM SODIUM AND PIPERACILLIN SODIUM 3.38 G: 375; 3 INJECTION, SOLUTION INTRAVENOUS at 21:30

## 2021-07-15 RX ADMIN — SODIUM CHLORIDE, POTASSIUM CHLORIDE, SODIUM LACTATE AND CALCIUM CHLORIDE: 600; 310; 30; 20 INJECTION, SOLUTION INTRAVENOUS at 21:20

## 2021-07-15 RX ADMIN — HYDROMORPHONE HYDROCHLORIDE 0.3 MG: 1 INJECTION, SOLUTION INTRAMUSCULAR; INTRAVENOUS; SUBCUTANEOUS at 21:24

## 2021-07-15 RX ADMIN — ONDANSETRON 4 MG: 2 INJECTION INTRAMUSCULAR; INTRAVENOUS at 21:24

## 2021-07-15 RX ADMIN — METOCLOPRAMIDE 5 MG: 5 INJECTION, SOLUTION INTRAMUSCULAR; INTRAVENOUS at 19:20

## 2021-07-15 RX ADMIN — HYDROMORPHONE HYDROCHLORIDE 0.5 MG: 1 INJECTION, SOLUTION INTRAMUSCULAR; INTRAVENOUS; SUBCUTANEOUS at 19:20

## 2021-07-15 RX ADMIN — HYDROMORPHONE HYDROCHLORIDE 0.5 MG: 1 INJECTION, SOLUTION INTRAMUSCULAR; INTRAVENOUS; SUBCUTANEOUS at 18:29

## 2021-07-15 ASSESSMENT — ENCOUNTER SYMPTOMS
FEVER: 0
SHORTNESS OF BREATH: 0
CHILLS: 0
COUGH: 0

## 2021-07-15 ASSESSMENT — MIFFLIN-ST. JEOR: SCORE: 1523.96

## 2021-07-15 NOTE — ED PROVIDER NOTES
History     Chief Complaint   Patient presents with     Abdominal Pain     HPI  James Grant is a 89 year old male who is here with acute right lower quadrant abdominal pain.  Was having a bowel movement, states it was very hard, that had sudden onset, 10-10 pain.  Radiates slightly into his right groin.  No history of similar.  States he has been constipated recently.  No nausea no vomiting.  No fever no chills.  When asked why his right hand shaking, states it is due to the severe pain he is experiencing.  Only surgical history is bladder cancer procedure, done by Dr. Milan, approximately 1.5 years ago.  Pain is sharp.  Constant.  10 out of 10.    Allergies:  Allergies   Allergen Reactions     Lisinopril Cough     Morphine Other (See Comments) and Visual Disturbance     confusion       Problem List:    Patient Active Problem List    Diagnosis Date Noted     TIA (transient ischemic attack) 11/30/2020     Priority: Medium     Malignant neoplasm of overlapping sites of bladder (H) 11/22/2019     Priority: Medium     History of bladder cancer 10/28/2019     Priority: Medium     Venous stasis dermatitis of both lower extremities 03/16/2017     Priority: Medium     ACP (advance care planning) 06/06/2016     Priority: Medium     Advance Care Planning 6/6/2016: ACP Review of Chart / Resources Provided:  Reviewed chart for advance care plan.  James Grant has no plan or code status on file. Discussed available resources and provided with information. Confirmed code status reflects current choices pending further ACP discussions.  Confirmed/documented legally designated decision makers.  Added by Yani Anderson             Advance care planning 02/08/2016     Priority: Medium     Advance Care Planning 2/8/2016: Receipt of ACP document:  Received: POLST which was signed and dated by provider on 1/18/16.  Document previously scanned on 1/22/16.  Order reviewed and found to be valid.  Code Status reflects choices in  most recent ACP document.  Confirmed/documented designated decision maker(s).  Added by Trinh Garces             BPH (benign prostatic hyperplasia) 01/08/2014     Priority: Medium     SNHL (sensorineural hearing loss) 07/17/2013     Priority: Medium     ETD (eustachian tube dysfunction) 07/17/2013     Priority: Medium     Dyslipidemia 11/26/2010     Priority: Medium     GERD (gastroesophageal reflux disease) 11/26/2010     Priority: Medium     CHD (coronary heart disease) 11/26/2010     Priority: Medium     07/2010 S/P inferior wall MI  Angioplasty and stenting X 2 RCA  08/2010 angioplasty and stenting (LIDIA) LAD       Essential hypertension, benign 11/08/2010     Priority: Medium     Overview:   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       CHOLECYSTECTOMY       CYSTOSCOPY, FULGURATE BLEEDERS, EVACUATE CLOT(S), COMBINED N/A 2/17/2020    Procedure: Clot Evacuation;  Surgeon: Andrzej Olivia MD;  Location:  OR     CYSTOSCOPY, RETROGRADES, COMBINED Bilateral 10/22/2019    Procedure: Bilateral Retrograde Pyelograms;  Surgeon: Andrzej Olivia MD;  Location:  OR     CYSTOSCOPY, TRANSURETHRAL RESECTION (TUR) TUMOR BLADDER, COMBINED N/A 10/22/2019    Procedure: Transurethral Resection of Bladder Tumor and transurethral resection of prostate and bladder stone removal;  Surgeon: Andrzej Olivia MD;  Location:  OR     HERNIA REPAIR       left arm surgery    "      Family History:    Family History   Problem Relation Age of Onset     Heart Failure Mother 86        Congestive - Cause of Death     Cerebrovascular Disease Father      C.A.D. Sister 91       Social History:  Marital Status:   [5]  Social History     Tobacco Use     Smoking status: Former Smoker     Packs/day: 1.00     Years: 13.00     Pack years: 13.00     Types: Cigarettes     Start date: 1949     Quit date: 1962     Years since quittin.9     Smokeless tobacco: Never Used   Substance Use Topics     Alcohol use: Not Currently     Comment: 3 Drinks (BEER & LIQUOR) ~ OCCASIONALLY (QUIT IN )     Drug use: No        Medications:    aspirin (ASA) 325 MG EC tablet  famotidine (PEPCID) 20 MG tablet  losartan (COZAAR) 50 MG tablet  metoprolol succinate ER (TOPROL-XL) 25 MG 24 hr tablet  simvastatin (ZOCOR) 20 MG tablet  NITROSTAT 0.4 MG sublingual tablet          Review of Systems   Constitutional: Negative for chills and fever.   Respiratory: Negative for cough and shortness of breath.    All other systems reviewed and are negative.      Physical Exam   BP: 167/85  Pulse: 69  Temp: 99.2  F (37.3  C)  Resp: 22  Height: 180.3 cm (5' 11\")  Weight: 86.2 kg (190 lb)  SpO2: 99 %      Physical Exam  Constitutional:       General: He is in acute distress.      Appearance: Normal appearance. He is ill-appearing.   HENT:      Head: Normocephalic and atraumatic.      Right Ear: External ear normal.      Left Ear: External ear normal.      Nose: Nose normal. No rhinorrhea.   Eyes:      Conjunctiva/sclera: Conjunctivae normal.   Cardiovascular:      Rate and Rhythm: Normal rate and regular rhythm.      Pulses: Normal pulses.   Pulmonary:      Effort: Pulmonary effort is normal. No respiratory distress.      Breath sounds: Normal breath sounds.   Abdominal:      General: There is no distension.      Palpations: Abdomen is soft.      Tenderness: There is generalized abdominal tenderness. There is guarding " and rebound.   Genitourinary:     Penis: Normal.       Testes: Normal.         Right: Tenderness or swelling not present.         Left: Tenderness or swelling not present.   Musculoskeletal:         General: No deformity or signs of injury.   Skin:     General: Skin is warm and dry.      Capillary Refill: Capillary refill takes less than 2 seconds.   Neurological:      General: No focal deficit present.      Mental Status: He is alert. Mental status is at baseline.   Psychiatric:         Mood and Affect: Mood normal.         Behavior: Behavior normal.         ED Course        Procedures             Critical Care time:               Results for orders placed or performed during the hospital encounter of 07/15/21 (from the past 24 hour(s))   [facility] Draw    Narrative    The following orders were created for panel order [facility].  Procedure                               Abnormality         Status                     ---------                               -----------         ------                     Extra Blue Top Tube[[id number]]                              In process                 Extra Red Top Tube[[id number]]                               In process                 Extra Green Top (Lithium...[[id number]]                      In process                 Extra Purple Top Tube[[id number]]                            In process                 Extra Green Top (Lithium...[[id number]]                      In process                   Please view results for these tests on the individual orders.   CBC with platelets differential    Narrative    The following orders were created for panel order CBC with platelets differential.  Procedure                               Abnormality         Status                     ---------                               -----------         ------                     CBC with platelets and d...[[id number]]  Abnormal            Final result                 Please view results for these tests  on the individual orders.   Lactic acid whole blood   Result Value Ref Range    Lactic Acid 1.6 0.7 - 2.0 mmol/L   CBC with platelets and differential   Result Value Ref Range    WBC Count 14.6 (H) 4.0 - 11.0 10e3/uL    RBC Count 4.99 4.40 - 5.90 10e6/uL    Hemoglobin 15.4 13.3 - 17.7 g/dL    Hematocrit 46.1 40.0 - 53.0 %    MCV 92 78 - 100 fL    MCH 30.9 26.5 - 33.0 pg    MCHC 33.4 31.5 - 36.5 g/dL    RDW 13.1 10.0 - 15.0 %    Platelet Count 242 150 - 450 10e3/uL    % Neutrophils 57 %    % Lymphocytes 32 %    % Monocytes 9 %    % Eosinophils 2 %    % Basophils 0 %    % Immature Granulocytes 0 %    NRBCs per 100 WBC 0 <1 /100    Absolute Neutrophils 8.2 1.6 - 8.3 10e3/uL    Absolute Lymphocytes 4.7 0.8 - 5.3 10e3/uL    Absolute Monocytes 1.3 0.0 - 1.3 10e3/uL    Absolute Eosinophils 0.3 0.0 - 0.7 10e3/uL    Absolute Basophils 0.1 0.0 - 0.2 10e3/uL    Absolute Immature Granulocytes 0.0 <=0.0 10e3/uL    Absolute NRBCs 0.0 10e3/uL   Asymptomatic COVID-19 Virus (Coronavirus) by PCR Nasopharyngeal    Specimen: Nasopharyngeal; Swab    Narrative    The following orders were created for panel order Asymptomatic COVID-19 Virus (Coronavirus) by PCR Nasopharyngeal.  Procedure                               Abnormality         Status                     ---------                               -----------         ------                     SARS-COV2 (COVID-19) Vir...[[id number]]                                                   Please view results for these tests on the individual orders.       Medications   HYDROmorphone (PF) (DILAUDID) injection 0.5 mg (0.5 mg Intravenous Given 7/15/21 1829)   0.9% sodium chloride BOLUS (1,000 mLs Intravenous New Bag 7/15/21 1830)       Assessments & Plan (with Medical Decision Making)     I have reviewed the nursing notes.    I have reviewed the findings, diagnosis, plan and need for follow up with the patient.  89-year-old male here with surgical abdomen, portable chest x-ray does not show  free air under diaphragm, will proceed with immediate CT abdomen pelvis, pain control, surgical intervention if pathology identified on CT scan.  Will let surgeon be aware ahead of time in case they are needed to come in emergently.    New Prescriptions    No medications on file       Final diagnoses:   Abdominal pain, right lower quadrant       7/15/2021   [facility]     Kirk Short MD  07/15/21 [id number]

## 2021-07-15 NOTE — ED TRIAGE NOTES
Pt brought in by EMS for evaluation of lower abdominal pain. Reports pain was a sudden onset after having a bowel movement. Pain dose not radiate. Aching constant type of pain. Pt reports stool today was very hard and states he is frequently constipated. Pt alert and oriented, appears to be in distress, some shaking and anxiety noted.

## 2021-07-16 LAB
ALBUMIN SERPL-MCNC: 3.3 G/DL (ref 3.4–5)
ALP SERPL-CCNC: 56 U/L (ref 40–150)
ALT SERPL W P-5'-P-CCNC: 21 U/L (ref 0–70)
ANION GAP SERPL CALCULATED.3IONS-SCNC: 7 MMOL/L (ref 3–14)
AST SERPL W P-5'-P-CCNC: 14 U/L (ref 0–45)
BILIRUB SERPL-MCNC: 1.1 MG/DL (ref 0.2–1.3)
BUN SERPL-MCNC: 19 MG/DL (ref 7–30)
CALCIUM SERPL-MCNC: 7.8 MG/DL (ref 8.5–10.1)
CHLORIDE BLD-SCNC: 107 MMOL/L (ref 94–109)
CO2 SERPL-SCNC: 24 MMOL/L (ref 20–32)
CREAT SERPL-MCNC: 0.73 MG/DL (ref 0.66–1.25)
CRP SERPL-MCNC: 57.8 MG/L (ref 0–8)
GFR SERPL CREATININE-BSD FRML MDRD: 79 ML/MIN/1.73M2
GLUCOSE BLD-MCNC: 145 MG/DL (ref 70–99)
POTASSIUM BLD-SCNC: 4 MMOL/L (ref 3.4–5.3)
PROT SERPL-MCNC: 6.5 G/DL (ref 6.8–8.8)
SODIUM SERPL-SCNC: 138 MMOL/L (ref 133–144)

## 2021-07-16 PROCEDURE — 258N000003 HC RX IP 258 OP 636: Performed by: INTERNAL MEDICINE

## 2021-07-16 PROCEDURE — 250N000013 HC RX MED GY IP 250 OP 250 PS 637: Performed by: INTERNAL MEDICINE

## 2021-07-16 PROCEDURE — 99222 1ST HOSP IP/OBS MODERATE 55: CPT | Performed by: SURGERY

## 2021-07-16 PROCEDURE — 36415 COLL VENOUS BLD VENIPUNCTURE: CPT | Performed by: INTERNAL MEDICINE

## 2021-07-16 PROCEDURE — 250N000011 HC RX IP 250 OP 636: Performed by: INTERNAL MEDICINE

## 2021-07-16 PROCEDURE — 80053 COMPREHEN METABOLIC PANEL: CPT | Performed by: INTERNAL MEDICINE

## 2021-07-16 PROCEDURE — 99232 SBSQ HOSP IP/OBS MODERATE 35: CPT | Performed by: INTERNAL MEDICINE

## 2021-07-16 PROCEDURE — 86140 C-REACTIVE PROTEIN: CPT | Performed by: INTERNAL MEDICINE

## 2021-07-16 PROCEDURE — 120N000001 HC R&B MED SURG/OB

## 2021-07-16 RX ORDER — FAMOTIDINE 20 MG/1
20 TABLET, FILM COATED ORAL DAILY
Status: DISCONTINUED | OUTPATIENT
Start: 2021-07-16 | End: 2021-07-20

## 2021-07-16 RX ORDER — SIMVASTATIN 10 MG
20 TABLET ORAL AT BEDTIME
Status: DISCONTINUED | OUTPATIENT
Start: 2021-07-16 | End: 2021-07-28 | Stop reason: HOSPADM

## 2021-07-16 RX ORDER — HYDROMORPHONE HYDROCHLORIDE 1 MG/ML
.5-1 INJECTION, SOLUTION INTRAMUSCULAR; INTRAVENOUS; SUBCUTANEOUS
Status: DISCONTINUED | OUTPATIENT
Start: 2021-07-16 | End: 2021-07-28 | Stop reason: HOSPADM

## 2021-07-16 RX ORDER — ACETAMINOPHEN 325 MG/1
975 TABLET ORAL EVERY 4 HOURS PRN
Status: DISCONTINUED | OUTPATIENT
Start: 2021-07-16 | End: 2021-07-28 | Stop reason: HOSPADM

## 2021-07-16 RX ORDER — LOSARTAN POTASSIUM 50 MG/1
50 TABLET ORAL DAILY
Status: DISCONTINUED | OUTPATIENT
Start: 2021-07-16 | End: 2021-07-24

## 2021-07-16 RX ADMIN — ONDANSETRON 4 MG: 2 INJECTION INTRAMUSCULAR; INTRAVENOUS at 21:22

## 2021-07-16 RX ADMIN — TAZOBACTAM SODIUM AND PIPERACILLIN SODIUM 3.38 G: 375; 3 INJECTION, SOLUTION INTRAVENOUS at 09:16

## 2021-07-16 RX ADMIN — METOPROLOL SUCCINATE 12.5 MG: 25 TABLET, EXTENDED RELEASE ORAL at 21:15

## 2021-07-16 RX ADMIN — HYDROMORPHONE HYDROCHLORIDE 0.3 MG: 1 INJECTION, SOLUTION INTRAMUSCULAR; INTRAVENOUS; SUBCUTANEOUS at 07:14

## 2021-07-16 RX ADMIN — SODIUM CHLORIDE, POTASSIUM CHLORIDE, SODIUM LACTATE AND CALCIUM CHLORIDE: 600; 310; 30; 20 INJECTION, SOLUTION INTRAVENOUS at 06:38

## 2021-07-16 RX ADMIN — TAZOBACTAM SODIUM AND PIPERACILLIN SODIUM 3.38 G: 375; 3 INJECTION, SOLUTION INTRAVENOUS at 03:13

## 2021-07-16 RX ADMIN — TAZOBACTAM SODIUM AND PIPERACILLIN SODIUM 3.38 G: 375; 3 INJECTION, SOLUTION INTRAVENOUS at 15:02

## 2021-07-16 RX ADMIN — LOSARTAN POTASSIUM 50 MG: 50 TABLET, FILM COATED ORAL at 09:05

## 2021-07-16 RX ADMIN — FAMOTIDINE 20 MG: 20 TABLET ORAL at 09:05

## 2021-07-16 RX ADMIN — ACETAMINOPHEN 975 MG: 325 TABLET, FILM COATED ORAL at 03:44

## 2021-07-16 RX ADMIN — HYDROMORPHONE HYDROCHLORIDE 0.3 MG: 1 INJECTION, SOLUTION INTRAMUSCULAR; INTRAVENOUS; SUBCUTANEOUS at 00:56

## 2021-07-16 RX ADMIN — HYDROMORPHONE HYDROCHLORIDE 0.5 MG: 1 INJECTION, SOLUTION INTRAMUSCULAR; INTRAVENOUS; SUBCUTANEOUS at 21:22

## 2021-07-16 RX ADMIN — HYDROMORPHONE HYDROCHLORIDE 0.5 MG: 1 INJECTION, SOLUTION INTRAMUSCULAR; INTRAVENOUS; SUBCUTANEOUS at 09:09

## 2021-07-16 RX ADMIN — SIMVASTATIN 20 MG: 10 TABLET, FILM COATED ORAL at 21:15

## 2021-07-16 RX ADMIN — HYDROMORPHONE HYDROCHLORIDE 0.5 MG: 1 INJECTION, SOLUTION INTRAMUSCULAR; INTRAVENOUS; SUBCUTANEOUS at 13:55

## 2021-07-16 RX ADMIN — TAZOBACTAM SODIUM AND PIPERACILLIN SODIUM 3.38 G: 375; 3 INJECTION, SOLUTION INTRAVENOUS at 21:15

## 2021-07-16 ASSESSMENT — ACTIVITIES OF DAILY LIVING (ADL)
ADLS_ACUITY_SCORE: 19
ADLS_ACUITY_SCORE: 20

## 2021-07-16 NOTE — CONSULTS
[facility]    History and Physical  General Surgery     Date of Admission:  7/15/2021    Assessment & Plan   James Grant is a 89 year old male who presents with sigmoid diverticulitis    Principal Problem:    Sigmoid diverticulitis  Active Problems:    SNHL (sensorineural hearing loss)    Essential hypertension, benign    CHD (coronary heart disease)    BPH (benign prostatic hyperplasia)    History of bladder cancer    TIA (transient ischemic attack)    Diverticulitis large intestine    Plan for trach n.p.o., bowel rest  IV antibiotics IV hydration  Monitor CBC, electrolytes  No evidence of perforation or abscess, therefore no need for acute surgical intervention at this time, will continue to monitor    Jayashree Lyle    Code Status   Full Code    Primary Care Physician   Broderick Flowers    Chief Complaint   Abdominal pain    History is obtained from the patient    History of Present Illness   James Grant is a 89 year old male who presents with abdominal pain after a bowel movement yesterday.  Pain located in the right lower quadrant and radiates towards the left lower quadrant.  Denies any fever, vomiting, dysuria, hematuria, hematochezia, rectal bleeding.  Admits to occasional chills and nausea.  Denies any previous history of diverticulitis.  History of bladder cancer    Past Medical History    I have reviewed this patient's medical history and updated it with pertinent information if needed.   Past Medical History:   Diagnosis Date     Benign localized hyperplasia of prostate without urinary obstruction and other lower urinary tract symptoms (LUTS) 11/26/2010     CHD (coronary heart disease) 11/26/2010     Dyslipidemia 11/26/2010     GERD (gastroesophageal reflux disease) 11/26/2010     HTN (hypertension) 11/26/2010     Old myocardial infarction 11/26/2010     Other symptoms involving digestive system(787.99) 10/20/2006       Past Surgical History   I have reviewed this patient's  surgical history and updated it with pertinent information if needed.  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       CHOLECYSTECTOMY       CYSTOSCOPY, FULGURATE BLEEDERS, EVACUATE CLOT(S), COMBINED N/A 2/17/2020    Procedure: Clot Evacuation;  Surgeon: Andrzej Olivia MD;  Location: [facility] OR     CYSTOSCOPY, RETROGRADES, COMBINED Bilateral 10/22/2019    Procedure: Bilateral Retrograde Pyelograms;  Surgeon: Andrzej Olivia MD;  Location: [facility] OR     CYSTOSCOPY, TRANSURETHRAL RESECTION (TUR) TUMOR BLADDER, COMBINED N/A 10/22/2019    Procedure: Transurethral Resection of Bladder Tumor and transurethral resection of prostate and bladder stone removal;  Surgeon: Andrzej Olivia MD;  Location: [facility] OR     HERNIA REPAIR       left arm surgery         Prior to Admission Medications   Prior to Admission Medications   Prescriptions Last Dose Informant Patient Reported? Taking?   NITROSTAT 0.4 MG sublingual tablet Unknown at Unknown time Self No No   Sig: PLACE 1 TABLET (0.4 MG) UNDER THE TONGUE EVERY 5 MINUTES AS NEEDED   aspirin (ASA) 325 MG EC tablet 7/15/2021 at Unknown time Self Yes Yes   Sig: Take 162 mg by mouth daily   famotidine (PEPCID) 20 MG tablet 7/14/2021 at Unknown time  No Yes   Sig: TAKE ONE (1) TABLET BY MOUTH DAILY   Patient taking differently: Take 20 mg by mouth daily as needed    losartan (COZAAR) 50 MG tablet 7/15/2021 at AM  No Yes   Sig: TAKE 1 TABLET (50 MG) BY MOUTH DAILY COZAAR   metoprolol succinate ER (TOPROL-XL) 25 MG 24 hr tablet 7/14/2021 at PM  No Yes   Sig: TAKE 1/2 TAB BY MOUTH AT BEDTIME. DO NOT CRUSH OR CHEW.   simvastatin (ZOCOR) 20 MG tablet 7/14/2021 at PM  No Yes   Sig: Take 1 tablet (20 mg) by mouth At Bedtime      Facility-Administered Medications: None     Allergies   Allergies   Allergen Reactions     Lisinopril Cough     Morphine Other (See Comments) and Visual Disturbance     confusion       Social History   I have reviewed this patient's social  history and updated it with pertinent information if needed. James Grant  reports that he quit smoking about 58 years ago. His smoking use included cigarettes. He started smoking about 72 years ago. He has a 13.00 pack-year smoking history. He has never used smokeless tobacco. He reports previous alcohol use. He reports that he does not use drugs.    Family History   I have reviewed this patient's family history and updated it with pertinent information if needed.   Family History   Problem Relation Age of Onset     Heart Failure Mother 86        Congestive - Cause of Death     Cerebrovascular Disease Father      C.A.D. Sister 91       Review of Systems   The 10 point Review of Systems is negative other than noted in the HPI or here.     Physical Exam   Temp: 100.3  F (37.9  C) Temp src: Tympanic BP: 119/56 Pulse: 73   Resp: 18 SpO2: 92 % O2 Device: Nasal cannula Oxygen Delivery: 2 LPM  Vital Signs with Ranges  Temp:  [99.2  F (37.3  C)-101.4  F (38.6  C)] 100.3  F (37.9  C)  Pulse:  [68-92] 73  Resp:  [12-24] 18  BP: (119-170)/(55-92) 119/56  SpO2:  [88 %-99 %] 92 %  190 lbs 0 oz    Constitutional: awake, alert, cooperative, no apparent distress, and appears stated age  Eyes: lids and lashes normal, pupils equal, round and reactive to light, extra-ocular muscles intact and sclera clear  ENT: normocepalic, without obvious abnormality, atramatic  Hematologic / Lymphatic: no cervical lymphadenopathy and no supraclavicular lymphadenopathy  Respiratory: No increased work of breathing, good air exchange, clear to auscultation bilaterally, no crackles or wheezing  Cardiovascular: normal apical pulses, regular rate and rhythm and normal S1 and S2  GI: Distended but soft, tender to palpation right lower quadrant towards the left lower quadrant with guarding and percussion tenderness bowel sounds diminished  Skin: no bruising or bleeding and normal skin color, texture, turgor  Musculoskeletal: no lower extremity pitting  edema present  there is no redness, warmth, or swelling of the joints  Neurologic: Awake, alert, oriented to name, place and time.  Cranial nerves II-XII are grossly intact.      Data   Results for orders placed or performed during the hospital encounter of 07/15/21 (from the past 24 hour(s))   [place] Draw *Canceled*    Narrative    The following orders were created for panel order [place] Draw.  Procedure                               Abnormality         Status                     ---------                               -----------         ------                       Please view results for these tests on the individual orders.   [place] Draw *Canceled*    Narrative    The following orders were created for panel order [place] Draw.  Procedure                               Abnormality         Status                     ---------                               -----------         ------                     Extra Blue Top Tube[[id number]]                                                         Extra Red Top Tube[[id number]]                                                          Extra Green Top (Lithium...[[id number]]                                                 Extra Purple Top Tube[[id number]]                                                       Extra Green Top (Lithium...[[id number]]                                                   Please view results for these tests on the individual orders.   CBC with platelets differential    Narrative    The following orders were created for panel order CBC with platelets differential.  Procedure                               Abnormality         Status                     ---------                               -----------         ------                     CBC with platelets and d...[[id number]]  Abnormal            Final result                 Please view results for these tests on the individual orders.   Comprehensive metabolic panel   Result Value Ref Range    Sodium 140  133 - 144 mmol/L    Potassium 4.2 3.4 - 5.3 mmol/L    Chloride 107 94 - 109 mmol/L    Carbon Dioxide (CO2) 30 20 - 32 mmol/L    Anion Gap 3 3 - 14 mmol/L    Urea Nitrogen 23 7 - 30 mg/dL    Creatinine 0.87 0.66 - 1.25 mg/dL    Calcium 8.7 8.5 - 10.1 mg/dL    Glucose 99 70 - 99 mg/dL    Alkaline Phosphatase 73 40 - 150 U/L    AST 20 0 - 45 U/L    ALT 25 0 - 70 U/L    Protein Total 7.6 6.8 - 8.8 g/dL    Albumin 4.0 3.4 - 5.0 g/dL    Bilirubin Total 0.6 0.2 - 1.3 mg/dL    GFR Estimate 74 >60 mL/min/1.73m2   Lipase   Result Value Ref Range    Lipase 154 73 - 393 U/L   Lactic acid whole blood   Result Value Ref Range    Lactic Acid 1.6 0.7 - 2.0 mmol/L   CBC with platelets and differential   Result Value Ref Range    WBC Count 14.6 (H) 4.0 - 11.0 10e3/uL    RBC Count 4.99 4.40 - 5.90 10e6/uL    Hemoglobin 15.4 13.3 - 17.7 g/dL    Hematocrit 46.1 40.0 - 53.0 %    MCV 92 78 - 100 fL    MCH 30.9 26.5 - 33.0 pg    MCHC 33.4 31.5 - 36.5 g/dL    RDW 13.1 10.0 - 15.0 %    Platelet Count 242 150 - 450 10e3/uL    % Neutrophils 57 %    % Lymphocytes 32 %    % Monocytes 9 %    % Eosinophils 2 %    % Basophils 0 %    % Immature Granulocytes 0 %    NRBCs per 100 WBC 0 <1 /100    Absolute Neutrophils 8.2 1.6 - 8.3 10e3/uL    Absolute Lymphocytes 4.7 0.8 - 5.3 10e3/uL    Absolute Monocytes 1.3 0.0 - 1.3 10e3/uL    Absolute Eosinophils 0.3 0.0 - 0.7 10e3/uL    Absolute Basophils 0.1 0.0 - 0.2 10e3/uL    Absolute Immature Granulocytes 0.0 <=0.0 10e3/uL    Absolute NRBCs 0.0 10e3/uL   Asymptomatic COVID-19 Virus (Coronavirus) by PCR Nasopharyngeal    Specimen: Nasopharyngeal; Swab    Narrative    The following orders were created for panel order Asymptomatic COVID-19 Virus (Coronavirus) by PCR Nasopharyngeal.  Procedure                               Abnormality         Status                     ---------                               -----------         ------                     SARS-COV2 (COVID-19) Vir...[[id number]]  Normal               Final result                 Please view results for these tests on the individual orders.   SARS-COV2 (COVID-19) Virus RT-PCR    Specimen: Nasopharyngeal; Swab   Result Value Ref Range    SARS CoV2 PCR Negative Negative    Narrative    Testing was performed using the Xpert Xpress SARS-CoV-2 Assay on the   Cepheid Gene-Xpert Instrument Systems. Additional information about   this Emergency Use Authorization (EUA) assay can be found via the Lab   Guide. This test should be ordered for the detection of SARS-CoV-2 in   individuals who meet SARS-CoV-2 clinical and/or epidemiological   criteria. Test performance is unknown in asymptomatic patients. This   test is for in vitro diagnostic use under the FDA EUA for   laboratories certified under CLIA to perform high complexity testing.   This test has not been FDA cleared or approved. A negative result   does not rule out the presence of PCR inhibitors in the specimen or   target RNA in concentration below the limit of detection for the   assay. The possibility of a false negative should be considered if   the patient's recent exposure or clinical presentation suggests   COVID-19. This test was validated by [facility]. This laboratory is certified under the Clinical Laboratory Improve  ment Amendments (CLIA) as qualified to perform high complexity testing.   XR Chest Port 1 View    Narrative    PROCEDURE: XR CHEST PORT 1 VIEW 7/15/2021 6:58 PM    HISTORY: acute abd pain r/o perforation    COMPARISONS: 11/30/2020.    TECHNIQUE: Single view.    FINDINGS: Heart is stable in size. There is stable ectasia of the  aorta. There is some mild linear scar or atelectasis at the lung  bases. There is no confluent infiltrate or pleural effusion.    No free air is seen beneath the diaphragm.         Impression    IMPRESSION: Linear scar or atelectasis at the lung bases.    ZANE RIVERA MD         SYSTEM ID:  [id number]   CT Abdomen Pelvis w Contrast     Narrative    EXAMINATION: CT ABDOMEN PELVIS W CONTRAST, 7/15/2021 7:08 PM    TECHNIQUE:  Helical CT images from the lung bases through the  symphysis pubis were obtained  with IV contrast. Contrast dose: Isovue  300 (100 mL)    COMPARISON: none    HISTORY:    FINDINGS:    There is dependent atelectasis at the lung bases.    The liver is free of masses or biliary ductal enlargement. The  gallbladder has been removed.    The the spleen and pancreas appear normal.    The adrenal glands are normal.    The right and left kidneys are free of masses or hydronephrosis.    The periaortic lymph nodes are normal in caliber.    There is sigmoid diverticulitis without abscess.    In the pelvis the bladder and rectum appear normal. Prostate is  markedly enlarged.    Degenerative changes are present in the thoracic and lumbar spine      Impression    IMPRESSION: Sigmoid diverticulitis     NIKOLAS MUNOZ MD         SYSTEM ID:  [id number]   UA with Microscopic reflex to Culture    Specimen: Urine, Midstream   Result Value Ref Range    Color Urine Light Yellow Colorless, Straw, Light Yellow, Yellow    Appearance Urine Clear Clear    Glucose Urine Negative Negative mg/dL    Bilirubin Urine Negative Negative    Ketones Urine 10  (A) Negative mg/dL    Specific Gravity Urine 1.020 1.003 - 1.035    Blood Urine Moderate (A) Negative    pH Urine 6.0 4.7 - 8.0    Protein Albumin Urine 10  (A) Negative mg/dL    Urobilinogen Urine Normal Normal, 2.0 mg/dL    Nitrite Urine Negative Negative    Leukocyte Esterase Urine Negative Negative    Mucus Urine Present (A) None Seen /LPF    RBC Urine 98 (H) <=2 /HPF    WBC Urine 1 <=5 /HPF    Squamous Epithelials Urine <1 <=1 /HPF    Narrative    Urine Culture not indicated   CBC with platelets differential    Narrative    The following orders were created for panel order CBC with platelets differential.  Procedure                               Abnormality         Status                     ---------                                -----------         ------                     CBC with platelets and d...[[id number]]  Abnormal            Final result                 Please view results for these tests on the individual orders.   CBC with platelets and differential   Result Value Ref Range    WBC Count 9.2 4.0 - 11.0 10e3/uL    RBC Count 4.84 4.40 - 5.90 10e6/uL    Hemoglobin 14.9 13.3 - 17.7 g/dL    Hematocrit 44.4 40.0 - 53.0 %    MCV 92 78 - 100 fL    MCH 30.8 26.5 - 33.0 pg    MCHC 33.6 31.5 - 36.5 g/dL    RDW 12.8 10.0 - 15.0 %    Platelet Count 201 150 - 450 10e3/uL    % Neutrophils 86 %    % Lymphocytes 8 %    % Monocytes 6 %    % Eosinophils 0 %    % Basophils 0 %    % Immature Granulocytes 0 %    NRBCs per 100 WBC 0 <1 /100    Absolute Neutrophils 7.8 1.6 - 8.3 10e3/uL    Absolute Lymphocytes 0.7 (L) 0.8 - 5.3 10e3/uL    Absolute Monocytes 0.6 0.0 - 1.3 10e3/uL    Absolute Eosinophils 0.0 0.0 - 0.7 10e3/uL    Absolute Basophils 0.0 0.0 - 0.2 10e3/uL    Absolute Immature Granulocytes 0.0 <=0.0 10e3/uL    Absolute NRBCs 0.0 10e3/uL   Comprehensive metabolic panel   Result Value Ref Range    Sodium 138 133 - 144 mmol/L    Potassium 4.0 3.4 - 5.3 mmol/L    Chloride 107 94 - 109 mmol/L    Carbon Dioxide (CO2) 24 20 - 32 mmol/L    Anion Gap 7 3 - 14 mmol/L    Urea Nitrogen 19 7 - 30 mg/dL    Creatinine 0.73 0.66 - 1.25 mg/dL    Calcium 7.8 (L) 8.5 - 10.1 mg/dL    Glucose 145 (H) 70 - 99 mg/dL    Alkaline Phosphatase 56 40 - 150 U/L    AST 14 0 - 45 U/L    ALT 21 0 - 70 U/L    Protein Total 6.5 (L) 6.8 - 8.8 g/dL    Albumin 3.3 (L) 3.4 - 5.0 g/dL    Bilirubin Total 1.1 0.2 - 1.3 mg/dL    GFR Estimate 79 >60 mL/min/1.73m2   CRP inflammation   Result Value Ref Range    CRP Inflammation 57.8 (H) 0.0 - 8.0 mg/L

## 2021-07-16 NOTE — PLAN OF CARE
"[facility] Admission Note:    Patient admitted to 3216/3216-1 at approximately 2112 via cart accompanied by son from emergency room . Report received from SARA Edge in SBAR format at 2044 via telephone. Patient transferred to bed via slide board.. Patient is alert and oriented X 3, reports pain; rates at 6 on 0-10 scale.  Patient oriented to room, unit, hourly rounding, and plan of care. Explained admission packet and patient handbook with patient bill of rights brochure. Will continue to monitor and document as needed.     Inpatient Nursing criteria listed below was met:    Health care directives status obtained and documented: Yes    Patient identifies a surrogate decision maker: No If yes, who:N/A Contact Information:N/A     If initial lactic acid >2.0, repeat lactic acid drawn within one hour of arrival to unit: NA. If no, state reason: N/A    Clergy visit ordered if patient requests: N/A    Skin issues/needs documented: No and N/A    Isolation Patient: no Education given, correct sign in place and documentation row added to PCS:  No    Fall Prevention Yes: Care plan updated, education given and documented, sticker and magnet in place: Yes    Care Plan initiated: Yes    Education Documented (including assessment): Yes    Patient has discharge needs : No If yes, please explain:N/A    Patient alert and oriented, irritable and repeatedly stating \"I am going to be dead by morning.\" Reassurance provided. PRN Dilaudid given x1 for pain with decrease in pain reported. PRN Zofran given for nausea with relief. Patient resting comfortably. T-max 101.4, requested Tylenol, awaiting response.      Face to face report given with opportunity to observe patient.    Report given to SARA Oviedo RN   7/15/2021  11:21 PM    "

## 2021-07-16 NOTE — ED NOTES
Face to face report given with opportunity to observe patient.    Report given to SARA Cage RN   7/15/2021  7:10 PM

## 2021-07-16 NOTE — H&P
[facility]    History and Physical - Hospitalist Service       Date of Admission:  7/15/2021    Assessment & Plan      James Grant is a 89 year old male admitted on 7/15/2021. He presents with abdominal pain which started today after having BM. Denied fever, but spiked temperature in ED. Surgery consulted. Diagnosed with uncomplicated diverticulitis. No surgical case. Give ciprofloxacin and flagyl in ED. After he spiked fevers, I ordered zosyn and blood cultures (blood cultures will be drawn after antibiotics given).     Problem list:   1. Sigmoid diverticulitis. Will treat with zosyn. Surgery consulted (dr. KEVIN Chavez) by ED. NPO. Pain control.     2. HTN: hold BP meds for now.     3. CAD with remote MI: was taking aspirin and metoprolol 12.5 mg po daily. Will hold both. Telemetry. No recent ischemic symptoms.     4. BPH; monitor for urinary output    5. H/o of bladder cancer. Very remote and not an active problem.    6. Remote TIA. Will monitor in telemetry.      Diet: NPO for Medical/Clinical Reasons Except for: Ice Chips    DVT Prophylaxis: Pneumatic Compression Devices  Prater Catheter: Not present  Central Lines: None  Code Status: Full Code      Risk Factors Present on Admission              # Platelet Defect: home medication list includes an antiplatelet medication    # intrabdominal abscess    Disposition Plan   Expected discharge: will stay at least 3-4 days.  recommended to SNF or home. Not clear yet. He was living independently and was driving.  once infection treated, oral antibiotic regimen established. .     The patient's care was discussed with the on call team.     Houston Hughes MD  [facility]  Securely message with the Vocera Web Console (learn more here)  Text page via [email or web address] Paging/Directory      ______________________________________________________________________    Chief Complaint    Lower abdominal pain for a few hours.     History is obtained from the patient,  electronic health record, emergency department physician and patient's daughter    History of Present Illness   James Grant is a 89 year old male who livers independently, drives, has h/o remote MI, bladder cancer, BPH, HTN, GERD presented to ED with abdominal pain which started after having BM. Points to RLQ. Denies fever, chills, chest pain, dyspnea, dysuria, hematuria, hematochezia, palpitation, focal weakness. Admits feeling cold, nausea, abdominal pain. Thre rest is negative. 12 points of ROS obtained.   He spiked a fever in ED but was hemodynamically stable.   ED workup and results:   wBC 14.6 hg 15.4 chemistry - unremarkable and lactic acid 1.6.   CT abdomen showed uncomplicated diverticulitis.   Dr. Chavez consulted and he reviewed CT and stated that this is not a surgical case.       Review of Systems    10 points of ROS obtained. Pertinent positives and negatives included in HPI. The rest is negative.     Past Medical History    I have reviewed this patient's medical history and updated it with pertinent information if needed.   Past Medical History:   Diagnosis Date     Benign localized hyperplasia of prostate without urinary obstruction and other lower urinary tract symptoms (LUTS) 11/26/2010     CHD (coronary heart disease) 11/26/2010     Dyslipidemia 11/26/2010     GERD (gastroesophageal reflux disease) 11/26/2010     HTN (hypertension) 11/26/2010     Old myocardial infarction 11/26/2010     Other symptoms involving digestive system(787.99) 10/20/2006       Past Surgical History   I have reviewed this patient's surgical history and updated it with pertinent information if needed.  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       CHOLECYSTECTOMY       CYSTOSCOPY, FULGURATE BLEEDERS, EVACUATE CLOT(S), COMBINED N/A 2/17/2020    Procedure: Clot Evacuation;  Surgeon: Andrzej Olivia MD;  Location:  OR     CYSTOSCOPY, RETROGRADES, COMBINED Bilateral 10/22/2019     Procedure: Bilateral Retrograde Pyelograms;  Surgeon: Andrzej Olivia MD;  Location: [facility] OR     CYSTOSCOPY, TRANSURETHRAL RESECTION (TUR) TUMOR BLADDER, COMBINED N/A 10/22/2019    Procedure: Transurethral Resection of Bladder Tumor and transurethral resection of prostate and bladder stone removal;  Surgeon: Andrzej Olivia MD;  Location: [facility] OR     HERNIA REPAIR       left arm surgery         Social History   I have reviewed this patient's social history and updated it with pertinent information if needed.  Social History     Tobacco Use     Smoking status: Former Smoker     Packs/day: 1.00     Years: 13.00     Pack years: 13.00     Types: Cigarettes     Start date: 1949     Quit date: 1962     Years since quittin.9     Smokeless tobacco: Never Used   Substance Use Topics     Alcohol use: Not Currently     Comment: 3 Drinks (BEER & LIQUOR) ~ OCCASIONALLY (QUIT IN )     Drug use: No       Family History   I have reviewed this patient's family history and updated it with pertinent information if needed.  Family History   Problem Relation Age of Onset     Heart Failure Mother 86        Congestive - Cause of Death     Cerebrovascular Disease Father      C.A.D. Sister 91       Prior to Admission Medications   Prior to Admission Medications   Prescriptions Last Dose Informant Patient Reported? Taking?   NITROSTAT 0.4 MG sublingual tablet  Self No No   Sig: PLACE 1 TABLET (0.4 MG) UNDER THE TONGUE EVERY 5 MINUTES AS NEEDED   aspirin (ASA) 325 MG EC tablet 7/15/2021 at Unknown time Self Yes Yes   Sig: Take 162 mg by mouth daily   famotidine (PEPCID) 20 MG tablet 2021 at Unknown time  No Yes   Sig: TAKE ONE (1) TABLET BY MOUTH DAILY   losartan (COZAAR) 50 MG tablet 7/15/2021 at Unknown time  No Yes   Sig: TAKE 1 TABLET (50 MG) BY MOUTH DAILY COZAAR   metoprolol succinate ER (TOPROL-XL) 25 MG 24 hr tablet 2021 at Unknown time  No Yes   Sig: TAKE 1/2 TAB BY MOUTH AT BEDTIME. DO NOT CRUSH OR CHEW.    simvastatin (ZOCOR) 20 MG tablet 7/14/2021 at Unknown time  No Yes   Sig: Take 1 tablet (20 mg) by mouth At Bedtime      Facility-Administered Medications: None     Allergies   Allergies   Allergen Reactions     Lisinopril Cough     Morphine Other (See Comments) and Visual Disturbance     confusion       Physical Exam   Vital Signs: Temp: (!) 100.8  F (38.2  C) Temp src: Tympanic BP: 150/75 Pulse: 90   Resp: 14 SpO2: 94 % O2 Device: Nasal cannula Oxygen Delivery: 3 LPM  Weight: 190 lbs 0 oz    General Appearance: elderly man, looks uncomfortable, but not in acute distress.   Eyes: no icterus  HEENT: OP clear, mucous membranes moist  Respiratory: clear but diminished at bases.   Cardiovascular: regular, distant, no S3  GI: tender to superficial and deep palpation, mainly lower abdomen, both sides. Voluntary guarding. No signs of peritonitis.   Lymph/Hematologic: no LAD.  Genitourinary: Bladder not palpable.   Skin: warm to touch, no rash  Musculoskeletal: no joint deformity or effusion.   Neurologic: non-focal exam.   Psychiatric: anxious.     Data   Data reviewed today: I reviewed all medications, new labs and imaging results over the last 24 hours. I personally reviewed the EKG tracing showing NSR and the abdominal CT image(s) showing diverticulitis (CT reviewed by radiologist).    Recent Labs   Lab 07/15/21  1831   WBC 14.6*   HGB 15.4   MCV 92         POTASSIUM 4.2   CHLORIDE 107   CO2 30   BUN 23   CR 0.87   ANIONGAP 3   VANIA 8.7   GLC 99   ALBUMIN 4.0   PROTTOTAL 7.6   BILITOTAL 0.6   ALKPHOS 73   ALT 25   AST 20   LIPASE 154     Recent Results (from the past 24 hour(s))   CT Abdomen Pelvis w Contrast    Narrative    EXAMINATION: CT ABDOMEN PELVIS W CONTRAST, 7/15/2021 7:08 PM    TECHNIQUE:  Helical CT images from the lung bases through the  symphysis pubis were obtained  with IV contrast. Contrast dose: Isovue  300 (100 mL)    COMPARISON: none    HISTORY:    FINDINGS:    There is dependent  atelectasis at the lung bases.    The liver is free of masses or biliary ductal enlargement. The  gallbladder has been removed.    The the spleen and pancreas appear normal.    The adrenal glands are normal.    The right and left kidneys are free of masses or hydronephrosis.    The periaortic lymph nodes are normal in caliber.    There is sigmoid diverticulitis without abscess.    In the pelvis the bladder and rectum appear normal. Prostate is  markedly enlarged.    Degenerative changes are present in the thoracic and lumbar spine      Impression    IMPRESSION: Sigmoid diverticulitis     NIKOLAS MUNOZ MD         SYSTEM ID:  [id number]

## 2021-07-16 NOTE — PHARMACY
"[facility]    Pharmacy      Antimicrobial Stewardship Note     Current antimicrobial therapy:  Anti-infectives (From now, onward)    Start     Dose/Rate Route Frequency Ordered Stop    07/15/21 2130  piperacillin-tazobactam (ZOSYN) infusion 3.375 g     Note to Pharmacy: For [facility], [facility] and [facility]: For Zosyn-naive patients, use the \"Zosyn initial dose + extended infusion\" order panel.    3.375 g  100 mL/hr over 30 Minutes Intravenous EVERY 6 HOURS 07/15/21 2113            Indication: intra-abdominal infection    Days of Therapy: 2     Pertinent labs:  Creatinine   Creatinine   Date Value Ref Range Status   07/16/2021 0.73 0.66 - 1.25 mg/dL Final   07/15/2021 0.87 0.66 - 1.25 mg/dL Final   11/30/2020 0.82 0.66 - 1.25 mg/dL Final   02/18/2020 0.73 0.70 - 1.30 mg/dL Final   02/17/2020 0.87 0.70 - 1.30 mg/dL Final     WBC   WBC   Date Value Ref Range Status   11/30/2020 9.3 4.0 - 11.0 10e9/L Final   02/18/2020 10.5 4.0 - 11.0 10e9/L Final   02/17/2020 9.7 4.0 - 11.0 10e9/L Final     WBC Count   Date Value Ref Range Status   07/15/2021 9.2 4.0 - 11.0 10e3/uL Final   07/15/2021 14.6 (H) 4.0 - 11.0 10e3/uL Final     Procalcitonin   Procalcitonin   Date Value Ref Range Status   02/16/2019 <0.05 ng/ml Final     Comment:     <0.05 ng/ml  Normal  Recommendation: Very low risk of bacterial infection.   Discourage antibiotics unless strong clinical suspicion for serious infection.       CRP   CRP Inflammation   Date Value Ref Range Status   07/16/2021 57.8 (H) 0.0 - 8.0 mg/L Final   01/02/2017 <2.9 0.0 - 8.0 mg/L Final       Culture Results:   (7/15/21) Blood = in process  (7/15/21) COVID = negative     Recommendations/Interventions:  1. None at this time.    Fauzia Leach, [facility]  July 16, 2021        "

## 2021-07-16 NOTE — PLAN OF CARE
Prior to Admission Medication Reconciliation:     Medications added:   [x] None  [] As listed below:    Medications deleted:   [x] None  [] As listed below:    Medications marked for review/removal by attending:  [x] None  [] As listed below:    Changes made to existing medications:   [] None  [x] As listed below:    pepcid- pt takes PRN    Last times/dates taken verified with patient:  [] Yes- completed myself  [] Prepared PTA medlist for review only. (will not be available to review personally)  [] Did not review with patient. Rx verification only. Review completed by nursing.    [x] Nurse completed no changes made (double checked entries)  [] Unable to review with patient at this time:  [] Nurse completed/changes made:       Allergies listed at another location:  []Not applicable   []See below:    Allergy review:    []Did not review: reviewed by nursing  []Did not review: pt unable at this time  []Patient/MAR verified NKDA  []Patient/MAR verified current existing allergies: no changes made    Medication reconciliation sources:   [x]Patient  []Patient family member/emergency contact: **  [x][facility] Report Review:  2014  []Epic Chart Review  [x]Care Everywhere review  []Pharmacy med list: **  []Pharmacy phone call  [x]Outside meds dispense report: see below  []Nursing home or Assisted Living MAR:  []Other: **    Pharmacy desired at discharge: [facility]    Is patient on coumadin?  [x]No    Requests for consultation by provider or pharmacist:   [x] Patient understands why all of their meds were prescribed and how to take them. No questions.   [] Managing party has no questions.   [] Patient/ managing party has questions about the following:  [] Did not review with patient. Cannot assess.     Fill dates and reported compliancy:  [x] Fill dates coincide with compliancy for all/most maintenance meds.   [] Fill dates do not coincide with compliancy with maintenance meds. See notes in PTA medlist and in comments.     [] Fill dates do not coincide with the following medications but pt reports compliancy:  [] Did not review with patient. Cannot assess.     Historian accuracy:  [] Excellent- alert and oriented, understands why meds were prescribed and how to take, able to answer specifics  [x] Good- alert and oriented, understands why meds were prescribed and how to take, some confusion   [] Fair- alert and oriented, doesn't know medications without list, cannot answer specifics about medications, but has a decent process for which to take at home  [] Poor- does not know medications, may not have a process to take at home, may be cognitively unable to review at this time  []Medication management done by family member or facility, no concerns about historian accuracy.   [] Did not review with patient. Cannot assess.     Medication Management:  [x] Manages meds independently  [] Family member/ other party manages meds:  [] Meds managed by staff at facility  [] Meds set up by home care, family/other party helps administer  [] Meds set up by home care, self administers  [] Did not review with patient. Cannot assess.     Other medications aside from PTA:  [x] Denies taking any medications aside from those listed in PTA meds  [] Reports taking another medication(s) but cannot recall the name(s)  [] Refuses to say.  [] Did not review with patient. Cannot assess.     Comments: pt in quite a bit pf pain when I spoke to him but he named off his medications and stated time of day he takes at home.     Stacy Govea on 7/16/2021 at 7:43 AM       Discrepancies: [x] No []Not Applicable []Yes: listed below    Time spent on medication reconciliation:   [x]5-20 mins  []20-40 mins  []> 40 mins    Issues completing PTA medication reconciliation:  [] On hold for a long time  [] Waited for a call back  [] Fax didn't come through  [] Fax took a long time  [] Other:    Notifying appropriate party of changes/additions/discrepancies:  [x]No pertinent  changes made, notification not necessary.   [] Notified attending provider via text page/phone call  [] Notified attending provider in person  [] Notified pharmacy  [] Notified nurse  [] Attending provider not available, left detailed notes  [] Pt is not admitted to floor yet, PTA meds completed before admission.   Medications Prior to Admission   Medication Sig Dispense Refill Last Dose     aspirin (ASA) 325 MG EC tablet Take 162 mg by mouth daily   7/15/2021 at Unknown time     famotidine (PEPCID) 20 MG tablet TAKE ONE (1) TABLET BY MOUTH DAILY (Patient taking differently: Take 20 mg by mouth daily as needed ) 30 tablet 4 7/14/2021 at Unknown time     losartan (COZAAR) 50 MG tablet TAKE 1 TABLET (50 MG) BY MOUTH DAILY COZAAR 30 tablet 2 7/15/2021 at AM     metoprolol succinate ER (TOPROL-XL) 25 MG 24 hr tablet TAKE 1/2 TAB BY MOUTH AT BEDTIME. DO NOT CRUSH OR CHEW. 45 tablet 2 7/14/2021 at PM     simvastatin (ZOCOR) 20 MG tablet Take 1 tablet (20 mg) by mouth At Bedtime 90 tablet 0 7/14/2021 at PM     NITROSTAT 0.4 MG sublingual tablet PLACE 1 TABLET (0.4 MG) UNDER THE TONGUE EVERY 5 MINUTES AS NEEDED 25 tablet 3 Unknown at Unknown time         Medication Dispense History (from 7/16/2020 to 7/15/2021)  Expand All  Collapse All  Ciprofloxacin-Dexamethasone   Dispensed Days Supply Quantity Provider Pharmacy   Ciprodex 0.3 %-0.1 % ear drops,suspension 09/30/2020 18 7.5 mL [facility] [place]         Famotidine   Dispensed Days Supply Quantity Provider Pharmacy   famotidine 20 mg tablet 06/17/2021 30 30 Units [facility] [place]   famotidine 20 mg tablet 05/19/2021 30 30 Units [facility] [place]   famotidine 20 mg tablet 04/19/2021 30 30 Units [facility] [place]   famotidine 20 mg tablet 03/22/2021 30 30 Units [facility] [place]   famotidine 20 mg tablet 02/19/2021 30 30 Units [facility] [place]    famotidine 20 mg tablet 01/20/2021 30 30 Units [facility] [place]   famotidine 20 mg tablet 12/18/2020 30 30 Units [facility] [place]   famotidine 20 mg tablet 11/14/2020 30 30 Units [facility] [place]   famotidine 20 mg tablet 10/17/2020 30 30 Units [facility] [place]   famotidine 20 mg tablet 09/18/2020 30 30 Units [facility] [place]...   famotidine 20 mg tablet 08/10/2020 30 30 Units [facility] [place]...   famotidine 20 mg tablet 07/18/2020 30 30 Units [facility] [place]...         Losartan Potassium   Dispensed Days Supply [facility]   losartan 50 mg tablet 06/17/2021 30 30 Units [facility] [place]   losartan 50 mg tablet 05/19/2021 30 30 Units [facility] [place]   losartan 50 mg tablet 04/19/2021 30 30 Units [facility] [place]   losartan 50 mg tablet 03/23/2021 30 30 Units [facility] [place]...   losartan 50 mg tablet 02/19/2021 30 30 Units [facility] [place]...   losartan 50 mg tablet 01/20/2021 30 30 Units [facility] [place]   losartan 50 mg tablet 12/18/2020 30 30 Units [facility] [place]...   losartan 50 mg tablet 11/14/2020 30 30 Units [facility] [place]...   losartan 50 mg tablet 10/17/2020 30 30 Units [facility] [place]...   losartan 50 mg tablet 09/18/2020 30 30 Units [facility] [place]...   losartan 50 mg tablet 08/05/2020 30 30 Units [facility] [place]...         Metoprolol Succinate   Dispensed Days Supply Quantity Provider Pharmacy   metoprolol succinate ER 25 mg tablet,extended release 24 hr 06/17/2021 30 15 Units [facility] [place]   metoprolol succinate ER 25 mg tablet,extended release 24 hr 05/19/2021 30 15 Units [facility] [place]   metoprolol succinate ER 25 mg tablet,extended release 24 hr 04/19/2021 30 15 Units [facility] [place]   metoprolol succinate ER 25 mg tablet,extended release 24 hr 03/22/2021 30 15 Units [facility] [place]   metoprolol succinate ER 25 mg tablet,extended release 24 hr 02/19/2021 30 15 Units [facility] [place]   metoprolol succinate ER 25 mg tablet,extended release 24 hr 01/20/2021 30 15 Units [facility] [place]   metoprolol succinate ER 25 mg tablet,extended release 24 hr 12/18/2020 30 15 Units [facility] [place]   metoprolol succinate ER 25 mg tablet,extended release 24 hr 11/14/2020 30 15 Units [facility] [place]   metoprolol succinate ER 25 mg tablet,extended release 24 hr 10/17/2020 30 15 Units [facility] [place]   metoprolol succinate ER 25 mg tablet,extended release 24 hr 09/17/2020 30 15 Units [facility] [place]   metoprolol succinate ER 25 mg tablet,extended release 24 hr 08/05/2020 30 15 Units [facility] [place]         Simvastatin   Dispensed Days Supply [facility]   simvastatin 20 mg tablet 06/17/2021 30 30 Units [facility] [place]   simvastatin 20 mg tablet 05/20/2021 30 30 Units [facility] [place]   simvastatin 20 mg tablet 04/19/2021 30 30 Units [facility] [place]   simvastatin 20 mg tablet 03/22/2021 30 30 Units [facility] [place]   simvastatin 20 mg tablet 02/19/2021 30 30 Units [facility] [place]   simvastatin 20 mg tablet 01/20/2021 30 30 Units [facility] [place]   simvastatin 20 mg tablet 12/18/2020 30 30 Units [facility] [place]   simvastatin 20 mg tablet 11/17/2020 30 30 Units [facility] [place]   simvastatin 20 mg tablet  10/17/2020 30 30 [facility] [place]   simvastatin 20 mg tablet 09/17/2020 30 30 [facility] [place]   simvastatin 20 mg tablet 08/06/2020 30 30 Units [facility] [place]

## 2021-07-16 NOTE — PROGRESS NOTES
[facility]    Hospitalist Progress Note    Date of Service (when I saw the patient): 07/16/2021    Assessment & Plan   James Grant is a 89 year old male who was admitted on 7/15/2021.    1. Sigmoid diverticulitis.  Confirmed by CT.  Continue IV zosyn.  Patient having difficult to control pain.  Spoke with Dr. Lyle, he will see today. NPO. Pain control.      2. HTN: hold BP meds for now.      3. CAD with remote MI: was taking aspirin and metoprolol 12.5 mg po daily. Continue. Telemetry. No recent ischemic symptoms.      4. BPH; monitor for urinary output     5. H/o of bladder cancer. Very remote and not an active problem.     6. Remote TIA. Will monitor in telemetry.     DVT Prophylaxis: Pneumatic Compression Devices    Code Status: Full Code    Disposition: Expected discharge in 2-3 days once improved.    Angelica Gallegos MD      Interval History   Patient seen at bedside.  Patient still having quite a bit of pain despite subcu Dilaudid.    -Data reviewed today: I reviewed all new labs and imaging results over the last 24 hours. I personally reviewed imaging reports.    Physical Exam   Temp: (!) 101.2  F (38.4  C) Temp src: Tympanic BP: 144/72 Pulse: 83   Resp: 18 SpO2: (!) 91 % O2 Device: Nasal cannula Oxygen Delivery: 2 LPM  Vitals:    07/15/21 1814   Weight: 86.2 kg (190 lb)     Vital Signs with Ranges  Temp:  [99.2  F (37.3  C)-101.4  F (38.6  C)] 101.2  F (38.4  C)  Pulse:  [68-92] 83  Resp:  [12-24] 18  BP: (124-170)/(55-92) 144/72  SpO2:  [88 %-99 %] 91 %    Intake/Output Summary (Last 24 hours) at 7/16/2021 0844  Last data filed at 7/16/2021 0637  Gross per 24 hour   Intake 1111 ml   Output 625 ml   Net 486 ml       Peripheral IV 07/15/21 Anterior;Left Upper forearm (Active)   Site Assessment WDL 07/15/21 2345   Line Status Infusing 07/15/21 2345   Phlebitis Scale 0-->no symptoms 07/15/21 2345   Infiltration Scale 0 07/15/21 2345   Number of days: 1     No line/device    Constitutional -  AA, moderate distress with pain  HEENT - atraumatic, normocephalic  Neck - supple, no masses, no JVD  CVS - S1 S2 RRR, no murmurs, rubs, gallops  Respiratory - CTA b/l  GI -some distention, tender throughout but especially right lower and right abdomen  Musculoskeletal - no LE edema, no lesions  Neuro - oriented x 3, no gross focal deficits    Medications     lactated ringers 125 mL/hr at 07/16/21 0638       famotidine  20 mg Oral Daily     losartan  50 mg Oral Daily     metoprolol succinate ER  12.5 mg Oral At Bedtime     piperacillin-tazobactam  3.375 g Intravenous Q6H     simvastatin  20 mg Oral At Bedtime     sodium chloride (PF)  3 mL Intracatheter Q8H       Data   Recent Labs   Lab 07/16/21  0519 07/15/21  2134 07/15/21  1831   WBC  --  9.2 14.6*   HGB  --  14.9 15.4   MCV  --  92 92   PLT  --  201 242     --  140   POTASSIUM 4.0  --  4.2   CHLORIDE 107  --  107   CO2 24  --  30   BUN 19  --  23   CR 0.73  --  0.87   ANIONGAP 7  --  3   VANIA 7.8*  --  8.7   *  --  99   ALBUMIN 3.3*  --  4.0   PROTTOTAL 6.5*  --  7.6   BILITOTAL 1.1  --  0.6   ALKPHOS 56  --  73   ALT 21  --  25   AST 14  --  20   LIPASE  --   --  154     Lactic Acid   Date Value Ref Range Status   07/15/2021 1.6 0.7 - 2.0 mmol/L Final   02/16/2019 1.5 0.7 - 2.0 mmol/L Final   02/16/2019 2.6 (H) 0.7 - 2.0 mmol/L Final     Comment:     Critical Value called to and read back by  ROD SUN 1751 2  Critical Value called to and read back by  ROD Jc1 2/16/19 JU  Significant value called to and read back by   ROD SUN 1751 2/16/19 JU     08/23/2015 1.6 0.4 - 2.0 mmol/L Final       Recent Results (from the past 24 hour(s))   XR Chest Port 1 View    Narrative    PROCEDURE: XR CHEST PORT 1 VIEW 7/15/2021 6:58 PM    HISTORY: acute abd pain r/o perforation    COMPARISONS: 11/30/2020.    TECHNIQUE: Single view.    FINDINGS: Heart is stable in size. There is stable ectasia of the  aorta. There is some mild linear scar or  atelectasis at the lung  bases. There is no confluent infiltrate or pleural effusion.    No free air is seen beneath the diaphragm.         Impression    IMPRESSION: Linear scar or atelectasis at the lung bases.    ZANE RIVERA MD         SYSTEM ID:  [id number]   CT Abdomen Pelvis w Contrast    Narrative    EXAMINATION: CT ABDOMEN PELVIS W CONTRAST, 7/15/2021 7:08 PM    TECHNIQUE:  Helical CT images from the lung bases through the  symphysis pubis were obtained  with IV contrast. Contrast dose: Isovue  300 (100 mL)    COMPARISON: none    HISTORY:    FINDINGS:    There is dependent atelectasis at the lung bases.    The liver is free of masses or biliary ductal enlargement. The  gallbladder has been removed.    The the spleen and pancreas appear normal.    The adrenal glands are normal.    The right and left kidneys are free of masses or hydronephrosis.    The periaortic lymph nodes are normal in caliber.    There is sigmoid diverticulitis without abscess.    In the pelvis the bladder and rectum appear normal. Prostate is  markedly enlarged.    Degenerative changes are present in the thoracic and lumbar spine      Impression    IMPRESSION: Sigmoid diverticulitis     NIKOLAS MUNOZ MD         SYSTEM ID:  [id number]       Angelica Gallegos MD

## 2021-07-16 NOTE — PLAN OF CARE
"/56   Pulse 64   Temp 99.7  F (37.6  C) (Tympanic)   Resp 17   Ht 1.803 m (5' 11\")   Wt 86.2 kg (190 lb)   SpO2 95%   BMI 26.50 kg/m       Patient is lethargic and oriented, slept most of shift. Temp high of 100.1 this shift, down to 99.7 after cooling measures initiated. BP on lower end this shift. LS diminished throughout. Denies SOB. HRR. On tele: SR 70's per ICU report. BS hypoactive, tender to palpation. Patient c/o of 9/10 abd pain at beginning of shift an hour after PRN dilaudid was given. MD notified, new order for increased dosage of PRN dilaudid received. Patient now denies pain at rest. Reminded patient to reposition throughout shift, turns independently well. IV infusing LR at 125 mL/hr. IV Zosyn given q6H per order.    Face to face report given with opportunity to observe patient.    Report given to SARA Farley RN   7/16/2021  3:08 PM    "

## 2021-07-16 NOTE — PROGRESS NOTES
Assessment completed by visit with Adrian and son, Osbaldo.    LOC: alert, oriented     Dx: diverticulitis   Chronic Disease Management: HTN, bladder cancer-history, history of TIA     Lives with: alone  Living at:  home  Transportation: YES Independent     Primary PCP: Broderick Flowers  Insurance:  Medicare and BCBS      Support System:  family  Homecare/PCA: not connected   /County Services:   Not connected  [place]: YES [employer]     How was the [facility] notification completed: declined notification    Health Care Directive: no  Guardian:  no  POA: no    Pharmacy: [facility]  Meds management: independently manages    Adequate Resources for needs (housing, utilities, food/med): YES  Household chores: independently manages  Work/community/social activity: YES as desired     ADLs: independently manages  Ambulation:independent   Falls: denies  Nutrition: no concerns   Sleep: not addressed     Equipment used: none      Oxygen supplier: n/a      Does the supplier have valid oxygen orders: n/a    Mental health: denies   Substance abuse: denies   Exposure to violence/abuse: no concerns voiced/identified  Stressors: no concerns voiced/identified    Able to Return to Prior Living Arrangements: TBD; open to short term rehab if necessary    Choice of Vendor: TBD    Barriers: no barriers identified      SONIA: low    Plan: goal would be to return home but will depend on hospital course.

## 2021-07-16 NOTE — PLAN OF CARE
"Reason for hospital stay:  Diverticulitis   Living situation PTA: Home  Most recent vitals: /72   Pulse 83   Temp (!) 101.2  F (38.4  C) (Tympanic)   Resp 18   Ht 1.803 m (5' 11\")   Wt 86.2 kg (190 lb)   SpO2 (!) 91%   BMI 26.50 kg/m      Pain Management:  Administered  subcutaneous 0.3 mg Dilaudid per MAR x2, and PRN Tylenol 975 mg for fever. continuous pain rating 5/10.  LOC:  A&O  Cardiac:  Per ICU RN Report Sheet Tele :  Respiratory:  Denies SOB, remains on 2LPM  GI:  No BM this shift  :  Soaked bed 1x this shift, changed all bedding and gown plus 300 ml urine output in urinal.   Skin Issues:  WNL  IVF:  Infusing  ml/ hr.   ABX:  Administered IV Zosyn per MAR    Nutrition: NPO except Ice Chips   ADL's:  A2  Ambulation: Strict Bedrest   Safety: Call button remains in reach, calls appropriatley and makes needs known. Bed in lowest position, alarms on and audible.     Face to face report given with opportunity to observe patient.      Face to face report given with opportunity to observe patient.    Report given to SARA Reeder RN   7/16/2021  7:21 AM        "

## 2021-07-16 NOTE — ED NOTES
"Pt's son came to nurses' station and stated \"My dad said he is going to die tomorrow, so someone needs to go and help him.\" Offgoing RN Noreen aware; oncoming RN Kely aware.  "

## 2021-07-17 LAB
ANION GAP SERPL CALCULATED.3IONS-SCNC: 5 MMOL/L (ref 3–14)
BUN SERPL-MCNC: 21 MG/DL (ref 7–30)
CALCIUM SERPL-MCNC: 7.8 MG/DL (ref 8.5–10.1)
CHLORIDE BLD-SCNC: 108 MMOL/L (ref 94–109)
CO2 SERPL-SCNC: 25 MMOL/L (ref 20–32)
CREAT SERPL-MCNC: 0.82 MG/DL (ref 0.66–1.25)
CRP SERPL-MCNC: 188 MG/L (ref 0–8)
ERYTHROCYTE [DISTWIDTH] IN BLOOD BY AUTOMATED COUNT: 13.4 % (ref 10–15)
GFR SERPL CREATININE-BSD FRML MDRD: 76 ML/MIN/1.73M2
GLUCOSE BLD-MCNC: 120 MG/DL (ref 70–99)
GLUCOSE BLDC GLUCOMTR-MCNC: 125 MG/DL (ref 70–99)
HCT VFR BLD AUTO: 39.6 % (ref 40–53)
HGB BLD-MCNC: 13.1 G/DL (ref 13.3–17.7)
MCH RBC QN AUTO: 30.6 PG (ref 26.5–33)
MCHC RBC AUTO-ENTMCNC: 33.1 G/DL (ref 31.5–36.5)
MCV RBC AUTO: 93 FL (ref 78–100)
PLATELET # BLD AUTO: 175 10E3/UL (ref 150–450)
POTASSIUM BLD-SCNC: 3.7 MMOL/L (ref 3.4–5.3)
RBC # BLD AUTO: 4.28 10E6/UL (ref 4.4–5.9)
SODIUM SERPL-SCNC: 138 MMOL/L (ref 133–144)
WBC # BLD AUTO: 14.7 10E3/UL (ref 4–11)

## 2021-07-17 PROCEDURE — 85014 HEMATOCRIT: CPT | Performed by: INTERNAL MEDICINE

## 2021-07-17 PROCEDURE — 250N000011 HC RX IP 250 OP 636: Performed by: INTERNAL MEDICINE

## 2021-07-17 PROCEDURE — 86140 C-REACTIVE PROTEIN: CPT | Performed by: INTERNAL MEDICINE

## 2021-07-17 PROCEDURE — 120N000001 HC R&B MED SURG/OB

## 2021-07-17 PROCEDURE — 250N000013 HC RX MED GY IP 250 OP 250 PS 637: Performed by: INTERNAL MEDICINE

## 2021-07-17 PROCEDURE — 99232 SBSQ HOSP IP/OBS MODERATE 35: CPT | Performed by: INTERNAL MEDICINE

## 2021-07-17 PROCEDURE — 36415 COLL VENOUS BLD VENIPUNCTURE: CPT | Performed by: INTERNAL MEDICINE

## 2021-07-17 PROCEDURE — 80048 BASIC METABOLIC PNL TOTAL CA: CPT | Performed by: INTERNAL MEDICINE

## 2021-07-17 PROCEDURE — 258N000003 HC RX IP 258 OP 636: Performed by: INTERNAL MEDICINE

## 2021-07-17 RX ORDER — PROCHLORPERAZINE 25 MG
12.5 SUPPOSITORY, RECTAL RECTAL EVERY 12 HOURS PRN
Status: DISCONTINUED | OUTPATIENT
Start: 2021-07-17 | End: 2021-07-28 | Stop reason: HOSPADM

## 2021-07-17 RX ORDER — PROCHLORPERAZINE MALEATE 5 MG
5 TABLET ORAL EVERY 6 HOURS PRN
Status: DISCONTINUED | OUTPATIENT
Start: 2021-07-17 | End: 2021-07-28 | Stop reason: HOSPADM

## 2021-07-17 RX ADMIN — SODIUM CHLORIDE, POTASSIUM CHLORIDE, SODIUM LACTATE AND CALCIUM CHLORIDE: 600; 310; 30; 20 INJECTION, SOLUTION INTRAVENOUS at 00:24

## 2021-07-17 RX ADMIN — HYDROMORPHONE HYDROCHLORIDE 0.5 MG: 1 INJECTION, SOLUTION INTRAMUSCULAR; INTRAVENOUS; SUBCUTANEOUS at 03:51

## 2021-07-17 RX ADMIN — TAZOBACTAM SODIUM AND PIPERACILLIN SODIUM 3.38 G: 375; 3 INJECTION, SOLUTION INTRAVENOUS at 15:57

## 2021-07-17 RX ADMIN — SIMVASTATIN 20 MG: 10 TABLET, FILM COATED ORAL at 21:16

## 2021-07-17 RX ADMIN — PROCHLORPERAZINE EDISYLATE 5 MG: 5 INJECTION INTRAMUSCULAR; INTRAVENOUS at 10:16

## 2021-07-17 RX ADMIN — HYDROMORPHONE HYDROCHLORIDE 1 MG: 1 INJECTION, SOLUTION INTRAMUSCULAR; INTRAVENOUS; SUBCUTANEOUS at 07:04

## 2021-07-17 RX ADMIN — TAZOBACTAM SODIUM AND PIPERACILLIN SODIUM 3.38 G: 375; 3 INJECTION, SOLUTION INTRAVENOUS at 21:20

## 2021-07-17 RX ADMIN — SODIUM CHLORIDE, POTASSIUM CHLORIDE, SODIUM LACTATE AND CALCIUM CHLORIDE: 600; 310; 30; 20 INJECTION, SOLUTION INTRAVENOUS at 09:38

## 2021-07-17 RX ADMIN — FAMOTIDINE 20 MG: 20 TABLET ORAL at 09:00

## 2021-07-17 RX ADMIN — METOPROLOL SUCCINATE 12.5 MG: 25 TABLET, EXTENDED RELEASE ORAL at 21:16

## 2021-07-17 RX ADMIN — TAZOBACTAM SODIUM AND PIPERACILLIN SODIUM 3.38 G: 375; 3 INJECTION, SOLUTION INTRAVENOUS at 09:50

## 2021-07-17 RX ADMIN — LOSARTAN POTASSIUM 50 MG: 50 TABLET, FILM COATED ORAL at 09:00

## 2021-07-17 RX ADMIN — ONDANSETRON 4 MG: 2 INJECTION INTRAMUSCULAR; INTRAVENOUS at 08:06

## 2021-07-17 RX ADMIN — ONDANSETRON 4 MG: 2 INJECTION INTRAMUSCULAR; INTRAVENOUS at 16:03

## 2021-07-17 RX ADMIN — ACETAMINOPHEN 975 MG: 325 TABLET, FILM COATED ORAL at 16:03

## 2021-07-17 RX ADMIN — TAZOBACTAM SODIUM AND PIPERACILLIN SODIUM 3.38 G: 375; 3 INJECTION, SOLUTION INTRAVENOUS at 03:12

## 2021-07-17 ASSESSMENT — ACTIVITIES OF DAILY LIVING (ADL)
ADLS_ACUITY_SCORE: 18
ADLS_ACUITY_SCORE: 20

## 2021-07-17 NOTE — PROGRESS NOTES
[facility]  Hospitalist Progress Note          Assessment and Plan:   James Grant is a 89 year old male who was admitted on 7/15/2021.     1. Sigmoid diverticulitis.  Confirmed by CT.  Continue IV Zosyn.  Patient having difficult to control pain. Surgery following. NPO. Pain control.      2. HTN: hold BP meds for now.      3. CAD with remote MI: was taking aspirin and metoprolol 12.5 mg po daily. Continue. Telemetry. No recent ischemic symptoms.      4. BPH; monitor for urinary output     5. H/o of bladder cancer. Very remote and not an active problem.     6. Remote TIA. Will monitor in telemetry.      DVT Prophylaxis: Pneumatic Compression Devices     Code Status: Full Code     Disposition: Expected discharge in 2-3 days once improved.           Interval History:   Patient remains in bed, reports ongoing pain.  Vitals remains stable.                Medications:       famotidine  20 mg Oral Daily     losartan  50 mg Oral Daily     metoprolol succinate ER  12.5 mg Oral At Bedtime     piperacillin-tazobactam  3.375 g Intravenous Q6H     simvastatin  20 mg Oral At Bedtime     sodium chloride (PF)  3 mL Intracatheter Q8H                  Physical Exam:     Vitals:    21 1216 21 1543 21 2114 21 0021   BP: 107/56 (!) 96/49 128/59 111/55   BP Location:  Right arm Right arm Right arm   Pulse: 64 67 71 67   Resp:    Temp: 99.7  F (37.6  C) 99.3  F (37.4  C) 99.4  F (37.4  C) 99.1  F (37.3  C)   TempSrc: Tympanic Tympanic Tympanic Tympanic   SpO2: 95% 92%  93%   Weight:       Height:             Vital Sign Ranges  Temperature Temp  Av.6  F (37.6  C)  Min: 99.1  F (37.3  C)  Max: 100.3  F (37.9  C)   Blood pressure Systolic (24hrs), Av , Min:96 , Max:128        Diastolic (24hrs), Av, Min:49, Max:59      Pulse Pulse  Av.4  Min: 64  Max: 73   Respirations Resp  Av.6  Min: 16  Max: 18   Pulse oximetry SpO2  Av %  Min: 92 %  Max: 95 %          Intake/Output Summary (Last 24 hours) at 7/17/2021 0802  Last data filed at 7/17/2021 0656  Gross per 24 hour   Intake 1845 ml   Output 650 ml   Net 1195 ml     Constitutional - AA, moderate distress with pain  HEENT - atraumatic, normocephalic  Neck - supple, no masses, no JVD  CVS - S1 S2 RRR, no murmurs, rubs, gallops  Respiratory - CTA b/l  GI -some distention, tender throughout but especially right lower and right abdomen  Musculoskeletal - no LE edema, no lesions  Neuro - oriented x 3, no gross focal deficits    Peripheral IV 07/15/21 Anterior;Left Upper forearm (Active)   Site Assessment WDL 07/17/21 0026   Line Status Infusing 07/17/21 0026   Phlebitis Scale 0-->no symptoms 07/17/21 0026   Infiltration Scale 0 07/17/21 0026   Number of days: 2     No line/device             Data:     Lab Results   Component Value Date    WBC 14.7 (H) 07/17/2021    HGB 13.1 (L) 07/17/2021    HCT 39.6 (L) 07/17/2021     07/17/2021     07/17/2021    POTASSIUM 3.7 07/17/2021    CHLORIDE 108 07/17/2021    CO2 25 07/17/2021    BUN 21 07/17/2021    CR 0.82 07/17/2021     (H) 07/17/2021    NTBNPI 110 01/18/2016    TROPI <0.015 11/30/2020    TROPN 0.06 02/08/2014    AST 14 07/16/2021    ALT 21 07/16/2021    ALKPHOS 56 07/16/2021    BILITOTAL 1.1 07/16/2021    INR 1.03 11/30/2020     Lactic Acid   Date Value Ref Range Status   07/15/2021 1.6 0.7 - 2.0 mmol/L Final   02/16/2019 1.5 0.7 - 2.0 mmol/L Final   02/16/2019 2.6 (H) 0.7 - 2.0 mmol/L Final     Comment:     Critical Value called to and read back by  ROD SUN 1751 2  Critical Value called to and read back by  ROD SUN 1751 2/16/19 JU  Significant value called to and read back by   ROD SUN 1751 2/16/19 JU     08/23/2015 1.6 0.4 - 2.0 mmol/L Final       Larry Quezada, DO

## 2021-07-17 NOTE — PLAN OF CARE
"Reason for hospital stay: Diverticulitis   Living situation PTA: Home   Most recent vitals: /55 (BP Location: Right arm)   Pulse 67   Temp 99.1  F (37.3  C) (Tympanic)   Resp 16   Ht 1.803 m (5' 11\")   Wt 86.2 kg (190 lb)   SpO2 93%   BMI 26.50 kg/m      Pain Management:  2x PRN doses of IVP Dilaudid given t/o shft. Encouraged pt to inform staff when pain is starting to increase so that we can give PRNs accordingly and not try to catch up. States that he is nervous of becoming addicted, educated with acute care setting and pain control importance.   LOC:  A&Ox4  Cardiac:  Tele: SR 60s  Respiratory:  Denies SOB, remains on O2@2LPM via NC   GI:  No BM this shift   :  Intermittent incontinence   Skin Issues:  WNL  IVF:  LR infusing 125 ml/ hr   ABX:  Administered IV Zosyn per MAR  Nutrition: NPO except Ice Chips  ADL's:  A2  Ambulation: Not OOB this shift  Safety:  Call button within reach, calls appropriately and makes needs known. Bed in lowest position. Room door remains open and room is located near RN station.    Face to face report given with opportunity to observe patient.    Report given to SARA Reeder RN   7/17/2021  7:31 AM       "

## 2021-07-17 NOTE — PROGRESS NOTES
GLORIA RODRIGUEZ  Patient visit during  rounds.  Patient was sleeping but son was present.  He noted his father is [employer] but did not want his  contacted at this time.

## 2021-07-17 NOTE — PLAN OF CARE
"/84 (BP Location: Right arm)   Pulse 71   Temp 99.1  F (37.3  C) (Tympanic)   Resp 18   Ht 1.803 m (5' 11\")   Wt 86.2 kg (190 lb)   SpO2 92%   BMI 26.50 kg/m       Patient is A&O, does not make needs known requires frequent rounding to assess needs. VSS, afebrile. Pt denies further pain after PRN dilaudid given by nightshift at shift change. C/o nausea at 0800, IV zofran given. No relief of nausea resulting in emesis episode. MD notified and order obtained for IV compazine. Relief of nausea after compazine given. BS hypoactive, tender to palpation. LS dim, remains on 2L O2 with O2 sats low 90's. HRR. On tele: SR 70's. IV  mL/hr. IV Zosyn given q6h. Bed alarm active, call light within reach.     Face to face report given with opportunity to observe patient.    Report given to [place]. SARA Mckay RN   7/17/2021  3:01 PM    "

## 2021-07-17 NOTE — PLAN OF CARE
BS hypoactive x 4.  Abdomen tender.  Rated abdominal pain 5/10, IV dilaudid 0.5 mg given x 1, decrease in pain.  Pt was premedicated with IV Zofran prior to IV dilaudid, due to pain medication causing nausea earlier, good results.  2 LPM NC, SPO2 92%.  Turned and repositioned as needed.     Face to face report given with opportunity to observe patient.    Report given to SARA Espinosa.     Martine Torres RN   7/16/2021  11:06 PM

## 2021-07-17 NOTE — PROVIDER NOTIFICATION
MD notified of patient vomiting moderate amount an hour after IV zofran given for nausea. Order obtained for IV compazine.

## 2021-07-17 NOTE — PHARMACY-MEDICATION REGIMEN REVIEW
"Pharmacy Antimicrobial Stewardship Assessment     Current Antimicrobial Therapy:  Anti-infectives (From now, onward)    Start     Dose/Rate Route Frequency Ordered Stop    07/15/21 2130  piperacillin-tazobactam (ZOSYN) infusion 3.375 g     Note to Pharmacy: For [facility], [facility] and [facility]: For Zosyn-naive patients, use the \"Zosyn initial dose + extended infusion\" order panel.    3.375 g  100 mL/hr over 30 Minutes Intravenous EVERY 6 HOURS 07/15/21 2113            Indication: Intra-abdominal infection    Days of Therapy: 3     Pertinent Labs:  Final     WBC Count   Date Value Ref Range Status   07/17/2021 14.7 (H) 4.0 - 11.0 10e3/uL Final   07/15/2021 9.2 4.0 - 11.0 10e3/uL Final   07/15/2021 14.6 (H) 4.0 - 11.0 10e3/uL Final       CRP Inflammation   Date Value Ref Range Status   07/17/2021 188.0 (H) 0.0 - 8.0 mg/L Final   07/16/2021 57.8 (H) 0.0 - 8.0 mg/L Final       Culture Results:   (7/15/21) Blood  (7/15/21) COVID = negative     Recommendations/Interventions:  1. None    Kaiden Jesus, [facility]  July 17, 2021    "

## 2021-07-18 LAB
ANION GAP SERPL CALCULATED.3IONS-SCNC: 5 MMOL/L (ref 3–14)
BASOPHILS # BLD AUTO: 0 10E3/UL (ref 0–0.2)
BASOPHILS NFR BLD AUTO: 0 %
BUN SERPL-MCNC: 20 MG/DL (ref 7–30)
CALCIUM SERPL-MCNC: 7.9 MG/DL (ref 8.5–10.1)
CHLORIDE BLD-SCNC: 106 MMOL/L (ref 94–109)
CO2 SERPL-SCNC: 26 MMOL/L (ref 20–32)
CREAT SERPL-MCNC: 0.69 MG/DL (ref 0.66–1.25)
EOSINOPHIL # BLD AUTO: 0 10E3/UL (ref 0–0.7)
EOSINOPHIL NFR BLD AUTO: 0 %
ERYTHROCYTE [DISTWIDTH] IN BLOOD BY AUTOMATED COUNT: 13.2 % (ref 10–15)
GFR SERPL CREATININE-BSD FRML MDRD: 81 ML/MIN/1.73M2
GLUCOSE BLD-MCNC: 116 MG/DL (ref 70–99)
HCT VFR BLD AUTO: 41.4 % (ref 40–53)
HGB BLD-MCNC: 14.2 G/DL (ref 13.3–17.7)
IMM GRANULOCYTES # BLD: 0.2 10E3/UL
IMM GRANULOCYTES NFR BLD: 1 %
LYMPHOCYTES # BLD AUTO: 1.4 10E3/UL (ref 0.8–5.3)
LYMPHOCYTES NFR BLD AUTO: 10 %
MCH RBC QN AUTO: 31.3 PG (ref 26.5–33)
MCHC RBC AUTO-ENTMCNC: 34.3 G/DL (ref 31.5–36.5)
MCV RBC AUTO: 91 FL (ref 78–100)
MONOCYTES # BLD AUTO: 0.9 10E3/UL (ref 0–1.3)
MONOCYTES NFR BLD AUTO: 6 %
NEUTROPHILS # BLD AUTO: 12.7 10E3/UL (ref 1.6–8.3)
NEUTROPHILS NFR BLD AUTO: 83 %
NRBC # BLD AUTO: 0 10E3/UL
NRBC BLD AUTO-RTO: 0 /100
PLATELET # BLD AUTO: 194 10E3/UL (ref 150–450)
POTASSIUM BLD-SCNC: 3.5 MMOL/L (ref 3.4–5.3)
RBC # BLD AUTO: 4.53 10E6/UL (ref 4.4–5.9)
SODIUM SERPL-SCNC: 137 MMOL/L (ref 133–144)
WBC # BLD AUTO: 15.2 10E3/UL (ref 4–11)

## 2021-07-18 PROCEDURE — 85025 COMPLETE CBC W/AUTO DIFF WBC: CPT | Performed by: INTERNAL MEDICINE

## 2021-07-18 PROCEDURE — 99232 SBSQ HOSP IP/OBS MODERATE 35: CPT | Performed by: SURGERY

## 2021-07-18 PROCEDURE — 99232 SBSQ HOSP IP/OBS MODERATE 35: CPT | Performed by: INTERNAL MEDICINE

## 2021-07-18 PROCEDURE — 250N000013 HC RX MED GY IP 250 OP 250 PS 637: Performed by: INTERNAL MEDICINE

## 2021-07-18 PROCEDURE — 80048 BASIC METABOLIC PNL TOTAL CA: CPT | Performed by: INTERNAL MEDICINE

## 2021-07-18 PROCEDURE — 250N000011 HC RX IP 250 OP 636: Performed by: INTERNAL MEDICINE

## 2021-07-18 PROCEDURE — 36415 COLL VENOUS BLD VENIPUNCTURE: CPT | Performed by: INTERNAL MEDICINE

## 2021-07-18 PROCEDURE — 120N000001 HC R&B MED SURG/OB

## 2021-07-18 RX ADMIN — LOSARTAN POTASSIUM 50 MG: 50 TABLET, FILM COATED ORAL at 09:19

## 2021-07-18 RX ADMIN — FAMOTIDINE 20 MG: 20 TABLET ORAL at 09:19

## 2021-07-18 RX ADMIN — TAZOBACTAM SODIUM AND PIPERACILLIN SODIUM 3.38 G: 375; 3 INJECTION, SOLUTION INTRAVENOUS at 15:37

## 2021-07-18 RX ADMIN — ACETAMINOPHEN 975 MG: 325 TABLET, FILM COATED ORAL at 06:19

## 2021-07-18 RX ADMIN — PROCHLORPERAZINE EDISYLATE 5 MG: 5 INJECTION INTRAMUSCULAR; INTRAVENOUS at 19:34

## 2021-07-18 RX ADMIN — HYDROMORPHONE HYDROCHLORIDE 0.5 MG: 1 INJECTION, SOLUTION INTRAMUSCULAR; INTRAVENOUS; SUBCUTANEOUS at 19:28

## 2021-07-18 RX ADMIN — TAZOBACTAM SODIUM AND PIPERACILLIN SODIUM 3.38 G: 375; 3 INJECTION, SOLUTION INTRAVENOUS at 21:12

## 2021-07-18 RX ADMIN — TAZOBACTAM SODIUM AND PIPERACILLIN SODIUM 3.38 G: 375; 3 INJECTION, SOLUTION INTRAVENOUS at 03:58

## 2021-07-18 RX ADMIN — ENOXAPARIN SODIUM 40 MG: 40 INJECTION SUBCUTANEOUS at 08:05

## 2021-07-18 RX ADMIN — ONDANSETRON 4 MG: 2 INJECTION INTRAMUSCULAR; INTRAVENOUS at 15:42

## 2021-07-18 RX ADMIN — METOPROLOL SUCCINATE 12.5 MG: 25 TABLET, EXTENDED RELEASE ORAL at 21:12

## 2021-07-18 RX ADMIN — HYDROMORPHONE HYDROCHLORIDE 0.5 MG: 1 INJECTION, SOLUTION INTRAMUSCULAR; INTRAVENOUS; SUBCUTANEOUS at 03:12

## 2021-07-18 RX ADMIN — SIMVASTATIN 20 MG: 10 TABLET, FILM COATED ORAL at 21:12

## 2021-07-18 RX ADMIN — ONDANSETRON 4 MG: 2 INJECTION INTRAMUSCULAR; INTRAVENOUS at 03:08

## 2021-07-18 RX ADMIN — TAZOBACTAM SODIUM AND PIPERACILLIN SODIUM 3.38 G: 375; 3 INJECTION, SOLUTION INTRAVENOUS at 10:02

## 2021-07-18 ASSESSMENT — ACTIVITIES OF DAILY LIVING (ADL)
ADLS_ACUITY_SCORE: 18

## 2021-07-18 NOTE — PLAN OF CARE
"/78 (BP Location: Left arm)   Pulse 65   Temp 100.2  F (37.9  C) (Tympanic)   Resp 16   Ht 1.803 m (5' 11\")   Wt 86.2 kg (190 lb)   SpO2 93%   BMI 26.50 kg/m       Pt is alert and oriented. Low-grade temp of 100.2 this shift, blankets removed and fan turned on. Denies pain at rest. Abd distended, soft, tender to palpation. BS hypoactive. Pt remains NPO. LS dim throughout. HRR. On tele: SR 60's per ICU report. IV  mL/hr. IV Zosyn given per MAR. Call light within reach, room near nurses station.    Face to face report given with opportunity to observe patient.    Report given to SARA Talamantes RN   7/18/2021  3:29 PM    "

## 2021-07-18 NOTE — PHARMACY-MEDICATION REGIMEN REVIEW
"Pharmacy Antimicrobial Stewardship Assessment     Current Antimicrobial Therapy:  Anti-infectives (From now, onward)    Start     Dose/Rate Route Frequency Ordered Stop    07/15/21 2130  piperacillin-tazobactam (ZOSYN) infusion 3.375 g     Note to Pharmacy: For [facility], [facility] and [facility]: For Zosyn-naive patients, use the \"Zosyn initial dose + extended infusion\" order panel.    3.375 g  100 mL/hr over 30 Minutes Intravenous EVERY 6 HOURS 07/15/21 2113            Indication: Intra-abdominal infection     Days of Therapy: 4        Pertinent Labs:  Final     WBC Count   Date Value Ref Range Status   07/18/2021 15.2 (H) 4.0 - 11.0 10e3/uL Final   07/17/2021 14.7 (H) 4.0 - 11.0 10e3/uL Final   07/15/2021 9.2 4.0 - 11.0 10e3/uL Final       CRP Inflammation   Date Value Ref Range Status   07/17/2021 188.0 (H) 0.0 - 8.0 mg/L Final   07/16/2021 57.8 (H) 0.0 - 8.0 mg/L Final   Final       Culture Results:   (7/15/21) Blood  (7/15/21) COVID = negative     Recommendations/Interventions:  1. None    Kaiden Jesus, [facility]  July 18, 2021    "

## 2021-07-18 NOTE — PLAN OF CARE
"Most recent vitals: /70 (BP Location: Right arm)   Pulse 70   Temp 99.5  F (37.5  C) (Tympanic)   Resp 18   Ht 1.803 m (5' 11\")   Wt 86.2 kg (190 lb)   SpO2 93%   BMI 26.50 kg/m      Alert and oriented, does not make needs known very well. Complains of 5/10 pain, tylenol given with effective relief. Complains of nausea, zofran given with effective relief. Abdomen is soft and tender. Bowel sounds hyperactive. Lungs clear and diminished bilaterally. Remains on 2L of oxygen, sats in the low 90s. LR running at 125 ml/hr. Zosyn given x2 per order. NPO. Remains on bedrest. Alarms active.    Face to face report given with opportunity to observe patient.    Report given to Jesús Gallagher RN   7/17/2021  11:23 PM      "

## 2021-07-18 NOTE — PROGRESS NOTES
[facility]  Hospitalist Progress Note          Assessment and Plan:   James Grant is a 89 year old male who was admitted on 7/15/2021.     1. Sigmoid diverticulitis.  Confirmed by CT.  Continue IV Zosyn.  Patient having difficult to control pain. Surgery following. NPO. Pain control.      2. HTN: hold BP meds for now, but BP higher today.  Will monitor.        3. CAD with remote MI: was taking aspirin and metoprolol 12.5 mg po daily. Continue telemetry. No recent ischemic symptoms.      4. BPH; monitor for urinary output     5. H/o of bladder cancer. Very remote and not an active problem.     6. Remote TIA. Will monitor in telemetry.      DVT Prophylaxis: Lovenox     Code Status: Full Code     Disposition: Expected discharge in 2-3 days once improved.                    Interval History:   Patient remains stable overnight.  No BMs.  He reports his seems to be improving.                Medications:       famotidine  20 mg Oral Daily     losartan  50 mg Oral Daily     metoprolol succinate ER  12.5 mg Oral At Bedtime     piperacillin-tazobactam  3.375 g Intravenous Q6H     simvastatin  20 mg Oral At Bedtime     sodium chloride (PF)  3 mL Intracatheter Q8H                  Physical Exam:     Vitals:    21 1556 21 2109 21 2342 21 0618   BP: 146/72 133/70 150/74 151/73   BP Location: Right arm Right arm Right arm Right arm   Pulse: 77 70 73 69   Resp: 18 18 18 18   Temp: 100.2  F (37.9  C) 99.5  F (37.5  C) 99.6  F (37.6  C) 98.6  F (37  C)   TempSrc: Tympanic Tympanic Tympanic Tympanic   SpO2: 93% 93% 95% (!) 91%   Weight:       Height:             Vital Sign Ranges  Temperature Temp  Av.4  F (37.4  C)  Min: 98.6  F (37  C)  Max: 100.2  F (37.9  C)   Blood pressure Systolic (24hrs), Av , Min:133 , Max:157        Diastolic (24hrs), Av, Min:70, Max:84      Pulse Pulse  Av.8  Min: 69  Max: 77   Respirations Resp  Av.8  Min: 17  Max: 18   Pulse oximetry SpO2   Av.7 %  Min: 91 %  Max: 95 %         Intake/Output Summary (Last 24 hours) at 2021 0741  Last data filed at 2021 0622  Gross per 24 hour   Intake 3864 ml   Output 525 ml   Net 3339 ml     Constitutional - AA, moderate distress with pain  HEENT - atraumatic, normocephalic  Neck - supple, no masses, no JVD  CVS - S1 S2 RRR, no murmurs, rubs, gallops  Respiratory - CTA b/l  GI -some distention, tender throughout but especially right lower and right abdomen  Musculoskeletal - no LE edema, no lesions  Neuro - oriented x 3, no gross focal deficits    Peripheral IV 07/15/21 Anterior;Left Upper forearm (Active)   Site Assessment WDL 21   Line Status Infusing 21   Phlebitis Scale 0-->no symptoms 21   Infiltration Scale 0 21   Number of days: 3     No line/device             Data:     Lab Results   Component Value Date    WBC 15.2 (H) 2021    HGB 14.2 2021    HCT 41.4 2021     2021     2021    POTASSIUM 3.5 2021    CHLORIDE 106 2021    CO2 26 2021    BUN 20 2021    CR 0.69 2021     (H) 2021    NTBNPI 110 2016    TROPI <0.015 2020    TROPN 0.06 2014    AST 14 2021    ALT 21 2021    ALKPHOS 56 2021    BILITOTAL 1.1 2021    INR 1.03 2020     Lactic Acid   Date Value Ref Range Status   07/15/2021 1.6 0.7 - 2.0 mmol/L Final   2019 1.5 0.7 - 2.0 mmol/L Final   2019 2.6 (H) 0.7 - 2.0 mmol/L Final     Comment:     Critical Value called to and read back by  ROD  2  Critical Value called to and read back by  ROD SUN [id number] KEM  Significant value called to and read back by   ROD SUN [id number] KEM     2015 1.6 0.4 - 2.0 mmol/L Final       Larry Quezada, DO

## 2021-07-18 NOTE — PLAN OF CARE
"Reason for hospital stay: Diverticulitis   Living situation PTA: Home  Most recent vitals: /74 (BP Location: Right arm)   Pulse 73   Temp 99.6  F (37.6  C) (Tympanic)   Resp 18   Ht 1.803 m (5' 11\")   Wt 86.2 kg (190 lb)   SpO2 95%   BMI 26.50 kg/m    Pt's hearing aides not working, states he will have his son bring in his tools to fix it tomorrow   Pain Management:  1x PRN IVP 0.5 mg Dilaudid, gave IVP Zofran prior to pain medication   LOC: A&O  Cardiac:    Respiratory: Denies SOB; remains on O2@2LPM via NC  GI:  No BM this shift  : 1x void in urinal, unable to hold it until urinal was ready, resulted in wet brief.   Skin Issues:  Some scrotal redness, barrier applied  IVF:  LR infusing 125 ml/ hr  ABX:  IV Zosyn administered per MAR    Nutrition: NPO; except Ice Chips   ADL's:  A2  Ambulation: Not OOB this shift  Safety: Call button within reach, calls appropriately and makes needs known. Bed in lowest position, room door open,  and room near RN station.     Face to face report given with opportunity to observe patient.    Report given to SARA Reeder RN   7/18/2021  7:08 AM      "

## 2021-07-18 NOTE — PROGRESS NOTES
INPATIENT ROUNDING NOTE  7/18/2021    Patient: James FALK Vikashliv    [facility]    Admitting diagnosis: Abdominal pain, right lower quadrant [R10.31]  Sigmoid diverticulitis [K57.32]  Diverticulitis large intestine [K57.32]      Current Diet: NPO    CURRENT MEDICATIONS:  Continuous Medications:  Current Facility-Administered Medications   Medication Last Rate     lactated ringers 125 mL/hr at 07/17/21 0938       Scheduled Medications:  Current Facility-Administered Medications   Medication Dose Route Frequency     enoxaparin ANTICOAGULANT  40 mg Subcutaneous Q24H     famotidine  20 mg Oral Daily     losartan  50 mg Oral Daily     metoprolol succinate ER  12.5 mg Oral At Bedtime     piperacillin-tazobactam  3.375 g Intravenous Q6H     simvastatin  20 mg Oral At Bedtime     sodium chloride (PF)  3 mL Intracatheter Q8H       PRN Medications:  Current Facility-Administered Medications   Medication Dose Route Frequency     acetaminophen  975 mg Oral Q4H PRN     HYDROmorphone  0.5-1 mg Intravenous Q3H PRN     lidocaine 4%   Topical Q1H PRN     lidocaine (buffered or not buffered)  0.1-1 mL Other Q1H PRN     melatonin  1 mg Oral At Bedtime PRN     naloxone  0.2 mg Intravenous Q2 Min PRN    Or     naloxone  0.4 mg Intravenous Q2 Min PRN    Or     naloxone  0.2 mg Intramuscular Q2 Min PRN    Or     naloxone  0.4 mg Intramuscular Q2 Min PRN     ondansetron  4 mg Oral Q6H PRN    Or     ondansetron  4 mg Intravenous Q6H PRN     prochlorperazine  5 mg Intravenous Q6H PRN    Or     prochlorperazine  5 mg Oral Q6H PRN    Or     prochlorperazine  12.5 mg Rectal Q12H PRN     sodium chloride (PF)  3 mL Intracatheter q1 min prn       SUBJECTIVE:   Nausea: Yes. Vomiting: Yes. Fever: No. Chills: No. Excessive burping: No. Flatus: Yes. BM: No. Pain is 5/10. Pain control: fair. Tolerating current diet: Yes.      PHYSICAL EXAM:   Vital signs: /61 (BP Location: Left arm)   Pulse 69   Temp 99  F (37.2  C) (Tympanic)    "Resp 18   Ht 1.803 m (5' 11\")   Wt 86.2 kg (190 lb)   SpO2 94%   BMI 26.50 kg/m     Weight: [unfilled]   BMI: Body mass index is 26.5 kg/m .   General: Normal, healthy, cooperative   HEENT: PERRLA and EOMI   Neck: supple, without adenopathy   Lungs: clear to auscultation, minimal dry crackles bilateral base(s)   CV: Regular rate and rhythm without murmer   Abdominal: Abdomen slightly less distended, soft, tender to palpation along lower abdomen, BS diminished. No masses, organomegaly   Extremities: No cyanosis, clubbing or edema noted bilaterally in Upper and Lower Extremities   Neurological: without deficit, cranial nerves intact    INPUT/OUTPUT:      Intake/Output Summary (Last 24 hours) at 7/18/2021 0834  Last data filed at 7/18/2021 0622  Gross per 24 hour   Intake 3864 ml   Output 525 ml   Net 3339 ml       I/O last 3 completed shifts:  In: 3864 [I.V.:3864]  Out: 525 [Urine:475; Emesis/NG output:50]    LABS:    Last CBC Rrsults:   Recent Labs   Lab Test 07/18/21  0608 07/17/21  0602 07/15/21  2134   WBC 15.2* 14.7* 9.2   RBC 4.53 4.28* 4.84   HGB 14.2 13.1* 14.9   HCT 41.4 39.6* 44.4   MCV 91 93 92   MCH 31.3 30.6 30.8   MCHC 34.3 33.1 33.6   RDW 13.2 13.4 12.8    175 201       Last Comprehensive Metabolic panel:  Recent Labs   Lab Test 07/18/21  0608 07/17/21  1308 07/17/21  0602 07/16/21  0519 07/15/21  1831 02/13/20  0002 12/29/19  0224     --  138 138 140   < > 142   POTASSIUM 3.5  --  3.7 4.0 4.2   < > 4.2   CHLORIDE 106  --  108 107 107   < > 110*   CO2 26  --  25 24 30   < > 28   ANIONGAP 5  --  5 7 3   < > 4   * 125* 120* 145* 99   < > 101*   BUN 20  --  21 19 23   < > 18   CR 0.69  --  0.82 0.73 0.87   < > 0.73   GFRESTIMATED 81  --  76 79 74   < > 80   VANIA 7.9*  --  7.8* 7.8* 8.7   < > 8.6   BILITOTAL  --   --   --  1.1 0.6  --  0.3   ALKPHOS  --   --   --  56 73  --  69   ALT  --   --   --  21 25  --  28   AST  --   --   --  14 20  --  20    < > = values in this interval not " displayed.       Recent Labs   Lab Test 07/16/21  0519 07/15/21  1831 02/18/20  0428 02/17/20  0440 02/16/20  0505 12/29/19  0224   MAG  --   --  2.0 2.1 1.9  --    ALBUMIN 3.3* 4.0  --   --   --  3.5           ASSESSMENT:     89yo male with Sigmoid diverticulitis [K57.32]    PLAN:   Continue NPO until resolution of percussion tenderness  IVF  IV abx  Continue to monitor    Jayashree Lyle DO on 7/18/2021 at 8:38 AM

## 2021-07-19 ENCOUNTER — APPOINTMENT (OUTPATIENT)
Dept: CT IMAGING | Facility: HOSPITAL | Age: 86
DRG: 329 | End: 2021-07-19
Attending: SURGERY
Payer: MEDICARE

## 2021-07-19 ENCOUNTER — ANESTHESIA (OUTPATIENT)
Dept: SURGERY | Facility: HOSPITAL | Age: 86
DRG: 329 | End: 2021-07-19
Payer: MEDICARE

## 2021-07-19 ENCOUNTER — APPOINTMENT (OUTPATIENT)
Dept: GENERAL RADIOLOGY | Facility: HOSPITAL | Age: 86
DRG: 329 | End: 2021-07-19
Attending: INTERNAL MEDICINE
Payer: MEDICARE

## 2021-07-19 ENCOUNTER — ANESTHESIA EVENT (OUTPATIENT)
Dept: SURGERY | Facility: HOSPITAL | Age: 86
DRG: 329 | End: 2021-07-19
Payer: MEDICARE

## 2021-07-19 DIAGNOSIS — K21.9 GASTROESOPHAGEAL REFLUX DISEASE WITHOUT ESOPHAGITIS: ICD-10-CM

## 2021-07-19 PROBLEM — R10.31 ABDOMINAL PAIN, RIGHT LOWER QUADRANT: Status: ACTIVE | Noted: 2021-07-15

## 2021-07-19 LAB
ALBUMIN SERPL-MCNC: 2.1 G/DL (ref 3.4–5)
ALP SERPL-CCNC: 48 U/L (ref 40–150)
ALT SERPL W P-5'-P-CCNC: 13 U/L (ref 0–70)
ANION GAP SERPL CALCULATED.3IONS-SCNC: 10 MMOL/L (ref 3–14)
AST SERPL W P-5'-P-CCNC: 16 U/L (ref 0–45)
BASOPHILS # BLD AUTO: 0 10E3/UL (ref 0–0.2)
BASOPHILS NFR BLD AUTO: 0 %
BILIRUB SERPL-MCNC: 0.6 MG/DL (ref 0.2–1.3)
BUN SERPL-MCNC: 19 MG/DL (ref 7–30)
CALCIUM SERPL-MCNC: 7.9 MG/DL (ref 8.5–10.1)
CHLORIDE BLD-SCNC: 104 MMOL/L (ref 94–109)
CO2 SERPL-SCNC: 25 MMOL/L (ref 20–32)
CREAT SERPL-MCNC: 0.63 MG/DL (ref 0.66–1.25)
EOSINOPHIL # BLD AUTO: 0 10E3/UL (ref 0–0.7)
EOSINOPHIL NFR BLD AUTO: 0 %
ERYTHROCYTE [DISTWIDTH] IN BLOOD BY AUTOMATED COUNT: 13.2 % (ref 10–15)
GFR SERPL CREATININE-BSD FRML MDRD: 84 ML/MIN/1.73M2
GLUCOSE BLD-MCNC: 101 MG/DL (ref 70–99)
GLUCOSE BLDC GLUCOMTR-MCNC: 130 MG/DL (ref 70–99)
GLUCOSE BLDC GLUCOMTR-MCNC: 136 MG/DL (ref 70–99)
HCT VFR BLD AUTO: 39.2 % (ref 40–53)
HGB BLD-MCNC: 13.5 G/DL (ref 13.3–17.7)
IMM GRANULOCYTES # BLD: 0.1 10E3/UL
IMM GRANULOCYTES NFR BLD: 1 %
LYMPHOCYTES # BLD AUTO: 1.2 10E3/UL (ref 0.8–5.3)
LYMPHOCYTES NFR BLD AUTO: 9 %
MCH RBC QN AUTO: 31.3 PG (ref 26.5–33)
MCHC RBC AUTO-ENTMCNC: 34.4 G/DL (ref 31.5–36.5)
MCV RBC AUTO: 91 FL (ref 78–100)
MONOCYTES # BLD AUTO: 1 10E3/UL (ref 0–1.3)
MONOCYTES NFR BLD AUTO: 8 %
NEUTROPHILS # BLD AUTO: 10.5 10E3/UL (ref 1.6–8.3)
NEUTROPHILS NFR BLD AUTO: 82 %
NRBC # BLD AUTO: 0 10E3/UL
NRBC BLD AUTO-RTO: 0 /100
PLATELET # BLD AUTO: 217 10E3/UL (ref 150–450)
POTASSIUM BLD-SCNC: 3.3 MMOL/L (ref 3.4–5.3)
PROT SERPL-MCNC: 5.6 G/DL (ref 6.8–8.8)
RBC # BLD AUTO: 4.32 10E6/UL (ref 4.4–5.9)
SODIUM SERPL-SCNC: 139 MMOL/L (ref 133–144)
WBC # BLD AUTO: 12.8 10E3/UL (ref 4–11)

## 2021-07-19 PROCEDURE — 200N000001 HC R&B ICU

## 2021-07-19 PROCEDURE — 710N000010 HC RECOVERY PHASE 1, LEVEL 2, PER MIN: Performed by: SURGERY

## 2021-07-19 PROCEDURE — 3E0T3BZ INTRODUCTION OF ANESTHETIC AGENT INTO PERIPHERAL NERVES AND PLEXI, PERCUTANEOUS APPROACH: ICD-10-PCS | Performed by: NURSE ANESTHETIST, CERTIFIED REGISTERED

## 2021-07-19 PROCEDURE — 250N000011 HC RX IP 250 OP 636: Performed by: NURSE ANESTHETIST, CERTIFIED REGISTERED

## 2021-07-19 PROCEDURE — 99100 ANES PT EXTEME AGE<1 YR&>70: CPT | Performed by: NURSE ANESTHETIST, CERTIFIED REGISTERED

## 2021-07-19 PROCEDURE — 250N000011 HC RX IP 250 OP 636: Performed by: INTERNAL MEDICINE

## 2021-07-19 PROCEDURE — 370N000017 HC ANESTHESIA TECHNICAL FEE, PER MIN: Performed by: SURGERY

## 2021-07-19 PROCEDURE — 64488 TAP BLOCK BI INJECTION: CPT | Mod: XU | Performed by: NURSE ANESTHETIST, CERTIFIED REGISTERED

## 2021-07-19 PROCEDURE — 71046 X-RAY EXAM CHEST 2 VIEWS: CPT

## 2021-07-19 PROCEDURE — 250N000011 HC RX IP 250 OP 636: Performed by: SURGERY

## 2021-07-19 PROCEDURE — 36415 COLL VENOUS BLD VENIPUNCTURE: CPT | Performed by: INTERNAL MEDICINE

## 2021-07-19 PROCEDURE — 0DBN0ZX EXCISION OF SIGMOID COLON, OPEN APPROACH, DIAGNOSTIC: ICD-10-PCS | Performed by: SURGERY

## 2021-07-19 PROCEDURE — 93010 ELECTROCARDIOGRAM REPORT: CPT | Performed by: INTERNAL MEDICINE

## 2021-07-19 PROCEDURE — 99232 SBSQ HOSP IP/OBS MODERATE 35: CPT | Performed by: INTERNAL MEDICINE

## 2021-07-19 PROCEDURE — 258N000003 HC RX IP 258 OP 636: Performed by: SURGERY

## 2021-07-19 PROCEDURE — 80053 COMPREHEN METABOLIC PANEL: CPT | Performed by: INTERNAL MEDICINE

## 2021-07-19 PROCEDURE — 99140 ANES COMP EMERGENCY COND: CPT | Performed by: NURSE ANESTHETIST, CERTIFIED REGISTERED

## 2021-07-19 PROCEDURE — 0D1N0Z4 BYPASS SIGMOID COLON TO CUTANEOUS, OPEN APPROACH: ICD-10-PCS | Performed by: SURGERY

## 2021-07-19 PROCEDURE — 258N000003 HC RX IP 258 OP 636: Performed by: INTERNAL MEDICINE

## 2021-07-19 PROCEDURE — 44143 PARTIAL REMOVAL OF COLON: CPT | Mod: AS | Performed by: NURSE PRACTITIONER

## 2021-07-19 PROCEDURE — 99233 SBSQ HOSP IP/OBS HIGH 50: CPT | Mod: 57 | Performed by: SURGERY

## 2021-07-19 PROCEDURE — 44143 PARTIAL REMOVAL OF COLON: CPT | Performed by: SURGERY

## 2021-07-19 PROCEDURE — 250N000009 HC RX 250: Performed by: INTERNAL MEDICINE

## 2021-07-19 PROCEDURE — 250N000009 HC RX 250: Performed by: NURSE ANESTHETIST, CERTIFIED REGISTERED

## 2021-07-19 PROCEDURE — 74176 CT ABD & PELVIS W/O CONTRAST: CPT | Mod: ME

## 2021-07-19 PROCEDURE — 44140 PARTIAL REMOVAL OF COLON: CPT | Performed by: NURSE ANESTHETIST, CERTIFIED REGISTERED

## 2021-07-19 PROCEDURE — 272N000001 HC OR GENERAL SUPPLY STERILE: Performed by: SURGERY

## 2021-07-19 PROCEDURE — 88302 TISSUE EXAM BY PATHOLOGIST: CPT | Mod: TC | Performed by: SURGERY

## 2021-07-19 PROCEDURE — 93005 ELECTROCARDIOGRAM TRACING: CPT

## 2021-07-19 PROCEDURE — 85025 COMPLETE CBC W/AUTO DIFF WBC: CPT | Performed by: INTERNAL MEDICINE

## 2021-07-19 PROCEDURE — 999N000141 HC STATISTIC PRE-PROCEDURE NURSING ASSESSMENT: Performed by: SURGERY

## 2021-07-19 PROCEDURE — 360N000076 HC SURGERY LEVEL 3, PER MIN: Performed by: SURGERY

## 2021-07-19 RX ORDER — ONDANSETRON 2 MG/ML
INJECTION INTRAMUSCULAR; INTRAVENOUS PRN
Status: DISCONTINUED | OUTPATIENT
Start: 2021-07-19 | End: 2021-07-19

## 2021-07-19 RX ORDER — ONDANSETRON 4 MG/1
4 TABLET, ORALLY DISINTEGRATING ORAL EVERY 30 MIN PRN
Status: DISCONTINUED | OUTPATIENT
Start: 2021-07-19 | End: 2021-07-19 | Stop reason: HOSPADM

## 2021-07-19 RX ORDER — METOPROLOL TARTRATE 1 MG/ML
1-2 INJECTION, SOLUTION INTRAVENOUS EVERY 5 MIN PRN
Status: DISCONTINUED | OUTPATIENT
Start: 2021-07-19 | End: 2021-07-19 | Stop reason: HOSPADM

## 2021-07-19 RX ORDER — HYDROMORPHONE HYDROCHLORIDE 1 MG/ML
0.2 INJECTION, SOLUTION INTRAMUSCULAR; INTRAVENOUS; SUBCUTANEOUS EVERY 5 MIN PRN
Status: DISCONTINUED | OUTPATIENT
Start: 2021-07-19 | End: 2021-07-19 | Stop reason: HOSPADM

## 2021-07-19 RX ORDER — ONDANSETRON 2 MG/ML
4 INJECTION INTRAMUSCULAR; INTRAVENOUS EVERY 30 MIN PRN
Status: DISCONTINUED | OUTPATIENT
Start: 2021-07-19 | End: 2021-07-19 | Stop reason: HOSPADM

## 2021-07-19 RX ORDER — PROPOFOL 10 MG/ML
INJECTION, EMULSION INTRAVENOUS PRN
Status: DISCONTINUED | OUTPATIENT
Start: 2021-07-19 | End: 2021-07-19

## 2021-07-19 RX ORDER — ONDANSETRON 2 MG/ML
4 INJECTION INTRAMUSCULAR; INTRAVENOUS ONCE
Status: DISCONTINUED | OUTPATIENT
Start: 2021-07-19 | End: 2021-07-28 | Stop reason: HOSPADM

## 2021-07-19 RX ORDER — CEFAZOLIN SODIUM 2 G/100ML
2 INJECTION, SOLUTION INTRAVENOUS
Status: DISCONTINUED | OUTPATIENT
Start: 2021-07-19 | End: 2021-07-19 | Stop reason: CLARIF

## 2021-07-19 RX ORDER — SODIUM CHLORIDE, SODIUM LACTATE, POTASSIUM CHLORIDE, CALCIUM CHLORIDE 600; 310; 30; 20 MG/100ML; MG/100ML; MG/100ML; MG/100ML
INJECTION, SOLUTION INTRAVENOUS CONTINUOUS
Status: DISCONTINUED | OUTPATIENT
Start: 2021-07-19 | End: 2021-07-19 | Stop reason: HOSPADM

## 2021-07-19 RX ORDER — METOPROLOL TARTRATE 1 MG/ML
5 INJECTION, SOLUTION INTRAVENOUS EVERY 8 HOURS
Status: DISCONTINUED | OUTPATIENT
Start: 2021-07-19 | End: 2021-07-20

## 2021-07-19 RX ORDER — KETAMINE HYDROCHLORIDE 10 MG/ML
INJECTION INTRAMUSCULAR; INTRAVENOUS PRN
Status: DISCONTINUED | OUTPATIENT
Start: 2021-07-19 | End: 2021-07-19

## 2021-07-19 RX ORDER — DEXAMETHASONE SODIUM PHOSPHATE 10 MG/ML
INJECTION, SOLUTION INTRAMUSCULAR; INTRAVENOUS PRN
Status: DISCONTINUED | OUTPATIENT
Start: 2021-07-19 | End: 2021-07-19

## 2021-07-19 RX ORDER — PROPOFOL 10 MG/ML
INJECTION, EMULSION INTRAVENOUS CONTINUOUS PRN
Status: DISCONTINUED | OUTPATIENT
Start: 2021-07-19 | End: 2021-07-19

## 2021-07-19 RX ORDER — FENTANYL CITRATE 50 UG/ML
50 INJECTION, SOLUTION INTRAMUSCULAR; INTRAVENOUS EVERY 5 MIN PRN
Status: DISCONTINUED | OUTPATIENT
Start: 2021-07-19 | End: 2021-07-19 | Stop reason: HOSPADM

## 2021-07-19 RX ORDER — CEFAZOLIN SODIUM 2 G/100ML
2 INJECTION, SOLUTION INTRAVENOUS SEE ADMIN INSTRUCTIONS
Status: DISCONTINUED | OUTPATIENT
Start: 2021-07-19 | End: 2021-07-19 | Stop reason: CLARIF

## 2021-07-19 RX ORDER — LIDOCAINE HYDROCHLORIDE 20 MG/ML
INJECTION, SOLUTION INFILTRATION; PERINEURAL PRN
Status: DISCONTINUED | OUTPATIENT
Start: 2021-07-19 | End: 2021-07-19

## 2021-07-19 RX ORDER — FAMOTIDINE 20 MG/1
TABLET, FILM COATED ORAL
Qty: 30 TABLET | Refills: 4 | Status: SHIPPED | OUTPATIENT
Start: 2021-07-19 | End: 2021-09-21

## 2021-07-19 RX ADMIN — TAZOBACTAM SODIUM AND PIPERACILLIN SODIUM 3.38 G: 375; 3 INJECTION, SOLUTION INTRAVENOUS at 20:20

## 2021-07-19 RX ADMIN — ROCURONIUM BROMIDE 30 MG: 10 INJECTION INTRAVENOUS at 14:51

## 2021-07-19 RX ADMIN — METOPROLOL TARTRATE 5 MG: 1 INJECTION, SOLUTION INTRAVENOUS at 21:00

## 2021-07-19 RX ADMIN — TAZOBACTAM SODIUM AND PIPERACILLIN SODIUM 3.38 G: 375; 3 INJECTION, SOLUTION INTRAVENOUS at 14:28

## 2021-07-19 RX ADMIN — Medication 100 MG: at 14:23

## 2021-07-19 RX ADMIN — LIDOCAINE HYDROCHLORIDE 40 MG: 20 INJECTION, SOLUTION INFILTRATION; PERINEURAL at 14:23

## 2021-07-19 RX ADMIN — SODIUM CHLORIDE, POTASSIUM CHLORIDE, SODIUM LACTATE AND CALCIUM CHLORIDE: 600; 310; 30; 20 INJECTION, SOLUTION INTRAVENOUS at 22:21

## 2021-07-19 RX ADMIN — TAZOBACTAM SODIUM AND PIPERACILLIN SODIUM 3.38 G: 375; 3 INJECTION, SOLUTION INTRAVENOUS at 10:10

## 2021-07-19 RX ADMIN — SODIUM CHLORIDE, POTASSIUM CHLORIDE, SODIUM LACTATE AND CALCIUM CHLORIDE: 600; 310; 30; 20 INJECTION, SOLUTION INTRAVENOUS at 15:49

## 2021-07-19 RX ADMIN — DEXAMETHASONE SODIUM PHOSPHATE 10 MG: 10 INJECTION, SOLUTION INTRAMUSCULAR; INTRAVENOUS at 14:41

## 2021-07-19 RX ADMIN — HYDROMORPHONE HYDROCHLORIDE 0.5 MG: 1 INJECTION, SOLUTION INTRAMUSCULAR; INTRAVENOUS; SUBCUTANEOUS at 17:43

## 2021-07-19 RX ADMIN — TAZOBACTAM SODIUM AND PIPERACILLIN SODIUM 3.38 G: 375; 3 INJECTION, SOLUTION INTRAVENOUS at 04:28

## 2021-07-19 RX ADMIN — PROPOFOL 200 MG: 10 INJECTION, EMULSION INTRAVENOUS at 14:23

## 2021-07-19 RX ADMIN — PROPOFOL 200 MCG/KG/MIN: 10 INJECTION, EMULSION INTRAVENOUS at 14:25

## 2021-07-19 RX ADMIN — HYDROMORPHONE HYDROCHLORIDE 0.5 MG: 1 INJECTION, SOLUTION INTRAMUSCULAR; INTRAVENOUS; SUBCUTANEOUS at 04:24

## 2021-07-19 RX ADMIN — SODIUM CHLORIDE, POTASSIUM CHLORIDE, SODIUM LACTATE AND CALCIUM CHLORIDE: 600; 310; 30; 20 INJECTION, SOLUTION INTRAVENOUS at 08:39

## 2021-07-19 RX ADMIN — ONDANSETRON 4 MG: 2 INJECTION INTRAMUSCULAR; INTRAVENOUS at 10:08

## 2021-07-19 RX ADMIN — KETAMINE HYDROCHLORIDE 30 MG: 10 INJECTION, SOLUTION INTRAMUSCULAR; INTRAVENOUS at 14:38

## 2021-07-19 RX ADMIN — ONDANSETRON 4 MG: 2 INJECTION INTRAMUSCULAR; INTRAVENOUS at 04:18

## 2021-07-19 RX ADMIN — HYDROMORPHONE HYDROCHLORIDE 0.5 MG: 1 INJECTION, SOLUTION INTRAMUSCULAR; INTRAVENOUS; SUBCUTANEOUS at 07:38

## 2021-07-19 RX ADMIN — SODIUM CHLORIDE, POTASSIUM CHLORIDE, SODIUM LACTATE AND CALCIUM CHLORIDE: 600; 310; 30; 20 INJECTION, SOLUTION INTRAVENOUS at 00:12

## 2021-07-19 RX ADMIN — FENTANYL CITRATE 50 MCG: 50 INJECTION, SOLUTION INTRAMUSCULAR; INTRAVENOUS at 17:09

## 2021-07-19 RX ADMIN — ONDANSETRON 4 MG: 2 INJECTION INTRAMUSCULAR; INTRAVENOUS at 15:43

## 2021-07-19 ASSESSMENT — ACTIVITIES OF DAILY LIVING (ADL)
ADLS_ACUITY_SCORE: 14
ADLS_ACUITY_SCORE: 14
ADLS_ACUITY_SCORE: 17
ADLS_ACUITY_SCORE: 18
ADLS_ACUITY_SCORE: 14

## 2021-07-19 NOTE — OR NURSING
Ascites. Abdomen distended. Hypoactive bowel sounds.  LLQ pain.  No fever.  Spitting up green bile. Vital signs 98.4 Pulse 63   Sats 95%  BP  137/77.  Allergic to Lisinipril and Morphine.  Manas discussed  fluid in lungs and possible pneumonia and a chance of having ventilator after surgery.  Family at bedside. (Christophe Roblero  And Shannan.)

## 2021-07-19 NOTE — ANESTHESIA CARE TRANSFER NOTE
Patient: James Grant    Procedure(s):  Exploratory  laparotomy with sigmoid colectomy and creation of colostomy, appendectomy    Diagnosis: Abdominal pain, right lower quadrant [R10.31]  Diverticulitis of large intestine without perforation or abscess without bleeding [K57.32]  Diagnosis Additional Information: No value filed.    Anesthesia Type:   General     Note:      Level of Consciousness: drowsy  Oxygen Supplementation: face mask    Independent Airway: airway patency satisfactory and stable  Dentition: dentition unchanged      Patient transferred to: ICU    ICU Handoff: Call for PAUSE to initiate/utilize ICU HANDOFF, Identified Patient, Identified Responsible Provider, Reviewed the Pertinent Medical History, Discussed Surgical Course, Reviewed Intra-OP Anesthesia Management and Issues during Anesthesia, Set Expectations for Post Procedure Period and Allowed Opportunity for Questions and Acknowledgement of Understanding      Vitals: (Last set prior to Anesthesia Care Transfer)  CRNA VITALS  7/19/2021 1558 - 7/19/2021 1653      7/19/2021             Resp Rate (set):  8        Electronically Signed By: PRATIMA Velasquez CRNA  July 19, 2021  4:53 PM

## 2021-07-19 NOTE — PLAN OF CARE
A&O, vital signs stable, afebrile. Makes needs known. IV infusing without difficulty. Continues to have abdominal pain & tenderness on right lower quadrant that radiates to left side. Bowel sounds are hypoactive.  Gave Dilaudid 0.5 mg via IV at 0737 with a decrease in pain. Upper lungs are clear with audible crackles in the bases. Encouraged to deep breath & cough.   Frustrated due to not being able to eat.  CT of abdomen order, xray tech here to start contrast and he refused, just wants to sleep. Xray will be back at 0945 to start contrast.  Continues NPO per surgeon until after CT scan is done.  Gave Zofran 4 mg via IV for small(30 ml) emesis of green bile.  Returned from CT scan, plan is patient will go to surgery in approximately 2 hrs., then return to ICU room 3124 after surgery.  1200 - resting comfortably, denies pain at this time, some nausea, no emesis. VSS, afebrile.  Family, 2 members in room with patient.

## 2021-07-19 NOTE — PROGRESS NOTES
INPATIENT ROUNDING NOTE  7/19/2021    Patient: James FALK Vikashliv    Physician of Record: Hospitalist Service    Admitting diagnosis: Abdominal pain, right lower quadrant [R10.31]  Sigmoid diverticulitis [K57.32]  Diverticulitis large intestine [K57.32]    Reason for Admission: Diverticulitis     Length of stay: 4    Current Diet: NPO    CURRENT MEDICATIONS:  Continuous Medications:  Current Facility-Administered Medications   Medication Last Rate     lactated ringers 125 mL/hr at 07/19/21 0012       Scheduled Medications:  Current Facility-Administered Medications   Medication Dose Route Frequency     enoxaparin ANTICOAGULANT  40 mg Subcutaneous Q24H     famotidine  20 mg Oral Daily     losartan  50 mg Oral Daily     metoprolol succinate ER  12.5 mg Oral At Bedtime     piperacillin-tazobactam  3.375 g Intravenous Q6H     simvastatin  20 mg Oral At Bedtime     sodium chloride (PF)  3 mL Intracatheter Q8H       PRN Medications:  Current Facility-Administered Medications   Medication Dose Route Frequency     acetaminophen  975 mg Oral Q4H PRN     HYDROmorphone  0.5-1 mg Intravenous Q3H PRN     lidocaine 4%   Topical Q1H PRN     lidocaine (buffered or not buffered)  0.1-1 mL Other Q1H PRN     melatonin  1 mg Oral At Bedtime PRN     naloxone  0.2 mg Intravenous Q2 Min PRN    Or     naloxone  0.4 mg Intravenous Q2 Min PRN    Or     naloxone  0.2 mg Intramuscular Q2 Min PRN    Or     naloxone  0.4 mg Intramuscular Q2 Min PRN     ondansetron  4 mg Oral Q6H PRN    Or     ondansetron  4 mg Intravenous Q6H PRN     prochlorperazine  5 mg Intravenous Q6H PRN    Or     prochlorperazine  5 mg Oral Q6H PRN    Or     prochlorperazine  12.5 mg Rectal Q12H PRN     sodium chloride (PF)  3 mL Intracatheter q1 min prn       SUBJECTIVE:   Nausea: No. Vomiting: No. Fever: No. Chills: No. Excessive burping: No. Flatus: No. BM: No. Pain is no pain at rest this am. Tolerating current diet: Yes.     PHYSICAL EXAM:   Vital signs: /79 (BP  "Location: Right arm)   Pulse 62   Temp 99.5  F (37.5  C) (Tympanic)   Resp 16   Ht 1.803 m (5' 11\")   Wt 86.2 kg (190 lb)   SpO2 93%   BMI 26.50 kg/m     BMI: Body mass index is 26.5 kg/m .   General: Normal, healthy, cooperative, in no acute distress, alert   Lungs: respirations are non-labored   Abdominal: distended; tender to palpation of lower abdomen   Extremities: No cyanosis, clubbing or edema noted bilaterally in Upper and Lower Extremities   Neurological: without deficit    INPUT/OUTPUT:      Intake/Output Summary (Last 24 hours) at 7/19/2021 0731  Last data filed at 7/19/2021 0519  Gross per 24 hour   Intake 2918 ml   Output 450 ml   Net 2468 ml       I/O last 3 completed shifts:  In: 2918 [P.O.:120; I.V.:2798]  Out: 450 [Urine:450]    LABS:    Last CBC Rrsults:   Recent Labs   Lab Test 07/19/21  0618 07/18/21  0608 07/17/21  0602   WBC 12.8* 15.2* 14.7*   RBC 4.32* 4.53 4.28*   HGB 13.5 14.2 13.1*   HCT 39.2* 41.4 39.6*   MCV 91 91 93   MCH 31.3 31.3 30.6   MCHC 34.4 34.3 33.1   RDW 13.2 13.2 13.4    194 175       Last Comprehensive Metabolic panel:  Recent Labs   Lab Test 07/19/21  0618 07/18/21  0608 07/17/21  1308 07/17/21  0602 07/16/21  0519 07/15/21  1831    137  --  138 138 140   POTASSIUM 3.3* 3.5  --  3.7 4.0 4.2   CHLORIDE 104 106  --  108 107 107   CO2 25 26  --  25 24 30   ANIONGAP 10 5  --  5 7 3   * 116* 125* 120* 145* 99   BUN 19 20  --  21 19 23   CR 0.63* 0.69  --  0.82 0.73 0.87   GFRESTIMATED 84 81  --  76 79 74   VANIA 7.9* 7.9*  --  7.8* 7.8* 8.7   BILITOTAL 0.6  --   --   --  1.1 0.6   ALKPHOS 48  --   --   --  56 73   ALT 13  --   --   --  21 25   AST 16  --   --   --  14 20       Recent Labs   Lab Test 07/19/21  0618 07/16/21  0519 07/15/21  1831 02/18/20  0428 02/17/20  0440 02/16/20  0505   MAG  --   --   --  2.0 2.1 1.9   ALBUMIN 2.1* 3.3* 4.0  --   --   --      ASSESSMENT:    89 year old male admitted for Abdominal pain, right lower quadrant " [R10.31]  Sigmoid diverticulitis [K57.32]  Diverticulitis large intestine [K57.32].  Here for diverticulitis. Hospital day 4.    PLAN:   Continue IV antiboitics  Continue NPO  Continue to monitor from a surgery standpoint

## 2021-07-19 NOTE — PLAN OF CARE
"Reason for hospital stay:  Pancreatitis   Living situation PTA: Home   Most recent vitals: /79 (BP Location: Right arm)   Pulse 62   Temp 99.5  F (37.5  C) (Tympanic)   Resp 16   Ht 1.803 m (5' 11\")   Wt 86.2 kg (190 lb)   SpO2 93%   BMI 26.50 kg/m      Pain Management:  Administered 1x PRN dose 0.5 mg IVP Dilaudid   LOC:  A&Ox4  Cardiac: Remains on Tele: Per ICU RN Report sheet: Sinus Dysrhythmia 60s   Respiratory:  Remains on O2@2LPM via NC  GI:  WNL  :  WNL  Skin Issues:  Continue to repo with encounters; heels suspended; scd's on this shift   IVF: LR infusing @ 125 ml/ hr   ABX:  Zosyn IV Q6 administered per MAR  Nutrition: NPO except Ice Chips   ADL's:  A1-2  Ambulation:Not OOB  Safety:  Call button within reach, calls appropriately and makes needs known. Bed in lowest position, room door remains open and room is near RN station.     Face to face report given with opportunity to observe patient.    Report given to SARA Fontanez and Jen Hanson RN   7/19/2021  7:16 AM      "

## 2021-07-19 NOTE — PLAN OF CARE
Pt transferred to ICU at approx 1715, report received face to face in rm from Nasrin RUIZ.     4LPM NC, rating pain 8/10 medicated per MAR. NG at 61cm to rt nares to LIS, returning brown/green fluid. Using oral suction independently to assist with clearing secretions. Ostomy bright red flush with skin, scant bloody drainage into ostomy appliance. EMMANUELLE w/bright red drainage. Midline incision w/wound vac in place no drainage noted to canister. Confused and mildly sedate but answering questions, bilat HA in place [place]. IVF continued from PACU area. Son bedside attentive to needs.     Addnl am labs ordered per surgeon instruction.

## 2021-07-19 NOTE — PROGRESS NOTES
[facility]  Hospitalist Progress Note          Assessment and Plan:   James Grant is a 89 year old male who was admitted on 7/15/2021.     1. Sigmoid diverticulitis.  Confirmed by CT.  Continue IV Zosyn.  Patient having difficult to control pain. Surgery following. Pain control.   -advance to clears today     2. HTN: hold BP meds for now, but BP higher today.  Will monitor.        3. CAD with remote MI: was taking aspirin and metoprolol 12.5 mg po daily. Continue telemetry. No recent ischemic symptoms.      4. BPH; monitor for urinary output     5. H/o of bladder cancer. Very remote and not an active problem.     6. Remote TIA. Will monitor in telemetry.      DVT Prophylaxis: Lovenox     Code Status: Full Code     Disposition: Expected discharge in 1-2 days once improved.                    Interval History:   Patient remains stable overnight. Improving.  Wants his cough, which he has had for more than 3-4 weeks, investigated.                Medications:       famotidine  20 mg Oral Daily     losartan  50 mg Oral Daily     metoprolol succinate ER  12.5 mg Oral At Bedtime     piperacillin-tazobactam  3.375 g Intravenous Q6H     simvastatin  20 mg Oral At Bedtime     sodium chloride (PF)  3 mL Intracatheter Q8H                  Physical Exam:     Vitals:    07/18/21 2109 07/19/21 0016 07/19/21 0415 07/19/21 0726   BP: 149/73 145/74 144/79 144/71   BP Location: Right arm Right arm Right arm Right arm   Pulse: 73 62 62    Resp: 18 16 16 16   Temp: 98.9  F (37.2  C) 99.4  F (37.4  C) 99.5  F (37.5  C) 97.8  F (36.6  C)   TempSrc: Tympanic Tympanic Tympanic Tympanic   SpO2: 92% 93% 93% 95%   Weight:       Height:             Intake/Output Summary (Last 24 hours) at 7/19/2021 0843  Last data filed at 7/19/2021 0519  Gross per 24 hour   Intake 2918 ml   Output 450 ml   Net 2468 ml     Constitutional - AA, moderate distress with pain  HEENT - atraumatic, normocephalic  Neck - supple, no masses, no JVD  CVS -  S1 S2 RRR, no murmurs, rubs, gallops  Respiratory - CTA b/l  GI -some distention, tenderness improved right lower and right abdomen  Musculoskeletal - no LE edema, no lesions  Neuro - oriented x 3, no gross focal deficits      Peripheral IV 07/15/21 Anterior;Left Upper forearm (Active)   Site Assessment WDL 07/19/21 0726   Line Status Infusing 07/19/21 0726   Phlebitis Scale 0-->no symptoms 07/19/21 0726   Infiltration Scale 0 07/19/21 0726   Number of days: 4     No line/device             Data:     Lab Results   Component Value Date    WBC 12.8 (H) 07/19/2021    HGB 13.5 07/19/2021    HCT 39.2 (L) 07/19/2021     07/19/2021     07/19/2021    POTASSIUM 3.3 (L) 07/19/2021    CHLORIDE 104 07/19/2021    CO2 25 07/19/2021    BUN 19 07/19/2021    CR 0.63 (L) 07/19/2021     (H) 07/19/2021    NTBNPI 110 01/18/2016    TROPI <0.015 11/30/2020    TROPN 0.06 02/08/2014    AST 16 07/19/2021    ALT 13 07/19/2021    ALKPHOS 48 07/19/2021    BILITOTAL 0.6 07/19/2021    INR 1.03 11/30/2020     Lactic Acid   Date Value Ref Range Status   07/15/2021 1.6 0.7 - 2.0 mmol/L Final   02/16/2019 1.5 0.7 - 2.0 mmol/L Final   02/16/2019 2.6 (H) 0.7 - 2.0 mmol/L Final     Comment:     Critical Value called to and read back by  ROD SUN 1751 2  Critical Value called to and read back by  ROD SUN 1751 2/16/19 JU  Significant value called to and read back by   ROD SUN 1751 2/16/19 JU     08/23/2015 1.6 0.4 - 2.0 mmol/L Final       Angelica Gallegos MD, MD

## 2021-07-19 NOTE — OP NOTE
REPORT OF OPERATION  DATE OF PROCEDURE: 7/19/2021    PATIENT: James Grant    SURGERY PERFORMED: Exploratory laparotomy, sigmoid colectomy with Walker's procedure and creation of end colostomy, appendectomy.    PREOPERATIVE DIAGNOSIS: Diverticulitis nonresponsive to antibiotic therapy    POSTOPERATIVE DIAGNOSIS: Same along with normal-appearing appendix.  Large abscess in the right lower quadrant.    SURGEON: Sukumar Chavez MD    ASSISTANTS: None and Lida Gill CNP, Her assistance was required because of the technical aspects of the case    ANESTHESIA: General Endotracheal Anesthesia    COMPLICATIONS: None apparent    TRANSFUSIONS: None    TISSUE TO PATHOLOGY: Sigmoid colon and appendix to pathology for pathological diagnosis    FINDINGS: Large abscess in the right lower quadrant involving the sigmoid colon and base of the cecum along with small bowel.  Probable small bowel obstruction secondary to involvement of the small bowel and the abscess cavity.    INDICATIONS: This is a 89 year old male who presented to the emergency room with acute onset of right lower quadrant pain.  CT was consistent with sigmoid diverticulitis.  The patient was admitted to the hospital and started on IV antibiotics.  Not defervesce as would be expected with simple diverticulitis and repeat CAT scan was obtained today.  CAT scan today showed evolving small bowel obstruction with continued diverticulitis.  Discussion with the patient and the patient's family we will take the patient to the operating room for an exploratory laparotomy sigmoid colectomy and creation of end colostomy and any other indicated procedures.    DESCRIPTIONS OF PROCEDURE IN DETAIL: After consent was obtained the patient was taken to the operative suite and tamiko in the supine position.  The patient was identified and the correct patient was confirmed.  General Endotracheal Anesthesia was administered by anesthesia.  The patient was sterilely prepped and  draped in the usual fashion.  A time out was performed verifying the correct patient and the correct procedure.  The entire operative team was in agreement.  All necessary equipment and supplies were in the room.    Through midline incision the skin was sharply entered dissection was carried down to isolation the fascia.  The fascia was opened and entered to the abdomen was accomplished.  On entrance into the abdomen a large amount of ascitic fluid was noted and this was evacuated.  Also dilated but viable small bowel was noted.  The sigmoid colon was encountered and followed distally and entered into a large abscess cavity on the right side was encountered.  This was evacuated.  It was decided to do a sigmoid colectomy with Walker's procedure leaving in and the stump.  Proximal point of resection was decided upon and bowel was divided with a CHRISTINE stapler.  The mesentery to the sigmoid colon was then taken down using the LigaSure until a distal point was decided upon.  The bowel was then divided with the contour stapler.  I was passed off and sent to pathology for pathologic diagnosis.  On investigation of the right lower quadrant small bowel adhesed down to the abscess cavity was noted.  This was mobilized until the small bowel could be run all the way to the ileocecal valve.  The cecum was involved within the abscess cavity and the appendix was isolated and it was decided to do an appendectomy.  Mesoappendix was taken down until the base appendix was encountered and then the base of the Pentax was stapled with a TA 30 stapler.  The appendix was amputated and sent to pathology for pathology diagnosis.  The appendiceal stump was cauterized.  The abdomen was then copiously irrigated and the irrigant was removed.  The sigmoid colon was mobilized allowing it to be brought up to the skin level for creation of a colostomy.  A quarter size defect was then created in the skin and this was carried down removing the  cutaneous tissue until the fascia was encountered.  A cruciate incision was made through the fascia and enlarged to 3 finger breaths.  This allowed for the occipital sigmoid colon to be brought out to the skin level.  Abdomen was copiously irrigated and the irrigant was removed.  Hemostasis was assured.  NG tube was checked and found to be in good position.  A 19 mm round drain was placed through a separate stab incision in the right lower quadrant and this was placed into the area of the abscess cavity.  Again the abdomen scope was irrigated and the irrigant was removed.  The abdomen was then closed with running looped #1 PDS.  Stainless steel staples were used to close the skin.    The ostomy was then matured to the skin using simple erupted 3-0 Vicryl sutures.  Sterile dressings were applied.  All needle instrument and sponge counts were correct x2.  Patient was then taken to the ICU in stable condition tolerating the procedure well.

## 2021-07-19 NOTE — PLAN OF CARE
OT order received. Per nursing, pt will be doing down for surgery so she advised to hold OT evaluation at this time. Pt likely will be returning to ICU after surgery. Due to change in medical status, will need new OT orders.

## 2021-07-19 NOTE — ANESTHESIA PREPROCEDURE EVALUATION
Anesthesia Pre-Procedure Evaluation    Patient: James Grant   MRN: [id number] : 3/5/1927        Preoperative Diagnosis: Abdominal pain, right lower quadrant [R10.31]  Diverticulitis of large intestine without perforation or abscess without bleeding [K57.32]   Procedure : Procedure(s):  Oratory laparotomy with sigmoid colectomy and creation of colostomy.  Any other indicated procedures.     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       CHOLECYSTECTOMY       CYSTOSCOPY, FULGURATE BLEEDERS, EVACUATE CLOT(S), COMBINED N/A 2020    Procedure: Clot Evacuation;  Surgeon: Andrzej Olivia MD;  Location:  OR     CYSTOSCOPY, RETROGRADES, COMBINED Bilateral 10/22/2019    Procedure: Bilateral Retrograde Pyelograms;  Surgeon: Andrzej Olivia MD;  Location:  OR     CYSTOSCOPY, TRANSURETHRAL RESECTION (TUR) TUMOR BLADDER, COMBINED N/A 10/22/2019    Procedure: Transurethral Resection of Bladder Tumor and transurethral resection of prostate and bladder stone removal;  Surgeon: Andrzej Olivia MD;  Location:  OR     HERNIA REPAIR       left arm surgery        Allergies   Allergen Reactions     Lisinopril Cough     Morphine Other (See Comments) and Visual Disturbance     confusion      Social History     Tobacco Use     Smoking status: Former Smoker     Packs/day: 1.00     Years: 13.00     Pack years: 13.00     Types: Cigarettes     Start date: 1949     Quit date: 1962     Years since quittin.9     Smokeless tobacco: Never Used    Substance Use Topics     Alcohol use: Not Currently     Comment: 3 Drinks ([employer]QU[employer]) ~ OCCASIONALLY (QUIT IN 2000)      Wt Readings from Last 1 Encounters:   07/15/21 86.2 kg (190 lb)        Anesthesia Evaluation   Pt has had prior anesthetic.     No history of anesthetic complications       ROS/MED HX  ENT/Pulmonary: Comment: Pleural effussion's per CT scan, possible atelectasis or pneumonia      Neurologic:     (+) TIA, date: patient and family deny,     Cardiovascular: Comment: Family thinks about 10 years ago, no stent    (+) Dyslipidemia hypertension--CAD -past MI --    METS/Exercise Tolerance:     Hematologic:  - neg hematologic  ROS     Musculoskeletal:  - neg musculoskeletal ROS     GI/Hepatic: Comment: Sigmoid diverticulitis, bowel obstruction-dilated small bowel and looped sigmoid    (+) GERD, Asymptomatic on medication,     Renal/Genitourinary:     (+) BPH,     Endo:  - neg endo ROS     Psychiatric/Substance Use:  - neg psychiatric ROS     Infectious Disease:  - neg infectious disease ROS     Malignancy:   (+) Malignancy, History of Other.Other CA bladder Remission status post.    Other:  - neg other ROS          Physical Exam    Airway        Mallampati: III   TM distance: > 3 FB   Neck ROM: full   Mouth opening: > 3 cm    Respiratory Devices and Support         Dental  no notable dental history   Comment: Top front false teeth        Cardiovascular   cardiovascular exam normal       Rhythm and rate: regular and normal     Pulmonary       Comment: Crackles bilateral lower bases    breath sounds clear to auscultation   (+) decreased breath sounds           OUTSIDE LABS:  CBC:   Lab Results   Component Value Date    WBC 12.8 (H) 07/19/2021    WBC 15.2 (H) 07/18/2021    HGB 13.5 07/19/2021    HGB 14.2 07/18/2021    HCT 39.2 (L) 07/19/2021    HCT 41.4 07/18/2021     07/19/2021     07/18/2021     BMP:   Lab Results   Component Value Date     07/19/2021     07/18/2021     POTASSIUM 3.3 (L) 07/19/2021    POTASSIUM 3.5 07/18/2021    CHLORIDE 104 07/19/2021    CHLORIDE 106 07/18/2021    CO2 25 07/19/2021    CO2 26 07/18/2021    BUN 19 07/19/2021    BUN 20 07/18/2021    CR 0.63 (L) 07/19/2021    CR 0.69 07/18/2021     (H) 07/19/2021     (H) 07/18/2021     COAGS:   Lab Results   Component Value Date    PTT 33 02/08/2014    INR 1.03 11/30/2020     POC:   Lab Results   Component Value Date    BGM 67 (L) 01/02/2017     HEPATIC:   Lab Results   Component Value Date    ALBUMIN 2.1 (L) 07/19/2021    PROTTOTAL 5.6 (L) 07/19/2021    ALT 13 07/19/2021    AST 16 07/19/2021    ALKPHOS 48 07/19/2021    BILITOTAL 0.6 07/19/2021     OTHER:   Lab Results   Component Value Date    LACT 1.6 07/15/2021    VANIA 7.9 (L) 07/19/2021    MAG 2.0 02/18/2020    LIPASE 154 07/15/2021    TSH 2.95 02/07/2019    .0 (H) 07/17/2021       Anesthesia Plan    ASA Status:  4, emergent    NPO Status:  NPO Appropriate    Anesthesia Type: General.     - Airway: ETT   Induction: RSI.   Maintenance: Balanced.   Techniques and Equipment:     - Lines/Monitors: 2nd IV     Consents    Anesthesia Plan(s) and associated risks, benefits, and realistic alternatives discussed. Questions answered and patient/representative(s) expressed understanding.     - Discussed with:  Patient, Other (See Comment) (discussed with family and patient)      - Extended Intubation/Ventilatory Support Discussed: Yes.      - Patient is DNR/DNI Status: No    Use of blood products discussed: No .     Postoperative Care    Pain management: Peripheral nerve block (Single Shot).   PONV prophylaxis: Ondansetron (or other 5HT-3)     Comments:    Will need 2nd IV and after intubation a NGT.  Discussed prolonged intubation due to lung status, potential for stroke and death.  Pt and family want everything done in the event of a MI or stroke            Manas Ag, APRN CRNA

## 2021-07-19 NOTE — PLAN OF CARE
"Most recent vitals: /73 (BP Location: Right arm)   Pulse 73   Temp 98.9  F (37.2  C) (Tympanic)   Resp 18   Ht 1.803 m (5' 11\")   Wt 86.2 kg (190 lb)   SpO2 92%   BMI 26.50 kg/m      Lethargic during the shift but more alert towards the end of the shift. Oriented x4. Complaining of 5/10 pain to abdomen during the shift. Dilaudid given with effective relief. Zofran and compazine given for nausea with some relief. Lungs clear but crackles to the RLL. Incontinent at times during the shift. LR running at 125 ml/hr. Remains NPO. Remains on bed rest, can turn self. Alarms active.    Face to face report given with opportunity to observe patient.    Report given to Jesús Gallagher RN   7/18/2021  11:24 PM        "

## 2021-07-19 NOTE — ANESTHESIA PROCEDURE NOTES
TAP Procedure Note  Pre-Procedure   Staff -        CRNA: Vinod Underwood APRN CRNA       Performed By: CRNA       Location: OR       Procedure Start/Stop Times: 7/19/2021 1:30 PM and 7/19/2021 1:35 PM       Pre-Anesthestic Checklist: patient identified, IV checked, site marked, risks and benefits discussed, informed consent, monitors and equipment checked, pre-op evaluation, at physician/surgeon's request and post-op pain management  Timeout:       Correct Patient: Yes        Correct Procedure: Yes        Correct Site: Yes        Correct Position: Yes        Correct Laterality: N/A        Site Marked: Yes  Procedure Documentation  Procedure: TAP       Diagnosis: BOWEL SURGERY       Laterality: bilateral       Patient Position: supine       Skin prep: Chloraprep       Needle Type: short bevel       Needle Gauge: 20.        Needle Length (Inches): 4        Ultrasound guided       1. Ultrasound was used to identify targeted nerve, plexus, vascular marker, or fascial plane and place a needle adjacent to it in real-time.       2. Ultrasound was used to visualize the spread of anesthetic in close proximity to the above referenced structure.       3. A permanent image is entered into the patient's record.       4. The visualized anatomic structures appeared normal.       5. There were no apparent abnormal pathologic findings.    Assessment/Narrative         The placement was negative for: blood aspirated, painful injection and site bleeding       Paresthesias: No.     Bolus given via needle..        Secured via.        Insertion/Infusion Method: Single Shot       Complications: none

## 2021-07-19 NOTE — ANESTHESIA POSTPROCEDURE EVALUATION
Patient: James Grant    Procedure(s):  Exploratory  laparotomy with sigmoid colectomy and creation of colostomy, appendectomy    Diagnosis:Abdominal pain, right lower quadrant [R10.31]  Diverticulitis of large intestine without perforation or abscess without bleeding [K57.32]  Diagnosis Additional Information: No value filed.    Anesthesia Type:  General    Note:  Disposition: ICU            ICU Sign Out: Anesthesiologist/ICU physician sign out WAS performed   Postop Pain Control: Uneventful            Sign Out: Well controlled pain   PONV: No   Neuro/Psych: Uneventful            Sign Out: Acceptable/Baseline neuro status   Airway/Respiratory: Uneventful            Sign Out: Acceptable/Baseline resp. status   CV/Hemodynamics: Uneventful            Sign Out: Acceptable CV status; No obvious hypovolemia; No obvious fluid overload   Other NRE: NONE   DID A NON-ROUTINE EVENT OCCUR? No           Last vitals:  Vitals:    07/19/21 1640 07/19/21 1645 07/19/21 1650   BP: 171/91 166/82 169/89   Pulse: 65 64 67   Resp: 14 14 16   Temp:      SpO2: 92% 92% (!) 90%       Last vitals prior to Anesthesia Care Transfer:  CRNA VITALS  7/19/2021 1558 - 7/19/2021 1654      7/19/2021             Resp Rate (set):  8          Electronically Signed By: PRATIMA Velasquez CRNA  July 19, 2021  4:54 PM

## 2021-07-19 NOTE — PLAN OF CARE
Orders received for PT for ambulation.  However shortly after orders placed, determined that pt needed to go down to surgery and will be returning to ICU.  Will need new orders due to change in medical status.  Hold Eval at this time.

## 2021-07-19 NOTE — ANESTHESIA PROCEDURE NOTES
Airway         Procedure Start/Stop Times: 7/19/2021 2:28 PM  Staff -        CRNA: Vinod Underwood APRN CRNA       Performed By: CRNAIndications and Patient Condition       Indications for airway management: mei-procedural       Induction type:RSI       Mask difficulty assessment: 0 - not attempted (RSI)    Final Airway Details       Final airway type: endotracheal airway       Successful airway: ETT - single  Endotracheal Airway Details        ETT size (mm): 7.5       Cuffed: yes       Cuff volume (mL): 8       Successful intubation technique: direct laryngoscopy       DL Blade Type: MAC 3       Grade View of Cords: 1       Adjucts: stylet       Position: Center       Measured from: gums/teeth       Secured at (cm): 24    Post intubation assessment        Placement verified by: capnometry, equal breath sounds and chest rise        Number of attempts at approach: 1       Number of other approaches attempted: 0       Secured with: plastic tape       Ease of procedure: easy       Dentition: Unchanged and Intact

## 2021-07-19 NOTE — PHARMACY
"[facility]    Pharmacy      Antimicrobial Stewardship Note     Current antimicrobial therapy:  Anti-infectives (From now, onward)    Start     Dose/Rate Route Frequency Ordered Stop    07/15/21 2130  piperacillin-tazobactam (ZOSYN) infusion 3.375 g     Note to Pharmacy: For [facility], [facility] and [facility]: For Zosyn-naive patients, use the \"Zosyn initial dose + extended infusion\" order panel.    3.375 g  100 mL/hr over 30 Minutes Intravenous EVERY 6 HOURS 07/15/21 2113            Indication: Diverticulitis    Days of Therapy: 5     Pertinent labs:  Creatinine   Creatinine   Date Value Ref Range Status   07/19/2021 0.63 (L) 0.66 - 1.25 mg/dL Final   07/18/2021 0.69 0.66 - 1.25 mg/dL Final   07/17/2021 0.82 0.66 - 1.25 mg/dL Final   11/30/2020 0.82 0.66 - 1.25 mg/dL Final   02/18/2020 0.73 0.70 - 1.30 mg/dL Final   02/17/2020 0.87 0.70 - 1.30 mg/dL Final     WBC   WBC   Date Value Ref Range Status   11/30/2020 9.3 4.0 - 11.0 10e9/L Final   02/18/2020 10.5 4.0 - 11.0 10e9/L Final   02/17/2020 9.7 4.0 - 11.0 10e9/L Final     WBC Count   Date Value Ref Range Status   07/19/2021 12.8 (H) 4.0 - 11.0 10e3/uL Final   07/18/2021 15.2 (H) 4.0 - 11.0 10e3/uL Final   07/17/2021 14.7 (H) 4.0 - 11.0 10e3/uL Final     Procalcitonin   Procalcitonin   Date Value Ref Range Status   02/16/2019 <0.05 ng/ml Final     Comment:     <0.05 ng/ml  Normal  Recommendation: Very low risk of bacterial infection.   Discourage antibiotics unless strong clinical suspicion for serious infection.       CRP   CRP Inflammation   Date Value Ref Range Status   07/17/2021 188.0 (H) 0.0 - 8.0 mg/L Final   07/16/2021 57.8 (H) 0.0 - 8.0 mg/L Final   01/02/2017 <2.9 0.0 - 8.0 mg/L Final       Culture Results: no growth     Recommendations/Interventions:  1. none    Sarahi Lan, [facility]  July 19, 2021        "

## 2021-07-20 ENCOUNTER — HOSPITAL ENCOUNTER (INPATIENT)
Dept: WOUND CARE | Facility: HOSPITAL | Age: 86
DRG: 329 | End: 2021-07-20
Attending: NURSE PRACTITIONER
Payer: MEDICARE

## 2021-07-20 LAB
ANION GAP SERPL CALCULATED.3IONS-SCNC: 8 MMOL/L (ref 3–14)
BUN SERPL-MCNC: 19 MG/DL (ref 7–30)
CALCIUM SERPL-MCNC: 8.1 MG/DL (ref 8.5–10.1)
CHLORIDE BLD-SCNC: 104 MMOL/L (ref 94–109)
CO2 SERPL-SCNC: 28 MMOL/L (ref 20–32)
CREAT SERPL-MCNC: 0.64 MG/DL (ref 0.66–1.25)
ERYTHROCYTE [DISTWIDTH] IN BLOOD BY AUTOMATED COUNT: 13.1 % (ref 10–15)
GFR SERPL CREATININE-BSD FRML MDRD: 84 ML/MIN/1.73M2
GLUCOSE BLD-MCNC: 153 MG/DL (ref 70–99)
GLUCOSE BLDC GLUCOMTR-MCNC: 128 MG/DL (ref 70–99)
HCT VFR BLD AUTO: 42.1 % (ref 40–53)
HGB BLD-MCNC: 14.3 G/DL (ref 13.3–17.7)
MAGNESIUM SERPL-MCNC: 2 MG/DL (ref 1.6–2.3)
MCH RBC QN AUTO: 30.7 PG (ref 26.5–33)
MCHC RBC AUTO-ENTMCNC: 34 G/DL (ref 31.5–36.5)
MCV RBC AUTO: 90 FL (ref 78–100)
PHOSPHATE SERPL-MCNC: 1.8 MG/DL (ref 2.5–4.5)
PHOSPHATE SERPL-MCNC: 1.8 MG/DL (ref 2.5–4.5)
PLATELET # BLD AUTO: 243 10E3/UL (ref 150–450)
POTASSIUM BLD-SCNC: 3.2 MMOL/L (ref 3.4–5.3)
POTASSIUM BLD-SCNC: 3.2 MMOL/L (ref 3.4–5.3)
RBC # BLD AUTO: 4.66 10E6/UL (ref 4.4–5.9)
SODIUM SERPL-SCNC: 140 MMOL/L (ref 133–144)
WBC # BLD AUTO: 11.4 10E3/UL (ref 4–11)

## 2021-07-20 PROCEDURE — 258N000003 HC RX IP 258 OP 636: Performed by: INTERNAL MEDICINE

## 2021-07-20 PROCEDURE — 84132 ASSAY OF SERUM POTASSIUM: CPT | Performed by: INTERNAL MEDICINE

## 2021-07-20 PROCEDURE — 85027 COMPLETE CBC AUTOMATED: CPT | Performed by: SURGERY

## 2021-07-20 PROCEDURE — 250N000011 HC RX IP 250 OP 636: Performed by: NURSE PRACTITIONER

## 2021-07-20 PROCEDURE — 250N000009 HC RX 250: Performed by: INTERNAL MEDICINE

## 2021-07-20 PROCEDURE — 99232 SBSQ HOSP IP/OBS MODERATE 35: CPT | Mod: 24 | Performed by: INTERNAL MEDICINE

## 2021-07-20 PROCEDURE — 84100 ASSAY OF PHOSPHORUS: CPT | Performed by: INTERNAL MEDICINE

## 2021-07-20 PROCEDURE — 36415 COLL VENOUS BLD VENIPUNCTURE: CPT | Performed by: INTERNAL MEDICINE

## 2021-07-20 PROCEDURE — 83735 ASSAY OF MAGNESIUM: CPT | Performed by: SURGERY

## 2021-07-20 PROCEDURE — 250N000011 HC RX IP 250 OP 636: Performed by: SURGERY

## 2021-07-20 PROCEDURE — 80048 BASIC METABOLIC PNL TOTAL CA: CPT | Performed by: SURGERY

## 2021-07-20 PROCEDURE — 258N000003 HC RX IP 258 OP 636: Performed by: SURGERY

## 2021-07-20 PROCEDURE — 36415 COLL VENOUS BLD VENIPUNCTURE: CPT | Performed by: SURGERY

## 2021-07-20 PROCEDURE — 250N000011 HC RX IP 250 OP 636: Performed by: INTERNAL MEDICINE

## 2021-07-20 PROCEDURE — 84100 ASSAY OF PHOSPHORUS: CPT | Performed by: SURGERY

## 2021-07-20 PROCEDURE — 200N000001 HC R&B ICU

## 2021-07-20 RX ORDER — METOPROLOL TARTRATE 1 MG/ML
5 INJECTION, SOLUTION INTRAVENOUS 2 TIMES DAILY
Status: DISCONTINUED | OUTPATIENT
Start: 2021-07-21 | End: 2021-07-24

## 2021-07-20 RX ORDER — POTASSIUM CHLORIDE 7.45 MG/ML
10 INJECTION INTRAVENOUS
Status: COMPLETED | OUTPATIENT
Start: 2021-07-20 | End: 2021-07-20

## 2021-07-20 RX ORDER — PROPOFOL 10 MG/ML
INJECTION, EMULSION INTRAVENOUS
Status: DISCONTINUED
Start: 2021-07-20 | End: 2021-07-21 | Stop reason: HOSPADM

## 2021-07-20 RX ADMIN — TAZOBACTAM SODIUM AND PIPERACILLIN SODIUM 3.38 G: 375; 3 INJECTION, SOLUTION INTRAVENOUS at 02:03

## 2021-07-20 RX ADMIN — FAMOTIDINE 20 MG: 20 INJECTION, SOLUTION INTRAVENOUS at 16:39

## 2021-07-20 RX ADMIN — METOPROLOL TARTRATE 5 MG: 1 INJECTION, SOLUTION INTRAVENOUS at 05:00

## 2021-07-20 RX ADMIN — SODIUM PHOSPHATE, MONOBASIC, MONOHYDRATE 15 MMOL: 276; 142 INJECTION, SOLUTION INTRAVENOUS at 12:58

## 2021-07-20 RX ADMIN — ONDANSETRON 4 MG: 2 INJECTION INTRAMUSCULAR; INTRAVENOUS at 02:10

## 2021-07-20 RX ADMIN — TAZOBACTAM SODIUM AND PIPERACILLIN SODIUM 3.38 G: 375; 3 INJECTION, SOLUTION INTRAVENOUS at 20:31

## 2021-07-20 RX ADMIN — SODIUM PHOSPHATE, MONOBASIC, MONOHYDRATE 15 MMOL: 276; 142 INJECTION, SOLUTION INTRAVENOUS at 20:31

## 2021-07-20 RX ADMIN — POTASSIUM CHLORIDE 10 MEQ: 7.46 INJECTION, SOLUTION INTRAVENOUS at 14:37

## 2021-07-20 RX ADMIN — SODIUM CHLORIDE, POTASSIUM CHLORIDE, SODIUM LACTATE AND CALCIUM CHLORIDE: 600; 310; 30; 20 INJECTION, SOLUTION INTRAVENOUS at 06:22

## 2021-07-20 RX ADMIN — SODIUM PHOSPHATE, MONOBASIC, MONOHYDRATE 15 MMOL: 276; 142 INJECTION, SOLUTION INTRAVENOUS at 11:03

## 2021-07-20 RX ADMIN — SODIUM PHOSPHATE, MONOBASIC, MONOHYDRATE 15 MMOL: 276; 142 INJECTION, SOLUTION INTRAVENOUS at 22:45

## 2021-07-20 RX ADMIN — TAZOBACTAM SODIUM AND PIPERACILLIN SODIUM 3.38 G: 375; 3 INJECTION, SOLUTION INTRAVENOUS at 08:06

## 2021-07-20 RX ADMIN — POTASSIUM CHLORIDE 10 MEQ: 7.46 INJECTION, SOLUTION INTRAVENOUS at 12:59

## 2021-07-20 RX ADMIN — POTASSIUM CHLORIDE 10 MEQ: 7.46 INJECTION, SOLUTION INTRAVENOUS at 10:50

## 2021-07-20 RX ADMIN — HYDROMORPHONE HYDROCHLORIDE 0.5 MG: 1 INJECTION, SOLUTION INTRAMUSCULAR; INTRAVENOUS; SUBCUTANEOUS at 02:10

## 2021-07-20 RX ADMIN — TAZOBACTAM SODIUM AND PIPERACILLIN SODIUM 3.38 G: 375; 3 INJECTION, SOLUTION INTRAVENOUS at 15:01

## 2021-07-20 RX ADMIN — METOPROLOL TARTRATE 5 MG: 1 INJECTION, SOLUTION INTRAVENOUS at 12:58

## 2021-07-20 RX ADMIN — SODIUM CHLORIDE, POTASSIUM CHLORIDE, SODIUM LACTATE AND CALCIUM CHLORIDE: 600; 310; 30; 20 INJECTION, SOLUTION INTRAVENOUS at 16:46

## 2021-07-20 ASSESSMENT — MIFFLIN-ST. JEOR: SCORE: 1613.13

## 2021-07-20 ASSESSMENT — ACTIVITIES OF DAILY LIVING (ADL)
ADLS_ACUITY_SCORE: 17
ADLS_ACUITY_SCORE: 16
ADLS_ACUITY_SCORE: 17
ADLS_ACUITY_SCORE: 14
ADLS_ACUITY_SCORE: 16
ADLS_ACUITY_SCORE: 17

## 2021-07-20 NOTE — PROGRESS NOTES
"CLINICAL NUTRITION SERVICES  -  ASSESSMENT NOTE    James Grant : NPO/Clear Liquids >4 days    89 yom admitted for sigmoid diverticulitis. Pt has a hx of HTN, dyslipidemia, GERD, CHD. Today is NPO/Clear liquid day 5. Pt is POD#1 of sigmoid colectomy with creation of colostomy, and appendectomy. Weight appears to be stable, slight weight gain over the last year. NG to LIS.    Diet Order: NPO  Intake: none    Height: 5' 11\"  Weight: 209 lbs 10.52 oz  Body mass index is 29.24 kg/m .  Weight Status:  Overweight BMI 25-29.9  IBW: 75.3 kg (166 lb 0.1 oz)  Weight History: admit weight is stateb  Wt Readings from Last 10 Encounters:   07/20/21 95.1 kg (209 lb 10.5 oz)   07/15/21 86.2 kg (190 lb)- admit   04/28/21 93.1 kg (205 lb 3.2 oz)   01/17/21 83.9 kg (185 lb)   12/09/20 89.4 kg (197 lb)   12/01/20 87 kg (191 lb 12.8 oz)   10/28/20 90.1 kg (198 lb 9.6 oz)   10/14/20 83.9 kg (185 lb)   09/30/20 83.9 kg (185 lb)   05/27/20 90.2 kg (198 lb 12.8 oz)   02/21/20 88 kg (194 lb)     Estimated Energy Needs: 2375 kcals (25 Kcal/Kg)   Estimated Protein Needs: 115 grams protein (1.2 g pro/Kg)  Estimated Fluid Needs: 2375  mL (1 mL/Kcal)    Malnutrition Diagnosis: Patient does not meet two of the criteria necessary for diagnosing malnutrition at this time. At risk with poor/no oral intake for 5 days.    NUTRITION DIAGNOSIS:  Inadequate oral intake related to GI dysfunction as evidenced by NPO/clear liquids day 5    NUTRITION RECOMMENDATIONS  - Advance diet as able  - May need to consider nutrition support if unable to advance in the next few days    MONITORING AND EVALUATION:  RD will monitor diet order, intake, weight, labs    "

## 2021-07-20 NOTE — PLAN OF CARE
"Pt is alert and oriented, forgetful/[place]. HR- Sinus dysrhythmia 50's-60's. BP's stable. Remains on 4 LPM to maintain sats. Lungs are clear with fine crackles to the bases. Wound vac to midline incision, no drainage noted in vac. Right EMMANUELLE drain patent with serosanguinous drainage. Colostomy changed by [facility] nurse d/t some stoma edema. Stoma is red/moist with 15 ml red output. SCD's in place. Son at bedside through most of shift. Declines pain meds. Reports no pain with rest, and \"just some discomfort\" with movement. Denies nausea. Makes needs known.     Face to face report given with opportunity to observe patient.    Report given to Martine Miles RN   7/20/2021  3:35 PM    "

## 2021-07-20 NOTE — PLAN OF CARE
Rated abdominal pain 7/10, IV dilaudid 0.5 mg given x 1, absence of nonverbal indicators.  Pt declining IV pain medication most of NOC, pt stated his pain is tolerable until he is turned and repositioned.  Wound vac to midline abdominal incision CDI, with no output NOC.  EMMANUELLE drain to RLQ, with 30 ml serosanguinous drainage.  Colostomy with bright red small amount drainage.  BS active.  NG to LIS, 750 ml green/brown output.  SPO2 92% 4 LPM NC.  Prater catheter, 615 ml output.  Let's needs be known.      Face to face report given with opportunity to observe patient.    Report given to SARA Stephenson.     Martine Torres RN   7/20/2021  7:14 AM

## 2021-07-20 NOTE — PHARMACY
"[facility]    Pharmacy      Antimicrobial Stewardship Note     Current antimicrobial therapy:  Anti-infectives (From now, onward)    Start     Dose/Rate Route Frequency Ordered Stop    07/15/21 2130  piperacillin-tazobactam (ZOSYN) infusion 3.375 g     Note to Pharmacy: For [facility], [facility] and [facility]: For Zosyn-naive patients, use the \"Zosyn initial dose + extended infusion\" order panel.    3.375 g  100 mL/hr over 30 Minutes Intravenous EVERY 6 HOURS 07/15/21 2113            Indication: Diverticulitis with abscess    Days of Therapy: 6     Pertinent labs:  Creatinine   Creatinine   Date Value Ref Range Status   07/20/2021 0.64 (L) 0.66 - 1.25 mg/dL Final   07/19/2021 0.63 (L) 0.66 - 1.25 mg/dL Final   07/18/2021 0.69 0.66 - 1.25 mg/dL Final     WBC     WBC Count   Date Value Ref Range Status   07/20/2021 11.4 (H) 4.0 - 11.0 10e3/uL Final   07/19/2021 12.8 (H) 4.0 - 11.0 10e3/uL Final   07/18/2021 15.2 (H) 4.0 - 11.0 10e3/uL Final     CRP   CRP Inflammation   Date Value Ref Range Status   07/17/2021 188.0 (H) 0.0 - 8.0 mg/L Final   07/16/2021 57.8 (H) 0.0 - 8.0 mg/L Final       Culture Results: no growth     Recommendations/Interventions:  1. Improving after abscess drained. Continue antibiotics.      Sarahi Lan RPH  July 20, 2021       "

## 2021-07-20 NOTE — CONSULTS
"Adrian was seen per [facility] consult order placed by Dr. Cole and per request of SARA Stephenson for ostomy pouch evaluation.  Seema explains that the pouch had been popping off not due to leakage issues.    Stoma Type: Colostomy  Location: LLQ  Mucosa: red/moist  Stoma Size: 2\" slightly oblong (normal postop edema)  Height: Well matured  Mucocutaneous Juncture: intact   Peristomal Skin: intact  Output: none, small amt of sanguinous drainage in pouch   Deviations from normal: NA  Pouching system upon arrival: [place] post op  Pouching system applied: [place] post op pouch with use of Ceraplus barrier ring, ensured that the barrier was cut to accommodate the swelling of the stoma    Explained the importance of gently massaging the area around the stoma to melt the ring and barrier together and assist in adhesion to the skin.  If pouch continues to pop off may need to change to a more flexible 1 piece flat pouch.  No evidence of pouch lifting or drainage beneath the pouch that was removed.   Will evaluate tomorrow to ensure pouching system is staying in place.      "

## 2021-07-20 NOTE — PLAN OF CARE
Face to face report given with opportunity to observe patient.    Report given to Martine Whitaker RN   7/19/2021  7:02 PM

## 2021-07-20 NOTE — PLAN OF CARE
New OT orders needed due to transfer to ICU after surgery. PT sent a message to Dr. Chavez. Will complete old OT order at this time and await for new orders.

## 2021-07-20 NOTE — PROGRESS NOTES
INPATIENT ROUNDING NOTE  7/20/2021    Patient: James FALK Rudy    Physician of Record: Hospitalist Service    Admitting diagnosis: Abdominal pain, right lower quadrant [R10.31]  Sigmoid diverticulitis [K57.32]  Diverticulitis large intestine [K57.32]    Procedure(s):  Exploratory  laparotomy with sigmoid colectomy and creation of colostomy, appendectomy     POD: 1 Day Post-Op    Current Diet: NPO    CURRENT MEDICATIONS:  Continuous Medications:  Current Facility-Administered Medications   Medication Last Rate     lactated ringers 125 mL/hr at 07/20/21 0622       Scheduled Medications:  Current Facility-Administered Medications   Medication Dose Route Frequency     famotidine  20 mg Oral Daily     losartan  50 mg Oral Daily     metoprolol  5 mg Intravenous Q8H     [Held by provider] metoprolol succinate ER  12.5 mg Oral At Bedtime     ondansetron  4 mg Intravenous Once     piperacillin-tazobactam  3.375 g Intravenous Q6H     [Held by provider] simvastatin  20 mg Oral At Bedtime     sodium chloride (PF)  3 mL Intracatheter Q8H       PRN Medications:  Current Facility-Administered Medications   Medication Dose Route Frequency     acetaminophen  975 mg Oral Q4H PRN     HYDROmorphone  0.5-1 mg Intravenous Q3H PRN     lidocaine 4%   Topical Q1H PRN     lidocaine (buffered or not buffered)  0.1-1 mL Other Q1H PRN     melatonin  1 mg Oral At Bedtime PRN     naloxone  0.2 mg Intravenous Q2 Min PRN    Or     naloxone  0.4 mg Intravenous Q2 Min PRN    Or     naloxone  0.2 mg Intramuscular Q2 Min PRN    Or     naloxone  0.4 mg Intramuscular Q2 Min PRN     ondansetron  4 mg Oral Q6H PRN    Or     ondansetron  4 mg Intravenous Q6H PRN     prochlorperazine  5 mg Intravenous Q6H PRN    Or     prochlorperazine  5 mg Oral Q6H PRN    Or     prochlorperazine  12.5 mg Rectal Q12H PRN     sodium chloride (PF)  3 mL Intracatheter q1 min prn       SUBJECTIVE:   Nausea: No. Vomiting: No--NG intact. Fever: No. Chills: No. Excessive burping:  "No. Flatus: No. BM: No. Pain is 0/10 at rest. Pain control: good. Tolerating current diet: N/A     PHYSICAL EXAM:   Vital signs: /80   Pulse 65   Temp 99  F (37.2  C) (Tympanic)   Resp 15   Ht 1.803 m (5' 11\")   Wt 95.1 kg (209 lb 10.5 oz)   SpO2 92%   BMI 29.24 kg/m     BMI: Body mass index is 29.24 kg/m .   General: Normal, healthy, cooperative, in no acute distress, alert   Lungs: respirations are non-labored   Abdominal: non-distended; stoma intact with healthy tissue noted   Wound: prevena wound vac intact; EMMANUELLE drain with bloody drainage noted   Extremities: No cyanosis, clubbing or edema noted bilaterally in Upper and Lower Extremities   Neurological: without deficit    INPUT/OUTPUT:      Intake/Output Summary (Last 24 hours) at 7/20/2021 0844  Last data filed at 7/20/2021 0800  Gross per 24 hour   Intake 2817.5 ml   Output 2955 ml   Net -137.5 ml       I/O last 3 completed shifts:  In: 2817.5 [I.V.:2817.5]  Out: 2880 [Urine:830; Emesis/NG output:780; Drains:70; Other:1200]    LABS:    Last CBC Rrsults:   Recent Labs   Lab Test 07/20/21  0501 07/19/21  0618 07/18/21  0608   WBC 11.4* 12.8* 15.2*   RBC 4.66 4.32* 4.53   HGB 14.3 13.5 14.2   HCT 42.1 39.2* 41.4   MCV 90 91 91   MCH 30.7 31.3 31.3   MCHC 34.0 34.4 34.3   RDW 13.1 13.2 13.2    217 194       Last Comprehensive Metabolic panel:  Recent Labs   Lab Test 07/20/21  0501 07/19/21  2213 07/19/21  1754 07/19/21  0618 07/18/21  0608 07/17/21  0602 07/16/21  0519 07/15/21  1831     --   --  139 137  --  138 140   POTASSIUM 3.2*  --   --  3.3* 3.5  --  4.0 4.2   CHLORIDE 104  --   --  104 106  --  107 107   CO2 28  --   --  25 26  --  24 30   ANIONGAP 8  --   --  10 5  --  7 3   * 130* 136* 101* 116*   < > 145* 99   BUN 19  --   --  19 20  --  19 23   CR 0.64*  --   --  0.63* 0.69  --  0.73 0.87   GFRESTIMATED 84  --   --  84 81  --  79 74   VANIA 8.1*  --   --  7.9* 7.9*  --  7.8* 8.7   BILITOTAL  --   --   --  0.6  --   --  1.1 " 0.6   ALKPHOS  --   --   --  48  --   --  56 73   ALT  --   --   --  13  --   --  21 25   AST  --   --   --  16  --   --  14 20    < > = values in this interval not displayed.       Recent Labs   Lab Test 07/20/21  0501 07/19/21  0618 07/16/21  0519 07/15/21  1831 02/18/20  0428 02/17/20  0440   MAG 2.0  --   --   --  2.0 2.1   ALBUMIN  --  2.1* 3.3* 4.0  --   --    PHOS 1.8*  --   --   --   --   --        ASSESSMENT:    1 Day Post-Op from Procedure(s):  Exploratory  laparotomy with sigmoid colectomy and creation of colostomy, appendectomy.  Sepsis, resolving      PLAN:   Up in Chair  Continue NG and haji

## 2021-07-20 NOTE — PROVIDER NOTIFICATION
MD Manzanares notified, pt ordered NPO/no exceptions. Has NG, abdominal sx. Scheduled PO metoprolol. Can I give this med?  Will continue to monitor.

## 2021-07-21 ENCOUNTER — APPOINTMENT (OUTPATIENT)
Dept: PHYSICAL THERAPY | Facility: HOSPITAL | Age: 86
DRG: 329 | End: 2021-07-21
Attending: NURSE PRACTITIONER
Payer: MEDICARE

## 2021-07-21 ENCOUNTER — APPOINTMENT (OUTPATIENT)
Dept: WOUND CARE | Facility: HOSPITAL | Age: 86
DRG: 329 | End: 2021-07-21
Attending: NURSE PRACTITIONER
Payer: MEDICARE

## 2021-07-21 ENCOUNTER — APPOINTMENT (OUTPATIENT)
Dept: OCCUPATIONAL THERAPY | Facility: HOSPITAL | Age: 86
DRG: 329 | End: 2021-07-21
Attending: INTERNAL MEDICINE
Payer: MEDICARE

## 2021-07-21 LAB
ANION GAP SERPL CALCULATED.3IONS-SCNC: 7 MMOL/L (ref 3–14)
BUN SERPL-MCNC: 17 MG/DL (ref 7–30)
CALCIUM SERPL-MCNC: 7.7 MG/DL (ref 8.5–10.1)
CHLORIDE BLD-SCNC: 102 MMOL/L (ref 94–109)
CO2 SERPL-SCNC: 30 MMOL/L (ref 20–32)
CREAT SERPL-MCNC: 0.68 MG/DL (ref 0.66–1.25)
ERYTHROCYTE [DISTWIDTH] IN BLOOD BY AUTOMATED COUNT: 13 % (ref 10–15)
GFR SERPL CREATININE-BSD FRML MDRD: 82 ML/MIN/1.73M2
GLUCOSE BLD-MCNC: 117 MG/DL (ref 70–99)
GLUCOSE BLDC GLUCOMTR-MCNC: 111 MG/DL (ref 70–99)
GLUCOSE BLDC GLUCOMTR-MCNC: 113 MG/DL (ref 70–99)
HCT VFR BLD AUTO: 40.8 % (ref 40–53)
HGB BLD-MCNC: 13.6 G/DL (ref 13.3–17.7)
HGB BLD-MCNC: 14 G/DL (ref 13.3–17.7)
MCH RBC QN AUTO: 30.6 PG (ref 26.5–33)
MCHC RBC AUTO-ENTMCNC: 34.3 G/DL (ref 31.5–36.5)
MCV RBC AUTO: 89 FL (ref 78–100)
PHOSPHATE SERPL-MCNC: 2.2 MG/DL (ref 2.5–4.5)
PHOSPHATE SERPL-MCNC: 3.5 MG/DL (ref 2.5–4.5)
PLATELET # BLD AUTO: 243 10E3/UL (ref 150–450)
POTASSIUM BLD-SCNC: 2.9 MMOL/L (ref 3.4–5.3)
POTASSIUM BLD-SCNC: 3 MMOL/L (ref 3.4–5.3)
POTASSIUM BLD-SCNC: 3.4 MMOL/L (ref 3.4–5.3)
RBC # BLD AUTO: 4.57 10E6/UL (ref 4.4–5.9)
SODIUM SERPL-SCNC: 139 MMOL/L (ref 133–144)
WBC # BLD AUTO: 12.7 10E3/UL (ref 4–11)

## 2021-07-21 PROCEDURE — 97530 THERAPEUTIC ACTIVITIES: CPT | Mod: GP | Performed by: PHYSICAL THERAPIST

## 2021-07-21 PROCEDURE — 97165 OT EVAL LOW COMPLEX 30 MIN: CPT | Mod: GO

## 2021-07-21 PROCEDURE — 250N000011 HC RX IP 250 OP 636: Performed by: INTERNAL MEDICINE

## 2021-07-21 PROCEDURE — 250N000011 HC RX IP 250 OP 636: Performed by: NURSE PRACTITIONER

## 2021-07-21 PROCEDURE — 84100 ASSAY OF PHOSPHORUS: CPT | Performed by: INTERNAL MEDICINE

## 2021-07-21 PROCEDURE — 80048 BASIC METABOLIC PNL TOTAL CA: CPT | Performed by: SURGERY

## 2021-07-21 PROCEDURE — 258N000003 HC RX IP 258 OP 636: Performed by: INTERNAL MEDICINE

## 2021-07-21 PROCEDURE — 36415 COLL VENOUS BLD VENIPUNCTURE: CPT | Performed by: SURGERY

## 2021-07-21 PROCEDURE — 120N000001 HC R&B MED SURG/OB

## 2021-07-21 PROCEDURE — 85018 HEMOGLOBIN: CPT | Performed by: INTERNAL MEDICINE

## 2021-07-21 PROCEDURE — 36415 COLL VENOUS BLD VENIPUNCTURE: CPT | Performed by: INTERNAL MEDICINE

## 2021-07-21 PROCEDURE — 84132 ASSAY OF SERUM POTASSIUM: CPT | Performed by: INTERNAL MEDICINE

## 2021-07-21 PROCEDURE — 99232 SBSQ HOSP IP/OBS MODERATE 35: CPT | Mod: 24 | Performed by: INTERNAL MEDICINE

## 2021-07-21 PROCEDURE — 250N000009 HC RX 250: Performed by: INTERNAL MEDICINE

## 2021-07-21 PROCEDURE — 250N000011 HC RX IP 250 OP 636

## 2021-07-21 PROCEDURE — 97162 PT EVAL MOD COMPLEX 30 MIN: CPT | Mod: GP | Performed by: PHYSICAL THERAPIST

## 2021-07-21 PROCEDURE — 250N000011 HC RX IP 250 OP 636: Performed by: SURGERY

## 2021-07-21 PROCEDURE — 85027 COMPLETE CBC AUTOMATED: CPT | Performed by: SURGERY

## 2021-07-21 RX ORDER — SODIUM CHLORIDE AND POTASSIUM CHLORIDE 300; 900 MG/100ML; MG/100ML
INJECTION, SOLUTION INTRAVENOUS CONTINUOUS
Status: DISCONTINUED | OUTPATIENT
Start: 2021-07-21 | End: 2021-07-23

## 2021-07-21 RX ORDER — POTASSIUM CHLORIDE 7.45 MG/ML
INJECTION INTRAVENOUS
Status: COMPLETED
Start: 2021-07-21 | End: 2021-07-21

## 2021-07-21 RX ORDER — HYDRALAZINE HYDROCHLORIDE 20 MG/ML
10 INJECTION INTRAMUSCULAR; INTRAVENOUS EVERY 6 HOURS PRN
Status: DISCONTINUED | OUTPATIENT
Start: 2021-07-21 | End: 2021-07-28 | Stop reason: HOSPADM

## 2021-07-21 RX ORDER — POTASSIUM CHLORIDE 7.45 MG/ML
10 INJECTION INTRAVENOUS
Status: COMPLETED | OUTPATIENT
Start: 2021-07-21 | End: 2021-07-21

## 2021-07-21 RX ADMIN — TAZOBACTAM SODIUM AND PIPERACILLIN SODIUM 3.38 G: 375; 3 INJECTION, SOLUTION INTRAVENOUS at 03:14

## 2021-07-21 RX ADMIN — POTASSIUM CHLORIDE 10 MEQ: 7.46 INJECTION, SOLUTION INTRAVENOUS at 13:05

## 2021-07-21 RX ADMIN — POTASSIUM CHLORIDE 10 MEQ: 7.46 INJECTION, SOLUTION INTRAVENOUS at 19:33

## 2021-07-21 RX ADMIN — TAZOBACTAM SODIUM AND PIPERACILLIN SODIUM 3.38 G: 375; 3 INJECTION, SOLUTION INTRAVENOUS at 08:12

## 2021-07-21 RX ADMIN — METOPROLOL TARTRATE 5 MG: 5 INJECTION INTRAVENOUS at 20:52

## 2021-07-21 RX ADMIN — POTASSIUM CHLORIDE 10 MEQ: 7.46 INJECTION, SOLUTION INTRAVENOUS at 12:03

## 2021-07-21 RX ADMIN — TAZOBACTAM SODIUM AND PIPERACILLIN SODIUM 3.38 G: 375; 3 INJECTION, SOLUTION INTRAVENOUS at 20:43

## 2021-07-21 RX ADMIN — METOPROLOL TARTRATE 5 MG: 5 INJECTION INTRAVENOUS at 08:11

## 2021-07-21 RX ADMIN — POTASSIUM CHLORIDE 10 MEQ: 7.46 INJECTION, SOLUTION INTRAVENOUS at 07:55

## 2021-07-21 RX ADMIN — ENOXAPARIN SODIUM 40 MG: 40 INJECTION SUBCUTANEOUS at 09:41

## 2021-07-21 RX ADMIN — POTASSIUM CHLORIDE 10 MEQ: 7.46 INJECTION, SOLUTION INTRAVENOUS at 18:09

## 2021-07-21 RX ADMIN — POTASSIUM CHLORIDE 10 MEQ: 7.46 INJECTION, SOLUTION INTRAVENOUS at 10:39

## 2021-07-21 RX ADMIN — FAMOTIDINE 20 MG: 20 INJECTION, SOLUTION INTRAVENOUS at 04:14

## 2021-07-21 RX ADMIN — POTASSIUM CHLORIDE 10 MEQ: 7.46 INJECTION, SOLUTION INTRAVENOUS at 17:03

## 2021-07-21 RX ADMIN — TAZOBACTAM SODIUM AND PIPERACILLIN SODIUM 3.38 G: 375; 3 INJECTION, SOLUTION INTRAVENOUS at 14:28

## 2021-07-21 RX ADMIN — HYDROMORPHONE HYDROCHLORIDE 0.5 MG: 1 INJECTION, SOLUTION INTRAMUSCULAR; INTRAVENOUS; SUBCUTANEOUS at 23:29

## 2021-07-21 RX ADMIN — POTASSIUM CHLORIDE 10 MEQ: 7.46 INJECTION, SOLUTION INTRAVENOUS at 05:51

## 2021-07-21 RX ADMIN — POTASSIUM CHLORIDE 10 MEQ: 7.46 INJECTION, SOLUTION INTRAVENOUS at 15:58

## 2021-07-21 RX ADMIN — SODIUM PHOSPHATE, MONOBASIC, MONOHYDRATE 15 MMOL: 276; 142 INJECTION, SOLUTION INTRAVENOUS at 09:41

## 2021-07-21 RX ADMIN — FAMOTIDINE 20 MG: 20 INJECTION, SOLUTION INTRAVENOUS at 16:15

## 2021-07-21 RX ADMIN — POTASSIUM CHLORIDE AND SODIUM CHLORIDE: 900; 300 INJECTION, SOLUTION INTRAVENOUS at 20:37

## 2021-07-21 RX ADMIN — SODIUM CHLORIDE, POTASSIUM CHLORIDE, SODIUM LACTATE AND CALCIUM CHLORIDE: 600; 310; 30; 20 INJECTION, SOLUTION INTRAVENOUS at 05:32

## 2021-07-21 RX ADMIN — POTASSIUM CHLORIDE 10 MEQ: 7.46 INJECTION, SOLUTION INTRAVENOUS at 09:41

## 2021-07-21 ASSESSMENT — ACTIVITIES OF DAILY LIVING (ADL)
ADLS_ACUITY_SCORE: 14

## 2021-07-21 ASSESSMENT — MIFFLIN-ST. JEOR: SCORE: 1647.13

## 2021-07-21 NOTE — PROGRESS NOTES
Inpatient Physical Therapy Evaluation    Name: James Grant MRN# [id number]   Age: 89 year old YOB: 1927     Date of Consultation: 2021  Consultation is requested by:  Lida Gill NP  Specific Consultation Request:  Post-op  Primary care provider: Broderick Flowers    General Information:   Onset Date: 2021    History of Current Problem/Admission: Abdominal pain, right lower quadrant [R10.31]  Sigmoid diverticulitis [K57.32]  Diverticulitis large intestine [K57.32]    Prior Level of Function: Pt reports he is independent with all ADLs at home.  Pt drives.  Pt ambulates without assistive device.  Ambulation: 0 - Independent   Transferrin - Independent   Toiletin - Independent    Bathin - Independent   Dressin - Independent   Eatin - Independent   Communication: 0 - Understands/communicates without difficulty  Swallowin - swallows foods/liquids without difficulty  Cognition: 0 - no cognitive issues reported    Fall history within the last 6 months: No    Current Living Situation: Patient 4 steps without rails to enter through the back of his home or 5 steps with rails to enter through front.  Laundry is in basement otherwise bedroom, bathroom, shower are located on main floor.  Pt lives alone    Current Equipment Used at Home: none     Patient & Family Goals: eat     Past Medical History:   Past Medical History:   Diagnosis Date     Benign localized hyperplasia of prostate without urinary obstruction and other lower urinary tract symptoms (LUTS) 2010     CHD (coronary heart disease) 2010     Dyslipidemia 2010     GERD (gastroesophageal reflux disease) 2010     HTN (hypertension) 2010     Old myocardial infarction 2010     Other symptoms involving digestive system(787.99) 10/20/2006       Past Surgical History:  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       CHOLECYSTECTOMY        CYSTOSCOPY, FULGURATE BLEEDERS, EVACUATE CLOT(S), COMBINED N/A 2/17/2020    Procedure: Clot Evacuation;  Surgeon: Andrzej Olivia MD;  Location:  OR     CYSTOSCOPY, RETROGRADES, COMBINED Bilateral 10/22/2019    Procedure: Bilateral Retrograde Pyelograms;  Surgeon: Andrzej Olivia MD;  Location: [facility] OR     CYSTOSCOPY, TRANSURETHRAL RESECTION (TUR) TUMOR BLADDER, COMBINED N/A 10/22/2019    Procedure: Transurethral Resection of Bladder Tumor and transurethral resection of prostate and bladder stone removal;  Surgeon: Andrzej Olivia MD;  Location: [facility] OR     HERNIA REPAIR       LAPAROTOMY EXPLORATORY N/A 7/19/2021    Procedure: Exploratory  laparotomy with sigmoid colectomy and creation of colostomy, appendectomy;  Surgeon: Sukumar Chavez MD;  Location: [facility] OR     left arm surgery         Medications:   Current Facility-Administered Medications   Medication     [Held by provider] acetaminophen (TYLENOL) tablet 975 mg     enoxaparin ANTICOAGULANT (LOVENOX) injection 40 mg     famotidine (PEPCID) infusion 20 mg     HYDROmorphone (PF) (DILAUDID) injection 0.5-1 mg     lactated ringers infusion     lidocaine (LMX4) kit     lidocaine 1 % 0.1-1 mL     [Held by provider] losartan (COZAAR) tablet 50 mg     [Held by provider] melatonin tablet 1 mg     metoprolol (LOPRESSOR) injection 5 mg     [Held by provider] metoprolol succinate (TOPROL-XL) half-tab 12.5 mg     naloxone (NARCAN) injection 0.2 mg    Or     naloxone (NARCAN) injection 0.4 mg    Or     naloxone (NARCAN) injection 0.2 mg    Or     naloxone (NARCAN) injection 0.4 mg     ondansetron (ZOFRAN) injection 4 mg     ondansetron (ZOFRAN-ODT) ODT tab 4 mg    Or     ondansetron (ZOFRAN) injection 4 mg     piperacillin-tazobactam (ZOSYN) infusion 3.375 g     potassium chloride 10 mEq in 100 mL sterile water intermittent infusion (premix)     prochlorperazine (COMPAZINE) injection 5 mg    Or     prochlorperazine (COMPAZINE) tablet 5 mg    Or     prochlorperazine  (COMPAZINE) suppository 12.5 mg     propofol (DIPRIVAN) 1000 MG/100ML infusion     [Held by provider] simvastatin (ZOCOR) tablet 20 mg     sodium chloride (PF) 0.9% PF flush 3 mL     sodium chloride (PF) 0.9% PF flush 3 mL     sodium phosphate 15 mmol in D5W intermittent infusion       Weight Bearing Status: FWB LEs     Cognitive Status Examination:  Orientation: oriented to time, place and person  Level of Consciousness: alert  Follows Commands and Answers Questions: 100% of the time  Personal Safety and Judgement: Intact  Memory: Immediate recall intact  Comments:     Pain:   Currently in pain? Yes  Pain Location? Hunger pains  Pain Rating: does not rate    Physical Therapy Evaluation:   Integumentary/Edema: abdominal incisions  ROM: LE AROM WFL  Strength: MMT deferred due to abdominal discomfort, LE strength appears grossly 4/5 throughout  Bed Mobility: sup>sit mod A with pain  Transfers: sit>stand mod A with pain  Gait: ambulated 3' from bed>chair with FWW min A of 2 on 3.5L O2 with sats 94-95% with activity  Stairs: NT  Balance: fair+ with support  Sensory: NT  Coordination: NT    Physical Therapy Interventions: Balance, Bed Mobility, Gait Training , Neuro-muscular re-education, Strengthening, Transfer Training, Risk Factor Education and Progressive Activity/Exercise     Clinical Impressions:  Criteria for Skilled Therapeutic Intervention Met: Yes, treatment indicated  PT Diagnosis: gait disturbance  Influenced by the following impairments: pain, decreased strength, decreased activity tolerance, decreased balance  Functional limitations due to impairment: decreased tolerance and ability to complete ADLs and functional mobility  Clinical presentation: Evolving/Changing  Clinical presentation rationale: clinical judgement  Clinical Decision making (complexity): Moderate Complexity  Frequency: 6 times/week  Predicted Duration of Therapy Intervention (days/wks): 5 days  Anticipated Discharge Disposition:  Transitional Care Facility   Anticipated Equipment Needs at Discharge:   Risks and Benefits of therapy have been explained: Yes  Patient, Family & other staff in agreement with plan of care: Yes  Clinical Impression Comments: PT evaluation completed.  Pt demonstrates decreased tolerance for ADLs and functional mobility and lives alone and is independent at baseline.  Based on pt's current level of function, likely will require short term rehab upon discharge however will depend how pt improves over the next several days.  Will see daily during acute care stay.      Total Eval Time: 15

## 2021-07-21 NOTE — PLAN OF CARE
Face to face report given with opportunity to observe patient.    Report given to Deb Whitaker, SARA   7/21/2021  6:52 AM

## 2021-07-21 NOTE — PLAN OF CARE
"/77 (BP Location: Left arm)   Pulse 67   Temp 98.2  F (36.8  C) (Tympanic)   Resp 18   Ht 1.803 m (5' 11\")   Wt 98.5 kg (217 lb 2.5 oz)   SpO2 99%   BMI 30.29 kg/m      LR running at 100 mL/hr. IV Zosyn. Vitals as charted. Denies pain. BSs hypoactive. Prevena wound vac in place. Adequate serosanguinous output from EMMANUELLE. Scant output into colostomy w/ no gas noted. Denies nausea. Remains NPO. Large dark red/brown output from NG. [place] remains unchanged at 61 cm. Clarified again with Dr. Chavez that NG color is acceptable. MD acknowledged and is not concerned at this time. Colostomy appliance changed by [facility] nurse.  Adequate output from haji. Up in chair x3 today. Heavy assist of 3 with GB & walker. Bed in lowest position. Call light in reach. ID band in place. Makes needs known. Room near unit station.   "

## 2021-07-21 NOTE — PROGRESS NOTES
Inpatient Occupational Therapy Evaluation    Name: James Grant MRN# [id number]   Age: 89 year old YOB: 1927     Date of Consultation: 2021  Consultation is requested by: Lida Gill NP  Specific Consultation Request: Post-op  Primary care provider: Broderick Flowers    General Information:   Onset Date: 21    History of Current Problem/Admission: Abdominal pain, right lower quadrant [R10.31]  Sigmoid diverticulitis [K57.32]  Diverticulitis large intestine [K57.32]    Prior Level of Function: Pt reports he was independent with ADLs prior to admission and did not use an assistive device for functional mobility.   Ambulation: 0 - Independent   Transferrin - Independent   Toiletin - Independent    Bathin - Independent   Dressin - Independent   Eatin - Independent   Communication: 0 - Understands/communicates without difficulty  Swallowin - swallows foods/liquids without difficulty  Cognition: 0 - no cognitive issues reported    Fall history within the last 6 months: No    Current Living Situation: Pt lives in a house alone with 5-6 steps to enter. Laundry is in the basement otherwise bedroom and bathroom are on the main floor. Bathroom has a taller toilet, shower/tub combo, and 1 grab bar by pt rarely uses.     Current Equipment Used at Home: None     Patient & Family Goals: Did not verbalize     Past Medical History:   Past Medical History:   Diagnosis Date     Benign localized hyperplasia of prostate without urinary obstruction and other lower urinary tract symptoms (LUTS) 2010     CHD (coronary heart disease) 2010     Dyslipidemia 2010     GERD (gastroesophageal reflux disease) 2010     HTN (hypertension) 2010     Old myocardial infarction 2010     Other symptoms involving digestive system(787.99) 10/20/2006       Past Surgical History:  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        CHOLECYSTECTOMY       CYSTOSCOPY, FULGURATE BLEEDERS, EVACUATE CLOT(S), COMBINED N/A 2/17/2020    Procedure: Clot Evacuation;  Surgeon: Andrzej Olivia MD;  Location: [facility] OR     CYSTOSCOPY, RETROGRADES, COMBINED Bilateral 10/22/2019    Procedure: Bilateral Retrograde Pyelograms;  Surgeon: Andrzej Olivia MD;  Location: [facility] OR     CYSTOSCOPY, TRANSURETHRAL RESECTION (TUR) TUMOR BLADDER, COMBINED N/A 10/22/2019    Procedure: Transurethral Resection of Bladder Tumor and transurethral resection of prostate and bladder stone removal;  Surgeon: Andrzej Olivia MD;  Location: [facility] OR     HERNIA REPAIR       LAPAROTOMY EXPLORATORY N/A 7/19/2021    Procedure: Exploratory  laparotomy with sigmoid colectomy and creation of colostomy, appendectomy;  Surgeon: Sukumar Chavez MD;  Location: [facility] OR     left arm surgery         Medications:   Current Facility-Administered Medications   Medication     [Held by provider] acetaminophen (TYLENOL) tablet 975 mg     enoxaparin ANTICOAGULANT (LOVENOX) injection 40 mg     famotidine (PEPCID) infusion 20 mg     HYDROmorphone (PF) (DILAUDID) injection 0.5-1 mg     lactated ringers infusion     lidocaine (LMX4) kit     lidocaine 1 % 0.1-1 mL     [Held by provider] losartan (COZAAR) tablet 50 mg     [Held by provider] melatonin tablet 1 mg     metoprolol (LOPRESSOR) injection 5 mg     [Held by provider] metoprolol succinate (TOPROL-XL) half-tab 12.5 mg     naloxone (NARCAN) injection 0.2 mg    Or     naloxone (NARCAN) injection 0.4 mg    Or     naloxone (NARCAN) injection 0.2 mg    Or     naloxone (NARCAN) injection 0.4 mg     ondansetron (ZOFRAN) injection 4 mg     ondansetron (ZOFRAN-ODT) ODT tab 4 mg    Or     ondansetron (ZOFRAN) injection 4 mg     piperacillin-tazobactam (ZOSYN) infusion 3.375 g     prochlorperazine (COMPAZINE) injection 5 mg    Or     prochlorperazine (COMPAZINE) tablet 5 mg    Or     prochlorperazine (COMPAZINE) suppository 12.5 mg     [Held by provider] simvastatin  (ZOCOR) tablet 20 mg     sodium chloride (PF) 0.9% PF flush 3 mL     sodium chloride (PF) 0.9% PF flush 3 mL     sodium phosphate 15 mmol in D5W intermittent infusion       Weight Bearing Status: FWB    Cognitive Status Examination:  Orientation: oriented to time, place and person (off on actual date by 3 days- stated )   Level of Consciousness: lethargic  Follows Commands and Answers Questions: 100% of the time  Personal Safety and Judgement: Intact  Memory: Immediate recall intact and Long-term memory intact  Comments:    Pain:   Currently in pain? Yes  Pain Location? R forearm   Pain Ratin    Occupational Therapy Evaluation:   Integumentary/Edema: Abdominal incisions   Range of Motion: BUE AROM WFL   Strength: MMT deferred due to surgery but functional weakness observed   Muscle Tone Assessment: No issues observd  Coordination: Normal    Mobility:   Bed Mobility: ModA supine to sit via log roll  Transfer Skills: ModA with a second staff present due to multiple tubes/wires/cords  Bed to Chair/Chair to Bed: Buddy x2 2-3 ft to the chair  Toilet Transfer: Unable to assess today   Balance: Fair with support     ADLs:   Lower Body Dressing: Unable to assess today  Toileting: Urinary catheter currently present  Grooming: Unable to assess today  Eating/Self Feeding: NG tube - pt strict NPO currently     SpO2 94% on 3L prior to activity, 99% after bed>chair    IADLs:   Previous Responsibilities of the Patient: Meal Prep, Housekeeping, Laundry, Shopping, Yard Work, Medication Management, Finances, and Driving      Activities of Daily Living Analysis:   Impairments Contributing to Impaired Activities of Daily Living: pain, decreased strength, decreased activity tolerance, decreased balance    Occupational Therapy Interventions: ADL Retraining, IADL retraining, strengthening, progressive activity/exercise and risk factor education     Clinical Impressions:  Criteria for Skilled Therapeutic Intervention Met: Yes,  treatment indicated  OT Diagnosis: Impaired ADLs  Influenced by the following impairments: pain, decreased strength, decreased activity tolerance, decreased balance  Functional limitations due to impairment: Decreased safety and tolerance with ADLs and IADLs  Clinical presentation: Evolving/Changing  Clinical presentation rationale: Clinical judgement  Clinical Decision making (complexity): Low Complexity  Frequency: 5 times/week  Predicted Duration of Therapy Intervention (days/wks): 5 days    Anticipated Discharge Disposition: Transitional Care Facility   Anticipated Equipment Needs at Discharge:   Risks and Benefits of therapy have been explained: Yes  Patient, Family & other staff in agreement with plan of care: Yes  Clinical Impression Comments: Prior to admission, pt lived alone and was independent in ADLs and IADLs. Currently, pt is requiring assistance to safety complete ADLs due to pain, decreased strength, decreased activity tolerance, and decreased balance. Pt would not be safe to return home based off of current level of function. Recommend short term rehab to improve pt's safety and independence in ADLs. Plan to treat pt during his acute care stay and will monitor/continue to make discharge recommendations based off of progress.     Total Eval Time: 20

## 2021-07-21 NOTE — PHARMACY
"Pharmacy Antimicrobial Stewardship Assessment     Current Antimicrobial Therapy:  Anti-infectives (From now, onward)    Start     Dose/Rate Route Frequency Ordered Stop    07/15/21 2130  piperacillin-tazobactam (ZOSYN) infusion 3.375 g     Note to Pharmacy: For [facility], [facility] and [facility]: For Zosyn-naive patients, use the \"Zosyn initial dose + extended infusion\" order panel.    3.375 g  100 mL/hr over 30 Minutes Intravenous EVERY 6 HOURS 07/15/21 2113            Indication: intra-abdominal infection    Days of Therapy: 7     Pertinent Labs:    WBC Count   Date Value Ref Range Status   07/21/2021 12.7 (H) 4.0 - 11.0 10e3/uL Final   07/20/2021 11.4 (H) 4.0 - 11.0 10e3/uL Final   07/19/2021 12.8 (H) 4.0 - 11.0 10e3/uL Final     Procalcitonin   Date Value Ref Range Status   02/16/2019 <0.05 ng/ml Final     Comment:     <0.05 ng/ml  Normal  Recommendation: Very low risk of bacterial infection.   Discourage antibiotics unless strong clinical suspicion for serious infection.       CRP Inflammation   Date Value Ref Range Status   07/17/2021 188.0 (H) 0.0 - 8.0 mg/L Final   07/16/2021 57.8 (H) 0.0 - 8.0 mg/L Final       Culture Results:   Collected Updated Procedure Result Status    07/15/2021 2130 07/19/2021 0626 Blood Culture Arm, Right [[id number]]   Blood from Arm, Right    Preliminary result Component Value   Culture No growth after 3 days P              07/15/2021 2130 07/19/2021 0631 Blood Culture Arm, Right [[id number]]   Blood from Arm, Right    Preliminary result Component Value   Culture No growth after 3 days P              07/15/2021 1842 07/15/2021 1938 Asymptomatic COVID-19 Virus (Coronavirus) by PCR Nasopharyngeal [[id number]]    Swab from Nasopharyngeal    Final result Component Value   No component results           07/15/2021 1842 07/15/2021 1938 SARS-COV2 (COVID-19) Virus RT-PCR [[id number]]    Swab from Nasopharyngeal    Final result Component Value   SARS CoV2 PCR Negative   NEGATIVE: SARS-CoV-2 " (COVID-19) RNA not detected, presumed negative.             Recommendations/Interventions:  1. None at this time    Lida Burdick, [facility]  July 21, 2021

## 2021-07-21 NOTE — PLAN OF CARE
Face to face report given with opportunity to observe patient.    Report given to SARA Whiteside.    Martine Torres RN   7/20/2021  7:03 PM

## 2021-07-21 NOTE — PROGRESS NOTES
07/21/21 0923   Signing Clinician's Name / Credentials   Signing clinician's name / credentials Emma Merino DPT   Quick Adds   Rehab Discipline PT   Therapeutic Activity   Minutes of Treatment 8 minutes   Symptoms Noted During/After Treatment Increased pain   Treatment Detail Pt verbally instructed in log roll and required assist to complete.  Educated on potential need for short term rehab upon discharge depending on progress over next several days.  Educated on importance of activity as pt wants to stay in bed at this time.     PT Discharge Planning    PT Discharge Recommendation (DC Rec) Transitional Care Facility   PT Rationale for DC Rec PT evaluation completed.  Pt demonstrates decreased tolerance for ADLs and functional mobility and lives alone and is independent at baseline.  Based on pt's current level of function, likely will require short term rehab upon discharge however will depend how pt improves over the next several days.  Will see daily during acute care stay.     PT Brief overview of current status  sup>sit mod A via logroll, sit<>stand mod A, ambulated approx 3' from bed>chair with FWW min A of 2   Additional Documentation   Rehab Comments limited activity tolerance   PT Plan Progress mobility and strength   Total Session Time   Total Session Time (minutes) 8 minutes

## 2021-07-21 NOTE — PROGRESS NOTES
07/21/21 1300   Signing Clinician's Name / Credentials   Signing clinician's name / credentials Saumya Ash, OTR/L   Quick Adds   Rehab Discipline OT   OT Discharge Planning    OT Discharge Recommendation (DC Rec) Transitional Care Facility   OT Rationale for DC Rec Prior to admission, pt lived alone and was independent in ADLs and IADLs. Currently, pt is required assistance to safety complete ADLs due to pain, decreased strength, decreased activity tolerance, and decreased balance. Pt would not be safe to return home based off of current level of function. Recommend short term rehab to improve pt's safety and independence in ADLs. Plan to treat pt during his acute care stay and will monitor/continue to make discharge recommendations based off of progress.    OT Brief overview of current status  ModA supine>sit via log roll, ModA sit>stand with a second staff present due to multiple tubes/wires/cords, Buddy x2 2-3 ft from bed to the chair, urinary catheter currently present, NG tube also currently present - pt strict NPO at this time; SpO2 94% on 3L prior to activity, 99% after bed>chair   Additional Documentation   Rehab Comments Decreased activity tolerance, weakness   OT Plan Progress strength, activity tolerance, and function, as able

## 2021-07-21 NOTE — PROGRESS NOTES
[facility]    Hospitalist Progress Note      Assessment & Plan   James Grant is a 89 year old male who was admitted on 7/15/2021.      1.  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  Drain is in place.  Minimal discomfort.  No real output into his colostomy yet at this time.  NG tube still in place.  Bilious returns.  No fevers at all.  White count 12,700.  Remains on IV Zosyn.  N.p.o. until some gas in bag.  Wound looks good.  NG tube and other cares per surgery.  Ambulate today.    2.  Sigmoid diverticulitis: Continues on Zosyn.  No fevers white count mild elevation.  Continue IV antibiotics.  No signs of sepsis.    3.  Cardiac status: Last echo February 2020 normal systolic function.  Joe hemodynamically stable here at this time.  No dysrhythmias.  Had been on oral beta-blocker he has IV metoprolol ordered.    4.  Pulmonary status: Patient remains on oxygen.  3 to 4 L.  Chest x-ray done preoperatively showed just some minimal atelectasis.  Will need aggressive pulmonary toilet.  We will continue with incentive spirometry.  Try to get him up and ambulate here today.    5.  Renal status: Prater catheter in place.  Urine outputs been good.  BUN/creatinine stable electrolytes biggest issue is just potassium hypokalemia we are continue to replace intravenously.    6.  Disposition: At this point patient is doing very well.  No longer requires ICU status.  We will move him out.  Further cares of NG diet etc. per surgery.  We will try and work on his pulmonary toilet a bit more.  Likely will require structured nursing facility to help with recovery and getting used to his colostomy.      Diet: NPO for Medical/Clinical Reasons Except for: No Exceptions  Fluids: Lactated Ringer's 100 cc an hour    DVT Prophylaxis: Pneumatic Compression Devices  Code Status: Full Code    Disposition: Expected discharge in 2-5 days  once ostomy working tolerating diet.    Filipe Cole    Interval History   No big issues overnight.  Hemodynamically stable.  Afebrile.  Minimal discomfort.  No gas or stool in ostomy at this time.  Will move out of ICU.  Ambulate.    -Data reviewed today: I reviewed all new labs and imaging results over the last 24 hours. I personally reviewed no images or EKG's today.    Physical Exam   Temp: 98.4  F (36.9  C) Temp src: Tympanic BP: 161/87 Pulse: 68   Resp: 13 SpO2: (!) 91 % O2 Device: Nasal cannula Oxygen Delivery: 4 LPM  Vitals:    07/15/21 1814 07/20/21 0400 07/21/21 0216   Weight: 86.2 kg (190 lb) 95.1 kg (209 lb 10.5 oz) 98.5 kg (217 lb 2.5 oz)     Vital Signs with Ranges  Temp:  [97.9  F (36.6  C)-99.8  F (37.7  C)] 98.4  F (36.9  C)  Pulse:  [55-68] 68  Resp:  [7-24] 13  BP: (138-164)/() 161/87  SpO2:  [75 %-94 %] 91 %  I/O last 3 completed shifts:  In: 2205 [I.V.:2205]  Out: 2380 [Urine:1270; Emesis/NG output:600; Drains:495; Stool:15]    Peripheral IV 07/19/21 Right Wrist (Active)   Site Assessment WDL 07/21/21 0500   Line Status Saline locked 07/21/21 0500   Phlebitis Scale 0-->no symptoms 07/21/21 0500   Infiltration Scale 0 07/21/21 0500   Number of days: 2       Peripheral IV 07/20/21 Anterior;Right Lower forearm (Active)   Site Assessment WDL 07/21/21 0500   Line Status Infusing 07/21/21 0500   Phlebitis Scale 0-->no symptoms 07/21/21 0500   Infiltration Scale 0 07/21/21 0500   Number of days: 1       Closed/Suction Drain 1 Right RLQ Bulb 19 [place] (Active)   Site Description UTV 07/21/21 0438   Dressing Status Drainage - Minimal 07/21/21 0438   Dressing Change Due 07/19/21 07/19/21 1635   Drainage Appearance Serosanguenous 07/21/21 0438   Status To bulb suction 07/21/21 0438   Output (ml) 80 ml 07/21/21 0438   Number of days: 2       NG/OG/NJ Tube Right nostril To LIS (Active)   Site Description WDL 07/21/21 0400   Status Suction-low intermittent 07/21/21 0400   Drainage Appearance Dark  Red;Brown 07/21/21 0400   Placement Confirmation [place] unchanged 07/21/21 0400   [place] (cm marking) at nare/mouth 61 cm 07/21/21 0400   Container Amount 250 mL 07/21/21 0532   Output (ml) 250 ml 07/21/21 0532   Number of days: 2       Colostomy (Active)   Stomal Appliance 2 piece 07/21/21 0400   Stoma Assessment Red;Protruding 07/21/21 0400   Peristomal Assessment UTV 07/20/21 2248   Treatment Barrier ring 07/20/21 1454   Stool Amount Other (Comment) 07/21/21 0400   Output (ml) 15 ml 07/20/21 1454   Number of days: 2       Urethral Catheter (Active)   Tube Description Positional 07/21/21 0532   Catheter Care Done 07/21/21 0532   Collection Container Standard 07/21/21 0532   Securement Method Securing device (Describe) 07/21/21 0532   Rationale for Continued Use /GI/GYN Pelvic Procedure 07/21/21 0532   Urine Output 100 mL 07/21/21 0532   Number of days: 2       Incision/Surgical Site 07/19/21 Abdomen (Active)   Incision Assessment UTV 07/21/21 0438   Madisyn-Incision Assessment Warm;Pale 07/20/21 1934   Closure DAVID 07/20/21 1200   Incision Drainage Amount None 07/21/21 0438   Incision Care Therapy - negative pressure 07/21/21 0438   Dressing Intervention Clean, dry, intact 07/20/21 1600   Number of days: 2     No line/device    Constitutional: ,Alert and oriented x3. No distress    HEENT: Normocephalic/atraumatic, PERRL, EOMI, mouth dry, NG tube right nare., neck supple, no LN.     Cardiovascular: RRR.  No murmur, no  rubs, or gallops.  JVD flat.  Bruits no.  Pulses 2+    Respiratory: Bibasilar crackles otherwise clear no wheezing clear to auscultation bilaterally    Abdomen: Mild distended soft mild tenderness no rebound.  Surgical site with dressing in place.  Right lower quadrant drain serosanguineous fluid.  Left lower quadrant colostomy pink no gas or stool.  Minimal bowel sounds    Extremities: Warm/dry. No edema    Neuro:   Non focal, cranial nerves intact, Moves all extremities.  Medications     lactated  ringers 100 mL/hr at 07/21/21 0532       famotidine  20 mg Intravenous Q12H     [Held by provider] losartan  50 mg Oral Daily     metoprolol  5 mg Intravenous BID     [Held by provider] metoprolol succinate ER  12.5 mg Oral At Bedtime     ondansetron  4 mg Intravenous Once     piperacillin-tazobactam  3.375 g Intravenous Q6H     potassium chloride  10 mEq Intravenous Q1H     propofol         [Held by provider] simvastatin  20 mg Oral At Bedtime     sodium chloride (PF)  3 mL Intracatheter Q8H       Data   Recent Labs   Lab 07/21/21  0603 07/21/21  0447 07/21/21  0424 07/20/21  2246 07/20/21  1832 07/20/21  0501 07/19/21  0618 07/17/21  0602 07/16/21  0519 07/15/21  1831   WBC  --  12.7*  --   --   --  11.4* 12.8*  --   --  14.6*   HGB  --  14.0  --   --   --  14.3 13.5  --   --  15.4   MCV  --  89  --   --   --  90 91  --   --  92   PLT  --  243  --   --   --  243 217  --   --  242   NA  --  139  --   --   --  140 139  --  138 140   POTASSIUM 3.0* 2.9*  --   --  3.2* 3.2* 3.3*  --  4.0 4.2   CHLORIDE  --  102  --   --   --  104 104  --  107 107   CO2  --  30  --   --   --  28 25  --  24 30   BUN  --  17  --   --   --  19 19  --  19 23   CR  --  0.68  --   --   --  0.64* 0.63*  --  0.73 0.87   ANIONGAP  --  7  --   --   --  8 10  --  7 3   VANIA  --  7.7*  --   --   --  8.1* 7.9*  --  7.8* 8.7   GLC  --  117* 111* 128*  --  153* 101*   < > 145* 99   ALBUMIN  --   --   --   --   --   --  2.1*  --  3.3* 4.0   PROTTOTAL  --   --   --   --   --   --  5.6*  --  6.5* 7.6   BILITOTAL  --   --   --   --   --   --  0.6  --  1.1 0.6   ALKPHOS  --   --   --   --   --   --  48  --  56 73   ALT  --   --   --   --   --   --  13  --  21 25   AST  --   --   --   --   --   --  16  --  14 20   LIPASE  --   --   --   --   --   --   --   --   --  154    < > = values in this interval not displayed.       No results found for this or any previous visit (from the past 24 hour(s)).

## 2021-07-21 NOTE — PROVIDER NOTIFICATION
DATE:  7/21/2021   TIME OF RECEIPT FROM LAB:  0526  LAB TEST:  potassium  LAB VALUE:  2.9  RESULTS GIVEN WITH READ-BACK TO (PROVIDER):  Dr Manzanares via txt pg, no RN managed order set in place, had given scheduled doses yesterday.   TIME LAB VALUE REPORTED TO PROVIDER:   9520

## 2021-07-21 NOTE — PROGRESS NOTES
Spoke with Adrian and son, Osbaldo in regards to current recommendations for short term rehab.  Adrian agreeable.    Pt/family was provided with the Medicare Compare list for SNF.  Discussed associated Medicare star ratings to assist with choice for referrals/discharge planning Yes    Education was given to pt/family that star ratings are updated/maintained by Medicare and can be reviewed by visiting www.medicare.gov Yes    Patient/family choices for facility in priority are:   First Choice : [facility]    [phone]; message left.     Second Choice: Skilled Nursing Facility [place]: [facility]     [phone]; no anticipated availability to accept an admission this week    Third Choice: Skilled Nursing Facility [place]: [facility]         [phone]; message left

## 2021-07-21 NOTE — PLAN OF CARE
"VSS ex remains on 4LPM NC. LS clear w/dim bases, intermittent fine crackles. BS hypoactive, ostomy in place w/scant bloody drainage, pt did report feeling as though he needed to have a bowel movement \"the old fashioned way\" placed on bedpan with no result. Prater in place draining adequate yellow urine. NG draining dark brown to dark red fluid, surgeon aware. EMMANUELLE output increased serosanguinous volume. IVF continues, abx zosyn, potassium and phos replacments given.   "

## 2021-07-21 NOTE — PROGRESS NOTES
INPATIENT ROUNDING NOTE  7/21/2021    Patient: James FALK Rudy    Physician of Record: Hospitalist Service    Admitting diagnosis: Abdominal pain, right lower quadrant [R10.31]  Sigmoid diverticulitis [K57.32]  Diverticulitis large intestine [K57.32]    Procedure(s):  Exploratory  laparotomy with sigmoid colectomy and creation of colostomy, appendectomy     POD: 2 Days Post-Op    Current Diet: NPO    CURRENT MEDICATIONS:  Continuous Medications:  Current Facility-Administered Medications   Medication Last Rate     lactated ringers 100 mL/hr at 07/21/21 0532       Scheduled Medications:  Current Facility-Administered Medications   Medication Dose Route Frequency     enoxaparin ANTICOAGULANT  40 mg Subcutaneous Q24H     famotidine  20 mg Intravenous Q12H     [Held by provider] losartan  50 mg Oral Daily     metoprolol  5 mg Intravenous BID     [Held by provider] metoprolol succinate ER  12.5 mg Oral At Bedtime     ondansetron  4 mg Intravenous Once     piperacillin-tazobactam  3.375 g Intravenous Q6H     potassium chloride  10 mEq Intravenous Q1H     propofol         [Held by provider] simvastatin  20 mg Oral At Bedtime     sodium chloride (PF)  3 mL Intracatheter Q8H     sodium phosphate  15 mmol Intravenous Once       PRN Medications:  Current Facility-Administered Medications   Medication Dose Route Frequency     [Held by provider] acetaminophen  975 mg Oral Q4H PRN     HYDROmorphone  0.5-1 mg Intravenous Q3H PRN     lidocaine 4%   Topical Q1H PRN     lidocaine (buffered or not buffered)  0.1-1 mL Other Q1H PRN     [Held by provider] melatonin  1 mg Oral At Bedtime PRN     naloxone  0.2 mg Intravenous Q2 Min PRN    Or     naloxone  0.4 mg Intravenous Q2 Min PRN    Or     naloxone  0.2 mg Intramuscular Q2 Min PRN    Or     naloxone  0.4 mg Intramuscular Q2 Min PRN     ondansetron  4 mg Oral Q6H PRN    Or     ondansetron  4 mg Intravenous Q6H PRN     prochlorperazine  5 mg Intravenous Q6H PRN    Or      "prochlorperazine  5 mg Oral Q6H PRN    Or     prochlorperazine  12.5 mg Rectal Q12H PRN     sodium chloride (PF)  3 mL Intracatheter q1 min prn       SUBJECTIVE:   Nausea: No. Vomiting: No. Fever: No. Chills: No. Excessive burping: No. Flatus: No. BM: No. Pain is no pain at rest. Pain control: good. Tolerating current diet: N/A    PHYSICAL EXAM:   Vital signs: BP (!) 182/96 (BP Location: Left arm)   Pulse 63   Temp 99.1  F (37.3  C) (Tympanic)   Resp 13   Ht 1.803 m (5' 11\")   Wt 98.5 kg (217 lb 2.5 oz)   SpO2 96%   BMI 30.29 kg/m     BMI: Body mass index is 30.29 kg/m .   General: Normal, healthy, cooperative, in no acute distress, alert   Abdominal: non-distended; EMMANUELLE drain intact with serosanguineous drainage; stoma with healthy tissue   Wound: prevena wound vac intact   Extremities: No cyanosis, clubbing or edema noted bilaterally in Upper and Lower Extremities   Neurological: without deficit    INPUT/OUTPUT:      Intake/Output Summary (Last 24 hours) at 7/21/2021 0900  Last data filed at 7/21/2021 0826  Gross per 24 hour   Intake 2205 ml   Output 2685 ml   Net -480 ml       I/O last 3 completed shifts:  In: 2205 [I.V.:2205]  Out: 2380 [Urine:1270; Emesis/NG output:600; Drains:495; Stool:15]    LABS:    Last CBC Rrsults:   Recent Labs   Lab Test 07/21/21  0447 07/20/21  0501 07/19/21  0618   WBC 12.7* 11.4* 12.8*   RBC 4.57 4.66 4.32*   HGB 14.0 14.3 13.5   HCT 40.8 42.1 39.2*   MCV 89 90 91   MCH 30.6 30.7 31.3   MCHC 34.3 34.0 34.4   RDW 13.0 13.1 13.2    243 217       Last Comprehensive Metabolic panel:  Recent Labs   Lab Test 07/21/21  0603 07/21/21  0447 07/21/21  0424 07/20/21  2246 07/20/21  1832 07/20/21  0501 07/19/21  0618 07/17/21  0602 07/16/21  0519 07/15/21  1831   NA  --  139  --   --   --  140 139  --  138 140   POTASSIUM 3.0* 2.9*  --   --  3.2* 3.2* 3.3*  --  4.0 4.2   CHLORIDE  --  102  --   --   --  104 104  --  107 107   CO2  --  30  --   --   --  28 25  --  24 30   ANIONGAP  -- "  7  --   --   --  8 10  --  7 3   GLC  --  117* 111* 128*  --  153* 101*   < > 145* 99   BUN  --  17  --   --   --  19 19  --  19 23   CR  --  0.68  --   --   --  0.64* 0.63*  --  0.73 0.87   GFRESTIMATED  --  82  --   --   --  84 84  --  79 74   VANIA  --  7.7*  --   --   --  8.1* 7.9*  --  7.8* 8.7   BILITOTAL  --   --   --   --   --   --  0.6  --  1.1 0.6   ALKPHOS  --   --   --   --   --   --  48  --  56 73   ALT  --   --   --   --   --   --  13  --  21 25   AST  --   --   --   --   --   --  16  --  14 20    < > = values in this interval not displayed.       Recent Labs   Lab Test 07/21/21  0810 07/21/21  0447 07/20/21  1832 07/20/21  0501 07/19/21  0618 07/16/21  0519 07/15/21  1831 02/18/20  0428 02/17/20  0440   MAG  --   --   --  2.0  --   --   --  2.0 2.1   ALBUMIN  --   --   --   --  2.1* 3.3* 4.0  --   --    PHOS 3.5 2.2* 1.8* 1.8*  --   --   --   --   --        ASSESSMENT:    2 Days Post-Op from Procedure(s):  Exploratory  laparotomy with sigmoid colectomy and creation of colostomy, appendectomy.      PLAN:   Up in chair and up ambulating with assist  Will start lovenox today  Stable from a surgical standpoint. Will continue to monitor.

## 2021-07-21 NOTE — PROGRESS NOTES
Pt seen for re-evaluation of pouching system. It was lifting once again at the 9:00 edge.   Placed a one piece flat pouching system with use of barrier ring. Hopefully this will not be as ridging and reduce the likelihood that the pouch will fail again. New template left on the communication board. There was less stomal edema today compared with yesterday.

## 2021-07-22 ENCOUNTER — APPOINTMENT (OUTPATIENT)
Dept: GENERAL RADIOLOGY | Facility: HOSPITAL | Age: 86
DRG: 329 | End: 2021-07-22
Attending: INTERNAL MEDICINE
Payer: MEDICARE

## 2021-07-22 ENCOUNTER — APPOINTMENT (OUTPATIENT)
Dept: PHYSICAL THERAPY | Facility: HOSPITAL | Age: 86
DRG: 329 | End: 2021-07-22
Attending: INTERNAL MEDICINE
Payer: MEDICARE

## 2021-07-22 ENCOUNTER — APPOINTMENT (OUTPATIENT)
Dept: OCCUPATIONAL THERAPY | Facility: HOSPITAL | Age: 86
DRG: 329 | End: 2021-07-22
Attending: INTERNAL MEDICINE
Payer: MEDICARE

## 2021-07-22 ENCOUNTER — ANESTHESIA EVENT (OUTPATIENT)
Dept: MEDSURG UNIT | Facility: HOSPITAL | Age: 86
DRG: 329 | End: 2021-07-22
Payer: MEDICARE

## 2021-07-22 ENCOUNTER — ANESTHESIA (OUTPATIENT)
Dept: MEDSURG UNIT | Facility: HOSPITAL | Age: 86
DRG: 329 | End: 2021-07-22
Payer: MEDICARE

## 2021-07-22 LAB
ANION GAP SERPL CALCULATED.3IONS-SCNC: 9 MMOL/L (ref 3–14)
BACTERIA BLD CULT: NO GROWTH
BACTERIA BLD CULT: NO GROWTH
BILIRUB DIRECT SERPL-MCNC: 0.3 MG/DL (ref 0–0.2)
BUN SERPL-MCNC: 19 MG/DL (ref 7–30)
CALCIUM SERPL-MCNC: 7.9 MG/DL (ref 8.5–10.1)
CHLORIDE BLD-SCNC: 104 MMOL/L (ref 94–109)
CO2 SERPL-SCNC: 25 MMOL/L (ref 20–32)
CREAT SERPL-MCNC: 0.64 MG/DL (ref 0.66–1.25)
ERYTHROCYTE [DISTWIDTH] IN BLOOD BY AUTOMATED COUNT: 13.2 % (ref 10–15)
GFR SERPL CREATININE-BSD FRML MDRD: 84 ML/MIN/1.73M2
GLUCOSE BLD-MCNC: 112 MG/DL (ref 70–99)
HCT VFR BLD AUTO: 42.6 % (ref 40–53)
HGB BLD-MCNC: 14.3 G/DL (ref 13.3–17.7)
INR PPP: 1.1 (ref 0.85–1.15)
MAGNESIUM SERPL-MCNC: 1.9 MG/DL (ref 1.6–2.3)
MCH RBC QN AUTO: 30.2 PG (ref 26.5–33)
MCHC RBC AUTO-ENTMCNC: 33.6 G/DL (ref 31.5–36.5)
MCV RBC AUTO: 90 FL (ref 78–100)
PATH REPORT.COMMENTS IMP SPEC: NORMAL
PATH REPORT.COMMENTS IMP SPEC: NORMAL
PATH REPORT.FINAL DX SPEC: NORMAL
PATH REPORT.GROSS SPEC: NORMAL
PATH REPORT.MICROSCOPIC SPEC OTHER STN: NORMAL
PHOSPHATE SERPL-MCNC: 2.3 MG/DL (ref 2.5–4.5)
PHOTO IMAGE: NORMAL
PLATELET # BLD AUTO: 279 10E3/UL (ref 150–450)
POTASSIUM BLD-SCNC: 3.9 MMOL/L (ref 3.4–5.3)
PREALB SERPL IA-MCNC: 12 MG/DL (ref 15–45)
RBC # BLD AUTO: 4.73 10E6/UL (ref 4.4–5.9)
SODIUM SERPL-SCNC: 138 MMOL/L (ref 133–144)
WBC # BLD AUTO: 11.7 10E3/UL (ref 4–11)

## 2021-07-22 PROCEDURE — 250N000011 HC RX IP 250 OP 636: Performed by: INTERNAL MEDICINE

## 2021-07-22 PROCEDURE — 82248 BILIRUBIN DIRECT: CPT | Performed by: SURGERY

## 2021-07-22 PROCEDURE — 85027 COMPLETE CBC AUTOMATED: CPT | Performed by: INTERNAL MEDICINE

## 2021-07-22 PROCEDURE — 97530 THERAPEUTIC ACTIVITIES: CPT | Mod: GP | Performed by: PHYSICAL THERAPIST

## 2021-07-22 PROCEDURE — 84100 ASSAY OF PHOSPHORUS: CPT | Performed by: SURGERY

## 2021-07-22 PROCEDURE — 999N000065 XR CHEST PORT 1 VIEW

## 2021-07-22 PROCEDURE — 88307 TISSUE EXAM BY PATHOLOGIST: CPT | Mod: 26 | Performed by: PATHOLOGY

## 2021-07-22 PROCEDURE — 250N000009 HC RX 250: Performed by: SURGERY

## 2021-07-22 PROCEDURE — 120N000001 HC R&B MED SURG/OB

## 2021-07-22 PROCEDURE — B543ZZA ULTRASONOGRAPHY OF RIGHT JUGULAR VEINS, GUIDANCE: ICD-10-PCS | Performed by: NURSE ANESTHETIST, CERTIFIED REGISTERED

## 2021-07-22 PROCEDURE — 85610 PROTHROMBIN TIME: CPT | Performed by: SURGERY

## 2021-07-22 PROCEDURE — 97530 THERAPEUTIC ACTIVITIES: CPT | Mod: GO

## 2021-07-22 PROCEDURE — 84134 ASSAY OF PREALBUMIN: CPT | Performed by: SURGERY

## 2021-07-22 PROCEDURE — 36556 INSERT NON-TUNNEL CV CATH: CPT | Performed by: NURSE ANESTHETIST, CERTIFIED REGISTERED

## 2021-07-22 PROCEDURE — 83735 ASSAY OF MAGNESIUM: CPT | Performed by: SURGERY

## 2021-07-22 PROCEDURE — 36415 COLL VENOUS BLD VENIPUNCTURE: CPT | Performed by: INTERNAL MEDICINE

## 2021-07-22 PROCEDURE — 3E0436Z INTRODUCTION OF NUTRITIONAL SUBSTANCE INTO CENTRAL VEIN, PERCUTANEOUS APPROACH: ICD-10-PCS | Performed by: NURSE PRACTITIONER

## 2021-07-22 PROCEDURE — 97110 THERAPEUTIC EXERCISES: CPT | Mod: GP | Performed by: PHYSICAL THERAPIST

## 2021-07-22 PROCEDURE — 88302 TISSUE EXAM BY PATHOLOGIST: CPT | Mod: 26 | Performed by: PATHOLOGY

## 2021-07-22 PROCEDURE — 250N000009 HC RX 250: Performed by: INTERNAL MEDICINE

## 2021-07-22 PROCEDURE — 05HM33Z INSERTION OF INFUSION DEVICE INTO RIGHT INTERNAL JUGULAR VEIN, PERCUTANEOUS APPROACH: ICD-10-PCS | Performed by: NURSE ANESTHETIST, CERTIFIED REGISTERED

## 2021-07-22 PROCEDURE — 250N000011 HC RX IP 250 OP 636: Performed by: NURSE PRACTITIONER

## 2021-07-22 PROCEDURE — 71045 X-RAY EXAM CHEST 1 VIEW: CPT

## 2021-07-22 PROCEDURE — 82374 ASSAY BLOOD CARBON DIOXIDE: CPT | Performed by: INTERNAL MEDICINE

## 2021-07-22 PROCEDURE — 36415 COLL VENOUS BLD VENIPUNCTURE: CPT | Performed by: SURGERY

## 2021-07-22 RX ORDER — DEXTROSE MONOHYDRATE 100 MG/ML
INJECTION, SOLUTION INTRAVENOUS CONTINUOUS PRN
Status: DISCONTINUED | OUTPATIENT
Start: 2021-07-22 | End: 2021-07-28 | Stop reason: HOSPADM

## 2021-07-22 RX ADMIN — TAZOBACTAM SODIUM AND PIPERACILLIN SODIUM 3.38 G: 375; 3 INJECTION, SOLUTION INTRAVENOUS at 20:21

## 2021-07-22 RX ADMIN — HYDRALAZINE HYDROCHLORIDE 10 MG: 20 INJECTION INTRAMUSCULAR; INTRAVENOUS at 14:34

## 2021-07-22 RX ADMIN — POTASSIUM CHLORIDE AND SODIUM CHLORIDE: 900; 300 INJECTION, SOLUTION INTRAVENOUS at 16:49

## 2021-07-22 RX ADMIN — ENOXAPARIN SODIUM 40 MG: 40 INJECTION SUBCUTANEOUS at 08:46

## 2021-07-22 RX ADMIN — ONDANSETRON 4 MG: 2 INJECTION INTRAMUSCULAR; INTRAVENOUS at 06:49

## 2021-07-22 RX ADMIN — FAMOTIDINE 20 MG: 20 INJECTION, SOLUTION INTRAVENOUS at 16:49

## 2021-07-22 RX ADMIN — METOPROLOL TARTRATE 5 MG: 5 INJECTION INTRAVENOUS at 20:27

## 2021-07-22 RX ADMIN — FAMOTIDINE 20 MG: 20 INJECTION, SOLUTION INTRAVENOUS at 04:19

## 2021-07-22 RX ADMIN — METOPROLOL TARTRATE 5 MG: 5 INJECTION INTRAVENOUS at 08:46

## 2021-07-22 RX ADMIN — POTASSIUM CHLORIDE AND SODIUM CHLORIDE: 900; 300 INJECTION, SOLUTION INTRAVENOUS at 06:51

## 2021-07-22 RX ADMIN — TAZOBACTAM SODIUM AND PIPERACILLIN SODIUM 3.38 G: 375; 3 INJECTION, SOLUTION INTRAVENOUS at 02:12

## 2021-07-22 RX ADMIN — ASCORBIC ACID, VITAMIN A PALMITATE, CHOLECALCIFEROL, THIAMINE HYDROCHLORIDE, RIBOFLAVIN-5 PHOSPHATE SODIUM, PYRIDOXINE HYDROCHLORIDE, NIACINAMIDE, DEXPANTHENOL, ALPHA-TOCOPHEROL ACETATE, VITAMIN K1, FOLIC ACID, BIOTIN, CYANOCOBALAMIN: 200; 3300; 200; 6; 3.6; 6; 40; 15; 10; 150; 600; 60; 5 INJECTION, SOLUTION INTRAVENOUS at 19:02

## 2021-07-22 RX ADMIN — TAZOBACTAM SODIUM AND PIPERACILLIN SODIUM 3.38 G: 375; 3 INJECTION, SOLUTION INTRAVENOUS at 14:34

## 2021-07-22 RX ADMIN — I.V. FAT EMULSION 250 ML: 20 EMULSION INTRAVENOUS at 19:43

## 2021-07-22 RX ADMIN — TAZOBACTAM SODIUM AND PIPERACILLIN SODIUM 3.38 G: 375; 3 INJECTION, SOLUTION INTRAVENOUS at 08:46

## 2021-07-22 RX ADMIN — HYDROMORPHONE HYDROCHLORIDE 0.5 MG: 1 INJECTION, SOLUTION INTRAMUSCULAR; INTRAVENOUS; SUBCUTANEOUS at 12:04

## 2021-07-22 ASSESSMENT — ACTIVITIES OF DAILY LIVING (ADL)
ADLS_ACUITY_SCORE: 14
ADLS_ACUITY_SCORE: 14
ADLS_ACUITY_SCORE: 15
ADLS_ACUITY_SCORE: 14

## 2021-07-22 NOTE — PROGRESS NOTES
Received consult regarding TPN initiation.    89 yom POD #3 s/p sigmoid colectomy, colostomy, appendectomy.    Current weight is 217# with admit weight of 190# - 27# gain.   Pt is NPO with NG to LIS.   Wound vac is in place.    Labs noted - glucose levels 111-128.    Nutrition needs per RD note 7/20 - 2375 calories, 115 gm protein.   Suggest goal TPN rate of 90 ml/hr 5% AA/20% Dextrose with 250 ml 20% lipids daily.  This will provide 2401 calories, 108 gm protein.    RD will follow tolerance to TPN and ability to advance diet.

## 2021-07-22 NOTE — PHARMACY
"Pharmacy Antimicrobial Stewardship Assessment     Current Antimicrobial Therapy:  Anti-infectives (From now, onward)    Start     Dose/Rate Route Frequency Ordered Stop    07/15/21 2130  piperacillin-tazobactam (ZOSYN) infusion 3.375 g     Note to Pharmacy: For [facility], [facility] and [facility]: For Zosyn-naive patients, use the \"Zosyn initial dose + extended infusion\" order panel.    3.375 g  100 mL/hr over 30 Minutes Intravenous EVERY 6 HOURS 07/15/21 2113          Indication: intra-abdominal infection/diverticulitis    Days of Therapy: 8     Pertinent Labs:  WBC Count   Date Value Ref Range Status   07/22/2021 11.7 (H) 4.0 - 11.0 10e3/uL Final   07/21/2021 12.7 (H) 4.0 - 11.0 10e3/uL Final   07/20/2021 11.4 (H) 4.0 - 11.0 10e3/uL Final     Procalcitonin   Date Value Ref Range Status     CRP Inflammation   Date Value Ref Range Status   07/17/2021 188.0 (H) 0.0 - 8.0 mg/L Final   07/16/2021 57.8 (H) 0.0 - 8.0 mg/L Final       Culture Results:   Collected Updated Procedure Result Status    07/15/2021 2130 07/22/2021 0939 Blood Culture Arm, Right [[id number]]   Blood from Arm, Right    Final result Component Value   Culture No Growth              07/15/2021 2130 07/22/2021 0950 Blood Culture Arm, Right [[id number]]   Blood from Arm, Right    Final result Component Value   Culture No Growth        07/15/2021 1842 07/15/2021 1938 SARS-COV2 (COVID-19) Virus RT-PCR [[id number]]    Swab from Nasopharyngeal    Final result Component Value   SARS CoV2 PCR Negative   NEGATIVE: SARS-CoV-2 (COVID-19) RNA not detected, presumed negative.               Recommendations/Interventions:  1. None at this time     Lida Burdick, [facility]  July 22, 2021    "

## 2021-07-22 NOTE — PROGRESS NOTES
Was called to patient's room.  His son was present.  Patient requested be DNR/DNI status.  This was documented in the chart.

## 2021-07-22 NOTE — PROGRESS NOTES
"   07/22/21 1500   Signing Clinician's Name / Credentials   Signing clinician's name / credentials Saumya Ash OTR/L   Quick Adds   Rehab Discipline OT   Therapeutic Activity   Minutes of Treatment 10 minutes   Symptoms Noted During/After Treatment Fatigue  (c/o headache prior to OT starting)   Treatment Detail Pt resting in bed upon OT arrival. Confirmed with nursing prior how long pt had been in bed and she reported 1-2 hours; son walked in shortly after OT and stated about 1 hour. Pt initially declined working with OT but was agreeable to getting into the chair after education and encouragement. Supine>sit via long roll SBA with moderate difficulties. Pt then transferred sit>stand and ambulated ~3 ft to the chair using FWW with Buddy and son holding wound vac. Once sitting, another woman entered (family/friend?) and pt requested a warm blanket. After OT returned with the blanket, family advising pt to \"inform her\" pointing to OT and son stated they needed to inform the RN. The lady mentioned something about code status and then the RN walked in. Pt left with all 3 of them resting in the chair.   OT Discharge Planning    OT Discharge Recommendation (DC Rec) Transitional Care Facility   OT Rationale for DC Rec Pt continues to require assistance to safety complete ADLs due to pain, decreased strength, decreased activity tolerance, and decreased balance. Pt would not be safe to return home based off of current level of function. Recommend short term rehab to improve pt's safety and independence in ADLs.   Additional Documentation   Rehab Comments Pt again needed encouragement about the importance of moving around and then was only agreeable to bed>chair. Pt frustrated about all his wires/cords/tubes   OT Plan Progress strength, activity tolerance, and function, as able   Total Session Time   Total Session Time (minutes) 10 minutes     "

## 2021-07-22 NOTE — PLAN OF CARE
Patient checked on at 0200 and was found to have NG pull back to 43 cm. Patient with some confusion. Extremely upset about having to keep NG in. Explained to patient rationale for NG tube. Patient concerned that tube will need to remain in forever. Patient in agreement to have NG tube re-advanced back to the 60-61 cm pauly where it had been previously. As staff started to advance tube patient became very anxious and upset. Patient yelling out and screaming. D/t patient's agitation and anxiety there was too much resistance to advance tube back in to 60 cm. Tube would be advanced to 60 cm and immediately get pushed back out of patient's right nare d/t pressure. NG currently remains at 55 cm. Unable to advance further at this time d/t patient's anxiety. Air bolus heard in stomach. NG hooked back up to LIS. Will monitor NG output.

## 2021-07-22 NOTE — ANESTHESIA PROCEDURE NOTES
Central Line/PA Catheter Placement  Pre-Procedure   Staff -        CRNA: Vinod Underwood APRN CRNA       Performed By: CRNA       Location: floor       Procedure Start/Stop Times: 7/22/2021 12:50 PM and 7/22/2021 1:20 PM       Time Interval: 4028-6310       Pre-Anesthestic Checklist: patient identified, IV checked, site marked, risks and benefits discussed, informed consent, monitors and equipment checked, pre-op evaluation and at physician/surgeon's request  Timeout:       Correct Patient: Yes        Correct Procedure: Yes        Correct Site: Yes        Correct Position: Yes        Correct Laterality: Yes     Procedure   Procedure: central line       Diagnosis: Need for TPN       Laterality: right       Insertion Site: internal jugular.       Patient Position: Trendelenburg  Sterile Prep        All elements of maximal sterile barrier technique followed       Patient Prep/Sterile Barriers: draped, hand hygiene, gloves , hat , mask , draped, gown, sterile gel and probe cover       Skin prep: Chloraprep  Insertion/Injection        Technique: ultrasound guided        1. Ultrasound was used to evaluate the access site.       2. Vein evaluated via ultrasound for patency/adequacy.       3. Using real-time ultrasound the needle/catheter was observed entering the artery/vein.       5. The visualized structures were anatomically normal.       6. There were no apparent abnormal pathologic findings.       Catheter Type/Size: 7 Fr, 20 cm, 3-lumen  Narrative         Secured by: suture       Tegaderm and Biopatch dressing used.       Complications: None apparent,        Verification method: X-ray (To be read as of yet for verification of placement)  Comments:  Tolerated well.  No complications.

## 2021-07-22 NOTE — PROGRESS NOTES
[facility]    Hospitalist Progress Note      Assessment & Plan   James Grant is a 89 year old male who was admitted on 7/15/2021.      1.  Postop day #3 status post exploratory laparotomy for diverticulitis with abscess: Patient underwent end colostomy with Walker's procedure.  No real air stool in bag at this point bowel sounds are positive.  Mildly tender right side of his abdomen.  Drain in place with serosanguineous fluid.  NG tube still in place with bilious returns.  No fevers at all.  He is getting very hungry.  Has been having some issues with the NG tube attempting to try and pull it out.  Ambulation.  Decisions upon NG tube etc. per surgery.    2.  Sigmoid diverticulitis.  Continues on Zosyn no fevers white count mildly elevated.  No signs of sepsis.    3.  Cardiac: Hemodynamically stable.  Was a little hypertensive yesterday.  Is getting some IV metoprolol as needed.  Pressures today are good at 136/73.  As needed hydralazine also has been ordered.  No signs of volume overload at this point.    4.  Pulmonary status: Slowly coming on the O2.  His lungs are basically clear some basal atelectasis.  Continue to push pulmonary toilet mobilize as best we can.    5.  Renal status: Prater catheter in place.  Likely can remove this.  BUN/creatinine stable.  Normal potassium coming up now.            Diet: NPO for Medical/Clinical Reasons Except for: No Exceptions  Fluids: Normal saline +40 K cc an hour    DVT Prophylaxis: Enoxaparin (Lovenox) SQ  Code Status: Full Code    Disposition: Expected discharge 2-5 whenever ileus resolves  [facility]    Interval History   Patient alert pleasant but was a little agitated last night with NG tube.  Is getting very hungry wants to eat.  Has not had much output in terms of his ostomy bag no air no stool.  Abdomen is mildly tender.  Otherwise hemodynamically stable.  Alert sats are better coming down his oxygen is up moving around at least.  Does have to  wait for the ileus to resolve.    -Data reviewed today: I reviewed all new labs and imaging results over the last 24 hours. I personally reviewed no images or EKG's today.    Physical Exam   Temp: 97.3  F (36.3  C) Temp src: Temporal BP: 136/73 Pulse: 64   Resp: 14 SpO2: 96 % O2 Device: Nasal cannula Oxygen Delivery: 2 LPM  Vitals:    07/15/21 1814 07/20/21 0400 07/21/21 0216   Weight: 86.2 kg (190 lb) 95.1 kg (209 lb 10.5 oz) 98.5 kg (217 lb 2.5 oz)     Vital Signs with Ranges  Temp:  [97.3  F (36.3  C)-99.2  F (37.3  C)] 97.3  F (36.3  C)  Pulse:  [55-78] 64  Resp:  [12-20] 14  BP: (136-182)/() 136/73  SpO2:  [81 %-99 %] 96 %  I/O last 3 completed shifts:  In: 2783 [I.V.:2783]  Out: 4690 [Urine:1600; Emesis/NG output:2800; Drains:290]    Peripheral IV 07/19/21 Right Wrist (Active)   Site Assessment WDL 07/21/21 2330   Line Status Saline locked 07/21/21 2330   Phlebitis Scale 0-->no symptoms 07/21/21 2330   Infiltration Scale 0 07/21/21 2330   Number of days: 3       Peripheral IV 07/21/21 Left;Dorsal Hand (Active)   Site Assessment WDL 07/21/21 2330   Line Status Infusing 07/21/21 2330   Dressing Intervention New dressing  07/21/21 1851   Phlebitis Scale 0-->no symptoms 07/21/21 2330   Infiltration Scale 0 07/21/21 2330   Number of days: 1       Closed/Suction Drain 1 Right RLQ Bulb 19 Setswana (Active)   Site Description UTV 07/21/21 2330   Dressing Status Normal: Clean, Dry & Intact 07/21/21 2330   Dressing Change Due 07/19/21 07/19/21 1635   Drainage Appearance Serosanguenous 07/21/21 2330   Status To bulb suction 07/21/21 2330   Output (ml) 50 ml 07/22/21 0613   Number of days: 3       NG/OG/NJ Tube Right nostril To LIS (Active)   Site Description UTV 07/21/21 2330   Status Suction-low intermittent 07/21/21 2330   Drainage Appearance Dark Red;Brown 07/22/21 0436   Placement Confirmation Iyanbito unchanged;Aspiration of gastric content 07/21/21 2330   Iyanbito (cm marking) at nare/mouth 55 cm 07/22/21 0436    Container Amount 200 mL 07/22/21 0613   Output (ml) 200 ml 07/22/21 0613   Number of days: 3       Colostomy (Active)   Stomal Appliance 2 piece 07/21/21 2330   Stoma Assessment Red;Pink 07/21/21 2330   Peristomal Assessment UTV 07/21/21 2330   Treatment Barrier ring 07/21/21 1610   Stool Amount Other (Comment) 07/21/21 1610   Output (ml) 15 ml 07/20/21 1454   Number of days: 3       Urethral Catheter (Active)   Tube Description Positional 07/21/21 1942   Catheter Care Done 07/21/21 1610   Collection Container Standard 07/21/21 2330   Securement Method Securing device (Describe) 07/21/21 2330   Rationale for Continued Use Wound Healing 07/21/21 2330   Urine Output 110 mL 07/22/21 0613   Number of days: 3       Incision/Surgical Site 07/19/21 Abdomen (Active)   Incision Assessment UTV 07/21/21 2330   Madisyn-Incision Assessment UTV 07/21/21 2330   Closure DAVID 07/21/21 2330   Incision Drainage Amount None 07/21/21 2330   Incision Care Therapy - negative pressure 07/21/21 2330   Dressing Intervention Clean, dry, intact 07/21/21 2330   Number of days: 3     No line/device    Constitutional: Alert and oriented x3. No distress    HEENT: Normocephalic/atraumatic, PERRL, EOMI, mouth moist, neck supple, NG right nare no LN.     Cardiovascular: RRR. no Murmur, no  rubs, or gallops.  JVD no.  Bruits no.  Pulses 2+    Respiratory:few crackles right base otherwise clear Clear to auscultation bilaterally    Abdomen: Soft, mild tender right abdomen.  EMMANUELLE drain right abdomen serosanguineous fluid.  Midline dressing intact, colostomy left abdomen no air in bag just some fluid no stool, nondistended, no organomegaly. Bowel sounds present    Extremities: Warm/dry.  Trace edema    Neuro:   Non focal, cranial nerves intact, Moves all extremities.  Medications     KCl 40 mEq in NaCl 0.9% 100 mL/hr at 07/22/21 0651       enoxaparin ANTICOAGULANT  40 mg Subcutaneous Q24H     famotidine  20 mg Intravenous Q12H     [Held by provider] losartan   50 mg Oral Daily     metoprolol  5 mg Intravenous BID     [Held by provider] metoprolol succinate ER  12.5 mg Oral At Bedtime     ondansetron  4 mg Intravenous Once     piperacillin-tazobactam  3.375 g Intravenous Q6H     [Held by provider] simvastatin  20 mg Oral At Bedtime     sodium chloride (PF)  3 mL Intracatheter Q8H       Data   Recent Labs   Lab 07/22/21  0615 07/22/21  0609 07/21/21  2144 07/21/21  1705 07/21/21  1501 07/21/21  0603 07/21/21  0447 07/20/21  1832 07/20/21  0501 07/19/21  0618 07/17/21  0602 07/16/21  0519 07/15/21  1831   WBC 11.7*  --   --   --   --   --  12.7*  --  11.4* 12.8*  --   --  14.6*   HGB 14.3  --  13.6  --   --   --  14.0  --  14.3 13.5  --   --  15.4   MCV 90  --   --   --   --   --  89  --  90 91  --   --  92     --   --   --   --   --  243  --  243 217  --   --  242   NA  --  138  --   --   --   --  139  --  140 139  --  138 140   POTASSIUM  --  3.9  --   --  3.4 3.0* 2.9*  --  3.2* 3.3*  --  4.0 4.2   CHLORIDE  --  104  --   --   --   --  102  --  104 104  --  107 107   CO2  --  25  --   --   --   --  30  --  28 25  --  24 30   BUN  --  19  --   --   --   --  17  --  19 19  --  19 23   CR  --  0.64*  --   --   --   --  0.68  --  0.64* 0.63*  --  0.73 0.87   ANIONGAP  --  9  --   --   --   --  7  --  8 10  --  7 3   VANIA  --  7.9*  --   --   --   --  7.7*  --  8.1* 7.9*  --  7.8* 8.7   GLC  --  112*  --  113*  --   --  117*   < > 153* 101*   < > 145* 99   ALBUMIN  --   --   --   --   --   --   --   --   --  2.1*  --  3.3* 4.0   PROTTOTAL  --   --   --   --   --   --   --   --   --  5.6*  --  6.5* 7.6   BILITOTAL  --   --   --   --   --   --   --   --   --  0.6  --  1.1 0.6   ALKPHOS  --   --   --   --   --   --   --   --   --  48  --  56 73   ALT  --   --   --   --   --   --   --   --   --  13  --  21 25   AST  --   --   --   --   --   --   --   --   --  16  --  14 20   LIPASE  --   --   --   --   --   --   --   --   --   --   --   --  154    < > = values in this  interval not displayed.       No results found for this or any previous visit (from the past 24 hour(s)).

## 2021-07-22 NOTE — ANESTHESIA POSTPROCEDURE EVALUATION
Patient: James Grant    * No procedures listed *    Diagnosis:* No pre-op diagnosis entered *  Diagnosis Additional Information: No value filed.    Anesthesia Type:  No value filed.    Note:  Disposition: Inpatient   Postop Pain Control: Uneventful            Sign Out: Well controlled pain   PONV: No   Neuro/Psych: Uneventful            Sign Out: Acceptable/Baseline neuro status   Airway/Respiratory: Uneventful            Sign Out: Acceptable/Baseline resp. status   CV/Hemodynamics: Uneventful            Sign Out: Acceptable CV status; No obvious hypovolemia; No obvious fluid overload   Other NRE: NONE   DID A NON-ROUTINE EVENT OCCUR? No           Last vitals:  Vitals Value Taken Time   BP     Temp     Pulse 67 07/22/21 1345   Resp     SpO2 93 % 07/22/21 1345   Vitals shown include unvalidated device data.    Electronically Signed By: PRATIMA Rodrigues CRNA  July 22, 2021  1:45 PM

## 2021-07-22 NOTE — PLAN OF CARE
Assessment as charted. Face to face report given with opportunity to observe patient.    Report given to Elsie Whitaker RN   7/21/2021  11:06 PM

## 2021-07-22 NOTE — PLAN OF CARE
Face to face report given with opportunity to observe patient.    Report given to Stevo Lan RN   7/21/2021  7:21 PM

## 2021-07-22 NOTE — PLAN OF CARE
Pt is A&O x 4. VS as charted, afebrile, IV dilaudid for abdominal pain. On 1 Lpm NC. Assist x 2 with walker and gait belt. Midline dressing CDI.  NG remains in place. EMMANUELLE draining serosanguineous. Ostomy remains intact. Central line placed Blood return to all lumens, Saline locked. Call light in reach, makes needs known.      Face to face report given with opportunity to observe patient.    Report given to ASRA Swann RN   7/22/2021  3:41 PM

## 2021-07-22 NOTE — PROGRESS NOTES
"   07/22/21 1209   Signing Clinician's Name / Credentials   Signing clinician's name / credentials Emma Merino DPT   Quick Adds   Rehab Discipline PT   Therapeutic Activity   Minutes of Treatment 12 minutes   Symptoms Noted During/After Treatment Increased pain;Dizziness   Treatment Detail Pt resting in bed, agreeable to PT.  Pt instructed in logroll for sup>sit transfer, able to complete with min A.  Pt transferred sit>stand min A with cues for safe hand placement.  Pt ambulated 125' with FWW CGA-min A, made 180 degree turn to head back towards room and started to have LOB x3 needing moderate assist from this writer to correct, pt asked if he was feeling ok and needed to rest and pt did not respond.  Assisted pt into sitting in w/c and again asked pt if he was okay.  Pt states \"I think I might have felt a little dizzy\".  O2 sats 96%, HR 65, /83.  Pt wheeled back to room and ambulated short distance approx 8' from w/c to chair with FWW min A, no LOB noted.  Pt asking to return to bed to rest and educated pt on importance of being up and more active in healing process.  Pt left sitting in chair with call light in reach and clip alarm on.   Therapeutic Exercise   Minutes of Treatment 8   Symptoms Noted During/After Treatment fatigue   Treatment Detail Pt completed seated marching x20, LAQ, x15, heel/toe raises x15.   PT Discharge Planning    PT Discharge Recommendation (DC Rec) Transitional Care Facility   PT Rationale for DC Rec pt still demonstrates weakness and instability with gait requiring min-mod A at times, would not be safe to return home alone.  Recommend short term rehab   PT Brief overview of current status  Pt resting in bed, agreeable to PT.  Pt instructed in logroll for sup>sit transfer, able to complete with min A.  Pt transferred sit>stand min A with cues for safe hand placement.  Pt ambulated 125' with FWW CGA-min A, made 180 degree turn to head back towards room and started to have LOB x3 " "needing moderate assist from this writer to correct, pt asked if he was feeling ok and needed to rest and pt did not respond.  Assisted pt into sitting in w/c and again asked pt if he was okay.  Pt states \"I think I might have felt a little dizzy\".  O2 sats 96%, HR 65, /83.  Pt wheeled back to room and ambulated short distance approx 8' from w/c to chair with FWW min A, no LOB noted.  Pt left sitting in chair with call light in reach and clip alarm on.   Additional Documentation   Rehab Comments tolerated ambulation but did have LOB and instability   PT Plan Progress mobility and strength.   Total Session Time   Total Session Time (minutes) 20 minutes     "

## 2021-07-22 NOTE — PLAN OF CARE
Face to face report given with opportunity to observe patient.    Report given to Estefani Ames RN   7/22/2021  7:14 AM

## 2021-07-22 NOTE — ANESTHESIA CARE TRANSFER NOTE
Patient: James Grant    * No procedures listed *    Diagnosis: * No pre-op diagnosis entered *  Diagnosis Additional Information: No value filed.    Anesthesia Type:   No value filed.     Note:    Oropharynx: oropharynx clear of all foreign objects and spontaneously breathing  Level of Consciousness: awake  Oxygen Supplementation: nasal cannula  Level of Supplemental Oxygen (L/min / FiO2): 3  Independent Airway: airway patency satisfactory and stable  Dentition: dentition unchanged  Vital Signs Stable: post-procedure vital signs reviewed and stable  Report to RN Given: handoff report given  Patient transferred to: Medical/Surgical Unit    Handoff Report: Identifed the Patient, Identified the Reponsible Provider, Reviewed the pertinent medical history, Discussed the surgical course, Reviewed Intra-OP anesthesia mangement and issues during anesthesia, Set expectations for post-procedure period and Allowed opportunity for questions and acknowledgement of understanding      Vitals:  Vitals Value Taken Time   BP     Temp     Pulse 67 07/22/21 1343   Resp     SpO2 92 % 07/22/21 1343   Vitals shown include unvalidated device data.    Electronically Signed By: PRATIMA Rodrigues CRNA  July 22, 2021  1:44 PM

## 2021-07-22 NOTE — ANESTHESIA PREPROCEDURE EVALUATION
Anesthesia Pre-Procedure Evaluation    Patient: James Grant   MRN: [id number] : 3/5/1927        Preoperative Diagnosis: * No pre-op diagnosis entered *   Procedure : * No procedures listed *     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       CHOLECYSTECTOMY       CYSTOSCOPY, FULGURATE BLEEDERS, EVACUATE CLOT(S), COMBINED N/A 2020    Procedure: Clot Evacuation;  Surgeon: Andrzej Olivia MD;  Location:  OR     CYSTOSCOPY, RETROGRADES, COMBINED Bilateral 10/22/2019    Procedure: Bilateral Retrograde Pyelograms;  Surgeon: Andrzej Olivia MD;  Location:  OR     CYSTOSCOPY, TRANSURETHRAL RESECTION (TUR) TUMOR BLADDER, COMBINED N/A 10/22/2019    Procedure: Transurethral Resection of Bladder Tumor and transurethral resection of prostate and bladder stone removal;  Surgeon: Andrzej Olivia MD;  Location:  OR     HERNIA REPAIR       LAPAROTOMY EXPLORATORY N/A 2021    Procedure: Exploratory  laparotomy with sigmoid colectomy and creation of colostomy, appendectomy;  Surgeon: Sukumar Chavez MD;  Location: [facility] OR     left arm surgery        Allergies   Allergen Reactions     Lisinopril Cough     Morphine Other (See Comments) and Visual Disturbance     confusion      Social History     Tobacco Use     Smoking status: Former Smoker     Packs/day: 1.00     Years: 13.00     Pack years: 13.00     Types: Cigarettes     Start date: 1949     Quit date: 1962     Years since quittin.9     Smokeless tobacco: Never Used    Substance Use Topics     Alcohol use: Not Currently     Comment: 3 Drinks ([employer]QU[employer]) ~ OCCASIONALLY (QUIT IN 2000)      Wt Readings from Last 1 Encounters:   07/21/21 98.5 kg (217 lb 2.5 oz)              OUTSIDE LABS:  CBC:   Lab Results   Component Value Date    WBC 11.7 (H) 07/22/2021    WBC 12.7 (H) 07/21/2021    HGB 14.3 07/22/2021    HGB 13.6 07/21/2021    HCT 42.6 07/22/2021    HCT 40.8 07/21/2021     07/22/2021     07/21/2021     BMP:   Lab Results   Component Value Date     07/22/2021     07/21/2021    POTASSIUM 3.9 07/22/2021    POTASSIUM 3.4 07/21/2021    CHLORIDE 104 07/22/2021    CHLORIDE 102 07/21/2021    CO2 25 07/22/2021    CO2 30 07/21/2021    BUN 19 07/22/2021    BUN 17 07/21/2021    CR 0.64 (L) 07/22/2021    CR 0.68 07/21/2021     (H) 07/22/2021     (H) 07/21/2021     COAGS:   Lab Results   Component Value Date    PTT 33 02/08/2014    INR 1.03 11/30/2020     POC:   Lab Results   Component Value Date    BGM 67 (L) 01/02/2017     HEPATIC:   Lab Results   Component Value Date    ALBUMIN 2.1 (L) 07/19/2021    PROTTOTAL 5.6 (L) 07/19/2021    ALT 13 07/19/2021    AST 16 07/19/2021    ALKPHOS 48 07/19/2021    BILITOTAL 0.6 07/19/2021     OTHER:   Lab Results   Component Value Date    LACT 1.6 07/15/2021    VANIA 7.9 (L) 07/22/2021    PHOS 3.5 07/21/2021    MAG 2.0 07/20/2021    LIPASE 154 07/15/2021    TSH 2.95 02/07/2019    .0 (H) 07/17/2021       Anesthesia Plan    ASA Status:  3     - Procedure: Procedure only, no anesthetic delivered                    Consents    Anesthesia Plan(s) and associated risks, benefits, and realistic alternatives discussed. Questions answered and patient/representative(s) expressed understanding.     - Discussed with:  Patient, Other (See Comment) (Son was present for consent)         Postoperative Care            Comments:                PRATIMA Rodrigues CRNA

## 2021-07-22 NOTE — PROGRESS NOTES
INPATIENT ROUNDING NOTE  7/22/2021    Patient: James FALK Rudy    Physician of Record: Hospitalist Service    Admitting diagnosis: Abdominal pain, right lower quadrant [R10.31]  Sigmoid diverticulitis [K57.32]  Diverticulitis large intestine [K57.32]    Procedure(s):  Exploratory  laparotomy with sigmoid colectomy and creation of colostomy, appendectomy     POD: 3 Days Post-Op    Current Diet: NPO    CURRENT MEDICATIONS:  Continuous Medications:  Current Facility-Administered Medications   Medication Last Rate     KCl 40 mEq in NaCl 0.9% 100 mL/hr at 07/22/21 0651       Scheduled Medications:  Current Facility-Administered Medications   Medication Dose Route Frequency     enoxaparin ANTICOAGULANT  40 mg Subcutaneous Q24H     famotidine  20 mg Intravenous Q12H     [Held by provider] losartan  50 mg Oral Daily     metoprolol  5 mg Intravenous BID     [Held by provider] metoprolol succinate ER  12.5 mg Oral At Bedtime     ondansetron  4 mg Intravenous Once     piperacillin-tazobactam  3.375 g Intravenous Q6H     [Held by provider] simvastatin  20 mg Oral At Bedtime     sodium chloride (PF)  3 mL Intracatheter Q8H       PRN Medications:  Current Facility-Administered Medications   Medication Dose Route Frequency     [Held by provider] acetaminophen  975 mg Oral Q4H PRN     hydrALAZINE  10 mg Intravenous Q6H PRN     HYDROmorphone  0.5-1 mg Intravenous Q3H PRN     lidocaine 4%   Topical Q1H PRN     lidocaine (buffered or not buffered)  0.1-1 mL Other Q1H PRN     [Held by provider] melatonin  1 mg Oral At Bedtime PRN     naloxone  0.2 mg Intravenous Q2 Min PRN    Or     naloxone  0.4 mg Intravenous Q2 Min PRN    Or     naloxone  0.2 mg Intramuscular Q2 Min PRN    Or     naloxone  0.4 mg Intramuscular Q2 Min PRN     ondansetron  4 mg Oral Q6H PRN    Or     ondansetron  4 mg Intravenous Q6H PRN     prochlorperazine  5 mg Intravenous Q6H PRN    Or     prochlorperazine  5 mg Oral Q6H PRN    Or     prochlorperazine  12.5 mg  "Rectal Q12H PRN     sodium chloride (PF)  3 mL Intracatheter q1 min prn       SUBJECTIVE:   Nausea: No. Vomiting: No--NG tube intact. Fever: No. Chills: No. Excessive burping: No. Flatus: No. BM: No. Pain is 0/10 at rest. Pain control: good. Tolerating current diet: N/A NPO    PHYSICAL EXAM:   Vital signs: BP (!) 193/90 (BP Location: Left arm)   Pulse 68   Temp 97.6  F (36.4  C) (Temporal)   Resp 16   Ht 1.803 m (5' 11\")   Wt 98.5 kg (217 lb 2.5 oz)   SpO2 96%   BMI 30.29 kg/m     BMI: Body mass index is 30.29 kg/m .   General: Normal, healthy, cooperative, in no acute distress, alert   Lungs: respirations are non-labored   Abdominal: non-distended, stoma with healthy tissue, liquid brown noted in ostomy pouch; EMMANUELLE drain intact with serosanguineous drainage noted   Wound: prevena wound vac intact   Extremities: No cyanosis, clubbing or edema noted bilaterally in Upper and Lower Extremities   Neurological: without deficit    INPUT/OUTPUT:      Intake/Output Summary (Last 24 hours) at 7/22/2021 0951  Last data filed at 7/22/2021 0654  Gross per 24 hour   Intake 2783 ml   Output 4310 ml   Net -1527 ml       I/O last 3 completed shifts:  In: 2783 [I.V.:2783]  Out: 4690 [Urine:1600; Emesis/NG output:2800; Drains:290]    LABS:    Last CBC Rrsults:   Recent Labs   Lab Test 07/22/21  0615 07/21/21  2144 07/21/21  0447 07/20/21  0501   WBC 11.7*  --  12.7* 11.4*   RBC 4.73  --  4.57 4.66   HGB 14.3 13.6 14.0 14.3   HCT 42.6  --  40.8 42.1   MCV 90  --  89 90   MCH 30.2  --  30.6 30.7   MCHC 33.6  --  34.3 34.0   RDW 13.2  --  13.0 13.1     --  243 243       Last Comprehensive Metabolic panel:  Recent Labs   Lab Test 07/22/21  0609 07/21/21  1705 07/21/21  1501 07/21/21  0603 07/21/21  0447 07/20/21  1832 07/20/21  0501 07/19/21  0618 07/17/21  0602 07/16/21  0519 07/15/21  1831     --   --   --  139  --  140 139  --  138 140   POTASSIUM 3.9  --  3.4 3.0* 2.9*  --  3.2* 3.3*  --  4.0 4.2   CHLORIDE 104  --  "  --   --  102  --  104 104  --  107 107   CO2 25  --   --   --  30  --  28 25  --  24 30   ANIONGAP 9  --   --   --  7  --  8 10  --  7 3   * 113*  --   --  117*   < > 153* 101*   < > 145* 99   BUN 19  --   --   --  17  --  19 19  --  19 23   CR 0.64*  --   --   --  0.68  --  0.64* 0.63*  --  0.73 0.87   GFRESTIMATED 84  --   --   --  82  --  84 84  --  79 74   VANIA 7.9*  --   --   --  7.7*  --  8.1* 7.9*  --  7.8* 8.7   BILITOTAL  --   --   --   --   --   --   --  0.6  --  1.1 0.6   ALKPHOS  --   --   --   --   --   --   --  48  --  56 73   ALT  --   --   --   --   --   --   --  13  --  21 25   AST  --   --   --   --   --   --   --  16  --  14 20    < > = values in this interval not displayed.       Recent Labs   Lab Test 07/21/21  0810 07/21/21  0447 07/20/21  1832 07/20/21  0501 07/19/21  0618 07/16/21  0519 07/15/21  1831 02/18/20  0428 02/17/20  0440   MAG  --   --   --  2.0  --   --   --  2.0 2.1   ALBUMIN  --   --   --   --  2.1* 3.3* 4.0  --   --    PHOS 3.5 2.2* 1.8* 1.8*  --   --   --   --   --      ASSESSMENT:    3 Days Post-Op from Procedure(s):  Exploratory  laparotomy with sigmoid colectomy and creation of colostomy, appendectomy.      PLAN:   Central Line will be placed today for TPN  Discontinue haji catheter

## 2021-07-23 ENCOUNTER — APPOINTMENT (OUTPATIENT)
Dept: PHYSICAL THERAPY | Facility: HOSPITAL | Age: 86
DRG: 329 | End: 2021-07-23
Attending: INTERNAL MEDICINE
Payer: MEDICARE

## 2021-07-23 ENCOUNTER — APPOINTMENT (OUTPATIENT)
Dept: OCCUPATIONAL THERAPY | Facility: HOSPITAL | Age: 86
DRG: 329 | End: 2021-07-23
Attending: INTERNAL MEDICINE
Payer: MEDICARE

## 2021-07-23 LAB
ALBUMIN SERPL-MCNC: 2.2 G/DL (ref 3.4–5)
ALP SERPL-CCNC: 64 U/L (ref 40–150)
ALT SERPL W P-5'-P-CCNC: 89 U/L (ref 0–70)
ANION GAP SERPL CALCULATED.3IONS-SCNC: 7 MMOL/L (ref 3–14)
AST SERPL W P-5'-P-CCNC: 64 U/L (ref 0–45)
BILIRUB SERPL-MCNC: 0.6 MG/DL (ref 0.2–1.3)
BUN SERPL-MCNC: 16 MG/DL (ref 7–30)
CALCIUM SERPL-MCNC: 7.8 MG/DL (ref 8.5–10.1)
CHLORIDE BLD-SCNC: 107 MMOL/L (ref 94–109)
CO2 SERPL-SCNC: 23 MMOL/L (ref 20–32)
CREAT SERPL-MCNC: 0.54 MG/DL (ref 0.66–1.25)
ERYTHROCYTE [DISTWIDTH] IN BLOOD BY AUTOMATED COUNT: 13.1 % (ref 10–15)
FASTING STATUS PATIENT QL REPORTED: NO
GFR SERPL CREATININE-BSD FRML MDRD: 90 ML/MIN/1.73M2
GLUCOSE BLD-MCNC: 197 MG/DL (ref 70–99)
GLUCOSE BLDC GLUCOMTR-MCNC: 160 MG/DL (ref 70–99)
GLUCOSE BLDC GLUCOMTR-MCNC: 195 MG/DL (ref 70–99)
HCT VFR BLD AUTO: 40.2 % (ref 40–53)
HGB BLD-MCNC: 13.7 G/DL (ref 13.3–17.7)
INR PPP: 1.08 (ref 0.85–1.15)
MAGNESIUM SERPL-MCNC: 1.9 MG/DL (ref 1.6–2.3)
MCH RBC QN AUTO: 30.7 PG (ref 26.5–33)
MCHC RBC AUTO-ENTMCNC: 34.1 G/DL (ref 31.5–36.5)
MCV RBC AUTO: 90 FL (ref 78–100)
PHOSPHATE SERPL-MCNC: 1.7 MG/DL (ref 2.5–4.5)
PHOSPHATE SERPL-MCNC: 2.6 MG/DL (ref 2.5–4.5)
PLATELET # BLD AUTO: 321 10E3/UL (ref 150–450)
POTASSIUM BLD-SCNC: 3.7 MMOL/L (ref 3.4–5.3)
PREALB SERPL IA-MCNC: 13 MG/DL (ref 15–45)
PROT SERPL-MCNC: 5.6 G/DL (ref 6.8–8.8)
RBC # BLD AUTO: 4.46 10E6/UL (ref 4.4–5.9)
SARS-COV-2 RNA RESP QL NAA+PROBE: NEGATIVE
SODIUM SERPL-SCNC: 137 MMOL/L (ref 133–144)
TRIGL SERPL-MCNC: 197 MG/DL
WBC # BLD AUTO: 12.8 10E3/UL (ref 4–11)

## 2021-07-23 PROCEDURE — 83735 ASSAY OF MAGNESIUM: CPT | Performed by: SURGERY

## 2021-07-23 PROCEDURE — 250N000011 HC RX IP 250 OP 636: Performed by: INTERNAL MEDICINE

## 2021-07-23 PROCEDURE — 84134 ASSAY OF PREALBUMIN: CPT | Performed by: SURGERY

## 2021-07-23 PROCEDURE — 97530 THERAPEUTIC ACTIVITIES: CPT | Mod: GP

## 2021-07-23 PROCEDURE — 84100 ASSAY OF PHOSPHORUS: CPT | Performed by: INTERNAL MEDICINE

## 2021-07-23 PROCEDURE — 97530 THERAPEUTIC ACTIVITIES: CPT | Mod: GO

## 2021-07-23 PROCEDURE — 36415 COLL VENOUS BLD VENIPUNCTURE: CPT | Performed by: INTERNAL MEDICINE

## 2021-07-23 PROCEDURE — 120N000001 HC R&B MED SURG/OB

## 2021-07-23 PROCEDURE — 84478 ASSAY OF TRIGLYCERIDES: CPT | Performed by: SURGERY

## 2021-07-23 PROCEDURE — 85610 PROTHROMBIN TIME: CPT | Performed by: SURGERY

## 2021-07-23 PROCEDURE — U0005 INFEC AGEN DETEC AMPLI PROBE: HCPCS | Performed by: INTERNAL MEDICINE

## 2021-07-23 PROCEDURE — 99232 SBSQ HOSP IP/OBS MODERATE 35: CPT | Mod: 24 | Performed by: INTERNAL MEDICINE

## 2021-07-23 PROCEDURE — 82040 ASSAY OF SERUM ALBUMIN: CPT | Performed by: SURGERY

## 2021-07-23 PROCEDURE — 250N000009 HC RX 250: Performed by: SURGERY

## 2021-07-23 PROCEDURE — 250N000009 HC RX 250: Performed by: INTERNAL MEDICINE

## 2021-07-23 PROCEDURE — 85027 COMPLETE CBC AUTOMATED: CPT | Performed by: INTERNAL MEDICINE

## 2021-07-23 PROCEDURE — 250N000011 HC RX IP 250 OP 636: Performed by: NURSE PRACTITIONER

## 2021-07-23 PROCEDURE — 258N000003 HC RX IP 258 OP 636: Performed by: INTERNAL MEDICINE

## 2021-07-23 RX ORDER — SODIUM CHLORIDE AND POTASSIUM CHLORIDE 300; 900 MG/100ML; MG/100ML
INJECTION, SOLUTION INTRAVENOUS CONTINUOUS
Status: DISCONTINUED | OUTPATIENT
Start: 2021-07-23 | End: 2021-07-25

## 2021-07-23 RX ADMIN — TAZOBACTAM SODIUM AND PIPERACILLIN SODIUM 3.38 G: 375; 3 INJECTION, SOLUTION INTRAVENOUS at 19:48

## 2021-07-23 RX ADMIN — METOPROLOL TARTRATE 5 MG: 5 INJECTION INTRAVENOUS at 09:01

## 2021-07-23 RX ADMIN — SODIUM PHOSPHATE, MONOBASIC, MONOHYDRATE 15 MMOL: 276; 142 INJECTION, SOLUTION INTRAVENOUS at 07:00

## 2021-07-23 RX ADMIN — FAMOTIDINE 20 MG: 20 INJECTION, SOLUTION INTRAVENOUS at 15:56

## 2021-07-23 RX ADMIN — METOPROLOL TARTRATE 5 MG: 5 INJECTION INTRAVENOUS at 19:48

## 2021-07-23 RX ADMIN — TAZOBACTAM SODIUM AND PIPERACILLIN SODIUM 3.38 G: 375; 3 INJECTION, SOLUTION INTRAVENOUS at 02:20

## 2021-07-23 RX ADMIN — ONDANSETRON 4 MG: 2 INJECTION INTRAMUSCULAR; INTRAVENOUS at 02:23

## 2021-07-23 RX ADMIN — POTASSIUM CHLORIDE AND SODIUM CHLORIDE: 900; 300 INJECTION, SOLUTION INTRAVENOUS at 14:50

## 2021-07-23 RX ADMIN — SODIUM PHOSPHATE, MONOBASIC, MONOHYDRATE 15 MMOL: 276; 142 INJECTION, SOLUTION INTRAVENOUS at 09:09

## 2021-07-23 RX ADMIN — TAZOBACTAM SODIUM AND PIPERACILLIN SODIUM 3.38 G: 375; 3 INJECTION, SOLUTION INTRAVENOUS at 14:46

## 2021-07-23 RX ADMIN — TAZOBACTAM SODIUM AND PIPERACILLIN SODIUM 3.38 G: 375; 3 INJECTION, SOLUTION INTRAVENOUS at 08:51

## 2021-07-23 RX ADMIN — FAMOTIDINE 20 MG: 20 INJECTION, SOLUTION INTRAVENOUS at 04:29

## 2021-07-23 RX ADMIN — ENOXAPARIN SODIUM 40 MG: 40 INJECTION SUBCUTANEOUS at 09:22

## 2021-07-23 RX ADMIN — I.V. FAT EMULSION 250 ML: 20 EMULSION INTRAVENOUS at 19:37

## 2021-07-23 RX ADMIN — ASCORBIC ACID, VITAMIN A PALMITATE, CHOLECALCIFEROL, THIAMINE HYDROCHLORIDE, RIBOFLAVIN-5 PHOSPHATE SODIUM, PYRIDOXINE HYDROCHLORIDE, NIACINAMIDE, DEXPANTHENOL, ALPHA-TOCOPHEROL ACETATE, VITAMIN K1, FOLIC ACID, BIOTIN, CYANOCOBALAMIN: 200; 3300; 200; 6; 3.6; 6; 40; 15; 10; 150; 600; 60; 5 INJECTION, SOLUTION INTRAVENOUS at 18:02

## 2021-07-23 RX ADMIN — HYDRALAZINE HYDROCHLORIDE 10 MG: 20 INJECTION INTRAMUSCULAR; INTRAVENOUS at 04:40

## 2021-07-23 ASSESSMENT — ACTIVITIES OF DAILY LIVING (ADL)
ADLS_ACUITY_SCORE: 14
ADLS_ACUITY_SCORE: 16
ADLS_ACUITY_SCORE: 15
ADLS_ACUITY_SCORE: 15
ADLS_ACUITY_SCORE: 14
ADLS_ACUITY_SCORE: 15

## 2021-07-23 ASSESSMENT — MIFFLIN-ST. JEOR: SCORE: 1592.13

## 2021-07-23 NOTE — PROGRESS NOTES
Spoke with Krissy at [facility], they will review referral information Monday, anticipate an opening on Tuesday. [facility]  Updated Adrian and daughter, Shannan.

## 2021-07-23 NOTE — PLAN OF CARE
Pt is A&O, Elevated BP came down after getting scheduled 5 mg IV Metoprolol. On 1 LPM O2 with SATS 93-95%.  NG advanced to 55 cm from 30 wit increase in output.  EMMANUELLE drain put out 50 mls sersanguinous this shift.  Wound vac in place for midline incision with no visible output.  Ostomy has brown output-few tablespoons - not measurable.  Started TPN and Lipids this shift.  Continuing with IV antbtx Q6H.  He is NPO with swabbing for oral cares. Has NS K40 at 100 mls/hr.  CL in place, triple lumen , all 3 lines patent. Pt uses call light when has to void, also calls out for help-needs staff to hold urinal in place.  Pt code status changed to DNR at start of shift.  Bed alarm on and call light in reach.    Face to face report given with opportunity to observe patient.    Report given to Lilil Negro RN   7/22/2021  11:16 PM

## 2021-07-23 NOTE — PROGRESS NOTES
07/23/21 1400   Signing Clinician's Name / Credentials   Signing clinician's name / credentials Jazmine Coyle DPT   Quick Adds   Rehab Discipline PT   Therapeutic Activity   Minutes of Treatment 23 minutes   Symptoms Noted During/After Treatment Fatigue   Treatment Detail Pt in recliner at start of session and agreeable to PT. Required mod A x2 for sit to stand transfer. Pt then needed to sit back down to use urinal. Required total A for using urinal. Then completed sit to stand again c mod A x2 using FWW. Pt ambulated about 100' c FWW and min A c WC follow for safety. Pt demonstrated improved activity tolerance but was very fatigued following ambulation. Pt transferred to edge of bed and required mod A for sit to supine transfer for lifting LE. Assisted pt to get comfortable in bed using several pillows in sidelying position. Call light within reach   PT Discharge Planning    PT Discharge Recommendation (DC Rec) Transitional Care Facility   PT Rationale for DC Rec pt still demonstrates weakness and instability with gait requiring min-mod A at times, would not be safe to return home alone.  Recommend short term rehab   PT Brief overview of current status  Pt in recliner at start of session and agreeable to PT. Required mod A x2 for sit to stand transfer. Pt then needed to sit back down to use urinal. Required total A for using urinal. Then completed sit to stand again c mod A x2 using FWW. Pt ambulated about 100' c FWW and min A c WC follow for safety. Pt demonstrated improved activity tolerance but was very fatigued following ambulation. Pt transferred to edge of bed and required mod A for sit to supine transfer for lifting LE. Assisted pt to get comfortable in bed using several pillows in sidelying position. Call light within reach (P)    Additional Documentation   PT Plan Progress mobility and strength.   Total Session Time   Total Session Time (minutes) 23 minutes

## 2021-07-23 NOTE — PROGRESS NOTES
INPATIENT ROUNDING NOTE  7/23/2021    Patient: James FALK Vikashliv    Physician of Record: Locums    Admitting diagnosis: Abdominal pain, right lower quadrant [R10.31]  Sigmoid diverticulitis [K57.32]  Diverticulitis large intestine [K57.32]    Procedure(s):  Exploratory  laparotomy with sigmoid colectomy and creation of colostomy, appendectomy     POD: 4 Days Post-Op    Current Diet: NPO    CURRENT MEDICATIONS:  Continuous Medications:  Current Facility-Administered Medications   Medication Last Rate     KCl 40 mEq in NaCl 0.9% 100 mL/hr at 07/23/21 1122     dextrose       parenteral nutrition - Clinimix E 90 mL/hr at 07/22/21 1902       Scheduled Medications:  Current Facility-Administered Medications   Medication Dose Route Frequency     enoxaparin ANTICOAGULANT  40 mg Subcutaneous Q24H     famotidine  20 mg Intravenous Q12H     lipids  250 mL Intravenous Q24H     [Held by provider] losartan  50 mg Oral Daily     metoprolol  5 mg Intravenous BID     [Held by provider] metoprolol succinate ER  12.5 mg Oral At Bedtime     ondansetron  4 mg Intravenous Once     piperacillin-tazobactam  3.375 g Intravenous Q6H     [Held by provider] simvastatin  20 mg Oral At Bedtime     sodium chloride (PF)  10 mL Intracatheter Q8H     sodium chloride (PF)  3 mL Intracatheter Q8H       PRN Medications:  Current Facility-Administered Medications   Medication Dose Route Frequency     [Held by provider] acetaminophen  975 mg Oral Q4H PRN     dextrose   Intravenous Continuous PRN     hydrALAZINE  10 mg Intravenous Q6H PRN     HYDROmorphone  0.5-1 mg Intravenous Q3H PRN     lidocaine 4%   Topical Q1H PRN     lidocaine (buffered or not buffered)  0.1-1 mL Other Q1H PRN     [Held by provider] melatonin  1 mg Oral At Bedtime PRN     naloxone  0.2 mg Intravenous Q2 Min PRN    Or     naloxone  0.4 mg Intravenous Q2 Min PRN    Or     naloxone  0.2 mg Intramuscular Q2 Min PRN    Or     naloxone  0.4 mg Intramuscular Q2 Min PRN     ondansetron  4 mg  "Oral Q6H PRN    Or     ondansetron  4 mg Intravenous Q6H PRN     prochlorperazine  5 mg Intravenous Q6H PRN    Or     prochlorperazine  5 mg Oral Q6H PRN    Or     prochlorperazine  12.5 mg Rectal Q12H PRN     sodium chloride (PF)  3 mL Intracatheter q1 min prn       SUBJECTIVE:   Nausea: Yes. Vomiting: No. Fever: No. Chills: No. Excessive burping: No. Flatus: Yes. BM: Yes. Pain is 4/10. Pain control: good. Tolerating current diet: No.  Patient just got back from a walk.  His NG tube was removed earlier this morning that he is thankful for that.  He denies any major issues after the NG tube has been removed.  He feels quite fatigued and does not have his normal energy.  He is wondering about what the future is going to hold.    PHYSICAL EXAM:   Vital signs: /62 (BP Location: Left arm)   Pulse 88   Temp 98.9  F (37.2  C) (Tympanic)   Resp 18   Ht 1.803 m (5' 11\")   Wt 93 kg (205 lb 0.4 oz)   SpO2 92%   BMI 28.60 kg/m     Weight: [unfilled]   BMI: Body mass index is 28.6 kg/m .   General: alert   Neck: CVP line is on the right.  Otherwise there is no abnormalities.   Lungs: clear to auscultation   CV: Regular rate and rhythm without murmer   Abdominal: Abdomen soft, non-tender. BS normal. No masses, organomegaly.  Ostomy in the left lower quadrant is pink and viable.  Ostomy appliance is well sealed and there is no evidence of leakage.  There is no stool or significant bowel gas in his ostomy appliance.   Wound: Negative pressure dressing is in place.  There is no evidence of cellulitis.   Extremities: No cyanosis, clubbing or edema noted bilaterally in Upper and Lower Extremities   Neurological: Patient is alert and responsive    INPUT/OUTPUT:      Intake/Output Summary (Last 24 hours) at 7/23/2021 1157  Last data filed at 7/23/2021 1035  Gross per 24 hour   Intake 3438 ml   Output 3040 ml   Net 398 ml       I/O last 3 completed shifts:  In: 3438 [I.V.:2262]  Out: 2715 [Urine:1175; Emesis/NG output:1300; " Drains:140; Stool:100]    LABS:    Last CBC Rrsults:   Recent Labs   Lab Test 07/23/21  0529 07/22/21  0615 07/21/21  2144 07/21/21  0447   WBC 12.8* 11.7*  --  12.7*   RBC 4.46 4.73  --  4.57   HGB 13.7 14.3 13.6 14.0   HCT 40.2 42.6  --  40.8   MCV 90 90  --  89   MCH 30.7 30.2  --  30.6   MCHC 34.1 33.6  --  34.3   RDW 13.1 13.2  --  13.0    279  --  243       Last Comprehensive Metabolic panel:  Recent Labs   Lab Test 07/23/21  0529 07/22/21  2353 07/22/21  0609 07/21/21  1501 07/21/21  0603 07/21/21  0447 07/19/21  1754 07/19/21  0618 07/17/21  0602 07/16/21  0519     --  138  --   --  139   < > 139  --  138   POTASSIUM 3.7  --  3.9 3.4  --  2.9*   < > 3.3*  --  4.0   CHLORIDE 107  --  104  --   --  102   < > 104  --  107   CO2 23  --  25  --   --  30   < > 25  --  24   ANIONGAP 7  --  9  --   --  7   < > 10  --  7   * 195* 112*  --    < > 117*   < > 101*   < > 145*   BUN 16  --  19  --   --  17   < > 19  --  19   CR 0.54*  --  0.64*  --   --  0.68   < > 0.63*  --  0.73   GFRESTIMATED 90  --  84  --   --  82   < > 84  --  79   VANIA 7.8*  --  7.9*  --   --  7.7*   < > 7.9*  --  7.8*   BILITOTAL 0.6  --   --   --   --   --   --  0.6  --  1.1   ALKPHOS 64  --   --   --   --   --   --  48  --  56   ALT 89*  --   --   --   --   --   --  13  --  21   AST 64*  --   --   --   --   --   --  16  --  14    < > = values in this interval not displayed.       Recent Labs   Lab Test 07/23/21  0529 07/22/21  1846 07/21/21  0810 07/20/21  0501 07/19/21  0618 07/16/21  0519   MAG 1.9 1.9  --  2.0  --   --    ALBUMIN 2.2*  --   --   --  2.1* 3.3*   PHOS 1.7* 2.3* 3.5 1.8*  --   --          ASSESSMENT:    4 Days Post-Op from Procedure(s):  Exploratory  laparotomy with sigmoid colectomy and creation of colostomy, appendectomy.  Severe protein calorie malnutrition  Postoperative ileus  Debilitated state    PLAN: We will plan to keep the patient n.p.o. until ostomy starts to function.  We will continue with TPN  until the patient starts to progress with diet.  We will encourage ambulation.  Continue chemical mechanical DVT prophylaxis.  Patient is currently on Zosyn and vancomycin for antibiotics.

## 2021-07-23 NOTE — PROGRESS NOTES
[facility]    Medicine Progress Note - Hospitalist Service       Date of Admission:  7/15/2021    Assessment & Plan         James Grant is a 89 year old male who was admitted on 7/15/2021.       1.   status post exploratory laparotomy for diverticulitis with abscess:   - Postop day #4  - Patient underwent end colostomy with Walker's procedure. Colostomy bag now has output.   - NG tube still in place with bilious returns.     Ambulation.    - Decisions upon NG tube etc. per surgery.  - 7/23: colostomy output noted;      2.  Sigmoid diverticulitis.    - Continues on Zosyn no fevers white count mildly elevated.  No signs of sepsis.  - 7/23: continue with current abx     3.  Cardiac:   - Hemodynamically stable.  Is getting some IV metoprolol as needed.   - 7/23: pt was hypertensive last night.  Better controlled this morning.  -  No signs of volume overload at this point.     4.  Pulmonary status:   - Slowly coming on the O2.  His lungs are basically clear some basal atelectasis.  Continue to push pulmonary toilet mobilize as best we can.     5.  Renal status:   - Prater catheter in place.  Likely can remove this.  BUN/creatinine stable.  Normal potassium coming up now.          Diet: NPO for Medical/Clinical Reasons Except for: No Exceptions  parenteral nutrition - Clinimix E    DVT Prophylaxis: Pneumatic Compression Devices  Prater Catheter: Not present  Central Lines: None  Code Status: No CPR- Do NOT Intubate      Disposition Plan   Expected discharge:  recommended to transitional care unit once pt is able to tolerate PO and surgery is OK with discharge.    The patient's care was discussed with the Patient.     Johnathan Eduardo MD  Hospitalist Service  [facility]  Securely message with the Vocera Web Console (learn more here)  Text page via [facility] Paging/Directory      Risk Factors Present on Admission              _____________________________________________________________________    Interval  History   Pt reports feeling weak and does not have much motivation to work with PT/OT.    Colostomy output was noted.  Some nausea, but no vomiting, fever, chills, chest pain, cough.    Data reviewed today: I reviewed all medications, new labs and imaging results over the last 24 hours. I personally reviewed no images or EKG's today.    Physical Exam   Vital Signs: Temp: 99.5  F (37.5  C) Temp src: Tympanic BP: 138/69 Pulse: 67   Resp: 18 SpO2: 94 % O2 Device: None (Room air) Oxygen Delivery: 1 LPM  Weight: 205 lbs .44 oz  General Appearance: In NAD  Respiratory: soft NT ND  Cardiovascular: RRR, no murmurs or gallops  GI: soft, colostomy bag with output.  Skin: intact  Other: Neuro: non-focal    Data   Recent Labs   Lab 07/23/21  1240 07/23/21  0529 07/22/21  2353 07/22/21  1846 07/22/21  0615 07/22/21  0609 07/21/21  2144 07/21/21  1501 07/21/21  0603 07/21/21  0447 07/21/21  0447 07/19/21  1754 07/19/21  0618   WBC  --  12.8*  --   --  11.7*  --   --   --   --   --  12.7*   < > 12.8*   HGB  --  13.7  --   --  14.3  --  13.6  --   --   --  14.0   < > 13.5   MCV  --  90  --   --  90  --   --   --   --   --  89   < > 91   PLT  --  321  --   --  279  --   --   --   --   --  243   < > 217   INR  --  1.08  --  1.10  --   --   --   --   --   --   --   --   --    NA  --  137  --   --   --  138  --   --   --   --  139   < > 139   POTASSIUM  --  3.7  --   --   --  3.9  --  3.4  --   --  2.9*   < > 3.3*   CHLORIDE  --  107  --   --   --  104  --   --   --   --  102   < > 104   CO2  --  23  --   --   --  25  --   --   --   --  30   < > 25   BUN  --  16  --   --   --  19  --   --   --   --  17   < > 19   CR  --  0.54*  --   --   --  0.64*  --   --   --   --  0.68   < > 0.63*   ANIONGAP  --  7  --   --   --  9  --   --   --   --  7   < > 10   VANIA  --  7.8*  --   --   --  7.9*  --   --   --   --  7.7*   < > 7.9*   * 197* 195*  --   --  112*  --   --    < >   < > 117*   < > 101*   ALBUMIN  --  2.2*  --   --   --   --   --    --   --   --   --   --  2.1*   PROTTOTAL  --  5.6*  --   --   --   --   --   --   --   --   --   --  5.6*   BILITOTAL  --  0.6  --   --   --   --   --   --   --   --   --   --  0.6   ALKPHOS  --  64  --   --   --   --   --   --   --   --   --   --  48   ALT  --  89*  --   --   --   --   --   --   --   --   --   --  13   AST  --  64*  --   --   --   --   --   --   --   --   --   --  16    < > = values in this interval not displayed.     No results found for this or any previous visit (from the past 24 hour(s)).  Medications     KCl 40 mEq in NaCl 0.9% 10 mL/hr at 07/23/21 1641     dextrose       parenteral nutrition - Clinimix E 90 mL/hr at 07/23/21 1802       enoxaparin ANTICOAGULANT  40 mg Subcutaneous Q24H     famotidine  20 mg Intravenous Q12H     lipids  250 mL Intravenous Q24H     [Held by provider] losartan  50 mg Oral Daily     metoprolol  5 mg Intravenous BID     [Held by provider] metoprolol succinate ER  12.5 mg Oral At Bedtime     ondansetron  4 mg Intravenous Once     piperacillin-tazobactam  3.375 g Intravenous Q6H     [Held by provider] simvastatin  20 mg Oral At Bedtime     sodium chloride (PF)  10 mL Intracatheter Q8H     sodium chloride (PF)  3 mL Intracatheter Q8H

## 2021-07-23 NOTE — PLAN OF CARE
"/63 (BP Location: Left arm)   Pulse 69   Temp 98.6  F (37  C) (Tympanic)   Resp 16   Ht 1.803 m (5' 11\")   Wt 93 kg (205 lb 0.4 oz)   SpO2 94%   BMI 28.60 kg/m       Pt is A&O, makes needs known. /79 this AM, PRN hydralazine given, BP down to 145/63, other VSS. Denies pain. IV zofran given x1 for nausea. Dressing to midline abd incision remains clean, dry, and intact. 0 mL output from wound vac. EMMANUELLE drain dressing clean, dry, intact with 30 mL of serosang drainage. NG tube at 58 cm this AM, 600cc of brown output this shift. Very minimal output from colostomy. LS dim. Weaned down to RA this shift, O2 sats mid 90's. HRR. On tele: SR 70's per ICU report. BS hypoactive. . Adequate urine output, frequent urination. Central line dressing reinforced. Blue lumen infusing TPN/lipids. Brown and white lumens SL, blood return noted. Maintenance fluids infusing through left peripheral IV. Call light within reach, room door open and near nurses station.     Face to face report given with opportunity to observe patient.    Report given to SARA Kumar RN   7/23/2021  7:34 AM    "

## 2021-07-23 NOTE — PLAN OF CARE
Pt is A&O. Denies pain. VSS. Voiding into urinal. Midline incision has a wound vac, no output from wound vac this shift. EMMANUELLE drain on L side of abdomen in tach and draining bright red. Colostomy on R side has had minimal output.  Pt up with assist of 1 gait belt and walker. Strict NPO. Central line has maintance fluid infusing into white port. Brown port saline locked. Blue port is infusing TPN. Blood returned noted on all ports. Central line dressing reinforced. Phosphorus replaced this shift. NG tube removed this shift and pt denies nausea since removal. LS dim and clear and has maintained oxygen saturation on room air. Infrequent productive cough. Call light remains within reach and pt has used it appropriately. Pt free from injury and falls this shift.  Face to face report given with opportunity to observe patient.    Report given to Lulu Hinojosa RN   7/23/2021  3:24 PM

## 2021-07-23 NOTE — PHARMACY
"Pharmacy Antimicrobial Stewardship Assessment     Current Antimicrobial Therapy:  Anti-infectives (From now, onward)    Start     Dose/Rate Route Frequency Ordered Stop    07/15/21 2130  piperacillin-tazobactam (ZOSYN) infusion 3.375 g     Note to Pharmacy: For [facility], [facility] and [facility]: For Zosyn-naive patients, use the \"Zosyn initial dose + extended infusion\" order panel.    3.375 g  100 mL/hr over 30 Minutes Intravenous EVERY 6 HOURS 07/15/21 2113          Indication: intra-abdominal infection/diverticulitis    Days of Therapy: 9     Pertinent Labs:    WBC Count   Date Value Ref Range Status   07/23/2021 12.8 (H) 4.0 - 11.0 10e3/uL Final   07/22/2021 11.7 (H) 4.0 - 11.0 10e3/uL Final   07/21/2021 12.7 (H) 4.0 - 11.0 10e3/uL Final       CRP Inflammation   Date Value Ref Range Status   07/17/2021 188.0 (H) 0.0 - 8.0 mg/L Final   07/16/2021 57.8 (H) 0.0 - 8.0 mg/L Final   01/02/2017 <2.9 0.0 - 8.0 mg/L Final       Culture Results:   07/23/2021 0928 07/23/2021 1027 SARS-COV2 (COVID-19) Virus RT-PCR [[id number]]    Swab from Nasopharyngeal    Final result Component Value   SARS CoV2 PCR Negative   NEGATIVE: SARS-CoV-2 (COVID-19) RNA not detected, presumed negative.           Recommendations/Interventions:  1. Recommend stopping tomorrow after 10-days treatment.     Lida Burdick, [facility]  July 23, 2021    "

## 2021-07-23 NOTE — PHARMACY
TPN formula evaluated based on today s labs results.    The following changes have been made:  Protein: No change.  Dextrose: No change.   Sodium:  No change .  Potassium: No change.  Calcium:  No change .  Magnesium: No change .  Phosphorus: No change . Received replacement dose this morning 15 mmol sodium phosphate x2 doses.  Chloride:Acetate ratio: No change  Trace elements:No change.  Multivitamins No change .    Triglycerides= 197 --> spoke with Monica Merida RD, and she recommended rechecking triglycerides on Monday and going from there.    Pharmacy will continue to follow and adjust as appropriate.    Lida Burdick, PharmD

## 2021-07-23 NOTE — PROGRESS NOTES
07/23/21 1500   Signing Clinician's Name / Credentials   Signing clinician's name / credentials Saumya Ash, OTR/L   Quick Adds   Rehab Discipline OT   Therapeutic Activity   Minutes of Treatment 15 minutes   Symptoms Noted During/After Treatment Fatigue   Treatment Detail Pt resting in bed with daughter present upon OT arrival. He was more bright and agreeable to OT today. Supine>sit SBA, mild difficulties. Sit>stand from EOB Buddy. Pt ambulated to/from the doorway and made a [place] in the hallway with FWW with Buddy and Nursing present to assist with IV pole, central line, and wound vac. Pt returned to the chair and declined completing any grooming tasks at this time. Pt educated in grooming supplies here at the hospital and was shown the mirror in the tray table. Pt's daughter asked nursing about pt shaving and he continued to decline. Pt wanted to read the paper and was left resting in the chair with the clip alert attached and call light within reach. Daughter present at conclusion of therapy, too.   OT Discharge Planning    OT Discharge Recommendation (DC Rec) Transitional Care Facility   OT Rationale for DC Rec Pt continues to require assistance to safety complete ADLs due to pain, decreased strength, decreased activity tolerance, and decreased balance. Pt would not be safe to return home based off of current level of function. Recommend short term rehab to improve pt's safety and independence in ADLs.   OT Brief overview of current status  Pt resting in bed with daughter present upon OT arrival. He was more bright and agreeable to OT today. Supine>sit SBA, mild difficulties. Sit>stand from EOB Buddy. Pt ambulated to/from the doorway and made a [place] in the hallway with FWW with Buddy and Nursing present to assist with IV pole, central line, and wound vac. Pt returned to the chair and declined completing any grooming tasks at this time. Pt educated in grooming supplies here at the hospital and was shown the  mirror in the tray table. Pt's daughter asked nursing about pt shaving and he continued to decline. Pt wanted to read the paper and was left resting in the chair with the clip alert attached and call light within reach. Daughter present at conclusion of therapy, too.   Additional Documentation   Rehab Comments Pt slightly improved today but continues to demonstrate weakness and limited activity tolerance   OT Plan Progress strength, activity tolerance, and ADLs, as able   Total Session Time   Total Session Time (minutes) 15 minutes

## 2021-07-24 ENCOUNTER — APPOINTMENT (OUTPATIENT)
Dept: PHYSICAL THERAPY | Facility: HOSPITAL | Age: 86
DRG: 329 | End: 2021-07-24
Attending: INTERNAL MEDICINE
Payer: MEDICARE

## 2021-07-24 LAB
ALBUMIN UR-MCNC: NEGATIVE MG/DL
ANION GAP SERPL CALCULATED.3IONS-SCNC: 4 MMOL/L (ref 3–14)
APPEARANCE UR: CLEAR
BILIRUB UR QL STRIP: NEGATIVE
BUN SERPL-MCNC: 15 MG/DL (ref 7–30)
CALCIUM SERPL-MCNC: 7.3 MG/DL (ref 8.5–10.1)
CHLORIDE BLD-SCNC: 105 MMOL/L (ref 94–109)
CO2 SERPL-SCNC: 27 MMOL/L (ref 20–32)
COLOR UR AUTO: NORMAL
CREAT SERPL-MCNC: 0.58 MG/DL (ref 0.66–1.25)
ERYTHROCYTE [DISTWIDTH] IN BLOOD BY AUTOMATED COUNT: 13.2 % (ref 10–15)
GFR SERPL CREATININE-BSD FRML MDRD: 87 ML/MIN/1.73M2
GLUCOSE BLD-MCNC: 166 MG/DL (ref 70–99)
GLUCOSE BLDC GLUCOMTR-MCNC: 133 MG/DL (ref 70–99)
GLUCOSE BLDC GLUCOMTR-MCNC: 155 MG/DL (ref 70–99)
GLUCOSE BLDC GLUCOMTR-MCNC: 159 MG/DL (ref 70–99)
GLUCOSE UR STRIP-MCNC: NEGATIVE MG/DL
HCT VFR BLD AUTO: 35.5 % (ref 40–53)
HGB BLD-MCNC: 12.1 G/DL (ref 13.3–17.7)
HGB UR QL STRIP: NEGATIVE
KETONES UR STRIP-MCNC: NEGATIVE MG/DL
LEUKOCYTE ESTERASE UR QL STRIP: NEGATIVE
MAGNESIUM SERPL-MCNC: 1.9 MG/DL (ref 1.6–2.3)
MCH RBC QN AUTO: 30.7 PG (ref 26.5–33)
MCHC RBC AUTO-ENTMCNC: 34.1 G/DL (ref 31.5–36.5)
MCV RBC AUTO: 90 FL (ref 78–100)
NITRATE UR QL: NEGATIVE
PH UR STRIP: 7 [PH] (ref 4.7–8)
PHOSPHATE SERPL-MCNC: 2.3 MG/DL (ref 2.5–4.5)
PLATELET # BLD AUTO: 292 10E3/UL (ref 150–450)
POTASSIUM BLD-SCNC: 3.6 MMOL/L (ref 3.4–5.3)
RBC # BLD AUTO: 3.94 10E6/UL (ref 4.4–5.9)
SODIUM SERPL-SCNC: 136 MMOL/L (ref 133–144)
SP GR UR STRIP: 1.01 (ref 1–1.03)
UROBILINOGEN UR STRIP-MCNC: NORMAL MG/DL
WBC # BLD AUTO: 12.4 10E3/UL (ref 4–11)

## 2021-07-24 PROCEDURE — 250N000011 HC RX IP 250 OP 636: Performed by: INTERNAL MEDICINE

## 2021-07-24 PROCEDURE — 83735 ASSAY OF MAGNESIUM: CPT | Performed by: SURGERY

## 2021-07-24 PROCEDURE — 85027 COMPLETE CBC AUTOMATED: CPT | Performed by: INTERNAL MEDICINE

## 2021-07-24 PROCEDURE — 258N000003 HC RX IP 258 OP 636: Performed by: INTERNAL MEDICINE

## 2021-07-24 PROCEDURE — 80048 BASIC METABOLIC PNL TOTAL CA: CPT | Performed by: INTERNAL MEDICINE

## 2021-07-24 PROCEDURE — 97530 THERAPEUTIC ACTIVITIES: CPT | Mod: GP

## 2021-07-24 PROCEDURE — 250N000009 HC RX 250: Performed by: INTERNAL MEDICINE

## 2021-07-24 PROCEDURE — 120N000001 HC R&B MED SURG/OB

## 2021-07-24 PROCEDURE — 81003 URINALYSIS AUTO W/O SCOPE: CPT | Performed by: INTERNAL MEDICINE

## 2021-07-24 PROCEDURE — 99232 SBSQ HOSP IP/OBS MODERATE 35: CPT | Mod: 24 | Performed by: INTERNAL MEDICINE

## 2021-07-24 PROCEDURE — 250N000013 HC RX MED GY IP 250 OP 250 PS 637: Performed by: INTERNAL MEDICINE

## 2021-07-24 PROCEDURE — 36415 COLL VENOUS BLD VENIPUNCTURE: CPT | Performed by: INTERNAL MEDICINE

## 2021-07-24 PROCEDURE — 84100 ASSAY OF PHOSPHORUS: CPT | Performed by: SURGERY

## 2021-07-24 PROCEDURE — 250N000009 HC RX 250: Performed by: SURGERY

## 2021-07-24 PROCEDURE — 250N000011 HC RX IP 250 OP 636: Performed by: NURSE PRACTITIONER

## 2021-07-24 RX ORDER — LOSARTAN POTASSIUM 50 MG/1
50 TABLET ORAL DAILY
Status: DISCONTINUED | OUTPATIENT
Start: 2021-07-24 | End: 2021-07-28 | Stop reason: HOSPADM

## 2021-07-24 RX ADMIN — METOPROLOL SUCCINATE 12.5 MG: 25 TABLET, EXTENDED RELEASE ORAL at 21:49

## 2021-07-24 RX ADMIN — METOPROLOL TARTRATE 5 MG: 5 INJECTION INTRAVENOUS at 10:06

## 2021-07-24 RX ADMIN — FAMOTIDINE 20 MG: 20 INJECTION, SOLUTION INTRAVENOUS at 17:04

## 2021-07-24 RX ADMIN — ASCORBIC ACID, VITAMIN A PALMITATE, CHOLECALCIFEROL, THIAMINE HYDROCHLORIDE, RIBOFLAVIN-5 PHOSPHATE SODIUM, PYRIDOXINE HYDROCHLORIDE, NIACINAMIDE, DEXPANTHENOL, ALPHA-TOCOPHEROL ACETATE, VITAMIN K1, FOLIC ACID, BIOTIN, CYANOCOBALAMIN: 200; 3300; 200; 6; 3.6; 6; 40; 15; 10; 150; 600; 60; 5 INJECTION, SOLUTION INTRAVENOUS at 20:06

## 2021-07-24 RX ADMIN — TAZOBACTAM SODIUM AND PIPERACILLIN SODIUM 3.38 G: 375; 3 INJECTION, SOLUTION INTRAVENOUS at 20:16

## 2021-07-24 RX ADMIN — TAZOBACTAM SODIUM AND PIPERACILLIN SODIUM 3.38 G: 375; 3 INJECTION, SOLUTION INTRAVENOUS at 08:41

## 2021-07-24 RX ADMIN — I.V. FAT EMULSION 250 ML: 20 EMULSION INTRAVENOUS at 20:08

## 2021-07-24 RX ADMIN — TAZOBACTAM SODIUM AND PIPERACILLIN SODIUM 3.38 G: 375; 3 INJECTION, SOLUTION INTRAVENOUS at 15:07

## 2021-07-24 RX ADMIN — ENOXAPARIN SODIUM 40 MG: 40 INJECTION SUBCUTANEOUS at 12:55

## 2021-07-24 RX ADMIN — SODIUM PHOSPHATE, MONOBASIC, MONOHYDRATE 15 MMOL: 276; 142 INJECTION, SOLUTION INTRAVENOUS at 10:05

## 2021-07-24 RX ADMIN — TAZOBACTAM SODIUM AND PIPERACILLIN SODIUM 3.38 G: 375; 3 INJECTION, SOLUTION INTRAVENOUS at 02:07

## 2021-07-24 RX ADMIN — FAMOTIDINE 20 MG: 20 INJECTION, SOLUTION INTRAVENOUS at 04:25

## 2021-07-24 ASSESSMENT — ACTIVITIES OF DAILY LIVING (ADL)
ADLS_ACUITY_SCORE: 16
ADLS_ACUITY_SCORE: 14
ADLS_ACUITY_SCORE: 16

## 2021-07-24 ASSESSMENT — MIFFLIN-ST. JEOR: SCORE: 1593.13

## 2021-07-24 NOTE — PLAN OF CARE
Patient has been alert and oriented. VSS, temp has been 99.1. Lungs clear, apical pulse irregular. Patient's IV infiltrated, taken out and central line used for antibiotic administration. Lipids running. TPN and matenence IV fluids running at a combine total of 100 ml/hr per order (TPN 90 ml/hr and NS with KCL at 10 ml/hr). Dressings on abdomen are dry, clean and intact. EMMANUELLE drain had 60 mL out and scant output in tubing of wound vac. Patient started experiencing urinary frequency at 00:00. Pt was concerned it could be a UTI. MD notified and UA was ordered by MD. Bladder scanned post-void of 180 mL. Face to face report given with opportunity to observe patient.    Report given to SARA Salgado RN   7/24/2021  7:30 AM

## 2021-07-24 NOTE — PROVIDER NOTIFICATION
Patient urinating frequently, 8 times since midnight and only in small amounts. Patient was concerned that it could be a UTI. MD notified and order was placed for a UA.

## 2021-07-24 NOTE — PROGRESS NOTES
[facility]    Medicine Progress Note - Hospitalist Service       Date of Admission:  7/15/2021    Assessment & Plan          James Grant is a 89 year old male who was admitted on 7/15/2021.       1.   status post exploratory laparotomy for diverticulitis with abscess:   - Postop day #5  - Patient underwent end colostomy with Walker's procedure. Colostomy bag now has output.   - NG tube still in place with bilious returns.     Ambulation.    - Decisions upon NG tube etc. per surgery.  - 7/23: colostomy output noted;   - 7/24: pt is doing well; tolerating PO     2.  Sigmoid diverticulitis.    - Continues on Zosyn no fevers white count mildly elevated.  No signs of sepsis.  - 7/23: continue with current abx  - 7/24: stable     3.  Cardiac:   - Hemodynamically stable.  Is getting some IV metoprolol as needed.   - 7/23: pt was hypertensive last night.  Better controlled this morning.  -  No signs of volume overload at this point.  - 7/24: resume losartan and metoprolol PO     4.  Pulmonary status:   - Slowly coming on the O2.  His lungs are basically clear some basal atelectasis.  Continue to push pulmonary toilet mobilize as best we can.  - 7/24: stable respiratory status.     5.  Renal status:   - Prater catheter in place.  Likely can remove this.  BUN/creatinine stable.  Normal potassium coming up now.        Diet: parenteral nutrition - Clinimix E  Full Liquid Diet    DVT Prophylaxis: Pneumatic Compression Devices  Prater Catheter: Not present  Central Lines: None  Code Status: No CPR- Do NOT Intubate      Disposition Plan   Expected discharge:  recommended to transitional care unit once antibiotic plan established and pt is tolerating PO .     The patient's care was discussed with the Patient and Patient's Family.    Johnathan Eduardo MD  Hospitalist Service  [facility]  Securely message with the Vocera Web Console (learn more here)  Text page via [employer] Paging/Directory      Risk Factors Present  on Admission   ______________________________________________________________________    Interval History   Patient is in much better spirits today.  He is very happy that he was able to ambulate with physical therapy.  He feels more energetic today.  Colostomy bag has output.  He denies fever, chills, chest pain, cough, nausea, vomiting, dysuria.  Lower extremity edema is about the same.    Data reviewed today: I reviewed all medications, new labs and imaging results over the last 24 hours. I personally reviewed no images or EKG's today.    Physical Exam   Vital Signs: Temp: 98.9  F (37.2  C) Temp src: Tympanic BP: 104/53 Pulse: 78   Resp: 19 SpO2: 94 % O2 Device: None (Room air)    Weight: 205 lbs 3.97 oz  General Appearance: In no acute distress  Respiratory: Clear to auscultation bilaterally  Cardiovascular: Regular rhythm and rate  GI: Soft, colostomy bag with output, scant bowel sound  Other: 2+ lower extremity edema bilaterally    Data   Recent Labs   Lab 07/24/21  1253 07/24/21  0550 07/24/21  0028 07/23/21  0529 07/22/21  1846 07/22/21  0615 07/22/21  0609 07/21/21  0447 07/19/21  1754 07/19/21  0618   WBC  --  12.4*  --  12.8*  --  11.7*  --   --    < > 12.8*   HGB  --  12.1*  --  13.7  --  14.3  --    < >   < > 13.5   MCV  --  90  --  90  --  90  --   --    < > 91   PLT  --  292  --  321  --  279  --   --    < > 217   INR  --   --   --  1.08 1.10  --   --   --   --   --    NA  --  136  --  137  --   --  138  --    < > 139   POTASSIUM  --  3.6  --  3.7  --   --  3.9  --    < > 3.3*   CHLORIDE  --  105  --  107  --   --  104  --    < > 104   CO2  --  27  --  23  --   --  25  --    < > 25   BUN  --  15  --  16  --   --  19  --    < > 19   CR  --  0.58*  --  0.54*  --   --  0.64*  --    < > 0.63*   ANIONGAP  --  4  --  7  --   --  9  --    < > 10   VANIA  --  7.3*  --  7.8*  --   --  7.9*  --    < > 7.9*   * 166* 159* 197*  --   --  112*   < >   < > 101*   ALBUMIN  --   --   --  2.2*  --   --   --   --    --  2.1*   PROTTOTAL  --   --   --  5.6*  --   --   --   --   --  5.6*   BILITOTAL  --   --   --  0.6  --   --   --   --   --  0.6   ALKPHOS  --   --   --  64  --   --   --   --   --  48   ALT  --   --   --  89*  --   --   --   --   --  13   AST  --   --   --  64*  --   --   --   --   --  16    < > = values in this interval not displayed.     No results found for this or any previous visit (from the past 24 hour(s)).  Medications     KCl 40 mEq in NaCl 0.9% 10 mL/hr at 07/23/21 1641     dextrose       parenteral nutrition - Clinimix E 90 mL/hr at 07/23/21 1802       enoxaparin ANTICOAGULANT  40 mg Subcutaneous Q24H     famotidine  20 mg Intravenous Q12H     lipids  250 mL Intravenous Q24H     losartan  50 mg Oral Daily     metoprolol succinate ER  12.5 mg Oral At Bedtime     ondansetron  4 mg Intravenous Once     piperacillin-tazobactam  3.375 g Intravenous Q6H     simvastatin  20 mg Oral At Bedtime     sodium chloride (PF)  10 mL Intracatheter Q8H     sodium chloride (PF)  3 mL Intracatheter Q8H     sodium phosphate  15 mmol Intravenous Once
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
## 2021-07-24 NOTE — PLAN OF CARE
"Patient is alert and oriented, makes needs known. Urinal at bedside. Ostomy having adequate output. EMMANUELLE drain had 60ml out. Voiding frequently. TPN and NS w/ K+ running for a total of 100ml/hr (TPN 90ml/HR and NS K+ 10ml/hr per order). Lipids running. Assessment as charted. Denies pain. /71 (BP Location: Left arm)   Pulse 71   Temp 98.2  F (36.8  C) (Tympanic)   Resp 16   Ht 1.803 m (5' 11\")   Wt 93 kg (205 lb 0.4 oz)   SpO2 94%   BMI 28.60 kg/m      Face to face report given with opportunity to observe patient.    Report given to Nikia Garg RN   7/23/2021  11:07 PM    "

## 2021-07-24 NOTE — PROGRESS NOTES
07/24/21 1055   Signing Clinician's Name / Credentials   Signing clinician's name / credentials Kavita White PTA   Quick Adds   Rehab Discipline PT   PT Assistant Visit Number 1   Therapeutic Activity   Minutes of Treatment 23 minutes   Symptoms Noted During/After Treatment Fatigue;Shortness of breath   Treatment Detail Pt was up in the recliner upon arrival and willing to work with PT. Pt had no c/o pain but very tired and SOB but his oxygen level was 93% on room air. Pt ambulated 100 feet with FWW a lot of tubes to manage and wheelchair behind. Pt was CGA1 sit to stand aware of tubing and was able to transition hands to the walker no difficulties, Pt was able to maneuver the walker down the hallway CGA1. Pt was tired when he got back to his room and wanted to rest for awhile in the recliner. Pts Oxygen level was 93% seated in the recliner.    PT Discharge Planning    PT Discharge Recommendation (DC Rec) Transitional Care Facility   PT Rationale for DC Rec pt still demonstrates weakness and instability with gait requiring CGA1 at times, would not be safe to return home alone.  Recommend short term rehab   PT Brief overview of current status  Pt was in the recliner but his sit to stand was CGA1 to MinA1 for a brief moment as he transitioned his hands to the walker. Pt was CGA1 with walking no LOB or unsafe maneuvers with the FWW.    Additional Documentation   PT Plan Progress mobility and strength.   Total Session Time   Total Session Time (minutes) 23 minutes

## 2021-07-25 LAB
ANION GAP SERPL CALCULATED.3IONS-SCNC: 5 MMOL/L (ref 3–14)
BUN SERPL-MCNC: 13 MG/DL (ref 7–30)
CALCIUM SERPL-MCNC: 7.4 MG/DL (ref 8.5–10.1)
CHLORIDE BLD-SCNC: 106 MMOL/L (ref 94–109)
CO2 SERPL-SCNC: 26 MMOL/L (ref 20–32)
CREAT SERPL-MCNC: 0.63 MG/DL (ref 0.66–1.25)
ERYTHROCYTE [DISTWIDTH] IN BLOOD BY AUTOMATED COUNT: 13.2 % (ref 10–15)
GFR SERPL CREATININE-BSD FRML MDRD: 84 ML/MIN/1.73M2
GLUCOSE BLD-MCNC: 175 MG/DL (ref 70–99)
GLUCOSE BLDC GLUCOMTR-MCNC: 116 MG/DL (ref 70–99)
GLUCOSE BLDC GLUCOMTR-MCNC: 140 MG/DL (ref 70–99)
GLUCOSE BLDC GLUCOMTR-MCNC: 180 MG/DL (ref 70–99)
HCT VFR BLD AUTO: 36.4 % (ref 40–53)
HGB BLD-MCNC: 12.5 G/DL (ref 13.3–17.7)
MAGNESIUM SERPL-MCNC: 2.1 MG/DL (ref 1.6–2.3)
MCH RBC QN AUTO: 31 PG (ref 26.5–33)
MCHC RBC AUTO-ENTMCNC: 34.3 G/DL (ref 31.5–36.5)
MCV RBC AUTO: 90 FL (ref 78–100)
PHOSPHATE SERPL-MCNC: 2.6 MG/DL (ref 2.5–4.5)
PLATELET # BLD AUTO: 315 10E3/UL (ref 150–450)
POTASSIUM BLD-SCNC: 3.7 MMOL/L (ref 3.4–5.3)
RBC # BLD AUTO: 4.03 10E6/UL (ref 4.4–5.9)
SODIUM SERPL-SCNC: 137 MMOL/L (ref 133–144)
WBC # BLD AUTO: 12.8 10E3/UL (ref 4–11)

## 2021-07-25 PROCEDURE — 250N000013 HC RX MED GY IP 250 OP 250 PS 637: Performed by: INTERNAL MEDICINE

## 2021-07-25 PROCEDURE — 120N000001 HC R&B MED SURG/OB

## 2021-07-25 PROCEDURE — 250N000011 HC RX IP 250 OP 636: Performed by: INTERNAL MEDICINE

## 2021-07-25 PROCEDURE — 250N000011 HC RX IP 250 OP 636: Performed by: NURSE PRACTITIONER

## 2021-07-25 PROCEDURE — 83735 ASSAY OF MAGNESIUM: CPT | Performed by: SURGERY

## 2021-07-25 PROCEDURE — 250N000013 HC RX MED GY IP 250 OP 250 PS 637: Performed by: SURGERY

## 2021-07-25 PROCEDURE — 36415 COLL VENOUS BLD VENIPUNCTURE: CPT | Performed by: INTERNAL MEDICINE

## 2021-07-25 PROCEDURE — 85014 HEMATOCRIT: CPT | Performed by: INTERNAL MEDICINE

## 2021-07-25 PROCEDURE — 99232 SBSQ HOSP IP/OBS MODERATE 35: CPT | Mod: 24 | Performed by: INTERNAL MEDICINE

## 2021-07-25 PROCEDURE — 80048 BASIC METABOLIC PNL TOTAL CA: CPT | Performed by: INTERNAL MEDICINE

## 2021-07-25 PROCEDURE — 84100 ASSAY OF PHOSPHORUS: CPT | Performed by: SURGERY

## 2021-07-25 PROCEDURE — 250N000009 HC RX 250: Performed by: SURGERY

## 2021-07-25 RX ORDER — FAMOTIDINE 20 MG/1
20 TABLET, FILM COATED ORAL 2 TIMES DAILY
Status: DISCONTINUED | OUTPATIENT
Start: 2021-07-25 | End: 2021-07-28 | Stop reason: HOSPADM

## 2021-07-25 RX ADMIN — FAMOTIDINE 20 MG: 20 INJECTION, SOLUTION INTRAVENOUS at 04:19

## 2021-07-25 RX ADMIN — TAZOBACTAM SODIUM AND PIPERACILLIN SODIUM 3.38 G: 375; 3 INJECTION, SOLUTION INTRAVENOUS at 19:28

## 2021-07-25 RX ADMIN — METOPROLOL SUCCINATE 12.5 MG: 25 TABLET, EXTENDED RELEASE ORAL at 20:50

## 2021-07-25 RX ADMIN — TAZOBACTAM SODIUM AND PIPERACILLIN SODIUM 3.38 G: 375; 3 INJECTION, SOLUTION INTRAVENOUS at 02:18

## 2021-07-25 RX ADMIN — TAZOBACTAM SODIUM AND PIPERACILLIN SODIUM 3.38 G: 375; 3 INJECTION, SOLUTION INTRAVENOUS at 13:52

## 2021-07-25 RX ADMIN — TAZOBACTAM SODIUM AND PIPERACILLIN SODIUM 3.38 G: 375; 3 INJECTION, SOLUTION INTRAVENOUS at 08:24

## 2021-07-25 RX ADMIN — FAMOTIDINE 20 MG: 20 TABLET ORAL at 20:50

## 2021-07-25 RX ADMIN — LOSARTAN POTASSIUM 50 MG: 50 TABLET, FILM COATED ORAL at 08:24

## 2021-07-25 RX ADMIN — SIMVASTATIN 20 MG: 10 TABLET, FILM COATED ORAL at 20:50

## 2021-07-25 RX ADMIN — ENOXAPARIN SODIUM 40 MG: 40 INJECTION SUBCUTANEOUS at 09:57

## 2021-07-25 RX ADMIN — ASCORBIC ACID, VITAMIN A PALMITATE, CHOLECALCIFEROL, THIAMINE HYDROCHLORIDE, RIBOFLAVIN-5 PHOSPHATE SODIUM, PYRIDOXINE HYDROCHLORIDE, NIACINAMIDE, DEXPANTHENOL, ALPHA-TOCOPHEROL ACETATE, VITAMIN K1, FOLIC ACID, BIOTIN, CYANOCOBALAMIN: 200; 3300; 200; 6; 3.6; 6; 40; 15; 10; 150; 600; 60; 5 INJECTION, SOLUTION INTRAVENOUS at 19:28

## 2021-07-25 ASSESSMENT — ACTIVITIES OF DAILY LIVING (ADL)
ADLS_ACUITY_SCORE: 14

## 2021-07-25 NOTE — PLAN OF CARE
Pt is  A&O. Denies pain. VSS. TPN has been decreased to 45 mL/hr and is infusing into blue port on central line. White port is saline locked. Brown port is occluded. Poor appetite, but tolerating a full liquid diet. BS hypoactive. Receiving IV zosyn q6h. On Tele SR 60s with 1 degree AVB per ICU tele report. Apical is irregular. LS clear. Voiding small amounts frequently. MD has been notified and pt was bladder scanned with a post void residual of 343. Colostomy has adequate output. Abdominal dressings are clean dry and intact. EMMANUELLE drain is draining serosanguinous.  Ambulated in hallway with assist of 2 gait belt and walker. Call light remains within reach and pt uses appropriately.   Face to face report given with opportunity to observe patient.    Report given to Jen Hinojosa RN   7/25/2021  3:16 PM

## 2021-07-25 NOTE — PLAN OF CARE
Pt is A&O. Handles things better when explanation is given to pt before performing a task. Denies pain. Remains on tele, SR 70s with 1 degree AVB and prolonged QT per ICU tele report. Denies chest pain.  LS are clear. Colostomy has had good output. Abdomen is tender pt reports more tender on the left. BS are hypoactive but audible. EMMANUELLE drain remains to bulb suction, intact, and dressing is clean and dry. Midline abdomen wound vac remains intact and is clean and dry. Voiding frequently into urinal. Diet advance to full liquid today. Pt had minimal intake but tolerated it well. Denies nausea. Groin has blanchable redness, barrier cream has been applied. Moved from bed to chair with assist of 1 gait belt and walker. Ambulated in the hallway with assist of 2 gait belt and walker. Tolerated ambulating well. Central line has lipids and TPN infusing into blue port, Brown port is saline locked, and white port has maintenance fluids infusing. Call light remains within reach. Alarms active. Pt free from injury and falls this shift.    Face to face report given with opportunity to observe patient.    Report given to Nikia Hinojosa RN   7/24/2021  10:57 PM

## 2021-07-25 NOTE — PLAN OF CARE
Patient is alert, oriented, and VSS. Lungs are clear, apical pulse regular.Sinus rhythm 70s with 1 AVB and prolonged QT per ICU Nurse. SpO2 was in the 80s at the beginning of the shfit. Back up to 90s with 1 LPM NC. Abdominal dressings are dry, clean and intact. Scant amount of red/brown drainage in wound vac tubing. EMMANUELLE draining serosanguinous drainage. Ostomy site is pink with brown stool. Small amount of blood noted in the proximal end of brown lumen of the central line at the beginning of the shift, occluded and marked. Maintenance fluids running into white port, TNP and lipids running into blue port.  Pt still experiencing urinary frequency this shift, only voiding 100 ml or less at a time. Calls when he needs to use the urinal and appropriate with call light. Face to face report given with opportunity to observe patient.    Report given to Stacy Salgado RN   7/25/2021  7:30 AM

## 2021-07-25 NOTE — PHARMACY-CONSULT NOTE
Pharmacy TPN Consult: Day #3     James Grant is a 89 year old year old admitted on 7/15/2021 for Sigmoid diverticulitis.    Vitals:    07/21/21 0216 07/23/21 0434 07/24/21 0410   Weight: 98.5 kg (217 lb 2.5 oz) 93 kg (205 lb 0.4 oz) 93.1 kg (205 lb 4 oz)       Intake/Output Summary (Last 24 hours) at 7/25/2021 1019  Last data filed at 7/25/2021 0856  Gross per 24 hour   Intake 2942 ml   Output 2965 ml   Net -23 ml        Results for orders placed or performed during the hospital encounter of 07/15/21 (from the past 24 hour(s))   Glucose by meter   Result Value Ref Range    GLUCOSE BY METER POCT 133 (H) 70 - 99 mg/dL   Glucose by meter   Result Value Ref Range    GLUCOSE BY METER POCT 155 (H) 70 - 99 mg/dL   Glucose by meter   Result Value Ref Range    GLUCOSE BY METER POCT 180 (H) 70 - 99 mg/dL   Basic metabolic panel   Result Value Ref Range    Sodium 137 133 - 144 mmol/L    Potassium 3.7 3.4 - 5.3 mmol/L    Chloride 106 94 - 109 mmol/L    Carbon Dioxide (CO2) 26 20 - 32 mmol/L    Anion Gap 5 3 - 14 mmol/L    Urea Nitrogen 13 7 - 30 mg/dL    Creatinine 0.63 (L) 0.66 - 1.25 mg/dL    Calcium 7.4 (L) 8.5 - 10.1 mg/dL    Glucose 175 (H) 70 - 99 mg/dL    GFR Estimate 84 >60 mL/min/1.73m2   CBC with platelets   Result Value Ref Range    WBC Count 12.8 (H) 4.0 - 11.0 10e3/uL    RBC Count 4.03 (L) 4.40 - 5.90 10e6/uL    Hemoglobin 12.5 (L) 13.3 - 17.7 g/dL    Hematocrit 36.4 (L) 40.0 - 53.0 %    MCV 90 78 - 100 fL    MCH 31.0 26.5 - 33.0 pg    MCHC 34.3 31.5 - 36.5 g/dL    RDW 13.2 10.0 - 15.0 %    Platelet Count 315 150 - 450 10e3/uL   Magnesium   Result Value Ref Range    Magnesium 2.1 1.6 - 2.3 mg/dL   Phosphorus   Result Value Ref Range    Phosphorus 2.6 2.5 - 4.5 mg/dL     Corrected Calcium =8.84  TPN Type = Central , Continuous    Clinimix Electrolytes:    Standard Multivitamins with Vitamin K [10 mL] added  Standard Trace Elements [1 mL] added    NUTRITIONAL REQUIREMENTS/CALCULATIONS  (Calculations based on  Adjusted body weight of 82.3)    Estimated BEE = 1421 Kcal/day    Rate = 45 mL/hr (Total volume/24 hours =1080 mL)?    The calculations below are based on a final infusion rate of 45 mL/hr:    Macronutrient Amount/Day Kcal/day % of Daily Kcal   Dextrose 5% 216 grams 734 67 [Goal 50%]   Amino Acid 20% 54 grams 0.6 g/kg 216 20 [Goal 20%]   Lipids 20% On hold 7/25 & 7/26; restart on 7/27 if needed as Tuesday/Thursdays.  71 mL/day 143 13 [Goal 30%]    Total Kcal/day provided by TPN= 1093     Total Kcal/kg/day = 13 (Adjusted body weight)      Assessment/Recommendations: Per discussion with Dr. Tim, changing lipids to Tuesday & Thursday, advancing diet, and decreasing TPN rate to 45 mL/hour (50% of previous rate of 90 mL/hour).     Check triglycerides, LFTs, Albumin tomorrow along with standard Monday TPN labs.    Will supplement electrolytes per replacement protocols.    Pharmacist will continue to follow. Thank you for the consult.    Kaiden Jesus RPH on 7/25/2021 at 10:19 AM

## 2021-07-25 NOTE — PROGRESS NOTES
[facility]    Medicine Progress Note - Hospitalist Service       Date of Admission:  7/15/2021    Assessment & Plan          James Grant is a 89 year old male who was admitted on 7/15/2021.       1.   status post exploratory laparotomy for diverticulitis with abscess:   - Postop day #6  - Patient underwent end colostomy with Walker's procedure. Colostomy bag now has output.   - NG tube still in place with bilious returns.     Ambulation.    - Decisions upon NG tube etc. per surgery.  - 7/23: colostomy output noted;   - 7/24: pt is doing well; tolerating PO  - 7/25: doing well; tolerating PO     2.  Sigmoid diverticulitis.    - Continues on Zosyn no fevers white count mildly elevated.  No signs of sepsis.  - 7/23: continue with current abx  - 7/24: stable  - 7/25: afebrile on Zosyn IV     3.  Cardiac:   - Hemodynamically stable.  Is getting some IV metoprolol as needed.   - 7/23: pt was hypertensive last night.  Better controlled this morning.  -  No signs of volume overload at this point.  - 7/24: resume losartan and metoprolol PO  - 7/25: BP uncontrolled at times.       4.  Pulmonary status:   - Slowly coming on the O2.  His lungs are basically clear some basal atelectasis.  Continue to push pulmonary toilet mobilize as best we can.  - 7/24: stable respiratory status.  - 7/25: stable respiratory status on room air     5.  Renal status:   - Prater catheter in place.  Likely can remove this.  BUN/creatinine stable.  Normal potassium coming up now.        Diet: parenteral nutrition - Clinimix E  Full Liquid Diet    DVT Prophylaxis: Pneumatic Compression Devices  Prater Catheter: Not present  Central Lines: None  Code Status: No CPR- Do NOT Intubate      Disposition Plan   Expected discharge:  recommended to transitional care unit once antibiotic plan established and pt is tolerating PO .     The patient's care was discussed with the Patient and Patient's Family.    Johnathan Eduardo MD  Hospitalist  Service  [facility]  Securely message with the [employer] Web Console (learn more here)  Text page via [employer] Paging/Directory      Risk Factors Present on Admission   ______________________________________________________________________    Interval History   Pt is feeling good today.  Colostomy bag has output.  He denies fever, chills, chest pain, cough, nausea, vomiting, dysuria.  Lower extremity edema noted    Data reviewed today: I reviewed all medications, new labs and imaging results over the last 24 hours. I personally reviewed no images or EKG's today.    Physical Exam   Vital Signs: Temp: 99  F (37.2  C) Temp src: Tympanic BP: 115/59 Pulse: 77   Resp: 16 SpO2: 94 % O2 Device: None (Room air) Oxygen Delivery: 1 LPM  Weight: 205 lbs 3.97 oz  General Appearance: In no acute distress  Respiratory: Clear to auscultation bilaterally  Cardiovascular: Regular rhythm and rate  GI: Soft, colostomy bag with output, scant bowel sound  Other: 2+ lower extremity edema bilaterally    Data   Recent Labs   Lab 07/25/21  1216 07/25/21  0553 07/25/21  0042 07/24/21  0550 07/23/21  0529 07/22/21  1846 07/19/21  1754 07/19/21  0618   WBC  --  12.8*  --  12.4* 12.8*  --    < > 12.8*   HGB  --  12.5*  --  12.1* 13.7  --    < > 13.5   MCV  --  90  --  90 90  --    < > 91   PLT  --  315  --  292 321  --    < > 217   INR  --   --   --   --  1.08 1.10  --   --    NA  --  137  --  136 137  --    < > 139   POTASSIUM  --  3.7  --  3.6 3.7  --    < > 3.3*   CHLORIDE  --  106  --  105 107  --    < > 104   CO2  --  26  --  27 23  --    < > 25   BUN  --  13  --  15 16  --    < > 19   CR  --  0.63*  --  0.58* 0.54*  --    < > 0.63*   ANIONGAP  --  5  --  4 7  --    < > 10   VANIA  --  7.4*  --  7.3* 7.8*  --    < > 7.9*   * 175* 180* 166* 197*  --    < > 101*   ALBUMIN  --   --   --   --  2.2*  --   --  2.1*   PROTTOTAL  --   --   --   --  5.6*  --   --  5.6*   BILITOTAL  --   --   --   --  0.6  --   --  0.6   ALKPHOS  --   --    --   --  64  --   --  48   ALT  --   --   --   --  89*  --   --  13   AST  --   --   --   --  64*  --   --  16    < > = values in this interval not displayed.     No results found for this or any previous visit (from the past 24 hour(s)).  Medications     dextrose       parenteral nutrition - Clinimix E 45 mL/hr at 07/25/21 1030       enoxaparin ANTICOAGULANT  40 mg Subcutaneous Q24H     famotidine  20 mg Oral BID     losartan  50 mg Oral Daily     metoprolol succinate ER  12.5 mg Oral At Bedtime     ondansetron  4 mg Intravenous Once     piperacillin-tazobactam  3.375 g Intravenous Q6H     simvastatin  20 mg Oral At Bedtime     sodium chloride (PF)  10 mL Intracatheter Q8H     sodium chloride (PF)  3 mL Intracatheter Q8H

## 2021-07-25 NOTE — PHARMACY-MEDICATION REGIMEN REVIEW
"Pharmacy Antimicrobial Stewardship Assessment     Current Antimicrobial Therapy:  Anti-infectives (From now, onward)    Start     Dose/Rate Route Frequency Ordered Stop    07/15/21 2130  piperacillin-tazobactam (ZOSYN) infusion 3.375 g     Note to Pharmacy: For [facility], [facility] and [facility]: For Zosyn-naive patients, use the \"Zosyn initial dose + extended infusion\" order panel.    3.375 g  100 mL/hr over 30 Minutes Intravenous EVERY 6 HOURS 07/15/21 2113            Indication: Diverticulitis    Days of Therapy: 11     Pertinent Labs:  Final     WBC Count   Date Value Ref Range Status   07/25/2021 12.8 (H) 4.0 - 11.0 10e3/uL Final   07/24/2021 12.4 (H) 4.0 - 11.0 10e3/uL Final   07/23/2021 12.8 (H) 4.0 - 11.0 10e3/uL Final     Culture Results:   (7/23/21) COVID = negative  (7/15/21) Blood = negative  (7/15/21) COVID = negative     Recommendations/Interventions:  1. Culture EMMANUELLE drain contents    Kaiden Jesus, [facility]  July 25, 2021      "

## 2021-07-25 NOTE — PROGRESS NOTES
INPATIENT ROUNDING NOTE  7/25/2021    Patient: James Grant    Physician of Record: Jimbo Tim MD [facility]    Admitting diagnosis: Abdominal pain, right lower quadrant [R10.31]  Sigmoid diverticulitis [K57.32]  Diverticulitis large intestine [K57.32]    Procedure(s):  Exploratory  laparotomy with sigmoid colectomy and creation of colostomy, appendectomy     POD: 6 Days Post-Op    Current Diet: Full liquids    CURRENT MEDICATIONS:  Continuous Medications:  Current Facility-Administered Medications   Medication Last Rate     dextrose       parenteral nutrition - Clinimix E 90 mL/hr at 07/24/21 2006       Scheduled Medications:  Current Facility-Administered Medications   Medication Dose Route Frequency     enoxaparin ANTICOAGULANT  40 mg Subcutaneous Q24H     famotidine  20 mg Intravenous Q12H     losartan  50 mg Oral Daily     metoprolol succinate ER  12.5 mg Oral At Bedtime     ondansetron  4 mg Intravenous Once     piperacillin-tazobactam  3.375 g Intravenous Q6H     simvastatin  20 mg Oral At Bedtime     sodium chloride (PF)  10 mL Intracatheter Q8H     sodium chloride (PF)  3 mL Intracatheter Q8H       PRN Medications:  Current Facility-Administered Medications   Medication Dose Route Frequency     [Held by provider] acetaminophen  975 mg Oral Q4H PRN     dextrose   Intravenous Continuous PRN     hydrALAZINE  10 mg Intravenous Q6H PRN     HYDROmorphone  0.5-1 mg Intravenous Q3H PRN     lidocaine 4%   Topical Q1H PRN     lidocaine (buffered or not buffered)  0.1-1 mL Other Q1H PRN     [Held by provider] melatonin  1 mg Oral At Bedtime PRN     naloxone  0.2 mg Intravenous Q2 Min PRN    Or     naloxone  0.4 mg Intravenous Q2 Min PRN    Or     naloxone  0.2 mg Intramuscular Q2 Min PRN    Or     naloxone  0.4 mg Intramuscular Q2 Min PRN     ondansetron  4 mg Oral Q6H PRN    Or     ondansetron  4 mg Intravenous Q6H PRN     prochlorperazine  5 mg Intravenous Q6H PRN    Or     prochlorperazine  5 mg Oral Q6H PRN    Or  "    prochlorperazine  12.5 mg Rectal Q12H PRN     sodium chloride (PF)  3 mL Intracatheter q1 min prn       SUBJECTIVE:   Nausea: No. Vomiting: No. Fever: No. Chills: No. Excessive burping: No. Flatus: Yes. BM: Yes. Pain is 2/10. Pain control: good. Tolerating current diet: Yes.  Patient is sitting up this morning and eating his breakfast.  He is complaining about the cream of wheat and says this does not taste like the cream a week he makes at home.  There have been no major setbacks.  His main complaint is about multiple and frequent voids.  He says that the volume is small.  A couple days ago he was scanned and found to have a post residual volume of approximately 183 mL.  He says that he is getting not a whole lot of sleep right now because he is voiding so much.  UA yesterday did not show any evidence of infection.    PHYSICAL EXAM:   Vital signs: /69   Pulse 69   Temp 98.9  F (37.2  C)   Resp 16   Ht 1.803 m (5' 11\")   Wt 93.1 kg (205 lb 4 oz)   SpO2 92%   BMI 28.63 kg/m     Weight: [unfilled]   BMI: Body mass index is 28.63 kg/m .   General: Normal, cooperative, overweight   Neck: supple, CVP line is in the right internal jugular.  Dressing appears to be intact and no evidence of cellulitis or erythema.   Lungs: clear to auscultation, without wheezes, without crackles   CV: Regular rate and rhythm without murmer   Abdominal: Abdomen soft, non-tender. BS normal. No masses, organomegaly, colostomy is in the left lower quadrant.  It is pink and viable.  Bilious fluid and gas are collecting in his colostomy bag.   Wound: Negative pressure drain and dressing are intact.  There is no evidence of cellulitis   Extremities: No cyanosis, clubbing or edema noted bilaterally in Upper and Lower Extremities   Neurological: without deficit, patient had a discussion with me today about his DNR status.  He is quite adamant that if something were to happen to him does not want any type of resuscitation at " all.    INPUT/OUTPUT:      Intake/Output Summary (Last 24 hours) at 7/25/2021 0929  Last data filed at 7/25/2021 0856  Gross per 24 hour   Intake 2942 ml   Output 3000 ml   Net -58 ml       I/O last 3 completed shifts:  In: 6350 [P.O.:240; I.V.:559]  Out: 3225 [Urine:2535; Drains:115; Stool:575]    LABS:    Last CBC Rrsults:   Recent Labs   Lab Test 07/25/21  0553 07/24/21  0550 07/23/21  0529   WBC 12.8* 12.4* 12.8*   RBC 4.03* 3.94* 4.46   HGB 12.5* 12.1* 13.7   HCT 36.4* 35.5* 40.2   MCV 90 90 90   MCH 31.0 30.7 30.7   MCHC 34.3 34.1 34.1   RDW 13.2 13.2 13.1    292 321       Last Comprehensive Metabolic panel:  Recent Labs   Lab Test 07/25/21  0553 07/25/21  0042 07/24/21  1817 07/24/21  0550 07/23/21  0529 07/19/21  1754 07/19/21  0618 07/17/21  0602 07/16/21  0519     --   --  136 137   < > 139  --  138   POTASSIUM 3.7  --   --  3.6 3.7   < > 3.3*  --  4.0   CHLORIDE 106  --   --  105 107   < > 104  --  107   CO2 26  --   --  27 23   < > 25  --  24   ANIONGAP 5  --   --  4 7   < > 10  --  7   * 180* 155* 166* 197*   < > 101*   < > 145*   BUN 13  --   --  15 16   < > 19  --  19   CR 0.63*  --   --  0.58* 0.54*   < > 0.63*  --  0.73   GFRESTIMATED 84  --   --  87 90   < > 84  --  79   VANIA 7.4*  --   --  7.3* 7.8*   < > 7.9*  --  7.8*   BILITOTAL  --   --   --   --  0.6  --  0.6  --  1.1   ALKPHOS  --   --   --   --  64  --  48  --  56   ALT  --   --   --   --  89*  --  13  --  21   AST  --   --   --   --  64*  --  16  --  14    < > = values in this interval not displayed.       Recent Labs   Lab Test 07/25/21  0553 07/24/21  0550 07/23/21  1559 07/23/21  0529 07/19/21  0618 07/16/21  0519   MAG 2.1 1.9  --  1.9  --   --    ALBUMIN  --   --   --  2.2* 2.1* 3.3*   PHOS 2.6 2.3* 2.6 1.7*  --   --        ASSESSMENT:    6 Days Post-Op from Procedure(s):  Exploratory  laparotomy with sigmoid colectomy and creation of colostomy, appendectomy.  Postoperative ileus  resolving  Hypokalemia  Polyuria  Severe protein calorie malnutrition    PLAN: I have talked with pharmacist about TPN, lipids and IV fluids.  We are going to try and drastically reduce the amount of fluids going in to put him on the negative ins and outs.  We will begin tapering his TPN starting today.  Lipids will be on hold until Tuesday.  Depending on how he does today will depend on how we advance his diet tomorrow.  We will continue ambulation of both mechanical and chemical DVT prophylaxis.  As long as he continues to tolerate diet we should be able to start converting some of his medications to orals.  We will take a look at this tomorrow.  If he does have high postvoid residuals then, would entertain him being started on Flomax at a low dose to start.

## 2021-07-26 ENCOUNTER — APPOINTMENT (OUTPATIENT)
Dept: OCCUPATIONAL THERAPY | Facility: HOSPITAL | Age: 86
DRG: 329 | End: 2021-07-26
Attending: INTERNAL MEDICINE
Payer: MEDICARE

## 2021-07-26 ENCOUNTER — APPOINTMENT (OUTPATIENT)
Dept: PHYSICAL THERAPY | Facility: HOSPITAL | Age: 86
DRG: 329 | End: 2021-07-26
Attending: INTERNAL MEDICINE
Payer: MEDICARE

## 2021-07-26 LAB
ALBUMIN SERPL-MCNC: 2.2 G/DL (ref 3.4–5)
ALP SERPL-CCNC: 52 U/L (ref 40–150)
ALT SERPL W P-5'-P-CCNC: 62 U/L (ref 0–70)
ANION GAP SERPL CALCULATED.3IONS-SCNC: 7 MMOL/L (ref 3–14)
AST SERPL W P-5'-P-CCNC: 28 U/L (ref 0–45)
BILIRUB DIRECT SERPL-MCNC: 0.1 MG/DL (ref 0–0.2)
BILIRUB SERPL-MCNC: 0.5 MG/DL (ref 0.2–1.3)
BUN SERPL-MCNC: 14 MG/DL (ref 7–30)
CALCIUM SERPL-MCNC: 7.8 MG/DL (ref 8.5–10.1)
CHLORIDE BLD-SCNC: 107 MMOL/L (ref 94–109)
CO2 SERPL-SCNC: 23 MMOL/L (ref 20–32)
CREAT SERPL-MCNC: 0.69 MG/DL (ref 0.66–1.25)
ERYTHROCYTE [DISTWIDTH] IN BLOOD BY AUTOMATED COUNT: 13.4 % (ref 10–15)
FASTING STATUS PATIENT QL REPORTED: NO
GFR SERPL CREATININE-BSD FRML MDRD: 81 ML/MIN/1.73M2
GLUCOSE BLD-MCNC: 130 MG/DL (ref 70–99)
GLUCOSE BLDC GLUCOMTR-MCNC: 117 MG/DL (ref 70–99)
HCT VFR BLD AUTO: 37.3 % (ref 40–53)
HGB BLD-MCNC: 12.7 G/DL (ref 13.3–17.7)
INR PPP: 1.06 (ref 0.85–1.15)
MAGNESIUM SERPL-MCNC: 2.1 MG/DL (ref 1.6–2.3)
MCH RBC QN AUTO: 31 PG (ref 26.5–33)
MCHC RBC AUTO-ENTMCNC: 34 G/DL (ref 31.5–36.5)
MCV RBC AUTO: 91 FL (ref 78–100)
PHOSPHATE SERPL-MCNC: 2.6 MG/DL (ref 2.5–4.5)
PLATELET # BLD AUTO: 197 10E3/UL (ref 150–450)
POTASSIUM BLD-SCNC: 3.9 MMOL/L (ref 3.4–5.3)
PREALB SERPL IA-MCNC: 18 MG/DL (ref 15–45)
PROT SERPL-MCNC: 5.4 G/DL (ref 6.8–8.8)
RBC # BLD AUTO: 4.1 10E6/UL (ref 4.4–5.9)
SODIUM SERPL-SCNC: 137 MMOL/L (ref 133–144)
TRIGL SERPL-MCNC: 165 MG/DL
WBC # BLD AUTO: 12.6 10E3/UL (ref 4–11)

## 2021-07-26 PROCEDURE — 82040 ASSAY OF SERUM ALBUMIN: CPT | Performed by: SURGERY

## 2021-07-26 PROCEDURE — 250N000011 HC RX IP 250 OP 636: Performed by: NURSE PRACTITIONER

## 2021-07-26 PROCEDURE — 84100 ASSAY OF PHOSPHORUS: CPT | Performed by: INTERNAL MEDICINE

## 2021-07-26 PROCEDURE — 97530 THERAPEUTIC ACTIVITIES: CPT | Mod: GP | Performed by: PHYSICAL THERAPIST

## 2021-07-26 PROCEDURE — 85027 COMPLETE CBC AUTOMATED: CPT | Performed by: INTERNAL MEDICINE

## 2021-07-26 PROCEDURE — 84134 ASSAY OF PREALBUMIN: CPT | Performed by: SURGERY

## 2021-07-26 PROCEDURE — 250N000013 HC RX MED GY IP 250 OP 250 PS 637: Performed by: INTERNAL MEDICINE

## 2021-07-26 PROCEDURE — 97530 THERAPEUTIC ACTIVITIES: CPT | Mod: GO

## 2021-07-26 PROCEDURE — 85610 PROTHROMBIN TIME: CPT | Performed by: SURGERY

## 2021-07-26 PROCEDURE — 83735 ASSAY OF MAGNESIUM: CPT | Performed by: SURGERY

## 2021-07-26 PROCEDURE — 99232 SBSQ HOSP IP/OBS MODERATE 35: CPT | Mod: 24 | Performed by: INTERNAL MEDICINE

## 2021-07-26 PROCEDURE — 36415 COLL VENOUS BLD VENIPUNCTURE: CPT | Performed by: SURGERY

## 2021-07-26 PROCEDURE — 84478 ASSAY OF TRIGLYCERIDES: CPT | Performed by: INTERNAL MEDICINE

## 2021-07-26 PROCEDURE — 82248 BILIRUBIN DIRECT: CPT | Performed by: SURGERY

## 2021-07-26 PROCEDURE — 250N000011 HC RX IP 250 OP 636: Performed by: INTERNAL MEDICINE

## 2021-07-26 PROCEDURE — 250N000013 HC RX MED GY IP 250 OP 250 PS 637: Performed by: SURGERY

## 2021-07-26 PROCEDURE — 120N000001 HC R&B MED SURG/OB

## 2021-07-26 RX ORDER — NYSTATIN 100000 U/G
CREAM TOPICAL 2 TIMES DAILY
Status: DISCONTINUED | OUTPATIENT
Start: 2021-07-26 | End: 2021-07-28 | Stop reason: HOSPADM

## 2021-07-26 RX ADMIN — TAZOBACTAM SODIUM AND PIPERACILLIN SODIUM 3.38 G: 375; 3 INJECTION, SOLUTION INTRAVENOUS at 01:39

## 2021-07-26 RX ADMIN — TAZOBACTAM SODIUM AND PIPERACILLIN SODIUM 3.38 G: 375; 3 INJECTION, SOLUTION INTRAVENOUS at 13:42

## 2021-07-26 RX ADMIN — TAZOBACTAM SODIUM AND PIPERACILLIN SODIUM 3.38 G: 375; 3 INJECTION, SOLUTION INTRAVENOUS at 19:52

## 2021-07-26 RX ADMIN — METOPROLOL SUCCINATE 12.5 MG: 25 TABLET, EXTENDED RELEASE ORAL at 21:28

## 2021-07-26 RX ADMIN — LOSARTAN POTASSIUM 50 MG: 50 TABLET, FILM COATED ORAL at 07:50

## 2021-07-26 RX ADMIN — FAMOTIDINE 20 MG: 20 TABLET ORAL at 07:50

## 2021-07-26 RX ADMIN — SIMVASTATIN 20 MG: 10 TABLET, FILM COATED ORAL at 21:28

## 2021-07-26 RX ADMIN — FAMOTIDINE 20 MG: 20 TABLET ORAL at 21:28

## 2021-07-26 RX ADMIN — TAZOBACTAM SODIUM AND PIPERACILLIN SODIUM 3.38 G: 375; 3 INJECTION, SOLUTION INTRAVENOUS at 07:51

## 2021-07-26 RX ADMIN — NYSTATIN: 100000 CREAM TOPICAL at 21:32

## 2021-07-26 RX ADMIN — ENOXAPARIN SODIUM 40 MG: 40 INJECTION SUBCUTANEOUS at 07:50

## 2021-07-26 ASSESSMENT — MIFFLIN-ST. JEOR: SCORE: 1558.13

## 2021-07-26 ASSESSMENT — ACTIVITIES OF DAILY LIVING (ADL)
ADLS_ACUITY_SCORE: 14

## 2021-07-26 NOTE — PROGRESS NOTES
[facility]    Medicine Progress Note - Hospitalist Service       Date of Admission:  7/15/2021    Assessment & Plan          James Grant is a 89 year old male who was admitted on 7/15/2021.       1.   status post exploratory laparotomy for diverticulitis with abscess:   - Postop day #7  - Patient underwent end colostomy with Walker's procedure. Colostomy bag now has output.   - 7/23: colostomy output noted;   - 7/24: pt is doing well; tolerating PO  - 7/25: doing well; tolerating PO  - 7/26: Diet has been advanced to regular by surgery team, who stated that patient can be discharged from surgery point of view     2.  Sigmoid diverticulitis.    - Continues on Zosyn no fevers white count mildly elevated.  No signs of sepsis.  - 7/23: continue with current abx  - 7/24: stable  - 7/25: afebrile on Zosyn IV  - 7/26: Stable on Zosyn IV.  Surgery recommends discharging on 7 days of oral antibiotic for total 2-week course.     3.  Cardiac:   - Hemodynamically stable.  Is getting some IV metoprolol as needed.   - 7/23: pt was hypertensive last night.  Better controlled this morning.  -  No signs of volume overload at this point.  - 7/24: resume losartan and metoprolol PO  - 7/25: BP uncontrolled at times.    - 7/26: Patient has mild hypertension at times, but in light of his age, we will not overtreat hypertension.     4.  Pulmonary status:   - Slowly coming on the O2.  His lungs are basically clear some basal atelectasis.  Continue to push pulmonary toilet mobilize as best we can.  - 7/24: stable respiratory status.  - 7/25: stable respiratory status on room air  - 7/26: Stable respiratory status on room air     5.  Renal status:   - Prater catheter in place.  Likely can remove this.  BUN/creatinine stable.  Normal potassium coming up now.        Diet: Regular Diet Adult    DVT Prophylaxis: Pneumatic Compression Devices  Prater Catheter: Not present  Central Lines: None  Code Status: No CPR- Do NOT Intubate       Disposition Plan   Expected discharge:  recommended to transitional care unit once antibiotic plan established and pt is tolerating PO .     The patient's care was discussed with the Patient and Patient's Family.    Johnathan Eduardo MD  Hospitalist Service  [facility]  Securely message with the [employer] Web Console (learn more here)  Text page via [facility] Paging/Directory      Risk Factors Present on Admission   ______________________________________________________________________    Interval History   Patient offers no new complaints.  He is participating in physical therapy and Occupational Therapy.  Surgery advance his diet to regular diet and states that he could be discharged from surgery standpoint.  He denies fever, chills, chest pain, cough, nausea, vomiting, dysuria.    Data reviewed today: I reviewed all medications, new labs and imaging results over the last 24 hours. I personally reviewed no images or EKG's today.    Physical Exam   Vital Signs: Temp: 99.5  F (37.5  C) Temp src: Tympanic BP: 109/62 Pulse: 72   Resp: 16 SpO2: 95 % O2 Device: None (Room air)    Weight: 197 lbs 8.51 oz  General Appearance: In no acute distress  Respiratory: Clear to auscultation bilaterally  Cardiovascular: Regular rhythm and rate  GI: Soft, colostomy bag with output, scant bowel sound  Other: 1+ lower extremity edema bilaterally    Data   Recent Labs   Lab 07/26/21  0656 07/26/21  0100 07/25/21  1747 07/25/21  0553 07/24/21  0550 07/23/21  0529 07/22/21  1846   WBC 12.6*  --   --  12.8* 12.4* 12.8*  --    HGB 12.7*  --   --  12.5* 12.1* 13.7  --    MCV 91  --   --  90 90 90  --      --   --  315 292 321  --    INR 1.06  --   --   --   --  1.08 1.10     --   --  137 136 137  --    POTASSIUM 3.9  --   --  3.7 3.6 3.7  --    CHLORIDE 107  --   --  106 105 107  --    CO2 23  --   --  26 27 23  --    BUN 14  --   --  13 15 16  --    CR 0.69  --   --  0.63* 0.58* 0.54*  --    ANIONGAP 7  --   --  5 4 7  --     VANIA 7.8*  --   --  7.4* 7.3* 7.8*  --    * 117* 140* 175* 166* 197*  --    ALBUMIN 2.2*  --   --   --   --  2.2*  --    PROTTOTAL 5.4*  --   --   --   --  5.6*  --    BILITOTAL 0.5  --   --   --   --  0.6  --    ALKPHOS 52  --   --   --   --  64  --    ALT 62  --   --   --   --  89*  --    AST 28  --   --   --   --  64*  --      No results found for this or any previous visit (from the past 24 hour(s)).  Medications     dextrose         enoxaparin ANTICOAGULANT  40 mg Subcutaneous Q24H     famotidine  20 mg Oral BID     losartan  50 mg Oral Daily     metoprolol succinate ER  12.5 mg Oral At Bedtime     ondansetron  4 mg Intravenous Once     piperacillin-tazobactam  3.375 g Intravenous Q6H     simvastatin  20 mg Oral At Bedtime     sodium chloride (PF)  10 mL Intracatheter Q8H     sodium chloride (PF)  3 mL Intracatheter Q8H

## 2021-07-26 NOTE — PROGRESS NOTES
Confirmed with surgery about wound vac plan. Spoke with Krissy at [facility], shared information about wound vac not being a standard wound vac.  Awaiting response back.  Referral pending at [facility] and with Sheryl. Message left at [facility].  Updated Adrian in regards to discharge planning.

## 2021-07-26 NOTE — PLAN OF CARE
Pt up ambulated twice today and up in chair most of shift. Pt diet advanced to reg although pt is very hesitant to push too fast. Pt denies nausea. Minimal pain and declines meds for pain. Pt has urinary urgency and calls freq for urinal. Pt colostomy intact. Stoma looks good. Incision with wound vac. EMMANUELLE drain intact. Pt makes needs known approp. TPN stopped. CL intact received CHG bath.    Face to face report given with opportunity to observe patient.    Report given to Britany Toro RN   7/26/2021  4:22 PM

## 2021-07-26 NOTE — PROGRESS NOTES
INPATIENT ROUNDING NOTE  7/26/2021    Patient: James FALK Rudy    Physician of Record: Hospitalist Service    Admitting diagnosis: Abdominal pain, right lower quadrant [R10.31]  Sigmoid diverticulitis [K57.32]  Diverticulitis large intestine [K57.32]    Procedure(s):  Exploratory  laparotomy with sigmoid colectomy and creation of colostomy, appendectomy     POD: 7 Days Post-Op    Current Diet: Full liquids    CURRENT MEDICATIONS:  Continuous Medications:  Current Facility-Administered Medications   Medication Last Rate     dextrose       parenteral nutrition - Clinimix E 45 mL/hr at 07/25/21 1928       Scheduled Medications:  Current Facility-Administered Medications   Medication Dose Route Frequency     enoxaparin ANTICOAGULANT  40 mg Subcutaneous Q24H     famotidine  20 mg Oral BID     losartan  50 mg Oral Daily     metoprolol succinate ER  12.5 mg Oral At Bedtime     ondansetron  4 mg Intravenous Once     piperacillin-tazobactam  3.375 g Intravenous Q6H     simvastatin  20 mg Oral At Bedtime     sodium chloride (PF)  10 mL Intracatheter Q8H     sodium chloride (PF)  3 mL Intracatheter Q8H       PRN Medications:  Current Facility-Administered Medications   Medication Dose Route Frequency     [Held by provider] acetaminophen  975 mg Oral Q4H PRN     dextrose   Intravenous Continuous PRN     hydrALAZINE  10 mg Intravenous Q6H PRN     HYDROmorphone  0.5-1 mg Intravenous Q3H PRN     lidocaine 4%   Topical Q1H PRN     lidocaine (buffered or not buffered)  0.1-1 mL Other Q1H PRN     [Held by provider] melatonin  1 mg Oral At Bedtime PRN     naloxone  0.2 mg Intravenous Q2 Min PRN    Or     naloxone  0.4 mg Intravenous Q2 Min PRN    Or     naloxone  0.2 mg Intramuscular Q2 Min PRN    Or     naloxone  0.4 mg Intramuscular Q2 Min PRN     ondansetron  4 mg Oral Q6H PRN    Or     ondansetron  4 mg Intravenous Q6H PRN     prochlorperazine  5 mg Intravenous Q6H PRN    Or     prochlorperazine  5 mg Oral Q6H PRN    Or      "prochlorperazine  12.5 mg Rectal Q12H PRN     sodium chloride (PF)  3 mL Intracatheter q1 min prn       SUBJECTIVE:   Nausea: No. Vomiting: No. Fever: No. Chills: No. Excessive burping: No. Flatus: Yes. BM: Yes. Pain is 0/10. Pain control: good. Tolerating current diet: Yes.     PHYSICAL EXAM:   Vital signs: /64   Pulse 64   Temp 99.1  F (37.3  C) (Tympanic)   Resp 16   Ht 1.803 m (5' 11\")   Wt 89.6 kg (197 lb 8.5 oz)   SpO2 94%   BMI 27.55 kg/m     BMI: Body mass index is 27.55 kg/m .   General: Normal, healthy, cooperative, in no acute distress, alert   Lungs: respirations are non-labored   Abdominal: non-distended; EMMANUELLE drain with serosanguineous drainage noted; stoma with healthy tissue noted   Wound: Prevena wound vac intact   Extremities: No cyanosis, clubbing or edema noted bilaterally in Upper and Lower Extremities   Neurological: without deficit    INPUT/OUTPUT:      Intake/Output Summary (Last 24 hours) at 7/26/2021 1224  Last data filed at 7/26/2021 1152  Gross per 24 hour   Intake 1615 ml   Output 2470 ml   Net -855 ml       I/O last 3 completed shifts:  In: 1545 [P.O.:680; I.V.:305]  Out: 2655 [Urine:1975; Drains:130; Stool:550]    LABS:    Last CBC Rrsults:   Recent Labs   Lab Test 07/26/21  0656 07/25/21  0553 07/24/21  0550   WBC 12.6* 12.8* 12.4*   RBC 4.10* 4.03* 3.94*   HGB 12.7* 12.5* 12.1*   HCT 37.3* 36.4* 35.5*   MCV 91 90 90   MCH 31.0 31.0 30.7   MCHC 34.0 34.3 34.1   RDW 13.4 13.2 13.2    315 292       Last Comprehensive Metabolic panel:  Recent Labs   Lab Test 07/26/21  0656 07/26/21  0100 07/25/21  1747 07/25/21  0553 07/24/21  0550 07/23/21  0529 07/19/21  1754 07/19/21  0618     --   --  137 136 137   < > 139   POTASSIUM 3.9  --   --  3.7 3.6 3.7   < > 3.3*   CHLORIDE 107  --   --  106 105 107   < > 104   CO2 23  --   --  26 27 23   < > 25   ANIONGAP 7  --   --  5 4 7   < > 10   * 117* 140* 175* 166* 197*   < > 101*   BUN 14  --   --  13 15 16   < > 19 "   CR 0.69  --   --  0.63* 0.58* 0.54*   < > 0.63*   GFRESTIMATED 81  --   --  84 87 90   < > 84   VANIA 7.8*  --   --  7.4* 7.3* 7.8*   < > 7.9*   BILITOTAL 0.5  --   --   --   --  0.6  --  0.6   ALKPHOS 52  --   --   --   --  64  --  48   ALT 62  --   --   --   --  89*  --  13   AST 28  --   --   --   --  64*  --  16    < > = values in this interval not displayed.       Recent Labs   Lab Test 07/26/21  0656 07/25/21  0553 07/24/21  0550 07/23/21  0529 07/19/21  0618   MAG 2.1 2.1 1.9 1.9  --    ALBUMIN 2.2*  --   --  2.2* 2.1*   PHOS 2.6 2.6 2.3* 1.7*  --      ASSESSMENT:    7 Days Post-Op from Procedure(s):  Exploratory  laparotomy with sigmoid colectomy and creation of colostomy, appendectomy.    PLAN:   Diet advanced to regular diet   Okay for discharge from a surgery standpoint  Patient should be discharged on 7 days of an oral antibiotic upon discharge.

## 2021-07-26 NOTE — PROGRESS NOTES
07/26/21 1300   Signing Clinician's Name / Credentials   Signing clinician's name / credentials Saumya Ash OTR/L   Quick Adds   Rehab Discipline OT   Therapeutic Activity   Minutes of Treatment 13 minutes   Symptoms Noted During/After Treatment Fatigue   Treatment Detail Pt was sitting up in the chair upon OT arrival. He reports feeling good but wanted to go for a walk and lay down as he had been sitting up in the chair for 2-3 hours already. Pt transferred sit>stand from the chair with CGA. He requested to use the urinal to pee and required totalA to manage brief and verbal cues indicating the urinal was lined up properly. Pt then ambulated out in the hallway using FWW with Silas to the opposite end of the kirk and back (~200'), a second staff was present with w/c to follow. Pt returned to the EOB with verbal cues, SpO2 on RA 99%, HR 82. Pt was fatigued after functional mobility. Pt transferred sit>supine via logroll with modA to assist with LB and then SL on his R with assist to manage EMMANUELLE drain, cords, and wound pump. Pt left resting in the bed with the call light within reach and bed alarm activated.   OT Discharge Planning    OT Discharge Recommendation (DC Rec) Transitional Care Facility   OT Rationale for DC Rec Pt would benefit from short term rehab as he continues to require assist with ADLs due to weakness and deconditioning   OT Brief overview of current status  Pt was sitting up in the chair upon OT arrival. He reports feeling good but wanted to go for a walk and lay down as he had been sitting up in the chair for 2-3 hours already. Pt transferred sit>stand from the chair with CGA. He requested to use the urinal to pee and required totalA to manage brief and verbal cues indicating the urinal was lined up properly. Pt then ambulated out in the hallway using FWW with Silas to the opposite end of the kirk and back (~200'), a second staff was present with w/c to follow. Pt returned to the EOB with verbal  cues, SpO2 on RA 99%, HR 82. Pt was fatigued after functional mobility. Pt transferred sit>supine via logroll with modA to assist with LB and then SL on his R with assist to manage EMMANUELLE drain, cords, and wound pump. Pt left resting in the bed with the call light within reach and bed alarm activated.   Additional Documentation   Rehab Comments Pt improving slowly but still not at a functional level to be able to return home alone   OT Plan Progress strength, activity tolerance, and ADLs, as able   Total Session Time   Total Session Time (minutes) 13 minutes

## 2021-07-26 NOTE — PROGRESS NOTES
"CLINICAL NUTRITION SERVICES  -  REASSESSMENT NOTE    James Grant     89 yom admitted for sigmoid diverticulitis. Pt has a hx of HTN, dyslipidemia, GERD, CHF. TPN was initiated on 7/22/21 with a goal rate of 90 ml/hr, 5% AA/20% Dextrose with 250 ml 20% lipids daily.  This provides 2401 calories, 108 gm protein. TPN currently tapering, taking in full liquids. Triglycerides have come down and phosphorus appears stable.    Diet Order: Clear liquid, TPN tapering  Intake: 100% x 1 meal on full liquid diet    Height: 5' 11\"  Weight: 197 lbs 8.51 oz  Body mass index is 27.55 kg/m .  Weight Status:  Overweight BMI 25-29.9  IBW: 75.3 kg (166 lb 0.1 oz)  Weight History: admit weight is stateb      Wt Readings from Last 10 Encounters:   07/20/21 95.1 kg (209 lb 10.5 oz)   07/15/21 86.2 kg (190 lb)- admit   04/28/21 93.1 kg (205 lb 3.2 oz)   01/17/21 83.9 kg (185 lb)   12/09/20 89.4 kg (197 lb)   12/01/20 87 kg (191 lb 12.8 oz)   10/28/20 90.1 kg (198 lb 9.6 oz)   10/14/20 83.9 kg (185 lb)   09/30/20 83.9 kg (185 lb)   05/27/20 90.2 kg (198 lb 12.8 oz)   02/21/20 88 kg (194 lb)       Estimated Energy Needs: 2375 kcals (25 Kcal/Kg)   Estimated Protein Needs: 115 grams protein (1.2 g pro/Kg)  Estimated Fluid Needs: 2375  mL (1 mL/Kcal)     Malnutrition Diagnosis: Patient does not meet two of the criteria necessary for diagnosing malnutrition at this time.      NUTRITION DIAGNOSIS:  Inadequate oral intake related to GI dysfunction as evidenced by NPO/clear liquids day 5     NUTRITION RECOMMENDATIONS  - Advance diet as able, soft foods, low fiber/fat  - encourage small frequent meals/snacks  - may need to supplement with Ensure    MONITORING AND EVALUATION:  RD will monitor diet order, intake, weight, labs             "

## 2021-07-26 NOTE — PLAN OF CARE
Patient has been alert, oriented, and makes his needs known. Lungs are clear, apical pulse irregular. Dressings are dry, clean and intact. EMMANUELLE had 90 ml out and ostomy had 100 out.Wound vac on midline with scant drainage in tubing. Ostomy bag began leaking toward the end of the shift, bag replaced. Still experiencing urinary frequency and urgency, using the urinal. At the beginning of the shift, blood return was noted in the blue and white ports of the central line. Near the end of the shift for blood draw, unable to get sufficient blood return on blue and white ports. Denies pain, VSS. Face to face report given with opportunity to observe patient.    Report given to Stacy Christensen RN   7/26/2021  7:41 AM

## 2021-07-26 NOTE — PLAN OF CARE
A&O, Vital signs stable, afebrile.  Up in chair visiting with son. Makes needs known by yelling for help or using call light. Denies pain. TPN @ 45/hr, infusing into blue port, white port locked, brown port occulted.   Continues with poor appetite, tolerating full liquids with no problems.  Voices his frustrations for being here so long.   Colostomy putting out brown watery stool. Midline incision dressing intact. EMMANUELLE draining serosanguinous fluids.  In bed resting, eyes closed with normal non labored respirations. Call light within reach.      Face to face report given with opportunity to observe patient.    Report given to SARA Elizondo & Stacy RN    Jen Mosquera RN   7/25/2021  11:07 PM

## 2021-07-26 NOTE — PROGRESS NOTES
07/26/21 0932   Signing Clinician's Name / Credentials   Signing clinician's name / credentials Emma Merino DPT   Quick Adds   Rehab Discipline PT   Therapeutic Activity   Minutes of Treatment 23 minutes   Symptoms Noted During/After Treatment Fatigue   Treatment Detail Pt resting in bed, agreeable to PT.  Pt cued for logroll sup>sit and able to complete with SBA and use of siderails.  Pt transferred sit>stand min A from EOB cues for hand placement.  Pt ambulated 200' with FWW CGA-min A, able to make 2 - 180 degree turns without LOB or instability.  Pt does demonstrate fatigue and SOB with sats 95% on RA.  Pt completed repeated sit<>stands from chair x10 reps with fatigue noted.  Pt left sitting up in chair with call light in reach and clip alarm on.     PT Discharge Planning    PT Discharge Recommendation (DC Rec) Transitional Care Facility   PT Rationale for DC Rec continued weakness, deconditioning, and decreased independence with mobility.  Would benefit from short term rehab upon discharge   PT Brief overview of current status  Pt resting in bed, agreeable to PT.  Pt cued for logroll sup>sit and able to complete with SBA and use of siderails.  Pt transferred sit>stand min A from EOB cues for hand placement.  Pt ambulated 200' with FWW CGA-min A, able to make 2 - 180 degree turns without LOB or instability.  Pt does demonstrate fatigue and SOB with sats 95% on RA.  Pt completed repeated sit<>stands from chair x10 reps with fatigue noted.  Pt left sitting up in chair with call light in reach and clip alarm on.     Additional Documentation   Rehab Comments making slow but steady gains    PT Plan Progress mobility and strength   Total Session Time   Total Session Time (minutes) 23 minutes

## 2021-07-26 NOTE — PROGRESS NOTES
Updated notes sent to [facility], currently being reviewed but unable to admit until Tuesday if accepted.  Messages left at [facility] and [facility].

## 2021-07-27 ENCOUNTER — APPOINTMENT (OUTPATIENT)
Dept: OCCUPATIONAL THERAPY | Facility: HOSPITAL | Age: 86
DRG: 329 | End: 2021-07-27
Attending: INTERNAL MEDICINE
Payer: MEDICARE

## 2021-07-27 ENCOUNTER — APPOINTMENT (OUTPATIENT)
Dept: PHYSICAL THERAPY | Facility: HOSPITAL | Age: 86
DRG: 329 | End: 2021-07-27
Attending: INTERNAL MEDICINE
Payer: MEDICARE

## 2021-07-27 PROBLEM — K57.20 DIVERTICULITIS OF LARGE INTESTINE WITH ABSCESS: Status: ACTIVE | Noted: 2021-07-15

## 2021-07-27 PROBLEM — K57.20 DIVERTICULITIS OF LARGE INTESTINE WITH PERFORATION AND ABSCESS: Status: ACTIVE | Noted: 2021-07-27

## 2021-07-27 LAB
ANION GAP SERPL CALCULATED.3IONS-SCNC: 6 MMOL/L (ref 3–14)
BUN SERPL-MCNC: 12 MG/DL (ref 7–30)
CALCIUM SERPL-MCNC: 7.9 MG/DL (ref 8.5–10.1)
CHLORIDE BLD-SCNC: 108 MMOL/L (ref 94–109)
CO2 SERPL-SCNC: 24 MMOL/L (ref 20–32)
CREAT SERPL-MCNC: 0.73 MG/DL (ref 0.66–1.25)
ERYTHROCYTE [DISTWIDTH] IN BLOOD BY AUTOMATED COUNT: 13.4 % (ref 10–15)
GFR SERPL CREATININE-BSD FRML MDRD: 79 ML/MIN/1.73M2
GLUCOSE BLD-MCNC: 104 MG/DL (ref 70–99)
GLUCOSE BLDC GLUCOMTR-MCNC: 101 MG/DL (ref 70–99)
HCT VFR BLD AUTO: 37.3 % (ref 40–53)
HGB BLD-MCNC: 12.5 G/DL (ref 13.3–17.7)
MCH RBC QN AUTO: 30.6 PG (ref 26.5–33)
MCHC RBC AUTO-ENTMCNC: 33.5 G/DL (ref 31.5–36.5)
MCV RBC AUTO: 91 FL (ref 78–100)
PLATELET # BLD AUTO: 352 10E3/UL (ref 150–450)
POTASSIUM BLD-SCNC: 3.9 MMOL/L (ref 3.4–5.3)
RBC # BLD AUTO: 4.09 10E6/UL (ref 4.4–5.9)
SODIUM SERPL-SCNC: 138 MMOL/L (ref 133–144)
WBC # BLD AUTO: 12.4 10E3/UL (ref 4–11)

## 2021-07-27 PROCEDURE — 99232 SBSQ HOSP IP/OBS MODERATE 35: CPT | Mod: 24 | Performed by: INTERNAL MEDICINE

## 2021-07-27 PROCEDURE — 80048 BASIC METABOLIC PNL TOTAL CA: CPT | Performed by: INTERNAL MEDICINE

## 2021-07-27 PROCEDURE — 85027 COMPLETE CBC AUTOMATED: CPT | Performed by: INTERNAL MEDICINE

## 2021-07-27 PROCEDURE — 250N000013 HC RX MED GY IP 250 OP 250 PS 637: Performed by: SURGERY

## 2021-07-27 PROCEDURE — 36415 COLL VENOUS BLD VENIPUNCTURE: CPT | Performed by: INTERNAL MEDICINE

## 2021-07-27 PROCEDURE — 120N000001 HC R&B MED SURG/OB

## 2021-07-27 PROCEDURE — 250N000011 HC RX IP 250 OP 636: Performed by: INTERNAL MEDICINE

## 2021-07-27 PROCEDURE — 250N000011 HC RX IP 250 OP 636: Performed by: NURSE PRACTITIONER

## 2021-07-27 PROCEDURE — 250N000013 HC RX MED GY IP 250 OP 250 PS 637: Performed by: INTERNAL MEDICINE

## 2021-07-27 PROCEDURE — 97530 THERAPEUTIC ACTIVITIES: CPT | Mod: GP

## 2021-07-27 PROCEDURE — 97530 THERAPEUTIC ACTIVITIES: CPT | Mod: GO

## 2021-07-27 RX ORDER — TAMSULOSIN HYDROCHLORIDE 0.4 MG/1
0.4 CAPSULE ORAL EVERY EVENING
Status: DISCONTINUED | OUTPATIENT
Start: 2021-07-27 | End: 2021-07-28 | Stop reason: HOSPADM

## 2021-07-27 RX ORDER — CIPROFLOXACIN 250 MG/1
250 TABLET, FILM COATED ORAL EVERY 12 HOURS SCHEDULED
Status: DISCONTINUED | OUTPATIENT
Start: 2021-07-27 | End: 2021-07-28 | Stop reason: HOSPADM

## 2021-07-27 RX ORDER — METRONIDAZOLE 250 MG/1
250 TABLET ORAL 3 TIMES DAILY
Status: DISCONTINUED | OUTPATIENT
Start: 2021-07-27 | End: 2021-07-28 | Stop reason: HOSPADM

## 2021-07-27 RX ADMIN — NYSTATIN: 100000 CREAM TOPICAL at 08:55

## 2021-07-27 RX ADMIN — FAMOTIDINE 20 MG: 20 TABLET ORAL at 08:49

## 2021-07-27 RX ADMIN — METRONIDAZOLE 250 MG: 250 TABLET ORAL at 20:39

## 2021-07-27 RX ADMIN — TAZOBACTAM SODIUM AND PIPERACILLIN SODIUM 3.38 G: 375; 3 INJECTION, SOLUTION INTRAVENOUS at 01:50

## 2021-07-27 RX ADMIN — METRONIDAZOLE 250 MG: 250 TABLET ORAL at 15:03

## 2021-07-27 RX ADMIN — TAZOBACTAM SODIUM AND PIPERACILLIN SODIUM 3.38 G: 375; 3 INJECTION, SOLUTION INTRAVENOUS at 08:00

## 2021-07-27 RX ADMIN — TAMSULOSIN HYDROCHLORIDE 0.4 MG: 0.4 CAPSULE ORAL at 20:39

## 2021-07-27 RX ADMIN — FAMOTIDINE 20 MG: 20 TABLET ORAL at 20:39

## 2021-07-27 RX ADMIN — ENOXAPARIN SODIUM 40 MG: 40 INJECTION SUBCUTANEOUS at 08:49

## 2021-07-27 RX ADMIN — CIPROFLOXACIN HYDROCHLORIDE 250 MG: 250 TABLET, FILM COATED ORAL at 20:39

## 2021-07-27 RX ADMIN — METOPROLOL SUCCINATE 12.5 MG: 25 TABLET, EXTENDED RELEASE ORAL at 20:39

## 2021-07-27 RX ADMIN — SIMVASTATIN 20 MG: 10 TABLET, FILM COATED ORAL at 20:39

## 2021-07-27 RX ADMIN — NYSTATIN: 100000 CREAM TOPICAL at 20:48

## 2021-07-27 RX ADMIN — LOSARTAN POTASSIUM 50 MG: 50 TABLET, FILM COATED ORAL at 08:49

## 2021-07-27 RX ADMIN — CIPROFLOXACIN HYDROCHLORIDE 250 MG: 250 TABLET, FILM COATED ORAL at 14:15

## 2021-07-27 RX ADMIN — ACETAMINOPHEN 975 MG: 325 TABLET, FILM COATED ORAL at 12:50

## 2021-07-27 ASSESSMENT — ACTIVITIES OF DAILY LIVING (ADL)
ADLS_ACUITY_SCORE: 14
ADLS_ACUITY_SCORE: 16
ADLS_ACUITY_SCORE: 16

## 2021-07-27 ASSESSMENT — MIFFLIN-ST. JEOR: SCORE: 1525.13

## 2021-07-27 NOTE — PROGRESS NOTES
07/27/21 1300   Signing Clinician's Name / Credentials   Signing clinician's name / credentials Saumya Ash OTR/L   Quick Adds   Rehab Discipline OT   Therapeutic Activity   Minutes of Treatment 23 minutes   Symptoms Noted During/After Treatment Fatigue   Treatment Detail Pt in bed with his call light on upon OT arrival. He reports needing to use the urinal, fast. Pt required TotalA to manage brief and place urinal; pt declined getting up to use and wanted to do it in bed. Pt then agreeable to get OOB despite c/o not being able to sleep much here. Pt transferred to EOB with SBA, mild difficulties. Sit>stand from EOB Buddy and verbal cues for hand placement. Pt ambulated out of room and in the hallway using FWW with Buddy-CGA (~250ft) with wheelchair to follow. Pt returned to his room and transferred into the chair with CGA. Fatigue noted. SpO2 98-99%. Daughter present along with another person and inquired about pt shaking. No shaking noted but pt did c/o being cold, and a warm blanket was provided. Nursing informed of urination in urinal. Clip alert attached and pt left resting in the chair eating his pudding with set up. Call light within reach.   OT Discharge Planning    OT Discharge Recommendation (DC Rec) Transitional Care Facility   OT Rationale for DC Rec Pt would benefit from short term rehab as he continues to require assist with ADLs due to weakness and deconditioning   OT Brief overview of current status  Pt in bed with his call light on upon OT arrival. He reports needing to use the urinal, fast. Pt required TotalA to manage brief and place urinal; pt declined getting up to use and wanted to do it in bed. Pt then agreeable to get OOB despite c/o not being able to sleep much here. Pt transferred to EOB with SBA, mild difficulties. Sit>stand from EOB Buddy and verbal cues for hand placement. Pt ambulated out of room and in the hallway using FWW with Buddy-CGA (~250ft) with wheelchair to follow. Pt  returned to his room and transferred into the chair with CGA. Fatigue noted. SpO2 98-99%. Daughter present along with another person and inquired about pt shaking. No shaking noted but pt did c/o being cold, and a warm blanket was provided. Nursing informed of urination in urinal. Clip alert attached and pt left resting in the chair eating his pudding with set up. Call light within reach.   Additional Documentation   Rehab Comments Pt tolerating more activity today, though he continues to demonstrate fatigue.   OT Plan Progress strength, activity tolerance, and ADLs, as able   Total Session Time   Total Session Time (minutes) 23 minutes

## 2021-07-27 NOTE — PLAN OF CARE
"Patient has denied pain this shift. He did report not feeling good at the beginning of the shift but didn't elaborate. After being cleaned up and getting repositioned, he did report feeling better. Low grade temps of 99.1 continue this shift. DBP is soft the second half of the shift, 112/46. MD aware. VSS otherwise. Colotomy leaked again at the beginning of the shift. Surgeon was updated due to wound vac dressing becoming contaminated with stool. Per surgeon, wound vac removed. Midline incision staples are intact. There are a few areas of tape-like burn along incision but not on incision itself. Area cleansed with baby soap and water, rinsed with normal saline and patted dry. Midline incision covered with waterproof mepilex per surgeons instructions. EMMANUELLE site is reddened but no active leak. Redressed this area as well. Small serosanguineous drainage out of EMMANUELLE. Colostomy replaced entirely with 1 piece stock. Colostomy output as charted. Bowel sounds active throughout. Flatus noted from colostomy. Patient continues to void frequent small amounts. Tip of penis is reddened, yeast like. MD updated. Nystatin order obtained. Patient was up to the chair a couple hours around supper time. Remains assist of 1 with walker and gait belt. Patient's irritable at times, he stated \"I'm ready for the box\" at the beginning of the shift. Attempted to reassure patient and encourage him. Alarms in place. Call light within reach. Central line in place. Brown cap occluded, white cap flushes but has no blood return and blue cap flushes with no blood return. IV antibiotic continues.    Face to face report given with opportunity to observe patient.    Report given to Suzy RUIZ.    Britany Purdy RN   7/26/2021  11:36 PM      "

## 2021-07-27 NOTE — PLAN OF CARE
" /76 (BP Location: Left arm)   Pulse 73   Temp 98.4  F (36.9  C) (Tympanic)   Resp 18   Ht 1.803 m (5' 11\")   Wt 86.3 kg (190 lb 4.1 oz)   SpO2 98%   BMI 26.54 kg/m    Pt has been awake on and off t/o this shift. A&Ox4. VS and Assessments as charted. Gave PRN Tylenol for temp 100.0 and preventative measures to pull central line. Ostomy remains intact and dry, see Flowsheets for output. Midline incision dressing remains CDI. EMMANUELLE drain intact, dressing CDI. Pt up and ambulating with PT this shift, took some time to recover; c/o being exhausted afterwards.     ABX:  Changed to PO per provider this shift, administered per MAR.     Nutrition: C/o little appetite, did eat 75% of brunch, and encouraged snacking and water.   ADL's:  A1  Ambulation: GB and walker  Safety:  Bed in lowest position, call button within reach, calls appropriately and makes needs known. Room door open and room located across from RN station.     Discharge care conference held.   Attendees: Nursing, Physical Therapy, Occupational Therapy, Pharmacy and Physician  Comments:    Face to face report given with opportunity to observe patient.    Report given to SARA Garg RN   7/27/2021  3:51 PM        "

## 2021-07-27 NOTE — PLAN OF CARE
Spoke with [facility] for screening/ potential placement.

## 2021-07-27 NOTE — PROGRESS NOTES
Tamie with Case Management called and requested ostomy product supply numbers for discharge. She spoke with Lida Gill NP and Lida asked that she reach out to me for this information. At this time I would recommend the following supplies for use at the nursing home:    [employer]ul[employer] 1 piece flat pouch #[id number]  [place] CeraRing #[id number]

## 2021-07-27 NOTE — PROGRESS NOTES
INPATIENT ROUNDING NOTE  7/27/2021    Patient: James FALK Rudy    Physician of Record: Hospitalist Service    Admitting diagnosis: Abdominal pain, right lower quadrant [R10.31]  Sigmoid diverticulitis [K57.32]  Diverticulitis large intestine [K57.32]    Procedure(s):  Exploratory  laparotomy with sigmoid colectomy and creation of colostomy, appendectomy     POD: 8 Days Post-Op    Current Diet: Regular    CURRENT MEDICATIONS:  Continuous Medications:  Current Facility-Administered Medications   Medication Last Rate     dextrose         Scheduled Medications:  Current Facility-Administered Medications   Medication Dose Route Frequency     enoxaparin ANTICOAGULANT  40 mg Subcutaneous Q24H     famotidine  20 mg Oral BID     losartan  50 mg Oral Daily     metoprolol succinate ER  12.5 mg Oral At Bedtime     nystatin   Topical BID     ondansetron  4 mg Intravenous Once     piperacillin-tazobactam  3.375 g Intravenous Q6H     simvastatin  20 mg Oral At Bedtime     sodium chloride (PF)  10 mL Intracatheter Q8H     sodium chloride (PF)  3 mL Intracatheter Q8H       PRN Medications:  Current Facility-Administered Medications   Medication Dose Route Frequency     acetaminophen  975 mg Oral Q4H PRN     dextrose   Intravenous Continuous PRN     hydrALAZINE  10 mg Intravenous Q6H PRN     HYDROmorphone  0.5-1 mg Intravenous Q3H PRN     lidocaine 4%   Topical Q1H PRN     lidocaine (buffered or not buffered)  0.1-1 mL Other Q1H PRN     [Held by provider] melatonin  1 mg Oral At Bedtime PRN     naloxone  0.2 mg Intravenous Q2 Min PRN    Or     naloxone  0.4 mg Intravenous Q2 Min PRN    Or     naloxone  0.2 mg Intramuscular Q2 Min PRN    Or     naloxone  0.4 mg Intramuscular Q2 Min PRN     ondansetron  4 mg Oral Q6H PRN    Or     ondansetron  4 mg Intravenous Q6H PRN     prochlorperazine  5 mg Intravenous Q6H PRN    Or     prochlorperazine  5 mg Oral Q6H PRN    Or     prochlorperazine  12.5 mg Rectal Q12H PRN     sodium chloride (PF)   "3 mL Intracatheter q1 min prn       SUBJECTIVE:   Nausea: No. Vomiting: No. Fever: No. Chills: No. Excessive burping: No. Flatus: Yes. BM: Yes. Pain is 0/10. Pain control: excellent. Tolerating current diet: Yes.     PHYSICAL EXAM:   Vital signs: /74 (BP Location: Left arm)   Pulse 72   Temp 98.3  F (36.8  C) (Tympanic)   Resp 18   Ht 1.803 m (5' 11\")   Wt 86.3 kg (190 lb 4.1 oz)   SpO2 94%   BMI 26.54 kg/m     BMI: Body mass index is 26.54 kg/m .   General: Normal, healthy, cooperative, in no acute distress, alert   Lungs: respirations are non-labored   Abdominal: EMMANUELLE drain intact with serosanguineous drainage; stoma with healthy tissue, abdomen non-distended   Wound: mepilex ag pads are intact to incisional site   Extremities: No cyanosis, clubbing or edema noted bilaterally in Upper and Lower Extremities   Neurological: without deficit    INPUT/OUTPUT:      Intake/Output Summary (Last 24 hours) at 7/27/2021 0806  Last data filed at 7/27/2021 0719  Gross per 24 hour   Intake 1635 ml   Output 2892 ml   Net -1257 ml       I/O last 3 completed shifts:  In: 1645 [P.O.:480; I.V.:110]  Out: 2842 [Urine:2435; Drains:57; Stool:350]    LABS:    Last CBC Rrsults:   Recent Labs   Lab Test 07/27/21  0616 07/26/21  0656 07/25/21  0553   WBC 12.4* 12.6* 12.8*   RBC 4.09* 4.10* 4.03*   HGB 12.5* 12.7* 12.5*   HCT 37.3* 37.3* 36.4*   MCV 91 91 90   MCH 30.6 31.0 31.0   MCHC 33.5 34.0 34.3   RDW 13.4 13.4 13.2    197 315       Last Comprehensive Metabolic panel:  Recent Labs   Lab Test 07/27/21  0616 07/26/21  0656 07/26/21  0100 07/25/21  0553 07/23/21  1240 07/23/21  0529 07/19/21  1754 07/19/21  0618    137  --  137   < > 137   < > 139   POTASSIUM 3.9 3.9  --  3.7   < > 3.7   < > 3.3*   CHLORIDE 108 107  --  106   < > 107   < > 104   CO2 24 23  --  26   < > 23   < > 25   ANIONGAP 6 7  --  5   < > 7   < > 10   * 130* 117* 175*   < > 197*   < > 101*   BUN 12 14  --  13   < > 16   < > 19   CR 0.73 " 0.69  --  0.63*   < > 0.54*   < > 0.63*   GFRESTIMATED 79 81  --  84   < > 90   < > 84   VANIA 7.9* 7.8*  --  7.4*   < > 7.8*   < > 7.9*   BILITOTAL  --  0.5  --   --   --  0.6  --  0.6   ALKPHOS  --  52  --   --   --  64  --  48   ALT  --  62  --   --   --  89*  --  13   AST  --  28  --   --   --  64*  --  16    < > = values in this interval not displayed.       Recent Labs   Lab Test 07/26/21  0656 07/25/21  0553 07/24/21  0550 07/23/21  0529 07/19/21  0618   MAG 2.1 2.1 1.9 1.9  --    ALBUMIN 2.2*  --   --  2.2* 2.1*   PHOS 2.6 2.6 2.3* 1.7*  --        ASSESSMENT:    8 Days Post-Op from Procedure(s):  Exploratory  laparotomy with sigmoid colectomy and creation of colostomy, appendectomy.    PLAN:   Okay for discharge from a surgery standpoint  Patient should be discharged on 7 days of an oral antibiotic upon discharge.

## 2021-07-27 NOTE — PLAN OF CARE
"/96   Pulse 78   Temp 99  F (37.2  C) (Tympanic)   Resp 20   Ht 1.803 m (5' 11\")   Wt 89.6 kg (197 lb 8.5 oz)   SpO2 97%   BMI 27.55 kg/m  . Bowel sounds audible et active x4, denies nausea. Abdominal dressing CDI with no shadowing. Colostomy patent. Urinary frequency et urgency, calls for assist with urinal. EMMANUELLE 20mls serosanguinous drainage.   Pt voiding every half to 1hr, small amt 100-200, bladder scanned post void 158 et voided 100ml right after scanned. Pt hasn't slept all night d/t having to void so frequently, states had prostate surgery cpl years ago et doesn't think he has taken any meds for prostate. Denies dysuria.    "

## 2021-07-27 NOTE — PROGRESS NOTES
Spoke with Krissy at [facility].  Accepted for short term rehab, could admit today.  Message at [facility].  Updated care team in care rounds.  Updated Adrian and family.  They would like to see if [facility] able to accept.

## 2021-07-27 NOTE — PROVIDER NOTIFICATION
MD updated RE: /46. HR 60's-70's. Reviewed todays vital signs. Toprol 12.5 mg PO due, question if MD would like it given with current BP. MD okays giving Toprol.

## 2021-07-27 NOTE — PROVIDER NOTIFICATION
Surgeon updated RE: colostomy bag has leaked again, this time it got all over the wound vac dressing. I don't feel comfortable leaving wound vac dressing in place with fecal contamination. Surgeon okays removing wound vac and replacing dressing with waterproof mepliex dressing.

## 2021-07-27 NOTE — PROGRESS NOTES
PAS-RR    Per DHS regulation, CTS team completed and submitted PAS-RR to [place] [employer] via the [employer]. CTS team advised SNF and they are aware a PAS-RR has been submitted.     CTS team reviewed with pt or health care agent that they may be contacted for a follow up appointment within 10 days of hospital discharge if SNF stay is <30 days. Contact information for [employer] was also provided.     Pt or health care agent verbalized understanding.     PAS-RR # [id number]

## 2021-07-27 NOTE — PROGRESS NOTES
Spoke with Edel at [facility], they have accepted Adrian for admission tomorrow.  Inquired about colostomy information so they can order supplies.  Working with surgery/wound care in regards to this.  Updated care team in regards to anticipated discharge.      Updated Adrian and family.  Will make transportation arrangements for discharge.  Awaiting call back from [facility] about availability as initially told that [facility] unable to provide transport tomorrow.

## 2021-07-27 NOTE — PROGRESS NOTES
07/27/21 1100   Signing Clinician's Name / Credentials   Signing clinician's name / credentials Jazmine Coyle DPT   Quick Adds   Rehab Discipline PT   Therapeutic Activity   Minutes of Treatment 23 minutes   Symptoms Noted During/After Treatment Fatigue   Treatment Detail Pt resting in bed, agreeable to PT.  Pt transferred sit>stand min A from EOB cues for hand placement. Upon standing pt needed to urinate. Required help to place urinal but was then able to stand with CGA while urinating. Pt ambulated 250' with FWW CGA-min A, able to make 2 - 180 degree turns without LOB or instability.  Pt does demonstrate fatigue and SOB with sats 95% on RA. Pt did have shakiness following ambulation due to feeling very cold. Pt was covered in warm blankets and nurse was update. Daughter in room with pt at end of session   OT Discharge Planning    OT Discharge Recommendation (DC Rec) Transitional Care Facility   OT Rationale for DC Rec Pt would benefit from short term rehab as he continues to require assist with ADLs due to weakness and deconditioning   Additional Documentation   PT Plan Progress mobility and strength   Total Session Time   Total Session Time (minutes) 23 minutes

## 2021-07-27 NOTE — PLAN OF CARE
Face to face report given with opportunity to observe patient.    Report given to Jesús Andrade RN   7/27/2021  7:00 AM

## 2021-07-27 NOTE — PROGRESS NOTES
[facility]    Hospitalist Progress Note      Assessment & Plan   James Grant is a 89 year old male who was admitted on 7/15/2021.      1.  Postop day #8: He status post exploratory laparotomy for diverticulitis with abscess.  Underwent resection and colostomy formation.  With Walker's procedure.  Has now output from his colostomy.  Tolerating regular diet.    2.  Sigmoid diverticulitis.  He has been on Zosyn the entire time.  Currently no fevers at all.  White counts okay.  We will switch to oral antibiotics now the recommend another 7 days of oral antibiotics.    3.  Cardiac status.  Hemodynamically stable occasional hypertension.  Doing very well.    4.  Pulmonary status: He is on room air with no issues.    5.  Renal status: Prater is out.  Some difficulty voiding has BPH symptoms.  Urinary frequency we will put him on some Flomax.    6.  Central line: Remove this today.  This was in place as he was getting TPN for a while.  But now since tolerating his diet this is been weaned off.    Overall he is doing well.  He has some just concerns with how long it took for him to get better and he is just not feeling his usual self.  Little bit overwhelmed with the colostomy bag.  Patient will be sent to the nursing home.  He will have training there.  Anticipate this to be relatively short-term as low likelihood he will return home at time of discharge.  He has been up ambulating with the staff.  Is still somewhat weak.  Not safe for return home at this point.      Diet: Regular Diet Adult  Advance Diet as Tolerated  Fluids: None    DVT Prophylaxis: Enoxaparin (Lovenox) SQ  Code Status: No CPR- Do NOT Intubate    Disposition: Expected discharge tomorrow  [facility]    Interval History   Patient alert this morning pleasant no real distress.  Main complaint is a lot of urinary frequency.  Otherwise no shortness of breath or chest pain nausea vomiting.  His ostomy is been working fine.  Hemodynamically  stable no troubles.  Place him on oral antibiotics are currently today.  Anticipate discharge tomorrow to [facility].    -Data reviewed today: I reviewed all new labs and imaging results over the last 24 hours. I personally reviewed no images or EKG's today.    Physical Exam   Temp: 98.3  F (36.8  C) Temp src: Tympanic BP: 104/50 Pulse: 74   Resp: 20 SpO2: 94 % O2 Device: None (Room air)    Vitals:    07/24/21 0410 07/26/21 0639 07/27/21 0522   Weight: 93.1 kg (205 lb 4 oz) 89.6 kg (197 lb 8.5 oz) 86.3 kg (190 lb 4.1 oz)     Vital Signs with Ranges  Temp:  [97.9  F (36.6  C)-99.5  F (37.5  C)] 98.3  F (36.8  C)  Pulse:  [68-78] 74  Resp:  [16-20] 20  BP: (104-150)/(46-96) 104/50  SpO2:  [94 %-97 %] 94 %  I/O last 3 completed shifts:  In: 1645 [P.O.:480; I.V.:110]  Out: 2842 [Urine:2435; Drains:57; Stool:350]    CVC TRIPLE LUMEN Right Internal jugular (Active)   Site Assessment WDL 07/27/21 0740   Dressing Type Chlorhexidine sponge;Transparent 07/27/21 0740   Dressing Status clean;dry;intact 07/27/21 0740   Dressing Intervention dressing reinforced 07/23/21 1600   Dressing Change Due 07/29/21 07/27/21 0740   Line Necessity yes, meets criteria 07/27/21 0740   Blue - Status saline locked 07/27/21 0740   Blue - Cap Change Due 07/29/21 07/27/21 0740   Brown - Status occluded 07/27/21 0740   Brown - Cap Change Due 07/29/21 07/27/21 0740   White - Status saline locked 07/27/21 0740   White - Cap Change Due 07/29/21 07/27/21 0740   Phlebitis Scale 0-->no symptoms 07/27/21 0740   Infiltration? no 07/27/21 0740   Infiltration Scale 0 07/27/21 0740   Number of days: 5       Closed/Suction Drain 1 Right RLQ Bulb 19 Portuguese (Active)   Site Description WDL X;Reddened 07/27/21 0740   Dressing Status Dressing Changed 07/26/21 1746   Dressing Change Due 07/19/21 07/19/21 1635   Drainage Appearance Serosanguenous 07/27/21 0740   Status To bulb suction 07/27/21 0740   Output (ml) 20 ml 07/27/21 0000   Number of days: 8       Colostomy  (Active)   Stomal Appliance 1 piece 07/27/21 0740   Stoma Assessment Pink;Red 07/27/21 0740   Peristomal Assessment Intact 07/27/21 0740   Treatment Skin prep 07/26/21 1746   Stool Amount Small 07/26/21 0100   Output (ml) 150 ml 07/27/21 1158   Number of days: 8       Incision/Surgical Site 07/19/21 Abdomen (Active)   Incision Assessment UTV 07/27/21 0740   Madisyn-Incision Assessment Erythema 07/26/21 1746   Closure [place] 07/27/21 0740   Incision Drainage Amount UTV 07/27/21 0740   Drainage Description Sanguinous 07/26/21 1746   Incision Care Therapy - negative pressure 07/26/21 0100   Dressing Intervention Clean, dry, intact 07/27/21 0740   Number of days: 8     Central line removed today.    Constitutional: Alert and oriented x3. No distress    HEENT: Normocephalic/atraumatic, PERRL, EOMI, mouth moist, neck supple, no LN.     Cardiovascular: RRR. no Murmur, no  rubs, or gallops.  JVD no.  Bruits no.  Pulses 2+    Respiratory: Clear to auscultation bilaterally    Abdomen: Soft, nontender, nondistended, no organomegaly. Bowel sounds present drain still right lower quadrant EMMANUELLE with serosanguineous fluid.  Midline incision looks good.  Colostomy left lower quadrant pink with stool in bag.    Extremities: Warm/dry.  No edema    Neuro:   Non focal, cranial nerves intact, Moves all extremities.  Medications     dextrose         enoxaparin ANTICOAGULANT  40 mg Subcutaneous Q24H     famotidine  20 mg Oral BID     losartan  50 mg Oral Daily     metoprolol succinate ER  12.5 mg Oral At Bedtime     nystatin   Topical BID     ondansetron  4 mg Intravenous Once     simvastatin  20 mg Oral At Bedtime     sodium chloride (PF)  10 mL Intracatheter Q8H     sodium chloride (PF)  3 mL Intracatheter Q8H     tamsulosin  0.4 mg Oral QPM       Data   Recent Labs   Lab 07/27/21  0616 07/26/21  0656 07/26/21  0100 07/25/21  0553 07/23/21  1240 07/23/21  0529 07/22/21  1846   WBC 12.4* 12.6*  --  12.8*   < > 12.8*  --    HGB 12.5* 12.7*  --   12.5*   < > 13.7  --    MCV 91 91  --  90   < > 90  --     197  --  315   < > 321  --    INR  --  1.06  --   --   --  1.08 1.10    137  --  137   < > 137  --    POTASSIUM 3.9 3.9  --  3.7   < > 3.7  --    CHLORIDE 108 107  --  106   < > 107  --    CO2 24 23  --  26   < > 23  --    BUN 12 14  --  13   < > 16  --    CR 0.73 0.69  --  0.63*   < > 0.54*  --    ANIONGAP 6 7  --  5   < > 7  --    VANIA 7.9* 7.8*  --  7.4*   < > 7.8*  --    * 130* 117* 175*   < > 197*  --    ALBUMIN  --  2.2*  --   --   --  2.2*  --    PROTTOTAL  --  5.4*  --   --   --  5.6*  --    BILITOTAL  --  0.5  --   --   --  0.6  --    ALKPHOS  --  52  --   --   --  64  --    ALT  --  62  --   --   --  89*  --    AST  --  28  --   --   --  64*  --     < > = values in this interval not displayed.       No results found for this or any previous visit (from the past 24 hour(s)).

## 2021-07-27 NOTE — PLAN OF CARE
Pt up and ambulated hallways with PT, upon rearrival to room pt c/o cold, shaky, and tired. Gave warm blankets, O2 sats at and VS: BP: 104/50 HR 74  Surgical incision remains CDI, bulbs to suction and draining adequately, ostomy intact.  MD text paged an update.

## 2021-07-28 ENCOUNTER — APPOINTMENT (OUTPATIENT)
Dept: PHYSICAL THERAPY | Facility: HOSPITAL | Age: 86
DRG: 329 | End: 2021-07-28
Attending: INTERNAL MEDICINE
Payer: MEDICARE

## 2021-07-28 ENCOUNTER — APPOINTMENT (OUTPATIENT)
Dept: OCCUPATIONAL THERAPY | Facility: HOSPITAL | Age: 86
DRG: 329 | End: 2021-07-28
Attending: INTERNAL MEDICINE
Payer: MEDICARE

## 2021-07-28 VITALS
DIASTOLIC BLOOD PRESSURE: 55 MMHG | OXYGEN SATURATION: 97 % | BODY MASS INDEX: 26.64 KG/M2 | HEIGHT: 71 IN | RESPIRATION RATE: 18 BRPM | HEART RATE: 77 BPM | WEIGHT: 190.26 LBS | SYSTOLIC BLOOD PRESSURE: 116 MMHG | TEMPERATURE: 98.6 F

## 2021-07-28 LAB
ANION GAP SERPL CALCULATED.3IONS-SCNC: 6 MMOL/L (ref 3–14)
BUN SERPL-MCNC: 14 MG/DL (ref 7–30)
CALCIUM SERPL-MCNC: 7.8 MG/DL (ref 8.5–10.1)
CHLORIDE BLD-SCNC: 108 MMOL/L (ref 94–109)
CO2 SERPL-SCNC: 25 MMOL/L (ref 20–32)
CREAT SERPL-MCNC: 0.73 MG/DL (ref 0.66–1.25)
ERYTHROCYTE [DISTWIDTH] IN BLOOD BY AUTOMATED COUNT: 13.5 % (ref 10–15)
GFR SERPL CREATININE-BSD FRML MDRD: 79 ML/MIN/1.73M2
GLUCOSE BLD-MCNC: 109 MG/DL (ref 70–99)
HCT VFR BLD AUTO: 37 % (ref 40–53)
HGB BLD-MCNC: 12.2 G/DL (ref 13.3–17.7)
MAGNESIUM SERPL-MCNC: 2.2 MG/DL (ref 1.6–2.3)
MCH RBC QN AUTO: 30.2 PG (ref 26.5–33)
MCHC RBC AUTO-ENTMCNC: 33 G/DL (ref 31.5–36.5)
MCV RBC AUTO: 92 FL (ref 78–100)
PHOSPHATE SERPL-MCNC: 2.7 MG/DL (ref 2.5–4.5)
PLATELET # BLD AUTO: 240 10E3/UL (ref 150–450)
POTASSIUM BLD-SCNC: 3.9 MMOL/L (ref 3.4–5.3)
RBC # BLD AUTO: 4.04 10E6/UL (ref 4.4–5.9)
SODIUM SERPL-SCNC: 139 MMOL/L (ref 133–144)
WBC # BLD AUTO: 10.7 10E3/UL (ref 4–11)

## 2021-07-28 PROCEDURE — 85027 COMPLETE CBC AUTOMATED: CPT | Performed by: INTERNAL MEDICINE

## 2021-07-28 PROCEDURE — 250N000011 HC RX IP 250 OP 636: Performed by: NURSE PRACTITIONER

## 2021-07-28 PROCEDURE — 84100 ASSAY OF PHOSPHORUS: CPT | Performed by: SURGERY

## 2021-07-28 PROCEDURE — 83735 ASSAY OF MAGNESIUM: CPT | Performed by: SURGERY

## 2021-07-28 PROCEDURE — 250N000013 HC RX MED GY IP 250 OP 250 PS 637: Performed by: INTERNAL MEDICINE

## 2021-07-28 PROCEDURE — 99239 HOSP IP/OBS DSCHRG MGMT >30: CPT | Mod: 24 | Performed by: INTERNAL MEDICINE

## 2021-07-28 PROCEDURE — 250N000013 HC RX MED GY IP 250 OP 250 PS 637: Performed by: SURGERY

## 2021-07-28 PROCEDURE — 36415 COLL VENOUS BLD VENIPUNCTURE: CPT | Performed by: INTERNAL MEDICINE

## 2021-07-28 PROCEDURE — 97530 THERAPEUTIC ACTIVITIES: CPT | Mod: GP | Performed by: PHYSICAL THERAPIST

## 2021-07-28 PROCEDURE — 80048 BASIC METABOLIC PNL TOTAL CA: CPT | Performed by: INTERNAL MEDICINE

## 2021-07-28 PROCEDURE — 97530 THERAPEUTIC ACTIVITIES: CPT | Mod: GO

## 2021-07-28 RX ORDER — CIPROFLOXACIN 250 MG/1
250 TABLET, FILM COATED ORAL EVERY 12 HOURS
Qty: 14 TABLET | Refills: 0 | Status: SHIPPED | OUTPATIENT
Start: 2021-07-28 | End: 2021-08-04

## 2021-07-28 RX ORDER — ACETAMINOPHEN 325 MG/1
650 TABLET ORAL EVERY 4 HOURS PRN
COMMUNITY
Start: 2021-07-28 | End: 2023-11-15

## 2021-07-28 RX ORDER — TAMSULOSIN HYDROCHLORIDE 0.4 MG/1
0.4 CAPSULE ORAL EVERY EVENING
Qty: 30 CAPSULE | Refills: 1 | Status: SHIPPED | OUTPATIENT
Start: 2021-07-28 | End: 2021-09-21

## 2021-07-28 RX ORDER — METRONIDAZOLE 250 MG/1
250 TABLET ORAL 3 TIMES DAILY
Qty: 21 TABLET | Refills: 0 | Status: SHIPPED | OUTPATIENT
Start: 2021-07-28 | End: 2021-08-04

## 2021-07-28 RX ADMIN — LOSARTAN POTASSIUM 50 MG: 50 TABLET, FILM COATED ORAL at 10:25

## 2021-07-28 RX ADMIN — METRONIDAZOLE 250 MG: 250 TABLET ORAL at 10:25

## 2021-07-28 RX ADMIN — FAMOTIDINE 20 MG: 20 TABLET ORAL at 10:25

## 2021-07-28 RX ADMIN — ENOXAPARIN SODIUM 40 MG: 40 INJECTION SUBCUTANEOUS at 10:25

## 2021-07-28 RX ADMIN — CIPROFLOXACIN HYDROCHLORIDE 250 MG: 250 TABLET, FILM COATED ORAL at 08:38

## 2021-07-28 ASSESSMENT — ACTIVITIES OF DAILY LIVING (ADL)
ADLS_ACUITY_SCORE: 15
ADLS_ACUITY_SCORE: 14

## 2021-07-28 NOTE — PLAN OF CARE
Nurse 2 Nurse report given to SARA Crowell at [facility].     Patient discharged at 12:00PM via wheel chair accompanied by son and staff. Prescriptions sent to patients preferred pharmacy. All belongings sent with patient.     Discharge instructions reviewed with Patient, son, and SARA Crowell at [facility]. Listed belongings gathered and returned to patient. Yes    Patient discharged to [facility].   Report called to Nursing Home:  [facility]     Surgical Patient   Surgical Procedures during stay: Yes  Did patient receive discharge instruction on wound care and recognition of infection symptoms? Yes    MISC  Follow up appointment made:  No  Home medications returned to patient: N/A  Patient reports pain was well managed at discharge: Yes

## 2021-07-28 NOTE — PROGRESS NOTES
[facility] unable to provide transport for discharge today.  [facility] soonest available is 3pm.  Message left with Edel to inquire if this is able to be accommodated.

## 2021-07-28 NOTE — PLAN OF CARE
Physical Therapy Discharge Summary    Reason for therapy discharge:    Discharged to transitional care facility.    Progress towards therapy goal(s). See goals on Care Plan in [facility] electronic health record for goal details.  Goals partially met.  Barriers to achieving goals:   discharge from facility.    Therapy recommendation(s):    Continued therapy is recommended.  Rationale/Recommendations:  to improve strength and independence with functional mobility.

## 2021-07-28 NOTE — PROGRESS NOTES
Received call back from Edel indicating that Adrian would need to be at th facility by 1:30.  [facility], [facility], [facility] and [facility] unable to provide transport.

## 2021-07-28 NOTE — PLAN OF CARE
Patient is irritable this shift. He has denied pain. VSS, afebrile. Midline abdominal dressing is clean, dry and intact. EMMANUELLE intact, dressing changed at the beginning of the shift due to serous with pink tinge saturation. Minimal serosanguineous drainage out of EMMANUELLE. Bowel sounds active throughout. Colostomy remains intact this shift. Colostomy output adequate. Tolerating regular diet although appetite is poor. Oral intake is adequate. Urine output adequate, he's voided 3 times this shift. Remains assist of 1 with walker and gait belt. Patient was up in the chair for a bit around supper time and did ambulate hallways once. Alarms in place. Call light within reach. Central line removed at the beginning of the shift. Pressure dressing remains in place. PO antibiotics started this evening.    Face to face report given with opportunity to observe patient.    Report given to Suzy RUIZ.    Britany Purdy RN   7/27/2021  11:29 PM

## 2021-07-28 NOTE — PROGRESS NOTES
Updated Maggi Crockett, Care Transitions manager of [facility] for transport.  Received call from Brenna with [facility], advising that they will pick Adrian up at 1200.  Adrian, care team and Edel at [facility] notified.  Orders faxed.

## 2021-07-28 NOTE — PROGRESS NOTES
INPATIENT ROUNDING NOTE  7/28/2021    Patient: James FALK Rudy    Physician of Record: Hospitalist Service    Admitting diagnosis: Abdominal pain, right lower quadrant [R10.31]  Sigmoid diverticulitis [K57.32]  Diverticulitis large intestine [K57.32]    Procedure(s):  Exploratory  laparotomy with sigmoid colectomy and creation of colostomy, appendectomy     POD: 9 Days Post-Op    Current Diet: Regular    CURRENT MEDICATIONS:  Continuous Medications:  Current Facility-Administered Medications   Medication Last Rate     dextrose         Scheduled Medications:  Current Facility-Administered Medications   Medication Dose Route Frequency     ciprofloxacin  250 mg Oral Q12H SABA     enoxaparin ANTICOAGULANT  40 mg Subcutaneous Q24H     famotidine  20 mg Oral BID     losartan  50 mg Oral Daily     metoprolol succinate ER  12.5 mg Oral At Bedtime     metroNIDAZOLE  250 mg Oral TID     nystatin   Topical BID     ondansetron  4 mg Intravenous Once     simvastatin  20 mg Oral At Bedtime     tamsulosin  0.4 mg Oral QPM       PRN Medications:  Current Facility-Administered Medications   Medication Dose Route Frequency     acetaminophen  975 mg Oral Q4H PRN     dextrose   Intravenous Continuous PRN     hydrALAZINE  10 mg Intravenous Q6H PRN     HYDROmorphone  0.5-1 mg Intravenous Q3H PRN     lidocaine 4%   Topical Q1H PRN     lidocaine (buffered or not buffered)  0.1-1 mL Other Q1H PRN     [Held by provider] melatonin  1 mg Oral At Bedtime PRN     naloxone  0.2 mg Intravenous Q2 Min PRN    Or     naloxone  0.4 mg Intravenous Q2 Min PRN    Or     naloxone  0.2 mg Intramuscular Q2 Min PRN    Or     naloxone  0.4 mg Intramuscular Q2 Min PRN     ondansetron  4 mg Oral Q6H PRN    Or     ondansetron  4 mg Intravenous Q6H PRN     prochlorperazine  5 mg Intravenous Q6H PRN    Or     prochlorperazine  5 mg Oral Q6H PRN    Or     prochlorperazine  12.5 mg Rectal Q12H PRN     sodium chloride (PF)  3 mL Intracatheter q1 min prn  "      SUBJECTIVE:   Nausea: No. Vomiting: No. Fever: No. Chills: No. Excessive burping: No. Flatus: Yes. BM: Yes. Pain is 0/10. Pain control: excellent. Tolerating current diet: Yes.     PHYSICAL EXAM:   Vital signs: /54   Pulse 72   Temp 98.9  F (37.2  C) (Tympanic)   Resp 18   Ht 1.803 m (5' 11\")   Wt 86.3 kg (190 lb 4.1 oz)   SpO2 97%   BMI 26.54 kg/m     General: Normal, healthy, cooperative, in no acute distress, alert              Lungs: respirations are non-labored              Abdominal: EMMANUELLE drain intact with serosanguineous drainage; stoma with healthy tissue, abdomen non-distended              Wound: mepilex ag pads are intact to incisional site              Extremities: No cyanosis, clubbing or edema noted bilaterally in Upper and Lower Extremities              Neurological: without deficit    INPUT/OUTPUT:      Intake/Output Summary (Last 24 hours) at 7/28/2021 0934  Last data filed at 7/28/2021 0830  Gross per 24 hour   Intake 480 ml   Output 1695 ml   Net -1215 ml       I/O last 3 completed shifts:  In: 480 [P.O.:480]  Out: 1945 [Urine:1525; Drains:20; Stool:400]    LABS:    Last CBC Rrsults:   Recent Labs   Lab Test 07/28/21  0621 07/27/21  0616 07/26/21  0656   WBC 10.7 12.4* 12.6*   RBC 4.04* 4.09* 4.10*   HGB 12.2* 12.5* 12.7*   HCT 37.0* 37.3* 37.3*   MCV 92 91 91   MCH 30.2 30.6 31.0   MCHC 33.0 33.5 34.0   RDW 13.5 13.4 13.4    352 197       Last Comprehensive Metabolic panel:  Recent Labs   Lab Test 07/28/21  0621 07/27/21  1235 07/27/21  0616 07/26/21  0656 07/23/21  1240 07/23/21  0529 07/19/21  1754 07/19/21  0618     --  138 137   < > 137   < > 139   POTASSIUM 3.9  --  3.9 3.9   < > 3.7   < > 3.3*   CHLORIDE 108  --  108 107   < > 107   < > 104   CO2 25  --  24 23   < > 23   < > 25   ANIONGAP 6  --  6 7   < > 7   < > 10   * 101* 104* 130*   < > 197*   < > 101*   BUN 14  --  12 14   < > 16   < > 19   CR 0.73  --  0.73 0.69   < > 0.54*   < > 0.63* "   GFRESTIMATED 79  --  79 81   < > 90   < > 84   VANIA 7.8*  --  7.9* 7.8*   < > 7.8*   < > 7.9*   BILITOTAL  --   --   --  0.5  --  0.6  --  0.6   ALKPHOS  --   --   --  52  --  64  --  48   ALT  --   --   --  62  --  89*  --  13   AST  --   --   --  28  --  64*  --  16    < > = values in this interval not displayed.       Recent Labs   Lab Test 07/28/21  0621 07/26/21  0656 07/25/21  0553 07/23/21  1559 07/23/21  0529 07/19/21  0618   MAG 2.2 2.1 2.1   < > 1.9  --    ALBUMIN  --  2.2*  --   --  2.2* 2.1*   PHOS 2.7 2.6 2.6  --  1.7*  --     < > = values in this interval not displayed.       ASSESSMENT:    9 Days Post-Op from Procedure(s):  Exploratory  laparotomy with sigmoid colectomy and creation of colostomy, appendectomy.      PLAN:   Okay for discharge from a surgery standpoint

## 2021-07-28 NOTE — PROGRESS NOTES
07/28/21 1100   Signing Clinician's Name / Credentials   Signing clinician's name / credentials Saumya Ash OTR/L   Quick Adds   Rehab Discipline OT   Therapeutic Activity   Minutes of Treatment 10 minutes   Symptoms Noted During/After Treatment Fatigue   Treatment Detail Pt sitting in the chair upon OT arrival, agreeable to tx. Pt eager to ambulate in the hallway but was informed PT would be coming soon and OT could work on other ADLs in his room. Pt reports already brushing his teeth and had clean socks. He doffed/donned a clean gown with ModA and assist to manage EMMANUELLE. Pt performed a sponge bath with set up. He declined wanting briefs/pull ups. Pt then transferred sit>stand from the chair with Buddy. He ambulated in/out of the BR using FWW with Buddy-CGA. Pt stood at the sink/mirror and combed his hair with CGA. Pt returned to the chair and transferred into sitting with CGA. Pt wanting to shave but was informed by nursing that they are too worried he will cut himself. Pt left resting in the chair with PT entering room.   OT Discharge Planning    OT Discharge Recommendation (DC Rec) Transitional Care Facility   OT Rationale for DC Rec Pt would benefit from short term rehab as he continues to require assist with ADLs due to weakness and deconditioning   OT Brief overview of current status  Pt sitting in the chair upon OT arrival, agreeable to tx. Pt eager to ambulate in the hallway but was informed PT would be coming soon and OT could work on other ADLs in his room. Pt reports already brushing his teeth and had clean socks. He doffed/donned a clean gown with ModA and assist to manage EMMANUELLE. Pt performed a sponge bath with set up. He declined wanting briefs/pull ups. Pt then transferred sit>stand from the chair with Buddy. He ambulated in/out of the BR using FWW with Buddy-CGA. Pt stood at the sink/mirror and combed his hair with CGA. Pt returned to the chair and transferred into sitting with CGA. Pt wanting to shave  but was informed by nursing that they are too worried he will cut himself. Pt left resting in the chair with PT entering room.   Additional Documentation   Rehab Comments Ongoing fatigue noted with ADLs   OT Plan Progress strength, activity tolerance, and ADLs, as able   Total Session Time   Total Session Time (minutes) 10 minutes

## 2021-07-28 NOTE — DISCHARGE SUMMARY
[facility]  Hospitalist Discharge Summary      Date of Admission:  7/15/2021  Date of Discharge:  7/28/2021 12:00 PM  Discharging Provider: Filipe Cole MD      Discharge Diagnoses     Diverticulitis of the sigmoid colon with perforation and abscess  BPH  Hypertension      Follow-ups Needed After Discharge   Follow-up Appointments     Follow Up and recommended labs and tests      Follow up with Surgery clinic  Lida Leighashok Chavez  in 1 week         Follow Up and recommended labs and tests      Follow up with assisted physician.  The following labs/tests are   recommended: CBC.  Follow up with me in 1 weeks.  No follow up labs or test are needed.         None    Unresulted Labs Ordered in the Past 30 Days of this Admission     No orders found from 6/15/2021 to 7/16/2021.      These results will be followed up by not needed    Discharge Disposition   Discharged to nursing home  Condition at discharge: Stable      Hospital Course   Patient is a 89-year-old gentleman presented to the ER on the day of his admission with acute onset of with severe right lower quadrant abdominal pain he was having some constipation and pushing hard and developed a severe pain.  Presented to ER temp was 99.2 sats were normal 99% hemodynamically fine.  White count 14,000 lactic acid normal.  He underwent a CT scan which did show evidence of sigmoid diverticulitis.  He was initially started on Flagyl and Cipro.  Eventually switched over to Zosyn.  The next several days he had a lot more pain.  Remained hemodynamically stable.  Mildly elevated white count no signs of sepsis.  On 7-19 repeat CT scan was done which did show small bowel obstruction and probable abscess.  He was taken to the emergency room by Dr. Chavez.  Underwent an exploratory laparotomy.  Was found to have an abscess he had a sigmoid colectomy with Walker's procedure.  And end colostomy placement and appendectomy.  NG tube was placed.  Over the next  several days he actually did relatively well.  He had no fevers.  Oxygenation with required some oxygen for several days but with aggressive pulmonary toilet that came off.  Maintained on antibiotics.  Eventually his NG tube was removed.  However he did not have a lot of output in his ostomy was started on TPN.  Gradually his ostomy did put out he was tolerating a regular diet without problem.  Was very weak.  Labs actually remained quite normal.  He had no fevers then she was switched over to oral antibiotics with Cipro and Flagyl.  Patient was quite weak.  He was able to work with physical therapy.  But is just not felt safe to go home.  He needed continue training regarding his colostomy use also.  He is DNR/DNI status per his request.  He was eventually transferred to [facility].  We will get PT and OT and training regarding his ostomy.  We will continue with the antibiotics for 7 more days.  He will follow up in the surgery clinic within the next week.    Remarkably he actually did very well for 89-year-old.  He had really no cardiac issues renal issues or pulmonary issues.  There was just some atelectasis but at this point he is on room air.  He is somewhat depressed by his surgery and his weakness and the fact that he does have the colostomy.    Consultations This Hospital Stay   SURGERY GENERAL IP CONSULT  CARE MANAGEMENT / SOCIAL WORK IP CONSULT  PHYSICAL THERAPY ADULT IP CONSULT  OCCUPATIONAL THERAPY ADULT IP CONSULT  PHYSICAL THERAPY ADULT IP CONSULT  PHYSICAL THERAPY ADULT IP CONSULT  OCCUPATIONAL THERAPY ADULT IP CONSULT  WOUND OSTOMY CONTINENCE NURSE  IP CONSULT  PHARMACY/NUTRITION TO START AND MANAGE TPN    Code Status   No CPR- Do NOT Intubate    Time Spent on this Encounter   I, Filipe Cole MD, personally saw the patient today and spent greater than 30 minutes discharging this patient.       Filipe Cole MD  [facility]  [address] [place] [id number]  Phone:  [phone]  Fax: [phone]  ______________________________________________________________________    Physical Exam   Vital Signs: Temp: 98.6  F (37  C) Temp src: Tympanic BP: 116/55 Pulse: 77   Resp: 18 SpO2: 97 % O2 Device: None (Room air)    Weight: 190 lbs 4.11 oz  Constitutional: awake, alert, cooperative, no apparent distress, and appears stated age  Respiratory: No increased work of breathing, good air exchange, clear to auscultation bilaterally, no crackles or wheezing  Cardiovascular: Normal apical impulse, regular rate and rhythm, normal S1 and S2, no S3 or S4, and no murmur noted  GI: Patient has a Mauri-Horn drain on the right side of his abdomen is draining serous sanguinous fluid colostomy left lower quadrant pink gas and stool noted.  His midline incision is clean and dry.    musculoskeletal: There is no redness, warmth, or swelling of the joints.  Full range of motion noted.  Motor strength is 5 out of 5 all extremities bilaterally.  Tone is normal.       Primary Care Physician   Broderick Flowers    Discharge Orders      WOUND CARE REFERRAL ([place])      General info for SNF    Length of Stay Estimate: Short Term Care: Estimated # of Days <30  Condition at Discharge: Improving  Level of care:skilled   Rehabilitation Potential: Good  Admission H&P remains valid and up-to-date: Yes  Recent Chemotherapy: N/A  Use Nursing Home Standing Orders: Yes     Follow Up and recommended labs and tests    Follow up with Surgery clinic  Lida Chavez  in 1 week     Reason for your hospital stay    Patient admitted with abdominal pain was found to have diverticulitis.  Did not seemingly improve CT scan showed suggested an abscess.  He underwent exploratory laparotomy underwent a colostomy formation.  Has improved.  Ostomy working tolerating diet hemodynamically stable.  Ready for discharge to nursing home for rehab purposes.     Colostomy; LLQ Care    ~ Encourage patient participation with  ostomy care,  ~ Empty pouch when 1/3 to 1/2 full,   ~ Notify WOC for ongoing ostomy pouch leakage,  ~ Document stoma output volume, color, consistency EVERY shift     Activity - Up with nursing assistance     Follow Up and recommended labs and tests    Follow up with [facility] physician.  The following labs/tests are recommended: CBC.  Follow up with me in 1 weeks.  No follow up labs or test are needed.     Colostomy; LLQ Care    ~ Encourage patient participation with ostomy care,  ~ Empty pouch when 1/3 to 1/2 full,   ~ Document stoma output volume, color, consistency EVERY shift     Wound care (specify)    Site:   EMMANUELLE drain  Instructions:  Empty daily and record daily appearance of EMMAUNELLE fluid     Wound care    Site:   Incisional site  Instructions:  Keep mepilex ag dressing intact to incisional site until seen back in the clinic.  Cover dressing to shower.  Change dressing if soiled or saturated.     No CPR- Do NOT Intubate     Fall precautions     Advance Diet as Tolerated    Follow this diet upon discharge: Orders Placed This Encounter      Regular Diet Adult     Advance Diet as Tolerated    Follow this diet upon discharge: Orders Placed This Encounter      Regular Diet Adult       Significant Results and Procedures   Most Recent 3 CBC's:Recent Labs   Lab Test 07/28/21  0621 07/27/21  0616 07/26/21  0656   WBC 10.7 12.4* 12.6*   HGB 12.2* 12.5* 12.7*   MCV 92 91 91    352 197     Most Recent 3 BMP's:Recent Labs   Lab Test 07/28/21  0621 07/27/21  1235 07/27/21  0616 07/26/21  0656     --  138 137   POTASSIUM 3.9  --  3.9 3.9   CHLORIDE 108  --  108 107   CO2 25  --  24 23   BUN 14  --  12 14   CR 0.73  --  0.73 0.69   ANIONGAP 6  --  6 7   VANIA 7.8*  --  7.9* 7.8*   * 101* 104* 130*     Most Recent 2 LFT's:Recent Labs   Lab Test 07/26/21  0656 07/23/21  0529   AST 28 64*   ALT 62 89*   ALKPHOS 52 64   BILITOTAL 0.5 0.6     Most Recent 6 Bacteria Isolates From Any Culture (See EPIC Reports for  Culture Details):Recent Labs   Lab Test 12/29/19  0230 02/16/19  2045 02/16/19  1818 02/16/19  1740 03/31/17  1050 01/18/16  1334   CULT >100,000 colonies/mL  Coagulase negative Staphylococcus  No further identification or sensitivity done  * No MRSA isolated No growth after 6 days No growth after 6 days Culture negative after 4 weeks  Heavy growth Diphtheroids No further identification or sensitivity done  Susceptibility testing not routinely done  * No MRSA isolated     Most Recent 6 glucoses:Recent Labs   Lab Test 07/28/21  0621 07/27/21  1235 07/27/21  0616 07/26/21  0656 07/26/21  0100 07/25/21  1747   * 101* 104* 130* 117* 140*     Most Recent Urinalysis:Recent Labs   Lab Test 07/24/21  0659 07/15/21  1938   COLOR Light Yellow Light Yellow   APPEARANCE Clear Clear   URINEGLC Negative Negative   URINEBILI Negative Negative   URINEKETONE Negative 10 *   SG 1.013 1.020   UBLD Negative Moderate*   URINEPH 7.0 6.0   PROTEIN Negative 10 *   NITRITE Negative Negative   LEUKEST Negative Negative   RBCU  --  98*   WBCU  --  1     Most Recent ESR & CRP:Recent Labs   Lab Test 07/17/21  0602   .0*   ,   Results for orders placed or performed during the hospital encounter of 07/15/21   XR Chest Port 1 View    Narrative    PROCEDURE: XR CHEST PORT 1 VIEW 7/15/2021 6:58 PM    HISTORY: acute abd pain r/o perforation    COMPARISONS: 11/30/2020.    TECHNIQUE: Single view.    FINDINGS: Heart is stable in size. There is stable ectasia of the  aorta. There is some mild linear scar or atelectasis at the lung  bases. There is no confluent infiltrate or pleural effusion.    No free air is seen beneath the diaphragm.         Impression    IMPRESSION: Linear scar or atelectasis at the lung bases.    ZANE RIVERA MD         SYSTEM ID:  [id number]   CT Abdomen Pelvis w Contrast    Narrative    EXAMINATION: CT ABDOMEN PELVIS W CONTRAST, 7/15/2021 7:08 PM    TECHNIQUE:  Helical CT images from the lung bases through  the  symphysis pubis were obtained  with IV contrast. Contrast dose: Isovue  300 (100 mL)    COMPARISON: none    HISTORY:    FINDINGS:    There is dependent atelectasis at the lung bases.    The liver is free of masses or biliary ductal enlargement. The  gallbladder has been removed.    The the spleen and pancreas appear normal.    The adrenal glands are normal.    The right and left kidneys are free of masses or hydronephrosis.    The periaortic lymph nodes are normal in caliber.    There is sigmoid diverticulitis without abscess.    In the pelvis the bladder and rectum appear normal. Prostate is  markedly enlarged.    Degenerative changes are present in the thoracic and lumbar spine      Impression    IMPRESSION: Sigmoid diverticulitis     NIKOLAS MUNOZ MD         SYSTEM ID:  [id number]   XR Chest 2 Views    Narrative    PROCEDURE:  XR CHEST 2 VW    HISTORY:  coughing, sob.     COMPARISON:  None.    FINDINGS:   The cardiac silhouette is normal in size. The pulmonary vasculature is  normal.  There is some increased density in both lung bases consistent  with atelectasis or pneumonia. This has worsened from July 15, 2021  there is blunting of the costophrenic angles which represents a change  from previous examination      Impression    IMPRESSION:  Bilateral small pleural effusions with bibasilar areas of  increased density consistent with atelectasis or pneumonia      NIKOLAS MUNOZ MD         SYSTEM ID:  [id number]   CT Abdomen Pelvis w/o Contrast    Addendum: 7/19/2021    There appears to be some bubbles of extraluminal gas in the right  lower quadrant. No abscess is seen.    NIKOLAS MUNOZ MD         SYSTEM ID:  [id number]      Narrative    EXAMINATION: CT ABDOMEN PELVIS W/O CONTRAST, 7/19/2021 9:53 AM    TECHNIQUE:  Helical CT images from the lung bases through the  symphysis pubis were obtained  without IV contrast. Contrast dose:    COMPARISON: none    HISTORY: Diverticulitis, complication  suspected    FINDINGS:    There is a right-sided pleural effusion which is new from previous  examination. There is some increased density seen in both lung bases  right worse than left consistent with atelectasis or pneumonia.    Ascites is seen within the abdomen. This represents a change from  previous examination. The ascites is mild in amount.    The liver is free of masses or biliary ductal enlargement. The  gallbladder is been removed. The spleen and pancreas appear normal.  The adrenal glands are normal. The right left kidneys are free of  masses or hydronephrosis.    Within the peritoneal cavity there are dilated loops of small bowel.  The stomach is distended. This represents a change from previous  examination.    No intra-abdominal abscesses are noted. The edema seen adjacent to the  sigmoid colon was more difficult to evaluate due to the ascites. No  gross change is seen.    The prostate is markedly enlarged. The bladder and rectum are normal.  Degenerative changes are present in the thoracic and lumbar spine.            Impression    IMPRESSION: Small bowel obstruction has developed since July 15, 2021.    Ascites has developed as compared to previous examination.    There is a right-sided pleural effusion possibly related to the  ascites. There is increased density in both lung bases right worse  than left most likely atelectasis however pneumonia cannot be  excluded.    The diverticulitis seen previously is more difficult to evaluate on  today's examination due to the ascites. No abscess is seen.     NIKOLAS MUNOZ MD         SYSTEM ID:  [id number]   XR Chest Port 1 View    Narrative    PROCEDURE:  XR CHEST PORT 1 VIEW    HISTORY:  central line.     COMPARISON:  None.    FINDINGS:   The cardiac silhouette is normal in size. The pulmonary vasculature is  normal.  There is some atelectatic changes seen at the lung bases.  There is a central venous catheter with its tip in the superior vena  cava. There  is a nasogastric tube with its tip overlying the distal  esophagus. No pleural effusion or pneumothorax.      Impression    IMPRESSION:  No complication of central catheter insertion is noted.  The nasogastric tube has its tip in the distal esophagus.      NIKOLAS MUNOZ MD         SYSTEM ID:  [id number]   XR Chest Port 1 View    Narrative    PROCEDURE:  XR CHEST PORT 1 VIEW    HISTORY:  NG tube advanced.     COMPARISON:  None.    FINDINGS:   The nasogastric gastric tube is in advanced and now lies within the  stomach. There is increased density both lung bases unchanged from  previous examination      Impression    IMPRESSION:  Nasogastric tube advanced into the stomach.      NIKOLAS MUNOZ MD         SYSTEM ID:  [id number]       Discharge Medications   Discharge Medication List as of 7/28/2021 11:29 AM      START taking these medications    Details   acetaminophen (TYLENOL) 325 MG tablet Take 2 tablets (650 mg) by mouth every 4 hours as needed for mild pain, fever or headaches, OTC      ciprofloxacin (CIPRO) 250 MG tablet Take 1 tablet (250 mg) by mouth every 12 hours for 7 days, Disp-14 tablet, R-0, E-Prescribe      metroNIDAZOLE (FLAGYL) 250 MG tablet Take 1 tablet (250 mg) by mouth 3 times daily for 7 days, Disp-21 tablet, R-0, E-Prescribe      tamsulosin (FLOMAX) 0.4 MG capsule Take 1 capsule (0.4 mg) by mouth every evening, Disp-30 capsule, R-1, E-Prescribe         CONTINUE these medications which have NOT CHANGED    Details   aspirin (ASA) 325 MG EC tablet Take 162 mg by mouth daily, Historical      famotidine (PEPCID) 20 MG tablet TAKE ONE (1) TABLET BY MOUTH DAILY, Disp-30 tablet, R-4, E-Prescribe      losartan (COZAAR) 50 MG tablet TAKE 1 TABLET (50 MG) BY MOUTH DAILY COZAAR, Disp-30 tablet, R-2, E-PrescribeThis prescription was filled on 5/19/2021. Any refills authorized will be placed on file.      metoprolol succinate ER (TOPROL-XL) 25 MG 24 hr tablet TAKE 1/2 TAB BY MOUTH AT BEDTIME. DO NOT CRUSH OR  CHEW., Disp-45 tablet, R-2, E-Prescribe      simvastatin (ZOCOR) 20 MG tablet Take 1 tablet (20 mg) by mouth At Bedtime, Disp-90 tablet, R-0, E-Prescribe      NITROSTAT 0.4 MG sublingual tablet PLACE 1 TABLET (0.4 MG) UNDER THE TONGUE EVERY 5 MINUTES AS NEEDED, Disp-25 tablet, R-3, E-Prescribe           Allergies   Allergies   Allergen Reactions     Lisinopril Cough     Morphine Other (See Comments) and Visual Disturbance     confusion

## 2021-07-28 NOTE — PLAN OF CARE
Occupational Therapy Discharge Summary    Reason for therapy discharge:    Discharged to transitional care facility.    Progress towards therapy goal(s). See goals on Care Plan in [facility] electronic health record for goal details.  Goals partially met.  Barriers to achieving goals:   discharge from facility.    Therapy recommendation(s):    Continued therapy is recommended.  Rationale/Recommendations:  to improve strength, activity tolerance, and ADLs/IADLs.

## 2021-07-28 NOTE — PLAN OF CARE
"/54   Pulse 72   Temp 98.9  F (37.2  C) (Tympanic)   Resp 18   Ht 1.803 m (5' 11\")   Wt 86.3 kg (190 lb 4.1 oz)   SpO2 97%   BMI 26.54 kg/m  . Bowel sounds audible et active x4, denies nausea or pain, ostomy patent. A/O calls for assist appropriately, pleasant et cooperative with cares. Pt sleeping peacefully, urinated 150ml plus small amt of incontinence d/t urgency. Lung sounds diminished, sats 97% on room air.     "

## 2021-07-28 NOTE — PROGRESS NOTES
07/28/21 1156   Signing Clinician's Name / Credentials   Signing clinician's name / credentials Emma Merino DPT   Quick Adds   Rehab Discipline PT   Therapeutic Activity   Minutes of Treatment 10 minutes   Symptoms Noted During/After Treatment Fatigue   Treatment Detail Pt wanting to ambulate before he discharges today.  Pt transferred sit>stand SBA up to FWW.  Pt ambulated 220' with FWW CGA at good pace with SOB and fatigue noted but steady on feet.  O2 sats 95% with activity.  Pt left sitting in chair with clip alarm on and call light in reach.   PT Discharge Planning    PT Discharge Recommendation (DC Rec) Transitional Care Facility   PT Rationale for DC Rec continued weakness, deconditioning, and decreased independence with mobility.  Would benefit from short term rehab upon discharge   PT Brief overview of current status  Pt wanting to ambulate before he discharges today.  Pt transferred sit>stand SBA up to FWW.  Pt ambulated 220' with FWW CGA at good pace with SOB and fatigue noted but steady on feet.  O2 sats 95% with activity.  Pt left sitting in chair with clip alarm on and call light in reach.   Additional Documentation   Rehab Comments continues to demonstrate fatigue but participation improving   PT Plan Pt set to DC today at 12   Total Session Time   Total Session Time (minutes) 10 minutes

## 2021-08-02 ENCOUNTER — DOCUMENTATION ONLY (OUTPATIENT)
Dept: OTHER | Facility: CLINIC | Age: 86
End: 2021-08-02

## 2021-08-02 NOTE — PATIENT INSTRUCTIONS
Thank you for allowing Lida Gill CNP and our surgical team to participate in your care. Please call our health unit coordinator at [phone] with scheduling questions or the nurse at [phone] with any other questions or concerns.

## 2021-08-03 ENCOUNTER — TRANSFERRED RECORDS (OUTPATIENT)
Dept: HEALTH INFORMATION MANAGEMENT | Facility: CLINIC | Age: 86
End: 2021-08-03

## 2021-08-03 ENCOUNTER — TELEPHONE (OUTPATIENT)
Dept: WOUND CARE | Facility: OTHER | Age: 86
End: 2021-08-03

## 2021-08-05 ENCOUNTER — OFFICE VISIT (OUTPATIENT)
Dept: SURGERY | Facility: OTHER | Age: 86
End: 2021-08-05
Attending: NURSE PRACTITIONER
Payer: MEDICARE

## 2021-08-05 VITALS
BODY MASS INDEX: 26.58 KG/M2 | HEART RATE: 68 BPM | OXYGEN SATURATION: 97 % | WEIGHT: 190.6 LBS | TEMPERATURE: 98.4 F | DIASTOLIC BLOOD PRESSURE: 58 MMHG | SYSTOLIC BLOOD PRESSURE: 100 MMHG

## 2021-08-05 DIAGNOSIS — Z90.49 STATUS POST APPENDECTOMY: ICD-10-CM

## 2021-08-05 DIAGNOSIS — Z90.49 STATUS POST COLECTOMY: Primary | ICD-10-CM

## 2021-08-05 PROCEDURE — 99024 POSTOP FOLLOW-UP VISIT: CPT | Performed by: NURSE PRACTITIONER

## 2021-08-05 PROCEDURE — G0463 HOSPITAL OUTPT CLINIC VISIT: HCPCS

## 2021-08-05 RX ORDER — ASPIRIN 81 MG/1
40 TABLET, DELAYED RELEASE ORAL DAILY
COMMUNITY
Start: 2021-07-28 | End: 2023-11-15

## 2021-08-05 ASSESSMENT — PAIN SCALES - GENERAL: PAINLEVEL: NO PAIN (0)

## 2021-08-05 NOTE — NURSING NOTE
"Chief Complaint   Patient presents with     RECHECK     7/19 post op colostomy care (exploratory laparatomy, sigmoid colectomy)        Initial /58   Pulse 68   Temp 98.4  F (36.9  C) (Tympanic)   Wt 86.5 kg (190 lb 9.6 oz)   SpO2 97%   BMI 26.58 kg/m   Estimated body mass index is 26.58 kg/m  as calculated from the following:    Height as of 7/19/21: 1.803 m (5' 11\").    Weight as of this encounter: 86.5 kg (190 lb 9.6 oz).  Medication Reconciliation: complete  Taylor Ordonez LPN    "

## 2021-08-05 NOTE — PROGRESS NOTES
CLINIC NOTE - POST-OP SURGERY  8/5/2021    Patient:James Grant    Procedure: Exploratory laparotomy, sigmoid colectomy with Walker's procedure and creation of end colostomy, appendectomy    This is a 89 year old male who is 2 weeks s/p Exploratory laparotomy, sigmoid colectomy with Walker's procedure and creation of end colostomy, appendectomy.  The patient has no complaints today.  He is accompanied to this appointment by family.  He is currently residing at [facility] for rehab.  He is eating, voiding, and stooling without problems.    Current Medications:  Current Outpatient Medications   Medication Sig Dispense Refill     acetaminophen (TYLENOL) 325 MG tablet Take 2 tablets (650 mg) by mouth every 4 hours as needed for mild pain, fever or headaches       famotidine (PEPCID) 20 MG tablet TAKE ONE (1) TABLET BY MOUTH DAILY 30 tablet 4     losartan (COZAAR) 50 MG tablet TAKE 1 TABLET (50 MG) BY MOUTH DAILY COZAAR 30 tablet 2     metoprolol succinate ER (TOPROL-XL) 25 MG 24 hr tablet TAKE 1/2 TAB BY MOUTH AT BEDTIME. DO NOT CRUSH OR CHEW. 45 tablet 2     NITROSTAT 0.4 MG sublingual tablet PLACE 1 TABLET (0.4 MG) UNDER THE TONGUE EVERY 5 MINUTES AS NEEDED 25 tablet 3     simvastatin (ZOCOR) 20 MG tablet Take 1 tablet (20 mg) by mouth At Bedtime 90 tablet 0     SM ASPIRIN ADULT LOW STRENGTH 81 MG EC tablet Take 162 mg by mouth daily       tamsulosin (FLOMAX) 0.4 MG capsule Take 1 capsule (0.4 mg) by mouth every evening 30 capsule 1       Allergies:  Allergies   Allergen Reactions     Lisinopril Cough     Morphine Other (See Comments) and Visual Disturbance     confusion       PHYSICAL EXAM:   Vital signs: /58   Pulse 68   Temp 98.4  F (36.9  C) (Tympanic)   Wt 86.5 kg (190 lb 9.6 oz)   SpO2 97%   BMI 26.58 kg/m     BMI: Body mass index is 26.58 kg/m .   General: Normal, healthy, cooperative, in no acute distress, alert   Lungs: respirations are non-labored   Abdominal: non-distended, soft, stoma  with healthy red tissue noted; stool and flatus are noted in ostomy pouch; EMMANUELLE drain intact with serosanguineous drainage   Incision: Staples intact to midline incision       PATHOLOGY:  A.  Colon, descending/sigmoid, resection-Diverticulosis with focal diverticulitis, no evidence of malignancy    B.  Appendix, resection-Mild acute and chronic appendicitis with periappendicitis (see description)    A.  Microscopic performed    B.  Sections of appendix show a mild mixed acute and chronic inflammatory cell infiltrate within the appendiceal wall, associated with focally marked acute inflammatory change involving the periappendiceal soft tissue.  A rare appendiceal crypt exhibits a crypt abscess.  The possibility of a external source of inflammatory change secondarily involving the appendix cannot be excluded.    ASSESSMENT:    89 year old male who is 2 weeks s/p Exploratory laparotomy, sigmoid colectomy with Walker's procedure and creation of end colostomy, appendectomy.  Doing well.     PLAN:   Over half of the staples were removed from the midline incisional site today.  EMMANUELLE drain was removed without problems.  Former EMMANUELLE drain site is to be treated with gauze or a bandage changed daily.      Follow-up 1 week.  Sooner with problems/concerns.

## 2021-08-09 ENCOUNTER — OFFICE VISIT (OUTPATIENT)
Dept: SURGERY | Facility: OTHER | Age: 86
End: 2021-08-09
Attending: NURSE PRACTITIONER
Payer: MEDICARE

## 2021-08-09 VITALS
SYSTOLIC BLOOD PRESSURE: 132 MMHG | DIASTOLIC BLOOD PRESSURE: 68 MMHG | TEMPERATURE: 98.8 F | OXYGEN SATURATION: 98 % | HEART RATE: 84 BPM | HEIGHT: 71 IN | WEIGHT: 190 LBS | BODY MASS INDEX: 26.6 KG/M2

## 2021-08-09 DIAGNOSIS — Z90.49 STATUS POST APPENDECTOMY: ICD-10-CM

## 2021-08-09 DIAGNOSIS — Z90.49 STATUS POST COLECTOMY: Primary | ICD-10-CM

## 2021-08-09 PROCEDURE — 99024 POSTOP FOLLOW-UP VISIT: CPT | Performed by: NURSE PRACTITIONER

## 2021-08-09 PROCEDURE — G0463 HOSPITAL OUTPT CLINIC VISIT: HCPCS

## 2021-08-09 ASSESSMENT — MIFFLIN-ST. JEOR: SCORE: 1523.96

## 2021-08-09 ASSESSMENT — PAIN SCALES - GENERAL: PAINLEVEL: NO PAIN (0)

## 2021-08-09 NOTE — NURSING NOTE
"Chief Complaint   Patient presents with     RECHECK     s/p-exploratory laparatomy, sigmoid colectomy 7/19/21- buldge at ostomy site, stool coming through rectum       Initial /68 (BP Location: Right arm, Patient Position: Chair, Cuff Size: Adult Large)   Pulse 84   Temp 98.8  F (37.1  C) (Tympanic)   Ht 1.803 m (5' 11\")   Wt 86.2 kg (190 lb)   SpO2 98%   BMI 26.50 kg/m   Estimated body mass index is 26.5 kg/m  as calculated from the following:    Height as of this encounter: 1.803 m (5' 11\").    Weight as of this encounter: 86.2 kg (190 lb).  Medication Reconciliation: complete  TITO HULL LPN    "

## 2021-08-09 NOTE — PROGRESS NOTES
"CLINIC NOTE - POST-OP SURGERY  8/9/2021    Patient:James Grant    Procedure: Exploratory laparotomy, sigmoid colectomy with Walker's procedure and creation of end colostomy, appendectomy    This is a 89 year old male who is 3 weeks s/p Exploratory laparotomy, sigmoid colectomy with Walker's procedure and creation of end colostomy, appendectomy.  He is accompanied to this appointment by family.  He is currently residing at [facility] for rehab.  He is eating, voiding, and stooling without problems.  Saturday it was noted that he had two small BMs per his rectum which he and his family are concerned about.    Current Medications:  Current Outpatient Medications   Medication Sig Dispense Refill     acetaminophen (TYLENOL) 325 MG tablet Take 2 tablets (650 mg) by mouth every 4 hours as needed for mild pain, fever or headaches       famotidine (PEPCID) 20 MG tablet TAKE ONE (1) TABLET BY MOUTH DAILY 30 tablet 4     losartan (COZAAR) 50 MG tablet TAKE 1 TABLET (50 MG) BY MOUTH DAILY COZAAR 30 tablet 2     metoprolol succinate ER (TOPROL-XL) 25 MG 24 hr tablet TAKE 1/2 TAB BY MOUTH AT BEDTIME. DO NOT CRUSH OR CHEW. 45 tablet 2     NITROSTAT 0.4 MG sublingual tablet PLACE 1 TABLET (0.4 MG) UNDER THE TONGUE EVERY 5 MINUTES AS NEEDED 25 tablet 3     simvastatin (ZOCOR) 20 MG tablet Take 1 tablet (20 mg) by mouth At Bedtime 90 tablet 0     SM ASPIRIN ADULT LOW STRENGTH 81 MG EC tablet Take 162 mg by mouth daily       tamsulosin (FLOMAX) 0.4 MG capsule Take 1 capsule (0.4 mg) by mouth every evening 30 capsule 1       Allergies:  Allergies   Allergen Reactions     Lisinopril Cough     Morphine Other (See Comments) and Visual Disturbance     confusion       PHYSICAL EXAM:   Vital signs: /68 (BP Location: Right arm, Patient Position: Chair, Cuff Size: Adult Large)   Pulse 84   Temp 98.8  F (37.1  C) (Tympanic)   Ht 1.803 m (5' 11\")   Wt 86.2 kg (190 lb)   SpO2 98%   BMI 26.50 kg/m     BMI: Body mass index " is 26.5 kg/m .   General: Normal, healthy, cooperative, in no acute distress, alert   Lungs: respirations are non-labored   Abdominal: non-distended, soft, small amount of swelling above the stoma possibly comparable with the start of a parstoamal hernia; stoma with healthy red tissue noted; stool and flatus are noted in ostomy pouch; Former EMMANUELLE drain site is scabbed   Incision: Staples intact to midline incision       PATHOLOGY:  A.  Colon, descending/sigmoid, resection-Diverticulosis with focal diverticulitis, no evidence of malignancy    B.  Appendix, resection-Mild acute and chronic appendicitis with periappendicitis (see description)    A.  Microscopic performed    B.  Sections of appendix show a mild mixed acute and chronic inflammatory cell infiltrate within the appendiceal wall, associated with focally marked acute inflammatory change involving the periappendiceal soft tissue.  A rare appendiceal crypt exhibits a crypt abscess.  The possibility of a external source of inflammatory change secondarily involving the appendix cannot be excluded.    ASSESSMENT:    89 year old male who is 3 weeks s/p Exploratory laparotomy, sigmoid colectomy with Walker's procedure and creation of end colostomy, appendectomy.      PLAN:   Patient and family was given reassurance that some stool per rectum is normal.  Dermatitis is noted around stomal site--will order stomal powder for this .  Remaining staples were removed from incisional site.  Patient is to follow up in 2 weeks for a recheck.  Sooner with problems/concerns.

## 2021-08-10 ENCOUNTER — TELEPHONE (OUTPATIENT)
Dept: SURGERY | Facility: OTHER | Age: 86
End: 2021-08-10

## 2021-08-10 DIAGNOSIS — Z90.49 STATUS POST COLECTOMY: Primary | ICD-10-CM

## 2021-08-10 DIAGNOSIS — K59.00 CONSTIPATION, UNSPECIFIED CONSTIPATION TYPE: Primary | ICD-10-CM

## 2021-08-10 DIAGNOSIS — Z87.19 HISTORY OF DIVERTICULITIS: ICD-10-CM

## 2021-08-10 RX ORDER — DOCUSATE SODIUM 100 MG/1
100 CAPSULE, LIQUID FILLED ORAL 2 TIMES DAILY
Qty: 60 CAPSULE | Refills: 3 | Status: SHIPPED | OUTPATIENT
Start: 2021-08-10 | End: 2022-03-25

## 2021-08-10 NOTE — TELEPHONE ENCOUNTER
Nursing Supervisor of [facility] returned call stating that Patient will not be released from the Nursing Home until closer to the end of this week. There will be more teaching for him to learn how to take care of and change his ostomy appliance. Also states that patient is completely independent with all ADL's, therefore Medicare is not covering long term stay at a care facility. Nursing supervisor also stated that the patients family is pushing for his release to move back home and that home health nurse visits will be in place before patient gets discharged from [facility]

## 2021-08-10 NOTE — TELEPHONE ENCOUNTER
Received fax from [facility] stating patient has some constipation. Order was faxed to pharmacy for stool softener.  Order sent for stoma powder to [facility].    Spoke to [facility] Nursing Supervisor 14:45 to get results for CBC lab draw but they were not drawn yet.  Will monitor for results from lab   within normal limits

## 2021-08-10 NOTE — PROGRESS NOTES
Called [facility] and spoke to Nurse about patients stoma site being extremely raw and to ask how often his ostomy is being changed. Nurse states per physician order it should be changed twice a week but has been changed more frequently due to leaking. Ostomy bag was changed here in clinic on 8/9 and again same day at Nursing home. The Nurse also states that patient is being discharged from the Nursing Home on 8/11 and states that he will be living independently with home care set up d/t him being unable to change ostomy bag independently.  Several messages left with [facility] Nursing supervisor to return call,    Lab order faxed to [facility] for CBC draw.

## 2021-08-12 ENCOUNTER — NURSING HOME VISIT (OUTPATIENT)
Dept: FAMILY MEDICINE | Facility: OTHER | Age: 86
End: 2021-08-12
Attending: FAMILY MEDICINE
Payer: MEDICARE

## 2021-08-12 DIAGNOSIS — K57.21 DIVERTICULITIS OF LARGE INTESTINE WITH PERFORATION AND ABSCESS WITH BLEEDING: Primary | ICD-10-CM

## 2021-08-12 DIAGNOSIS — E78.2 MIXED HYPERLIPIDEMIA: ICD-10-CM

## 2021-08-12 DIAGNOSIS — I87.2 VENOUS STASIS DERMATITIS OF BOTH LOWER EXTREMITIES: ICD-10-CM

## 2021-08-12 DIAGNOSIS — I10 BENIGN ESSENTIAL HYPERTENSION: ICD-10-CM

## 2021-08-12 DIAGNOSIS — Z93.3 S/P COLOSTOMY (H): ICD-10-CM

## 2021-08-12 DIAGNOSIS — Z90.49 STATUS POST COLECTOMY: Primary | ICD-10-CM

## 2021-08-12 DIAGNOSIS — C67.8 MALIGNANT NEOPLASM OF OVERLAPPING SITES OF BLADDER (H): ICD-10-CM

## 2021-08-12 DIAGNOSIS — K21.9 GASTROESOPHAGEAL REFLUX DISEASE WITHOUT ESOPHAGITIS: ICD-10-CM

## 2021-08-12 DIAGNOSIS — I10 ESSENTIAL HYPERTENSION, BENIGN: ICD-10-CM

## 2021-08-12 PROCEDURE — 99304 1ST NF CARE SF/LOW MDM 25: CPT | Performed by: FAMILY MEDICINE

## 2021-08-16 ENCOUNTER — TELEPHONE (OUTPATIENT)
Dept: UROLOGY | Facility: OTHER | Age: 86
End: 2021-08-16

## 2021-08-16 NOTE — TELEPHONE ENCOUNTER
Patient is due in October for a 6 month follow up with a cysto. I have left several messages and patient has not returned my calls.     Letter sent.    Cherie Linder on 8/16/2021 at 1:18 PM

## 2021-08-17 ENCOUNTER — OFFICE VISIT (OUTPATIENT)
Dept: WOUND CARE | Facility: OTHER | Age: 86
End: 2021-08-17
Attending: NURSE PRACTITIONER
Payer: MEDICARE

## 2021-08-17 VITALS
HEART RATE: 69 BPM | TEMPERATURE: 99 F | OXYGEN SATURATION: 96 % | DIASTOLIC BLOOD PRESSURE: 67 MMHG | SYSTOLIC BLOOD PRESSURE: 105 MMHG

## 2021-08-17 DIAGNOSIS — Z43.3 COLOSTOMY CARE (H): ICD-10-CM

## 2021-08-17 PROCEDURE — G0463 HOSPITAL OUTPT CLINIC VISIT: HCPCS | Mod: 25

## 2021-08-17 PROCEDURE — G0463 HOSPITAL OUTPT CLINIC VISIT: HCPCS

## 2021-08-17 PROCEDURE — 99213 OFFICE O/P EST LOW 20 MIN: CPT | Performed by: NURSE PRACTITIONER

## 2021-08-17 RX ORDER — LOSARTAN POTASSIUM 25 MG/1
TABLET ORAL
COMMUNITY
Start: 2021-08-13 | End: 2021-09-21

## 2021-08-17 ASSESSMENT — PAIN SCALES - GENERAL: PAINLEVEL: NO PAIN (0)

## 2021-08-17 NOTE — PATIENT INSTRUCTIONS
Removed old pouch system.   Clean area around stoma with warm water only.    Dry.    Measure and cut the opening of new ostomy pouch to fit  Apply light dusting of stoma powder  Crust with skin barrier prep pad.   Apply barrier ring--either to skin itself or ostomy pouching system  Apply new pouch.   Massage area around stoma for at least 2 minutes.    Apply warm hand over stoma/ostomy pouching system for 3-5 minutes  Change every 3-5 days.      Call if any questions/concerns and/or problems develop ([phone])    Follow up as scheduled.

## 2021-08-17 NOTE — PROGRESS NOTES
SUBJECTIVE:  James Grant, 89 year old, male presents with the following Chief Complaint(s) with HPI to follow:  Chief Complaint   Patient presents with     WOUND CARE        HPI:  Adrian is here today with his son for the reassessment and treatment of colostomy care.   Back story:  Per Dr. Cole discharge summary  Patient is a 89-year-old gentleman presented to the ER on the day of his admission with acute onset of with severe right lower quadrant abdominal pain he was having some constipation and pushing hard and developed a severe pain.  Presented to ER temp was 99.2 sats were normal 99% hemodynamically fine.  White count 14,000 lactic acid normal.  He underwent a CT scan which did show evidence of sigmoid diverticulitis.  He was initially started on Flagyl and Cipro.  Eventually switched over to Zosyn.  The next several days he had a lot more pain.  Remained hemodynamically stable.  Mildly elevated white count no signs of sepsis.  On 7-19 repeat CT scan was done which did show small bowel obstruction and probable abscess.  He was taken to the emergency room by Dr. Chavez.  Underwent an exploratory laparotomy.  Was found to have an abscess he had a sigmoid colectomy with Walker's procedure.  And end colostomy placement and appendectomy.  NG tube was placed.  Over the next several days he actually did relatively well.  He had no fevers.  Oxygenation with required some oxygen for several days but with aggressive pulmonary toilet that came off.  Maintained on antibiotics.  Eventually his NG tube was removed.  However he did not have a lot of output in his ostomy was started on TPN.  Gradually his ostomy did put out he was tolerating a regular diet without problem.  Was very weak.  Labs actually remained quite normal.  He had no fevers then she was switched over to oral antibiotics with Cipro and Flagyl.  Patient was quite weak.  He was able to work with physical therapy.  But is just not felt safe to go home.  He  needed continue training regarding his colostomy use also.  He is DNR/DNI status per his request.  He was eventually transferred to [facility].  We will get PT and OT and training regarding his ostomy.  We will continue with the antibiotics for 7 more days.  He will follow up in the surgery clinic within the next week.  Remarkably he actually did very well for 89-year-old.  He had really no cardiac issues renal issues or pulmonary issues.  There was just some atelectasis but at this point he is on room air.  He is somewhat depressed by his surgery and his weakness and the fact that he does have the colostomy.  Had exploratory laparotomy, sigmoid colectomy with Walker's procedure and creation of end colostomy, appendectomy.    8/5/21: saw Lida Gill NP. 1/2 of staples removed.   8/9/21: saw Lida Gill NP--recommended stomal powder.  Staples removed.    8/17/21: seeing myself    Hasn't started the stomal powder.   Initially, output was paste consistency. Now, it's more liquid--but not watery.    States he's been using a 1 piece, flat [place].   He has a relative that has been helping him change--using the 51 mm  Denies any fevers, chills, and/or malodorous drainage.   PMH: hx diverticulitis of large intestine with perforation and abscess, hx of smoking, denies diabetes  Last A1c  No results found for: A1C      Patient Active Problem List   Diagnosis     SNHL (sensorineural hearing loss)     ETD (eustachian tube dysfunction)     Essential hypertension, benign     Dyslipidemia     GERD (gastroesophageal reflux disease)     CHD (coronary heart disease)     BPH (benign prostatic hyperplasia)     Advance care planning     ACP (advance care planning)     Venous stasis dermatitis of both lower extremities     History of bladder cancer     Malignant neoplasm of overlapping sites of bladder (H)     TIA (transient ischemic attack)     Sigmoid diverticulitis     Diverticulitis of large intestine with abscess      Abdominal pain, right lower quadrant     Diverticulitis of large intestine with perforation and abscess       Past Medical History:   Diagnosis Date     Benign localized hyperplasia of prostate without urinary obstruction and other lower urinary tract symptoms (LUTS) 2010     CHD (coronary heart disease) 2010     Dyslipidemia 2010     GERD (gastroesophageal reflux disease) 2010     HTN (hypertension) 2010     Old myocardial infarction 2010     Other symptoms involving digestive system(787.99) 10/20/2006       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       CHOLECYSTECTOMY       CYSTOSCOPY, FULGURATE BLEEDERS, EVACUATE CLOT(S), COMBINED N/A 2020    Procedure: Clot Evacuation;  Surgeon: Andrzej Olivia MD;  Location:  OR     CYSTOSCOPY, RETROGRADES, COMBINED Bilateral 10/22/2019    Procedure: Bilateral Retrograde Pyelograms;  Surgeon: Andrzej Olivia MD;  Location:  OR     CYSTOSCOPY, TRANSURETHRAL RESECTION (TUR) TUMOR BLADDER, COMBINED N/A 10/22/2019    Procedure: Transurethral Resection of Bladder Tumor and transurethral resection of prostate and bladder stone removal;  Surgeon: Andrzej Olivia MD;  Location:  OR     HERNIA REPAIR       LAPAROTOMY EXPLORATORY N/A 2021    Procedure: Exploratory  laparotomy with sigmoid colectomy and creation of colostomy, appendectomy;  Surgeon: Sukumar Chavez MD;  Location: [facility] OR     left arm surgery         Family History   Problem Relation Age of Onset     Heart Failure Mother 86        Congestive - Cause of Death     Cerebrovascular Disease Father      C.A.D. Sister 91       Social History     Tobacco Use     Smoking status: Former Smoker     Packs/day: 1.00     Years: 13.00     Pack years: 13.00     Types: Cigarettes     Start date: 1949     Quit date: 1962     Years since quittin.0     Smokeless tobacco: Never Used   Substance Use Topics     Alcohol use: Not  Currently     Comment: 3 Drinks (BEER & LIQUOR) ~ OCCASIONALLY (QUIT IN 2000)       Current Outpatient Medications   Medication Sig Dispense Refill     acetaminophen (TYLENOL) 325 MG tablet Take 2 tablets (650 mg) by mouth every 4 hours as needed for mild pain, fever or headaches       docusate sodium (COLACE) 100 MG capsule Take 1 capsule (100 mg) by mouth 2 times daily 60 capsule 3     famotidine (PEPCID) 20 MG tablet TAKE ONE (1) TABLET BY MOUTH DAILY 30 tablet 4     losartan (COZAAR) 50 MG tablet TAKE 1 TABLET (50 MG) BY MOUTH DAILY COZAAR 30 tablet 2     metoprolol succinate ER (TOPROL-XL) 25 MG 24 hr tablet TAKE 1/2 TAB BY MOUTH AT BEDTIME. DO NOT CRUSH OR CHEW. 45 tablet 2     NITROSTAT 0.4 MG sublingual tablet PLACE 1 TABLET (0.4 MG) UNDER THE TONGUE EVERY 5 MINUTES AS NEEDED 25 tablet 3     simvastatin (ZOCOR) 20 MG tablet Take 1 tablet (20 mg) by mouth At Bedtime 90 tablet 0     SM ASPIRIN ADULT LOW STRENGTH 81 MG EC tablet Take 162 mg by mouth daily       tamsulosin (FLOMAX) 0.4 MG capsule Take 1 capsule (0.4 mg) by mouth every evening 30 capsule 1     losartan (COZAAR) 25 MG tablet TAKE ONE TABLET BY MOUTH EVERY DAY IN THE MORNING         Allergies   Allergen Reactions     Lisinopril Cough     Morphine Other (See Comments) and Visual Disturbance     confusion       REVIEW OF SYSTEMS  Skin: as stated above  Eyes: negative  Ears/Nose/Throat: negative, [place]  Respiratory: No shortness of breath, dyspnea on exertion, cough, or hemoptysis  Cardiovascular: negative  Gastrointestinal: colostomy  Genitourinary: negative, hx of bladder cancer  Musculoskeletal: improving with rehab  Neurologic: negative  Psychiatric: negative  Hematologic/Lymphatic/Immunologic: negative  Endocrine: negative    OBJECTIVE:  /67   Pulse 69   Temp 99  F (37.2  C)   SpO2 96%   Constitutional: alert and no distress  Respiratory:  Good diaphragmatic excursion.   Musculoskeletal: extremities normal- no gross deformities  noted  Skin:   Stoma Type: colostomy  Location:Q  Mucosa: pink, moist  Stoma Size: side to side: 44 mm; up and down: 38 mm  Height: 1 cm  Mucocutaneous Juncture: intact, sutures still present  Peristomal Skin: irritation and areas of maceration  Output: light brown, looser  Deviations from normal: as noted above    Pouching system upon arrival: 1 piece, flat [place], barrier ring    Pouching system applied: stomal powder, crusted, 1 piece, flat [place], barrier ring     Picture:  Didn't save    Psychiatric: mentation appears normal and affect normal/bright      LABS  No results found for any visits on 08/17/21.    ASSESSMENT / PLAN:  (Z43.3) Colostomy care (H)  Comment: noted  Plan:   Education focused on how to use stoma powder and crust it.   Education on ways to help with adherence of pouching      For now, continue to use his 1 piece flat from [place] (8931) with barrier ring (8805)    He might benefit from convexity.      Follow up  1 week    Treatment goal:  Powder to help with skin   Adherence of pouching system      Patient Instructions   Removed old pouch system.   Clean area around stoma with warm water only.    Dry.    Measure and cut the opening of new ostomy pouch to fit  Apply light dusting of stoma powder  Crust with skin barrier prep pad.   Apply barrier ring--either to skin itself or ostomy pouching system  Apply new pouch.   Massage area around stoma for at least 2 minutes.    Apply warm hand over stoma/ostomy pouching system for 3-5 minutes  Change every 3-5 days.      Call if any questions/concerns and/or problems develop ([phone])    Follow up as scheduled.          Time: 35 min  Barrier: none  Willingness to learn: accepting    Paulina ANDUJAR [facility]  Diabetes and Wound Care    Cc: Dr. Flowers

## 2021-08-17 NOTE — NURSING NOTE
"Chief Complaint   Patient presents with     WOUND CARE       Initial /67   Pulse 69   Temp 99  F (37.2  C)   SpO2 96%  Estimated body mass index is 26.5 kg/m  as calculated from the following:    Height as of 8/9/21: 1.803 m (5' 11\").    Weight as of 8/9/21: 86.2 kg (190 lb).  Medication Reconciliation: complete  Marilyn Santo MA  "

## 2021-08-18 VITALS
WEIGHT: 190.6 LBS | SYSTOLIC BLOOD PRESSURE: 100 MMHG | HEART RATE: 79 BPM | HEIGHT: 71 IN | OXYGEN SATURATION: 98 % | BODY MASS INDEX: 26.68 KG/M2 | DIASTOLIC BLOOD PRESSURE: 59 MMHG | TEMPERATURE: 98.3 F | RESPIRATION RATE: 19 BRPM

## 2021-08-18 ASSESSMENT — MIFFLIN-ST. JEOR: SCORE: 1526.69

## 2021-08-18 ASSESSMENT — PAIN SCALES - GENERAL: PAINLEVEL: NO PAIN (0)

## 2021-08-19 RX ORDER — LOSARTAN POTASSIUM 50 MG/1
TABLET ORAL
Qty: 30 TABLET | Refills: 2 | COMMUNITY
Start: 2021-08-19 | End: 2021-09-14 | Stop reason: DRUGHIGH

## 2021-08-19 NOTE — PROGRESS NOTES
HISTORY OF PRESENT ILLNESS:      James is a 89 year old male (3/5/1927)  resident of [facility]  who is being seen today for and initial TCU visit.     Patient has a history of TIA, HTN, HLD, Bladder Cancer.     Patient was admitted to this facility after hospitalization for sigmoid diverticulitis with abscess formation. Was treated operatively with sigmoid colectomy and end colostomy placement. Due to decreased functional status, colostomy, pt was transferred to TCU for further cares. He plans on returning to his home tomorrow.     Discussed with nursing staff who have the following concerns:  None.      Patient is seen in their room. Family is not present. He voices no concerns, eager to get home. Feeling well. No pain. Becoming more comfortable with ostomy cares.     Current medications, allergies, and interdisciplinary care plan are reviewed.    Patient Active Problem List    Diagnosis Date Noted     Diverticulitis of large intestine with perforation and abscess 07/27/2021     Priority: Medium     Sigmoid diverticulitis 07/15/2021     Priority: Medium     Diverticulitis of large intestine with abscess 07/15/2021     Priority: Medium     Abdominal pain, right lower quadrant 07/15/2021     Priority: Medium     Added automatically from request for surgery [id number]       TIA (transient ischemic attack) 11/30/2020     Priority: Medium     Malignant neoplasm of overlapping sites of bladder (H) 11/22/2019     Priority: Medium     History of bladder cancer 10/28/2019     Priority: Medium     Venous stasis dermatitis of both lower extremities 03/16/2017     Priority: Medium     ACP (advance care planning) 06/06/2016     Priority: Medium     Advance Care Planning 6/6/2016: ACP Review of Chart / Resources Provided:  Reviewed chart for advance care plan.  James Grant has no plan or code status on file. Discussed available resources and provided with information. Confirmed code status reflects current choices  pending further ACP discussions.  Confirmed/documented legally designated decision makers.  Added by Yani Anderson             Advance care planning 2016     Priority: Medium     Advance Care Planning 2016: Receipt of ACP document:  Received: POLST which was signed and dated by provider on 16.  Document previously scanned on 16.  Order reviewed and found to be valid.  Code Status reflects choices in most recent ACP document.  Confirmed/documented designated decision maker(s).  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  Angioplasty and stenting X 2 RCA  2010 angioplasty and stenting (LIDIA) LAD       Essential hypertension, benign 2010     Priority: Medium     Overview:   IMO Update 10/11            Social History     Socioeconomic History     Marital status:      Spouse name: Not on file     Number of children: Not on file     Years of education: Not on file     Highest education level: Not on file   Occupational History     Occupation:  and      Employer: [employer]     Comment: 1987 - RETIRED ([employer])   Tobacco Use     Smoking status: Former Smoker     Packs/day: 1.00     Years: 13.00     Pack years: 13.00     Types: Cigarettes     Start date: 1949     Quit date: 1962     Years since quittin.0     Smokeless tobacco: Never Used   Substance and Sexual Activity     Alcohol use: Not Currently     Comment: 3 Drinks (BEER & LIQUOR) ~ OCCASIONALLY (QUIT IN )     Drug use: No     Sexual activity: Never    Other Topics Concern      Service Not Asked     Blood Transfusions Yes     Comment: PERMITS     Caffeine Concern Yes     Comment: COFFEE - 4 CUPS DAILY     Occupational Exposure Not Asked     Hobby Hazards Not Asked     Sleep Concern Not Asked     Stress Concern Not Asked     Weight Concern Not Asked     Special Diet Not Asked     Back Care Not Asked     Exercise Yes     Comment: WALKING / HOUSEWORK - OCCASIONAL     Bike Helmet Not Asked     Seat Belt Not Asked     Self-Exams Not Asked     Parent/sibling w/ CABG, MI or angioplasty before 65F 55M? No   Social History Narrative     Not on file     Social Determinants of Health     Financial Resource Strain:      Difficulty of Paying Living Expenses:    Food Insecurity:      Worried About Running Out of Food in the Last Year:      Ran Out of Food in the Last Year:    Transportation Needs:      Lack of Transportation (Medical):      Lack of Transportation (Non-Medical):    Physical Activity:      Days of Exercise per Week:      Minutes of Exercise per Session:    Stress:      Feeling of Stress :    Social Connections:      Frequency of Communication with Friends and Family:      Frequency of Social Gatherings with Friends and Family:      Attends Church Services:      Active Member of Clubs or Organizations:      Attends Club or Organization Meetings:      Marital Status:    Intimate Partner Violence:      Fear of Current or Ex-Partner:      Emotionally Abused:      Physically Abused:      Sexually Abused:         Current Outpatient Medications   Medication Sig     acetaminophen (TYLENOL) 325 MG tablet Take 2 tablets (650 mg) by mouth every 4 hours as needed for mild pain, fever or headaches     docusate sodium (COLACE) 100 MG capsule Take 1 capsule (100 mg) by mouth 2 times daily     famotidine (PEPCID) 20 MG tablet TAKE ONE (1) TABLET BY MOUTH DAILY     losartan (COZAAR) 25 MG tablet TAKE ONE TABLET BY MOUTH EVERY DAY IN THE MORNING     losartan (COZAAR)  "50 MG tablet TAKE 1 TABLET (50 MG) BY MOUTH DAILY COZAAR     metoprolol succinate ER (TOPROL-XL) 25 MG 24 hr tablet TAKE 1/2 TAB BY MOUTH AT BEDTIME. DO NOT CRUSH OR CHEW.     NITROSTAT 0.4 MG sublingual tablet PLACE 1 TABLET (0.4 MG) UNDER THE TONGUE EVERY 5 MINUTES AS NEEDED     simvastatin (ZOCOR) 20 MG tablet Take 1 tablet (20 mg) by mouth At Bedtime     SM ASPIRIN ADULT LOW STRENGTH 81 MG EC tablet Take 162 mg by mouth daily     tamsulosin (FLOMAX) 0.4 MG capsule Take 1 capsule (0.4 mg) by mouth every evening     No current facility-administered medications for this visit.       Allergies   Allergen Reactions     Lisinopril Cough     Morphine Other (See Comments) and Visual Disturbance     confusion       I have reviewed the care plan and do agree with the plan.    ROS:  No chest pain, shortness of breath, fever, chills, headache, nausea, vomiting, dysuria, or changes in bowel habits.  Appetite is good.  No pain noted.    OBJECTIVE:  /59   Pulse 79   Temp 98.3  F (36.8  C) (Tympanic)   Resp 19   Ht 1.803 m (5' 11\")   Wt 86.5 kg (190 lb 9.6 oz)   SpO2 98%   BMI 26.58 kg/m      GENERAL:  Alert, and in no acute distress  RESP:  Lungs clear.  No rales, rhonchi, or wheezing  CV:  RRR.  S1 S2 with out murmur. No clicks or rubs.  ABD: Soft, non tender, non distended, no organomegaly. BS are normal.   SKIN:  Age-related changes.  No suspicious lesions or rashes.  PSYCH:  Affect is bright.   NEURO: Mental status is alert. Memory is grossly intact to recent events>   EXTREM:  1+ edema.    Lab/Diagnostic data:    Reviewed in Epic    ASSESSMENT/ORDERS:  James was seen today for nursing home regulatory.    Diagnoses and all orders for this visit:    Diverticulitis of large intestine with perforation and abscess with bleeding / S/P colostomy (H)  Pt doing well with PT/OT. Plan for discharge home with family supports. Pt and family are in agreement.     Essential hypertension, " benign  Controlled    Dyslipidemia  Statin continued, could consider discharge with age.     GERD (gastroesophageal reflux disease)  Asymptomatic, not on ppi    Venous stasis dermatitis of both lower extremities  Pt is edematous today, btu this is improving as he is more ambulatory. Monitor with follow up.     Malignant neoplasm of overlapping sites of bladder (H)  Undergoing surveillance. Urinating well, continue flomax    Total time spent with patient visit was 25 min including patient visit, review of pertinent clinical information, and treatment plan.    Erlinda Dhaliwal MD

## 2021-08-24 ENCOUNTER — OFFICE VISIT (OUTPATIENT)
Dept: SURGERY | Facility: OTHER | Age: 86
End: 2021-08-24
Attending: SURGERY
Payer: MEDICARE

## 2021-08-24 VITALS
SYSTOLIC BLOOD PRESSURE: 132 MMHG | HEART RATE: 73 BPM | DIASTOLIC BLOOD PRESSURE: 74 MMHG | BODY MASS INDEX: 26.73 KG/M2 | RESPIRATION RATE: 17 BRPM | OXYGEN SATURATION: 98 % | WEIGHT: 190.9 LBS | HEIGHT: 71 IN

## 2021-08-24 DIAGNOSIS — K57.20 PERFORATED DIVERTICULUM OF LARGE INTESTINE: Primary | ICD-10-CM

## 2021-08-24 PROCEDURE — G0463 HOSPITAL OUTPT CLINIC VISIT: HCPCS

## 2021-08-24 PROCEDURE — 99024 POSTOP FOLLOW-UP VISIT: CPT | Performed by: SURGERY

## 2021-08-24 ASSESSMENT — MIFFLIN-ST. JEOR: SCORE: 1528.05

## 2021-08-24 ASSESSMENT — PAIN SCALES - GENERAL: PAINLEVEL: NO PAIN (0)

## 2021-08-24 NOTE — NURSING NOTE
"Chief Complaint   Patient presents with     Surgical Followup     post-op colostomy       Initial /74 (BP Location: Right arm, Patient Position: Chair, Cuff Size: Adult Regular)   Pulse 73   Resp 17   Ht 1.803 m (5' 11\")   Wt 86.6 kg (190 lb 14.4 oz)   SpO2 98%   BMI 26.63 kg/m   Estimated body mass index is 26.63 kg/m  as calculated from the following:    Height as of this encounter: 1.803 m (5' 11\").    Weight as of this encounter: 86.6 kg (190 lb 14.4 oz).  Medication Reconciliation: complete  Cee Otero LPN    "

## 2021-08-24 NOTE — PROGRESS NOTES
"CLINIC NOTE - FOLLOW UP  8/24/2021    Patient:James Grant    Reason for Visit: Follow up from recent surgery for perforated diverticulitis    This is a 89 year old male here for follow up from a recent surgery for perforated diverticulitis. His prior medical records were reviewed.       Of note he does have a colostomy.  He was discharged to home in the nursing home.  He states that he is able to manage his colostomy fairly well.  No real complaints today.    Current Medications:  Current Outpatient Medications   Medication Sig Dispense Refill     acetaminophen (TYLENOL) 325 MG tablet Take 2 tablets (650 mg) by mouth every 4 hours as needed for mild pain, fever or headaches       docusate sodium (COLACE) 100 MG capsule Take 1 capsule (100 mg) by mouth 2 times daily 60 capsule 3     famotidine (PEPCID) 20 MG tablet TAKE ONE (1) TABLET BY MOUTH DAILY 30 tablet 4     losartan (COZAAR) 25 MG tablet TAKE ONE TABLET BY MOUTH EVERY DAY IN THE MORNING       losartan (COZAAR) 50 MG tablet TAKE 1 TABLET (50 MG) BY MOUTH DAILY COZAAR 30 tablet 2     metoprolol succinate ER (TOPROL-XL) 25 MG 24 hr tablet TAKE 1/2 TAB BY MOUTH AT BEDTIME. DO NOT CRUSH OR CHEW. 45 tablet 2     NITROSTAT 0.4 MG sublingual tablet PLACE 1 TABLET (0.4 MG) UNDER THE TONGUE EVERY 5 MINUTES AS NEEDED 25 tablet 3     simvastatin (ZOCOR) 20 MG tablet Take 1 tablet (20 mg) by mouth At Bedtime 90 tablet 0     SM ASPIRIN ADULT LOW STRENGTH 81 MG EC tablet Take 162 mg by mouth daily       tamsulosin (FLOMAX) 0.4 MG capsule Take 1 capsule (0.4 mg) by mouth every evening 30 capsule 1       Allergies:  Allergies   Allergen Reactions     Lisinopril Cough     Morphine Other (See Comments) and Visual Disturbance     confusion     PHYSICAL EXAM:     Vital signs: /74 (BP Location: Right arm, Patient Position: Chair, Cuff Size: Adult Regular)   Pulse 73   Resp 17   Ht 1.803 m (5' 11\")   Wt 86.6 kg (190 lb 14.4 oz)   SpO2 98%   BMI 26.63 kg/m     Weight: " [unfilled]   BMI: Body mass index is 26.63 kg/m .   General: Normal, cooperative, in no acute distress   Skin: no jaundice   HEENT: PERRLA and EOMI   Neck: supple   Abdominal: abdomen is soft without significant tenderness, masses, organomegaly or guarding.  He does have an ostomy in place.  There are some mild granulation tissue surrounding the mucosa but otherwise minimal excoriation of the skin.   Extremities: No cyanosis, clubbing or edema noted bilaterally in Upper and Lower Extremities   Neurological: without deficit    ASSESSMENT:  89 year old male here as follow up from a recent surgery for perforated diverticulitis.  Status post Walker's procedure with end colostomy.    PLAN: We will discharge from surgery clinic from a postoperative point of view.  We will follow-up for care assessing of his ostomy.

## 2021-08-24 NOTE — PATIENT INSTRUCTIONS
Thank you for allowing Dr. Chavez and our surgical team to participate in your care. Please call our health unit coordinator at [phone] with scheduling questions or the nurse at [phone] with any other questions or concerns.      Please continue changing ostomy at home. Check for redness around stoma. Use powder around area to decrease agitation from tape.  If increased redness please call our office as soon as possible so you can be seen.

## 2021-09-14 ENCOUNTER — OFFICE VISIT (OUTPATIENT)
Dept: SURGERY | Facility: OTHER | Age: 86
End: 2021-09-14
Attending: NURSE PRACTITIONER
Payer: MEDICARE

## 2021-09-14 VITALS
HEART RATE: 60 BPM | BODY MASS INDEX: 26.49 KG/M2 | HEIGHT: 71 IN | OXYGEN SATURATION: 97 % | DIASTOLIC BLOOD PRESSURE: 62 MMHG | TEMPERATURE: 97.4 F | WEIGHT: 189.2 LBS | SYSTOLIC BLOOD PRESSURE: 110 MMHG

## 2021-09-14 DIAGNOSIS — Z90.49 STATUS POST COLECTOMY: Primary | ICD-10-CM

## 2021-09-14 PROCEDURE — 99024 POSTOP FOLLOW-UP VISIT: CPT | Performed by: NURSE PRACTITIONER

## 2021-09-14 ASSESSMENT — MIFFLIN-ST. JEOR: SCORE: 1520.34

## 2021-09-14 ASSESSMENT — PAIN SCALES - GENERAL: PAINLEVEL: NO PAIN (0)

## 2021-09-14 NOTE — PATIENT INSTRUCTIONS
"Chief Complaint   Patient presents with     RECHECK     follow-up ostomy       Initial /62   Pulse 60   Temp 97.4  F (36.3  C)   Ht 1.803 m (5' 11\")   Wt 85.8 kg (189 lb 3.2 oz)   SpO2 97%   BMI 26.39 kg/m   Estimated body mass index is 26.39 kg/m  as calculated from the following:    Height as of this encounter: 1.803 m (5' 11\").    Weight as of this encounter: 85.8 kg (189 lb 3.2 oz).  Medication Reconciliation: complete  TAL GARCIA LPN    "

## 2021-09-14 NOTE — PROGRESS NOTES
"CLINIC NOTE - POST-OP SURGERY  9/14/2021    Patient:James Grant    Procedure: Exploratory laparotomy, sigmoid colectomy with Walker's procedure and creation of end colostomy, appendectomy    This is a 89 year old male who is 8 weeks s/p Exploratory laparotomy, sigmoid colectomy with Walker's procedure and creation of end colostomy, appendectomy.  He is accompanied to this appointment by family.  He is eating, voiding, and stooling without problems.  He is at home and doing well he reports with ostomy cares.    Current Medications:  Current Outpatient Medications   Medication Sig Dispense Refill     acetaminophen (TYLENOL) 325 MG tablet Take 2 tablets (650 mg) by mouth every 4 hours as needed for mild pain, fever or headaches       docusate sodium (COLACE) 100 MG capsule Take 1 capsule (100 mg) by mouth 2 times daily 60 capsule 3     famotidine (PEPCID) 20 MG tablet TAKE ONE (1) TABLET BY MOUTH DAILY 30 tablet 4     losartan (COZAAR) 25 MG tablet TAKE ONE TABLET BY MOUTH EVERY DAY IN THE MORNING       metoprolol succinate ER (TOPROL-XL) 25 MG 24 hr tablet TAKE 1/2 TAB BY MOUTH AT BEDTIME. DO NOT CRUSH OR CHEW. 45 tablet 2     simvastatin (ZOCOR) 20 MG tablet Take 1 tablet (20 mg) by mouth At Bedtime 90 tablet 0     SM ASPIRIN ADULT LOW STRENGTH 81 MG EC tablet Take 162 mg by mouth daily       tamsulosin (FLOMAX) 0.4 MG capsule Take 1 capsule (0.4 mg) by mouth every evening 30 capsule 1     NITROSTAT 0.4 MG sublingual tablet PLACE 1 TABLET (0.4 MG) UNDER THE TONGUE EVERY 5 MINUTES AS NEEDED (Patient not taking: Reported on 9/14/2021) 25 tablet 3       Allergies:  Allergies   Allergen Reactions     Lisinopril Cough     Morphine Other (See Comments) and Visual Disturbance     confusion       PHYSICAL EXAM:   Vital signs: /62   Pulse 60   Temp 97.4  F (36.3  C)   Ht 1.803 m (5' 11\")   Wt 85.8 kg (189 lb 3.2 oz)   SpO2 97%   BMI 26.39 kg/m     BMI: Body mass index is 26.39 kg/m .   General: Normal, " healthy, cooperative, in no acute distress, alert   Lungs: respirations are non-labored   Abdominal: non-distended, soft, stoma with healthy red tissue noted; stool and flatus are noted in ostomy pouch   Incision: Well healed     ASSESSMENT:    89 year old male who is 8 weeks s/p Exploratory laparotomy, sigmoid colectomy with Walker's procedure and creation of end colostomy, appendectomy.      PLAN:   Patient to follow up in 6 weeks for a recheck of his stomal and reassessment of how he is doing with stomal cares.  Sooner with problems/concerns.

## 2021-09-20 DIAGNOSIS — I10 ESSENTIAL (PRIMARY) HYPERTENSION: ICD-10-CM

## 2021-09-20 DIAGNOSIS — K21.9 GASTROESOPHAGEAL REFLUX DISEASE WITHOUT ESOPHAGITIS: ICD-10-CM

## 2021-09-20 DIAGNOSIS — I10 BENIGN ESSENTIAL HYPERTENSION: ICD-10-CM

## 2021-09-20 DIAGNOSIS — E78.2 MIXED HYPERLIPIDEMIA: ICD-10-CM

## 2021-09-20 DIAGNOSIS — N40.0 BENIGN NON-NODULAR PROSTATIC HYPERPLASIA WITHOUT LOWER URINARY TRACT SYMPTOMS: ICD-10-CM

## 2021-09-20 NOTE — TELEPHONE ENCOUNTER
Tamsulosin (Flomax) 0.4 mg   Take 1 capsule (0.4 mg) by mouth every evening  Last Written Prescription Date:  7-28-21  Last Fill Quantity: 30 capsule,   # refills: 1  Last Office Visit: 8-12-21  Future Office visit:    Next 5 appointments (look out 90 days)    Oct 26, 2021 11:00 AM  (Arrive by 10:45 AM)  Return Visit with Lida Gill NP  [facility] [address] [address]  [phone]           Famotidine (Pepcid) 20 mg tablet    Take one (1) tablet by mouth daily  Last Written Prescription Date:  7-19-21  Last Fill Quantity: 30 tablet,   # refills: 4  Last Office Visit: 8-12-21  Future Office visit:    Next 5 appointments (look out 90 days)    Oct 26, 2021 11:00 AM  (Arrive by 10:45 AM)  Return Visit with PRATEEK [facility] ([facility] ) [address] [address]  [phone]           Losartan (Cozaar) 25 mg tablet  Take one tablet by mouth every day in the morning  Last Written Prescription Date:  8-13-21  Last Fill Quantity: historical,   # refills: historical  Last Office Visit: 8-12-21  Future Office visit:    Next 5 appointments (look out 90 days)    Oct 26, 2021 11:00 AM  (Arrive by 10:45 AM)  Return Visit with Lida Gill NP  [facility] [address] [address]  [phone]         Metoprolol succinate ER (Toprolol-XL)  25 mg 24 hr tablet  Take 1/2 tab by mouth at bedtime.  Do Not Crush or Chew      Last Written Prescription Date:  1-20-21  Last Fill Quantity: 45 tablet,   # refills: 2  Last Office Visit: 8-12-21  Future Office visit:    Next 5 appointments (look out 90 days)    Oct 26, 2021 11:00 AM  (Arrive by 10:45 AM)  Return Visit with Lida Gill NP  [facility] ([facility] ) [address] [place]  [address]  [phone]           Simvastatin (Zocor) 20 mg tablet  Take 1 tablet (20 mg) by mouth at bedtime    Last Written Prescription Date:  5-20-21  Last Fill Quantity: 90 tablet,   # refills: 0  Last Office Visit: 8-12-21  Future Office visit:    Next 5 appointments (look out 90 days)    Oct 26, 2021 11:00 AM  (Arrive by 10:45 AM)  Return Visit with Lida Gill NP  [facility] ([facility] ) [address]  [place] [address]  [phone]

## 2021-09-21 RX ORDER — METOPROLOL SUCCINATE 25 MG/1
TABLET, EXTENDED RELEASE ORAL
Qty: 15 TABLET | Refills: 0 | Status: SHIPPED | OUTPATIENT
Start: 2021-09-21 | End: 2021-10-21

## 2021-09-21 RX ORDER — TAMSULOSIN HYDROCHLORIDE 0.4 MG/1
CAPSULE ORAL
Qty: 30 CAPSULE | Refills: 0 | Status: SHIPPED | OUTPATIENT
Start: 2021-09-21 | End: 2021-10-21

## 2021-09-21 RX ORDER — LOSARTAN POTASSIUM 25 MG/1
TABLET ORAL
Qty: 30 TABLET | Refills: 0 | Status: SHIPPED | OUTPATIENT
Start: 2021-09-21 | End: 2021-10-21

## 2021-09-21 RX ORDER — SIMVASTATIN 20 MG
TABLET ORAL
Qty: 30 TABLET | Refills: 0 | Status: SHIPPED | OUTPATIENT
Start: 2021-09-21 | End: 2021-10-21

## 2021-09-21 RX ORDER — FAMOTIDINE 20 MG/1
TABLET, FILM COATED ORAL
Qty: 30 TABLET | Refills: 0 | Status: SHIPPED | OUTPATIENT
Start: 2021-09-21 | End: 2021-10-21

## 2021-10-14 ENCOUNTER — OFFICE VISIT (OUTPATIENT)
Dept: OTOLARYNGOLOGY | Facility: OTHER | Age: 86
End: 2021-10-14
Attending: PHYSICIAN ASSISTANT
Payer: MEDICARE

## 2021-10-14 VITALS
TEMPERATURE: 97.5 F | DIASTOLIC BLOOD PRESSURE: 54 MMHG | HEIGHT: 71 IN | OXYGEN SATURATION: 98 % | HEART RATE: 59 BPM | SYSTOLIC BLOOD PRESSURE: 120 MMHG | BODY MASS INDEX: 26.6 KG/M2 | WEIGHT: 190 LBS

## 2021-10-14 DIAGNOSIS — H90.3 SENSORINEURAL HEARING LOSS (SNHL) OF BOTH EARS: ICD-10-CM

## 2021-10-14 DIAGNOSIS — H61.23 EXCESSIVE CERUMEN IN EAR CANAL, BILATERAL: Primary | ICD-10-CM

## 2021-10-14 PROCEDURE — 92504 EAR MICROSCOPY EXAMINATION: CPT | Performed by: PHYSICIAN ASSISTANT

## 2021-10-14 PROCEDURE — 69210 REMOVE IMPACTED EAR WAX UNI: CPT | Performed by: PHYSICIAN ASSISTANT

## 2021-10-14 PROCEDURE — 99212 OFFICE O/P EST SF 10 MIN: CPT | Mod: 25 | Performed by: PHYSICIAN ASSISTANT

## 2021-10-14 PROCEDURE — G0463 HOSPITAL OUTPT CLINIC VISIT: HCPCS

## 2021-10-14 ASSESSMENT — MIFFLIN-ST. JEOR: SCORE: 1523.96

## 2021-10-14 ASSESSMENT — PAIN SCALES - GENERAL: PAINLEVEL: NO PAIN (0)

## 2021-10-14 NOTE — PROGRESS NOTES
"Chief Complaint   Patient presents with     Cerumen Impaction     Pt is here for an ear cleaning.    Adrian returns to ENT for ear cleaning.   He has no otalgia, otorrhea.   He had improvement and recommended q 6 month ear cleaning.      Today, he returns for follow up ear cleaning.   He has felt some left ear drainage and mild itching in right ear   Reports left aid does get waxy discharge on aid.      [facility] and [facility] with [facility] about 3 years [facility].   [facility] HAC.       Audiogram 2/26/18: Mild to profound bilateral SNHL. WRS 80% right and 88% left. SRT 70 dB HL right and 60 dB HL left.   No recent audiogram available.      Past Medical History:   Diagnosis Date     Benign localized hyperplasia of prostate without urinary obstruction and other lower urinary tract symptoms (LUTS) 11/26/2010     CHD (coronary heart disease) 11/26/2010     Dyslipidemia 11/26/2010     GERD (gastroesophageal reflux disease) 11/26/2010     HTN (hypertension) 11/26/2010     Old myocardial infarction 11/26/2010     Other symptoms involving digestive system(787.99) 10/20/2006        Allergies   Allergen Reactions     Lisinopril Cough     Morphine Other (See Comments) and Visual Disturbance     confusion     Current Outpatient Medications   Medication     acetaminophen (TYLENOL) 325 MG tablet     docusate sodium (COLACE) 100 MG capsule     famotidine (PEPCID) 20 MG tablet     losartan (COZAAR) 25 MG tablet     metoprolol succinate ER (TOPROL-XL) 25 MG 24 hr tablet     NITROSTAT 0.4 MG sublingual tablet     simvastatin (ZOCOR) 20 MG tablet     SM ASPIRIN ADULT LOW STRENGTH 81 MG EC tablet     tamsulosin (FLOMAX) 0.4 MG capsule     No current facility-administered medications for this visit.      ROS: /54 (BP Location: Right arm, Cuff Size: Adult Large)   Pulse 59   Temp 97.5  F (36.4  C) (Tympanic)   Ht 1.803 m (5' 11\")   Wt 86.2 kg (190 lb)   SpO2 98%   BMI 26.50 kg/m  "     [place] w/ aids.   General - The patient is well nourished and well developed, and appears to have good nutritional status.  Alert and oriented to person and place, interactive.  Head and Face - Normocephalic and atraumatic, with no gross asymmetry noted of the contour of the facial features.  The facial nerve is intact, with strong symmetric movements.  Neck-no palpable lymphadenopathy or thyroid mass.  Trachea is midline.  Eyes - Extraocular movements intact.   Ears- External ears normal. The ears were examined under microscopy bilaterally. Right EAC with thick cerumen. Ears were cleaned with cupped forceps and cerumen loops. No otorrhea or polypoid changes seen today. Bilateral tympanic membranes are intact without effusion or retraction.   Nose - External nasal contour symmetric. See below for nasal examination.  Mouth - Examination of the oral cavity shows pink, healthy, moist mucosa. Dentition in good condition.  No lesions or ulceration noted. The tongue is mobile and midline.    Throat - The walls of the oropharynx were smooth, pink, moist, symmetric, and had no lesions or ulcerations.  The tonsillar pillars and soft palate were symmetric.      ASSESSMENT:    ICD-10-CM    1. Excessive cerumen in ear canal, bilateral  H61.23    2. Sensorineural hearing loss (SNHL) of both ears  H90.3              He was encouraged to follow up at Audiology at [facility] for repeat hearing test and HA adjustments. He states he will call to schedule.   No recent audiogram >3 years.      Return in 6 months for cleaning. His ears appear stable at this time.   If right ear develops active drainage, may need powder application.      He was happy with this plan.     Lizzeth Harris PA-C  ENT  [facility]

## 2021-10-14 NOTE — LETTER
10/14/2021         RE: James Grant  [address] [facility] [address]        Dear Colleague,    Thank you for referring your patient, James Grant, to the [facility]. Please see a copy of my visit note below.    Chief Complaint   Patient presents with     Cerumen Impaction     Pt is here for an ear cleaning.    Adrian returns to ENT for ear cleaning.   He has no otalgia, otorrhea.   He had improvement and recommended q 6 month ear cleaning.      Today, he returns for follow up ear cleaning.   He has felt some left ear drainage and mild itching in right ear   Reports left aid does get waxy discharge on aid.      [facility] about [facility].   [facility].       Audiogram 2/26/18: Mild to profound bilateral SNHL. WRS 80% right and 88% left. SRT 70 dB HL right and 60 dB HL left.   No recent audiogram available.      Past Medical History:   Diagnosis Date     Benign localized hyperplasia of prostate without urinary obstruction and other lower urinary tract symptoms (LUTS) 11/26/2010     CHD (coronary heart disease) 11/26/2010     Dyslipidemia 11/26/2010     GERD (gastroesophageal reflux disease) 11/26/2010     HTN (hypertension) 11/26/2010     Old myocardial infarction 11/26/2010     Other symptoms involving digestive system(787.99) 10/20/2006        Allergies   Allergen Reactions     Lisinopril Cough     Morphine Other (See Comments) and Visual Disturbance     confusion     Current Outpatient Medications   Medication     acetaminophen (TYLENOL) 325 MG tablet     docusate sodium (COLACE) 100 MG capsule     famotidine (PEPCID) 20 MG tablet     losartan (COZAAR) 25 MG tablet     metoprolol succinate ER (TOPROL-XL) 25 MG 24 hr tablet     NITROSTAT 0.4 MG sublingual tablet     simvastatin (ZOCOR) 20 MG tablet     SM ASPIRIN ADULT LOW STRENGTH 81 MG EC tablet     tamsulosin (FLOMAX) 0.4 MG capsule     No  "current facility-administered medications for this visit.      ROS: /54 (BP Location: Right arm, Cuff Size: Adult Large)   Pulse 59   Temp 97.5  F (36.4  C) (Tympanic)   Ht 1.803 m (5' 11\")   Wt 86.2 kg (190 lb)   SpO2 98%   BMI 26.50 kg/m      [place] w/ aids.   General - The patient is well nourished and well developed, and appears to have good nutritional status.  Alert and oriented to person and place, interactive.  Head and Face - Normocephalic and atraumatic, with no gross asymmetry noted of the contour of the facial features.  The facial nerve is intact, with strong symmetric movements.  Neck-no palpable lymphadenopathy or thyroid mass.  Trachea is midline.  Eyes - Extraocular movements intact.   Ears- External ears normal. The ears were examined under microscopy bilaterally. Right EAC with thick cerumen. Ears were cleaned with cupped forceps and cerumen loops. No otorrhea or polypoid changes seen today. Bilateral tympanic membranes are intact without effusion or retraction.   Nose - External nasal contour symmetric. See below for nasal examination.  Mouth - Examination of the oral cavity shows pink, healthy, moist mucosa. Dentition in good condition.  No lesions or ulceration noted. The tongue is mobile and midline.    Throat - The walls of the oropharynx were smooth, pink, moist, symmetric, and had no lesions or ulcerations.  The tonsillar pillars and soft palate were symmetric.      ASSESSMENT:    ICD-10-CM    1. Excessive cerumen in ear canal, bilateral  H61.23    2. Sensorineural hearing loss (SNHL) of both ears  H90.3              He was encouraged to follow up at Audiology at [facility] for repeat hearing test and HA adjustments. He states he will call to schedule.   No recent audiogram >3 years.      Return in 6 months for cleaning. His ears appear stable at this time.   If right ear develops active drainage, may need powder application.      He was happy with this plan.     Lizzeth Harris, " KEDAR  ENT  [facility], [place]            Again, thank you for allowing me to participate in the care of your patient.        Sincerely,        Lizzeth Harris PA-C

## 2021-10-14 NOTE — NURSING NOTE
"Chief Complaint   Patient presents with     Cerumen Impaction     Pt is here for an ear cleaning.       Initial /54 (BP Location: Right arm, Cuff Size: Adult Large)   Pulse 59   Temp 97.5  F (36.4  C) (Tympanic)   Ht 1.803 m (5' 11\")   Wt 86.2 kg (190 lb)   SpO2 98%   BMI 26.50 kg/m   Estimated body mass index is 26.5 kg/m  as calculated from the following:    Height as of this encounter: 1.803 m (5' 11\").    Weight as of this encounter: 86.2 kg (190 lb).  Medication Reconciliation: complete  Mili Guzman LPN    "

## 2021-10-14 NOTE — PATIENT INSTRUCTIONS
[facility]. Complete [facility]ogram (Hearing test). May need hearing aid adjustments.     Ears were cleaned.   Return in 6 months for cleaning.     Thank you for allowing Lizzeth Harris PA-C and our ENT team to participate in your care.  If your medications are too expensive, please give the nurse a call.  We can possibly change this medication.  If you have a scheduling or an appointment question please contact our Health Unit Coordinator at [phone], Ext. [phone].    ALL nursing questions or concerns can be directed to your ENT nurse at: [phone] Mili

## 2021-10-19 DIAGNOSIS — E78.2 MIXED HYPERLIPIDEMIA: ICD-10-CM

## 2021-10-19 DIAGNOSIS — K21.9 GASTROESOPHAGEAL REFLUX DISEASE WITHOUT ESOPHAGITIS: ICD-10-CM

## 2021-10-19 DIAGNOSIS — I10 ESSENTIAL (PRIMARY) HYPERTENSION: ICD-10-CM

## 2021-10-19 DIAGNOSIS — N40.0 BENIGN NON-NODULAR PROSTATIC HYPERPLASIA WITHOUT LOWER URINARY TRACT SYMPTOMS: ICD-10-CM

## 2021-10-19 DIAGNOSIS — I10 BENIGN ESSENTIAL HYPERTENSION: ICD-10-CM

## 2021-10-21 RX ORDER — SIMVASTATIN 20 MG
TABLET ORAL
Qty: 30 TABLET | Refills: 0 | Status: SHIPPED | OUTPATIENT
Start: 2021-10-21 | End: 2021-11-26

## 2021-10-21 RX ORDER — FAMOTIDINE 20 MG/1
TABLET, FILM COATED ORAL
Qty: 30 TABLET | Refills: 0 | Status: SHIPPED | OUTPATIENT
Start: 2021-10-21 | End: 2021-11-26

## 2021-10-21 RX ORDER — LOSARTAN POTASSIUM 25 MG/1
TABLET ORAL
Qty: 30 TABLET | Refills: 0 | Status: SHIPPED | OUTPATIENT
Start: 2021-10-21 | End: 2021-11-26

## 2021-10-21 RX ORDER — TAMSULOSIN HYDROCHLORIDE 0.4 MG/1
CAPSULE ORAL
Qty: 30 CAPSULE | Refills: 0 | Status: SHIPPED | OUTPATIENT
Start: 2021-10-21 | End: 2021-11-26

## 2021-10-21 RX ORDER — METOPROLOL SUCCINATE 25 MG/1
TABLET, EXTENDED RELEASE ORAL
Qty: 15 TABLET | Refills: 0 | Status: SHIPPED | OUTPATIENT
Start: 2021-10-21 | End: 2021-11-26

## 2021-10-21 NOTE — TELEPHONE ENCOUNTER
Metoprolol  Last Written Prescription Date: 9/21/21  Last Fill Quantity: 15 # of Refills: 0  Last Office Visit: 8/12/21    Pepcid  Last Written Prescription Date: 9/21/21  Last Fill Quantity: 30 # of Refills: 0  Last Office Visit: 8/12/21    Losartan  Last Written Prescription Date: 9/21/21  Last Fill Quantity: 30 # of Refills: 0  Last Office Visit: 8/12/21    Flomax  Last Written Prescription Date: 9/21/21  Last Fill Quantity: 30 # of Refills: 0  Last Office Visit: 8/12/21    Simvastatin  Last Written Prescription Date: 9/21/21  Last Fill Quantity: 30 # of Refills: 0  Last Office Visit: 8/12/21

## 2021-10-26 ENCOUNTER — OFFICE VISIT (OUTPATIENT)
Dept: SURGERY | Facility: OTHER | Age: 86
End: 2021-10-26
Attending: NURSE PRACTITIONER
Payer: MEDICARE

## 2021-10-26 VITALS
HEIGHT: 71 IN | TEMPERATURE: 97.2 F | BODY MASS INDEX: 26.88 KG/M2 | SYSTOLIC BLOOD PRESSURE: 138 MMHG | WEIGHT: 192 LBS | OXYGEN SATURATION: 96 % | HEART RATE: 59 BPM | DIASTOLIC BLOOD PRESSURE: 62 MMHG

## 2021-10-26 DIAGNOSIS — Z90.49 STATUS POST COLECTOMY: Primary | ICD-10-CM

## 2021-10-26 PROCEDURE — G0463 HOSPITAL OUTPT CLINIC VISIT: HCPCS

## 2021-10-26 PROCEDURE — 99212 OFFICE O/P EST SF 10 MIN: CPT | Performed by: NURSE PRACTITIONER

## 2021-10-26 ASSESSMENT — MIFFLIN-ST. JEOR: SCORE: 1533.04

## 2021-10-26 ASSESSMENT — PAIN SCALES - GENERAL: PAINLEVEL: NO PAIN (0)

## 2021-10-26 NOTE — PROGRESS NOTES
"CLINIC NOTE - POST-OP SURGERY  10/26/2021    Patient:James Grant    Procedure: Exploratory laparotomy, sigmoid colectomy with Walker's procedure and creation of end colostomy, appendectomy    This is a 89 year old male who is s/p Exploratory laparotomy, sigmoid colectomy with Walker's procedure and creation of end colostomy, appendectomy.  He is eating, voiding, and stooling without problems.  He is at home and doing well he reports with ostomy cares. He is without complaints.    Current Medications:  Current Outpatient Medications   Medication Sig Dispense Refill     acetaminophen (TYLENOL) 325 MG tablet Take 2 tablets (650 mg) by mouth every 4 hours as needed for mild pain, fever or headaches       docusate sodium (COLACE) 100 MG capsule Take 1 capsule (100 mg) by mouth 2 times daily 60 capsule 3     famotidine (PEPCID) 20 MG tablet TAKE ONE TABLET BY MOUTH EVERY DAY IN THE MORNING 30 tablet 0     losartan (COZAAR) 25 MG tablet TAKE ONE TABLET BY MOUTH EVERY DAY IN THE MORNING 30 tablet 0     metoprolol succinate ER (TOPROL-XL) 25 MG 24 hr tablet TAKE ONE-HALF TABLET BY MOUTH EVERY DAY AT BEDTIME 15 tablet 0     simvastatin (ZOCOR) 20 MG tablet TAKE ONE TABLET BY MOUTH EVERY DAY AT BEDTIME 30 tablet 0     SM ASPIRIN ADULT LOW STRENGTH 81 MG EC tablet Take 162 mg by mouth daily       tamsulosin (FLOMAX) 0.4 MG capsule TAKE ONE CAPSULE BY MOUTH EVERY DAY IN THE EVENING 30 capsule 0     NITROSTAT 0.4 MG sublingual tablet PLACE 1 TABLET (0.4 MG) UNDER THE TONGUE EVERY 5 MINUTES AS NEEDED (Patient not taking: Reported on 10/26/2021) 25 tablet 3       Allergies:  Allergies   Allergen Reactions     Lisinopril Cough     Morphine Other (See Comments) and Visual Disturbance     confusion       PHYSICAL EXAM:   Vital signs: /62   Pulse 59   Temp 97.2  F (36.2  C)   Ht 1.803 m (5' 11\")   Wt 87.1 kg (192 lb)   SpO2 96%   BMI 26.78 kg/m     BMI: Body mass index is 26.78 kg/m .   General: Normal, healthy, " cooperative, in no acute distress, alert   Lungs: respirations are non-labored   Abdominal: non-distended, soft, stoma with healthy red tissue noted; stool and flatus are noted in ostomy pouch   Incision: Well healed     ASSESSMENT:    89 year old male who is s/p Exploratory laparotomy, sigmoid colectomy with Walker's procedure and creation of end colostomy, appendectomy.      PLAN:   Patient to follow up as needed at this time with problems/concerns.

## 2021-10-26 NOTE — NURSING NOTE
"Chief Complaint   Patient presents with     Ostomy     Exploratory laparotomy, sigmoid colectomy with Walker's procedure and creation of end colostomy, appendectomy 7/19/2021       Initial /62   Pulse 59   Temp 97.2  F (36.2  C)   Ht 1.803 m (5' 11\")   Wt 87.1 kg (192 lb)   SpO2 96%   BMI 26.78 kg/m   Estimated body mass index is 26.78 kg/m  as calculated from the following:    Height as of this encounter: 1.803 m (5' 11\").    Weight as of this encounter: 87.1 kg (192 lb).  Medication Reconciliation: complete  TAL GARCIA LPN    "

## 2021-10-26 NOTE — PATIENT INSTRUCTIONS
Thank you for allowing Lida Gill CNP and our surgical team to participate in your care. Please call our health unit coordinator at [phone] with scheduling questions or the nurse at [phone] with any other questions or concerns.

## 2021-10-28 ENCOUNTER — OFFICE VISIT (OUTPATIENT)
Dept: UROLOGY | Facility: OTHER | Age: 86
End: 2021-10-28
Attending: UROLOGY
Payer: MEDICARE

## 2021-10-28 VITALS
WEIGHT: 191.2 LBS | OXYGEN SATURATION: 97 % | BODY MASS INDEX: 26.67 KG/M2 | RESPIRATION RATE: 16 BRPM | HEART RATE: 63 BPM | TEMPERATURE: 97.5 F

## 2021-10-28 DIAGNOSIS — Z85.51 HISTORY OF BLADDER CANCER: Primary | ICD-10-CM

## 2021-10-28 PROCEDURE — G0463 HOSPITAL OUTPT CLINIC VISIT: HCPCS | Mod: 25

## 2021-10-28 PROCEDURE — 52000 CYSTOURETHROSCOPY: CPT | Performed by: UROLOGY

## 2021-10-28 ASSESSMENT — PAIN SCALES - GENERAL: PAINLEVEL: NO PAIN (0)

## 2021-10-28 NOTE — PATIENT INSTRUCTIONS

## 2021-10-28 NOTE — PROGRESS NOTES
Preprocedure diagnosis  History of bladder cancer    Postprocedure diagnosis  History of bladder cancer    Procedure  Flexible Cystourethroscopy    Surgeon  Andrzej Olivia MD    Anesthesia  2% lidocaine jelly intraurethrally    Complications  None    Indications  The patient is undergoing a flexible cystoscopy for the above mentioned indications.    Findings  Cystoscopic findings included a normal anterior urethra.    There was not a prominent median lobe.    The lateral lobes were not obstructive in appearance.  The bladder appeared to be normal capacity.    There were no tumors, stones or foreign bodies.    The orifices were slit-shaped and in their normal location.    Procedure  The patient was placed in supine position and prepped and draped in sterile fashion with lidocaine jelly per urethra for anesthesia.    I passed a lubricated 14F flexible cystoscope through the penile urethra and into the bladder and the bladder was completely visualized.  The cystoscope was retroflexed and the bladder neck and prostate visualized.    The cystoscope was slowly withdrawn while visualizing the urethra and the procedure terminated.    The patient tolerated the procedure well.      Pathology  I personally reviewed the pathology report  10/22/2019  Low grade Ta (muscularis propria was present and uninvolved in specimen)    Plan  Follow up for surveillance cystoscopy once annually.

## 2021-10-28 NOTE — NURSING NOTE
Patient positioned in supine position, perineum area prepped with chlorhexidene Gluconate and patient draped per sterile technique. Per verbal order read back by Andrzej Olivia MD, Urojet 10mL 2% lidocaine jelly to be instilled into urethra.  Urojet- 10ml 2% Lidocaine jelly instilled into the urethra.    Urojet 2%  Lot#: [id number]  Expiration date: 03/2023  : [employer]  NDC: [address]-1    [place] Protocol    A. Pre-procedure verification complete Yes  1-relevant information / documentation available, reviewed and properly matched to the patient; 2-consent accurate and complete, 3-equipment and supplies available    B. Site marking complete N/A  Site marked if not in continuous attendance with patient    C. TIME OUT completed Yes  Time Out was conducted just prior to starting procedure to verify the eight required elements: 1-patient identity, 2-consent accurate and complete, 3-position, 4-correct side/site marked (if applicable), 5-procedure, 6-relevant images / results properly labeled and displayed (if applicable), 7-antibiotics / irrigation fluids (if applicable), 8-safety precautions.    After procedure perineum area rinsed. Discharge instructions reviewed with patient. Patient verbalized understanding of discharge instructions and discharged ambulatory.  Lesly Manzanares LPN..................10/28/2021  1:11 PM

## 2021-11-23 DIAGNOSIS — N40.0 BENIGN NON-NODULAR PROSTATIC HYPERPLASIA WITHOUT LOWER URINARY TRACT SYMPTOMS: ICD-10-CM

## 2021-11-23 DIAGNOSIS — I10 BENIGN ESSENTIAL HYPERTENSION: ICD-10-CM

## 2021-11-23 DIAGNOSIS — I10 ESSENTIAL (PRIMARY) HYPERTENSION: ICD-10-CM

## 2021-11-23 DIAGNOSIS — E78.2 MIXED HYPERLIPIDEMIA: ICD-10-CM

## 2021-11-23 DIAGNOSIS — K21.9 GASTROESOPHAGEAL REFLUX DISEASE WITHOUT ESOPHAGITIS: ICD-10-CM

## 2021-11-25 NOTE — TELEPHONE ENCOUNTER
Pepcid      Last Written Prescription Date:  10/21/21  Last Fill Quantity: 30,   # refills: 0  Last Office Visit: 8/12/21  Future Office visit:       Routing refill request to provider for review/approval because:      Losartan      Last Written Prescription Date:  10/21/21  Last Fill Quantity: 30,   # refills: 0  Last Office Visit: 8/12/21  Future Office visit:       Routing refill request to provider for review/approval because:      Metoprolol      Last Written Prescription Date:  10/21/21  Last Fill Quantity: 15,   # refills: 0  Last Office Visit: 8/12/21  Future Office visit:       Routing refill request to provider for review/approval because:      Simvastatin      Last Written Prescription Date:  10/21/21  Last Fill Quantity: 30,   # refills: 0  Last Office Visit: 8/12/21  Future Office visit:       Routing refill request to provider for review/approval because:      Flomax      Last Written Prescription Date:  10/21/21  Last Fill Quantity: 30,   # refills: 0  Last Office Visit: 8/12/21  Future Office visit:       Routing refill request to provider for review/approval because:

## 2021-11-26 RX ORDER — SIMVASTATIN 20 MG
TABLET ORAL
Qty: 30 TABLET | Refills: 0 | Status: SHIPPED | OUTPATIENT
Start: 2021-11-26 | End: 2022-01-03

## 2021-11-26 RX ORDER — TAMSULOSIN HYDROCHLORIDE 0.4 MG/1
CAPSULE ORAL
Qty: 30 CAPSULE | Refills: 0 | Status: SHIPPED | OUTPATIENT
Start: 2021-11-26 | End: 2022-01-03

## 2021-11-26 RX ORDER — METOPROLOL SUCCINATE 25 MG/1
TABLET, EXTENDED RELEASE ORAL
Qty: 15 TABLET | Refills: 0 | Status: SHIPPED | OUTPATIENT
Start: 2021-11-26 | End: 2022-01-03

## 2021-11-26 RX ORDER — LOSARTAN POTASSIUM 25 MG/1
TABLET ORAL
Qty: 30 TABLET | Refills: 0 | Status: SHIPPED | OUTPATIENT
Start: 2021-11-26 | End: 2022-01-03

## 2021-11-26 RX ORDER — FAMOTIDINE 20 MG/1
TABLET, FILM COATED ORAL
Qty: 30 TABLET | Refills: 0 | Status: SHIPPED | OUTPATIENT
Start: 2021-11-26 | End: 2022-01-03

## 2022-01-03 DIAGNOSIS — I10 BENIGN ESSENTIAL HYPERTENSION: ICD-10-CM

## 2022-01-03 DIAGNOSIS — N40.0 BENIGN NON-NODULAR PROSTATIC HYPERPLASIA WITHOUT LOWER URINARY TRACT SYMPTOMS: ICD-10-CM

## 2022-01-03 DIAGNOSIS — I10 ESSENTIAL (PRIMARY) HYPERTENSION: ICD-10-CM

## 2022-01-03 DIAGNOSIS — E78.2 MIXED HYPERLIPIDEMIA: ICD-10-CM

## 2022-01-03 DIAGNOSIS — K21.9 GASTROESOPHAGEAL REFLUX DISEASE WITHOUT ESOPHAGITIS: ICD-10-CM

## 2022-01-03 RX ORDER — METOPROLOL SUCCINATE 25 MG/1
TABLET, EXTENDED RELEASE ORAL
Qty: 15 TABLET | Refills: 1 | Status: SHIPPED | OUTPATIENT
Start: 2022-01-03 | End: 2022-03-03

## 2022-01-03 RX ORDER — TAMSULOSIN HYDROCHLORIDE 0.4 MG/1
CAPSULE ORAL
Qty: 30 CAPSULE | Refills: 1 | Status: SHIPPED | OUTPATIENT
Start: 2022-01-03 | End: 2022-03-25

## 2022-01-03 RX ORDER — LOSARTAN POTASSIUM 25 MG/1
TABLET ORAL
Qty: 30 TABLET | Refills: 1 | Status: SHIPPED | OUTPATIENT
Start: 2022-01-03 | End: 2022-03-03

## 2022-01-03 RX ORDER — SIMVASTATIN 20 MG
TABLET ORAL
Qty: 30 TABLET | Refills: 0 | Status: SHIPPED | OUTPATIENT
Start: 2022-01-03 | End: 2022-02-09

## 2022-01-03 RX ORDER — FAMOTIDINE 20 MG/1
TABLET, FILM COATED ORAL
Qty: 30 TABLET | Refills: 1 | Status: SHIPPED | OUTPATIENT
Start: 2022-01-03 | End: 2022-03-03

## 2022-01-03 NOTE — TELEPHONE ENCOUNTER
Pt called for meds, meds signed, pt updated he needs to establish care pt verbalized understanding, but disconnected phone.    Please call pt to establish care. Thank you

## 2022-02-05 DIAGNOSIS — K21.9 GASTROESOPHAGEAL REFLUX DISEASE WITHOUT ESOPHAGITIS: ICD-10-CM

## 2022-02-05 DIAGNOSIS — I10 BENIGN ESSENTIAL HYPERTENSION: ICD-10-CM

## 2022-02-05 DIAGNOSIS — I10 ESSENTIAL (PRIMARY) HYPERTENSION: ICD-10-CM

## 2022-02-05 DIAGNOSIS — N40.0 BENIGN NON-NODULAR PROSTATIC HYPERPLASIA WITHOUT LOWER URINARY TRACT SYMPTOMS: ICD-10-CM

## 2022-02-05 DIAGNOSIS — E78.2 MIXED HYPERLIPIDEMIA: ICD-10-CM

## 2022-02-08 RX ORDER — FAMOTIDINE 20 MG/1
TABLET, FILM COATED ORAL
Qty: 30 TABLET | Refills: 1 | OUTPATIENT
Start: 2022-02-08

## 2022-02-08 RX ORDER — METOPROLOL SUCCINATE 25 MG/1
TABLET, EXTENDED RELEASE ORAL
Qty: 15 TABLET | Refills: 1 | OUTPATIENT
Start: 2022-02-08

## 2022-02-08 RX ORDER — TAMSULOSIN HYDROCHLORIDE 0.4 MG/1
CAPSULE ORAL
Qty: 30 CAPSULE | Refills: 1 | OUTPATIENT
Start: 2022-02-08

## 2022-02-08 RX ORDER — LOSARTAN POTASSIUM 25 MG/1
TABLET ORAL
Qty: 30 TABLET | Refills: 1 | OUTPATIENT
Start: 2022-02-08

## 2022-02-08 NOTE — TELEPHONE ENCOUNTER
2/8/2022  4:04 PM  Pt transferred to scheduling to establish care.   4:14 PM pt has established with Dr. Mateus Marrero will route refill request to her.     Appointments in Next Year    Mar 03, 2022  1:40 PM  (Arrive by 1:25 PM)  New Patient with Lsia Marrero MD  [facility] ([facility] ) [phone]   Apr 14, 2022  1:00 PM  (Arrive by 12:45 PM)  Procedure with Lizzeth Harris PA-C  [facility] ([facility] ) [phone]          Simvastatin       Last Written Prescription Date:  1/3/22  Last Fill Quantity: 30,   # refills: 0  Routing refill request to provider for review/approval because:  Can you fill 30 days until pt sees you? Thank you

## 2022-02-09 RX ORDER — SIMVASTATIN 20 MG
TABLET ORAL
Qty: 30 TABLET | Refills: 0 | Status: SHIPPED | OUTPATIENT
Start: 2022-02-09 | End: 2022-03-03

## 2022-03-01 DIAGNOSIS — I10 BENIGN ESSENTIAL HYPERTENSION: ICD-10-CM

## 2022-03-01 DIAGNOSIS — K21.9 GASTROESOPHAGEAL REFLUX DISEASE WITHOUT ESOPHAGITIS: ICD-10-CM

## 2022-03-01 DIAGNOSIS — E78.2 MIXED HYPERLIPIDEMIA: ICD-10-CM

## 2022-03-01 DIAGNOSIS — I10 ESSENTIAL (PRIMARY) HYPERTENSION: ICD-10-CM

## 2022-03-01 NOTE — PROGRESS NOTES
"  Assessment & Plan     Rash  Right shin itching  Itching is worse at night  Appears eczematous, will Rx triamcinolone for this  Advised to not use longer than 2 weeks at a time      Gross hematuria  Patient has noted blood in his urine  He does have a history of bladder cancer  This is concerning and needs to be evaluated  He is clinically well appearing.  Will follow this closely   He will need referral to urology as this could be recurrence  - UA reflex to Microscopic and Culture - [place]; Future  - UA reflex to Microscopic and Culture - [place]         BMI:   Estimated body mass index is 27.75 kg/m  as calculated from the following:    Height as of 10/26/21: 1.803 m (5' 11\").    Weight as of this encounter: 90.3 kg (199 lb).       No follow-ups on file.    Lisa Marrero MD  [facility] [place]    Mica Campbell is a 89 year old who presents for the following health issues    HPI       Hypertension Follow-up      Do you check your blood pressure regularly outside of the clinic? No     Are you following a low salt diet? Yes    Are your blood pressures ever more than 140 on the top number (systolic) OR more   than 90 on the bottom number (diastolic), for example 140/90? No    Vascular Disease Follow-up      How often do you take nitroglycerin? Never    Do you take an aspirin every day? Yes    (STOPPED TAKING FLOMAX)    Concern - right shin itches at night  Onset: several months ago  Description: calf itches at night keeping him awake at night  Intensity: mild. intermittent  Progression of Symptoms:  same  Accompanying Signs & Symptoms: itching, keeps him up at night  Previous history of similar problem: no  Precipitating factors:        Worsened by: nothing  Alleviating factors:        Improved by: nothing  Therapies tried and outcome: lotion, did not help      Review of Systems   Constitutional, HEENT, cardiovascular, pulmonary, gi and gu systems are negative, except as otherwise noted.    "   Objective    /70   Pulse 70   Temp 97.4  F (36.3  C) (Tympanic)   Resp 16   Wt 90.3 kg (199 lb)   SpO2 97%   BMI 27.75 kg/m    Body mass index is 27.75 kg/m .  Physical Exam   GENERAL: healthy, alert and no distress  EYES: Eyes grossly normal to inspection, PERRL and conjunctivae and sclerae normal  HENT: ear canals and TM's normal, nose and mouth without ulcers or lesions  NECK: no adenopathy, no asymmetry, masses, or scars and thyroid normal to palpation  RESP: lungs clear to auscultation - no rales, rhonchi or wheezes  CV: regular rate and rhythm, normal S1 S2, no S3 or S4, no murmur, click or rub, no peripheral edema and peripheral pulses strong  ABDOMEN: soft, nontender, no hepatosplenomegaly, no masses and bowel sounds normal, ,colostomy bag in place  MS: no gross musculoskeletal defects noted, no edema  SKIN: no suspicious lesions or rashes  NEURO: Normal strength and tone, mentation intact and speech normal  PSYCH: mentation appears normal, affect normal/bright

## 2022-03-03 ENCOUNTER — OFFICE VISIT (OUTPATIENT)
Dept: FAMILY MEDICINE | Facility: OTHER | Age: 87
End: 2022-03-03
Attending: STUDENT IN AN ORGANIZED HEALTH CARE EDUCATION/TRAINING PROGRAM
Payer: MEDICARE

## 2022-03-03 VITALS
SYSTOLIC BLOOD PRESSURE: 132 MMHG | OXYGEN SATURATION: 97 % | RESPIRATION RATE: 16 BRPM | HEART RATE: 70 BPM | TEMPERATURE: 97.4 F | DIASTOLIC BLOOD PRESSURE: 70 MMHG | WEIGHT: 199 LBS | BODY MASS INDEX: 27.75 KG/M2

## 2022-03-03 DIAGNOSIS — R21 RASH: Primary | ICD-10-CM

## 2022-03-03 DIAGNOSIS — R31.0 GROSS HEMATURIA: ICD-10-CM

## 2022-03-03 LAB
ALBUMIN UR-MCNC: 10 MG/DL
APPEARANCE UR: CLEAR
BILIRUB UR QL STRIP: NEGATIVE
COLOR UR AUTO: YELLOW
GLUCOSE UR STRIP-MCNC: NEGATIVE MG/DL
HGB UR QL STRIP: NEGATIVE
KETONES UR STRIP-MCNC: NEGATIVE MG/DL
LEUKOCYTE ESTERASE UR QL STRIP: NEGATIVE
MUCOUS THREADS #/AREA URNS LPF: PRESENT /LPF
NITRATE UR QL: NEGATIVE
PH UR STRIP: 5.5 [PH] (ref 4.7–8)
RBC URINE: 2 /HPF
SP GR UR STRIP: 1.03 (ref 1–1.03)
SQUAMOUS EPITHELIAL: 0 /HPF
UROBILINOGEN UR STRIP-MCNC: NORMAL MG/DL
WBC URINE: 3 /HPF

## 2022-03-03 PROCEDURE — G0463 HOSPITAL OUTPT CLINIC VISIT: HCPCS

## 2022-03-03 PROCEDURE — 99213 OFFICE O/P EST LOW 20 MIN: CPT | Performed by: STUDENT IN AN ORGANIZED HEALTH CARE EDUCATION/TRAINING PROGRAM

## 2022-03-03 PROCEDURE — 81001 URINALYSIS AUTO W/SCOPE: CPT | Mod: ZL | Performed by: STUDENT IN AN ORGANIZED HEALTH CARE EDUCATION/TRAINING PROGRAM

## 2022-03-03 RX ORDER — SIMVASTATIN 20 MG
TABLET ORAL
Qty: 90 TABLET | Refills: 1 | Status: SHIPPED | OUTPATIENT
Start: 2022-03-03 | End: 2022-09-01

## 2022-03-03 RX ORDER — FAMOTIDINE 20 MG/1
TABLET, FILM COATED ORAL
Qty: 90 TABLET | Refills: 1 | Status: SHIPPED | OUTPATIENT
Start: 2022-03-03 | End: 2022-09-01

## 2022-03-03 RX ORDER — TRIAMCINOLONE ACETONIDE 0.25 MG/G
OINTMENT TOPICAL 2 TIMES DAILY
Qty: 80 G | Refills: 0 | Status: SHIPPED | OUTPATIENT
Start: 2022-03-03 | End: 2022-04-27

## 2022-03-03 RX ORDER — METOPROLOL SUCCINATE 25 MG/1
TABLET, EXTENDED RELEASE ORAL
Qty: 45 TABLET | Refills: 1 | Status: SHIPPED | OUTPATIENT
Start: 2022-03-03 | End: 2022-09-01

## 2022-03-03 RX ORDER — LOSARTAN POTASSIUM 25 MG/1
TABLET ORAL
Qty: 90 TABLET | Refills: 1 | Status: SHIPPED | OUTPATIENT
Start: 2022-03-03 | End: 2022-09-01

## 2022-03-03 ASSESSMENT — PAIN SCALES - GENERAL: PAINLEVEL: NO PAIN (0)

## 2022-03-03 NOTE — TELEPHONE ENCOUNTER
Attempt # 1  Outcome: Left Message   Comment: Left a voicemail to call back and reschedule est care

## 2022-03-03 NOTE — NURSING NOTE
"Chief Complaint   Patient presents with     [facility]     Derm Problem       Initial /70   Pulse 70   Temp 97.4  F (36.3  C) (Tympanic)   Resp 16   Wt 90.3 kg (199 lb)   SpO2 97%   BMI 27.75 kg/m   Estimated body mass index is 27.75 kg/m  as calculated from the following:    Height as of 10/26/21: 1.803 m (5' 11\").    Weight as of this encounter: 90.3 kg (199 lb).  Medication Reconciliation: complete  Suzy Dwyer LPN    "

## 2022-03-03 NOTE — TELEPHONE ENCOUNTER
Pepcid, metoprolol, simvastatin, losartan- Pt was scheduled to est care with Dr Marrero today however she is out of the office.

## 2022-03-03 NOTE — LETTER
March 3, 2022      Adrian Grant  [address] [facility] [address]        Dear ,    We are writing to inform you of your test results.    There was no blood in your urine...     Resulted Orders   UA reflex to Microscopic and Culture - [place]   Result Value Ref Range    Color Urine Yellow Colorless, Straw, Light Yellow, Yellow    Appearance Urine Clear Clear    Glucose Urine Negative Negative mg/dL    Bilirubin Urine Negative Negative    Ketones Urine Negative Negative mg/dL    Specific Gravity Urine 1.027 1.003 - 1.035    Blood Urine Negative Negative    pH Urine 5.5 4.7 - 8.0    Protein Albumin Urine 10  (A) Negative mg/dL    Urobilinogen Urine Normal Normal, 2.0 mg/dL    Nitrite Urine Negative Negative    Leukocyte Esterase Urine Negative Negative    Mucus Urine Present (A) None Seen /LPF    RBC Urine 2 <=2 /HPF    WBC Urine 3 <=5 /HPF    Squamous Epithelials Urine 0 <=1 /HPF    Narrative    Urine Culture not indicated       If you have any questions or concerns, please call the clinic at the number listed above.       Sincerely,      Lisa Marrero MD

## 2022-03-14 NOTE — PLAN OF CARE
Note opened in error  Gracie Damon RN on 9/3/2021 at 7:54 AM   Quality 111:Pneumonia Vaccination Status For Older Adults: Pneumococcal Vaccination Previously Received Quality 154 Part A: Falls: Risk Assessment (Should Be Reported With Measure 155.): Risk Assessment for Falls not Completed for Medical Reasons: Confined to bed or chair Quality 226: Preventive Care And Screening: Tobacco Use: Screening And Cessation Intervention: Patient screened for tobacco use and is an ex/non-smoker Quality 154 Part B: Falls: Risk Screening (Should Be Reported With Measure 155.): Patient screened for future fall risk; documentation of two or more falls in the past year or any fall with injury in the past year Quality 130: Documentation Of Current Medications In The Medical Record: Current Medications Documented Quality 110: Preventive Care And Screening: Influenza Immunization: Influenza Immunization previously received during influenza season Detail Level: Detailed Quality 431: Preventive Care And Screening: Unhealthy Alcohol Use - Screening: Patient not identified as an unhealthy alcohol user when screened for unhealthy alcohol use using a systematic screening method

## 2022-03-25 ENCOUNTER — HOSPITAL ENCOUNTER (OUTPATIENT)
Facility: HOSPITAL | Age: 87
Setting detail: OBSERVATION
Discharge: HOME | End: 2022-03-26
Attending: INTERNAL MEDICINE | Admitting: INTERNAL MEDICINE
Payer: MEDICARE

## 2022-03-25 ENCOUNTER — APPOINTMENT (OUTPATIENT)
Dept: GENERAL RADIOLOGY | Facility: HOSPITAL | Age: 87
End: 2022-03-25
Attending: INTERNAL MEDICINE
Payer: MEDICARE

## 2022-03-25 DIAGNOSIS — R50.9 FEBRILE ILLNESS: ICD-10-CM

## 2022-03-25 DIAGNOSIS — N39.0 URINARY TRACT INFECTION WITHOUT HEMATURIA, SITE UNSPECIFIED: ICD-10-CM

## 2022-03-25 LAB
ALBUMIN SERPL-MCNC: 3.6 G/DL (ref 3.4–5)
ALBUMIN UR-MCNC: 10 MG/DL
ALP SERPL-CCNC: 67 U/L (ref 40–150)
ALT SERPL W P-5'-P-CCNC: 22 U/L (ref 0–70)
ANION GAP SERPL CALCULATED.3IONS-SCNC: 2 MMOL/L (ref 3–14)
APPEARANCE UR: CLEAR
AST SERPL W P-5'-P-CCNC: 18 U/L (ref 0–45)
BASOPHILS # BLD AUTO: 0.1 10E3/UL (ref 0–0.2)
BASOPHILS NFR BLD AUTO: 0 %
BILIRUB SERPL-MCNC: 0.3 MG/DL (ref 0.2–1.3)
BILIRUB UR QL STRIP: NEGATIVE
BUN SERPL-MCNC: 23 MG/DL (ref 7–30)
CALCIUM SERPL-MCNC: 8.7 MG/DL (ref 8.5–10.1)
CHLORIDE BLD-SCNC: 107 MMOL/L (ref 94–109)
CO2 SERPL-SCNC: 29 MMOL/L (ref 20–32)
COLOR UR AUTO: YELLOW
CREAT SERPL-MCNC: 0.78 MG/DL (ref 0.66–1.25)
CRP SERPL-MCNC: 3.8 MG/L (ref 0–8)
EOSINOPHIL # BLD AUTO: 0.3 10E3/UL (ref 0–0.7)
EOSINOPHIL NFR BLD AUTO: 1 %
ERYTHROCYTE [DISTWIDTH] IN BLOOD BY AUTOMATED COUNT: 13 % (ref 10–15)
GFR SERPL CREATININE-BSD FRML MDRD: 82 ML/MIN/1.73M2
GLUCOSE BLD-MCNC: 142 MG/DL (ref 70–99)
GLUCOSE UR STRIP-MCNC: NEGATIVE MG/DL
HCT VFR BLD AUTO: 43.2 % (ref 40–53)
HGB BLD-MCNC: 14.7 G/DL (ref 13.3–17.7)
HGB UR QL STRIP: NEGATIVE
HOLD SPECIMEN: NORMAL
IMM GRANULOCYTES # BLD: 0.1 10E3/UL
IMM GRANULOCYTES NFR BLD: 1 %
KETONES UR STRIP-MCNC: NEGATIVE MG/DL
LACTATE SERPL-SCNC: 1.9 MMOL/L (ref 0.7–2)
LEUKOCYTE ESTERASE UR QL STRIP: ABNORMAL
LYMPHOCYTES # BLD AUTO: 2.4 10E3/UL (ref 0.8–5.3)
LYMPHOCYTES NFR BLD AUTO: 12 %
MCH RBC QN AUTO: 30.7 PG (ref 26.5–33)
MCHC RBC AUTO-ENTMCNC: 34 G/DL (ref 31.5–36.5)
MCV RBC AUTO: 90 FL (ref 78–100)
MONOCYTES # BLD AUTO: 1.6 10E3/UL (ref 0–1.3)
MONOCYTES NFR BLD AUTO: 8 %
MUCOUS THREADS #/AREA URNS LPF: PRESENT /LPF
NEUTROPHILS # BLD AUTO: 16 10E3/UL (ref 1.6–8.3)
NEUTROPHILS NFR BLD AUTO: 78 %
NITRATE UR QL: NEGATIVE
NRBC # BLD AUTO: 0 10E3/UL
NRBC BLD AUTO-RTO: 0 /100
PH UR STRIP: 7.5 [PH] (ref 4.7–8)
PLATELET # BLD AUTO: 259 10E3/UL (ref 150–450)
POTASSIUM BLD-SCNC: 3.8 MMOL/L (ref 3.4–5.3)
PROCALCITONIN SERPL-MCNC: <0.05 NG/ML
PROT SERPL-MCNC: 7.4 G/DL (ref 6.8–8.8)
RBC # BLD AUTO: 4.79 10E6/UL (ref 4.4–5.9)
RBC URINE: 1 /HPF
SODIUM SERPL-SCNC: 138 MMOL/L (ref 133–144)
SP GR UR STRIP: 1.03 (ref 1–1.03)
SQUAMOUS EPITHELIAL: <1 /HPF
UROBILINOGEN UR STRIP-MCNC: NORMAL MG/DL
WBC # BLD AUTO: 20.5 10E3/UL (ref 4–11)
WBC CLUMPS #/AREA URNS HPF: PRESENT /HPF
WBC URINE: 6 /HPF

## 2022-03-25 PROCEDURE — 87637 SARSCOV2&INF A&B&RSV AMP PRB: CPT | Performed by: INTERNAL MEDICINE

## 2022-03-25 PROCEDURE — 36415 COLL VENOUS BLD VENIPUNCTURE: CPT | Performed by: INTERNAL MEDICINE

## 2022-03-25 PROCEDURE — 99285 EMERGENCY DEPT VISIT HI MDM: CPT | Performed by: INTERNAL MEDICINE

## 2022-03-25 PROCEDURE — 80053 COMPREHEN METABOLIC PANEL: CPT | Performed by: INTERNAL MEDICINE

## 2022-03-25 PROCEDURE — 71045 X-RAY EXAM CHEST 1 VIEW: CPT

## 2022-03-25 PROCEDURE — 81003 URINALYSIS AUTO W/O SCOPE: CPT | Performed by: INTERNAL MEDICINE

## 2022-03-25 PROCEDURE — 86140 C-REACTIVE PROTEIN: CPT | Performed by: INTERNAL MEDICINE

## 2022-03-25 PROCEDURE — G0378 HOSPITAL OBSERVATION PER HR: HCPCS

## 2022-03-25 PROCEDURE — 99285 EMERGENCY DEPT VISIT HI MDM: CPT | Mod: 25

## 2022-03-25 PROCEDURE — 258N000003 HC RX IP 258 OP 636: Performed by: INTERNAL MEDICINE

## 2022-03-25 PROCEDURE — 84145 PROCALCITONIN (PCT): CPT | Performed by: INTERNAL MEDICINE

## 2022-03-25 PROCEDURE — 85025 COMPLETE CBC W/AUTO DIFF WBC: CPT | Performed by: INTERNAL MEDICINE

## 2022-03-25 PROCEDURE — C9803 HOPD COVID-19 SPEC COLLECT: HCPCS

## 2022-03-25 PROCEDURE — 87086 URINE CULTURE/COLONY COUNT: CPT | Performed by: INTERNAL MEDICINE

## 2022-03-25 PROCEDURE — 87040 BLOOD CULTURE FOR BACTERIA: CPT | Performed by: INTERNAL MEDICINE

## 2022-03-25 PROCEDURE — 83605 ASSAY OF LACTIC ACID: CPT | Performed by: INTERNAL MEDICINE

## 2022-03-25 RX ADMIN — SODIUM CHLORIDE 1000 ML: 9 INJECTION, SOLUTION INTRAVENOUS at 23:06

## 2022-03-26 ENCOUNTER — APPOINTMENT (OUTPATIENT)
Dept: CT IMAGING | Facility: HOSPITAL | Age: 87
End: 2022-03-26
Attending: INTERNAL MEDICINE
Payer: MEDICARE

## 2022-03-26 ENCOUNTER — APPOINTMENT (OUTPATIENT)
Dept: PHYSICAL THERAPY | Facility: HOSPITAL | Age: 87
End: 2022-03-26
Attending: INTERNAL MEDICINE
Payer: MEDICARE

## 2022-03-26 VITALS
HEART RATE: 67 BPM | TEMPERATURE: 99 F | OXYGEN SATURATION: 94 % | SYSTOLIC BLOOD PRESSURE: 135 MMHG | RESPIRATION RATE: 18 BRPM | DIASTOLIC BLOOD PRESSURE: 74 MMHG

## 2022-03-26 PROBLEM — R50.9 FEBRILE ILLNESS: Status: ACTIVE | Noted: 2022-03-26

## 2022-03-26 PROBLEM — N39.0 URINARY TRACT INFECTION WITHOUT HEMATURIA, SITE UNSPECIFIED: Status: ACTIVE | Noted: 2022-03-26

## 2022-03-26 LAB
ALBUMIN SERPL-MCNC: 3 G/DL (ref 3.4–5)
ANION GAP SERPL CALCULATED.3IONS-SCNC: 6 MMOL/L (ref 3–14)
BUN SERPL-MCNC: 18 MG/DL (ref 7–30)
CALCIUM SERPL-MCNC: 8.1 MG/DL (ref 8.5–10.1)
CHLORIDE BLD-SCNC: 109 MMOL/L (ref 94–109)
CO2 SERPL-SCNC: 26 MMOL/L (ref 20–32)
CREAT SERPL-MCNC: 0.7 MG/DL (ref 0.66–1.25)
ERYTHROCYTE [DISTWIDTH] IN BLOOD BY AUTOMATED COUNT: 13.2 % (ref 10–15)
FLUAV RNA SPEC QL NAA+PROBE: NEGATIVE
FLUBV RNA RESP QL NAA+PROBE: NEGATIVE
GFR SERPL CREATININE-BSD FRML MDRD: 85 ML/MIN/1.73M2
GLUCOSE BLD-MCNC: 116 MG/DL (ref 70–99)
HCT VFR BLD AUTO: 39.7 % (ref 40–53)
HGB BLD-MCNC: 13.2 G/DL (ref 13.3–17.7)
HOLD SPECIMEN: NORMAL
LACTATE SERPL-SCNC: 1 MMOL/L (ref 0.7–2)
MAGNESIUM SERPL-MCNC: 2 MG/DL (ref 1.6–2.3)
MCH RBC QN AUTO: 30.6 PG (ref 26.5–33)
MCHC RBC AUTO-ENTMCNC: 33.2 G/DL (ref 31.5–36.5)
MCV RBC AUTO: 92 FL (ref 78–100)
PHOSPHATE SERPL-MCNC: 2.9 MG/DL (ref 2.5–4.5)
PLATELET # BLD AUTO: 236 10E3/UL (ref 150–450)
POTASSIUM BLD-SCNC: 4 MMOL/L (ref 3.4–5.3)
PROCALCITONIN SERPL-MCNC: <0.05 NG/ML
RBC # BLD AUTO: 4.32 10E6/UL (ref 4.4–5.9)
RSV RNA SPEC NAA+PROBE: NEGATIVE
SARS-COV-2 RNA RESP QL NAA+PROBE: NEGATIVE
SODIUM SERPL-SCNC: 141 MMOL/L (ref 133–144)
WBC # BLD AUTO: 20.2 10E3/UL (ref 4–11)

## 2022-03-26 PROCEDURE — 96365 THER/PROPH/DIAG IV INF INIT: CPT

## 2022-03-26 PROCEDURE — 258N000003 HC RX IP 258 OP 636: Performed by: INTERNAL MEDICINE

## 2022-03-26 PROCEDURE — 250N000011 HC RX IP 250 OP 636: Performed by: INTERNAL MEDICINE

## 2022-03-26 PROCEDURE — 84145 PROCALCITONIN (PCT): CPT | Performed by: INTERNAL MEDICINE

## 2022-03-26 PROCEDURE — 82040 ASSAY OF SERUM ALBUMIN: CPT | Performed by: INTERNAL MEDICINE

## 2022-03-26 PROCEDURE — 36415 COLL VENOUS BLD VENIPUNCTURE: CPT | Performed by: INTERNAL MEDICINE

## 2022-03-26 PROCEDURE — 97161 PT EVAL LOW COMPLEX 20 MIN: CPT | Mod: GP

## 2022-03-26 PROCEDURE — 250N000013 HC RX MED GY IP 250 OP 250 PS 637: Performed by: INTERNAL MEDICINE

## 2022-03-26 PROCEDURE — 83605 ASSAY OF LACTIC ACID: CPT | Performed by: INTERNAL MEDICINE

## 2022-03-26 PROCEDURE — G0378 HOSPITAL OBSERVATION PER HR: HCPCS

## 2022-03-26 PROCEDURE — 99235 HOSP IP/OBS SAME DATE MOD 70: CPT | Performed by: INTERNAL MEDICINE

## 2022-03-26 PROCEDURE — 83735 ASSAY OF MAGNESIUM: CPT | Performed by: INTERNAL MEDICINE

## 2022-03-26 PROCEDURE — 85027 COMPLETE CBC AUTOMATED: CPT | Performed by: INTERNAL MEDICINE

## 2022-03-26 PROCEDURE — 96375 TX/PRO/DX INJ NEW DRUG ADDON: CPT

## 2022-03-26 PROCEDURE — 74177 CT ABD & PELVIS W/CONTRAST: CPT | Mod: ME

## 2022-03-26 RX ORDER — CIPROFLOXACIN 500 MG/1
500 TABLET, FILM COATED ORAL EVERY 12 HOURS
Qty: 10 TABLET | Refills: 0 | Status: SHIPPED | OUTPATIENT
Start: 2022-03-26 | End: 2022-03-26

## 2022-03-26 RX ORDER — LOSARTAN POTASSIUM 25 MG/1
25 TABLET ORAL DAILY
Status: DISCONTINUED | OUTPATIENT
Start: 2022-03-26 | End: 2022-03-26 | Stop reason: HOSPADM

## 2022-03-26 RX ORDER — ONDANSETRON 2 MG/ML
4 INJECTION INTRAMUSCULAR; INTRAVENOUS ONCE
Status: COMPLETED | OUTPATIENT
Start: 2022-03-26 | End: 2022-03-26

## 2022-03-26 RX ORDER — CEFTRIAXONE SODIUM 1 G/50ML
1 INJECTION, SOLUTION INTRAVENOUS ONCE
Status: COMPLETED | OUTPATIENT
Start: 2022-03-26 | End: 2022-03-26

## 2022-03-26 RX ORDER — CIPROFLOXACIN 500 MG/1
500 TABLET, FILM COATED ORAL EVERY 12 HOURS
Qty: 10 TABLET | Refills: 0 | Status: SHIPPED | OUTPATIENT
Start: 2022-03-26 | End: 2022-04-01

## 2022-03-26 RX ORDER — ONDANSETRON 4 MG/1
4 TABLET, ORALLY DISINTEGRATING ORAL EVERY 6 HOURS PRN
Status: DISCONTINUED | OUTPATIENT
Start: 2022-03-26 | End: 2022-03-26 | Stop reason: HOSPADM

## 2022-03-26 RX ORDER — LANOLIN ALCOHOL/MO/W.PET/CERES
3 CREAM (GRAM) TOPICAL
Status: DISCONTINUED | OUTPATIENT
Start: 2022-03-26 | End: 2022-03-26 | Stop reason: HOSPADM

## 2022-03-26 RX ORDER — ASPIRIN 81 MG/1
81 TABLET, CHEWABLE ORAL DAILY
Status: DISCONTINUED | OUTPATIENT
Start: 2022-03-26 | End: 2022-03-26 | Stop reason: HOSPADM

## 2022-03-26 RX ORDER — CIPROFLOXACIN 500 MG/1
500 TABLET, FILM COATED ORAL EVERY 12 HOURS SCHEDULED
Status: DISCONTINUED | OUTPATIENT
Start: 2022-03-26 | End: 2022-03-26 | Stop reason: HOSPADM

## 2022-03-26 RX ORDER — FAMOTIDINE 20 MG/1
20 TABLET, FILM COATED ORAL DAILY
Status: DISCONTINUED | OUTPATIENT
Start: 2022-03-26 | End: 2022-03-26 | Stop reason: HOSPADM

## 2022-03-26 RX ORDER — ONDANSETRON 2 MG/ML
4 INJECTION INTRAMUSCULAR; INTRAVENOUS EVERY 6 HOURS PRN
Status: DISCONTINUED | OUTPATIENT
Start: 2022-03-26 | End: 2022-03-26 | Stop reason: HOSPADM

## 2022-03-26 RX ORDER — IOPAMIDOL 755 MG/ML
98 INJECTION, SOLUTION INTRAVASCULAR ONCE
Status: COMPLETED | OUTPATIENT
Start: 2022-03-26 | End: 2022-03-26

## 2022-03-26 RX ORDER — ACETAMINOPHEN 325 MG/1
650 TABLET ORAL EVERY 4 HOURS PRN
Status: DISCONTINUED | OUTPATIENT
Start: 2022-03-26 | End: 2022-03-26 | Stop reason: HOSPADM

## 2022-03-26 RX ORDER — SACCHAROMYCES BOULARDII 250 MG
250 CAPSULE ORAL 2 TIMES DAILY
Qty: 20 CAPSULE | Refills: 0 | Status: SHIPPED | OUTPATIENT
Start: 2022-03-26 | End: 2022-03-26

## 2022-03-26 RX ORDER — SACCHAROMYCES BOULARDII 250 MG
250 CAPSULE ORAL 2 TIMES DAILY
Qty: 20 CAPSULE | Refills: 0 | Status: SHIPPED | OUTPATIENT
Start: 2022-03-26 | End: 2023-09-13

## 2022-03-26 RX ADMIN — SODIUM CHLORIDE 1000 ML: 9 INJECTION, SOLUTION INTRAVENOUS at 03:31

## 2022-03-26 RX ADMIN — IOPAMIDOL 98 ML: 755 INJECTION, SOLUTION INTRAVENOUS at 00:31

## 2022-03-26 RX ADMIN — CEFTRIAXONE SODIUM 1 G: 1 INJECTION, SOLUTION INTRAVENOUS at 02:10

## 2022-03-26 RX ADMIN — ONDANSETRON 4 MG: 2 INJECTION INTRAMUSCULAR; INTRAVENOUS at 01:37

## 2022-03-26 RX ADMIN — LOSARTAN POTASSIUM 25 MG: 25 TABLET, FILM COATED ORAL at 10:23

## 2022-03-26 RX ADMIN — ASPIRIN 81 MG: 81 TABLET, CHEWABLE ORAL at 10:23

## 2022-03-26 RX ADMIN — CIPROFLOXACIN HYDROCHLORIDE 500 MG: 500 TABLET, FILM COATED ORAL at 10:22

## 2022-03-26 RX ADMIN — METOPROLOL SUCCINATE 12.5 MG: 25 TABLET, EXTENDED RELEASE ORAL at 03:47

## 2022-03-26 RX ADMIN — FAMOTIDINE 20 MG: 20 TABLET, FILM COATED ORAL at 10:23

## 2022-03-26 NOTE — PROGRESS NOTES
"   03/26/22 1200   Quick Adds   Type of Visit Initial PT Evaluation   Living Environment   People in Home alone   Current Living Arrangements house   Home Accessibility stairs to enter home;stairs within home   Number of Stairs, Main Entrance 2   Stair Railings, Main Entrance railing on left side (ascending)   Number of Stairs, Within Home, Primary eight  (basement- laundry)   Stair Railings, Within Home, Primary railing on left side (ascending)   Transportation Anticipated car, drives self   Self-Care   Usual Activity Tolerance good   Current Activity Tolerance moderate   Regular Exercise Yes   Activity/Exercise Type walking   Fall history within last six months no   General Information   Onset of Illness/Injury or Date of Surgery 03/24/22   Referring Physician Dr Eduardo   Patient/Family Therapy Goals Statement (PT) Return home   Pertinent History of Current Problem (include personal factors and/or comorbidities that impact the POC) This 89 y/o male admitted through ED d/t UTI with sepsis. Pt lives alone and is indep in all adls, drives and shops. He has family nearby but does not like to \"bother\" them for help. MOWs.    Existing Precautions/Restrictions no known precautions/restrictions   General Observations colostomy bag lower left abdomen   Cognition   Affect/Mental Status (Cognition) WNL   Orientation Status (Cognition) oriented x 3   Safety Deficit (Cognition)   (no deficits safety or cognitively. Koi. )   Pain Assessment   Patient Currently in Pain No   Range of Motion (ROM)   ROM Comment wfl t/o UE/LEs   Strength (Manual Muscle Testing)   Strength Comments Gross MMT hips 3+/5, knees/ankles 4/5   Bed Mobility   Bed Mobility no deficits identified   Transfers   Transfers   (able to stand up without uE assist)   Comment, (Transfers) Indep in sit/supine and sit to stand   Gait/Stairs (Locomotion)   [place] Level (Gait) supervision   Assistive Device (Gait)   (no assisted device )   Distance in Feet (Required " for LE Total Joints) approx 250 ft total   Negotiation (Stairs) number of steps   Number of Steps (Stairs) 4   Comment, (Gait/Stairs) CGA for safety using one railing   Balance   Balance Comments wfl, no fall history   Clinical Impression   Criteria for Skilled Therapeutic Intervention Evaluation only;Current level of function same as previous level of function;No problems identified which require skilled intervention  (Pt returning home this afternoon)   PT Diagnosis (PT) Weakness d/t UTI, sepsis   Clinical Impression Comments pt has walker and cane at home if needed. Pt does not typically use them. He feels he is back to baseline.    Total Evaluation Time   Total Evaluation Time (Minutes) 25

## 2022-03-26 NOTE — PLAN OF CARE
Goal Outcome Evaluation:  Patient discharged at 2:35 PM via wheel chair accompanied by daughter and staff. Prescriptions sent to patients preferred pharmacy. All belongings sent with patient.     Discharge instructions reviewed with patient and daughter. Listed belongings gathered and returned to patient. yes    Patient discharged to home.   Report called to N/A    Surgical Patient   Surgical Procedures during stay: N/A  Did patient receive discharge instruction on wound care and recognition of infection symptoms? N/A    MISC  Follow up appointment made:  patient to make his own follow up appointment  Home medications returned to patient: N/A  Patient reports pain was well managed at discharge: Yes

## 2022-03-26 NOTE — PROGRESS NOTES
Care Transitions focused note:      Chart reviewed and pt discussed in interdisciplinary rounds. No anticipated discharge needs at this time. CTS team will continue to monitor daily in interdisciplinary rounds and will intervene as needed/appropriate.    Checked in with Adrian regarding pending discharge today. He reports that he will call his son for a ride home and that he doesn't need any services or equipment at home. He reports that he uses [facility] for a pharmacy.

## 2022-03-26 NOTE — ED NOTES
Pt gave urine sample, Covid swab obtained, Blood cultures obtained x 2. Chest x ray obtained.  Normal Saline 1000 ml bolus started in left AC. Pt still febrile at 100.8. Awaiting test results.

## 2022-03-26 NOTE — PLAN OF CARE
Face to face report given with opportunity to observe patient.    Report given to Patricia David RN   3/26/2022  7:14 AM

## 2022-03-26 NOTE — PLAN OF CARE
Most recent vitals: /64 (BP Location: Left arm, Patient Position: Sitting)   Pulse 82   Temp 98.3  F (36.8  C) (Tympanic)   Resp 18   SpO2 93%      A&O, VSS, afebrile, denies pain. [place], has bilateral hearing aid and glasses. Blanchable redness to coccyx. 1L bolus early this AM. Currently SL. Call light within reach, free from falls this shift.

## 2022-03-26 NOTE — H&P
[facility]    History and Physical - Hospitalist Service       Date of Admission:  3/25/2022    Assessment & Plan      James Grant is a 89 year old male admitted on 3/25/2022.      Acute cystitis without hematuria: monitor culture, including blood cultures, use PO Cipro for better prostate penetration, repeat UA, trend fever, WBC. Bladder scan pre/post void    Associated sepsis: sepsis criteria met (fever 100.7 + WBC 20.5). lactic acid is high normal, providing some support to the diagnosis. 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     Diet:   Regular  DVT Prophylaxis: Pneumatic Compression Devices  Prater Catheter: Not present  Central Lines: None  Cardiac Monitoring: None  Code Status:   DNR       Disposition Plan   Expected Discharge:<2 days  Anticipated discharge location:  Awaiting care coordination huddle  Delays: presentation of illness other than UTI     The patient's care was discussed with the Patient and his son, present    Hoang Manzanares, DO  Hospitalist Service  [facility]  Securely message with the Vocera Web Console (learn more here)  Text page via [employer] Paging/Directory         ______________________________________________________________________    Chief Complaint   Fever, felt ill suddenly early this morning    History is obtained from the patient, son, ER Provider, EHR review    History of Present Illness   James Grant is a 89 year old male who is generally healthy, ambulates without a device and has been feeling well    In July, 2021, he had refractory diverticulitis that resulted in a colostomy. Since, he has been doing well.    Tonight, he retired felling well and then was awakened feeling non-specifically ill and had chills. He measured his temperature and it was just over 100 degrees. He called his son, who transported to the ER     ER  Course: vitals showed a fever of 100.7; there was no distress. Lab diagnostics resulted a heme profile with a surprisingly high WBC (20.5). the chemistry panel was stable with preserved stage 2 renal insufficiency. Lactic acid was (1.9), procalcitonin was < 0.05. CT scanning showed no acute process, UA reflected acute cystitis    Review of Systems    Constitutional: measured fever with subjective chills, some generalized weakness, no unintentional weight change, diminished appetite  Ears, Nose & Throat: no sore throat, no nasal drainage, no congestion. No ear pain, no ear drainage, no particular change in hearing  Eyes: no particular change in vision, no redness, no drainage  Cardiovascular: No chest pain at rest, no chest pain with familiar activities.  Pulmonary: No cough, no particular change in work of breathing, no particular change in shortness of breath with position changes or familiar activities  Gastrointestinal: slight DARIO  abdominal pain, now resolved, no nausea, no vomiting, no diarrhea. No particular change in bowel movement pattern (chronic constipation), no black stools, no bloody stools  Genitourinary: No particular change in incontinence, no pain with urination, no particular change in stream, no particular change in amount urinated with urge, no discharge  Skin: No particular change in bruising, no rashes  Musculoskeletal: no particular change in strength, no particular change in muscle development  Neurological: no numbness and tingling, no headache, no particular change in balance, no dizziness  Psychologic: No particular change in depression and/or anxiety  Endocrine: No particular change in heat or cold intolerance        Past Medical History    I have reviewed this patient's medical history and updated it with pertinent information if needed.   Past Medical History:   Diagnosis Date     Benign localized hyperplasia of prostate without urinary obstruction and other lower urinary tract symptoms  (LUTS) 2010     CHD (coronary heart disease) 2010     Dyslipidemia 2010     GERD (gastroesophageal reflux disease) 2010     HTN (hypertension) 2010     Old myocardial infarction 2010     Other symptoms involving digestive system(787.99) 10/20/2006       Past Surgical History   I have reviewed this patient's surgical history and updated it with pertinent information if needed.  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       CHOLECYSTECTOMY       CYSTOSCOPY, FULGURATE BLEEDERS, EVACUATE CLOT(S), COMBINED N/A 2020    Procedure: Clot Evacuation;  Surgeon: Andrzej Olivia MD;  Location:  OR     CYSTOSCOPY, RETROGRADES, COMBINED Bilateral 10/22/2019    Procedure: Bilateral Retrograde Pyelograms;  Surgeon: Andrzej Olivia MD;  Location:  OR     CYSTOSCOPY, TRANSURETHRAL RESECTION (TUR) TUMOR BLADDER, COMBINED N/A 10/22/2019    Procedure: Transurethral Resection of Bladder Tumor and transurethral resection of prostate and bladder stone removal;  Surgeon: Andrzej Olivia MD;  Location:  OR     HERNIA REPAIR       LAPAROTOMY EXPLORATORY N/A 2021    Procedure: Exploratory  laparotomy with sigmoid colectomy and creation of colostomy, appendectomy;  Surgeon: Sukumar Chavez MD;  Location: [facility]     left arm surgery         Social History   I have reviewed this patient's social history and updated it with pertinent information if needed.  Social History     Tobacco Use     Smoking status: Former Smoker     Packs/day: 1.00     Years: 13.00     Pack years: 13.00     Types: Cigarettes     Start date: 1949     Quit date: 1962     Years since quittin.6     Smokeless tobacco: Never Used   Substance Use Topics     Alcohol use: Not Currently     Comment: 3 Drinks (BEER & LIQUOR) ~ OCCASIONALLY (QUIT IN )     Drug use: No       Family History   I have reviewed this patient's family history and updated it with pertinent  information if needed.  Family History   Problem Relation Age of Onset     Heart Failure Mother 86        Congestive - Cause of Death     Cerebrovascular Disease Father      C.A.D. Sister 91       Prior to Admission Medications   Prior to Admission Medications   Prescriptions Last Dose Informant Patient Reported? Taking?   NITROSTAT 0.4 MG sublingual tablet  Self No Yes   Sig: PLACE 1 TABLET (0.4 MG) UNDER THE TONGUE EVERY 5 MINUTES AS NEEDED   SM ASPIRIN ADULT LOW STRENGTH 81 MG EC tablet   Yes Yes   Sig: Take 162 mg by mouth daily   acetaminophen (TYLENOL) 325 MG tablet   No Yes   Sig: Take 2 tablets (650 mg) by mouth every 4 hours as needed for mild pain, fever or headaches   famotidine (PEPCID) 20 MG tablet   No Yes   Sig: TAKE ONE TABLET BY MOUTH EVERY DAY IN THE MORNING   losartan (COZAAR) 25 MG tablet   No Yes   Sig: TAKE ONE TABLET BY MOUTH EVERY DAY IN THE MORNING   metoprolol succinate ER (TOPROL-XL) 25 MG 24 hr tablet   No No   Sig: TAKE ONE-HALF TABLET BY MOUTH EVERY DAY AT BEDTIME   simvastatin (ZOCOR) 20 MG tablet   No Yes   Sig: TAKE ONE TABLET BY MOUTH EVERY DAY AT BEDTIME   triamcinolone (KENALOG) 0.025 % external ointment   No Yes   Sig: Apply topically 2 times daily Apply to rash two times a day for up to two weeks      Facility-Administered Medications: None     Allergies   Allergies   Allergen Reactions     Lisinopril Cough     Morphine Other (See Comments) and Visual Disturbance     confusion       Physical Exam   Vital Signs: Temp: 98.7  F (37.1  C) Temp src: Oral BP: 140/74 Pulse: 84   Resp: 20 SpO2: (!) 91 % O2 Device: None (Room air)    Weight: 0 lbs 0 oz    Case reviewed with the ER Provider, EHR reviewed; patient seen in ER room 3, with son present    Vital signs:  Temp: 98.7  F (37.1  C) Temp src: Oral BP: 160/73 Pulse: 83   Resp: 16 SpO2: (!) 91 % O2 Device: None (Room air)        Estimated body mass index is 27.75 kg/m  as calculated from the following:    Height as of 10/26/21: 1.803  "m (5' 11\").    Weight as of 3/3/22: 90.3 kg (199 lb).      General: No distress, interactive  Head: normocephalic, no obvious trauma  Eyes: Gaze directed normally, sclera clear, no discharge, no abnormal ocular movements  Ears: Normal appearing age-related external ears, no discharge, stable hearing acuity loss  Nose: Normal age-related appearance  Mouth: Normal appearing oral mucosa, Gums and throat appear age-related normal  Neck: Normal age-related appearance, age-related range of motion, supple, no adenopathy  Pulmonary: Normal work of breathing, no expiratory delay, no coarseness, no wheezing  Cardiovascular: Distant heart sounds, regular rhythm   Abdomen: No obvious distention, soft, non tender with palpation, bowel sounds present with normal frequency and pitch, colostomy bag with little output (recently changed)  Rectal: Deferred  Back: Age-related normal  Skin: Age-related normal, no rashes  Extremities: Not tender, no lower extremity edema. Moving upper and lower extremities  Neurological: Grossly in tact  Psychiatric: Mood is stable, appropriately interactive        Data   Data reviewed today: I reviewed all medications, new labs and imaging results over the last 24 hours   "

## 2022-03-26 NOTE — PROGRESS NOTES
Name: James Grant    MRN#: [id number]    Reason for Hospitalization: Febrile illness [R50.9]  Urinary tract infection without hematuria, site unspecified [N39.0]    SONIA: Low    Discharge Date: 3/26/2022    Patient / Family response to discharge plan: Patient agrees.     Follow-Up Appt:   Future Appointments   Date Time Provider Department Center   4/14/2022  1:00 PM Lizzeth Harris PA-C [facility]   7/14/2022  1:40 PM Lisa Marrero MD [facility]       Other Providers (Care Coordinator, County Services, PCA services etc): No    Discharge Disposition: home    Mely Mendoza CM

## 2022-03-26 NOTE — DISCHARGE INSTRUCTIONS
What to expect when you have contrast    During your exam, we will inject  contrast  into your vein or artery. (Contrast is a clear liquid with iodine in it. It shows up on X-rays.)    You may feel warm or hot. You may have a metal taste in your mouth and a slight upset stomach. You may also feel pressure near the kidneys and bladder. These effects will last about 1 to 3 minutes.    Please tell us if you have:    Sneezing     Itching    Hives     Swelling in the face    A hoarse voice    Breathing problems    Other new symptoms    Serious problems are rare.  They may include:    Irregular heartbeat     Seizures    Kidney failure              Tissue damage    Shock      Death    If you have any problems during the exam, we  will treat them right away.    When you get home    Call your hospital if you have any new symptoms in the next 2 days, like hives or swelling. (Phone numbers are at the bottom of this page.) Or call your family doctor.     If you have wheezing or trouble breathing, call 911.    Self-care  -Drink at least 4 extra glasses of water today.   This reduces the stress on your kidneys.  -Keep taking your regular medicines.    The contrast will pass out of your body in your  Urine(pee). This will happen in the next 24 hours. You  will not feel this. Your urine will not  change color.    If you have kidney problems or take metformin    Drink 4 to 8 large glasses of water for the next  2 days, if you are not on a fluid restriction.    ?If you take metformin (Glucophage or Glucovance) for diabetes, keep taking it.      ?Your kidney function tests are abnormal.  If you take Metformin, do not take it for 48 hours. Please go to your clinic for a blood test within 3 days after your exam before the restarting this medicine.     (Note to provider:please give patient prescription for lab tests.)    ?Special instructions: N/A    I have read and understand the above information.    Patient Sign  Here:______________________________________Date:________Time:______    Staff Sign Here:________________________________________Date:_______Time:______      Radiology Departments:     ?[facility]: [phone] ?[facility]: [phone]     ?[facility]: [phone] ?[facility]:[phone]      ?[facility]: [phone]  ?[facility]: [phone]  ?[facility]:[phone]    ?[facility]:[phone]  ?[facility]:[phone]

## 2022-03-26 NOTE — DISCHARGE SUMMARY
[facility]  Hospitalist Discharge Summary      Date of Admission:  3/25/2022  Date of Discharge:  3/26/2022  Discharging Provider: Johnathan Eduardo MD  Discharge Service: Hospitalist Service    Discharge Diagnoses   Active Problems:    Febrile illness    Urinary tract infection without hematuria, site unspecified      Follow-ups Needed After Discharge   Follow-up Appointments     Follow-up and recommended labs and tests       Follow up with primary care provider, Broderick Flowers, within 7 days for   hospital follow- up.  No follow up labs or test are needed.           Unresulted Labs Ordered in the Past 30 Days of this Admission     Date and Time Order Name Status Description    3/25/2022 11:25 PM Urine Culture Preliminary     3/25/2022 10:46 PM Blood Culture Peripheral Blood In process     3/25/2022 10:46 PM Blood Culture Line, venous In process       These results will be followed up by PCP    Discharge Disposition   Discharged to home  Condition at discharge: Stable      Hospital Course   HPI from H&P:  James Grant is a 89 year old male who is generally healthy, ambulates without a device and has been feeling well   In July, 2021, he had refractory diverticulitis that resulted in a colostomy. Since, he has been doing well.   Tonight, he retired felling well and then was awakened feeling non-specifically ill and had chills. He measured his temperature and it was just over 100 degrees. He called his son, who transported to the ER    ER Course: vitals showed a fever of 100.7; there was no distress. Lab diagnostics resulted a heme profile with a surprisingly high WBC (20.5). the chemistry panel was stable with preserved stage 2 renal insufficiency. Lactic acid was (1.9), procalcitonin was < 0.05. CT scanning showed no acute process, UA reflected acute cystitis     Brief Hospital course:  Patient was started on ciprofloxacin for acute cystitis without hematuria.  Patient improved rapidly and felt he was back  to baseline soon after admission.  Patient was evaluated by of physical therapy who determined the patient was in good functional status.  Patient was discharged home on ciprofloxacin p.o.      Consultations This Hospital Stay   PHYSICAL THERAPY ADULT IP CONSULT  OCCUPATIONAL THERAPY ADULT IP CONSULT    Code Status   No CPR- Do NOT Intubate    Time Spent on this Encounter   I, Johnathan Eduardo MD, personally saw the patient today and spent greater than 30 minutes discharging this patient.       Johnathan Eduardo MD  [facility]  [address] [place] [address]  [facility] [address]  Phone: [phone]  Fax: [phone]  ______________________________________________________________________    Physical Exam   Vital Signs: Temp: 99  F (37.2  C) Temp src: Temporal BP: 135/74 Pulse: 67   Resp: 18 SpO2: 94 % O2 Device: None (Room air)    Weight: 0 lbs 0 oz  General Appearance: Sitting in bed in no acute distress  Respiratory: Clear to auscultation bilaterally  Cardiovascular: S1-S2, regular rhythm and rate, no murmurs rubs gallops  GI: Soft, nontender, nondistended  Skin: Intact  Other: Neuro: Nonfocal       Primary Care Physician   Broderick Flowers    Discharge Orders      Reason for your hospital stay    Urinary tract infection     Follow-up and recommended labs and tests     Follow up with primary care provider, Broderick Flowers, within 7 days for hospital follow- up.  No follow up labs or test are needed.     Activity    Your activity upon discharge: activity as tolerated     Diet    Follow this diet upon discharge: Orders Placed This Encounter      Regular Diet Adult       Significant Results and Procedures   Most Recent 3 CBC's:Recent Labs   Lab Test 03/26/22  0525 03/25/22 2233 07/28/21  0621   WBC 20.2* 20.5* 10.7   HGB 13.2* 14.7 12.2*   MCV 92 90 92    259 240     Most Recent 3 BMP's:Recent Labs   Lab Test 03/26/22  0518 03/25/22 2233 07/28/21  0621    138 139   POTASSIUM 4.0 3.8 3.9   CHLORIDE 109 107 108   CO2  26 29 25   BUN 18 23 14   CR 0.70 0.78 0.73   ANIONGAP 6 2* 6   VANIA 8.1* 8.7 7.8*   * 142* 109*     Most Recent 2 LFT's:Recent Labs   Lab Test 03/25/22  2233 07/26/21  0656   AST 18 28   ALT 22 62   ALKPHOS 67 52   BILITOTAL 0.3 0.5     Most Recent Urinalysis:Recent Labs   Lab Test 03/25/22  2317   COLOR Yellow   APPEARANCE Clear   URINEGLC Negative   URINEBILI Negative   URINEKETONE Negative   SG 1.026   UBLD Negative   URINEPH 7.5   PROTEIN 10 *   NITRITE Negative   LEUKEST Moderate*   RBCU 1   WBCU 6*   ,   Results for orders placed or performed during the hospital encounter of 03/25/22   XR Chest Port 1 View    Narrative    Exam:  XR CHEST PORT 1 VIEW    HISTORY: fever, cough.    COMPARISON:  7/22/2021, 7/19/2021, 7/15/2021    FINDINGS:     The cardiomediastinal contours are normal.      No focal consolidation, effusion, or pneumothorax.      No acute osseous abnormality.       Impression    IMPRESSION:      No acute cardiopulmonary process.      KAMLESH PAGE MD         SYSTEM ID:  [id number]   Abd/pelvis CT - IV contrast only TRAUMA / AAA    Narrative    Exam: CT ABDOMEN PELVIS W CONTRAST    Exam reason: Abdominal pain, fever    Technique: Using helical CT technique, axial images of the abdomen and  pelvis were obtained with IV contrast.  Coronal and sagittal  reconstructions also performed.    Meds/Contrast: ISOVUE 370 98mL    Comparison: 7/19/2021, 7/15/2021, 10/2/2019     FINDINGS:    ABDOMEN:    Liver: No mass or any significant abnormality.  Gallbladder: Resected.   Bile Ducts: No biliary ductal dilation.   Spleen:  No splenomegaly or focal lesion.  Pancreas: No mass, ductal dilatation, or inflammatory changes.  Kidneys: No solid mass, hydronephrosis, or any definite calculi.   Adrenals:  No nodules.   Lymph Nodes: No adenopathy.   Vascular: No aortic aneurysm.   Abdominal Wall: There is a left lower quadrant colostomy.  Fat-containing left inguinal hernia.    PELVIS:   No mass or adenopathy.  The prostate is enlarged.    Bowel/Mesentery/Peritoneum:   -There is a left lower quadrant colostomy with a small amount of  nonspecific edema within the fat.  -No bowel obstruction.   -There is descending and sigmoid diverticulosis, without evidence of  acute cellulitis.  -No acute inflammatory findings.  -No ascites. Previously demonstrate small volume ascites is resolved.    Visualized portions of the Chest: There is bibasilar atelectasis.  Musculoskeletal: No acute osseous abnormalities.       Impression    IMPRESSION:  There is a left lower quadrant colostomy with a small amount of  nonspecific edema within the fat, possibly post surgical change.    No acute findings in the abdomen or pelvis.    KAMLESH PAGE MD         SYSTEM ID:  [id number]       Discharge Medications   Current Discharge Medication List      START taking these medications    Details   ciprofloxacin (CIPRO) 500 MG tablet Take 1 tablet (500 mg) by mouth every 12 hours for 5 days  Qty: 10 tablet, Refills: 0    Associated Diagnoses: Urinary tract infection without hematuria, site unspecified      saccharomyces boulardii (FLORASTOR) 250 MG capsule Take 1 capsule (250 mg) by mouth 2 times daily for 10 days  Qty: 20 capsule, Refills: 0    Associated Diagnoses: Urinary tract infection without hematuria, site unspecified         CONTINUE these medications which have NOT CHANGED    Details   acetaminophen (TYLENOL) 325 MG tablet Take 2 tablets (650 mg) by mouth every 4 hours as needed for mild pain, fever or headaches    Associated Diagnoses: Diverticulitis of large intestine with abscess without bleeding      famotidine (PEPCID) 20 MG tablet TAKE ONE TABLET BY MOUTH EVERY DAY IN THE MORNING  Qty: 90 tablet, Refills: 1    Associated Diagnoses: Gastroesophageal reflux disease without esophagitis      losartan (COZAAR) 25 MG tablet TAKE ONE TABLET BY MOUTH EVERY DAY IN THE MORNING  Qty: 90 tablet, Refills: 1    Associated Diagnoses: Essential  (primary) hypertension      NITROSTAT 0.4 MG sublingual tablet PLACE 1 TABLET (0.4 MG) UNDER THE TONGUE EVERY 5 MINUTES AS NEEDED  Qty: 25 tablet, Refills: 3    Associated Diagnoses: Coronary artery disease involving native coronary artery of native heart without angina pectoris      simvastatin (ZOCOR) 20 MG tablet TAKE ONE TABLET BY MOUTH EVERY DAY AT BEDTIME  Qty: 90 tablet, Refills: 1    Associated Diagnoses: Mixed hyperlipidemia      SM ASPIRIN ADULT LOW STRENGTH 81 MG EC tablet Take 162 mg by mouth daily      triamcinolone (KENALOG) 0.025 % external ointment Apply topically 2 times daily Apply to rash two times a day for up to two weeks  Qty: 80 g, Refills: 0    Associated Diagnoses: Rash      metoprolol succinate ER (TOPROL-XL) 25 MG 24 hr tablet TAKE ONE-HALF TABLET BY MOUTH EVERY DAY AT BEDTIME  Qty: 45 tablet, Refills: 1    Associated Diagnoses: Benign essential hypertension           Allergies   Allergies   Allergen Reactions     Lisinopril Cough     Morphine Other (See Comments) and Visual Disturbance     confusion

## 2022-03-26 NOTE — ED NOTES
"Pt entered ER stating that he has been feeling \"awful\" since this evening. States that he has chills, a fever, and a mild fever. Pt shaking with chills. Pt coughing up white phlegm at this time. Able to answer all questions but hard of hearing. States that he is very tired. 18 gauge IV placed in left AC. Labs drawn.   "

## 2022-03-26 NOTE — PLAN OF CARE
[facility] Inpatient Admission Note:    Patient admitted to 3108/3108-1 at approximately 0319 via wheel chair accompanied by son from emergency room . Report received from  in SBAR format at [facility] via telephone. Patient ambulated to bed via self.. Patient is alert and oriented X 3, denies pain; rates at 0 on 0-10 scale.  Patient oriented to room, unit, hourly rounding, and plan of care. Explained admission packet and patient handbook with patient bill of rights brochure. Will continue to monitor and document as needed.     Inpatient Nursing criteria listed below was met:    Health care directives status obtained and documented: Yes    Patient identifies a surrogate decision maker: No If yes, who:N/A Contact Information:N/A     If initial lactic acid greater than 2.0, repeat lactic acid drawn within one hour of arrival to unit: No. If no, state reason: Lab 1.9    Clergy visit ordered if patient requests: No    Skin issues/needs documented: No    Isolation Patient: no Education given, correct sign in place and documentation row added to PCS:  N/A    Fall Prevention Yes: Care plan updated, education given and documented, sticker and magnet in place: Yes    Care Plan initiated: Yes    Education Documented (including assessment): Yes    Patient has discharge needs : Yes If yes, please explain: Home care

## 2022-03-26 NOTE — PHARMACY
Pharmacy Antimicrobial Stewardship Assessment     Current Antimicrobial Therapy:  Anti-infectives (From now, onward)    Start     Dose/Rate Route Frequency Ordered Stop    22 0800  ciprofloxacin (CIPRO) tablet 500 mg         500 mg Oral EVERY 12 HOURS SCHEDULED 22 0325            Indication: UTI    Days of Therapy: 1     Pertinent Labs:  Lab Test 22  0525 22  2233   WBC 20.2* 20.5*     Lab Test 22  0518 22  22322  2233   LACT 1.0  --  1.9   CRP  --   --  3.8   PCAL <0.05 <0.05  --         Temperature:  Temp (24hrs), Av.3  F (37.4  C), Min:98.3  F (36.8  C), Max:100.9  F (38.3  C)    Culture Results:       Recommendations/Interventions:  1. None at this time.    Mely Anton [facility]  2022

## 2022-03-27 LAB — BACTERIA UR CULT: NORMAL

## 2022-03-27 ASSESSMENT — ENCOUNTER SYMPTOMS
CONFUSION: 0
BACK PAIN: 0
DIAPHORESIS: 0
DIZZINESS: 0
WEAKNESS: 1
NECK PAIN: 0
DYSURIA: 0
LIGHT-HEADEDNESS: 0
FREQUENCY: 0
BLOOD IN STOOL: 0
NUMBNESS: 0
CHEST TIGHTNESS: 0
CHILLS: 1
VOMITING: 0
COUGH: 0
ABDOMINAL PAIN: 0
NAUSEA: 0
FATIGUE: 1
FEVER: 1
FLANK PAIN: 0
ANAL BLEEDING: 0
SHORTNESS OF BREATH: 0
COLOR CHANGE: 0
SLEEP DISTURBANCE: 0
ABDOMINAL DISTENTION: 0
WHEEZING: 0
VOICE CHANGE: 0
MYALGIAS: 1
PALPITATIONS: 0
HEADACHES: 0

## 2022-03-27 NOTE — ED PROVIDER NOTES
History     Chief Complaint   Patient presents with     Fever     Headache     The history is provided by the patient and a relative.   Fever  Temp source:  Subjective  Severity:  Moderate  Onset quality:  Sudden  Timing:  Constant  Progression:  Worsening  Chronicity:  New  Associated symptoms: chills and myalgias    Associated symptoms: no chest pain, no confusion, no cough, no dysuria, no headaches, no nausea, no rash and no vomiting          Allergies:  Allergies   Allergen Reactions     Lisinopril Cough     Morphine Other (See Comments) and Visual Disturbance     confusion       Problem List:    Patient Active Problem List    Diagnosis Date Noted     Febrile illness 03/26/2022     Priority: Medium     Urinary tract infection without hematuria, site unspecified 03/26/2022     Priority: Medium     Diverticulitis of large intestine with perforation and abscess 07/27/2021     Priority: Medium     Sigmoid diverticulitis 07/15/2021     Priority: Medium     Diverticulitis of large intestine with abscess 07/15/2021     Priority: Medium     Abdominal pain, right lower quadrant 07/15/2021     Priority: Medium     Added automatically from request for surgery [id number]       TIA (transient ischemic attack) 11/30/2020     Priority: Medium     Malignant neoplasm of overlapping sites of bladder (H) 11/22/2019     Priority: Medium     History of bladder cancer 10/28/2019     Priority: Medium     Venous stasis dermatitis of both lower extremities 03/16/2017     Priority: Medium     ACP (advance care planning) 06/06/2016     Priority: Medium     Advance Care Planning 6/6/2016: ACP Review of Chart / Resources Provided:  Reviewed chart for advance care plan.  James Grant has no plan or code status on file. Discussed available resources and provided with information. Confirmed code status reflects current choices pending further ACP discussions.  Confirmed/documented legally designated decision makers.  Added by Yani Lucas  Duker             Advance care planning 02/08/2016     Priority: Medium     Advance Care Planning 2/8/2016: Receipt of ACP document:  Received: POLST which was signed and dated by provider on 1/18/16.  Document previously scanned on 1/22/16.  Order reviewed and found to be valid.  Code Status reflects choices in most recent ACP document.  Confirmed/documented designated decision maker(s).  Added by Trinh Garces             BPH (benign prostatic hyperplasia) 01/08/2014     Priority: Medium     SNHL (sensorineural hearing loss) 07/17/2013     Priority: Medium     ETD (eustachian tube dysfunction) 07/17/2013     Priority: Medium     Dyslipidemia 11/26/2010     Priority: Medium     GERD (gastroesophageal reflux disease) 11/26/2010     Priority: Medium     CHD (coronary heart disease) 11/26/2010     Priority: Medium     07/2010 S/P inferior wall MI  Angioplasty and stenting X 2 RCA  08/2010 angioplasty and stenting (LIDIA) LAD       Essential hypertension, benign 11/08/2010     Priority: Medium     Overview:   IMO Update 10/11       Coronary atherosclerosis of native coronary artery 11/08/2010     Priority: Medium     Formatting of this note might be different from the original.  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       CHOLECYSTECTOMY       CYSTOSCOPY, FULGURATE BLEEDERS, EVACUATE CLOT(S), COMBINED N/A 2/17/2020    Procedure: Clot Evacuation;  Surgeon: Corwin  Andrzej MOJICA MD;  Location: [facility] OR     CYSTOSCOPY, RETROGRADES, COMBINED Bilateral 10/22/2019    Procedure: Bilateral Retrograde Pyelograms;  Surgeon: Andrzej Olivia MD;  Location: [facility] OR     CYSTOSCOPY, TRANSURETHRAL RESECTION (TUR) TUMOR BLADDER, COMBINED N/A 10/22/2019    Procedure: Transurethral Resection of Bladder Tumor and transurethral resection of prostate and bladder stone removal;  Surgeon: Andrzej Olivia MD;  Location: [facility] OR     HERNIA REPAIR       LAPAROTOMY EXPLORATORY N/A 2021    Procedure: Exploratory  laparotomy with sigmoid colectomy and creation of colostomy, appendectomy;  Surgeon: Sukumar Chavez MD;  Location: [facility] OR     left arm surgery         Family History:    Family History   Problem Relation Age of Onset     Heart Failure Mother 86        Congestive - Cause of Death     Cerebrovascular Disease Father      C.A.D. Sister 91       Social History:  Marital Status:   [5]  Social History     Tobacco Use     Smoking status: Former Smoker     Packs/day: 1.00     Years: 13.00     Pack years: 13.00     Types: Cigarettes     Start date: 1949     Quit date: 1962     Years since quittin.6     Smokeless tobacco: Never Used   Substance Use Topics     Alcohol use: Not Currently     Comment: 3 Drinks (BEER & LIQUOR) ~ OCCASIONALLY (QUIT IN )     Drug use: No        Medications:    acetaminophen (TYLENOL) 325 MG tablet  ciprofloxacin (CIPRO) 500 MG tablet  famotidine (PEPCID) 20 MG tablet  losartan (COZAAR) 25 MG tablet  NITROSTAT 0.4 MG sublingual tablet  saccharomyces boulardii (FLORASTOR) 250 MG capsule  simvastatin (ZOCOR) 20 MG tablet  SM ASPIRIN ADULT LOW STRENGTH 81 MG EC tablet  triamcinolone (KENALOG) 0.025 % external ointment  metoprolol succinate ER (TOPROL-XL) 25 MG 24 hr tablet          Review of Systems   Constitutional: Positive for chills, fatigue and fever. Negative for diaphoresis.   HENT: Negative for voice change.    Eyes: Negative for visual disturbance.    Respiratory: Negative for cough, chest tightness, shortness of breath and wheezing.    Cardiovascular: Negative for chest pain, palpitations and leg swelling.   Gastrointestinal: Negative for abdominal distention, abdominal pain, anal bleeding, blood in stool, nausea and vomiting.   Genitourinary: Negative for decreased urine volume, dysuria, flank pain and frequency.   Musculoskeletal: Positive for myalgias. Negative for back pain, gait problem and neck pain.   Skin: Negative for color change, pallor and rash.   Neurological: Positive for weakness. Negative for dizziness, syncope, light-headedness, numbness and headaches.   Psychiatric/Behavioral: Negative for confusion, sleep disturbance and suicidal ideas.       Physical Exam   BP: 144/80  Pulse: 86  Temp: (!) 100.9  F (38.3  C)  Resp: 18  SpO2: 96 %      Physical Exam  Vitals and nursing note reviewed.   Constitutional:       Appearance: He is well-developed.   HENT:      Head: Normocephalic and atraumatic.   Eyes:      Conjunctiva/sclera: Conjunctivae normal.      Pupils: Pupils are equal, round, and reactive to light.   Neck:      Thyroid: No thyromegaly.      Vascular: No JVD.      Trachea: No tracheal deviation.   Cardiovascular:      Rate and Rhythm: Normal rate and regular rhythm.      Heart sounds: Normal heart sounds. No murmur heard.    No gallop.   Pulmonary:      Effort: Pulmonary effort is normal. No respiratory distress.      Breath sounds: Normal breath sounds. No stridor. No wheezing or rales.   Chest:      Chest wall: No tenderness.   Abdominal:      General: Bowel sounds are normal. There is no distension.      Palpations: Abdomen is soft. There is no mass.      Tenderness: There is abdominal tenderness in the periumbilical area and left upper quadrant. There is no guarding or rebound.   Musculoskeletal:         General: No tenderness. Normal range of motion.      Cervical back: Normal range of motion and neck supple.   Lymphadenopathy:       Cervical: No cervical adenopathy.   Skin:     General: Skin is warm.      Coloration: Skin is not pale.      Findings: No erythema or rash.   Neurological:      Mental Status: He is alert and oriented to person, place, and time.   Psychiatric:         Behavior: Behavior normal.         ED Course                 Procedures                  Results for orders placed or performed during the hospital encounter of 03/25/22 (from the past 24 hour(s))   Procalcitonin   Result Value Ref Range    Procalcitonin <0.05 <0.05 ng/mL   Lactic acid whole blood   Result Value Ref Range    Lactic Acid 1.0 0.7 - 2.0 mmol/L   Renal panel   Result Value Ref Range    Sodium 141 133 - 144 mmol/L    Potassium 4.0 3.4 - 5.3 mmol/L    Chloride 109 94 - 109 mmol/L    Carbon Dioxide (CO2) 26 20 - 32 mmol/L    Anion Gap 6 3 - 14 mmol/L    Urea Nitrogen 18 7 - 30 mg/dL    Creatinine 0.70 0.66 - 1.25 mg/dL    Calcium 8.1 (L) 8.5 - 10.1 mg/dL    Glucose 116 (H) 70 - 99 mg/dL    Albumin 3.0 (L) 3.4 - 5.0 g/dL    Phosphorus 2.9 2.5 - 4.5 mg/dL    GFR Estimate 85 >60 mL/min/1.73m2   Magnesium   Result Value Ref Range    Magnesium 2.0 1.6 - 2.3 mg/dL   Extra Tube    Narrative    The following orders were created for panel order Extra Tube.  Procedure                               Abnormality         Status                     ---------                               -----------         ------                     Extra Purple Top Tube[[id number]]                            Final result                 Please view results for these tests on the individual orders.   Extra Purple Top Tube   Result Value Ref Range    Hold Specimen JIC    CBC with platelets   Result Value Ref Range    WBC Count 20.2 (H) 4.0 - 11.0 10e3/uL    RBC Count 4.32 (L) 4.40 - 5.90 10e6/uL    Hemoglobin 13.2 (L) 13.3 - 17.7 g/dL    Hematocrit 39.7 (L) 40.0 - 53.0 %    MCV 92 78 - 100 fL    MCH 30.6 26.5 - 33.0 pg    MCHC 33.2 31.5 - 36.5 g/dL    RDW 13.2 10.0 - 15.0 %    Platelet Count  236 150 - 450 10e3/uL       Medications   0.9% sodium chloride BOLUS (0 mLs Intravenous Stopped 3/26/22 0011)   iopamidol (ISOVUE-370) solution 98 mL (98 mLs Intravenous Given 3/26/22 0031)   sodium chloride (PF) 0.9% PF flush 60 mL (60 mLs Intravenous Given 3/26/22 0032)   ondansetron (ZOFRAN) injection 4 mg (4 mg Intravenous Given 3/26/22 0137)   cefTRIAXone in d5w (ROCEPHIN) intermittent infusion 1 g (0 g Intravenous Stopped 3/26/22 0240)   0.9% sodium chloride BOLUS (1,000 mLs Intravenous New Bag 3/26/22 0331)       Assessments & Plan (with Medical Decision Making)   Fever, chills  Slight tenderdness in abdomen but pt denies any abdominal pain  Labs reviewed, WBC: 20k  UA positive,   CT abd: no acute finding    UTI vs early abdominal infection  IV ceftriaxone given  Spoke to Dr Manzanares , accepted for admission as observation.   I have reviewed the nursing notes.    I have reviewed the findings, diagnosis, plan and need for follow up with the patient.      Discharge Medication List as of 3/26/2022  2:00 PM      START taking these medications    Details   ciprofloxacin (CIPRO) 500 MG tablet Take 1 tablet (500 mg) by mouth every 12 hours for 5 days, Disp-10 tablet, R-0, E-Prescribe      saccharomyces boulardii (FLORASTOR) 250 MG capsule Take 1 capsule (250 mg) by mouth 2 times daily for 10 days, Disp-20 capsule, R-0, E-Prescribe             Final diagnoses:   Urinary tract infection without hematuria, site unspecified   Febrile illness       3/25/2022   [facility]     Pepe Herrera MD  03/27/22 0129

## 2022-03-28 ENCOUNTER — TELEPHONE (OUTPATIENT)
Dept: CASE MANAGEMENT | Facility: HOSPITAL | Age: 87
End: 2022-03-28
Payer: MEDICARE

## 2022-03-28 NOTE — TELEPHONE ENCOUNTER
James Grant, was discharged to home on 3/26/22 from [facility]. I spoke today with him regarding the patient's discharge.   He indicates he has receive(d) sufficient information upon discharge. Medications were reviewed in full on discharge, including: Medications to be started; medications to be stopped; medications to be continued from preadmission and any side effects. Prescriptions were received at discharge and were able to be filled. Medications are being taken as prescribed.   He indicates they have an appointment scheduled for 4/1/22 and have transportation available. They are able to confirm their appointment time and have it written down.   He did not have any questions regarding discharge instructions or condition.  Per their request, the following employee (s) can be recognized for their outstanding services delivered:  None  Suggestions to improve service: None  He was informed they may receive a survey in the mail from the hospital. Asked if they would kindly complete the survey in order for [facility] to know if services fully met patient needs.    Gracie Damon RN on 3/28/2022 at 12:23 PM

## 2022-03-31 LAB
BACTERIA BLD CULT: NO GROWTH
BACTERIA BLD CULT: NO GROWTH

## 2022-03-31 RX ORDER — GARLIC EXTRACT 500 MG
CAPSULE ORAL
COMMUNITY
Start: 2022-03-26 | End: 2023-09-13

## 2022-03-31 NOTE — PROGRESS NOTES
"  Assessment & Plan     Urinary tract infection  Improvement since hospitalization.  Feels like he is back to normal  Continue with probiotic until completed  White count is improving   Follow up with any recurrent symptoms or concerns   - CBC with platelets and differential; Future             BMI:   Estimated body mass index is 27.2 kg/m  as calculated from the following:    Height as of 10/26/21: 1.803 m (5' 11\").    Weight as of this encounter: 88.5 kg (195 lb).       See Patient Instructions    No follow-ups on file.    PRATIMA Osorio [facility] - JESSICA Campbell is a 89 year old who presents for the following health issues  accompanied by his alone.    hospitals       Hospital Follow-up Visit:    Hospital/Nursing Home/IP Rehab Facility: [facility]  Date of Admission: 3/25/22  Date of Discharge: 3/26/22  Reason(s) for Admission: UTI      Was your hospitalization related to COVID-19? No   Problems taking medications regularly:  no  Medication changes since discharge: yes  Cipro 500 mg daily for 5 days completed   Florastor 2 times a day for 10 days has a few days left   Problems adhering to non-medication therapy:  None    Summary of hospitalization:  [facility] discharge summary reviewed  Diagnostic Tests/Treatments reviewed.  Follow up needed: none  Other Healthcare Providers Involved in Patient s Care:         None  Update since discharge: improved. Post Discharge Medication Reconciliation: discharge medications reconciled and changed, per note/orders.  Plan of care communicated with patient              Review of Systems   CONSTITUTIONAL: NEGATIVE for fever, chills, change in weight  ENT: post nasal drip   RESP:cough productive in the morning   CV: NEGATIVE for chest pain, palpitations or peripheral edema  GI: NEGATIVE for nausea, abdominal pain, heartburn, or change in bowel habits  : negative for dysuria, hematuria, decreased urinary " stream,    Objective    BP (!) 152/70   Pulse 72   Temp (!) 96.7  F (35.9  C) (Tympanic)   Wt 88.5 kg (195 lb)   SpO2 96%   BMI 27.20 kg/m    Body mass index is 27.2 kg/m .  Physical Exam   GENERAL: alert and no distress  RESP: lungs clear to auscultation - no rales, rhonchi or wheezes  CV: regular rate and rhythm, normal S1 S2, no S3 or S4, no murmur, click or rub, no peripheral edema and peripheral pulses strong  ABDOMEN: soft, nontender, no hepatosplenomegaly, no masses and bowel sounds normal  PSYCH: mentation appears normal, affect normal/bright    Lab on 04/01/2022   Component Date Value Ref Range Status     WBC Count 04/01/2022 11.9 (A) 4.0 - 11.0 10e3/uL Final     RBC Count 04/01/2022 4.86  4.40 - 5.90 10e6/uL Final     Hemoglobin 04/01/2022 14.7  13.3 - 17.7 g/dL Final     Hematocrit 04/01/2022 44.9  40.0 - 53.0 % Final     MCV 04/01/2022 92  78 - 100 fL Final     MCH 04/01/2022 30.2  26.5 - 33.0 pg Final     MCHC 04/01/2022 32.7  31.5 - 36.5 g/dL Final     RDW 04/01/2022 13.3  10.0 - 15.0 % Final     Platelet Count 04/01/2022 295  150 - 450 10e3/uL Final     % Neutrophils 04/01/2022 44  % Final     % Lymphocytes 04/01/2022 40  % Final     % Monocytes 04/01/2022 11  % Final     % Eosinophils 04/01/2022 5  % Final     % Basophils 04/01/2022 0  % Final     % Immature Granulocytes 04/01/2022 0  % Final     NRBCs per 100 WBC 04/01/2022 0  <1 /100 Final     Absolute Neutrophils 04/01/2022 5.1  1.6 - 8.3 10e3/uL Final     Absolute Lymphocytes 04/01/2022 4.8  0.8 - 5.3 10e3/uL Final     Absolute Monocytes 04/01/2022 1.3  0.0 - 1.3 10e3/uL Final     Absolute Eosinophils 04/01/2022 0.6  0.0 - 0.7 10e3/uL Final     Absolute Basophils 04/01/2022 0.1  0.0 - 0.2 10e3/uL Final     Absolute Immature Granulocytes 04/01/2022 0.0  <=0.4 10e3/uL Final     Absolute NRBCs 04/01/2022 0.0  10e3/uL Final

## 2022-04-01 ENCOUNTER — LAB (OUTPATIENT)
Dept: LAB | Facility: OTHER | Age: 87
End: 2022-04-01
Payer: MEDICARE

## 2022-04-01 ENCOUNTER — OFFICE VISIT (OUTPATIENT)
Dept: FAMILY MEDICINE | Facility: OTHER | Age: 87
End: 2022-04-01
Attending: NURSE PRACTITIONER
Payer: MEDICARE

## 2022-04-01 VITALS
DIASTOLIC BLOOD PRESSURE: 70 MMHG | HEART RATE: 72 BPM | OXYGEN SATURATION: 96 % | SYSTOLIC BLOOD PRESSURE: 152 MMHG | BODY MASS INDEX: 27.2 KG/M2 | WEIGHT: 195 LBS | TEMPERATURE: 96.7 F

## 2022-04-01 DIAGNOSIS — N39.0 URINARY TRACT INFECTION: ICD-10-CM

## 2022-04-01 DIAGNOSIS — N30.00 ACUTE CYSTITIS WITHOUT HEMATURIA: Primary | ICD-10-CM

## 2022-04-01 LAB
BASOPHILS # BLD AUTO: 0.1 10E3/UL (ref 0–0.2)
BASOPHILS NFR BLD AUTO: 0 %
EOSINOPHIL # BLD AUTO: 0.6 10E3/UL (ref 0–0.7)
EOSINOPHIL NFR BLD AUTO: 5 %
ERYTHROCYTE [DISTWIDTH] IN BLOOD BY AUTOMATED COUNT: 13.3 % (ref 10–15)
HCT VFR BLD AUTO: 44.9 % (ref 40–53)
HGB BLD-MCNC: 14.7 G/DL (ref 13.3–17.7)
HOLD SPECIMEN: NORMAL
IMM GRANULOCYTES # BLD: 0 10E3/UL
IMM GRANULOCYTES NFR BLD: 0 %
LYMPHOCYTES # BLD AUTO: 4.8 10E3/UL (ref 0.8–5.3)
LYMPHOCYTES NFR BLD AUTO: 40 %
MCH RBC QN AUTO: 30.2 PG (ref 26.5–33)
MCHC RBC AUTO-ENTMCNC: 32.7 G/DL (ref 31.5–36.5)
MCV RBC AUTO: 92 FL (ref 78–100)
MONOCYTES # BLD AUTO: 1.3 10E3/UL (ref 0–1.3)
MONOCYTES NFR BLD AUTO: 11 %
NEUTROPHILS # BLD AUTO: 5.1 10E3/UL (ref 1.6–8.3)
NEUTROPHILS NFR BLD AUTO: 44 %
NRBC # BLD AUTO: 0 10E3/UL
NRBC BLD AUTO-RTO: 0 /100
PLATELET # BLD AUTO: 295 10E3/UL (ref 150–450)
RBC # BLD AUTO: 4.86 10E6/UL (ref 4.4–5.9)
WBC # BLD AUTO: 11.9 10E3/UL (ref 4–11)

## 2022-04-01 PROCEDURE — 85025 COMPLETE CBC W/AUTO DIFF WBC: CPT | Mod: ZL

## 2022-04-01 PROCEDURE — 99213 OFFICE O/P EST LOW 20 MIN: CPT | Performed by: NURSE PRACTITIONER

## 2022-04-01 PROCEDURE — G0463 HOSPITAL OUTPT CLINIC VISIT: HCPCS | Performed by: NURSE PRACTITIONER

## 2022-04-01 PROCEDURE — 36415 COLL VENOUS BLD VENIPUNCTURE: CPT | Mod: ZL

## 2022-04-01 ASSESSMENT — PAIN SCALES - GENERAL: PAINLEVEL: NO PAIN (0)

## 2022-04-01 NOTE — NURSING NOTE
"Chief Complaint   Patient presents with     Hospital F/U       Initial BP (!) 152/70   Pulse 72   Temp (!) 96.7  F (35.9  C) (Tympanic)   Wt 88.5 kg (195 lb)   SpO2 96%   BMI 27.20 kg/m   Estimated body mass index is 27.2 kg/m  as calculated from the following:    Height as of 10/26/21: 1.803 m (5' 11\").    Weight as of this encounter: 88.5 kg (195 lb).  Medication Reconciliation: complete  Edward Almeida LPN  "

## 2022-04-13 NOTE — PROGRESS NOTES
Chief Complaint   Patient presents with     Cerumen Impaction     Ear cleaning       Adrian returns to ENT for ear cleaning. He was last seen on 10/14/21 and was doing well.   He has no otalgia, otorrhea.   He had improvement and recommended q 6 month ear cleaning.      Today, he returns for follow up ear cleaning.   He has felt some left ear drainage and mild itching in right ear   Reports left aid does get waxy discharge on aid.      [facility].   [facility]         Audiogram 2/26/18: Mild to profound bilateral SNHL. WRS 80% right and 88% left. SRT 70 dB HL right and 60 dB HL left.   No recent audiogram available.        Past Medical History:   Diagnosis Date     Benign localized hyperplasia of prostate without urinary obstruction and other lower urinary tract symptoms (LUTS) 11/26/2010     CHD (coronary heart disease) 11/26/2010     Dyslipidemia 11/26/2010     GERD (gastroesophageal reflux disease) 11/26/2010     HTN (hypertension) 11/26/2010     Old myocardial infarction 11/26/2010     Other symptoms involving digestive system(787.99) 10/20/2006        Allergies   Allergen Reactions     Lisinopril Cough     Morphine Other (See Comments) and Visual Disturbance     confusion     Current Outpatient Medications   Medication     acetaminophen (TYLENOL) 325 MG tablet     famotidine (PEPCID) 20 MG tablet     Lactobacillus Acid-Pectin (ACIDOPHILUS/PECTIN) CAPS     losartan (COZAAR) 25 MG tablet     metoprolol succinate ER (TOPROL-XL) 25 MG 24 hr tablet     NITROSTAT 0.4 MG sublingual tablet     saccharomyces boulardii (FLORASTOR) 250 MG capsule     simvastatin (ZOCOR) 20 MG tablet     SM ASPIRIN ADULT LOW STRENGTH 81 MG EC tablet     triamcinolone (KENALOG) 0.025 % external ointment     No current facility-administered medications for this visit.     ROS_ SEE HPI  /64 (BP Location: Right arm, Patient Position:  "Sitting, Cuff Size: Adult Regular)   Pulse 62   Temp 97.9  F (36.6  C) (Tympanic)   Ht 1.803 m (5' 11\")   Wt 88.9 kg (196 lb)   SpO2 97%   BMI 27.34 kg/m      [place] w/ aids.   General - The patient is well nourished and well developed, and appears to have good nutritional status.  Alert and oriented to person and place, interactive.  Head and Face - Normocephalic and atraumatic, with no gross asymmetry noted of the contour of the facial features.  The facial nerve is intact, with strong symmetric movements.  Neck-no palpable lymphadenopathy or thyroid mass.  Trachea is midline.  Eyes - Extraocular movements intact.   Ears- External ears normal. The ears were examined under microscopy bilaterally. Right EAC with thick cerumen. Ears were cleaned with cupped forceps and cerumen loops.  I used #5 Posada and #3 Posada to clean otorrhea near the TM.  Right TM edematous, myringitis.  There is polypoid changes along the anterior superior quadrant.  Left EAC was cleared of cerumen with use of cup forceps and cerumen loop.  Left tympanic membrane is intact without effusion or retraction noted.  Nose - External nasal contour symmetric. See below for nasal examination.  Mouth - Examination of the oral cavity shows pink, healthy, moist mucosa. Dentition in good condition.  No lesions or ulceration noted. The tongue is mobile and midline.    Throat - The walls of the oropharynx were smooth, pink, moist, symmetric, and had no lesions or ulcerations.  The tonsillar pillars and soft palate were symmetric.         ASSESSMENT/ PLAN:    ICD-10-CM    1. Myringitis of right ear  H73.21 ciprofloxacin-dexamethasone (CIPRODEX) 0.3-0.1 % otic suspension   2. Otorrhea, right  H92.11 ciprofloxacin-dexamethasone (CIPRODEX) 0.3-0.1 % otic suspension   3. Excessive cerumen in ear canal, bilateral  H61.23    4. Sensorineural hearing loss (SNHL) of both ears  H90.3          Start Ciprodex 4 drops twice daily for 10 days to right ear  Take " hearing aid out to allow venting.    Return in 3 to 4 weeks-at that visit would recommend placement of antibiotic powder.  If he has no improvement within 1 week of drop use, he was instructed to contact nurse.    He was happy with this plan will follow up as scheduled.    I encouraged [facility].  Patient states that once the weather improves, he will travel to the facility.    Lizzeth Harris PA-C  ENT  [facility], [place]

## 2022-04-14 ENCOUNTER — OFFICE VISIT (OUTPATIENT)
Dept: OTOLARYNGOLOGY | Facility: OTHER | Age: 87
End: 2022-04-14
Attending: PHYSICIAN ASSISTANT
Payer: MEDICARE

## 2022-04-14 VITALS
HEIGHT: 71 IN | HEART RATE: 62 BPM | DIASTOLIC BLOOD PRESSURE: 64 MMHG | WEIGHT: 196 LBS | SYSTOLIC BLOOD PRESSURE: 128 MMHG | BODY MASS INDEX: 27.44 KG/M2 | OXYGEN SATURATION: 97 % | TEMPERATURE: 97.9 F

## 2022-04-14 DIAGNOSIS — H92.11 OTORRHEA, RIGHT: ICD-10-CM

## 2022-04-14 DIAGNOSIS — H61.23 EXCESSIVE CERUMEN IN EAR CANAL, BILATERAL: ICD-10-CM

## 2022-04-14 DIAGNOSIS — H73.21 MYRINGITIS OF RIGHT EAR: Primary | ICD-10-CM

## 2022-04-14 DIAGNOSIS — H90.3 SENSORINEURAL HEARING LOSS (SNHL) OF BOTH EARS: ICD-10-CM

## 2022-04-14 PROCEDURE — 92504 EAR MICROSCOPY EXAMINATION: CPT | Performed by: PHYSICIAN ASSISTANT

## 2022-04-14 PROCEDURE — 69210 REMOVE IMPACTED EAR WAX UNI: CPT | Performed by: PHYSICIAN ASSISTANT

## 2022-04-14 PROCEDURE — 99213 OFFICE O/P EST LOW 20 MIN: CPT | Mod: 25 | Performed by: PHYSICIAN ASSISTANT

## 2022-04-14 PROCEDURE — G0463 HOSPITAL OUTPT CLINIC VISIT: HCPCS

## 2022-04-14 RX ORDER — CIPROFLOXACIN AND DEXAMETHASONE 3; 1 MG/ML; MG/ML
4 SUSPENSION/ DROPS AURICULAR (OTIC) 2 TIMES DAILY
Qty: 4 ML | Refills: 0 | Status: SHIPPED | OUTPATIENT
Start: 2022-04-14 | End: 2022-04-24

## 2022-04-14 ASSESSMENT — PAIN SCALES - GENERAL: PAINLEVEL: NO PAIN (0)

## 2022-04-14 NOTE — PATIENT INSTRUCTIONS
Start Ciprodex 4 drops twice a day for 10 days  Keep ear dry  Try to take aid out at times to allow ear to vent  Follow up in 3-4 weeks- may need powder applied to ears    Thank you for allowing Lizzeth Harris PA-C and our ENT team to participate in your care.  If your medications are too expensive, please give the nurse a call.  We can possibly change this medication.  If you have a scheduling or an appointment question please contact our Health Unit Coordinator at [phone], Ext. [phone].    ALL nursing questions or concerns can be directed to your ENT nurse at: [phone] [place]

## 2022-04-14 NOTE — NURSING NOTE
"Chief Complaint   Patient presents with     Cerumen Impaction     Ear cleaning       Initial /64 (BP Location: Right arm, Patient Position: Sitting, Cuff Size: Adult Regular)   Pulse 62   Temp 97.9  F (36.6  C) (Tympanic)   Ht 1.803 m (5' 11\")   Wt 88.9 kg (196 lb)   SpO2 97%   BMI 27.34 kg/m   Estimated body mass index is 27.34 kg/m  as calculated from the following:    Height as of this encounter: 1.803 m (5' 11\").    Weight as of this encounter: 88.9 kg (196 lb).  Medication Reconciliation: complete  Es Hummel LPN    "

## 2022-04-14 NOTE — LETTER
4/14/2022         RE: James Grant  [address] [facility] [address]        Dear Colleague,    Thank you for referring your patient, James Grant, to the [facility]. Please see a copy of my visit note below.    Chief Complaint   Patient presents with     Cerumen Impaction     Ear cleaning       Adrian returns to ENT for ear cleaning. He was last seen on 10/14/21 and was doing well.   He has no otalgia, otorrhea.   He had improvement and recommended q 6 month ear cleaning.      Today, he returns for follow up ear cleaning.   He has felt some left ear drainage and mild itching in right ear   Reports left aid does get waxy discharge on aid.      [facility].   [facility].         Audiogram 2/26/18: Mild to profound bilateral SNHL. WRS 80% right and 88% left. SRT 70 dB HL right and 60 dB HL left.   No recent audiogram available.        Past Medical History:   Diagnosis Date     Benign localized hyperplasia of prostate without urinary obstruction and other lower urinary tract symptoms (LUTS) 11/26/2010     CHD (coronary heart disease) 11/26/2010     Dyslipidemia 11/26/2010     GERD (gastroesophageal reflux disease) 11/26/2010     HTN (hypertension) 11/26/2010     Old myocardial infarction 11/26/2010     Other symptoms involving digestive system(787.99) 10/20/2006        Allergies   Allergen Reactions     Lisinopril Cough     Morphine Other (See Comments) and Visual Disturbance     confusion     Current Outpatient Medications   Medication     acetaminophen (TYLENOL) 325 MG tablet     famotidine (PEPCID) 20 MG tablet     Lactobacillus Acid-Pectin (ACIDOPHILUS/PECTIN) CAPS     losartan (COZAAR) 25 MG tablet     metoprolol succinate ER (TOPROL-XL) 25 MG 24 hr tablet     NITROSTAT 0.4 MG sublingual tablet     saccharomyces boulardii (FLORASTOR) 250 MG capsule     simvastatin (ZOCOR) 20 MG  "tablet     SM ASPIRIN ADULT LOW STRENGTH 81 MG EC tablet     triamcinolone (KENALOG) 0.025 % external ointment     No current facility-administered medications for this visit.     ROS_ SEE HPI  /64 (BP Location: Right arm, Patient Position: Sitting, Cuff Size: Adult Regular)   Pulse 62   Temp 97.9  F (36.6  C) (Tympanic)   Ht 1.803 m (5' 11\")   Wt 88.9 kg (196 lb)   SpO2 97%   BMI 27.34 kg/m      [place] w/ aids.   General - The patient is well nourished and well developed, and appears to have good nutritional status.  Alert and oriented to person and place, interactive.  Head and Face - Normocephalic and atraumatic, with no gross asymmetry noted of the contour of the facial features.  The facial nerve is intact, with strong symmetric movements.  Neck-no palpable lymphadenopathy or thyroid mass.  Trachea is midline.  Eyes - Extraocular movements intact.   Ears- External ears normal. The ears were examined under microscopy bilaterally. Right EAC with thick cerumen. Ears were cleaned with cupped forceps and cerumen loops.  I used #5 Posada and #3 Posada to clean otorrhea near the TM.  Right TM edematous, myringitis.  There is polypoid changes along the anterior superior quadrant.  Left EAC was cleared of cerumen with use of cup forceps and cerumen loop.  Left tympanic membrane is intact without effusion or retraction noted.  Nose - External nasal contour symmetric. See below for nasal examination.  Mouth - Examination of the oral cavity shows pink, healthy, moist mucosa. Dentition in good condition.  No lesions or ulceration noted. The tongue is mobile and midline.    Throat - The walls of the oropharynx were smooth, pink, moist, symmetric, and had no lesions or ulcerations.  The tonsillar pillars and soft palate were symmetric.         ASSESSMENT/ PLAN:    ICD-10-CM    1. Myringitis of right ear  H73.21 ciprofloxacin-dexamethasone (CIPRODEX) 0.3-0.1 % otic suspension   2. Otorrhea, right  H92.11 " ciprofloxacin-dexamethasone (CIPRODEX) 0.3-0.1 % otic suspension   3. Excessive cerumen in ear canal, bilateral  H61.23    4. Sensorineural hearing loss (SNHL) of both ears  H90.3          Start Ciprodex 4 drops twice daily for 10 days to right ear  Take hearing aid out to allow venting.    Return in 3 to 4 weeks-at that visit would recommend placement of antibiotic powder.  If he has no improvement within 1 week of drop use, he was instructed to contact nurse.    He was happy with this plan will follow up as scheduled.    I encouraged [facility].  Patient states that once the weather improves, he will travel to the facility.    Lizzeth Harris PA-C  ENT  [facility], [place]          Again, thank you for allowing me to participate in the care of your patient.        Sincerely,        Lizzeth Harris PA-C

## 2022-04-26 ENCOUNTER — NURSE TRIAGE (OUTPATIENT)
Dept: FAMILY MEDICINE | Facility: OTHER | Age: 87
End: 2022-04-26
Payer: MEDICARE

## 2022-04-26 NOTE — TELEPHONE ENCOUNTER
Pt called has blood in urine denies any other sx.     Reason for Disposition    Blood in urine  (Exception: could be normal menstrual bleeding)    Additional Information    Negative: Shock suspected (e.g., cold/pale/clammy skin, too weak to stand, low BP, rapid pulse)    Negative: Sounds like a life-threatening emergency to the triager    Negative: Followed a genital area injury    Negative: Followed a genital area injury (penis, scrotum)    Negative: Vaginal discharge    Negative: Pus (white, yellow) or bloody discharge from end of penis    Negative: [1] Taking antibiotic for urinary tract infection (UTI) AND [2] female    Negative: [1] Taking antibiotic for urinary tract infection (UTI) AND [2] male    Negative: [1] Discomfort (pain, burning or stinging) when passing urine AND [2] pregnant    Negative: [1] Discomfort (pain, burning or stinging) when passing urine AND [2] postpartum (from 0 to 6 weeks after delivery)    Negative: [1] Discomfort (pain, burning or stinging) when passing urine AND [2] female    Negative: [1] Discomfort (pain, burning or stinging) when passing urine AND [2] male    Negative: Pain or itching in the vulvar area    Negative: Pain in scrotum is main symptom    Negative: Blood in the urine is main symptom    Negative: Symptoms arising from use of a urinary catheter (Prater or Coude)    Negative: [1] Unable to urinate (or only a few drops) > 4 hours AND     [2] bladder feels very full (e.g., palpable bladder or strong urge to urinate)    Negative: [1] Decreased urination and [2] drinking very little AND [2] dehydration suspected (e.g., dark urine, no urine > 12 hours, very dry mouth, very lightheaded)    Negative: Patient sounds very sick or weak to the triager    Negative: Fever > 100.4 F (38.0 C)    Negative: Side (flank) or lower back pain present    Negative: [1] Can't control passage of urine (i.e., urinary incontinence) AND [2] new onset (< 2 weeks) or worsening    Negative: Urinating  "more frequently than usual (i.e., frequency)    Negative: Bad or foul-smelling urine    Negative: Shock suspected (e.g., cold/pale/clammy skin, too weak to stand, low BP, rapid pulse)    Negative: Sounds like a life-threatening emergency to the triager    Negative: Urinary catheter, questions about    Negative: Recent back or abdominal injury    Negative: Recent genital injury    Negative: [1] Unable to urinate (or only a few drops) > 4 hours AND [2] bladder feels very full (e.g., palpable bladder or strong urge to urinate)    Negative: Passing pure blood or large blood clots (i.e., size > a dime) (Exception: kwesi or small strands)    Negative: Fever > 100.4 F (38.0 C)    Negative: Patient sounds very sick or weak to the triager    Negative: Known sickle cell disease    Negative: Taking Coumadin (warfarin) or other strong blood thinner, or known bleeding disorder (e.g., thrombocytopenia)    Negative: Side (flank) or back pain present    Negative: Pain or burning with passing urine    Negative: [1] Pink or red-colored urine and likely from food (beets, rhubarb, red food dye) AND [2] lasts > 24 hours after stopping food    Answer Assessment - Initial Assessment Questions  1. SYMPTOM: \"What's the main symptom you're concerned about?\" (e.g., frequency, incontinence)      Blood urine  2. ONSET: \"When did the  sx  start?\"      Couple days  3. PAIN: \"Is there any pain?\" If so, ask: \"How bad is it?\" (Scale: 1-10; mild, moderate, severe)      no  4. CAUSE: \"What do you think is causing the symptoms?\"      uti  5. OTHER SYMPTOMS: \"Do you have any other symptoms?\" (e.g., fever, flank pain, blood in urine, pain with urination)      Blood in urine  6. PREGNANCY: \"Is there any chance you are pregnant?\" \"When was your last menstrual period?\"      no    Protocols used: URINE - BLOOD IN-A-, URINARY SYMPTOMS-A-AH      "

## 2022-04-27 ENCOUNTER — OFFICE VISIT (OUTPATIENT)
Dept: FAMILY MEDICINE | Facility: OTHER | Age: 87
End: 2022-04-27
Attending: NURSE PRACTITIONER
Payer: MEDICARE

## 2022-04-27 VITALS
RESPIRATION RATE: 18 BRPM | BODY MASS INDEX: 27.48 KG/M2 | HEIGHT: 71 IN | HEART RATE: 60 BPM | OXYGEN SATURATION: 98 % | TEMPERATURE: 97.7 F | DIASTOLIC BLOOD PRESSURE: 76 MMHG | WEIGHT: 196.3 LBS | SYSTOLIC BLOOD PRESSURE: 128 MMHG

## 2022-04-27 DIAGNOSIS — Z85.51 HISTORY OF BLADDER CANCER: ICD-10-CM

## 2022-04-27 DIAGNOSIS — R31.0 GROSS HEMATURIA: Primary | ICD-10-CM

## 2022-04-27 LAB
ALBUMIN UR-MCNC: 30 MG/DL
APPEARANCE UR: CLEAR
BILIRUB UR QL STRIP: NEGATIVE
COLOR UR AUTO: YELLOW
GLUCOSE UR STRIP-MCNC: NEGATIVE MG/DL
HGB UR QL STRIP: ABNORMAL
KETONES UR STRIP-MCNC: NEGATIVE MG/DL
LEUKOCYTE ESTERASE UR QL STRIP: NEGATIVE
NITRATE UR QL: NEGATIVE
PH UR STRIP: 6 [PH] (ref 5–7)
RBC #/AREA URNS AUTO: ABNORMAL /HPF
SP GR UR STRIP: 1.02 (ref 1–1.03)
UROBILINOGEN UR STRIP-ACNC: 1 E.U./DL
WBC #/AREA URNS AUTO: ABNORMAL /HPF

## 2022-04-27 PROCEDURE — 81003 URINALYSIS AUTO W/O SCOPE: CPT | Mod: ZL | Performed by: NURSE PRACTITIONER

## 2022-04-27 PROCEDURE — 81001 URINALYSIS AUTO W/SCOPE: CPT | Mod: ZL | Performed by: NURSE PRACTITIONER

## 2022-04-27 PROCEDURE — 99213 OFFICE O/P EST LOW 20 MIN: CPT | Performed by: NURSE PRACTITIONER

## 2022-04-27 PROCEDURE — G0463 HOSPITAL OUTPT CLINIC VISIT: HCPCS

## 2022-04-27 ASSESSMENT — PAIN SCALES - GENERAL: PAINLEVEL: NO PAIN (0)

## 2022-04-27 NOTE — PROGRESS NOTES
"  Assessment & Plan     1. Gross hematuria  No infection noted today.  With history of bladder cancer will refer back to Dr Olivia for evaluation.    - UA with Microscopic reflex to Culture - [facility]  - Urine Microscopic  - Adult Urology Referral; Future    2. History of bladder cancer  - Adult Urology Referral; Future           BMI:   Estimated body mass index is 27.38 kg/m  as calculated from the following:    Height as of this encounter: 1.803 m (5' 11\").    Weight as of this encounter: 89 kg (196 lb 4.8 oz).         Follow-up as needed    Sharon Ayala NP  [facility] - [place] ABBY Campbell is a 89 year old who presents for the following health issues     HPI     Genitourinary - Male  Onset/Duration: 4/25/22  Description:   Dysuria (painful urination): no}  Hematuria (blood in urine): YES  Frequency: no  Waking at night to urinate: YES  Hesitancy (delay in urine): no  Retention (unable to empty): no  Decrease in urinary flow: no  Incontinence: YES  Progression of Symptoms:  No blood noted today   Accompanying Signs & Symptoms:  Fever: no  Back/Flank pain: no  Urethral discharge: no  Testicle lumps/masses/pain: no  Nausea and/or vomiting: no  Abdominal pain: no  History:   History of frequent UTI s: hx of bladder cancer and was hospitalized with UTI one month ago.    History of kidney stones: YES  History of hernias: YES  Personal or Family history of Prostate problems: YES  Sexually active: no  Precipitating or alleviating factors: None  Therapies tried and outcome: none        Review of Systems   Constitutional, HEENT, cardiovascular, pulmonary, gi and gu systems are negative, except as otherwise noted.      Objective    /76 (BP Location: Left arm, Patient Position: Chair, Cuff Size: Adult Large)   Pulse 60   Temp 97.7  F (36.5  C) (Tympanic)   Resp 18   Ht 1.803 m (5' 11\")   Wt 89 kg (196 lb 4.8 oz)   SpO2 98%   BMI 27.38 kg/m    Body mass index is 27.38 " kg/m .  Physical Exam   GENERAL: alert, no distress and elderly very pleasant male.   MS: no gross musculoskeletal defects noted, no edema  PSYCH: mentation appears normal, affect normal/bright    Results for orders placed or performed in visit on 04/27/22   UA with Microscopic reflex to Culture - [place]/[facility]     Status: Abnormal    Specimen: Urine, Midstream   Result Value Ref Range    Color Urine Yellow Colorless, Straw, Light Yellow, Yellow    Appearance Urine Clear Clear    Glucose Urine Negative Negative mg/dL    Bilirubin Urine Negative Negative    Ketones Urine Negative Negative mg/dL    Specific Gravity Urine 1.025 1.003 - 1.035    Blood Urine Trace (A) Negative    pH Urine 6.0 5.0 - 7.0    Protein Albumin Urine 30  (A) Negative mg/dL    Urobilinogen Urine 1.0 0.2, 1.0 E.U./dL    Nitrite Urine Negative Negative    Leukocyte Esterase Urine Negative Negative   Urine Microscopic     Status: Abnormal   Result Value Ref Range    RBC Urine 5-10 (A) 0-2 /HPF /HPF    WBC Urine 0-5 0-5 /HPF /HPF    Narrative    Urine Culture not indicated

## 2022-04-27 NOTE — NURSING NOTE
"Chief Complaint   Patient presents with     Kidney Problem       Initial /76 (BP Location: Left arm, Patient Position: Chair, Cuff Size: Adult Large)   Pulse 60   Temp 97.7  F (36.5  C) (Tympanic)   Resp 18   Ht 1.803 m (5' 11\")   Wt 89 kg (196 lb 4.8 oz)   SpO2 98%   BMI 27.38 kg/m   Estimated body mass index is 27.38 kg/m  as calculated from the following:    Height as of this encounter: 1.803 m (5' 11\").    Weight as of this encounter: 89 kg (196 lb 4.8 oz).  Medication Reconciliation: complete  Pamela M. Lechevalier, LPN    "

## 2022-05-10 ENCOUNTER — OFFICE VISIT (OUTPATIENT)
Dept: OTOLARYNGOLOGY | Facility: OTHER | Age: 87
End: 2022-05-10
Attending: PHYSICIAN ASSISTANT
Payer: MEDICARE

## 2022-05-10 VITALS
DIASTOLIC BLOOD PRESSURE: 68 MMHG | OXYGEN SATURATION: 98 % | HEIGHT: 71 IN | TEMPERATURE: 97.8 F | BODY MASS INDEX: 27.3 KG/M2 | HEART RATE: 59 BPM | WEIGHT: 195 LBS | SYSTOLIC BLOOD PRESSURE: 120 MMHG

## 2022-05-10 DIAGNOSIS — Z97.4 USES HEARING AID: ICD-10-CM

## 2022-05-10 DIAGNOSIS — H90.3 SENSORINEURAL HEARING LOSS (SNHL) OF BOTH EARS: Primary | ICD-10-CM

## 2022-05-10 PROCEDURE — 92504 EAR MICROSCOPY EXAMINATION: CPT

## 2022-05-10 PROCEDURE — G0463 HOSPITAL OUTPT CLINIC VISIT: HCPCS

## 2022-05-10 PROCEDURE — 99213 OFFICE O/P EST LOW 20 MIN: CPT | Mod: 25 | Performed by: PHYSICIAN ASSISTANT

## 2022-05-10 PROCEDURE — 92504 EAR MICROSCOPY EXAMINATION: CPT | Performed by: PHYSICIAN ASSISTANT

## 2022-05-10 ASSESSMENT — PAIN SCALES - GENERAL: PAINLEVEL: NO PAIN (0)

## 2022-05-10 NOTE — NURSING NOTE
"Chief Complaint   Patient presents with     Ear Problem     Follow up myringitis right ear and right otorrhea.       Initial /68 (BP Location: Left arm)   Pulse 59   Temp 97.8  F (36.6  C) (Tympanic)   Ht 1.803 m (5' 11\")   Wt 88.5 kg (195 lb)   SpO2 98%   BMI 27.20 kg/m   Estimated body mass index is 27.2 kg/m  as calculated from the following:    Height as of this encounter: 1.803 m (5' 11\").    Weight as of this encounter: 88.5 kg (195 lb).  Medication Reconciliation: complete  Mili Guzman LPN    "

## 2022-05-10 NOTE — LETTER
5/10/2022         RE: James Grant  [address] [facility] [address]        Dear Colleague,    Thank you for referring your patient, James Grant, to the [facility]. Please see a copy of my visit note below.    Chief Complaint   Patient presents with     Ear Problem     Follow up myringitis right ear and right otorrhea.       Patient returns to ENT for follow up exam. He was last seen on 4/14/22 and had right otorrhea and started on otics.   He completed Ciprodex and returns for recheck.       Today, he has no surjit concerns.      [facility] and [facility] was about [facility] years [facility].   [facility].         Audiogram 2/26/18: Mild to profound bilateral SNHL. WRS 80% right and 88% left. SRT 70 dB HL right and 60 dB HL left.   No recent audiogram available.         Past Medical History:   Diagnosis Date     Benign localized hyperplasia of prostate without urinary obstruction and other lower urinary tract symptoms (LUTS) 11/26/2010     CHD (coronary heart disease) 11/26/2010     Dyslipidemia 11/26/2010     GERD (gastroesophageal reflux disease) 11/26/2010     HTN (hypertension) 11/26/2010     Old myocardial infarction 11/26/2010     Other symptoms involving digestive system(787.99) 10/20/2006        Allergies   Allergen Reactions     Lisinopril Cough     Morphine Other (See Comments) and Visual Disturbance     confusion     Current Outpatient Medications   Medication     acetaminophen (TYLENOL) 325 MG tablet     famotidine (PEPCID) 20 MG tablet     Lactobacillus Acid-Pectin (ACIDOPHILUS/PECTIN) CAPS     losartan (COZAAR) 25 MG tablet     metoprolol succinate ER (TOPROL-XL) 25 MG 24 hr tablet     NITROSTAT 0.4 MG sublingual tablet     saccharomyces boulardii (FLORASTOR) 250 MG capsule     simvastatin (ZOCOR) 20 MG tablet     SM ASPIRIN ADULT LOW STRENGTH 81 MG EC tablet     No current facility-administered medications for this  "visit.     ROS- SEE HPI      /68 (BP Location: Left arm)   Pulse 59   Temp 97.8  F (36.6  C) (Tympanic)   Ht 1.803 m (5' 11\")   Wt 88.5 kg (195 lb)   SpO2 98%   BMI 27.20 kg/m      [place] w/ aids.   General - The patient is well nourished and well developed, and appears to have good nutritional status.  Alert and oriented to person and place, interactive.  Head and Face - Normocephalic and atraumatic, with no gross asymmetry noted of the contour of the facial features.  The facial nerve is intact, with strong symmetric movements.  Neck-no palpable lymphadenopathy or thyroid mass.  Trachea is midline.  Eyes - Extraocular movements intact.   Ears- External ears normal. The ears were examined under microscopy bilaterally.  EAC is improved.  There is no active otorrhea.  There is scant crusting around base of TM which was removed with #3 suction.  Tympanic membrane appears thickened no effusion.  Left EAC was cleared of cerumen with use of cup forceps and cerumen loop.  Left tympanic membrane is intact without effusion or retraction noted.    Mouth - Examination of the oral cavity shows pink, healthy, moist mucosa. Dentition in good condition.  No lesions or ulceration noted. The tongue is mobile and midline.    Throat - The walls of the oropharynx were smooth, pink, moist, symmetric, and had no lesions or ulcerations.  The tonsillar pillars and soft palate were symmetric.            ASSESSMENT/ PLAN:          ICD-10-CM    1. Sensorineural hearing loss (SNHL) of both ears  H90.3 Adult Audiology Referral   2. Uses hearing aid  Z97.4 Adult Audiology Referral     His ear exam today is greatly improved.  No active otorrhea and tympanic membrane appears improved.  Antibiotic powder is placed in canal.    Patient was recommended to have repeat evaluation and may require hearing aid adjustments.  Referral was placed to [facility] and may require [facility] authorization prior to.    Return to ENT for routine ear " cleaning in 4 to 6 months    Lizzeth Harris PA-C  ENT  [facility], [place]          Again, thank you for allowing me to participate in the care of your patient.        Sincerely,        Lizzeth Harris PA-C

## 2022-05-10 NOTE — PATIENT INSTRUCTIONS
Follow up for audiogram. Order was placed for [facility])    Return for ear cleaning/ check in 4-6 months  Powder applied to ear today.     Thank you for allowing Lizzeth Harris PA-C and our ENT team to participate in your care.  If your medications are too expensive, please give the nurse a call.  We can possibly change this medication.  If you have a scheduling or an appointment question please contact our Health Unit Coordinator at [phone], Ext. [phone].    ALL nursing questions or concerns can be directed to your ENT nurse at: [phone] Mili

## 2022-05-10 NOTE — PROGRESS NOTES
"Chief Complaint   Patient presents with     Ear Problem     Follow up myringitis right ear and right otorrhea.       Patient returns to ENT for follow up exam. He was last seen on 4/14/22 and had right otorrhea and started on otics.   He completed Ciprodex and returns for recheck.       Today, he has no surjit concerns.      [facility] and [facility] with [facility] was about 3 years ago.   [facility].         Audiogram 2/26/18: Mild to profound bilateral SNHL. WRS 80% right and 88% left. SRT 70 dB HL right and 60 dB HL left.   No recent audiogram available.         Past Medical History:   Diagnosis Date     Benign localized hyperplasia of prostate without urinary obstruction and other lower urinary tract symptoms (LUTS) 11/26/2010     CHD (coronary heart disease) 11/26/2010     Dyslipidemia 11/26/2010     GERD (gastroesophageal reflux disease) 11/26/2010     HTN (hypertension) 11/26/2010     Old myocardial infarction 11/26/2010     Other symptoms involving digestive system(787.99) 10/20/2006        Allergies   Allergen Reactions     Lisinopril Cough     Morphine Other (See Comments) and Visual Disturbance     confusion     Current Outpatient Medications   Medication     acetaminophen (TYLENOL) 325 MG tablet     famotidine (PEPCID) 20 MG tablet     Lactobacillus Acid-Pectin (ACIDOPHILUS/PECTIN) CAPS     losartan (COZAAR) 25 MG tablet     metoprolol succinate ER (TOPROL-XL) 25 MG 24 hr tablet     NITROSTAT 0.4 MG sublingual tablet     saccharomyces boulardii (FLORASTOR) 250 MG capsule     simvastatin (ZOCOR) 20 MG tablet     SM ASPIRIN ADULT LOW STRENGTH 81 MG EC tablet     No current facility-administered medications for this visit.     ROS- SEE HPI      /68 (BP Location: Left arm)   Pulse 59   Temp 97.8  F (36.6  C) (Tympanic)   Ht 1.803 m (5' 11\")   Wt 88.5 kg (195 lb)   SpO2 98%   BMI 27.20 kg/m      [place] w/ aids.   General - The patient is well nourished " and well developed, and appears to have good nutritional status.  Alert and oriented to person and place, interactive.  Head and Face - Normocephalic and atraumatic, with no gross asymmetry noted of the contour of the facial features.  The facial nerve is intact, with strong symmetric movements.  Neck-no palpable lymphadenopathy or thyroid mass.  Trachea is midline.  Eyes - Extraocular movements intact.   Ears- External ears normal. The ears were examined under microscopy bilaterally.  EAC is improved.  There is no active otorrhea.  There is scant crusting around base of TM which was removed with #3 suction.  Tympanic membrane appears thickened no effusion.  Left EAC was cleared of cerumen with use of cup forceps and cerumen loop.  Left tympanic membrane is intact without effusion or retraction noted.    Mouth - Examination of the oral cavity shows pink, healthy, moist mucosa. Dentition in good condition.  No lesions or ulceration noted. The tongue is mobile and midline.    Throat - The walls of the oropharynx were smooth, pink, moist, symmetric, and had no lesions or ulcerations.  The tonsillar pillars and soft palate were symmetric.            ASSESSMENT/ PLAN:          ICD-10-CM    1. Sensorineural hearing loss (SNHL) of both ears  H90.3 Adult Audiology Referral   2. Uses hearing aid  Z97.4 Adult Audiology Referral     His ear exam today is greatly improved.  No active otorrhea and tympanic membrane appears improved.  Antibiotic powder is placed in canal.    Patient was recommended to have repeat evaluation and may require hearing aid adjustments.  Referral was placed to [facility] and may require [facility] authorization prior to.    Return to ENT for routine ear cleaning in 4 to 6 months    Lizzeth Harris PA-C  ENT  [facility], [place]

## 2022-05-18 ENCOUNTER — OFFICE VISIT (OUTPATIENT)
Dept: SURGERY | Facility: OTHER | Age: 87
End: 2022-05-18
Attending: NURSE PRACTITIONER
Payer: MEDICARE

## 2022-05-18 VITALS
HEART RATE: 79 BPM | DIASTOLIC BLOOD PRESSURE: 74 MMHG | TEMPERATURE: 98.2 F | SYSTOLIC BLOOD PRESSURE: 124 MMHG | RESPIRATION RATE: 16 BRPM | OXYGEN SATURATION: 96 %

## 2022-05-18 DIAGNOSIS — K43.5 PARASTOMAL HERNIA WITHOUT OBSTRUCTION OR GANGRENE: Primary | ICD-10-CM

## 2022-05-18 PROCEDURE — G0463 HOSPITAL OUTPT CLINIC VISIT: HCPCS

## 2022-05-18 PROCEDURE — 99212 OFFICE O/P EST SF 10 MIN: CPT | Performed by: NURSE PRACTITIONER

## 2022-05-18 ASSESSMENT — PAIN SCALES - GENERAL: PAINLEVEL: NO PAIN (0)

## 2022-05-18 NOTE — PATIENT INSTRUCTIONS
Thank you for allowing Lida Gill CNP and our surgical team to participate in your care. Please call our health unit coordinator at [phone] with scheduling questions or the nurse at [phone] with any other questions or concerns.

## 2022-05-18 NOTE — NURSING NOTE
"Chief Complaint   Patient presents with     Consult     Ostomy care        Initial /74   Pulse 79   Temp 98.2  F (36.8  C) (Tympanic)   Resp 16   SpO2 96%  Estimated body mass index is 27.2 kg/m  as calculated from the following:    Height as of 5/10/22: 1.803 m (5' 11\").    Weight as of 5/10/22: 88.5 kg (195 lb).  Medication Reconciliation: complete  Fauzia Livingston LPN    "

## 2022-05-18 NOTE — PROGRESS NOTES
CLINIC NOTE - POST-OP SURGERY  5/18/2022    Patient:James Grant    Procedure: Exploratory laparotomy, sigmoid colectomy with Walker's procedure and creation of end colostomy, appendectomy    This is a 89 year old male who is s/p Exploratory laparotomy, sigmoid colectomy with Walker's procedure and creation of end colostomy, appendectomy.  He is eating, voiding, and stooling without problems.  He is at home and doing well he reports with ostomy cares. He notes a lump around his ostomy site which is concerning to him.     Current Medications:  Current Outpatient Medications   Medication Sig Dispense Refill     acetaminophen (TYLENOL) 325 MG tablet Take 2 tablets (650 mg) by mouth every 4 hours as needed for mild pain, fever or headaches       famotidine (PEPCID) 20 MG tablet TAKE ONE TABLET BY MOUTH EVERY DAY IN THE MORNING 90 tablet 1     Lactobacillus Acid-Pectin (ACIDOPHILUS/PECTIN) CAPS TAKE 1 CAPSULE BY MOUTH 2 TIMES DAILY       losartan (COZAAR) 25 MG tablet TAKE ONE TABLET BY MOUTH EVERY DAY IN THE MORNING 90 tablet 1     metoprolol succinate ER (TOPROL-XL) 25 MG 24 hr tablet TAKE ONE-HALF TABLET BY MOUTH EVERY DAY AT BEDTIME 45 tablet 1     NITROSTAT 0.4 MG sublingual tablet PLACE 1 TABLET (0.4 MG) UNDER THE TONGUE EVERY 5 MINUTES AS NEEDED 25 tablet 3     saccharomyces boulardii (FLORASTOR) 250 MG capsule Take 1 capsule (250 mg) by mouth 2 times daily 20 capsule 0     simvastatin (ZOCOR) 20 MG tablet TAKE ONE TABLET BY MOUTH EVERY DAY AT BEDTIME 90 tablet 1     SM ASPIRIN ADULT LOW STRENGTH 81 MG EC tablet Take 162 mg by mouth daily         Allergies:  Allergies   Allergen Reactions     Lisinopril Cough     Morphine Other (See Comments) and Visual Disturbance     confusion       PHYSICAL EXAM:   Vital signs: /74   Pulse 79   Temp 98.2  F (36.8  C) (Tympanic)   Resp 16   SpO2 96%    BMI: There is no height or weight on file to calculate BMI.   General: Normal, healthy, cooperative, in no acute  distress, alert   Lungs: respirations are non-labored   Abdominal: non-distended, soft, stoma with healthy red tissue noted; stool and flatus are noted in ostomy pouch.  Swelling is noted surrounding the ostomy site consistent with a parastomal hernia.  The area of hernia is reducible.   Incision: Well healed     ASSESSMENT:    89 year old male who is s/p Exploratory laparotomy, sigmoid colectomy with Walker's procedure and creation of end colostomy, appendectomy, parastomal hernia     PLAN:  Dr. Sukumar Chavez was consulted on the patient and examined the patient.  Patient to monitor the parastomal hernia at this time and follow up if the hernia enlarges, causes pain, or he develops constipation/problems with stooling.  He verbalized understanding of this information and denied questions at the end of the appointment.

## 2022-05-25 ENCOUNTER — OFFICE VISIT (OUTPATIENT)
Dept: UROLOGY | Facility: OTHER | Age: 87
End: 2022-05-25
Attending: NURSE PRACTITIONER
Payer: MEDICARE

## 2022-05-25 VITALS
WEIGHT: 193 LBS | RESPIRATION RATE: 16 BRPM | HEART RATE: 73 BPM | BODY MASS INDEX: 26.92 KG/M2 | OXYGEN SATURATION: 100 %

## 2022-05-25 DIAGNOSIS — R31.0 GROSS HEMATURIA: ICD-10-CM

## 2022-05-25 DIAGNOSIS — Z85.51 HISTORY OF BLADDER CANCER: ICD-10-CM

## 2022-05-25 PROCEDURE — 52000 CYSTOURETHROSCOPY: CPT | Performed by: UROLOGY

## 2022-05-25 PROCEDURE — G0463 HOSPITAL OUTPT CLINIC VISIT: HCPCS | Mod: 25

## 2022-05-25 ASSESSMENT — PAIN SCALES - GENERAL: PAINLEVEL: NO PAIN (0)

## 2022-05-25 NOTE — PATIENT INSTRUCTIONS

## 2022-05-25 NOTE — NURSING NOTE
Patient positioned in supine position, perineum area prepped with chlorhexidene Gluconate and patient draped per sterile technique. Per verbal order read back by Andrzej Olivia MD, Urojet 10mL 2% lidocaine jelly to be instilled into urethra.  Urojet- 10ml 2% Lidocaine jelly instilled into the urethra.    Urojet 2%  Lot#: [id number]  Expiration date: 6/2023  : [employer]  NDC: [address]-8    [facility] Protocol    A. Pre-procedure verification complete Yes  1-relevant information / documentation available, reviewed and properly matched to the patient; 2-consent accurate and complete, 3-equipment and supplies available    B. Site marking complete N/A  Site marked if not in continuous attendance with patient    C. TIME OUT completed Yes  Time Out was conducted just prior to starting procedure to verify the eight required elements: 1-patient identity, 2-consent accurate and complete, 3-position, 4-correct side/site marked (if applicable), 5-procedure, 6-relevant images / results properly labeled and displayed (if applicable), 7-antibiotics / irrigation fluids (if applicable), 8-safety precautions.    After procedure perineum area rinsed. Discharge instructions reviewed with patient. Patient verbalized understanding of discharge instructions and discharged ambulatory.  Lesly Manzanares LPN..................5/25/2022  10:51 AM

## 2022-07-13 NOTE — PROGRESS NOTES
SUBJECTIVE:   James Grant is a 89 year old male who presents for Preventive Visit.    Patient has been advised of split billing requirements and indicates understanding: Yes  Are you in the first 12 months of your Medicare coverage?  No    HPI  Do you feel safe in your environment? Yes    Have you ever done Advance Care Planning? (For example, a Health Directive, POLST, or a discussion with a medical provider or your loved ones about your wishes): Yes, patient states has an Advance Care Planning document and will bring a copy to the clinic.     Tomorrow is 1 year from surgery- Diverticulitis with perforation.  Is able to do his own hernia pouch cares with no issues.  He has a parastomal hernia that is being followed.  He currently denies any symptoms or concerns.        Fall risk- No falls recently.  No falls in the last year.     Patient doing crossword puzzles at home.      Lives home alone.  Everything is on the same floor.  No pets at home.        Cognitive Screening   MOCA- 29/30. No signs of cognitive impairment     Do you have sleep apnea, excessive snoring or daytime drowsiness?: no                       Social History     Tobacco Use     Smoking status: Former Smoker     Packs/day: 1.00     Years: 13.00     Pack years: 13.00     Types: Cigarettes     Start date: 1949     Quit date: 1962     Years since quittin.9     Smokeless tobacco: Never Used   Substance Use Topics     Alcohol use: Not Currently     Comment: 3 Drinks (BEER & LIQUOR) ~ OCCASIONALLY (QUIT IN )         Current providers sharing in care for this patient include:   Patient Care Team:  Lisa Marrero MD as PCP - General (Family Medicine)  Sharon Ayala NP as Assigned PCP  Lida Gill NP as Assigned Surgical Provider    The following health maintenance items are reviewed in Epic and correct as of today:  Health Maintenance Due   Topic Date Due     ZOSTER IMMUNIZATION (1 of 2) Never done     MEDICARE  ANNUAL WELLNESS VISIT  01/08/2015     Pneumococcal Vaccine: 65+ Years (2 - PCV) 01/19/2017     COVID-19 Vaccine (3 - Booster for Moderna series) 08/05/2021     Patient Active Problem List   Diagnosis     SNHL (sensorineural hearing loss)     ETD (eustachian tube dysfunction)     Essential hypertension, benign     Dyslipidemia     GERD (gastroesophageal reflux disease)     CHD (coronary heart disease)     BPH (benign prostatic hyperplasia)     Advance care planning     ACP (advance care planning)     Venous stasis dermatitis of both lower extremities     History of bladder cancer     Malignant neoplasm of overlapping sites of bladder (H)     TIA (transient ischemic attack)     Sigmoid diverticulitis     Diverticulitis of large intestine with abscess     Abdominal pain, right lower quadrant     Diverticulitis of large intestine with perforation and abscess     Coronary atherosclerosis of native coronary artery     Febrile illness     Urinary tract infection without hematuria, site unspecified     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       CHOLECYSTECTOMY       CYSTOSCOPY, FULGURATE BLEEDERS, EVACUATE CLOT(S), COMBINED N/A 2/17/2020    Procedure: Clot Evacuation;  Surgeon: Andrzej Olivia MD;  Location:  OR     CYSTOSCOPY, RETROGRADES, COMBINED Bilateral 10/22/2019    Procedure: Bilateral Retrograde Pyelograms;  Surgeon: Andrzej Olivia MD;  Location:  OR     CYSTOSCOPY, TRANSURETHRAL RESECTION (TUR) TUMOR BLADDER, COMBINED N/A 10/22/2019    Procedure: Transurethral Resection of Bladder Tumor and transurethral resection of prostate and bladder stone removal;  Surgeon: Andrzej Olivia MD;  Location:  OR     HERNIA REPAIR       LAPAROTOMY EXPLORATORY N/A 7/19/2021    Procedure: Exploratory  laparotomy with sigmoid colectomy and creation of colostomy, appendectomy;  Surgeon: Sukumar Chavez MD;  Location: [facility]     left arm surgery         Social History     Tobacco  Use     Smoking status: Former Smoker     Packs/day: 1.00     Years: 13.00     Pack years: 13.00     Types: Cigarettes     Start date: 1949     Quit date: 1962     Years since quittin.0     Smokeless tobacco: Never Used   Substance Use Topics     Alcohol use: Not Currently     Comment: 3 Drinks (BEER & LIQUOR) ~ OCCASIONALLY (QUIT IN )     Family History   Problem Relation Age of Onset     Heart Failure Mother 86        Congestive - Cause of Death     Cerebrovascular Disease Father      C.A.D. Sister 91         Current Outpatient Medications   Medication Sig Dispense Refill     acetaminophen (TYLENOL) 325 MG tablet Take 2 tablets (650 mg) by mouth every 4 hours as needed for mild pain, fever or headaches       famotidine (PEPCID) 20 MG tablet TAKE ONE TABLET BY MOUTH EVERY DAY IN THE MORNING 90 tablet 1     Lactobacillus Acid-Pectin (ACIDOPHILUS/PECTIN) CAPS TAKE 1 CAPSULE BY MOUTH 2 TIMES DAILY       losartan (COZAAR) 25 MG tablet TAKE ONE TABLET BY MOUTH EVERY DAY IN THE MORNING 90 tablet 1     metoprolol succinate ER (TOPROL-XL) 25 MG 24 hr tablet TAKE ONE-HALF TABLET BY MOUTH EVERY DAY AT BEDTIME 45 tablet 1     simvastatin (ZOCOR) 20 MG tablet TAKE ONE TABLET BY MOUTH EVERY DAY AT BEDTIME 90 tablet 1     SM ASPIRIN ADULT LOW STRENGTH 81 MG EC tablet Take 162 mg by mouth daily       NITROSTAT 0.4 MG sublingual tablet PLACE 1 TABLET (0.4 MG) UNDER THE TONGUE EVERY 5 MINUTES AS NEEDED (Patient not taking: Reported on 2022) 25 tablet 3     saccharomyces boulardii (FLORASTOR) 250 MG capsule Take 1 capsule (250 mg) by mouth 2 times daily (Patient not taking: Reported on 2022) 20 capsule 0     Allergies   Allergen Reactions     Lisinopril Cough     Morphine Other (See Comments) and Visual Disturbance     confusion     Recent Labs   Lab Test 22  1412 22  0518 22  2233 21  0616 21  0656 21  0550 21  0529 07/15/21  1831 20  0430 20  0429  "02/16/19  1741 02/07/19  0819 02/02/18  0927 06/07/16  0923   LDL  --   --   --   --   --   --   --   --   --   --   --  62 62 54   HDL  --   --   --   --   --   --   --   --   --   --   --  39* 46 36*   TRIG  --   --   --   --  165*  --  197*  --   --   --   --  85 90 92   ALT  --   --  22  --  62  --  89*   < >  --   --    < > 29  --   --    CR  --  0.70 0.78   < > 0.69   < > 0.54*   < > 0.82 0.73   < > 0.85  --   --    GFRESTIMATED  --  85 82   < > 81   < > 90   < > 76 >90   < > 76  --   --    GFRESTBLACK  --   --   --   --   --   --   --   --  88 >90   < > 88  --   --    POTASSIUM  --  4.0 3.8   < > 3.9   < > 3.7   < > 3.9 4.0   < > 4.4  --   --    TSH 2.26  --   --   --   --   --   --   --   --   --   --  2.95  --   --     < > = values in this interval not displayed.        Review of Systems  Constitutional, HEENT, cardiovascular, pulmonary, gi and gu systems are negative, except as otherwise noted.    OBJECTIVE:   There were no vitals taken for this visit. Estimated body mass index is 26.92 kg/m  as calculated from the following:    Height as of 5/10/22: 1.803 m (5' 11\").    Weight as of 5/25/22: 87.5 kg (193 lb).  Physical Exam  GENERAL: healthy, alert and no distress  EYES: Eyes grossly normal to inspection, PERRL and conjunctivae and sclerae normal  HENT: ear canals and TM's normal, nose and mouth without ulcers or lesions  NECK: no adenopathy, no asymmetry, masses, or scars and thyroid normal to palpation  RESP: lungs clear to auscultation - no rales, rhonchi or wheezes  CV: regular rate and rhythm, normal S1 S2, no S3 or S4, no murmur, click or rub, no peripheral edema and peripheral pulses strong  ABDOMEN: soft, nontender, no hepatosplenomegaly, no masses and bowel sounds normal, there is a colostomy with a pouch on the left side of abdomen.    MS: no gross musculoskeletal defects noted, no edema  SKIN: no suspicious lesions or rashes  NEURO: Normal strength and tone, mentation intact and speech " "normal  PSYCH: mentation appears normal, affect normal/bright      ASSESSMENT / PLAN:   (R53.83) Fatigue, unspecified type  (primary encounter diagnosis)  Comment: I believe this is age-related physical deconditioning but will obtain some labs to assess for potential cause  Plan: Vitamin B12, Vitamin D Deficiency, TSH      Essential HTN benign  -Well controlled     Colostomy in place  - No issues with ostomy cares. Denies symptoms    (E55.9) Vitamin D deficiency, unspecified   Comment: Expect vitamin D level to be low  Plan: Vitamin D Deficiency            (R31.0) Gross hematuria  Comment: history of this, none currently.  He would like this to be rechecked.    Plan: UA reflex to Microscopic and Culture - [place],        Urine Culture           COUNSELING:  Reviewed preventive health counseling, as reflected in patient instructions       Regular exercise       Healthy diet/nutrition       Dental care       Fall risk prevention    Estimated body mass index is 26.92 kg/m  as calculated from the following:    Height as of 5/10/22: 1.803 m (5' 11\").    Weight as of 5/25/22: 87.5 kg (193 lb).    Weight management plan: Discussed healthy diet and exercise guidelines    He reports that he quit smoking about 59 years ago. His smoking use included cigarettes. He started smoking about 73 years ago. He has a 13.00 pack-year smoking history. He has never used smokeless tobacco.      Appropriate preventive services were discussed with this patient, including applicable screening as appropriate for cardiovascular disease, diabetes, osteopenia/osteoporosis, and glaucoma.  As appropriate for age/gender, discussed screening for colorectal cancer, prostate cancer, breast cancer, and cervical cancer. Checklist reviewing preventive services available has been given to the patient.    Reviewed patients plan of care and provided an AVS. The Basic Care Plan (routine screening as documented in Health Maintenance) for James meets the Care " Plan requirement. This Care Plan has been established and reviewed with the Patient.    Counseling Resources:  ATP IV Guidelines  Pooled Cohorts Equation Calculator  Breast Cancer Risk Calculator  Breast Cancer: Medication to Reduce Risk  FRAX Risk Assessment  ICSI Preventive Guidelines  Dietary Guidelines for Americans, 2010  USDA's MyPlate  ASA Prophylaxis  Lung CA Screening    Lisa Marrero MD  [facility] - [place]    Identified Health Risks:

## 2022-07-14 ENCOUNTER — OFFICE VISIT (OUTPATIENT)
Dept: FAMILY MEDICINE | Facility: OTHER | Age: 87
End: 2022-07-14
Attending: STUDENT IN AN ORGANIZED HEALTH CARE EDUCATION/TRAINING PROGRAM
Payer: MEDICARE

## 2022-07-14 VITALS
BODY MASS INDEX: 27.58 KG/M2 | SYSTOLIC BLOOD PRESSURE: 130 MMHG | WEIGHT: 197 LBS | DIASTOLIC BLOOD PRESSURE: 70 MMHG | TEMPERATURE: 98.1 F | HEIGHT: 71 IN | OXYGEN SATURATION: 96 % | HEART RATE: 74 BPM

## 2022-07-14 DIAGNOSIS — I10 ESSENTIAL HYPERTENSION, BENIGN: ICD-10-CM

## 2022-07-14 DIAGNOSIS — Z93.3 COLOSTOMY IN PLACE (H): ICD-10-CM

## 2022-07-14 DIAGNOSIS — R31.0 GROSS HEMATURIA: ICD-10-CM

## 2022-07-14 DIAGNOSIS — R53.83 FATIGUE, UNSPECIFIED TYPE: Primary | ICD-10-CM

## 2022-07-14 DIAGNOSIS — E55.9 VITAMIN D DEFICIENCY, UNSPECIFIED: ICD-10-CM

## 2022-07-14 LAB
ALBUMIN UR-MCNC: 20 MG/DL
APPEARANCE UR: CLEAR
BILIRUB UR QL STRIP: NEGATIVE
COLOR UR AUTO: YELLOW
GLUCOSE UR STRIP-MCNC: NEGATIVE MG/DL
HGB UR QL STRIP: NEGATIVE
KETONES UR STRIP-MCNC: NEGATIVE MG/DL
LEUKOCYTE ESTERASE UR QL STRIP: ABNORMAL
MUCOUS THREADS #/AREA URNS LPF: PRESENT /LPF
NITRATE UR QL: NEGATIVE
PH UR STRIP: 6 [PH] (ref 4.7–8)
RBC URINE: 0 /HPF
SP GR UR STRIP: 1.03 (ref 1–1.03)
SQUAMOUS EPITHELIAL: 0 /HPF
TSH SERPL DL<=0.005 MIU/L-ACNC: 2.26 MU/L (ref 0.4–4)
UROBILINOGEN UR STRIP-MCNC: NORMAL MG/DL
WBC URINE: 21 /HPF

## 2022-07-14 PROCEDURE — 81001 URINALYSIS AUTO W/SCOPE: CPT | Mod: ZL | Performed by: STUDENT IN AN ORGANIZED HEALTH CARE EDUCATION/TRAINING PROGRAM

## 2022-07-14 PROCEDURE — G0463 HOSPITAL OUTPT CLINIC VISIT: HCPCS | Mod: 25

## 2022-07-14 PROCEDURE — 87088 URINE BACTERIA CULTURE: CPT | Mod: ZL | Performed by: STUDENT IN AN ORGANIZED HEALTH CARE EDUCATION/TRAINING PROGRAM

## 2022-07-14 PROCEDURE — 36415 COLL VENOUS BLD VENIPUNCTURE: CPT | Mod: ZL | Performed by: STUDENT IN AN ORGANIZED HEALTH CARE EDUCATION/TRAINING PROGRAM

## 2022-07-14 PROCEDURE — 99213 OFFICE O/P EST LOW 20 MIN: CPT | Performed by: STUDENT IN AN ORGANIZED HEALTH CARE EDUCATION/TRAINING PROGRAM

## 2022-07-14 PROCEDURE — G0463 HOSPITAL OUTPT CLINIC VISIT: HCPCS

## 2022-07-14 PROCEDURE — 82607 VITAMIN B-12: CPT | Mod: ZL | Performed by: STUDENT IN AN ORGANIZED HEALTH CARE EDUCATION/TRAINING PROGRAM

## 2022-07-14 PROCEDURE — 84443 ASSAY THYROID STIM HORMONE: CPT | Mod: ZL | Performed by: STUDENT IN AN ORGANIZED HEALTH CARE EDUCATION/TRAINING PROGRAM

## 2022-07-14 PROCEDURE — 82306 VITAMIN D 25 HYDROXY: CPT | Mod: ZL | Performed by: STUDENT IN AN ORGANIZED HEALTH CARE EDUCATION/TRAINING PROGRAM

## 2022-07-14 ASSESSMENT — PAIN SCALES - GENERAL: PAINLEVEL: NO PAIN (0)

## 2022-07-14 NOTE — NURSING NOTE
"Chief Complaint   Patient presents with     Medicare Visit     Annual wellness exam          Initial /70 (BP Location: Left arm, Patient Position: Chair, Cuff Size: Adult Regular)   Pulse 74   Temp 98.1  F (36.7  C) (Tympanic)   Ht 1.803 m (5' 11\")   Wt 89.4 kg (197 lb)   SpO2 96%   BMI 27.48 kg/m   Estimated body mass index is 27.48 kg/m  as calculated from the following:    Height as of this encounter: 1.803 m (5' 11\").    Weight as of this encounter: 89.4 kg (197 lb).  Medication Reconciliation: complete  Emma Rodriguez LPN  "

## 2022-07-15 LAB — VIT B12 SERPL-MCNC: 239 PG/ML (ref 232–1245)

## 2022-07-16 LAB — BACTERIA UR CULT: ABNORMAL

## 2022-07-18 LAB — DEPRECATED CALCIDIOL+CALCIFEROL SERPL-MC: 23 UG/L (ref 20–75)

## 2022-08-31 DIAGNOSIS — I10 BENIGN ESSENTIAL HYPERTENSION: ICD-10-CM

## 2022-08-31 DIAGNOSIS — K21.9 GASTROESOPHAGEAL REFLUX DISEASE WITHOUT ESOPHAGITIS: ICD-10-CM

## 2022-08-31 DIAGNOSIS — E78.2 MIXED HYPERLIPIDEMIA: ICD-10-CM

## 2022-08-31 DIAGNOSIS — I10 ESSENTIAL (PRIMARY) HYPERTENSION: ICD-10-CM

## 2022-09-01 RX ORDER — LOSARTAN POTASSIUM 25 MG/1
TABLET ORAL
Qty: 90 TABLET | Refills: 1 | Status: SHIPPED | OUTPATIENT
Start: 2022-09-01 | End: 2023-03-10

## 2022-09-01 RX ORDER — METOPROLOL SUCCINATE 25 MG/1
TABLET, EXTENDED RELEASE ORAL
Qty: 45 TABLET | Refills: 1 | Status: SHIPPED | OUTPATIENT
Start: 2022-09-01 | End: 2023-03-10

## 2022-09-01 RX ORDER — SIMVASTATIN 20 MG
TABLET ORAL
Qty: 90 TABLET | Refills: 1 | Status: SHIPPED | OUTPATIENT
Start: 2022-09-01 | End: 2023-03-10

## 2022-09-01 RX ORDER — FAMOTIDINE 20 MG/1
TABLET, FILM COATED ORAL
Qty: 90 TABLET | Refills: 1 | Status: SHIPPED | OUTPATIENT
Start: 2022-09-01 | End: 2023-03-10

## 2022-09-01 NOTE — TELEPHONE ENCOUNTER
Pepcid      Last Written Prescription Date:  3/3/22  Last Fill Quantity: 90,   # refills: 1  Last Office Visit: 7/14/22  Future Office visit:       Routing refill request to provider for review/approval because:      Losartan      Last Written Prescription Date:  3/3/22  Last Fill Quantity: 90,   # refills: 1  Last Office Visit: 7/14/22  Future Office visit:       Routing refill request to provider for review/approval because:      Metoprolol      Last Written Prescription Date:  3/3/22  Last Fill Quantity: 45,   # refills: 1  Last Office Visit: 7/14/22  Future Office visit:       Routing refill request to provider for review/approval because:      Simvastatin      Last Written Prescription Date:  3/3/22  Last Fill Quantity: 90,   # refills: 1  Last Office Visit: 7/14/22  Future Office visit:       Routing refill request to provider for review/approval because:

## 2022-09-13 ENCOUNTER — NURSE TRIAGE (OUTPATIENT)
Dept: FAMILY MEDICINE | Facility: OTHER | Age: 87
End: 2022-09-13

## 2022-09-13 NOTE — TELEPHONE ENCOUNTER
"    Reason for Disposition    Patient wants to be seen    Answer Assessment - Initial Assessment Questions  1. SYMPTOM: \"What's the main symptom you're concerned about?\" (e.g., discharge from penis, rash, pain, itching, swelling)      No pain slight blood leaking from his penis  2. LOCATION: \"Where is the    located?\"      penis  3. ONSET: \"When did     start?\"      2 weeks ago  4. PAIN: \"Is there any pain?\" If Yes, ask: \"How bad is it?\"  (Scale 1-10; or mild, moderate, severe)      no  5. URINE: \"Any difficulty passing urine?\" If Yes, ask: \"When was the last time?\"      no  6. CAUSE: \"What do you think is causing the symptoms?\"      Bladder leak  7. OTHER SYMPTOMS: \"Do you have any other symptoms?\" (e.g., fever, abdominal pain, blood in urine)      A little blood in his brief but not in the toilet    Protocols used: PENIS AND SCROTUM SYMPTOMS-A-[place]      "

## 2022-09-15 ENCOUNTER — DOCUMENTATION ONLY (OUTPATIENT)
Dept: OTHER | Facility: CLINIC | Age: 87
End: 2022-09-15

## 2022-09-15 ENCOUNTER — TELEPHONE (OUTPATIENT)
Dept: FAMILY MEDICINE | Facility: OTHER | Age: 87
End: 2022-09-15

## 2022-09-15 ENCOUNTER — OFFICE VISIT (OUTPATIENT)
Dept: FAMILY MEDICINE | Facility: OTHER | Age: 87
End: 2022-09-15
Attending: STUDENT IN AN ORGANIZED HEALTH CARE EDUCATION/TRAINING PROGRAM
Payer: MEDICARE

## 2022-09-15 VITALS
HEART RATE: 69 BPM | HEIGHT: 71 IN | RESPIRATION RATE: 16 BRPM | TEMPERATURE: 97.3 F | SYSTOLIC BLOOD PRESSURE: 140 MMHG | DIASTOLIC BLOOD PRESSURE: 80 MMHG | BODY MASS INDEX: 26.88 KG/M2 | OXYGEN SATURATION: 97 % | WEIGHT: 192 LBS

## 2022-09-15 DIAGNOSIS — F43.21 GRIEVING: ICD-10-CM

## 2022-09-15 DIAGNOSIS — R31.0 GROSS HEMATURIA: Primary | ICD-10-CM

## 2022-09-15 DIAGNOSIS — E55.9 HYPOVITAMINOSIS D: ICD-10-CM

## 2022-09-15 LAB
ALBUMIN UR-MCNC: NEGATIVE MG/DL
AMORPH CRY #/AREA URNS HPF: ABNORMAL /HPF
APPEARANCE UR: ABNORMAL
BILIRUB UR QL STRIP: NEGATIVE
COLOR UR AUTO: YELLOW
GLUCOSE UR STRIP-MCNC: NEGATIVE MG/DL
HGB UR QL STRIP: NEGATIVE
KETONES UR STRIP-MCNC: NEGATIVE MG/DL
LEUKOCYTE ESTERASE UR QL STRIP: ABNORMAL
MUCOUS THREADS #/AREA URNS LPF: PRESENT /LPF
NITRATE UR QL: NEGATIVE
PH UR STRIP: 6.5 [PH] (ref 4.7–8)
RBC URINE: 1 /HPF
SP GR UR STRIP: 1.02 (ref 1–1.03)
SQUAMOUS EPITHELIAL: 0 /HPF
UROBILINOGEN UR STRIP-MCNC: NORMAL MG/DL
WBC URINE: 7 /HPF

## 2022-09-15 PROCEDURE — 87086 URINE CULTURE/COLONY COUNT: CPT | Mod: ZL | Performed by: STUDENT IN AN ORGANIZED HEALTH CARE EDUCATION/TRAINING PROGRAM

## 2022-09-15 PROCEDURE — G0463 HOSPITAL OUTPT CLINIC VISIT: HCPCS

## 2022-09-15 PROCEDURE — 99213 OFFICE O/P EST LOW 20 MIN: CPT | Performed by: STUDENT IN AN ORGANIZED HEALTH CARE EDUCATION/TRAINING PROGRAM

## 2022-09-15 PROCEDURE — 81001 URINALYSIS AUTO W/SCOPE: CPT | Mod: ZL | Performed by: STUDENT IN AN ORGANIZED HEALTH CARE EDUCATION/TRAINING PROGRAM

## 2022-09-15 RX ORDER — CHOLECALCIFEROL (VITAMIN D3) 50 MCG
1 TABLET ORAL DAILY
Qty: 90 TABLET | Refills: 0 | Status: SHIPPED | OUTPATIENT
Start: 2022-09-15 | End: 2023-09-13

## 2022-09-15 ASSESSMENT — PAIN SCALES - GENERAL: PAINLEVEL: NO PAIN (0)

## 2022-09-15 NOTE — PROGRESS NOTES
Assessment & Plan     Gross hematuria  Has noted small amount of blood on a pad for one day.  No other associated symptoms of dysuria, urgency, frequency, flank pain abdominal pain.  No weight loss or fevers/chills  Does have a known history of bladder cancer and receives annual cystoscopy  Has follow-up with urology in the spring.  Follows with Dr. Olivia  Penis grossly normal to inspection, no CVA or suprapubic tenderness to palpation  Will repeat U/A  - UA reflex to Microscopic and Culture - [place]; Future  - UA reflex to Microscopic and Culture - [place]  - Urine Culture    Hypovitaminosis D  Refill vitamin D  - vitamin D3 (CHOLECALCIFEROL) 50 mcg (2000 units) tablet; Take 1 tablet (50 mcg) by mouth daily    Grieving  Lost son and niece recently  Son (67)  of a massive heart attack  Patient is coping well with loss, no significant depression noted today      No follow-ups on file.    Lisa Marrero MD  [facility] - [place]    Subjective   Adrian is a 89 year old male with history of bladder cancer, presenting for the following health issues:  Urinary Problem      HPI     Blood on pad.  Brought bad in.  There is scant blood on the top of the pad.   Was really active prior to this- cutting grass, going to grocery store.  Wondering if activity is causing this.  No flank pain.  Urine did not look bloody.  No suprapubic tenderness. No flank pain.   At night, up every 2.5 hours.    This happened a few days ago.  Patient denies any other abnormal urethral discharge.    No pain, denies any weight loss, no fevers or chills, not on any blood thinners.     Feeling under stress.  Lost Son, lost his niece.  Son has massive heart attack- Ended up at [facility].  Son was 67.  Son was his go to person for help.       Genitourinary - Male  Onset/Duration: a week   Description:   Dysuria (painful urination): No}  Hematuria (blood in urine): YES- Blood on pad   Frequency: No  Waking at night to urinate: Yes  "  Hesitancy (delay in urine): No  Retention (unable to empty): No  Decrease in urinary flow: No  Incontinence: YES  Progression of Symptoms:  worsening  Accompanying Signs & Symptoms:  Fever: No  Back/Flank pain: No  Urethral discharge: YES  Testicle lumps/masses/pain: No  Nausea and/or vomiting: No  Abdominal pain: No  History: - History of bladder cancer 2-3 years ago   History of frequent UTI s: No  History of kidney stones: No  History of hernias: No  Personal or Family history of Prostate problems: No  Sexually active: No  Precipitating or alleviating factors: None  Therapies tried and outcome: none      Review of Systems   Constitutional, HEENT, cardiovascular, pulmonary, gi and gu systems are negative, except as otherwise noted.      Objective    BP (!) 140/80 (BP Location: Right arm, Patient Position: Chair)   Pulse 69   Temp 97.3  F (36.3  C)   Resp 16   Ht 1.803 m (5' 11\")   Wt 87.1 kg (192 lb)   SpO2 97%   BMI 26.78 kg/m    Body mass index is 26.78 kg/m .  Physical Exam     GENERAL: healthy, alert and no distress  EYES: Eyes grossly normal to inspection, PERRL and conjunctivae and sclerae normal  HENT: ear canals and TM's normal, nose and mouth without ulcers or lesions  NECK: no adenopathy, no asymmetry, masses, or scars and thyroid normal to palpation  RESP: lungs clear to auscultation - no rales, rhonchi or wheezes  CV: regular rate and rhythm, normal S1 S2, no S3 or S4, no murmur, click or rub, no peripheral edema and peripheral pulses strong  ABDOMEN: soft, nontender, no hepatosplenomegaly, no masses and bowel sounds normal  MS: no gross musculoskeletal defects noted, no edema  SKIN: no suspicious lesions or rashes  NEURO: Normal strength and tone, mentation intact and speech normal  PSYCH: mentation appears normal, affect normal/bright      "

## 2022-09-15 NOTE — NURSING NOTE
"Chief Complaint   Patient presents with     Urinary Problem       Initial BP (!) 140/80 (BP Location: Right arm, Patient Position: Chair)   Pulse 69   Temp 97.3  F (36.3  C)   Resp 16   Ht 1.803 m (5' 11\")   Wt 87.1 kg (192 lb)   SpO2 97%   BMI 26.78 kg/m   Estimated body mass index is 26.78 kg/m  as calculated from the following:    Height as of this encounter: 1.803 m (5' 11\").    Weight as of this encounter: 87.1 kg (192 lb).  Medication Reconciliation: complete  Kimberli Lutz LPN    "

## 2022-09-18 LAB — BACTERIA UR CULT: ABNORMAL

## 2022-09-27 NOTE — NURSING NOTE
"Chief Complaint   Patient presents with     Cerumen Impaction     ear cleaning        Initial /58 (BP Location: Right arm, Patient Position: Chair, Cuff Size: Adult Regular)  Pulse 80  Temp 97.7  F (36.5  C) (Tympanic)  Ht 5' 11\" (1.803 m)  Wt 190 lb (86.2 kg)  BMI 26.5 kg/m2 Estimated body mass index is 26.5 kg/(m^2) as calculated from the following:    Height as of this encounter: 5' 11\" (1.803 m).    Weight as of this encounter: 190 lb (86.2 kg).  Medication Reconciliation: complete     Agata Gonzalez LPN      " 27-Sep-2022

## 2022-11-17 ENCOUNTER — DOCUMENTATION ONLY (OUTPATIENT)
Dept: FAMILY MEDICINE | Facility: OTHER | Age: 87
End: 2022-11-17

## 2022-11-17 DIAGNOSIS — K57.20 PERFORATED DIVERTICULUM OF LARGE INTESTINE: Primary | ICD-10-CM

## 2022-11-17 DIAGNOSIS — Z93.3 COLOSTOMY STATUS (H): ICD-10-CM

## 2022-12-12 ENCOUNTER — NURSE TRIAGE (OUTPATIENT)
Dept: FAMILY MEDICINE | Facility: OTHER | Age: 87
End: 2022-12-12

## 2022-12-12 ENCOUNTER — LAB (OUTPATIENT)
Dept: LAB | Facility: OTHER | Age: 87
End: 2022-12-12
Payer: MEDICARE

## 2022-12-12 ENCOUNTER — OFFICE VISIT (OUTPATIENT)
Dept: FAMILY MEDICINE | Facility: OTHER | Age: 87
End: 2022-12-12
Attending: FAMILY MEDICINE
Payer: MEDICARE

## 2022-12-12 VITALS
HEART RATE: 63 BPM | DIASTOLIC BLOOD PRESSURE: 76 MMHG | RESPIRATION RATE: 18 BRPM | OXYGEN SATURATION: 97 % | TEMPERATURE: 98.1 F | BODY MASS INDEX: 26.22 KG/M2 | WEIGHT: 188 LBS | SYSTOLIC BLOOD PRESSURE: 128 MMHG

## 2022-12-12 DIAGNOSIS — K43.5 PARASTOMAL HERNIA WITHOUT OBSTRUCTION OR GANGRENE: ICD-10-CM

## 2022-12-12 DIAGNOSIS — L82.1 SEBORRHEIC KERATOSIS: ICD-10-CM

## 2022-12-12 DIAGNOSIS — Z85.51 HISTORY OF BLADDER CANCER: ICD-10-CM

## 2022-12-12 DIAGNOSIS — R31.9 HEMATURIA, UNSPECIFIED TYPE: ICD-10-CM

## 2022-12-12 DIAGNOSIS — R31.9 HEMATURIA, UNSPECIFIED TYPE: Primary | ICD-10-CM

## 2022-12-12 LAB
ALBUMIN UR-MCNC: 10 MG/DL
APPEARANCE UR: CLEAR
BILIRUB UR QL STRIP: NEGATIVE
COLOR UR AUTO: YELLOW
GLUCOSE UR STRIP-MCNC: NEGATIVE MG/DL
HGB UR QL STRIP: NEGATIVE
KETONES UR STRIP-MCNC: NEGATIVE MG/DL
LEUKOCYTE ESTERASE UR QL STRIP: ABNORMAL
MUCOUS THREADS #/AREA URNS LPF: PRESENT /LPF
NITRATE UR QL: NEGATIVE
PH UR STRIP: 5.5 [PH] (ref 4.7–8)
RBC URINE: 0 /HPF
SP GR UR STRIP: 1.02 (ref 1–1.03)
SQUAMOUS EPITHELIAL: <1 /HPF
UROBILINOGEN UR STRIP-MCNC: NORMAL MG/DL
WBC URINE: 11 /HPF

## 2022-12-12 PROCEDURE — 81001 URINALYSIS AUTO W/SCOPE: CPT | Mod: ZL

## 2022-12-12 PROCEDURE — G0463 HOSPITAL OUTPT CLINIC VISIT: HCPCS

## 2022-12-12 PROCEDURE — 99213 OFFICE O/P EST LOW 20 MIN: CPT | Performed by: FAMILY MEDICINE

## 2022-12-12 PROCEDURE — 87086 URINE CULTURE/COLONY COUNT: CPT | Mod: ZL

## 2022-12-12 ASSESSMENT — ENCOUNTER SYMPTOMS
FLANK PAIN: 0
DYSURIA: 0
FEVER: 0
ABDOMINAL PAIN: 0
HEMATOCHEZIA: 0
NAUSEA: 0
FREQUENCY: 0
DIFFICULTY URINATING: 0

## 2022-12-12 ASSESSMENT — PAIN SCALES - GENERAL: PAINLEVEL: NO PAIN (0)

## 2022-12-12 NOTE — TELEPHONE ENCOUNTER
"Patient calling to see Janes or Dr Olivia    Reason for Disposition    Patient wants to be seen    Urinating more frequently than usual (i.e., frequency)    Answer Assessment - Initial Assessment Questions  1. SYMPTOM: \"What's the main symptom you're concerned about?\" (e.g., frequency, incontinence)      Patient thinks his bladder cancer is back  2. ONSET: \"When did the      start?\"      Sunday 1 tablespoon of blood  3. PAIN: \"Is there any pain?\" If Yes, ask: \"How bad is it?\" (Scale: 1-10; mild, moderate, severe)      no  4. CAUSE: \"What do you think is causing the symptoms?\"      Thinks the bladder cancer is back  5. OTHER SYMPTOMS: \"Do you have any other symptoms?\" (e.g., fever, flank pain, blood in urine, pain with urination)      Blood in urine  6. PREGNANCY: \"Is there any chance you are pregnant?\" \"When was your last menstrual period?\"      no    Protocols used: URINARY SYMPTOMS-A-[place]      "

## 2022-12-12 NOTE — PROGRESS NOTES
Assessment & Plan     Hematuria, unspecified type  No infectious symptoms, has small LE, will culture.  Will get back in with Urology given his concern.  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  I doubt anything surgical needs to be done.  But will ask Gen Surg to speak with him given his concerns.  - Adult General Surg Referral    Seborrheic keratosis  Discussed his scalp lesion visually appears to be benign.  Observation is appropriate unless it starts changing/growing.  If it's bothering him, we could try cryotherapy or a shave excision, but he wants to hold off for now.        LLOYD DALEY, DO  [facility] - [place]    Subjective   Adrian is a 89 year old, presenting for the following health issues:  Urinary Problem (Hematuria) and Ostomy      HPI     89yo male.  He tells me he has history of bladder cancer.  Sunday (yesterday) he had some blood in his urine.  One time, since resolved.  Reports being concerned because he had blood prior to his past urology surgery.  He denies frequency, urgency, burning, abd pain, odor, flank pain, fever, chills, nausea.  Only concern is yesterday's blood.  Chart review shows he saw Dr. Olivia back in June, plan if for cystoscopy annually.    Concern of a grown on his anterior scalp x6 months, not changing, not getting bigger, not itching or bleeding.    States he has a bulge around his stoma for about 6 months.  Slowly getting bigger.  Not painful or tender.  Stoma working appropriately.  He's worried as the bulge is getting bigger.  Chart review: saw general surgery on 5/18/22, has a parastomal hernia, plan was observation.          Review of Systems   Constitutional: Negative for fever.    Gastrointestinal: Negative for abdominal pain, hematochezia and nausea.   Genitourinary: Negative for decreased urine volume, difficulty urinating, dysuria, flank pain, frequency, penile discharge, penile pain, penile swelling, scrotal swelling, testicular pain and urgency.            Objective    /76   Pulse 63   Temp 98.1  F (36.7  C) (Tympanic)   Resp 18   Wt 85.3 kg (188 lb)   SpO2 97%   BMI 26.22 kg/m    Body mass index is 26.22 kg/m .  Physical Exam  Constitutional:       General: He is not in acute distress.     Appearance: Normal appearance.   Pulmonary:      Effort: Pulmonary effort is normal.   Abdominal:      General: Bowel sounds are normal.      Palpations: Abdomen is soft.      Tenderness: There is no abdominal tenderness.      Comments: Sizeable LLQ parastomal hernia, not tender, no erythema, stoma looks okay, light brown colored stool in bag.   Neurological:      Mental Status: He is alert.

## 2022-12-14 DIAGNOSIS — R31.9 HEMATURIA, UNSPECIFIED TYPE: Primary | ICD-10-CM

## 2022-12-14 LAB — BACTERIA UR CULT: ABNORMAL

## 2022-12-14 RX ORDER — AMOXICILLIN 875 MG
875 TABLET ORAL 2 TIMES DAILY
Qty: 14 TABLET | Refills: 0 | Status: SHIPPED | OUTPATIENT
Start: 2022-12-14 | End: 2022-12-21

## 2022-12-15 ENCOUNTER — TELEPHONE (OUTPATIENT)
Dept: FAMILY MEDICINE | Facility: OTHER | Age: 87
End: 2022-12-15

## 2022-12-15 NOTE — TELEPHONE ENCOUNTER
----- Message from Tarun Sanford DO sent at 12/14/2022  3:57 PM CST -----  Final urine culture came back growing some bacteria, unclear if colonized vs infection, but will treat given his recent hematuria.  Amoxicillin 875mg bid x7 days.  I sent to [facility] in [place].

## 2022-12-20 ENCOUNTER — OFFICE VISIT (OUTPATIENT)
Dept: SURGERY | Facility: OTHER | Age: 87
End: 2022-12-20
Attending: FAMILY MEDICINE
Payer: MEDICARE

## 2022-12-20 VITALS
SYSTOLIC BLOOD PRESSURE: 115 MMHG | TEMPERATURE: 97.1 F | DIASTOLIC BLOOD PRESSURE: 78 MMHG | OXYGEN SATURATION: 98 % | HEART RATE: 70 BPM | HEIGHT: 71 IN | WEIGHT: 190 LBS | BODY MASS INDEX: 26.6 KG/M2

## 2022-12-20 DIAGNOSIS — K43.5 PARASTOMAL HERNIA WITHOUT OBSTRUCTION OR GANGRENE: ICD-10-CM

## 2022-12-20 PROCEDURE — 99213 OFFICE O/P EST LOW 20 MIN: CPT | Performed by: SURGERY

## 2022-12-20 PROCEDURE — G0463 HOSPITAL OUTPT CLINIC VISIT: HCPCS

## 2022-12-20 ASSESSMENT — PAIN SCALES - GENERAL: PAINLEVEL: NO PAIN (0)

## 2022-12-20 NOTE — PROGRESS NOTES
CLINIC NOTE - CONSULT  12/20/2022    Patient: James Grant  Referring Physician: Tarun Sanford    Reason for Referral: Parastomal Hernia    This is a 89 year old male who is status post colostomy who was recently seen by his primary care physician and found to have a bulge in and around his colostomy.  He was referred to us.  The patient has no complaints.  No obstructive type symptoms.  No problems having the bag attached to the skin.       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       CHOLECYSTECTOMY       CYSTOSCOPY, FULGURATE BLEEDERS, EVACUATE CLOT(S), COMBINED N/A 2/17/2020    Procedure: Clot Evacuation;  Surgeon: Andrzej Olivia MD;  Location:  OR     CYSTOSCOPY, RETROGRADES, COMBINED Bilateral 10/22/2019    Procedure: Bilateral Retrograde Pyelograms;  Surgeon: Andrzej Olivia MD;  Location:  OR     CYSTOSCOPY, TRANSURETHRAL RESECTION (TUR) TUMOR BLADDER, COMBINED N/A 10/22/2019    Procedure: Transurethral Resection of Bladder Tumor and transurethral resection of prostate and bladder stone removal;  Surgeon: Andrzej Olivia MD;  Location:  OR     HERNIA REPAIR       LAPAROTOMY EXPLORATORY N/A 7/19/2021    Procedure: Exploratory  laparotomy with sigmoid colectomy and creation of colostomy, appendectomy;  Surgeon: Sukumar Chavez MD;  Location: [facility] OR     left arm surgery         Family History History:  Family History   Problem Relation Age of Onset     Heart Failure Mother 86      "   Congestive - Cause of Death     Cerebrovascular Disease Father      TRINIDAD. Sister 91       History of Tobacco Use:  History   Smoking Status     Former     Packs/day: 1.00     Years: 13.00     Types: Cigarettes     Start date: 1/1/1949     Quit date: 8/8/1962   Smokeless Tobacco     Never       Current Medications:  Current Outpatient Medications   Medication Sig Dispense Refill     acetaminophen (TYLENOL) 325 MG tablet Take 2 tablets (650 mg) by mouth every 4 hours as needed for mild pain, fever or headaches       amoxicillin (AMOXIL) 875 MG tablet Take 1 tablet (875 mg) by mouth 2 times daily for 7 days 14 tablet 0     famotidine (PEPCID) 20 MG tablet TAKE ONE TABLET BY MOUTH EVERY DAY IN THE MORNING 90 tablet 1     Lactobacillus Acid-Pectin (ACIDOPHILUS/PECTIN) CAPS        losartan (COZAAR) 25 MG tablet TAKE ONE TABLET BY MOUTH EVERY DAY IN THE MORNING 90 tablet 1     metoprolol succinate ER (TOPROL XL) 25 MG 24 hr tablet TAKE ONE-HALF TABLET BY MOUTH EVERY DAY AT BEDTIME 45 tablet 1     NITROSTAT 0.4 MG sublingual tablet PLACE 1 TABLET (0.4 MG) UNDER THE TONGUE EVERY 5 MINUTES AS NEEDED 25 tablet 3     saccharomyces boulardii (FLORASTOR) 250 MG capsule Take 1 capsule (250 mg) by mouth 2 times daily 20 capsule 0     simvastatin (ZOCOR) 20 MG tablet TAKE ONE TABLET BY MOUTH EVERY DAY AT BEDTIME 90 tablet 1     SM ASPIRIN ADULT LOW STRENGTH 81 MG EC tablet Take 162 mg by mouth daily       vitamin D3 (CHOLECALCIFEROL) 50 mcg (2000 units) tablet Take 1 tablet (50 mcg) by mouth daily 90 tablet 0       Allergies:  Allergies   Allergen Reactions     Lisinopril Cough     Morphine Other (See Comments) and Visual Disturbance     confusion     PHYSICAL EXAM:     Vital signs: /78   Pulse 70   Temp 97.1  F (36.2  C) (Tympanic)   Ht 1.803 m (5' 11\")   Wt 86.2 kg (190 lb)   SpO2 98%   BMI 26.50 kg/m     Weight: [unfilled]   BMI: Body mass index is 26.5 kg/m .   General: Normal, healthy, cooperative, in no acute " distress, alert   Skin: no jaundice   HEENT: PERRLA and EOMI   Neck: supple   Abdominal: Abdomen soft, non-tender. BS normal. No masses,  No organomegaly        Hernia    Location: Parastomal     Reducible: YES    Tender: NO    Erythematous: NO      ASSESSMENT: 89 year old male with an asymptomatic parastomal hernia.     PLAN: Gave the patient reassurance.  At this point I would not do anything given his age.  If he becomes obstructed then at that point we would be forced to dress parastomal hernia.  Patient is comfortable with the plan. All of his questions were answered.

## 2023-02-24 NOTE — PROGRESS NOTES
Call made to MD Olivia to update on slowed CBI rate, urine pale red/pink, no clots present. Verbal order to clamp patient, orders implemented, pt tolerating well, will continue to monitor. Luke Hall RN on 10/23/2019 at 9:40 AM   Awake

## 2023-03-08 DIAGNOSIS — E78.2 MIXED HYPERLIPIDEMIA: ICD-10-CM

## 2023-03-08 DIAGNOSIS — I10 ESSENTIAL (PRIMARY) HYPERTENSION: ICD-10-CM

## 2023-03-08 DIAGNOSIS — K21.9 GASTROESOPHAGEAL REFLUX DISEASE WITHOUT ESOPHAGITIS: ICD-10-CM

## 2023-03-08 DIAGNOSIS — I10 BENIGN ESSENTIAL HYPERTENSION: ICD-10-CM

## 2023-03-10 RX ORDER — FAMOTIDINE 20 MG/1
TABLET, FILM COATED ORAL
Qty: 90 TABLET | Refills: 0 | Status: SHIPPED | OUTPATIENT
Start: 2023-03-10 | End: 2023-06-22

## 2023-03-10 RX ORDER — SIMVASTATIN 20 MG
TABLET ORAL
Qty: 90 TABLET | Refills: 0 | Status: SHIPPED | OUTPATIENT
Start: 2023-03-10 | End: 2023-06-22

## 2023-03-10 RX ORDER — METOPROLOL SUCCINATE 25 MG/1
TABLET, EXTENDED RELEASE ORAL
Qty: 45 TABLET | Refills: 0 | Status: SHIPPED | OUTPATIENT
Start: 2023-03-10 | End: 2023-06-22

## 2023-03-10 RX ORDER — LOSARTAN POTASSIUM 25 MG/1
TABLET ORAL
Qty: 90 TABLET | Refills: 0 | Status: SHIPPED | OUTPATIENT
Start: 2023-03-10 | End: 2023-06-22

## 2023-03-10 NOTE — TELEPHONE ENCOUNTER
PEPCID 20MG  DAILY  COZAAR 25MG DAILY  TOPROL XL 25MG  ZOCOR 20MG  Last Written Prescription Date:  9-1-22  Last Fill Quantity: 3 MONTH,   # refills: 1  Last Office Visit: 12-12-22  Future Office visit:       Routing refill request to provider for review/approval because:  FOUR MEDICAIT[employer]

## 2023-05-09 NOTE — TELEPHONE ENCOUNTER
pepcid    Last Written Prescription Date: 2/7/17  Last Fill Quantity: 90,  # refills: 2   Last Office Visit with [facility], [facility] or [facility] prescribing provider: 3/14/17    zocor     Last Written Prescription Date: 10/31/17  Last Fill Quantity: 30,  # refills: 0   Last Office Visit with [facility], [facility] or [facility] prescribing provider: 3/14/17                                                                                         Calcipotriene Pregnancy And Lactation Text: This medication has not been proven safe during pregnancy. It is unknown if this medication is excreted in breast milk.

## 2023-06-13 ENCOUNTER — OFFICE VISIT (OUTPATIENT)
Dept: FAMILY MEDICINE | Facility: OTHER | Age: 88
End: 2023-06-13
Attending: STUDENT IN AN ORGANIZED HEALTH CARE EDUCATION/TRAINING PROGRAM
Payer: MEDICARE

## 2023-06-13 VITALS
HEART RATE: 64 BPM | BODY MASS INDEX: 27.13 KG/M2 | SYSTOLIC BLOOD PRESSURE: 130 MMHG | WEIGHT: 194.5 LBS | RESPIRATION RATE: 16 BRPM | TEMPERATURE: 98 F | DIASTOLIC BLOOD PRESSURE: 64 MMHG | OXYGEN SATURATION: 98 %

## 2023-06-13 DIAGNOSIS — R09.81 NASAL CONGESTION: ICD-10-CM

## 2023-06-13 DIAGNOSIS — I10 BENIGN ESSENTIAL HYPERTENSION: ICD-10-CM

## 2023-06-13 DIAGNOSIS — K57.21 DIVERTICULITIS OF LARGE INTESTINE WITH PERFORATION AND ABSCESS WITH BLEEDING: ICD-10-CM

## 2023-06-13 DIAGNOSIS — R79.89 LOW VITAMIN B12 LEVEL: ICD-10-CM

## 2023-06-13 DIAGNOSIS — R53.83 FATIGUE, UNSPECIFIED TYPE: ICD-10-CM

## 2023-06-13 DIAGNOSIS — H90.3 SENSORINEURAL HEARING LOSS (SNHL) OF BOTH EARS: ICD-10-CM

## 2023-06-13 DIAGNOSIS — J30.2 SEASONAL ALLERGIC RHINITIS, UNSPECIFIED TRIGGER: ICD-10-CM

## 2023-06-13 DIAGNOSIS — Z00.00 ROUTINE HISTORY AND PHYSICAL EXAMINATION OF ADULT: Primary | ICD-10-CM

## 2023-06-13 PROCEDURE — 250N000011 HC RX IP 250 OP 636: Performed by: STUDENT IN AN ORGANIZED HEALTH CARE EDUCATION/TRAINING PROGRAM

## 2023-06-13 PROCEDURE — 96372 THER/PROPH/DIAG INJ SC/IM: CPT | Performed by: STUDENT IN AN ORGANIZED HEALTH CARE EDUCATION/TRAINING PROGRAM

## 2023-06-13 PROCEDURE — G0439 PPPS, SUBSEQ VISIT: HCPCS | Performed by: STUDENT IN AN ORGANIZED HEALTH CARE EDUCATION/TRAINING PROGRAM

## 2023-06-13 PROCEDURE — G0463 HOSPITAL OUTPT CLINIC VISIT: HCPCS | Mod: 25

## 2023-06-13 PROCEDURE — 99213 OFFICE O/P EST LOW 20 MIN: CPT | Mod: 25 | Performed by: STUDENT IN AN ORGANIZED HEALTH CARE EDUCATION/TRAINING PROGRAM

## 2023-06-13 RX ORDER — CYANOCOBALAMIN 1000 UG/ML
1000 INJECTION, SOLUTION INTRAMUSCULAR; SUBCUTANEOUS
Status: DISPENSED | OUTPATIENT
Start: 2023-06-13 | End: 2024-06-07

## 2023-06-13 RX ORDER — FLUTICASONE PROPIONATE 50 MCG
1 SPRAY, SUSPENSION (ML) NASAL DAILY
Qty: 15.8 ML | Refills: 1 | Status: SHIPPED | OUTPATIENT
Start: 2023-06-13 | End: 2023-09-13

## 2023-06-13 RX ADMIN — CYANOCOBALAMIN 1000 MCG: 1000 INJECTION, SOLUTION INTRAMUSCULAR; SUBCUTANEOUS at 14:32

## 2023-06-13 ASSESSMENT — ENCOUNTER SYMPTOMS
FEVER: 0
COUGH: 1
PARESTHESIAS: 0
SORE THROAT: 1
HEMATURIA: 0
NERVOUS/ANXIOUS: 0
MYALGIAS: 0
SHORTNESS OF BREATH: 0
FREQUENCY: 1
HEARTBURN: 1
EYE PAIN: 0
HEADACHES: 0
CHILLS: 1
ARTHRALGIAS: 0
DIZZINESS: 0
CONSTIPATION: 1
JOINT SWELLING: 0
DIARRHEA: 0
WEAKNESS: 0
HEMATOCHEZIA: 0
ABDOMINAL PAIN: 0
DYSURIA: 0
NAUSEA: 0
PALPITATIONS: 0

## 2023-06-13 ASSESSMENT — ACTIVITIES OF DAILY LIVING (ADL): CURRENT_FUNCTION: NO ASSISTANCE NEEDED

## 2023-06-13 ASSESSMENT — PAIN SCALES - GENERAL: PAINLEVEL: NO PAIN (0)

## 2023-06-13 NOTE — PROGRESS NOTES
"SUBJECTIVE:   Adrian is a 89 year old who presents for Preventive Visit.      6/13/2023     1:42 PM   Additional Questions   Roomed by Melody Saul   Accompanied by self     Are you in the first 12 months of your Medicare coverage?  No    Healthy Habits:    In general, how would you rate your overall health?  Poor    Frequency of exercise:  1 day/week    Duration of exercise:  15-30 minutes    Do you usually eat at least 4 servings of fruit and vegetables a day, include whole grains    & fiber and avoid regularly eating high fat or \"junk\" foods?  No    Taking medications regularly:  Yes    Medication side effects:  None    Ability to successfully perform activities of daily living:  No assistance needed    Home Safety:  No safety concerns identified    Hearing Impairment:  Need to ask people to speak up or repeat themselves    In the past 6 months, have you been bothered by leaking of urine?  No    In general, how would you rate your overall mental or emotional health?  Good      PHQ-2 Total Score:    Additional concerns today:  Yes    Has been coughing up clear sputum. Congestion.  Coughing all the time.  Worse when he gets up in the morning.  Slows down in the evening.  No fevers.  Appetite is good.      Have you ever done Advance Care Planning? (For example, a Health Directive, POLST, or a discussion with a medical provider or your loved ones about your wishes): No, advance care planning information given to patient to review.  Patient plans to discuss their wishes with loved ones or provider.      Fall risk  Fallen 2 or more times in the past year?: No  Any fall with injury in the past year?: Yes, fell while cleaning snow off the front of the steps, about an inch of it.  Doing the sidewalk.  Lost complete sensation in the right leg and found himself on the ground.  Layed on the ground and wondered what had happened.   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  Lost oldest boy to heart attack who  was only 68  Cognitive Screening   Deferred 2/2 age but patient is doing quite well for 89 years old.  He is able to manage his own ostomy very well.  I am not worried about any serious cognitive deficiencies in this gentleman    Do you have sleep apnea, excessive snoring or daytime drowsiness?: yes    Reviewed and updated as needed this visit by clinical staff   Tobacco  Allergies  Meds              Reviewed and updated as needed this visit by Provider                 Social History     Tobacco Use     Smoking status: Former     Packs/day: 1.00     Years: 13.00     Pack years: 13.00     Types: Cigarettes     Start date: 1949     Quit date: 1962     Years since quittin.8     Smokeless tobacco: Never   Vaping Use     Vaping status: Never Used   Substance Use Topics     Alcohol use: Not Currently     Comment: 3 Drinks (BEER & LIQUOR) ~ OCCASIONALLY (QUIT IN )            2023     1:52 PM   Alcohol Use   Prescreen: >3 drinks/day or >7 drinks/week? Not Applicable          View : No data to display.              Do you have a current opioid prescription? No  Do you use any other controlled substances or medications that are not prescribed by a provider? None    RESPIRATORY SYMPTOMS      Duration: ongoing    Description  nasal congestion, rhinorrhea, cough, ear pain right and hoarse voice    Severity: mild    Accompanying signs and symptoms: coughing up snot    History (predisposing factors):  none    Precipitating or alleviating factors: None    Therapies tried and outcome:  none      Current providers sharing in care for this patient include:   Patient Care Team:  Lisa Marrero MD as PCP - General (Family Medicine)  Lisa Marrero MD as Assigned PCP  Lizzeth Harris PA-C as Assigned Surgical Provider    The following health maintenance items are reviewed in Epic and correct as of today:  Health Maintenance  "  Topic Date Due     ZOSTER IMMUNIZATION (1 of 2) Never done     MEDICARE ANNUAL WELLNESS VISIT  01/08/2015     Pneumococcal Vaccine: 65+ Years (2 - PCV) 01/19/2017     COVID-19 Vaccine (3 - Moderna series) 04/30/2021     PHQ-2 (once per calendar year)  01/01/2023     DTAP/TDAP/TD IMMUNIZATION (2 - Td or Tdap) 08/30/2023     INFLUENZA VACCINE (Season Ended) 09/01/2023     FALL RISK ASSESSMENT  06/13/2024     ADVANCE CARE PLANNING  09/15/2027     IPV IMMUNIZATION  Aged Out     MENINGITIS IMMUNIZATION  Aged Out       Review of Systems   Constitutional: Positive for chills. Negative for fever.   HENT: Positive for congestion, ear pain, hearing loss and sore throat.    Eyes: Negative for pain and visual disturbance.   Respiratory: Positive for cough. Negative for shortness of breath.    Cardiovascular: Negative for chest pain, palpitations and peripheral edema.   Gastrointestinal: Positive for constipation and heartburn. Negative for abdominal pain, diarrhea, hematochezia and nausea.   Genitourinary: Positive for frequency and urgency. Negative for dysuria, genital sores, hematuria, impotence and penile discharge.   Musculoskeletal: Negative for arthralgias, joint swelling and myalgias.   Skin: Negative for rash.   Neurological: Negative for dizziness, weakness, headaches and paresthesias.   Psychiatric/Behavioral: Positive for mood changes. The patient is not nervous/anxious.      Constitutional, HEENT, cardiovascular, pulmonary, gi and gu systems are negative, except as otherwise noted.    OBJECTIVE:   /64 (BP Location: Right arm, Patient Position: Chair, Cuff Size: Adult Large)   Pulse 64   Temp 98  F (36.7  C) (Tympanic)   Resp 16   Wt 88.2 kg (194 lb 8 oz)   SpO2 98%   BMI 27.13 kg/m   Estimated body mass index is 27.13 kg/m  as calculated from the following:    Height as of 12/20/22: 1.803 m (5' 11\").    Weight as of this encounter: 88.2 kg (194 lb 8 oz).  Physical Exam  GENERAL: healthy, alert and no " distress  EYES: Eyes grossly normal to inspection, PERRL and conjunctivae and sclerae normal  HENT: ear canals and TM's normal, nose and mouth without ulcers or lesions  NECK: no adenopathy, no asymmetry, masses, or scars and thyroid normal to palpation  RESP: lungs clear to auscultation - no rales, rhonchi or wheezes  CV: regular rate and rhythm, normal S1 S2, no S3 or S4, no murmur, click or rub, no peripheral edema and peripheral pulses strong  ABDOMEN: soft, nontender, no hepatosplenomegaly, no masses and bowel sounds normal.  Ostomy looks healthy.    MS: no gross musculoskeletal defects noted, no edema  SKIN: no suspicious lesions or rashes  NEURO: Normal strength and tone, mentation intact and speech normal  PSYCH: mentation appears normal, affect normal/bright    ASSESSMENT / PLAN:       ICD-10-CM    1. Routine history and physical examination of adult  Z00.00       2. HTN (hypertension)  I10       3. Low vitamin B12 level  E53.8 cyanocobalamin injection 1,000 mcg      4. Sensorineural hearing loss (SNHL) of both ears  H90.3       5. Diverticulitis of large intestine with perforation and abscess with bleeding  K57.21       6. Nasal congestion  R09.81       7. Seasonal allergic rhinitis, unspecified trigger  J30.2 fluticasone (FLONASE) 50 MCG/ACT nasal spray      8. Fatigue, unspecified type  R53.83 CBC with platelets and differential        89 year old male here for his annual physical.  He tells me that he feels rather weak and fatigued.  His vitamin B12 was checked about 1 year ago and noted to be rather low (at 239) and I think it might have fallen further since that time.  We will try B12 injection and schedule him for monthly injections to help improve his levels.  I think this will help improve his energy level.  He is also reporting cough with some clear sputum production.  He has no alarm symptoms, his VSS and normal.  Most likely related to rhinitis.  We will trial some intranasal steroids along with  "nasal rinses.  He has a colostomy 2/2 a serious bout of diverticulitis with perforation.  He is doing well with this.  He has occassional very mild bleeding from the stoma site- most likely from manipulation of the bag- I discussed that stomas are rather vascular in nature and can easily bleed if accidentally knocked or caught on something.  He is able to easily stop the bleeding and there have been no complications thus far.  He is able to take care of his stoma without issues.  Discussed that we do not need to repeat labs at this time.  Vitamin D level is on low end of normal.  I recommend he take between 2,000 and 5,000 units of vitamin D daily.  Low vitamin D could be contributing to patient's fatigue.  History of anemia in the past as well, we will recheck CBC      COUNSELING:  Reviewed preventive health counseling, as reflected in patient instructions       Regular exercise       Healthy diet/nutrition       Vision screening       Hearing screening       Dental care       Bladder control       Fall risk prevention      BMI:   Estimated body mass index is 27.13 kg/m  as calculated from the following:    Height as of 12/20/22: 1.803 m (5' 11\").    Weight as of this encounter: 88.2 kg (194 lb 8 oz).   Patient is doing quite well for his age.  Continue present activities without change       He reports that he quit smoking about 60 years ago. His smoking use included cigarettes. He started smoking about 74 years ago. He has a 13.00 pack-year smoking history. He has never used smokeless tobacco.    Reviewed patients plan of care and provided an AVS. The Basic Care Plan (routine screening as documented in Health Maintenance) for James meets the Care Plan requirement. This Care Plan has been established and reviewed with the Patient.          Lisa Marrero MD   [facility] [place]    Identified Health Risks:    I have reviewed Opioid Use Disorder and Substance Use Disorder risk factors and made " any needed referrals.

## 2023-06-19 DIAGNOSIS — K21.9 GASTROESOPHAGEAL REFLUX DISEASE WITHOUT ESOPHAGITIS: ICD-10-CM

## 2023-06-19 DIAGNOSIS — I10 ESSENTIAL (PRIMARY) HYPERTENSION: ICD-10-CM

## 2023-06-19 DIAGNOSIS — E78.2 MIXED HYPERLIPIDEMIA: ICD-10-CM

## 2023-06-19 DIAGNOSIS — I10 BENIGN ESSENTIAL HYPERTENSION: ICD-10-CM

## 2023-06-22 RX ORDER — FAMOTIDINE 20 MG/1
TABLET, FILM COATED ORAL
Qty: 90 TABLET | Refills: 0 | Status: SHIPPED | OUTPATIENT
Start: 2023-06-22 | End: 2023-10-02

## 2023-06-22 RX ORDER — SIMVASTATIN 20 MG
TABLET ORAL
Qty: 90 TABLET | Refills: 0 | Status: SHIPPED | OUTPATIENT
Start: 2023-06-22 | End: 2023-10-02

## 2023-06-22 RX ORDER — LOSARTAN POTASSIUM 25 MG/1
TABLET ORAL
Qty: 90 TABLET | Refills: 0 | Status: SHIPPED | OUTPATIENT
Start: 2023-06-22 | End: 2023-10-02

## 2023-06-22 RX ORDER — METOPROLOL SUCCINATE 25 MG/1
TABLET, EXTENDED RELEASE ORAL
Qty: 45 TABLET | Refills: 0 | Status: SHIPPED | OUTPATIENT
Start: 2023-06-22 | End: 2023-10-02

## 2023-06-22 NOTE — TELEPHONE ENCOUNTER
losartan (COZAAR) 25 MG tablet 90 tablet 0 3/10/2023      metoprolol succinate ER (TOPROL XL) 25 MG 24 hr tablet 45 tablet 0 3/10/2023     famotidine (PEPCID) 20 MG tablet 90 tablet 0 3/10/2023     simvastatin (ZOCOR) 20 MG tablet 90 tablet 0 3/10/2023     Last Office Visit: 06/13/2023   Future Office visit:       Routing refill request to provider for review/approval because:

## 2023-09-13 ENCOUNTER — OFFICE VISIT (OUTPATIENT)
Dept: SURGERY | Facility: OTHER | Age: 88
End: 2023-09-13
Attending: SURGERY
Payer: MEDICARE

## 2023-09-13 VITALS
TEMPERATURE: 97.5 F | HEIGHT: 71 IN | OXYGEN SATURATION: 98 % | BODY MASS INDEX: 26.6 KG/M2 | SYSTOLIC BLOOD PRESSURE: 126 MMHG | DIASTOLIC BLOOD PRESSURE: 80 MMHG | HEART RATE: 58 BPM | WEIGHT: 190 LBS

## 2023-09-13 DIAGNOSIS — K43.5 PARASTOMAL HERNIA WITHOUT OBSTRUCTION OR GANGRENE: Primary | ICD-10-CM

## 2023-09-13 DIAGNOSIS — Z90.49 STATUS POST COLECTOMY: ICD-10-CM

## 2023-09-13 PROCEDURE — G0463 HOSPITAL OUTPT CLINIC VISIT: HCPCS

## 2023-09-13 PROCEDURE — 99213 OFFICE O/P EST LOW 20 MIN: CPT | Performed by: SURGERY

## 2023-09-13 ASSESSMENT — PAIN SCALES - GENERAL: PAINLEVEL: NO PAIN (0)

## 2023-09-13 NOTE — PROGRESS NOTES
"CLINIC NOTE - FOLLOW UP  9/13/2023    Patient:James Grant    Reason for Visit: Follow up from prior clinic visit for parastomal hernia    This is a 89 year old male here for follow up from a prior clinic visit for parastomal hernia. His prior medical records were reviewed.       The patient is approximately 2 years status post perforated diverticulitis.  At that time a Awlker's procedure with end colostomy was performed.  The patient did well postoperatively.  He has a known parastomal hernia and comes in today stating that it seems to be getting a little larger and that he has had some minor blowouts at the inferior aspect of his colostomy bag.  Otherwise no obstructive type symptoms.    Current Medications:  Current Outpatient Medications   Medication Sig Dispense Refill    acetaminophen (TYLENOL) 325 MG tablet Take 2 tablets (650 mg) by mouth every 4 hours as needed for mild pain, fever or headaches      famotidine (PEPCID) 20 MG tablet TAKE ONE TABLET BY MOUTH EVERY DAY IN THE MORNING 90 tablet 0    losartan (COZAAR) 25 MG tablet TAKE ONE TABLET BY MOUTH EVERY DAY IN THE MORNING 90 tablet 0    metoprolol succinate ER (TOPROL XL) 25 MG 24 hr tablet TAKE ONE-HALF TABLET BY MOUTH EVERY DAY AT BEDTIME 45 tablet 0    NITROSTAT 0.4 MG sublingual tablet PLACE 1 TABLET (0.4 MG) UNDER THE TONGUE EVERY 5 MINUTES AS NEEDED 25 tablet 3    simvastatin (ZOCOR) 20 MG tablet TAKE ONE TABLET BY MOUTH EVERY DAY AT BEDTIME 90 tablet 0    SM ASPIRIN ADULT LOW STRENGTH 81 MG EC tablet Take 40 mg by mouth daily         Allergies:  Allergies   Allergen Reactions    Lisinopril Cough    Morphine Other (See Comments) and Visual Disturbance     confusion       PHYSICAL EXAM:     Vital signs: /80 (BP Location: Right arm, Cuff Size: Adult Regular)   Pulse 58   Temp 97.5  F (36.4  C) (Tympanic)   Ht 1.803 m (5' 11\")   Wt 86.2 kg (190 lb)   SpO2 98%   BMI 26.50 kg/m     Weight: [unfilled]   BMI: Body mass index is 26.5 " kg/m .   General: Normal, cooperative, in no acute distress, alert   Skin: no jaundice   HEENT: PERRLA and EOMI   Neck: supple   Abdominal: abdomen is soft without significant tenderness, masses, organomegaly or guarding.  There is a stoma in his left lower quadrant.  There is evidence of a parastomal hernia.  Ostomy is viable.  Stool is coming from the ostomy and in the bag.   Extremities: No cyanosis, clubbing or edema noted bilaterally in Upper and Lower Extremities   Neurological: without deficit    ASSESSMENT:  89 year old male here as follow up from a prior clinic visit for a known parastomal hernia.  No evidence of obstruction.    PLAN: Discussed with him that at 89 I would not recommend either a colostomy takedown or repair of the parastomal hernia as the risks far outweigh the benefits.  Also stated that if continues to have problems with the colostomy bag that we could have him see Lida for recommendations on the front appliances and/or other remedies to help parents of the bag.    At this point he feels that it is relatively manageable does not want to pursue anything.  He will follow-up with me as needed.

## 2023-09-28 DIAGNOSIS — I10 BENIGN ESSENTIAL HYPERTENSION: ICD-10-CM

## 2023-09-28 DIAGNOSIS — E78.2 MIXED HYPERLIPIDEMIA: ICD-10-CM

## 2023-09-28 DIAGNOSIS — I10 ESSENTIAL (PRIMARY) HYPERTENSION: ICD-10-CM

## 2023-09-28 DIAGNOSIS — K21.9 GASTROESOPHAGEAL REFLUX DISEASE WITHOUT ESOPHAGITIS: ICD-10-CM

## 2023-10-02 RX ORDER — FAMOTIDINE 20 MG/1
TABLET, FILM COATED ORAL
Qty: 90 TABLET | Refills: 2 | Status: SHIPPED | OUTPATIENT
Start: 2023-10-02 | End: 2024-07-19

## 2023-10-02 RX ORDER — METOPROLOL SUCCINATE 25 MG/1
TABLET, EXTENDED RELEASE ORAL
Qty: 45 TABLET | Refills: 2 | Status: SHIPPED | OUTPATIENT
Start: 2023-10-02 | End: 2024-07-19

## 2023-10-02 RX ORDER — LOSARTAN POTASSIUM 25 MG/1
TABLET ORAL
Qty: 90 TABLET | Refills: 0 | Status: SHIPPED | OUTPATIENT
Start: 2023-10-02 | End: 2023-12-27

## 2023-10-02 RX ORDER — SIMVASTATIN 20 MG
TABLET ORAL
Qty: 90 TABLET | Refills: 0 | Status: SHIPPED | OUTPATIENT
Start: 2023-10-02 | End: 2024-01-22

## 2023-10-02 NOTE — TELEPHONE ENCOUNTER
Cozaar, Zocor      Last Written Prescription Date:  6.22.23  Last Fill Quantity: #90, #90,   # refills: 0  Last Office Visit: 6.13.23  Future Office visit:       Routing refill request to provider for review/approval because:

## 2023-11-08 ENCOUNTER — APPOINTMENT (OUTPATIENT)
Dept: GENERAL RADIOLOGY | Facility: HOSPITAL | Age: 88
End: 2023-11-08
Attending: STUDENT IN AN ORGANIZED HEALTH CARE EDUCATION/TRAINING PROGRAM
Payer: MEDICARE

## 2023-11-08 ENCOUNTER — HOSPITAL ENCOUNTER (EMERGENCY)
Facility: HOSPITAL | Age: 88
Discharge: ANOTHER HOSPITAL | End: 2023-11-08
Attending: STUDENT IN AN ORGANIZED HEALTH CARE EDUCATION/TRAINING PROGRAM | Admitting: STUDENT IN AN ORGANIZED HEALTH CARE EDUCATION/TRAINING PROGRAM
Payer: MEDICARE

## 2023-11-08 VITALS
OXYGEN SATURATION: 100 % | DIASTOLIC BLOOD PRESSURE: 100 MMHG | HEART RATE: 69 BPM | RESPIRATION RATE: 18 BRPM | SYSTOLIC BLOOD PRESSURE: 156 MMHG | TEMPERATURE: 97 F

## 2023-11-08 DIAGNOSIS — S72.002A HIP FRACTURE, LEFT, CLOSED, INITIAL ENCOUNTER (H): ICD-10-CM

## 2023-11-08 LAB
ANION GAP SERPL CALCULATED.3IONS-SCNC: 16 MMOL/L (ref 7–15)
ATRIAL RATE - MUSE: 73 BPM
BUN SERPL-MCNC: 16.8 MG/DL (ref 8–23)
CALCIUM SERPL-MCNC: 9.4 MG/DL (ref 8.2–9.6)
CHLORIDE SERPL-SCNC: 101 MMOL/L (ref 98–107)
CREAT SERPL-MCNC: 0.79 MG/DL (ref 0.67–1.17)
DEPRECATED HCO3 PLAS-SCNC: 22 MMOL/L (ref 22–29)
DIASTOLIC BLOOD PRESSURE - MUSE: NORMAL MMHG
EGFRCR SERPLBLD CKD-EPI 2021: 81 ML/MIN/1.73M2
ERYTHROCYTE [DISTWIDTH] IN BLOOD BY AUTOMATED COUNT: 12.9 % (ref 10–15)
GLUCOSE BLDC GLUCOMTR-MCNC: 97 MG/DL (ref 70–99)
GLUCOSE SERPL-MCNC: 119 MG/DL (ref 70–99)
HCT VFR BLD AUTO: 42.2 % (ref 40–53)
HGB BLD-MCNC: 14.4 G/DL (ref 13.3–17.7)
HOLD SPECIMEN: NORMAL
HOLD SPECIMEN: NORMAL
INR PPP: 1.01 (ref 0.85–1.15)
INTERPRETATION ECG - MUSE: NORMAL
MCH RBC QN AUTO: 31.6 PG (ref 26.5–33)
MCHC RBC AUTO-ENTMCNC: 34.1 G/DL (ref 31.5–36.5)
MCV RBC AUTO: 93 FL (ref 78–100)
P AXIS - MUSE: 91 DEGREES
PLATELET # BLD AUTO: 235 10E3/UL (ref 150–450)
POTASSIUM SERPL-SCNC: 4.2 MMOL/L (ref 3.4–5.3)
PR INTERVAL - MUSE: 240 MS
QRS DURATION - MUSE: 90 MS
QT - MUSE: 420 MS
QTC - MUSE: 462 MS
R AXIS - MUSE: 15 DEGREES
RBC # BLD AUTO: 4.56 10E6/UL (ref 4.4–5.9)
SODIUM SERPL-SCNC: 139 MMOL/L (ref 135–145)
SYSTOLIC BLOOD PRESSURE - MUSE: NORMAL MMHG
T AXIS - MUSE: 47 DEGREES
VENTRICULAR RATE- MUSE: 73 BPM
WBC # BLD AUTO: 9.8 10E3/UL (ref 4–11)

## 2023-11-08 PROCEDURE — 96374 THER/PROPH/DIAG INJ IV PUSH: CPT

## 2023-11-08 PROCEDURE — 36415 COLL VENOUS BLD VENIPUNCTURE: CPT | Performed by: STUDENT IN AN ORGANIZED HEALTH CARE EDUCATION/TRAINING PROGRAM

## 2023-11-08 PROCEDURE — 82962 GLUCOSE BLOOD TEST: CPT

## 2023-11-08 PROCEDURE — 85027 COMPLETE CBC AUTOMATED: CPT | Performed by: STUDENT IN AN ORGANIZED HEALTH CARE EDUCATION/TRAINING PROGRAM

## 2023-11-08 PROCEDURE — 73552 X-RAY EXAM OF FEMUR 2/>: CPT | Mod: LT

## 2023-11-08 PROCEDURE — 99284 EMERGENCY DEPT VISIT MOD MDM: CPT | Performed by: STUDENT IN AN ORGANIZED HEALTH CARE EDUCATION/TRAINING PROGRAM

## 2023-11-08 PROCEDURE — 96375 TX/PRO/DX INJ NEW DRUG ADDON: CPT

## 2023-11-08 PROCEDURE — 99285 EMERGENCY DEPT VISIT HI MDM: CPT | Mod: 25

## 2023-11-08 PROCEDURE — 93005 ELECTROCARDIOGRAM TRACING: CPT

## 2023-11-08 PROCEDURE — 82310 ASSAY OF CALCIUM: CPT | Performed by: STUDENT IN AN ORGANIZED HEALTH CARE EDUCATION/TRAINING PROGRAM

## 2023-11-08 PROCEDURE — 85610 PROTHROMBIN TIME: CPT | Performed by: STUDENT IN AN ORGANIZED HEALTH CARE EDUCATION/TRAINING PROGRAM

## 2023-11-08 PROCEDURE — 73502 X-RAY EXAM HIP UNI 2-3 VIEWS: CPT

## 2023-11-08 PROCEDURE — 250N000011 HC RX IP 250 OP 636: Performed by: STUDENT IN AN ORGANIZED HEALTH CARE EDUCATION/TRAINING PROGRAM

## 2023-11-08 RX ORDER — ONDANSETRON 2 MG/ML
4 INJECTION INTRAMUSCULAR; INTRAVENOUS ONCE
Status: COMPLETED | OUTPATIENT
Start: 2023-11-08 | End: 2023-11-08

## 2023-11-08 RX ORDER — HYDROMORPHONE HYDROCHLORIDE 1 MG/ML
0.5 INJECTION, SOLUTION INTRAMUSCULAR; INTRAVENOUS; SUBCUTANEOUS EVERY 30 MIN PRN
Status: DISCONTINUED | OUTPATIENT
Start: 2023-11-08 | End: 2023-11-08 | Stop reason: HOSPADM

## 2023-11-08 RX ADMIN — HYDROMORPHONE HYDROCHLORIDE 0.5 MG: 1 INJECTION, SOLUTION INTRAMUSCULAR; INTRAVENOUS; SUBCUTANEOUS at 14:28

## 2023-11-08 RX ADMIN — ONDANSETRON 4 MG: 2 INJECTION INTRAMUSCULAR; INTRAVENOUS at 14:41

## 2023-11-08 ASSESSMENT — ACTIVITIES OF DAILY LIVING (ADL): ADLS_ACUITY_SCORE: 41

## 2023-11-08 NOTE — ED PROVIDER NOTES
History     Chief Complaint   Patient presents with    Fall    Hip Pain     HPI  James Grant is a 89 year old male who presents after a mechanical fall. States he tripped on a stair in his garage. Has left sided hip pain and hurt too much to stand up. Crawled outside so someone could see him and call 911. Was on ground for around 20-30 minutes. No CP. Did not hit head. Not on blood thinners. No LOC. No CP. No SOB. No abdominal pain. He was hypoxic for medics but not SOB (noted that he did have cold fingers though). No N/V/D. No recent illness. No presyncope. No lightheadedness.    Allergies:  Allergies   Allergen Reactions    Lisinopril Cough    Morphine Other (See Comments) and Visual Disturbance     confusion       Problem List:    Patient Active Problem List    Diagnosis Date Noted    Febrile illness 03/26/2022     Priority: Medium    Urinary tract infection without hematuria, site unspecified 03/26/2022     Priority: Medium    Diverticulitis of large intestine with perforation and abscess 07/27/2021     Priority: Medium    Sigmoid diverticulitis 07/15/2021     Priority: Medium    Diverticulitis of large intestine with abscess 07/15/2021     Priority: Medium    Abdominal pain, right lower quadrant 07/15/2021     Priority: Medium     Added automatically from request for surgery [id number]      TIA (transient ischemic attack) 11/30/2020     Priority: Medium    Malignant neoplasm of overlapping sites of bladder (H) 11/22/2019     Priority: Medium    History of bladder cancer 10/28/2019     Priority: Medium    Venous stasis dermatitis of both lower extremities 03/16/2017     Priority: Medium    ACP (advance care planning) 06/06/2016     Priority: Medium     Advance Care Planning 6/6/2016: ACP Review of Chart / Resources Provided:  Reviewed chart for advance care plan.  James Grant has no plan or code status on file. Discussed available resources and provided with information. Confirmed code status reflects  current choices pending further ACP discussions.  Confirmed/documented legally designated decision makers.  Added by Yani Anderson            BPH (benign prostatic hyperplasia) 01/08/2014     Priority: Medium    SNHL (sensorineural hearing loss) 07/17/2013     Priority: Medium    ETD (eustachian tube dysfunction) 07/17/2013     Priority: Medium    Dyslipidemia 11/26/2010     Priority: Medium    GERD (gastroesophageal reflux disease) 11/26/2010     Priority: Medium    CHD (coronary heart disease) 11/26/2010     Priority: Medium     07/2010 S/P inferior wall MI  Angioplasty and stenting X 2 RCA  08/2010 angioplasty and stenting (LIDIA) LAD      Essential hypertension, benign 11/08/2010     Priority: Medium     Overview:   IMO Update 10/11      Coronary atherosclerosis of native coronary artery 11/08/2010     Priority: Medium     Formatting of this note might be different from the original.  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      CHOLECYSTECTOMY      CYSTOSCOPY, FULGURATE BLEEDERS, EVACUATE CLOT(S), COMBINED N/A 2/17/2020    Procedure: Clot Evacuation;  Surgeon: Andrzej Olivia MD;  Location:  OR    CYSTOSCOPY, RETROGRADES, COMBINED Bilateral 10/22/2019    Procedure: Bilateral Retrograde Pyelograms;  Surgeon: Andrzej Olivia MD;  Location:  OR    CYSTOSCOPY, TRANSURETHRAL RESECTION (TUR) TUMOR BLADDER, COMBINED N/A 10/22/2019    Procedure: Transurethral Resection of  Bladder Tumor and transurethral resection of prostate and bladder stone removal;  Surgeon: Andrzej Olivia MD;  Location: [facility] OR    HERNIA REPAIR      LAPAROTOMY EXPLORATORY N/A 2021    Procedure: Exploratory  laparotomy with sigmoid colectomy and creation of colostomy, appendectomy;  Surgeon: Sukumar Chavez MD;  Location: [facility] OR    left arm surgery         Family History:    Family History   Problem Relation Age of Onset    Heart Failure Mother 86        Congestive - Cause of Death    Cerebrovascular Disease Father     C.A.D. Sister 91       Social History:  Marital Status:   [5]  Social History     Tobacco Use    Smoking status: Former     Packs/day: 1.00     Years: 13.00     Additional pack years: 0.00     Total pack years: 13.00     Types: Cigarettes     Start date: 1949     Quit date: 1962     Years since quittin.2    Smokeless tobacco: Never   Vaping Use    Vaping Use: Never used   Substance Use Topics    Alcohol use: Not Currently     Comment: 3 Drinks (BEER & LIQUOR) ~ OCCASIONALLY (QUIT IN )    Drug use: No        Medications:    acetaminophen (TYLENOL) 325 MG tablet  famotidine (PEPCID) 20 MG tablet  losartan (COZAAR) 25 MG tablet  metoprolol succinate ER (TOPROL XL) 25 MG 24 hr tablet  NITROSTAT 0.4 MG sublingual tablet  simvastatin (ZOCOR) 20 MG tablet  SM ASPIRIN ADULT LOW STRENGTH 81 MG EC tablet          Review of Systems   All other systems reviewed and are negative.      Physical Exam   BP: (!) 165/149  Pulse: 71  Temp: 97.7  F (36.5  C)  Resp: 26  SpO2: 99 %      Physical Exam  Vitals and nursing note reviewed.   Constitutional:       General: He is not in acute distress.     Appearance: Normal appearance. He is not ill-appearing or toxic-appearing.   HENT:      Head: Normocephalic and atraumatic.      Right Ear: External ear normal.      Left Ear: External ear normal.      Nose: Nose normal. No congestion.      Mouth/Throat:      Mouth: Mucous membranes are moist.       Pharynx: Oropharynx is clear.   Eyes:      Extraocular Movements: Extraocular movements intact.      Pupils: Pupils are equal, round, and reactive to light.   Cardiovascular:      Rate and Rhythm: Normal rate and regular rhythm.      Pulses: Normal pulses.      Heart sounds: Normal heart sounds.   Pulmonary:      Effort: Pulmonary effort is normal.      Breath sounds: Normal breath sounds.   Abdominal:      General: Abdomen is flat.      Palpations: Abdomen is soft.      Comments: Colostomy in tact and clean without surrounding erythema.   Musculoskeletal:         General: No swelling or tenderness.      Cervical back: Normal range of motion. No rigidity or tenderness.      Comments: Left hip pain after fall. Externally rotated and shortened.   Skin:     General: Skin is warm.      Capillary Refill: Capillary refill takes less than 2 seconds.   Neurological:      General: No focal deficit present.      Mental Status: He is alert and oriented to person, place, and time.      Comments: Able to move left lower extremity but with significant pain.   Psychiatric:         Mood and Affect: Mood normal.         ED Course           Procedures              Results for orders placed or performed during the hospital encounter of 11/08/23 (from the past 24 hour(s))   Glucose by meter   Result Value Ref Range    GLUCOSE BY METER POCT 97 70 - 99 mg/dL   EKG 12 lead   Result Value Ref Range    Systolic Blood Pressure  mmHg    Diastolic Blood Pressure  mmHg    Ventricular Rate 73 BPM    Atrial Rate 73 BPM    KY Interval 240 ms    QRS Duration 90 ms     ms    QTc 462 ms    P Axis 91 degrees    R AXIS 15 degrees    T Axis 47 degrees    Interpretation ECG       Sinus rhythm with 1st degree A-V block  Low voltage QRS  Borderline ECG  Significant artifact  No previous ECGs available  Confirmed by MD Quezada Anthony ([id number]) on 11/8/2023 2:35:26 PM     CBC with platelets   Result Value Ref Range    WBC Count 9.8 4.0 - 11.0  10e3/uL    RBC Count 4.56 4.40 - 5.90 10e6/uL    Hemoglobin 14.4 13.3 - 17.7 g/dL    Hematocrit 42.2 40.0 - 53.0 %    MCV 93 78 - 100 fL    MCH 31.6 26.5 - 33.0 pg    MCHC 34.1 31.5 - 36.5 g/dL    RDW 12.9 10.0 - 15.0 %    Platelet Count 235 150 - 450 10e3/uL   Basic metabolic panel   Result Value Ref Range    Sodium 139 135 - 145 mmol/L    Potassium 4.2 3.4 - 5.3 mmol/L    Chloride 101 98 - 107 mmol/L    Carbon Dioxide (CO2) 22 22 - 29 mmol/L    Anion Gap 16 (H) 7 - 15 mmol/L    Urea Nitrogen 16.8 8.0 - 23.0 mg/dL    Creatinine 0.79 0.67 - 1.17 mg/dL    GFR Estimate 81 >60 mL/min/1.73m2    Calcium 9.4 8.2 - 9.6 mg/dL    Glucose 119 (H) 70 - 99 mg/dL   INR   Result Value Ref Range    INR 1.01 0.85 - 1.15   [facility] Draw    Narrative    The following orders were created for panel order [facility] Draw.  Procedure                               Abnormality         Status                     ---------                               -----------         ------                     Extra Blue Top Tube[[id number]]                                                         Extra Red Top Tube[[id number]]                               Final result               Extra Green Top (Lithium...[[id number]]                      Final result               Extra Purple Top Tube[[id number]]                                                         Please view results for these tests on the individual orders.   Extra Red Top Tube   Result Value Ref Range    Hold Specimen JIC    Extra Green Top (Lithium Heparin) Tube   Result Value Ref Range    Hold Specimen JIC    XR Pelvis and Hip Portable Left 1 View    Narrative    XR PELVIS AND HIP PORTABLE LEFT 1 VIEW, 11/8/2023 2:16 PM    History: Male, age 89 years; Pain, fall, suspected fracture    Comparison: CT scan abdomen and pelvis 3/26/2022    Technique: Single view the pelvis and single view of the left hip were  obtained.    FINDINGS: The bones are normally mineralized. The bony pelvis and  visualized  portions of the hip demonstrate an impacted, minimally  angulated fracture involving the left femoral neck with foreshortening  of the proximal left femur. No evidence of dislocation.      Impression    IMPRESSION:   Mildly angulated, impacted left femoral neck fracture.    HÉCTOR MAC MD         SYSTEM ID:  [id number]   XR Femur Port Left 2 Views    Narrative    PROCEDURE:  XR FEMUR PORT LEFT 2 VIEWS    HISTORY: Pain, fracture    COMPARISON:  None.    TECHNIQUE:  AP view of the femur     FINDINGS:  There is a femoral neck fracture on the left. The distal  femoral shaft intact.       Impression    IMPRESSION: Femoral neck fracture on the left.      NIKOLAS MUNOZ MD         SYSTEM ID:  [id number]       Medications   HYDROmorphone (PF) (DILAUDID) injection 0.5 mg (0.5 mg Intravenous $Given 11/8/23 1422)   ondansetron (ZOFRAN) injection 4 mg (4 mg Intravenous $Given 11/8/23 1442)       Assessments & Plan (with Medical Decision Making)     I have reviewed the nursing notes.    Mechanical fall. Normal vitals. No trauma team activation called. No LOC. No indication for work-up of etiology of fall. Plan on XR, pain control. He lives alone at home independently so will plan on transfer for orthopedic care. Will obtain pre-op labs as I suspect he has a hip fracture.    See ED Course.    I have reviewed the findings, diagnosis, plan and need for follow up with the patient.      Discharge Medication List as of 11/8/2023  3:35 PM          Final diagnoses:   Hip fracture, left, closed, initial encounter (H)       11/8/2023   [facility]       Dylon Easley MD  11/08/23 [id number]

## 2023-11-08 NOTE — ED TRIAGE NOTES
Pt comes by EMS after falling in garage, pt states twice but he couldn't get up due to pain.  Pt states he crawled out of garage, laid on the ground for about 30 minutes until his neighbor came home and pt was able to yell to neighbor, neightbor called 911. Pt has externally rotated left leg. And pain in left groin. Pt is a/o. States he remember sthe entire fall and accounts.     Triage Assessment (Adult)       Row Name 11/08/23 [id number]          Triage Assessment    Airway WDL WDL        Respiratory WDL    Respiratory WDL WDL        Skin Circulation/Temperature WDL    Skin Circulation/Temperature WDL WDL        Cognitive/Neuro/Behavioral WDL    Cognitive/Neuro/Behavioral WDL WDL

## 2023-11-14 PROBLEM — S72.92XA FEMUR FRACTURE, LEFT (H): Status: ACTIVE | Noted: 2023-11-08

## 2023-11-14 PROBLEM — S72.002A CLOSED FRACTURE OF LEFT HIP, INITIAL ENCOUNTER (H): Status: ACTIVE | Noted: 2023-11-08

## 2023-11-14 PROBLEM — Z93.3 COLOSTOMY PRESENT (H): Status: ACTIVE | Noted: 2023-11-08

## 2023-11-15 ENCOUNTER — TELEPHONE (OUTPATIENT)
Dept: FAMILY MEDICINE | Facility: OTHER | Age: 88
End: 2023-11-15

## 2023-11-15 ENCOUNTER — NURSING HOME VISIT (OUTPATIENT)
Dept: FAMILY MEDICINE | Facility: OTHER | Age: 88
End: 2023-11-15
Attending: FAMILY MEDICINE
Payer: MEDICARE

## 2023-11-15 VITALS
OXYGEN SATURATION: 96 % | HEART RATE: 68 BPM | TEMPERATURE: 97.8 F | SYSTOLIC BLOOD PRESSURE: 156 MMHG | RESPIRATION RATE: 18 BRPM | DIASTOLIC BLOOD PRESSURE: 78 MMHG

## 2023-11-15 DIAGNOSIS — R10.31 ABDOMINAL PAIN, RIGHT LOWER QUADRANT: ICD-10-CM

## 2023-11-15 DIAGNOSIS — S72.002A CLOSED FRACTURE OF LEFT HIP, INITIAL ENCOUNTER (H): ICD-10-CM

## 2023-11-15 DIAGNOSIS — I10 ESSENTIAL HYPERTENSION, BENIGN: ICD-10-CM

## 2023-11-15 DIAGNOSIS — C67.8 MALIGNANT NEOPLASM OF OVERLAPPING SITES OF BLADDER (H): ICD-10-CM

## 2023-11-15 DIAGNOSIS — Z93.3 COLOSTOMY PRESENT (H): ICD-10-CM

## 2023-11-15 DIAGNOSIS — K21.9 GASTROESOPHAGEAL REFLUX DISEASE WITHOUT ESOPHAGITIS: ICD-10-CM

## 2023-11-15 DIAGNOSIS — H90.3 SENSORINEURAL HEARING LOSS (SNHL) OF BOTH EARS: ICD-10-CM

## 2023-11-15 DIAGNOSIS — E78.2 MIXED HYPERLIPIDEMIA: ICD-10-CM

## 2023-11-15 DIAGNOSIS — Z78.9 NURSING HOME RESIDENT: Primary | ICD-10-CM

## 2023-11-15 DIAGNOSIS — I25.10 ATHEROSCLEROSIS OF NATIVE CORONARY ARTERY OF NATIVE HEART WITHOUT ANGINA PECTORIS: ICD-10-CM

## 2023-11-15 PROCEDURE — 99310 SBSQ NF CARE HIGH MDM 45: CPT | Performed by: FAMILY MEDICINE

## 2023-11-15 RX ORDER — HYDROMORPHONE HYDROCHLORIDE 2 MG/1
1 TABLET ORAL EVERY 4 HOURS PRN
COMMUNITY

## 2023-11-15 RX ORDER — ASPIRIN 325 MG
0.5 TABLET, DELAYED RELEASE (ENTERIC COATED) ORAL DAILY
COMMUNITY

## 2023-11-15 RX ORDER — IPRATROPIUM BROMIDE 42 UG/1
2 SPRAY, METERED NASAL 3 TIMES DAILY
COMMUNITY

## 2023-11-15 NOTE — PROGRESS NOTES
MED REC REQUIRED  Post Medication Reconciliation Status:  Discharge medications reconciled, continue medications without change      Nursing Home Initial Visit    HISTORY OF PRESENT ILLNESS:  James is a 89 year old male (3/5/1927)  resident of [facility] who is being seen today for initial Nursing Home Visit.     He has a history of coronary heart disease, hypertension, dyslipidemia, GERD (gastroesophageal reflux disease),bladder cancer, diverticulitis with perforation and abscess with colostomy placement, benign prostatic hypertrophy, as well as sensorineural hearing loss.    He was admitted from [facility] after falling suffering a closed fracture of the left hip. Adrian underwent left hip hemiarthroplasty.  His postoperative course was complicated by acute blood loss anemia .      The patient notes no pain issues.    Discussed with nursing staff who note no concerns.    The patient is seen in his room.  Family is not present.     Medical records are reviewed.    Current medications, allergies, and interdisciplinary care plan are reviewed.      Patient Active Problem List    Diagnosis Date Noted    Malignant neoplasm of overlapping sites of bladder (H) 11/22/2019     Priority: High    History of bladder cancer 10/28/2019     Priority: High    CHD (coronary heart disease) 11/26/2010     Priority: High     07/2010 S/P inferior wall MI  Angioplasty and stenting X 2 RCA  08/2010 angioplasty and stenting (LIDIA) LAD      Essential hypertension, benign 11/08/2010     Priority: High     Overview:   IMO Update 10/11      Coronary atherosclerosis of native coronary artery 11/08/2010     Priority: High     Formatting of this note might be different from the original.  IMO Update 10/11      Closed fracture of left hip, initial encounter (H) 11/08/2023     Priority: Medium    Colostomy present (H) 11/08/2023     Priority: Medium    Femur fracture, left (H) 11/08/2023     Priority: Medium     Febrile illness 03/26/2022     Priority: Medium    Urinary tract infection without hematuria, site unspecified 03/26/2022     Priority: Medium    Diverticulitis of large intestine with perforation and abscess 07/27/2021     Priority: Medium    Sigmoid diverticulitis 07/15/2021     Priority: Medium    Diverticulitis of large intestine with abscess 07/15/2021     Priority: Medium    Abdominal pain, right lower quadrant 07/15/2021     Priority: Medium     Added automatically from request for surgery [id number]      TIA (transient ischemic attack) 11/30/2020     Priority: Medium    Venous stasis dermatitis of both lower extremities 03/16/2017     Priority: Medium    BPH (benign prostatic hyperplasia) 01/08/2014     Priority: Medium    SNHL (sensorineural hearing loss) 07/17/2013     Priority: Medium    ETD (eustachian tube dysfunction) 07/17/2013     Priority: Medium    Dyslipidemia 11/26/2010     Priority: Medium    GERD (gastroesophageal reflux disease) 11/26/2010     Priority: Medium    Nursing Home Visit 11/27/2023     Priority: Low    ACP (advance care planning) 06/06/2016     Priority: Low     Advance Care Planning 6/6/2016: ACP Review of Chart / Resources Provided:  Reviewed chart for advance care plan.  James Grant has no plan or code status on file. Discussed available resources and provided with information. Confirmed code status reflects current choices pending further ACP discussions.  Confirmed/documented legally designated decision makers.  Added by Yani Anderson                  Social History     Socioeconomic History    Marital status:      Spouse name: Not on file    Number of children: Not on file    Years of education: Not on file    Highest education level: Not on file   Occupational History    Occupation:  and      Employer: [employer]     Comment: 01/01/1987 - RETIRED ([employer])   Tobacco Use    Smoking status: Former     Packs/day: 1.00     Years:  13.00     Additional pack years: 0.00     Total pack years: 13.00     Types: Cigarettes     Start date: 1949     Quit date: 1962     Years since quittin.3    Smokeless tobacco: Never   Vaping Use    Vaping Use: Never used   Substance and Sexual Activity    Alcohol use: Not Currently     Comment: 3 Drinks (BEER & LIQUOR) ~ OCCASIONALLY (QUIT IN )    Drug use: No    Sexual activity: Never   Other Topics Concern     Service Not Asked    Blood Transfusions Yes     Comment: PERMITS    Caffeine Concern Yes     Comment: COFFEE - 4 CUPS DAILY    Occupational Exposure Not Asked    Hobby Hazards Not Asked    Sleep Concern Not Asked    Stress Concern Not Asked    Weight Concern Not Asked    Special Diet Not Asked    Back Care Not Asked    Exercise Yes     Comment: WALKING / HOUSEWORK - OCCASIONAL    Bike Helmet Not Asked    Seat Belt Not Asked    Self-Exams Not Asked    Parent/sibling w/ CABG, MI or angioplasty before 65F 55M? No   Social History Narrative    Not on file     Social Determinants of Health     Financial Resource Strain: Not on file   Food Insecurity: Not on file   Transportation Needs: Not on file   Physical Activity: Not on file   Stress: Not on file   Social Connections: Not on file   Interpersonal Safety: Not on file   Housing Stability: Not on file        Current Outpatient Medications   Medication Sig    aspirin (ASA) 325 MG EC tablet Take 0.5 tablets by mouth daily    famotidine (PEPCID) 20 MG tablet TAKE ONE TABLET BY MOUTH EVERY DAY IN THE MORNING    HYDROmorphone (DILAUDID) 2 MG tablet Take 1 mg by mouth every 4 hours as needed for severe pain    ipratropium (ATROVENT) 0.06 % nasal spray Spray 2 sprays into both nostrils 3 times daily    losartan (COZAAR) 25 MG tablet TAKE ONE TABLET BY MOUTH EVERY DAY IN THE MORNING (Patient taking differently: Take 50 mg by mouth daily)    metoprolol succinate ER (TOPROL XL) 25 MG 24 hr tablet TAKE ONE-HALF TABLET BY MOUTH EVERY DAY AT BEDTIME     NITROSTAT 0.4 MG sublingual tablet PLACE 1 TABLET (0.4 MG) UNDER THE TONGUE EVERY 5 MINUTES AS NEEDED    simvastatin (ZOCOR) 20 MG tablet TAKE ONE TABLET BY MOUTH EVERY DAY AT BEDTIME     Current Facility-Administered Medications   Medication    cyanocobalamin injection 1,000 mcg       Allergies   Allergen Reactions    Lisinopril Cough    Morphine Other (See Comments) and Visual Disturbance     confusion       I have reviewed the MDS and I have reviewed the care plan and do agree with the plan.      ROS:  Review of Systems   Constitutional:  Positive for appetite change and fatigue. Negative for unexpected weight change.   HENT:   Positive for hearing loss. Negative for trouble swallowing and voice change.    Eyes:  Negative for eye problems.   Respiratory:  Negative for chest tightness, cough, hemoptysis, shortness of breath and wheezing.    Cardiovascular:  Negative for chest pain, leg swelling and palpitations.   Gastrointestinal:  Positive for constipation. Negative for abdominal pain, blood in stool, diarrhea, nausea and vomiting.   Genitourinary:  Positive for frequency. Negative for bladder incontinence, difficulty urinating, dysuria, hematuria and nocturia.    Musculoskeletal:  Positive for arthralgias and back pain. Negative for gait problem, myalgias and neck pain.   Skin:  Negative for itching, rash and wound.   Neurological:  Positive for dizziness. Negative for extremity weakness, gait problem, light-headedness, numbness, seizures and speech difficulty.   Hematological:  Negative for adenopathy.   Psychiatric/Behavioral:  Negative for depression and sleep disturbance. The patient is not nervous/anxious.          OBJECTIVE:  BP (!) 156/78   Pulse 68   Temp 97.8  F (36.6  C)   Resp 18   SpO2 96%     Physical Exam  Vitals and nursing note reviewed.   Constitutional:       General: He is not in acute distress.     Appearance: Normal appearance. He is well-developed.   HENT:      Head: Normocephalic  and atraumatic.      Right Ear: External ear normal.      Left Ear: External ear normal.      Nose: Nose normal.      Mouth/Throat:      Mouth: Mucous membranes are moist.   Eyes:      Extraocular Movements: Extraocular movements intact.      Conjunctiva/sclera: Conjunctivae normal.      Pupils: Pupils are equal, round, and reactive to light.   Cardiovascular:      Rate and Rhythm: Normal rate and regular rhythm.      Heart sounds: Normal heart sounds. No murmur heard.     No friction rub. No gallop.   Pulmonary:      Effort: Pulmonary effort is normal.      Breath sounds: Normal breath sounds.   Musculoskeletal:      Right lower leg: No edema.      Left lower leg: No edema.   Skin:     General: Skin is warm and dry.   Neurological:      Mental Status: He is alert and oriented to person, place, and time. Mental status is at baseline.   Psychiatric:         Mood and Affect: Mood normal.         Behavior: Behavior normal.         Lab/Diagnostic data:    Reviewed    ASSESSMENT/ORDERS:  Nursing Home Visit  Discussed with staff. Care plan reviewed and orders updated.  Chronic issues are stable. Routine 30 day Nursing Home follow up.     Closed fracture of left hip, initial encounter (H)  Physical Therapy and Occupational Therapy consultation    Colostomy present (H)  Functional    Atherosclerosis of native coronary artery of native heart without angina pectoris  Most recent EKG, echocardiogram, and coronary heart disease risk factors reviewed. Continue current medication regimen.    .  Essential hypertension, benign  Available blood pressure readings are reviewed.  Goals discussed. Will continue same antihypertensive regimen. Monitor electrolytes and renal function every 6 months.     Malignant neoplasm of overlapping sites of bladder (H)  Stable    Sensorineural hearing loss (SNHL) of both ears  Stab le    GERD (gastroesophageal reflux disease)  On famotidine. Will follow.    Dyslipidemia  Most recent lipid profile  reviewed.  Role of medication therapy in the elderly discussed. Continue simvastatin (Zocor).Will follow LFT every 6 months with annual lipid profile.         Total time spent on the day of service was 45 min including patient visit, review of pertinent clinical information, and treatment plan.      Broderick Flowers MD FAAFP [facility]  Geriatrics  Hospice and Palliative Care

## 2023-11-16 NOTE — TELEPHONE ENCOUNTER
Approved by Dr. Flowers  Notification sent to Nursing [place].  Hiwot Andrade, [facility]

## 2023-11-27 PROBLEM — Z78.9 NURSING HOME RESIDENT: Status: ACTIVE | Noted: 2023-11-27

## 2023-11-30 ENCOUNTER — TELEPHONE (OUTPATIENT)
Dept: FAMILY MEDICINE | Facility: OTHER | Age: 88
End: 2023-11-30

## 2023-11-30 ASSESSMENT — ENCOUNTER SYMPTOMS
NUMBNESS: 0
BLOOD IN STOOL: 0
WOUND: 0
TROUBLE SWALLOWING: 0
VOICE CHANGE: 0
DIFFICULTY URINATING: 0
MYALGIAS: 0
EXTREMITY WEAKNESS: 0
BACK PAIN: 1
CHEST TIGHTNESS: 0
NERVOUS/ANXIOUS: 0
VOMITING: 0
DYSURIA: 0
ADENOPATHY: 0
DIARRHEA: 0
SEIZURES: 0
LIGHT-HEADEDNESS: 0
NECK PAIN: 0
DIZZINESS: 1
EYE PROBLEMS: 0
DEPRESSION: 0
SPEECH DIFFICULTY: 0
WHEEZING: 0
COUGH: 0
NAUSEA: 0
SLEEP DISTURBANCE: 0
ABDOMINAL PAIN: 0
HEMATURIA: 0
CONSTIPATION: 1
PALPITATIONS: 0
FATIGUE: 1
SHORTNESS OF BREATH: 0
FREQUENCY: 1
APPETITE CHANGE: 1
HEMOPTYSIS: 0
ARTHRALGIAS: 1
UNEXPECTED WEIGHT CHANGE: 0
LEG SWELLING: 0

## 2023-12-01 ENCOUNTER — TELEPHONE (OUTPATIENT)
Dept: FAMILY MEDICINE | Facility: OTHER | Age: 88
End: 2023-12-01

## 2023-12-01 NOTE — TELEPHONE ENCOUNTER
Approved by Dr. Flowers  Notification sent to Nursing [place].  Hiwot Andrade, [facility]

## 2023-12-01 NOTE — TELEPHONE ENCOUNTER
Approved by Dr. Flowers  Notification sent to Nursing [place].  Hiwot Andrade, [facility]

## 2023-12-04 DIAGNOSIS — I25.10 CORONARY ARTERY DISEASE INVOLVING NATIVE CORONARY ARTERY OF NATIVE HEART WITHOUT ANGINA PECTORIS: ICD-10-CM

## 2023-12-05 RX ORDER — NITROGLYCERIN 0.4 MG/1
TABLET SUBLINGUAL
Qty: 0.1 TABLET | Refills: 0 | Status: SHIPPED | OUTPATIENT
Start: 2023-12-05

## 2023-12-07 DIAGNOSIS — Z20.822 EXPOSURE TO 2019 NOVEL CORONAVIRUS: Primary | ICD-10-CM

## 2023-12-08 ENCOUNTER — TELEPHONE (OUTPATIENT)
Dept: PHARMACY | Facility: PHYSICIAN GROUP | Age: 88
End: 2023-12-08
Payer: MEDICARE

## 2023-12-08 NOTE — TELEPHONE ENCOUNTER
Paxlovid drug-drug interaction check:     Simvastatin and Paxlovid. Paxlovid can increase the serum concentration of simvastatin resulting in an increased risk for adverse effects including myopathy and potential rhabdomyolysis. Simvastatin must be held starting at least 12 hours prior to the first dose of Paxlovid, for the entire course of Paxlovid, and for 5 days after the completion of the Paxlovid course (10 days total).     2. Hydromorphone and Paxlovid. Paxlovid may increase the serum concentration of hydromorphone. According to the [facility] COVID-19 interaction , Hydromorphone is eliminated via glucuronidation, mainly by UGT2B7 with a minor proportion undergoing CYP mediated metabolism. Ritonavir induces glucuronidation, however, given that induction reaches maximal effect after several days and the short duration of nirmatrelvir/ritonavir treatment, no a priori dosage adjustment is recommended. Although no significant increase in hydromorphone exposure is expected as CYP mediated metabolism represents a minor pathway, patients should be informed about potential adverse effects.    3. Metoprolol and Paxlovid. Paxlovid can increase the serum concentration of metoprolol moderately through inhibition of CYP2D6. This effect is not expected to be clinically relevant, but the patient may benefit from monitoring blood pressure and heart rate as able to ensure that hypotension and bradycardia are not occurring. No dose adjustment of metoprolol is necessary prior to starting Paxlovid.     4. Losartan and Paxlovid. Paxlovid can increase the serum concentration of losartan's active metabolite through induction of CYP2C9. However, this interaction is not expected to be clinically relevant as induction of CYP2C9 takes several days to reach its maximum effect, and Paxlovid is a short 5 day course. No dose adjustment of losartan is necessary, but the patient may benefit from monitoring blood pressure as able to  ensure that hypotension is not occurring.     Marcelo Braxton, PharmD  Medication Therapy Management Pharmacist  [facility]  Office phone: [phone]

## 2023-12-11 ENCOUNTER — TELEPHONE (OUTPATIENT)
Dept: UROLOGY | Facility: OTHER | Age: 88
End: 2023-12-11
Payer: MEDICARE

## 2023-12-11 NOTE — TELEPHONE ENCOUNTER
Attempted to reach patient.  He has an appt scheduled 1/4/24 with Dr Ortiz/  Patient needs to hold aspirin 5 days prior to appt. Possible cysto

## 2023-12-20 ENCOUNTER — MEDICAL CORRESPONDENCE (OUTPATIENT)
Dept: HEALTH INFORMATION MANAGEMENT | Facility: HOSPITAL | Age: 88
End: 2023-12-20

## 2023-12-22 DIAGNOSIS — Z85.51 PERSONAL HISTORY OF MALIGNANT NEOPLASM OF BLADDER: Primary | ICD-10-CM

## 2023-12-26 DIAGNOSIS — I10 ESSENTIAL (PRIMARY) HYPERTENSION: ICD-10-CM

## 2023-12-26 NOTE — TELEPHONE ENCOUNTER
Disp Refills Start End FIORDALIZA   losartan (COZAAR) 25 MG tablet 90 tablet 0 10/2/2023 -- No   Sig: TAKE ONE TABLET BY MOUTH EVERY DAY IN THE MORNING   Patient taking differently: Take 50 mg by mouth daily     Last Office Visit: 11/15/2023 NH visit  Future Office visit:       Routing refill request to provider for review/approval because:

## 2023-12-27 RX ORDER — LOSARTAN POTASSIUM 25 MG/1
TABLET ORAL
Qty: 90 TABLET | Refills: 0 | Status: SHIPPED | OUTPATIENT
Start: 2023-12-27 | End: 2024-03-22

## 2024-01-20 DIAGNOSIS — E78.2 MIXED HYPERLIPIDEMIA: ICD-10-CM

## 2024-01-22 RX ORDER — SIMVASTATIN 20 MG
TABLET ORAL
Qty: 90 TABLET | Refills: 0 | Status: SHIPPED | OUTPATIENT
Start: 2024-01-22 | End: 2024-04-22

## 2024-01-22 NOTE — PROGRESS NOTES
[facility]    Hospitalist Progress Note      Assessment & Plan   James Grant is a 89 year old male who was admitted on 7/15/2021.       1.  Postop day #1 status post exploratory lap for diverticulitis with abscess.  Patient status post end colostomy formation Walker's procedure.  Large abscess was found on laparotomy.  Currently is on IV Zosyn.  Minimal discomfort.  Alert appropriate NG tube is in place with bilious drainage.  Ostomy looks good pink wound without obvious redness or drainage.  No significant nausea.  Cares per surgery regarding NG tube diet.    2.  Sigmoid diverticulitis: Patient was really progressing adequately.  Was taken to the OR for exploratory laparotomy.  Otherwise doing well at this time.  Currently on Zosyn..  No fevers hemodynamically stable.  White counts 11,400.  We will continue with antibiotic therapy.  Drain is in place with serous fluid noted.    3.  Cardiac status: Most recent echocardiogram he had back in February 2020 was normal systolic function no major valvular abnormalities.  He is hemodynamically stable here currently with no signs of any dysrhythmias.  Has IV metoprolol ordered.    4.  Pulmonary status sats stable room air 92%.  Lungs are clear.  Continue with aggressive pulmonary toilet get patient up in the chair ambulate when feasible.    5.  Electrolyte abnormalities: Potassium 3.2 will give him some IV therapy want anything per mouth at this time per surgery.  Also was on the low side will place.  Recheck later.      Diet: NPO for Medical/Clinical Reasons Except for: No Exceptions  Fluids: Lactated Ringer's 125 cc an hour    DVT Prophylaxis: Pneumatic Compression Devices  Code Status: Full Code    Disposition: Expected discharge in 2-5 days once tolerating diet.    Filipe Cole    Interval History   Patient alert pleasant no distress.  Minimal discomfort.  Feels a lot better than yesterday.  Denies any nausea.  NG tube is bothering.  He has been  hemodynamically stable no fevers room air sats fine.  He was a bit surprised that he had the colostomy.  Explained to him that we will have him trained in terms of how to care for prior to discharge.  He did ask about whether or not there would be a reanastomosis ever done.  That would be up to surgery.  They did do a Neetu's pouch.  Overall his great get up in the chair continue NG suction Prater catheter advance his activity as he tolerates.  N.p.o. until surgery okays    -Data reviewed today: I reviewed all new labs and imaging results over the last 24 hours. I personally reviewed no images or EKG's today.    Physical Exam   Temp: 99  F (37.2  C) Temp src: Tympanic BP: 158/80 Pulse: 65   Resp: 15 SpO2: 92 % O2 Device: Nasal cannula Oxygen Delivery: 4 LPM  Vitals:    07/15/21 1814 07/20/21 0400   Weight: 86.2 kg (190 lb) 95.1 kg (209 lb 10.5 oz)     Vital Signs with Ranges  Temp:  [97.4  F (36.3  C)-99.4  F (37.4  C)] 99  F (37.2  C)  Pulse:  [61-70] 65  Resp:  [4-22] 15  BP: (106-186)/() 158/80  SpO2:  [87 %-95 %] 92 %  I/O last 3 completed shifts:  In: 2817.5 [I.V.:2817.5]  Out: 2880 [Urine:830; Emesis/NG output:780; Drains:70; Other:1200]    Peripheral IV 07/19/21 Right Wrist (Active)   Site Assessment [facility] 07/20/21 0800   Line Status Infusing 07/20/21 0800   Phlebitis Scale 0-->no symptoms 07/20/21 0800   Infiltration Scale 0 07/20/21 0800   Number of days: 1       Peripheral IV 07/20/21 Anterior;Right Lower forearm (Active)   Site Assessment [facility] 07/20/21 0800   Line Status Infusing 07/20/21 0800   Phlebitis Scale 0-->no symptoms 07/20/21 0800   Infiltration Scale 0 07/20/21 0800   Number of days: 0       Closed/Suction Drain 1 Right RLQ Bulb 19 Thai (Active)   Site Description UTV 07/20/21 0800   Dressing Status Drainage - Minimal 07/20/21 0400   Dressing Change Due 07/19/21 07/19/21 1635   Drainage Appearance Serosanguenous 07/20/21 0200   Status To bulb suction 07/20/21 0800   Output (ml) 30 ml  07/20/21 0200   Number of days: 1       NG/OG/NJ Tube Right nostril To LIS (Active)   Site Description WDL 07/20/21 0800   Status Suction-low intermittent 07/20/21 0800   Drainage Appearance Brown;Green 07/20/21 0800   Placement Confirmation Other (see comments) 07/19/21 1715   Nathalie (cm marking) at nare/mouth 61 cm 07/20/21 0800   Output (ml) 750 ml 07/20/21 0600   Number of days: 1       Colostomy (Active)   Stomal Appliance 2 piece 07/20/21 0800   Stoma Assessment Red 07/20/21 0800   Peristomal Assessment UTV 07/20/21 0800   Treatment Barrier ring 07/20/21 0800   Number of days: 1       Urethral Catheter (Active)   Tube Description Positional 07/20/21 0800   Catheter Care Done 07/20/21 0800   Collection Container Standard 07/20/21 0800   Securement Method Securing device (Describe) 07/20/21 0800   Rationale for Continued Use /GI/GYN Pelvic Procedure 07/20/21 0800   Urine Output 75 mL 07/20/21 0800   Number of days: 1       Incision/Surgical Site 07/19/21 Abdomen (Active)   Incision Assessment UTV 07/20/21 0800   Madisyn-Incision Assessment UTV 07/20/21 0800   Closure DAVID 07/20/21 0800   Incision Drainage Amount UTV 07/20/21 0800   Incision Care Therapy - negative pressure 07/20/21 0800   Dressing Intervention Clean, dry, intact 07/20/21 0800   Number of days: 1     No line/device    Constitutional: Alert and oriented x3. No distress    HEENT: Normocephalic/atraumatic, PERRL, EOMI, mouth NG tube right nare, neck supple, no LN.     Cardiovascular: RRR. No  Murmur, no  rubs, or gallops.  JVD no .  Bruits no.  Pulses 2+    Respiratory: Clear to auscultation bilaterally    Abdomen: Soft, minimally tender, nondistended, no organomegaly. Bowel sounds present but distant, surgical site covered vacuum device.  Drain right side with serous fluid.  Colostomy left lower quadrant pink no gas in bag.    Extremities: Warm/dry. No edema    Neuro:   Non focal, cranial nerves intact, Moves all extremities.  Medications      lactated ringers 125 mL/hr at 07/20/21 0622       famotidine  20 mg Oral Daily     losartan  50 mg Oral Daily     metoprolol  5 mg Intravenous Q8H     [Held by provider] metoprolol succinate ER  12.5 mg Oral At Bedtime     ondansetron  4 mg Intravenous Once     piperacillin-tazobactam  3.375 g Intravenous Q6H     [Held by provider] simvastatin  20 mg Oral At Bedtime     sodium chloride (PF)  3 mL Intracatheter Q8H       Data   Recent Labs   Lab 07/20/21  0501 07/19/21  2213 07/19/21  1754 07/19/21  0618 07/18/21  0608 07/17/21  0602 07/16/21  0519 07/15/21  1831   WBC 11.4*  --   --  12.8* 15.2*  --   --  14.6*   HGB 14.3  --   --  13.5 14.2  --   --  15.4   MCV 90  --   --  91 91  --   --  92     --   --  217 194  --   --  242     --   --  139 137  --  138 140   POTASSIUM 3.2*  --   --  3.3* 3.5  --  4.0 4.2   CHLORIDE 104  --   --  104 106  --  107 107   CO2 28  --   --  25 26  --  24 30   BUN 19  --   --  19 20  --  19 23   CR 0.64*  --   --  0.63* 0.69  --  0.73 0.87   ANIONGAP 8  --   --  10 5  --  7 3   VANIA 8.1*  --   --  7.9* 7.9*  --  7.8* 8.7   * 130* 136* 101* 116*   < > 145* 99   ALBUMIN  --   --   --  2.1*  --   --  3.3* 4.0   PROTTOTAL  --   --   --  5.6*  --   --  6.5* 7.6   BILITOTAL  --   --   --  0.6  --   --  1.1 0.6   ALKPHOS  --   --   --  48  --   --  56 73   ALT  --   --   --  13  --   --  21 25   AST  --   --   --  16  --   --  14 20   LIPASE  --   --   --   --   --   --   --  154    < > = values in this interval not displayed.       Recent Results (from the past 24 hour(s))   CT Abdomen Pelvis w/o Contrast    Addendum: 7/19/2021    There appears to be some bubbles of extraluminal gas in the right  lower quadrant. No abscess is seen.    NIKOLAS MUNOZ MD         SYSTEM ID:  [id number]      Narrative    EXAMINATION: CT ABDOMEN PELVIS W/O CONTRAST, 7/19/2021 9:53 AM    TECHNIQUE:  Helical CT images from the lung bases through the  symphysis pubis were obtained  without IV  contrast. Contrast dose:    COMPARISON: none    HISTORY: Diverticulitis, complication suspected    FINDINGS:    There is a right-sided pleural effusion which is new from previous  examination. There is some increased density seen in both lung bases  right worse than left consistent with atelectasis or pneumonia.    Ascites is seen within the abdomen. This represents a change from  previous examination. The ascites is mild in amount.    The liver is free of masses or biliary ductal enlargement. The  gallbladder is been removed. The spleen and pancreas appear normal.  The adrenal glands are normal. The right left kidneys are free of  masses or hydronephrosis.    Within the peritoneal cavity there are dilated loops of small bowel.  The stomach is distended. This represents a change from previous  examination.    No intra-abdominal abscesses are noted. The edema seen adjacent to the  sigmoid colon was more difficult to evaluate due to the ascites. No  gross change is seen.    The prostate is markedly enlarged. The bladder and rectum are normal.  Degenerative changes are present in the thoracic and lumbar spine.            Impression    IMPRESSION: Small bowel obstruction has developed since July 15, 2021.    Ascites has developed as compared to previous examination.    There is a right-sided pleural effusion possibly related to the  ascites. There is increased density in both lung bases right worse  than left most likely atelectasis however pneumonia cannot be  excluded.    The diverticulitis seen previously is more difficult to evaluate on  today's examination due to the ascites. No abscess is seen.     NIKOLAS MUNOZ MD         SYSTEM ID:  [id number]   XR Chest 2 Views    Narrative    PROCEDURE:  XR CHEST 2 VW    HISTORY:  coughing, sob.     COMPARISON:  None.    FINDINGS:   The cardiac silhouette is normal in size. The pulmonary vasculature is  normal.  There is some increased density in both lung bases  consistent  with atelectasis or pneumonia. This has worsened from July 15, 2021  there is blunting of the costophrenic angles which represents a change  from previous examination      Impression    IMPRESSION:  Bilateral small pleural effusions with bibasilar areas of  increased density consistent with atelectasis or pneumonia      NIKOLAS MUNOZ MD         SYSTEM ID:  [id number]      18

## 2024-01-22 NOTE — TELEPHONE ENCOUNTER
Simvastatin      Last Written Prescription Date:  10/2/23  Last Fill Quantity: 90,   # refills: 0  Last Office Visit: 11/15/23-Nursing Home Visit  Future Office visit:       Routing refill request to provider for review/approval because:

## 2024-01-31 ENCOUNTER — DOCUMENTATION ONLY (OUTPATIENT)
Dept: FAMILY MEDICINE | Facility: OTHER | Age: 89
End: 2024-01-31

## 2024-01-31 DIAGNOSIS — K57.20 PERFORATED DIVERTICULUM OF LARGE INTESTINE: ICD-10-CM

## 2024-01-31 DIAGNOSIS — Z93.3 COLOSTOMY STATUS (H): Primary | ICD-10-CM

## 2024-03-12 ENCOUNTER — DOCUMENTATION ONLY (OUTPATIENT)
Dept: OTHER | Facility: CLINIC | Age: 89
End: 2024-03-12
Payer: MEDICARE

## 2024-03-20 DIAGNOSIS — I10 ESSENTIAL (PRIMARY) HYPERTENSION: ICD-10-CM

## 2024-03-20 NOTE — TELEPHONE ENCOUNTER
Cozaar       Last Written Prescription Date:  12/27/2023  Last Fill Quantity: 90,   # refills: 0  Last Office Visit: 11/15/2023  Future Office visit:

## 2024-03-22 RX ORDER — LOSARTAN POTASSIUM 25 MG/1
TABLET ORAL
Qty: 90 TABLET | Refills: 0 | Status: SHIPPED | OUTPATIENT
Start: 2024-03-22 | End: 2024-06-21

## 2024-04-19 DIAGNOSIS — E78.2 MIXED HYPERLIPIDEMIA: ICD-10-CM

## 2024-04-19 NOTE — TELEPHONE ENCOUNTER
Simvastatin (Zocor) 20 mg tablet  Take one tablet by mouth every day at bedtime   Last Written Prescription Date:  1-  Last Fill Quantity: 90 tablet,   # refills: 0  Last Office Visit: 6-  Future Office visit:

## 2024-04-22 RX ORDER — SIMVASTATIN 20 MG
TABLET ORAL
Qty: 90 TABLET | Refills: 0 | Status: SHIPPED | OUTPATIENT
Start: 2024-04-22 | End: 2024-07-19

## 2024-06-02 NOTE — TELEPHONE ENCOUNTER
metoprolol succinate ER (TOPROL-XL) 25 MG 24 hr tablet    Last refill date 8/14/18    Last office visit 10/12/18   Spontaneous, unlabored and symmetrical

## 2024-06-20 DIAGNOSIS — I10 ESSENTIAL (PRIMARY) HYPERTENSION: ICD-10-CM

## 2024-06-20 NOTE — TELEPHONE ENCOUNTER
Losartan      Last Written Prescription Date:  3/22/24  Last Fill Quantity: 90,   # refills: 0  Last Office Visit: 11/15/23  Future Office visit:       Routing refill request to provider for review/approval because:

## 2024-06-21 RX ORDER — LOSARTAN POTASSIUM 25 MG/1
TABLET ORAL
Qty: 90 TABLET | Refills: 0 | Status: SHIPPED | OUTPATIENT
Start: 2024-06-21 | End: 2024-09-19

## 2024-07-18 DIAGNOSIS — E78.2 MIXED HYPERLIPIDEMIA: ICD-10-CM

## 2024-07-18 DIAGNOSIS — K21.9 GASTROESOPHAGEAL REFLUX DISEASE WITHOUT ESOPHAGITIS: ICD-10-CM

## 2024-07-18 DIAGNOSIS — I10 BENIGN ESSENTIAL HYPERTENSION: ICD-10-CM

## 2024-07-18 NOTE — TELEPHONE ENCOUNTER
Pepcid       Last Written Prescription Date:  10/02/2023  Last Fill Quantity: 90,   # refills: 2  Last Office Visit: 6/13/2023    Metoprolol       Last Written Prescription Date:  10/02/2023  Last Fill Quantity: 45,   # refills: 2  Last Office Visit: 6/13/2023    Zocor       Last Written Prescription Date:  4/22/2024  Last Fill Quantity: 90,   # refills: 0  Last Office Visit: 6/13/2023

## 2024-07-19 RX ORDER — FAMOTIDINE 20 MG/1
TABLET, FILM COATED ORAL
Qty: 90 TABLET | Refills: 3 | Status: SHIPPED | OUTPATIENT
Start: 2024-07-19

## 2024-07-19 RX ORDER — SIMVASTATIN 20 MG
TABLET ORAL
Qty: 90 TABLET | Refills: 3 | Status: SHIPPED | OUTPATIENT
Start: 2024-07-19

## 2024-07-19 RX ORDER — METOPROLOL SUCCINATE 25 MG/1
TABLET, EXTENDED RELEASE ORAL
Qty: 45 TABLET | Refills: 3 | Status: SHIPPED | OUTPATIENT
Start: 2024-07-19

## 2024-09-19 DIAGNOSIS — I10 ESSENTIAL (PRIMARY) HYPERTENSION: ICD-10-CM

## 2024-09-19 RX ORDER — LOSARTAN POTASSIUM 25 MG/1
TABLET ORAL
Qty: 90 TABLET | Refills: 0 | Status: SHIPPED | OUTPATIENT
Start: 2024-09-19

## 2024-09-19 NOTE — TELEPHONE ENCOUNTER
Routing refill request to provider for review/approval because:  Angiotensin-II Receptors Failed    Rerun Protocol (9/19/2024 1:31 PM)    Last blood pressure under 140/90 in past 12 months        BP Readings from Last 3 Encounters:   11/15/23 (!) 156/78   11/08/23 156/100   09/13/23 126/80      No data recorded       Recent (12 mo) or future (90days) visit within the authorizing provider's specialty    The patient must have completed an in-person or virtual visit within the past 12 months or has a future visit scheduled within the next 90 days with the authorizing provider s specialty.  Urgent care and e-visits do not quality as an office visit for this protocol.

## 2024-09-19 NOTE — TELEPHONE ENCOUNTER
Losartan  Last Written Prescription Date: 6/21/24  Last Fill Quantity: 90 # of Refills: 0  Last Office Visit: 6/13/23

## 2024-10-29 ENCOUNTER — OFFICE VISIT (OUTPATIENT)
Dept: SURGERY | Facility: OTHER | Age: 89
End: 2024-10-29
Attending: NURSE PRACTITIONER
Payer: MEDICARE

## 2024-10-29 VITALS
DIASTOLIC BLOOD PRESSURE: 88 MMHG | SYSTOLIC BLOOD PRESSURE: 144 MMHG | OXYGEN SATURATION: 96 % | HEART RATE: 73 BPM | TEMPERATURE: 98.3 F

## 2024-10-29 DIAGNOSIS — Z90.49 STATUS POST COLECTOMY: ICD-10-CM

## 2024-10-29 DIAGNOSIS — K43.5 PARASTOMAL HERNIA WITHOUT OBSTRUCTION OR GANGRENE: Primary | ICD-10-CM

## 2024-10-29 PROCEDURE — G0463 HOSPITAL OUTPT CLINIC VISIT: HCPCS

## 2024-10-29 PROCEDURE — 99213 OFFICE O/P EST LOW 20 MIN: CPT | Performed by: NURSE PRACTITIONER

## 2024-10-29 ASSESSMENT — PAIN SCALES - GENERAL: PAINLEVEL_OUTOF10: NO PAIN (0)

## 2024-10-29 NOTE — PROGRESS NOTES
CLINIC NOTE - FOLLOW UP  10/29/2024    Patient:James Grant    Reason for Visit: Follow up from prior clinic visit for parastomal hernia, ostomy    This is a 89 year old male here for follow up from a prior clinic visit for parastomal hernia, ostomy. His prior medical records were reviewed.       He states he is having a hard time getting an ostomy pouch to stay on and is having to change the pouch twice a day currently.      Current Medications:  Current Outpatient Medications   Medication Sig Dispense Refill    aspirin (ASA) 325 MG EC tablet Take 0.5 tablets by mouth daily      famotidine (PEPCID) 20 MG tablet TAKE ONE TABLET BY MOUTH EVERY DAY IN THE MORNING 90 tablet 3    HYDROmorphone (DILAUDID) 2 MG tablet Take 1 mg by mouth every 4 hours as needed for severe pain      ipratropium (ATROVENT) 0.06 % nasal spray Spray 2 sprays into both nostrils 3 times daily      losartan (COZAAR) 25 MG tablet TAKE ONE TABLET BY MOUTH EVERY DAY IN THE MORNING 90 tablet 0    metoprolol succinate ER (TOPROL XL) 25 MG 24 hr tablet TAKE ONE-HALF TABLET BY MOUTH EVERY DAY AT BEDTIME 45 tablet 3    nitroGLYcerin (NITROSTAT) 0.4 MG sublingual tablet DISSOLVE 1 TABLET UNDER THE TONGUE EVERY 5 MINUTES AS NEEDED FOR CHEST PAIN. DO NOT EXCEED A TOTAL OF 3 DOSES IN 15 MINUTES. 0.1 tablet 0    simvastatin (ZOCOR) 20 MG tablet TAKE ONE TABLET BY MOUTH EVERY DAY AT BEDTIME 90 tablet 3       Allergies:  Allergies   Allergen Reactions    Lisinopril Cough    Morphine Other (See Comments) and Visual Disturbance     confusion       PHYSICAL EXAM:     Vital signs: BP (!) 144/88   Pulse 73   Temp 98.3  F (36.8  C) (Tympanic)   SpO2 96%    BMI: There is no height or weight on file to calculate BMI.   General: Normal, healthy, cooperative, in no acute distress, alert   Skin: no rashes   Lungs: respirations are non-labored   Abdominal: non-distended, parastomal hernia noted   Extremities: No cyanosis, clubbing or edema noted bilaterally in Upper  and Lower Extremities   Neurological: without deficit    ASSESSMENT:  89 year old male here as follow up from a prior clinic visit for parastomal hernia, ostomy.    PLAN:   Dr. Sukumar Chavez was consulted on the patient and discussed surgical options for the parastomal hernia which the patient declines surgery for the hernia at this time.  The patient is having hard time getting the ostomy pouching system to stay on due to the hernia. Will add C-strips to the patient's current pouching regimen to see if that help with wear time.  If the C-strips don't help, would consider trying a convex pouch for the patient.     Nursing to call the patient tomorrow to how the C-strips are working.  Follow up with me with any continued problems with ostomy pouching wear time.

## 2024-10-31 ENCOUNTER — OFFICE VISIT (OUTPATIENT)
Dept: SURGERY | Facility: OTHER | Age: 89
End: 2024-10-31
Attending: NURSE PRACTITIONER
Payer: MEDICARE

## 2024-10-31 VITALS
OXYGEN SATURATION: 97 % | DIASTOLIC BLOOD PRESSURE: 82 MMHG | HEART RATE: 70 BPM | RESPIRATION RATE: 18 BRPM | TEMPERATURE: 98.2 F | SYSTOLIC BLOOD PRESSURE: 144 MMHG

## 2024-10-31 DIAGNOSIS — Z90.49 STATUS POST COLECTOMY: ICD-10-CM

## 2024-10-31 DIAGNOSIS — K43.5 PARASTOMAL HERNIA WITHOUT OBSTRUCTION OR GANGRENE: Primary | ICD-10-CM

## 2024-10-31 PROCEDURE — G0463 HOSPITAL OUTPT CLINIC VISIT: HCPCS | Performed by: NURSE PRACTITIONER

## 2024-10-31 PROCEDURE — 99212 OFFICE O/P EST SF 10 MIN: CPT | Performed by: NURSE PRACTITIONER

## 2024-10-31 ASSESSMENT — PAIN SCALES - GENERAL: PAINLEVEL_OUTOF10: NO PAIN (0)

## 2024-10-31 NOTE — PROGRESS NOTES
CLINIC NOTE - FOLLOW UP  10/31/2024    Patient:James Grant    Reason for Visit: Follow up from prior clinic visit for parastomal hernia, ostomy    This is a 89 year old male here for follow up from a prior clinic visit for parastomal hernia, ostomy. His prior medical records were reviewed.       He was seen earlier this week and the changes made to his ostomy routine did not help his pouching system stay on longer unfortunately.     Current Medications:  Current Outpatient Medications   Medication Sig Dispense Refill    aspirin (ASA) 325 MG EC tablet Take 0.5 tablets by mouth daily      famotidine (PEPCID) 20 MG tablet TAKE ONE TABLET BY MOUTH EVERY DAY IN THE MORNING 90 tablet 3    HYDROmorphone (DILAUDID) 2 MG tablet Take 1 mg by mouth every 4 hours as needed for severe pain      ipratropium (ATROVENT) 0.06 % nasal spray Spray 2 sprays into both nostrils 3 times daily      losartan (COZAAR) 25 MG tablet TAKE ONE TABLET BY MOUTH EVERY DAY IN THE MORNING 90 tablet 0    metoprolol succinate ER (TOPROL XL) 25 MG 24 hr tablet TAKE ONE-HALF TABLET BY MOUTH EVERY DAY AT BEDTIME 45 tablet 3    nitroGLYcerin (NITROSTAT) 0.4 MG sublingual tablet DISSOLVE 1 TABLET UNDER THE TONGUE EVERY 5 MINUTES AS NEEDED FOR CHEST PAIN. DO NOT EXCEED A TOTAL OF 3 DOSES IN 15 MINUTES. 0.1 tablet 0    simvastatin (ZOCOR) 20 MG tablet TAKE ONE TABLET BY MOUTH EVERY DAY AT BEDTIME 90 tablet 3       Allergies:  Allergies   Allergen Reactions    Lisinopril Cough    Morphine Other (See Comments) and Visual Disturbance     confusion       PHYSICAL EXAM:     Vital signs: BP (!) 144/82   Pulse 70   Temp 98.2  F (36.8  C) (Tympanic)   Resp 18   SpO2 97%    BMI: There is no height or weight on file to calculate BMI.   General: Normal, healthy, cooperative, in no acute distress, alert   Skin: no rashes   Lungs: respirations are non-labored   Abdominal: non-distended, parastomal hernia noted, stoma with healthy red tissue noted   Extremities: No  cyanosis, clubbing or edema noted bilaterally in Upper and Lower Extremities   Neurological: without deficit    ASSESSMENT:  89 year old male here as follow up from a prior clinic visit for parastomal hernia, ostomy.    PLAN:   Changed patient to delano one piece convex ostomy [id number] today.  C-strips were used around the ostomy wafer as well as the patient was given an ostomy belt. Nursing to call the patient tomorrow to see how the changes are working.  Follow up with me with any continued problems with ostomy pouching wear time.

## 2024-10-31 NOTE — PATIENT INSTRUCTIONS
Please call SARA Rodriguez Care Coordinator with General Surgery with any problems with your ostomy @ [phone].

## 2024-11-04 DIAGNOSIS — K43.5 PARASTOMAL HERNIA WITHOUT OBSTRUCTION OR GANGRENE: Primary | ICD-10-CM

## 2024-11-04 DIAGNOSIS — Z90.49 STATUS POST COLECTOMY: ICD-10-CM

## 2024-12-18 DIAGNOSIS — I10 ESSENTIAL (PRIMARY) HYPERTENSION: ICD-10-CM

## 2024-12-18 NOTE — TELEPHONE ENCOUNTER
losartan (COZAAR) 25 MG tablet 90 tablet 0 9/19/2024     Last Office Visit: 6/13/2023     Future Office visit:       Routing refill request to provider for review/approval because:

## 2024-12-19 RX ORDER — LOSARTAN POTASSIUM 25 MG/1
TABLET ORAL
Qty: 90 TABLET | Refills: 0 | Status: SHIPPED | OUTPATIENT
Start: 2024-12-19

## 2024-12-19 NOTE — TELEPHONE ENCOUNTER
Routing refill request to provider for review/approval because:  Angiotensin-II Receptors Failed    Rerun Protocol (12/18/2024 10:52 AM)    Most recent blood pressure under 140/90 in past 12 months        BP Readings from Last 3 Encounters:   10/31/24 (!) 144/82   10/29/24 (!) 144/88   11/15/23 (!) 156/78      No data recorded       Has GFR on file in past 12 months and most recent value is normal      Recent (12 mo) or future (90days) visit within the authorizing provider's specialty    The patient must have completed an in-person or virtual visit within the past 12 months or has a future visit scheduled within the next 90 days with the authorizing provider s specialty.  Urgent care and e-visits do not qualify as an office visit for this protocol.    Normal serum potassium on file in past 12 months        Recent Labs   Lab Test 11/08/23  1414   POTASSIUM 4.2

## 2025-01-17 ENCOUNTER — DOCUMENTATION ONLY (OUTPATIENT)
Dept: FAMILY MEDICINE | Facility: OTHER | Age: OVER 89
End: 2025-01-17

## 2025-01-17 DIAGNOSIS — K43.5 PARASTOMAL HERNIA WITHOUT OBSTRUCTION OR GANGRENE: Primary | ICD-10-CM

## 2025-01-17 DIAGNOSIS — Z90.49 ACQUIRED ABSENCE OF OTHER SPECIFIED PARTS OF DIGESTIVE TRACT: ICD-10-CM

## 2025-01-21 ENCOUNTER — HOSPITAL ENCOUNTER (EMERGENCY)
Facility: HOSPITAL | Age: OVER 89
Discharge: HOME | End: 2025-01-21
Attending: PHYSICIAN ASSISTANT
Payer: MEDICARE

## 2025-01-21 VITALS
OXYGEN SATURATION: 94 % | TEMPERATURE: 98.4 F | DIASTOLIC BLOOD PRESSURE: 87 MMHG | SYSTOLIC BLOOD PRESSURE: 174 MMHG | RESPIRATION RATE: 19 BRPM | HEART RATE: 71 BPM

## 2025-01-21 DIAGNOSIS — R23.8 SKIN IRRITATION: ICD-10-CM

## 2025-01-21 DIAGNOSIS — R09.82 POSTNASAL DRIP: ICD-10-CM

## 2025-01-21 PROCEDURE — 99284 EMERGENCY DEPT VISIT MOD MDM: CPT

## 2025-01-21 PROCEDURE — 99283 EMERGENCY DEPT VISIT LOW MDM: CPT | Performed by: PHYSICIAN ASSISTANT

## 2025-01-21 RX ORDER — FLUTICASONE PROPIONATE 50 MCG
1 SPRAY, SUSPENSION (ML) NASAL DAILY
Qty: 16 G | Refills: 0 | Status: SHIPPED | OUTPATIENT
Start: 2025-01-21

## 2025-01-21 RX ORDER — FEXOFENADINE HCL 180 MG/1
180 TABLET ORAL DAILY
Qty: 7 TABLET | Refills: 0 | Status: SHIPPED | OUTPATIENT
Start: 2025-01-21 | End: 2025-01-28

## 2025-01-21 RX ORDER — MICONAZOLE NITRATE 20 MG/G
CREAM TOPICAL 2 TIMES DAILY
Qty: 56 G | Refills: 0 | Status: SHIPPED | OUTPATIENT
Start: 2025-01-21

## 2025-01-21 ASSESSMENT — ACTIVITIES OF DAILY LIVING (ADL): ADLS_ACUITY_SCORE: 52

## 2025-01-21 ASSESSMENT — ENCOUNTER SYMPTOMS
COUGH: 1
FATIGUE: 0
RHINORRHEA: 1
SORE THROAT: 0
FEVER: 0

## 2025-01-21 ASSESSMENT — COLUMBIA-SUICIDE SEVERITY RATING SCALE - C-SSRS
1. IN THE PAST MONTH, HAVE YOU WISHED YOU WERE DEAD OR WISHED YOU COULD GO TO SLEEP AND NOT WAKE UP?: NO
2. HAVE YOU ACTUALLY HAD ANY THOUGHTS OF KILLING YOURSELF IN THE PAST MONTH?: NO
6. HAVE YOU EVER DONE ANYTHING, STARTED TO DO ANYTHING, OR PREPARED TO DO ANYTHING TO END YOUR LIFE?: NO

## 2025-01-21 NOTE — ED TRIAGE NOTES
Patient presents with c/o redness and pain around colostomy bag that he noticed today when changing his bag they removed a white strip and believes he might be allergic to it. Per patient denies pain. Changes his bag independently. States has hernia behind where colostomy bag is but denies pain.

## 2025-01-22 NOTE — ED PROVIDER NOTES
History     Chief Complaint   Patient presents with    Wound Check     The history is provided by the patient.     James Grant is a 89 year old male who presented to the emergency department for evaluation of redness and irritation around his ostomy.  No fevers.  No pain.  He also would like me to look in his throat for many etiology of his chronic postnasal drip.  Several weeks.  He has tried no over-the-counter measures.    Allergies:  Allergies   Allergen Reactions    Lisinopril Cough    Morphine Other (See Comments) and Visual Disturbance     confusion       Problem List:    Patient Active Problem List    Diagnosis Date Noted    Nursing Home Visit 11/27/2023     Priority: Medium    Closed fracture of left hip, initial encounter (H) 11/08/2023     Priority: Medium    Colostomy present (H) 11/08/2023     Priority: Medium    Femur fracture, left (H) 11/08/2023     Priority: Medium    Febrile illness 03/26/2022     Priority: Medium    Urinary tract infection without hematuria, site unspecified 03/26/2022     Priority: Medium    Diverticulitis of large intestine with perforation and abscess 07/27/2021     Priority: Medium    Sigmoid diverticulitis 07/15/2021     Priority: Medium    Diverticulitis of large intestine with abscess 07/15/2021     Priority: Medium    Abdominal pain, right lower quadrant 07/15/2021     Priority: Medium     Added automatically from request for surgery [id number]      TIA (transient ischemic attack) 11/30/2020     Priority: Medium    Malignant neoplasm of overlapping sites of bladder (H) 11/22/2019     Priority: Medium    History of bladder cancer 10/28/2019     Priority: Medium    Venous stasis dermatitis of both lower extremities 03/16/2017     Priority: Medium    ACP (advance care planning) 06/06/2016     Priority: Medium     Advance Care Planning 6/6/2016: ACP Review of Chart / Resources Provided:  Reviewed chart for advance care plan.  James Grant has no plan or code status on  file. Discussed available resources and provided with information. Confirmed code status reflects current choices pending further ACP discussions.  Confirmed/documented legally designated decision makers.  Added by Yani Anderson            BPH (benign prostatic hyperplasia) 01/08/2014     Priority: Medium    SNHL (sensorineural hearing loss) 07/17/2013     Priority: Medium    ETD (eustachian tube dysfunction) 07/17/2013     Priority: Medium    Dyslipidemia 11/26/2010     Priority: Medium    GERD (gastroesophageal reflux disease) 11/26/2010     Priority: Medium    CHD (coronary heart disease) 11/26/2010     Priority: Medium     07/2010 S/P inferior wall MI  Angioplasty and stenting X 2 RCA  08/2010 angioplasty and stenting (LIDIA) LAD      Essential hypertension, benign 11/08/2010     Priority: Medium     Overview:   IMO Update 10/11      Coronary atherosclerosis of native coronary artery 11/08/2010     Priority: Medium     Formatting of this note might be different from the original.  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      CHOLECYSTECTOMY      CYSTOSCOPY, FULGURATE BLEEDERS, EVACUATE CLOT(S), COMBINED N/A 2/17/2020    Procedure: Clot Evacuation;  Surgeon: Andrzej Olivia MD;  Location:  OR    CYSTOSCOPY, RETROGRADES, COMBINED Bilateral 10/22/2019    Procedure: Bilateral Retrograde Pyelograms;  Surgeon: Andrzej Olivia MD;  Location:  OR    CYSTOSCOPY,  TRANSURETHRAL RESECTION (TUR) TUMOR BLADDER, COMBINED N/A 10/22/2019    Procedure: Transurethral Resection of Bladder Tumor and transurethral resection of prostate and bladder stone removal;  Surgeon: Andrzej Olivia MD;  Location: [facility] OR    HERNIA REPAIR      LAPAROTOMY EXPLORATORY N/A 2021    Procedure: Exploratory  laparotomy with sigmoid colectomy and creation of colostomy, appendectomy;  Surgeon: Sukumar Chavez MD;  Location: [facility] OR    left arm surgery         Family History:    Family History   Problem Relation Age of Onset    Heart Failure Mother 86        Congestive - Cause of Death    Cerebrovascular Disease Father     C.A.D. Sister 91       Social History:  Marital Status:   [5]  Social History     Tobacco Use    Smoking status: Former     Current packs/day: 0.00     Average packs/day: 1 pack/day for 13.6 years (13.6 ttl pk-yrs)     Types: Cigarettes     Start date: 1949     Quit date: 1962     Years since quittin.4    Smokeless tobacco: Never   Vaping Use    Vaping status: Never Used   Substance Use Topics    Alcohol use: Not Currently     Comment: 3 Drinks (BEER & LIQUOR) ~ OCCASIONALLY (QUIT IN )    Drug use: No        Medications:    fexofenadine (ALLEGRA) 180 MG tablet  fluticasone (FLONASE) 50 MCG/ACT nasal spray  miconazole (MICATIN) 2 % external cream  aspirin (ASA) 325 MG EC tablet  famotidine (PEPCID) 20 MG tablet  HYDROmorphone (DILAUDID) 2 MG tablet  ipratropium (ATROVENT) 0.06 % nasal spray  losartan (COZAAR) 25 MG tablet  metoprolol succinate ER (TOPROL XL) 25 MG 24 hr tablet  nitroGLYcerin (NITROSTAT) 0.4 MG sublingual tablet  simvastatin (ZOCOR) 20 MG tablet          Review of Systems   Constitutional:  Negative for fatigue and fever.   HENT:  Positive for congestion, postnasal drip and rhinorrhea. Negative for sore throat.    Respiratory:  Positive for cough.        Physical Exam   BP: 174/87  Pulse: 71  Temp: 98.4  F (36.9  C)  Resp: 19  SpO2: 94  %      Physical Exam  Vitals and nursing note reviewed.   Constitutional:       General: He is not in acute distress.     Appearance: Normal appearance. He is normal weight. He is not ill-appearing, toxic-appearing or diaphoretic.      Comments: Pleasant and talkative well-appearing 89-year-old male found seated upright on the exam bed in no distress   HENT:      Mouth/Throat:      Comments: No significant swelling.  There is no petechiae or purpura.  There is clear postnasal drip  Pulmonary:      Effort: Pulmonary effort is normal.   Abdominal:      Comments: Abdomen is soft.  There are some mild surrounding erythema consistent with skin irritation secondary to the appliance no night.  No concerning features of infection   Skin:     General: Skin is warm and dry.      Capillary Refill: Capillary refill takes less than 2 seconds.   Neurological:      General: No focal deficit present.      Mental Status: He is alert and oriented to person, place, and time.         ED Course        Procedures              Critical Care time:  none              No results found for this or any previous visit (from the past 24 hours).    Medications - No data to display    Assessments & Plan (with Medical Decision Making)   Scan as noted with above picture.  Not consistent with infection.  Most consistent with irritation.  Discussed measures to help.  Discussed importance of wound care/general surgery follow-up for further evaluation.  See discharge instructions.  As far as his subacute postnasal drip we can trial Allegra and Flonase.  Follow-up in the clinic.  Return here as needed.    This document was prepared using a combination of typing and voice generated software.  While every attempt was made for accuracy, spelling and grammatical errors may exist.     I have reviewed the nursing notes.    I have reviewed the findings, diagnosis, plan and need for follow up with the patient.           Medical Decision Making  The patient's  presentation was of moderate complexity (an undiagnosed new problem with uncertain prognosis).    The patient's evaluation involved:  history and exam without other MDM data elements    The patient's management necessitated moderate risk (prescription drug management including medications given in the ED).        New Prescriptions    FEXOFENADINE (ALLEGRA) 180 MG TABLET    Take 1 tablet (180 mg) by mouth daily for 7 days.    FLUTICASONE (FLONASE) 50 MCG/ACT NASAL SPRAY    Spray 1 spray into both nostrils daily.    MICONAZOLE (MICATIN) 2 % EXTERNAL CREAM    Apply topically 2 times daily.       Final diagnoses:   Postnasal drip   Skin irritation       1/21/2025   [facility]       Clarke Atkins PA-C  01/21/25 1956

## 2025-02-27 ENCOUNTER — APPOINTMENT (OUTPATIENT)
Dept: CT IMAGING | Facility: HOSPITAL | Age: OVER 89
End: 2025-02-27
Attending: EMERGENCY MEDICINE
Payer: MEDICARE

## 2025-02-27 ENCOUNTER — HOSPITAL ENCOUNTER (EMERGENCY)
Facility: HOSPITAL | Age: OVER 89
End: 2025-02-27
Attending: EMERGENCY MEDICINE
Payer: MEDICARE

## 2025-02-27 VITALS
TEMPERATURE: 97.6 F | WEIGHT: 208.3 LBS | HEART RATE: 63 BPM | BODY MASS INDEX: 29.16 KG/M2 | RESPIRATION RATE: 18 BRPM | DIASTOLIC BLOOD PRESSURE: 91 MMHG | SYSTOLIC BLOOD PRESSURE: 158 MMHG | OXYGEN SATURATION: 95 % | HEIGHT: 71 IN

## 2025-02-27 DIAGNOSIS — R51.9 ACUTE NONINTRACTABLE HEADACHE, UNSPECIFIED HEADACHE TYPE: ICD-10-CM

## 2025-02-27 DIAGNOSIS — R42 DIZZINESS: ICD-10-CM

## 2025-02-27 LAB
ALBUMIN SERPL BCG-MCNC: 4.1 G/DL (ref 3.5–5.2)
ALP SERPL-CCNC: 65 U/L (ref 40–150)
ALT SERPL W P-5'-P-CCNC: 19 U/L (ref 0–70)
ANION GAP SERPL CALCULATED.3IONS-SCNC: 12 MMOL/L (ref 7–15)
AST SERPL W P-5'-P-CCNC: 25 U/L (ref 0–45)
BASOPHILS # BLD AUTO: 0.1 10E3/UL (ref 0–0.2)
BASOPHILS NFR BLD AUTO: 1 %
BILIRUB SERPL-MCNC: 0.3 MG/DL
BUN SERPL-MCNC: 18.5 MG/DL (ref 8–23)
CALCIUM SERPL-MCNC: 9 MG/DL (ref 8.8–10.4)
CHLORIDE SERPL-SCNC: 102 MMOL/L (ref 98–107)
CREAT SERPL-MCNC: 0.77 MG/DL (ref 0.67–1.17)
EGFRCR SERPLBLD CKD-EPI 2021: 81 ML/MIN/1.73M2
EOSINOPHIL # BLD AUTO: 0.4 10E3/UL (ref 0–0.7)
EOSINOPHIL NFR BLD AUTO: 5 %
ERYTHROCYTE [DISTWIDTH] IN BLOOD BY AUTOMATED COUNT: 13.6 % (ref 10–15)
FLUAV RNA SPEC QL NAA+PROBE: NEGATIVE
FLUBV RNA RESP QL NAA+PROBE: NEGATIVE
GLUCOSE BLDC GLUCOMTR-MCNC: 152 MG/DL (ref 70–99)
GLUCOSE SERPL-MCNC: 115 MG/DL (ref 70–99)
HCO3 SERPL-SCNC: 23 MMOL/L (ref 22–29)
HCT VFR BLD AUTO: 42.5 % (ref 40–53)
HGB BLD-MCNC: 14.5 G/DL (ref 13.3–17.7)
HOLD SPECIMEN: NORMAL
IMM GRANULOCYTES # BLD: 0 10E3/UL
IMM GRANULOCYTES NFR BLD: 0 %
LYMPHOCYTES # BLD AUTO: 3 10E3/UL (ref 0.8–5.3)
LYMPHOCYTES NFR BLD AUTO: 40 %
MCH RBC QN AUTO: 31.3 PG (ref 26.5–33)
MCHC RBC AUTO-ENTMCNC: 34.1 G/DL (ref 31.5–36.5)
MCV RBC AUTO: 92 FL (ref 78–100)
MONOCYTES # BLD AUTO: 0.9 10E3/UL (ref 0–1.3)
MONOCYTES NFR BLD AUTO: 12 %
NEUTROPHILS # BLD AUTO: 3.2 10E3/UL (ref 1.6–8.3)
NEUTROPHILS NFR BLD AUTO: 42 %
NRBC # BLD AUTO: 0 10E3/UL
NRBC BLD AUTO-RTO: 0 /100
PLATELET # BLD AUTO: 241 10E3/UL (ref 150–450)
POTASSIUM SERPL-SCNC: 4.4 MMOL/L (ref 3.4–5.3)
PROT SERPL-MCNC: 7.1 G/DL (ref 6.4–8.3)
RBC # BLD AUTO: 4.63 10E6/UL (ref 4.4–5.9)
RSV RNA SPEC NAA+PROBE: NEGATIVE
SARS-COV-2 RNA RESP QL NAA+PROBE: NEGATIVE
SODIUM SERPL-SCNC: 137 MMOL/L (ref 135–145)
WBC # BLD AUTO: 7.5 10E3/UL (ref 4–11)

## 2025-02-27 PROCEDURE — 82962 GLUCOSE BLOOD TEST: CPT

## 2025-02-27 PROCEDURE — 70450 CT HEAD/BRAIN W/O DYE: CPT

## 2025-02-27 PROCEDURE — 250N000013 HC RX MED GY IP 250 OP 250 PS 637: Performed by: EMERGENCY MEDICINE

## 2025-02-27 PROCEDURE — 87637 SARSCOV2&INF A&B&RSV AMP PRB: CPT | Performed by: EMERGENCY MEDICINE

## 2025-02-27 PROCEDURE — 83690 ASSAY OF LIPASE: CPT | Performed by: EMERGENCY MEDICINE

## 2025-02-27 PROCEDURE — 258N000003 HC RX IP 258 OP 636: Performed by: EMERGENCY MEDICINE

## 2025-02-27 PROCEDURE — 85025 COMPLETE CBC W/AUTO DIFF WBC: CPT | Performed by: EMERGENCY MEDICINE

## 2025-02-27 PROCEDURE — 83880 ASSAY OF NATRIURETIC PEPTIDE: CPT | Performed by: EMERGENCY MEDICINE

## 2025-02-27 PROCEDURE — 84484 ASSAY OF TROPONIN QUANT: CPT | Performed by: EMERGENCY MEDICINE

## 2025-02-27 PROCEDURE — 36415 COLL VENOUS BLD VENIPUNCTURE: CPT | Performed by: EMERGENCY MEDICINE

## 2025-02-27 PROCEDURE — 80053 COMPREHEN METABOLIC PANEL: CPT | Performed by: EMERGENCY MEDICINE

## 2025-02-27 PROCEDURE — 82310 ASSAY OF CALCIUM: CPT | Performed by: EMERGENCY MEDICINE

## 2025-02-27 PROCEDURE — 84155 ASSAY OF PROTEIN SERUM: CPT | Performed by: EMERGENCY MEDICINE

## 2025-02-27 RX ORDER — ACETAMINOPHEN 325 MG/1
975 TABLET ORAL ONCE
Status: COMPLETED | OUTPATIENT
Start: 2025-02-27 | End: 2025-02-27

## 2025-02-27 RX ORDER — CLONIDINE HYDROCHLORIDE 0.1 MG/1
0.1 TABLET ORAL ONCE
Status: DISCONTINUED | OUTPATIENT
Start: 2025-02-27 | End: 2025-02-27

## 2025-02-27 RX ADMIN — ACETAMINOPHEN 975 MG: 325 TABLET, FILM COATED ORAL at 19:51

## 2025-02-27 RX ADMIN — SODIUM CHLORIDE 1000 ML: 9 INJECTION, SOLUTION INTRAVENOUS at 22:50

## 2025-02-27 ASSESSMENT — ACTIVITIES OF DAILY LIVING (ADL)
ADLS_ACUITY_SCORE: 52

## 2025-02-27 ASSESSMENT — COLUMBIA-SUICIDE SEVERITY RATING SCALE - C-SSRS
6. HAVE YOU EVER DONE ANYTHING, STARTED TO DO ANYTHING, OR PREPARED TO DO ANYTHING TO END YOUR LIFE?: NO
2. HAVE YOU ACTUALLY HAD ANY THOUGHTS OF KILLING YOURSELF IN THE PAST MONTH?: NO
1. IN THE PAST MONTH, HAVE YOU WISHED YOU WERE DEAD OR WISHED YOU COULD GO TO SLEEP AND NOT WAKE UP?: NO

## 2025-02-27 NOTE — ED TRIAGE NOTES
Patient here for hypertension and head pressure that started this morning.   BEFAST negative.      Triage Assessment (Adult)       Row Name 02/27/25 7918          Triage Assessment    Airway WDL WDL        Respiratory WDL    Respiratory WDL WDL        Skin Circulation/Temperature WDL    Skin Circulation/Temperature WDL WDL        Cardiac WDL    Cardiac WDL WDL        Peripheral/Neurovascular WDL    Peripheral Neurovascular WDL WDL        Cognitive/Neuro/Behavioral WDL    Cognitive/Neuro/Behavioral WDL WDL

## 2025-02-28 ENCOUNTER — APPOINTMENT (OUTPATIENT)
Dept: PHYSICAL THERAPY | Facility: HOSPITAL | Age: OVER 89
End: 2025-02-28
Attending: HOSPITALIST
Payer: MEDICARE

## 2025-02-28 ENCOUNTER — APPOINTMENT (OUTPATIENT)
Dept: OCCUPATIONAL THERAPY | Facility: HOSPITAL | Age: OVER 89
End: 2025-02-28
Attending: HOSPITALIST
Payer: MEDICARE

## 2025-02-28 ENCOUNTER — APPOINTMENT (OUTPATIENT)
Dept: CT IMAGING | Facility: HOSPITAL | Age: OVER 89
End: 2025-02-28
Attending: EMERGENCY MEDICINE
Payer: MEDICARE

## 2025-02-28 ENCOUNTER — APPOINTMENT (OUTPATIENT)
Dept: GENERAL RADIOLOGY | Facility: HOSPITAL | Age: OVER 89
End: 2025-02-28
Attending: EMERGENCY MEDICINE
Payer: MEDICARE

## 2025-02-28 VITALS
HEART RATE: 80 BPM | TEMPERATURE: 98 F | SYSTOLIC BLOOD PRESSURE: 160 MMHG | RESPIRATION RATE: 20 BRPM | HEIGHT: 71 IN | DIASTOLIC BLOOD PRESSURE: 86 MMHG | WEIGHT: 206.79 LBS | BODY MASS INDEX: 28.95 KG/M2 | OXYGEN SATURATION: 98 %

## 2025-02-28 PROBLEM — R51.9 ACUTE NONINTRACTABLE HEADACHE, UNSPECIFIED HEADACHE TYPE: Status: ACTIVE | Noted: 2025-02-28

## 2025-02-28 PROBLEM — Z93.3 COLOSTOMY PRESENT (H): Status: ACTIVE | Noted: 2023-11-08

## 2025-02-28 PROBLEM — R42 DIZZINESS: Status: ACTIVE | Noted: 2025-02-28

## 2025-02-28 PROBLEM — Z93.3 COLOSTOMY PRESENT (H): Chronic | Status: ACTIVE | Noted: 2023-11-08

## 2025-02-28 PROBLEM — R51.9 HEADACHE: Status: ACTIVE | Noted: 2025-02-28

## 2025-02-28 LAB
ALBUMIN UR-MCNC: NEGATIVE MG/DL
APPEARANCE UR: CLEAR
ATRIAL RATE - MUSE: 57 BPM
BILIRUB UR QL STRIP: NEGATIVE
COLOR UR AUTO: ABNORMAL
DIASTOLIC BLOOD PRESSURE - MUSE: NORMAL MMHG
GLUCOSE UR STRIP-MCNC: NEGATIVE MG/DL
HGB UR QL STRIP: NEGATIVE
INTERPRETATION ECG - MUSE: NORMAL
KETONES UR STRIP-MCNC: NEGATIVE MG/DL
LEUKOCYTE ESTERASE UR QL STRIP: NEGATIVE
LIPASE SERPL-CCNC: 35 U/L (ref 13–60)
MUCOUS THREADS #/AREA URNS LPF: PRESENT /LPF
NITRATE UR QL: NEGATIVE
NT-PROBNP SERPL-MCNC: 187 PG/ML (ref 0–1800)
P AXIS - MUSE: 68 DEGREES
PH UR STRIP: 7 [PH] (ref 4.7–8)
PR INTERVAL - MUSE: 260 MS
QRS DURATION - MUSE: 92 MS
QT - MUSE: 472 MS
QTC - MUSE: 459 MS
R AXIS - MUSE: -7 DEGREES
RBC URINE: <1 /HPF
SP GR UR STRIP: 1.02 (ref 1–1.03)
SQUAMOUS EPITHELIAL: 0 /HPF
SYSTOLIC BLOOD PRESSURE - MUSE: NORMAL MMHG
T AXIS - MUSE: 37 DEGREES
TROPONIN T SERPL HS-MCNC: 18 NG/L
UROBILINOGEN UR STRIP-MCNC: NORMAL MG/DL
VENTRICULAR RATE- MUSE: 57 BPM
WBC URINE: <1 /HPF

## 2025-02-28 PROCEDURE — 250N000013 HC RX MED GY IP 250 OP 250 PS 637: Performed by: HOSPITALIST

## 2025-02-28 PROCEDURE — 97161 PT EVAL LOW COMPLEX 20 MIN: CPT | Mod: GP

## 2025-02-28 PROCEDURE — 81003 URINALYSIS AUTO W/O SCOPE: CPT | Performed by: EMERGENCY MEDICINE

## 2025-02-28 PROCEDURE — 258N000003 HC RX IP 258 OP 636: Performed by: HOSPITALIST

## 2025-02-28 PROCEDURE — 999N000104 HC STATISTIC NO CHARGE

## 2025-02-28 PROCEDURE — 71045 X-RAY EXAM CHEST 1 VIEW: CPT

## 2025-02-28 PROCEDURE — 93010 ELECTROCARDIOGRAM REPORT: CPT | Performed by: INTERNAL MEDICINE

## 2025-02-28 PROCEDURE — 70496 CT ANGIOGRAPHY HEAD: CPT

## 2025-02-28 PROCEDURE — G0378 HOSPITAL OBSERVATION PER HR: HCPCS

## 2025-02-28 PROCEDURE — 250N000011 HC RX IP 250 OP 636: Performed by: EMERGENCY MEDICINE

## 2025-02-28 PROCEDURE — 99235 HOSP IP/OBS SAME DATE MOD 70: CPT | Performed by: HOSPITALIST

## 2025-02-28 PROCEDURE — 97530 THERAPEUTIC ACTIVITIES: CPT | Mod: GP

## 2025-02-28 PROCEDURE — 97165 OT EVAL LOW COMPLEX 30 MIN: CPT | Mod: GO

## 2025-02-28 PROCEDURE — 74177 CT ABD & PELVIS W/CONTRAST: CPT

## 2025-02-28 PROCEDURE — 250N000013 HC RX MED GY IP 250 OP 250 PS 637: Performed by: INTERNAL MEDICINE

## 2025-02-28 RX ORDER — ENOXAPARIN SODIUM 100 MG/ML
40 INJECTION SUBCUTANEOUS EVERY 24 HOURS
Status: DISCONTINUED | OUTPATIENT
Start: 2025-02-28 | End: 2025-02-28 | Stop reason: HOSPADM

## 2025-02-28 RX ORDER — AMOXICILLIN 250 MG
1 CAPSULE ORAL 2 TIMES DAILY PRN
Status: DISCONTINUED | OUTPATIENT
Start: 2025-02-28 | End: 2025-02-28 | Stop reason: HOSPADM

## 2025-02-28 RX ORDER — FAMOTIDINE 20 MG/1
20 TABLET, FILM COATED ORAL EVERY MORNING
Status: DISCONTINUED | OUTPATIENT
Start: 2025-02-28 | End: 2025-02-28 | Stop reason: HOSPADM

## 2025-02-28 RX ORDER — LOSARTAN POTASSIUM 25 MG/1
25 TABLET ORAL EVERY MORNING
Status: DISCONTINUED | OUTPATIENT
Start: 2025-02-28 | End: 2025-02-28 | Stop reason: HOSPADM

## 2025-02-28 RX ORDER — SODIUM CHLORIDE 9 MG/ML
INJECTION, SOLUTION INTRAVENOUS CONTINUOUS
Status: DISCONTINUED | OUTPATIENT
Start: 2025-02-28 | End: 2025-02-28 | Stop reason: HOSPADM

## 2025-02-28 RX ORDER — HYDRALAZINE HYDROCHLORIDE 20 MG/ML
10 INJECTION INTRAMUSCULAR; INTRAVENOUS EVERY 4 HOURS PRN
Status: DISCONTINUED | OUTPATIENT
Start: 2025-02-28 | End: 2025-02-28 | Stop reason: HOSPADM

## 2025-02-28 RX ORDER — ACETAMINOPHEN 325 MG/1
650 TABLET ORAL EVERY 4 HOURS PRN
Status: DISCONTINUED | OUTPATIENT
Start: 2025-02-28 | End: 2025-02-28 | Stop reason: HOSPADM

## 2025-02-28 RX ORDER — ONDANSETRON 2 MG/ML
4 INJECTION INTRAMUSCULAR; INTRAVENOUS EVERY 6 HOURS PRN
Status: DISCONTINUED | OUTPATIENT
Start: 2025-02-28 | End: 2025-02-28 | Stop reason: HOSPADM

## 2025-02-28 RX ORDER — IOPAMIDOL 755 MG/ML
67 INJECTION, SOLUTION INTRAVASCULAR ONCE
Status: COMPLETED | OUTPATIENT
Start: 2025-02-28 | End: 2025-02-28

## 2025-02-28 RX ORDER — ONDANSETRON 4 MG/1
4 TABLET, ORALLY DISINTEGRATING ORAL EVERY 6 HOURS PRN
Status: DISCONTINUED | OUTPATIENT
Start: 2025-02-28 | End: 2025-02-28 | Stop reason: HOSPADM

## 2025-02-28 RX ORDER — AMOXICILLIN 250 MG
2 CAPSULE ORAL 2 TIMES DAILY PRN
Status: DISCONTINUED | OUTPATIENT
Start: 2025-02-28 | End: 2025-02-28 | Stop reason: HOSPADM

## 2025-02-28 RX ORDER — ACETAMINOPHEN 650 MG/1
650 SUPPOSITORY RECTAL EVERY 4 HOURS PRN
Status: DISCONTINUED | OUTPATIENT
Start: 2025-02-28 | End: 2025-02-28 | Stop reason: HOSPADM

## 2025-02-28 RX ORDER — PROCHLORPERAZINE MALEATE 5 MG/1
5 TABLET ORAL EVERY 6 HOURS PRN
Status: DISCONTINUED | OUTPATIENT
Start: 2025-02-28 | End: 2025-02-28 | Stop reason: HOSPADM

## 2025-02-28 RX ADMIN — SODIUM CHLORIDE, PRESERVATIVE FREE: 5 INJECTION INTRAVENOUS at 04:52

## 2025-02-28 RX ADMIN — IOPAMIDOL 67 ML: 755 INJECTION, SOLUTION INTRAVENOUS at 02:06

## 2025-02-28 RX ADMIN — METOPROLOL SUCCINATE 12.5 MG: 25 TABLET, EXTENDED RELEASE ORAL at 09:34

## 2025-02-28 RX ADMIN — FAMOTIDINE 20 MG: 20 TABLET, FILM COATED ORAL at 09:34

## 2025-02-28 RX ADMIN — LOSARTAN POTASSIUM 25 MG: 25 TABLET, FILM COATED ORAL at 09:34

## 2025-02-28 ASSESSMENT — ACTIVITIES OF DAILY LIVING (ADL)
ADLS_ACUITY_SCORE: 52
ADLS_ACUITY_SCORE: 52
PREVIOUS_RESPONSIBILITIES: MEDICATION MANAGEMENT
ADLS_ACUITY_SCORE: 41
ADLS_ACUITY_SCORE: 41
ADLS_ACUITY_SCORE: 39
ADLS_ACUITY_SCORE: 41
ADLS_ACUITY_SCORE: 52
ADLS_ACUITY_SCORE: 39
ADLS_ACUITY_SCORE: 39
ADLS_ACUITY_SCORE: 52
ADLS_ACUITY_SCORE: 52
ADLS_ACUITY_SCORE: 41
ADLS_ACUITY_SCORE: 39
ADLS_ACUITY_SCORE: 41
ADLS_ACUITY_SCORE: 42

## 2025-02-28 NOTE — DISCHARGE SUMMARY
[facility]    Discharge Summary  Hospitalist    Date of Admission:  2/27/2025  Date of Discharge:  2/28/2025  Discharging Provider: Angelica Gallegos MD, MD  Date of Service (when I saw the patient): 02/28/25    Discharge Diagnoses   Principal Problem:    Acute nonintractable headache, unspecified headache type  Active Problems:    SNHL (sensorineural hearing loss)    GERD (gastroesophageal reflux disease)    Colostomy present (H)    Dizziness      History of Present Illness   James Grant is an 89 year old male who presented with headache, dizziness.  Please see admission H+P for additional details.    Hospital Course   James Grant was admitted on 2/27/2025.  7-year-old male who lives in assisted living with history of hypertension, chronic colostomy who presented with dizziness and headache.  Workup including CT a head neck, CT abdomen pelvis was negative for any acute processes.  Patient was hypertensive on admission, which likely caused his symptoms.  His home regimen of metoprolol XL 12.5 mg daily as well as losartan were continued.  His blood pressure was in the 140s systolic.  We will not change his regimen at this time.  PT/OT evaluated the patient, deemed him safe to return to assisted living.  Patient is anxious to get home at this time.  He will follow-up with his primary care physician within the week to assess for continued wellbeing.    Angelica Gallegos MD      Significant Results and Procedures   See below.    Pending Results   These results will be followed up by Lisa Marrero    Unresulted Labs Ordered in the Past 30 Days of this Admission       No orders found for last 31 day(s).            Code Status   Full Code       Primary Care Physician   Lisa Marrero    Physical Exam   Temp: 97.6  F (36.4  C) Temp src: Tympanic BP: (!) 148/66 Pulse: 63   Resp: 18 SpO2: 97 % O2 Device: None (Room air)    Vitals:    02/27/25 1638 02/28/25 0445   Weight: 94.5 kg (208 lb 4.8 oz) 93.8 kg (206  lb 12.7 oz)     Vital Signs with Ranges  Temp:  [97.6  F (36.4  C)] 97.6  F (36.4  C)  Pulse:  [54-67] 63  Resp:  [18-20] 18  BP: (132-193)/(66-97) 148/66  SpO2:  [93 %-99 %] 97 %  I/O last 3 completed shifts:  In: 1100 [P.O.:840; I.V.:260]  Out: 1300 [Urine:1300]    Constitutional: AA, NAD  Eyes: PERRLA, no injection, no icterus  HEENT: atraumatic, normocephalic  Respiratory: CTA b/l  Cardiovascular: S1 S2 RRR  GI: soft, NT, ND, + bowel sounds  Lymph/Hematologic: no palpable lymphadenopathy  Skin: no rashes, no lesions  Musculoskeletal: No edema, good tone, no deformities  Neurologic: oriented x 3, no focal deficits  Psychiatric: appropriate affect    Discharge Disposition   Discharged to assisted living  Condition at discharge: Stable    Consultations This Hospital Stay   PHYSICAL THERAPY ADULT IP CONSULT  OCCUPATIONAL THERAPY ADULT IP CONSULT    Time Spent on this Encounter   IAngelica MD, personally saw the patient today and spent greater than 30 minutes discharging this patient.    Discharge Orders      Reason for your hospital stay    Dizziness     Follow-up and recommended labs and tests     Follow up with primary care provider, Lisa Marrero, within 7 days for hospital follow- up.  No follow up labs or test are needed.     Activity    Your activity upon discharge: activity as tolerated     Diet    Follow this diet upon discharge: Current Diet:Orders Placed This Encounter      Regular Diet Adult     Discharge Medications   Current Discharge Medication List        CONTINUE these medications which have NOT CHANGED    Details   aspirin (ASA) 325 MG EC tablet Take 0.5 tablets by mouth daily      famotidine (PEPCID) 20 MG tablet TAKE ONE TABLET BY MOUTH EVERY DAY IN THE MORNING  Qty: 90 tablet, Refills: 3    Associated Diagnoses: Gastroesophageal reflux disease without esophagitis      fluticasone (FLONASE) 50 MCG/ACT nasal spray Spray 1 spray into both nostrils daily.  Qty: 16 g, Refills: 0       losartan (COZAAR) 25 MG tablet TAKE ONE TABLET BY MOUTH EVERY DAY IN THE MORNING  Qty: 90 tablet, Refills: 0    Associated Diagnoses: Essential (primary) hypertension      nitroGLYcerin (NITROSTAT) 0.4 MG sublingual tablet DISSOLVE 1 TABLET UNDER THE TONGUE EVERY 5 MINUTES AS NEEDED FOR CHEST PAIN. DO NOT EXCEED A TOTAL OF 3 DOSES IN 15 MINUTES.  Qty: 0.1 tablet, Refills: 0    Associated Diagnoses: Coronary artery disease involving native coronary artery of native heart without angina pectoris      simvastatin (ZOCOR) 20 MG tablet TAKE ONE TABLET BY MOUTH EVERY DAY AT BEDTIME  Qty: 90 tablet, Refills: 3    Associated Diagnoses: Mixed hyperlipidemia      metoprolol succinate ER (TOPROL XL) 25 MG 24 hr tablet TAKE ONE-HALF TABLET BY MOUTH EVERY DAY AT BEDTIME  Qty: 45 tablet, Refills: 3    Associated Diagnoses: Benign essential hypertension           Allergies   Allergies   Allergen Reactions    Lisinopril Cough    Morphine Other (See Comments) and Visual Disturbance     confusion     Data   Most Recent 3 CBC's:  Recent Labs   Lab Test 02/27/25  1728 11/08/23  1414 04/01/22  0915   WBC 7.5 9.8 11.9*   HGB 14.5 14.4 14.7   MCV 92 93 92    235 295      Most Recent 3 BMP's:  Recent Labs   Lab Test 02/27/25  2207 02/27/25  1728 11/08/23  1414 11/08/23  1351 03/26/22  0518   NA  --  137 139  --  141   POTASSIUM  --  4.4 4.2  --  4.0   CHLORIDE  --  102 101  --  109   CO2  --  23 22  --  26   BUN  --  18.5 16.8  --  18   CR  --  0.77 0.79  --  0.70   ANIONGAP  --  12 16*  --  6   VANIA  --  9.0 9.4  --  8.1*   * 115* 119*   < > 116*    < > = values in this interval not displayed.     Most Recent 2 LFT's:  Recent Labs   Lab Test 02/27/25  1728 03/25/22  2233   AST 25 18   ALT 19 22   ALKPHOS 65 67   BILITOTAL 0.3 0.3     Most Recent INR's and Anticoagulation Dosing History:  Anticoagulation Dose History  More data may exist         Latest Ref Rng & Units 2/8/2014 12/29/2019 11/30/2020 7/22/2021 7/23/2021  7/26/2021 11/8/2023   Recent Dosing and Labs   INR 0.85 - 1.15 1.06  1.07  1.03  1.10        Effective 7/11/2021, the reference range for this assay has changed. 1.08        Effective 7/11/2021, the reference range for this assay has changed. 1.06        Effective 7/11/2021, the reference range for this assay has changed. 1.01      Most Recent 3 Troponin's:  Recent Labs   Lab Test 11/30/20  0430 02/16/19  1741   TROPI <0.015 <0.015     Most Recent Cholesterol Panel:  Recent Labs   Lab Test 07/26/21  0656 07/23/21  0529 02/07/19  0819   CHOL  --   --  118   LDL  --   --  62   HDL  --   --  39*   TRIG 165*   < > 85    < > = values in this interval not displayed.     Most Recent 6 Bacteria Isolates From Any Culture (See EPIC Reports for Culture Details):  Recent Labs   Lab Test 12/29/19  0230 02/16/19  2045 02/16/19  1818 02/16/19  1740 03/31/17  1050   CULT >100,000 colonies/mL  Coagulase negative Staphylococcus  No further identification or sensitivity done  * No MRSA isolated No growth after 6 days No growth after 6 days Culture negative after 4 weeks  Heavy growth Diphtheroids No further identification or sensitivity done  Susceptibility testing not routinely done  *     Most Recent TSH, T4 and A1c Labs:  Recent Labs   Lab Test 07/14/22  1412   TSH 2.26     Results for orders placed or performed during the hospital encounter of 02/27/25   Head CT w/o contrast    Narrative    EXAM: CT HEAD W/O CONTRAST  LOCATION: [facility]  DATE: 2/27/2025    INDICATION: headache  COMPARISON: MRI brain 11/30/2020  TECHNIQUE: Routine CT Head without IV contrast. Multiplanar reformats. Dose reduction techniques were used.    FINDINGS:  INTRACRANIAL CONTENTS: No intracranial hemorrhage, extraaxial collection, or mass effect.  No CT evidence of acute infarct. Mild presumed chronic small vessel ischemic changes. Mild generalized volume loss. No hydrocephalus. There is intracranial   atherosclerosis.     VISUALIZED  ORBITS/SINUSES/MASTOIDS: Prior bilateral cataract surgery. Visualized portions of the orbits are otherwise unremarkable. No paranasal sinus mucosal disease. No middle ear or mastoid effusion.    BONES/SOFT TISSUES: No acute abnormality.      Impression    IMPRESSION:  1.  Normal head CT.   CTA Head Neck with Contrast    Narrative    EXAM: CTA HEAD NECK W CONTRAST  [facility]: [facility]  DATE: 2/28/2025    INDICATION: dizziness  COMPARISON: CT head 2/27/2025 and CTA head and neck from 11/30/2020  CONTRAST: Isovue 370 67ml  TECHNIQUE: Head and neck CT angiogram with IV contrast. Axial helical CT images of the head and neck vessels obtained during the arterial phase of intravenous contrast administration. Axial 2D reconstructed images and multiplanar 3D MIP reconstructed   images of the head and neck vessels were performed by the technologist. Dose reduction techniques were used. All stenosis measurements made according to NASCET criteria unless otherwise specified.    FINDINGS:     HEAD CTA:  ANTERIOR CIRCULATION: No stenosis/occlusion, aneurysm, or high flow vascular malformation. Standard [place] anatomy.    POSTERIOR CIRCULATION: No stenosis/occlusion, aneurysm, or high flow vascular malformation. Dominant left vertebral artery supplies a normal basilar artery. 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    DURAL VENOUS SINUSES: Expected enhancement of the major dural venous sinuses.    NECK CTA:  RIGHT CAROTID: No measurable stenosis or dissection.    LEFT CAROTID: No measurable stenosis or dissection.    VERTEBRAL ARTERIES: No focal stenosis or dissection. Balanced vertebral arteries.    AORTIC ARCH: Classic aortic arch anatomy with no significant stenosis at the origin of the great vessels.    NONVASCULAR STRUCTURES: Unremarkable.      Impression    IMPRESSION:     HEAD CTA:   1.  Normal CTA [place].    NECK CTA:  1.  Normal  neck CTA.   CT Abdomen Pelvis w Contrast    Narrative    EXAM: CT ABDOMEN PELVIS W CONTRAST  LOCATION: [facility]  DATE: 2/28/2025    INDICATION: vomiting  COMPARISON: March 26, 2022.  TECHNIQUE: CT scan of the abdomen and pelvis was performed following injection of IV contrast. Multiplanar reformats were obtained. Dose reduction techniques were used.  CONTRAST: 67 mL Isovue-370.    FINDINGS: Extensive breathing motion.  LOWER CHEST: Normal.    HEPATOBILIARY: Prior cholecystectomy. Normal hepatic parenchyma and biliary tree.    PANCREAS: Normal.    SPLEEN: Normal.    ADRENAL GLANDS: Normal.    KIDNEYS/BLADDER: Normal appearance of the renal parenchyma. No calculus, hydronephrosis, or perinephric stranding. Ureters and urinary bladder appear normal.    BOWEL: Normal stomach and small bowel. Incidentally noted duodenal diverticula are unchanged. Extensive colonic diverticulosis without left lower quadrant colostomy. Parastomal hernia defect measures about 65 mm wide. A few loops of normal-appearing   small bowel extending into the hernia. No associated mesenteric edema or evidence of obstruction. Prior appendectomy.    LYMPH NODES: Normal.    VASCULATURE: Atheromatous calcification with normal caliber aorta.    PELVIC ORGANS: Partly obscured by artifact from left hip arthroplasty, there appears to be massive enlargement of the prostate to almost 8 cm diameter.    MUSCULOSKELETAL: Left hip hemiarthroplasty with degenerative change along the acetabular side of the joint. Degenerative change L5-S1 and at the lower thoracic spine.       Impression    IMPRESSION:   1.  No acute abnormality is identified.  2.  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  3.  Massive enlargement of the prostate.     XR Chest Port 1 View    Narrative    EXAM: XR CHEST PORT 1 VIEW  LOCATION: [facility]  DATE: 2/28/2025    INDICATION: dizziness  COMPARISON: Abdomen pelvis  CT acquired a few hours ago and chest x-ray March 25, 2022.      Impression    IMPRESSION: Negative chest.

## 2025-02-28 NOTE — PLAN OF CARE
"Plan of Care Reviewed With: patient    Overall Patient Progress: not progressing    Patient admitted at 0441 this morning. Reports of \"dizziness and shakes off and on.\" Patient reports not feeling well for a couple of days. Vital signs and assessment as charted. Remains afebrile, denies nausea but states he \"feels like he has to throw up off and on but can't.\" Colostomy bag present to left abdomin, small amount of visible stool in bag, not enough to empty this shift. Patient noted to have an incontinence pad in his underwear that was wet upon arrival to floor, per ER Voided (see flowsheet). Patient very [place], has bilateral hearing aides in place. Patient denied pain upon arrival to floor per Charge nurse, but does talk off and on about a headache, lights dimmed, messaged provider to order something for pain. Patient sleeping once prn tylenol order was obtained so did not give at that time. IVF running per MAR. Lungs clear, bowel sounds active. No emesis. Patient was able to stand to transfer from cot to bed upon arrival from ER and stood this morning to use the urinal with assist per staff. Alarms on, call light within reach.     Face to face report given with opportunity to observe patient.    Report given to Brenna Hand RN   2/28/2025  7:17 AM        "

## 2025-02-28 NOTE — PROGRESS NOTES
02/28/25 1100   Appointment Info   Signing Clinician's Name / Credentials (OT) Saumya Chavez, OTR/L   Rehab Comments (OT) OT evaluation completed with PT   Quick Adds   Quick Adds Certification   Living Environment   People in Home facility resident   Current Living Arrangements assisted living   Home Accessibility no concerns   Living Environment Comments Pt is a resident of [facility]. He bathes in a walk in shower with grab bars; pt standes to bathe. There are also grab bars by his toilet.   Self-Care   Usual Activity Tolerance good   Current Activity Tolerance good   Equipment Currently Used at Home walker, rolling;grab bar, tub/shower;colostomy/ostomy supplies;grab bar, toilet;other (see comments)  (sock aid, reacher)   Fall history within last six months no   Activity/Exercise/Self-Care Comment Pt is independent in ADLs, including management of his ostomy. He does have a 4WW for mobility that he uses but doesn't always use it properly (pushes it on his side with one hand).   Instrumental Activities of Daily Living (IADL)   Previous Responsibilities medication management   General Information   Onset of Illness/Injury or Date of Surgery 02/27/25   Referring Physician Dr. Melody Morales   Patient/Family Therapy Goal Statement (OT) Return to [facility]   Additional Occupational Profile Info/Pertinent History of Current Problem Pt admitted due to dizziness, HA, and some nausea. CT of head good and labs normal. No obvious cause for symptoms.   Cognitive Status Examination   Orientation Status orientation to person, place and time   Pain Assessment   Patient Currently in Pain No   Range of Motion Comprehensive   Comment, General Range of Motion WFL   Strength Comprehensive (MMT)   Comment, General Manual Muscle Testing (MMT) Assessment WFL   Bed Mobility   Bed Mobility No deficits identified;supine-sit   Supine-Sit [place] (Bed Mobility) supervision   Assistive Device (Bed Mobility) bed rails   Transfers    Transfers sit-stand transfer   Sit-Stand Transfer   Sit-Stand [place] (Transfers) supervision   Balance   Balance Comments Pt ambulated with the FWW, with no AD, and with the cane all with SBA. See PT note for more details on balance   Activities of Daily Living   BADL Assessment/Intervention lower body dressing;toileting   Lower Body Dressing Assessment/Training   Comment, (Lower Body Dressing) Pt uses a sock aid at baseline; states he has no difficulties with this   Toileting   Position (Toileting) unsupported standing   [place] Level (Toileting) adjust/manage clothing;supervision   Clinical Impression   Criteria for Skilled Therapeutic Interventions Met (OT) Evaluation only;Current level of function same as previous level of function   Clinical Decision Making Complexity (OT) problem focused assessment/low complexity   Risk & Benefits of therapy have been explained evaluation/treatment results reviewed;care plan/treatment goals reviewed;risks/benefits reviewed;current/potential barriers reviewed;participants voiced agreement with care plan;participants included;patient;daughter   Clinical Impression Comments OT evaluation completed. Prior to admission, pt resided at [facility] in [place] and was independent in ADLs and medication management. Pt completed ADLs/mobility today with SBA, no major deficits noted. No significant increase in symptoms. Pt appears close to his baseline in ADLs. Discussed home therapy if pt returns to his [facility] and feels weaker/more deconditioned than normal. Pt and daughter demonstrated goodknowledge and understanding of education.   OT Total Evaluation Time   OT Eval, Low Complexity Minutes (63314) 20   Therapy Certification   Medical Diagnosis dizziness, headache, some nausea   Start of Care Date 02/28/25   Certification date from 02/28/25   Certification date to 02/28/25   OT Discharge Planning   OT Plan Eval only was needed; will complete OT order   OT Discharge  Recommendation (DC Rec) home with assist  (home = [facility])   OT Rationale for DC Rec OT evaluation completed. Prior to admission, pt resided at [facility] in [place] and was independent in ADLs and medication management. Pt completed ADLs/mobility today with SBA, no major deficits noted. No significant increase in symptoms. Pt appears close to his baseline in ADLs. Discussed home therapy if pt returns to his [facility] and feels weaker/more deconditioned than normal. Pt and daughter demonstrated goodknowledge and understanding of education.   OT Brief overview of current status SBA for ADLs/mobility   Total Session Time   Total Session Time (sum of timed and untimed services) 20     I certify the need for these services furnished under this plan of treatment and while under my care. (Physician co-signature of this document indicates review and certification of the therapy plan).

## 2025-02-28 NOTE — PROGRESS NOTES
02/28/25 8479   Appointment Info   Signing Clinician's Name / Credentials (PT) CARLOS Clay   Student Supervision Direct supervision provided;Direct Patient Contact Provided;Therapy services provided with the co-signing licensed therapist guiding and directing the services, and providing the skilled judgement and assessment throughout the session   Rehab Comments (PT) Eval completed in conjunction with OT   Quick Adds   Quick Adds Certification   Living Environment   People in Home facility resident   Current Living Arrangements assisted living   Home Accessibility no concerns   Living Environment Comments Pt lives in assisted living facility. Uses walk-in shower with grab bars. Grab bars by the toilet. No stairs to access his apartment.   Self-Care   Usual Activity Tolerance good   Current Activity Tolerance good   Regular Exercise Other (see comments)  (Reports doing some dumbell and banded exercises occasionally at home. Also reports walking regularly down the halls of the assisted living.)   Equipment Currently Used at Home walker, rolling;grab bar, tub/shower;colostomy/ostomy supplies;grab bar, toilet   Fall history within last six months no   Activity/Exercise/Self-Care Comment Pt indep with ADLs. Facility provides meals and assists with laundry and cleaning. Pt stands in the shower. Uses a 4WW at home when walking in the halls, primarily because people ask him where the walker is. He notes he does not push it in front of him but drags it behind him with one hand and sits on the seat occasionally.   General Information   Onset of Illness/Injury or Date of Surgery 02/27/25   Referring Physician Melody Morales MD   Patient/Family Therapy Goals Statement (PT) Return to assisted living facility   Pertinent History of Current Problem (include personal factors and/or comorbidities that impact the POC) Pt presented to the ED on 2/27/25 due to hypertension and headache. He was also reporting dizziness and  "nausea when sitting up. No room spinning or lightheadedness associated with the dizziness. CT and CTA did not indicate any abnormal results.   Existing Precautions/Restrictions no known precautions/restrictions   Cognition   Affect/Mental Status (Cognition) WFL   Orientation Status (Cognition) oriented x 4;oriented to;person;place;situation;time   Follows Commands (Cognition) WFL   Cognitive Status Comments Pt is hard of hearing   Pain Assessment   Patient Currently in Pain No   Integumentary/Edema   Integumentary/Edema no deficits were identifed   Posture    Posture Protracted shoulders   Range of Motion (ROM)   Range of Motion ROM is WFL   Strength (Manual Muscle Testing)   Strength (Manual Muscle Testing) strength is WFL   Bed Mobility   Bed Mobility supine-sit   Supine-Sit [place] (Bed Mobility) modified independence   Assistive Device (Bed Mobility) bed rails   Transfers   Transfers sit-stand transfer   Sit-Stand Transfer   Sit-Stand [place] (Transfers) supervision;1 person assist   Comment, (Sit-Stand Transfer) Pt reports sit to stand transfers can be difficult, but he performed without difficulty or need for assist. Utlized UE support to push up from edge of bed.   Gait/Stairs (Locomotion)   [place] Level (Gait) supervision   Assistive Device (Gait) walker, front-wheeled   Distance in Feet (Gait) 100   Pattern (Gait) step-through   Comment, (Gait/Stairs) Pt alternated between using the FWW in front of him and dragging it behind him like he does at home. Appears steady in both scenarios.   Balance   Balance other (describe)   Balance Comments Tinetti score = 24/28 indicating good balance/gait skill. No losses of balance during functional mobility. Pt demonstrates overall safety and stability.   Sensory Examination   Sensory Perception other (describe)   Sensory Perception Comments Pt does not note any dizziness prior to upright mobility. Following today's session, he mentions a mild \"fullness\" " feeling in his head. No room spinning or lightheadedness present.   Coordination   Coordination no deficits were identified   Muscle Tone   Muscle Tone no deficits were identified   Clinical Impression   Criteria for Skilled Therapeutic Intervention Evaluation only   PT Diagnosis (PT) Mild gait deviation   Influenced by the following impairments Mild balance deficit   Functional limitations due to impairments Mild unsteadiness with ambulation when not using an assistive device   Clinical Presentation (PT Evaluation Complexity) stable   Clinical Presentation Rationale Clinical reasoning   Clinical Decision Making (Complexity) low complexity   Risk & Benefits of therapy have been explained evaluation/treatment results reviewed;participants included;patient;daughter   Clinical Impression Comments Physical therapy evaluation indicates pt does not have functional mobility deficits at this time. He appears to be moving at his baseline functional status and does not note any concerns. If he notices weakness or difficulties with functional mobility once he returns home, then home care or outpatient physical therapy may be warranted. Outpatient physical therapy is also indicated to further address dizziness if symptoms persist upon discharge. Continued skilled PT is not warranted at this time. Pt is safe to return to his assisted living facility.   PT Total Evaluation Time   PT Eval, Low Complexity Minutes (11323) 20   Therapy Certification   Start of care date 02/28/25   Certification date from 02/28/25   Certification date to 02/28/25   Medical Diagnosis Acute nonintractable headache, unspecified headache type   Physical Therapy Goals   PT Frequency One time eval and treatment only   Interventions   Interventions Quick Adds Therapeutic Activity   Therapeutic Activity   Therapeutic Activities: dynamic activities to improve functional performance Minutes (69140) 10   Treatment Detail/Skilled Intervention Pt ambulated  additional 100' using a SEC in the R UE with SBA for balance. Pt able to naturally figure out step sequencing with the SEC. Mild path deviation with the SEC but pt overall steady and gait speed is relatively unchanged compared to using the FWW. Educated pt on the use of a cane at home rather than carrying his walker behind him. Left pt seated in chair with clip alarm on and call light in reach.   PT Discharge Planning   PT Plan No further PT services warranted at this time.   PT Discharge Recommendation (DC Rec) home   PT Rationale for DC Rec Physical therapy evaluation indicates pt does not have functional mobility deficits at this time. He appears to be moving at his baseline functional status and does not note any concerns. If he notices weakness or difficulties with functional mobility once he returns home, then home care or outpatient physical therapy may be warranted. Outpatient physical therapy is also indicated to further address dizziness if symptoms persist upon discharge. Continued skilled PT is not warranted at this time. Pt is safe to return to his assisted living facility.   PT Brief overview of current status SBA for transfers and ambulation with both a FWW and SEC. Amena for supine to sit transfer   PT Total Distance Amb During Session (feet) 200   Physical Therapy Time and Intention   Timed Code Treatment Minutes 10   Total Session Time (sum of timed and untimed services) 30   I certify the need for these services furnished under this plan of treatment and while under my care. (Physician co-signature of this document indicates review and certification of the therapy plan).

## 2025-02-28 NOTE — ED NOTES
[facility] assisted [facility] called to get an update on this patient. Patient is being seen by provider at this time.

## 2025-02-28 NOTE — MEDICATION SCRIBE - ADMISSION MEDICATION HISTORY
Medication Scribe Admission Medication History    Admission medication history is complete. The information provided in this note is only as accurate as the sources available at the time of the update.    Information Source(s): Patient and CareEverywhere/SureScripts via in-person    Pertinent Information:   Patient resides at [facility] but manages his own medications.     Changes made to PTA medication list:  Added: None  Deleted: dilaudid, atrovent, miconazole- pt reports no longer uses  Changed: flonase- using PRN    Allergies reviewed with patient and updates made in EHR: no    Medication History Completed By: Stacy Govea 2/28/2025 10:11 AM    PTA Med List   Medication Sig Last Dose/Taking    aspirin (ASA) 325 MG EC tablet Take 0.5 tablets by mouth daily 2/27/2025 Morning    famotidine (PEPCID) 20 MG tablet TAKE ONE TABLET BY MOUTH EVERY DAY IN THE MORNING 2/27/2025 Morning    fluticasone (FLONASE) 50 MCG/ACT nasal spray Spray 1 spray into both nostrils daily. (Patient taking differently: Spray 1 spray into both nostrils daily as needed.) More than a month    losartan (COZAAR) 25 MG tablet TAKE ONE TABLET BY MOUTH EVERY DAY IN THE MORNING 2/27/2025 Morning    nitroGLYcerin (NITROSTAT) 0.4 MG sublingual tablet DISSOLVE 1 TABLET UNDER THE TONGUE EVERY 5 MINUTES AS NEEDED FOR CHEST PAIN. DO NOT EXCEED A TOTAL OF 3 DOSES IN 15 MINUTES. Unknown    simvastatin (ZOCOR) 20 MG tablet TAKE ONE TABLET BY MOUTH EVERY DAY AT BEDTIME 2/26/2025 Bedtime

## 2025-02-28 NOTE — PROGRESS NOTES
Chart reviewed and pt discussed in interdisciplinary rounds. No anticipated discharge needs at this time. CTS team will continue to monitor daily in interdisciplinary rounds and will intervene as needed/appropriate.    Checked in with Patient and his daughter.  Patient and daughter deny questions or concerns at this time.  CTS will continue to remain available for support and resources.     Follow-up appointment is 3/7/25 at 1:15 pm with Dr. Jan[facility] pharmacy

## 2025-02-28 NOTE — ED NOTES
ED tech went to sit patinet up and he started to shake.  Provider updated.  BG POCT check, and temp checked.

## 2025-02-28 NOTE — PLAN OF CARE
[facility] Inpatient Admission Note:    Patient admitted to 3108/3108-1 at approximately 0441 via cart accompanied by transport tech from emergency room . Report received from Kellie RUIZ in SBAR format at 0417 via telephone. Patient ambulated to bed via self.. Patient is alert and oriented X 3, denies pain; rates at 0 on 0-10 scale.  Patient oriented to room, unit, hourly rounding, and plan of care. Explained admission packet and patient handbook with patient bill of rights brochure. Will continue to monitor and document as needed.     Inpatient Nursing criteria listed below was met:    Health care directives status obtained and documented: Yes    Patient identifies a surrogate decision maker: Yes If yes, who:Daughter Shannan Contact Information:See Facesheet     If initial lactic acid greater than 2.0, repeat lactic acid drawn within one hour of arrival to unit: NA - no lactic acid obtained. If no, state reason: No lactic acid obtained    Clergy visit ordered if patient requests: N/A    Skin issues/needs documented: N/A    Isolation Patient: no Education given, correct sign in place and documentation row added to PCS:   N/A    Fall Prevention Yes: Care plan updated, education given and documented, sticker and magnet in place: Yes    Care Plan initiated: Yes    Education Documented (including assessment): Yes    Patient has discharge needs :  TBD

## 2025-02-28 NOTE — ED PROVIDER NOTES
History     Chief Complaint   Patient presents with    Hypertension    Headache     HPI  James Grant is a 89 year old male who presents for headache and hypertension.  Patient lives in assisted living and notes that he had a headache when he awoke this morning.  He states it is not the worst headache of his life.  He denies any fall or head trauma.  He states he has had similar headaches in the past, however, they usually go away after taking aspirin.  Patient took some aspirin this morning and the headache did not go away.  He took another dose of aspirin this afternoon he states the pain is much better, however, he still feels a funny feeling in the top of his head.  He denies fever, speech or vision changes, focal numbness or weakness.  States has been taking his regular medications.  Patient is not sure if he takes a blood pressure medication or not.  He denies light sensitivity, states he has mild nausea, no vomiting, chest pain, shortness of breath, abdominal pain.  Patient states headache is similar to previous, however, they usually do not last this long.    Allergies:  Allergies   Allergen Reactions    Lisinopril Cough    Morphine Other (See Comments) and Visual Disturbance     confusion       Problem List:    Patient Active Problem List    Diagnosis Date Noted    Nursing Home Visit 11/27/2023     Priority: Medium    Closed fracture of left hip, initial encounter (H) 11/08/2023     Priority: Medium    Colostomy present (H) 11/08/2023     Priority: Medium    Femur fracture, left (H) 11/08/2023     Priority: Medium    Febrile illness 03/26/2022     Priority: Medium    Urinary tract infection without hematuria, site unspecified 03/26/2022     Priority: Medium    Diverticulitis of large intestine with perforation and abscess 07/27/2021     Priority: Medium    Sigmoid diverticulitis 07/15/2021     Priority: Medium    Diverticulitis of large intestine with abscess 07/15/2021     Priority: Medium    Abdominal  pain, right lower quadrant 07/15/2021     Priority: Medium     Added automatically from request for surgery [id number]      TIA (transient ischemic attack) 11/30/2020     Priority: Medium    Malignant neoplasm of overlapping sites of bladder (H) 11/22/2019     Priority: Medium    History of bladder cancer 10/28/2019     Priority: Medium    Venous stasis dermatitis of both lower extremities 03/16/2017     Priority: Medium    ACP (advance care planning) 06/06/2016     Priority: Medium     Advance Care Planning 6/6/2016: ACP Review of Chart / Resources Provided:  Reviewed chart for advance care plan.  James Grant has no plan or code status on file. Discussed available resources and provided with information. Confirmed code status reflects current choices pending further ACP discussions.  Confirmed/documented legally designated decision makers.  Added by Yani Anderson            BPH (benign prostatic hyperplasia) 01/08/2014     Priority: Medium    SNHL (sensorineural hearing loss) 07/17/2013     Priority: Medium    ETD (eustachian tube dysfunction) 07/17/2013     Priority: Medium    Dyslipidemia 11/26/2010     Priority: Medium    GERD (gastroesophageal reflux disease) 11/26/2010     Priority: Medium    CHD (coronary heart disease) 11/26/2010     Priority: Medium     07/2010 S/P inferior wall MI  Angioplasty and stenting X 2 RCA  08/2010 angioplasty and stenting (LIDIA) LAD      Essential hypertension, benign 11/08/2010     Priority: Medium     Overview:   IMO Update 10/11      Coronary atherosclerosis of native coronary artery 11/08/2010     Priority: Medium     Formatting of this note might be different from the original.  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      CHOLECYSTECTOMY      CYSTOSCOPY, FULGURATE BLEEDERS, EVACUATE CLOT(S), COMBINED N/A 2020    Procedure: Clot Evacuation;  Surgeon: Andrzej Olivia MD;  Location: [facility]    CYSTOSCOPY, RETROGRADES, COMBINED Bilateral 10/22/2019    Procedure: Bilateral Retrograde Pyelograms;  Surgeon: Andrzej Olivia MD;  Location: [facility] OR    CYSTOSCOPY, TRANSURETHRAL RESECTION (TUR) TUMOR BLADDER, COMBINED N/A 10/22/2019    Procedure: Transurethral Resection of Bladder Tumor and transurethral resection of prostate and bladder stone removal;  Surgeon: Andrzej Olivia MD;  Location: [facility] OR    HERNIA REPAIR      LAPAROTOMY EXPLORATORY N/A 2021    Procedure: Exploratory  laparotomy with sigmoid colectomy and creation of colostomy, appendectomy;  Surgeon: Sukumar Chavez MD;  Location: [facility] OR    left arm surgery         Family History:    Family History   Problem Relation Age of Onset    Heart Failure Mother 86        Congestive - Cause of Death    Cerebrovascular Disease Father     C.A.D. Sister 91       Social History:  Marital Status:   [5]  Social History     Tobacco Use    Smoking status: Former     Current packs/day: 0.00     Average packs/day: 1 pack/day for 13.6 years (13.6 ttl pk-yrs)     Types: Cigarettes     Start date: 1949     Quit date: 1962     Years since quittin.6    Smokeless tobacco: Never   Vaping Use    Vaping status: Never Used   Substance Use Topics    Alcohol use: Not Currently     Comment: 3 Drinks (BEER & LIQUOR) ~ OCCASIONALLY (QUIT IN )    Drug use: No        Medications:    aspirin (ASA) 325 MG EC tablet  famotidine (PEPCID) 20 MG tablet  fluticasone (FLONASE) 50 MCG/ACT nasal  "spray  HYDROmorphone (DILAUDID) 2 MG tablet  ipratropium (ATROVENT) 0.06 % nasal spray  losartan (COZAAR) 25 MG tablet  metoprolol succinate ER (TOPROL XL) 25 MG 24 hr tablet  miconazole (MICATIN) 2 % external cream  nitroGLYcerin (NITROSTAT) 0.4 MG sublingual tablet  simvastatin (ZOCOR) 20 MG tablet          Review of Systems    Medically appropriate review of systems was done.  See HPI for details.    Physical Exam   BP: (!) 201/91  Pulse: 63  Temp: 98.1  F (36.7  C)  Resp: 18  Height: 180.3 cm (5' 11\")  Weight: 94.5 kg (208 lb 4.8 oz)  SpO2: 97 %      Physical Exam    GEN:  Well developed, no acute distress  HEENT:  PERRL, EOMI, Mucous membranes are moist.   Cardio:  RRR, no murmur, radial pulses equal bilaterally  PULM:  Lungs clear, good air movement, no wheezes, rales   Abd:  Soft, normal bowel sounds, no focal tenderness  Musculoskeletal:  normal range of motion, there is mild bilateral lower extremity swelling without calf tenderness  Neuro:  Alert and oriented X3, Follows commands, CN exam is normal, finger to nose is normal, sensation and strength is normal throughout, no pronator drift   Skin:  Warm, dry      ED Course        Procedures         {EKG done?:753881}    Critical Care time:  {none or minutes:672643}  {Trauma Activation or Fall?:067285}  {Sepsis/Septic Shock/Stemi/Stroke:674938::\"None\"}         Results for orders placed or performed during the hospital encounter of 02/27/25 (from the past 24 hours)   CBC with platelets differential    Narrative    The following orders were created for panel order CBC with platelets differential.  Procedure                               Abnormality         Status                     ---------                               -----------         ------                     CBC with platelets and d...[[id number]]                      Final result                 Please view results for these tests on the individual orders.   Comprehensive metabolic panel   Result Value " Ref Range    Sodium 137 135 - 145 mmol/L    Potassium 4.4 3.4 - 5.3 mmol/L    Carbon Dioxide (CO2) 23 22 - 29 mmol/L    Anion Gap 12 7 - 15 mmol/L    Urea Nitrogen 18.5 8.0 - 23.0 mg/dL    Creatinine 0.77 0.67 - 1.17 mg/dL    GFR Estimate 81 >60 mL/min/1.73m2    Calcium 9.0 8.8 - 10.4 mg/dL    Chloride 102 98 - 107 mmol/L    Glucose 115 (H) 70 - 99 mg/dL    Alkaline Phosphatase 65 40 - 150 U/L    AST 25 0 - 45 U/L    ALT 19 0 - 70 U/L    Protein Total 7.1 6.4 - 8.3 g/dL    Albumin 4.1 3.5 - 5.2 g/dL    Bilirubin Total 0.3 <=1.2 mg/dL   [place] Draw    Narrative    The following orders were created for panel order [place] Draw.  Procedure                               Abnormality         Status                     ---------                               -----------         ------                     Extra Blue Top Tube[[id number]]                              Final result               Extra Red Top Tube[[id number]]                               Final result               Extra Green Top (Lithium...[[id number]]                                                 Extra Purple Top Tube[[id number]]                                                       Extra Heparinized Syringe[[id number]]                        Final result                 Please view results for these tests on the individual orders.   CBC with platelets and differential   Result Value Ref Range    WBC Count 7.5 4.0 - 11.0 10e3/uL    RBC Count 4.63 4.40 - 5.90 10e6/uL    Hemoglobin 14.5 13.3 - 17.7 g/dL    Hematocrit 42.5 40.0 - 53.0 %    MCV 92 78 - 100 fL    MCH 31.3 26.5 - 33.0 pg    MCHC 34.1 31.5 - 36.5 g/dL    RDW 13.6 10.0 - 15.0 %    Platelet Count 241 150 - 450 10e3/uL    % Neutrophils 42 %    % Lymphocytes 40 %    % Monocytes 12 %    % Eosinophils 5 %    % Basophils 1 %    % Immature Granulocytes 0 %    NRBCs per 100 WBC 0 <1 /100    Absolute Neutrophils 3.2 1.6 - 8.3 10e3/uL    Absolute Lymphocytes 3.0 0.8 - 5.3 10e3/uL    Absolute Monocytes 0.9 0.0 -  "1.3 10e3/uL    Absolute Eosinophils 0.4 0.0 - 0.7 10e3/uL    Absolute Basophils 0.1 0.0 - 0.2 10e3/uL    Absolute Immature Granulocytes 0.0 <=0.4 10e3/uL    Absolute NRBCs 0.0 10e3/uL   Extra Blue Top Tube   Result Value Ref Range    Hold Specimen JIC    Extra Red Top Tube   Result Value Ref Range    Hold Specimen JIC    Extra Heparinized Syringe   Result Value Ref Range    Hold Specimen JIC        Medications   cloNIDine (CATAPRES) tablet 0.1 mg (0 mg Oral Hold 2/27/25 2000)   acetaminophen (TYLENOL) tablet 975 mg (975 mg Oral $Given 2/27/25 1951)       Assessments & Plan (with Medical Decision Making)     I have reviewed the nursing notes.    I have reviewed the findings, diagnosis, plan and need for follow up with the patient.  {ED Addendum:550975::\" \"}        Medical Decision Making  The patient's presentation was of {[facility] Problem:047786}.    The patient's evaluation involved:  {[facility] Data:424391}    The patient's management necessitated {[facility] Management:532126}.        New Prescriptions    No medications on file       Final diagnoses:   None       2/27/2025   [facility]    " Lymphocytes 3.0 0.8 - 5.3 10e3/uL    Absolute Monocytes 0.9 0.0 - 1.3 10e3/uL    Absolute Eosinophils 0.4 0.0 - 0.7 10e3/uL    Absolute Basophils 0.1 0.0 - 0.2 10e3/uL    Absolute Immature Granulocytes 0.0 <=0.4 10e3/uL    Absolute NRBCs 0.0 10e3/uL   Extra Blue Top Tube   Result Value Ref Range    Hold Specimen JIC    Extra Red Top Tube   Result Value Ref Range    Hold Specimen JIC    Extra Heparinized Syringe   Result Value Ref Range    Hold Specimen JIC    Lipase   Result Value Ref Range    Lipase 35 13 - 60 U/L   Troponin T, High Sensitivity   Result Value Ref Range    Troponin T, High Sensitivity 18 <=22 ng/L   Nt probnp inpatient (BNP)   Result Value Ref Range    N terminal Pro BNP Inpatient 187 0 - 1,800 pg/mL   Head CT w/o contrast    Narrative    EXAM: CT HEAD W/O CONTRAST  LOCATION: [facility]  DATE: 2/27/2025    INDICATION: headache  COMPARISON: MRI brain 11/30/2020  TECHNIQUE: Routine CT Head without IV contrast. Multiplanar reformats. Dose reduction techniques were used.    FINDINGS:  INTRACRANIAL CONTENTS: No intracranial hemorrhage, extraaxial collection, or mass effect.  No CT evidence of acute infarct. Mild presumed chronic small vessel ischemic changes. Mild generalized volume loss. No hydrocephalus. There is intracranial   atherosclerosis.     VISUALIZED ORBITS/SINUSES/MASTOIDS: Prior bilateral cataract surgery. Visualized portions of the orbits are otherwise unremarkable. No paranasal sinus mucosal disease. No middle ear or mastoid effusion.    BONES/SOFT TISSUES: No acute abnormality.      Impression    IMPRESSION:  1.  Normal head CT.   Glucose by meter   Result Value Ref Range    GLUCOSE BY METER POCT 152 (H) 70 - 99 mg/dL   Influenza A/B, RSV and SARS-CoV2 PCR (COVID-19) Nasopharyngeal    Specimen: Nasopharyngeal; Swab   Result Value Ref Range    Influenza A PCR Negative Negative    Influenza B PCR Negative Negative    RSV PCR Negative Negative    SARS CoV2 PCR Negative Negative     Narrative    Testing was performed using the Xpert Xpress CoV2/Flu/RSV Assay on the [employer] GeneXpert Instrument. This test should be ordered for the detection of SARS-CoV2, influenza, and RSV viruses in individuals with signs and symptoms of respiratory tract infection. This test is for in vitro diagnostic use under the [place] FDA for laboratories certified under CLIA to perform high or moderate complexity testing. This test has been [place] FDA cleared. A negative result does not rule out the presence of PCR inhibitors in the specimen or target RNA in concentration below the limit of detection for the assay. If only one viral target is positive but coinfection with multiple targets is suspected, the sample should be re-tested with another FDA cleared, approved, or authorized test, if coninfection would change clinical management. This test was validated by the [facility]. These laboratories are certified under the Clinical Laboratory Improvement Amendments of 1988 (CLIA-88) as qualified to perfom high complexity laboratory testing.   UA Macroscopic with reflex to Microscopic and Culture    Specimen: Urine, NOS   Result Value Ref Range    Color Urine Light Yellow Colorless, Straw, Light Yellow, Yellow    Appearance Urine Clear Clear    Glucose Urine Negative Negative mg/dL    Bilirubin Urine Negative Negative    Ketones Urine Negative Negative mg/dL    Specific Gravity Urine 1.018 1.003 - 1.035    Blood Urine Negative Negative    pH Urine 7.0 4.7 - 8.0    Protein Albumin Urine Negative Negative mg/dL    Urobilinogen Urine Normal Normal, 2.0 mg/dL    Nitrite Urine Negative Negative    Leukocyte Esterase Urine Negative Negative    Mucus Urine Present (A) None Seen /LPF    RBC Urine <1 <=2 /HPF    WBC Urine <1 <=5 /HPF    Squamous Epithelials Urine 0 <=1 /HPF    Narrative    Microscopic not indicated  Urine Culture not indicated   CTA Head Neck with Contrast    Narrative    EXAM: CTA HEAD NECK W  CONTRAST  LOCATION: [facility]  DATE: 2/28/2025    INDICATION: dizziness  COMPARISON: CT head 2/27/2025 and CTA head and neck from 11/30/2020  CONTRAST: Isovue 370 67ml  TECHNIQUE: Head and neck CT angiogram with IV contrast. Axial helical CT images of the head and neck vessels obtained during the arterial phase of intravenous contrast administration. Axial 2D reconstructed images and multiplanar 3D MIP reconstructed   images of the head and neck vessels were performed by the technologist. Dose reduction techniques were used. All stenosis measurements made according to NASCET criteria unless otherwise specified.    FINDINGS:     HEAD CTA:  ANTERIOR CIRCULATION: No stenosis/occlusion, aneurysm, or high flow vascular malformation. Standard [place] anatomy.    POSTERIOR CIRCULATION: No stenosis/occlusion, aneurysm, or high flow vascular malformation. Dominant left vertebral artery supplies a normal basilar artery. 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    DURAL VENOUS SINUSES: Expected enhancement of the major dural venous sinuses.    NECK CTA:  RIGHT CAROTID: No measurable stenosis or dissection.    LEFT CAROTID: No measurable stenosis or dissection.    VERTEBRAL ARTERIES: No focal stenosis or dissection. Balanced vertebral arteries.    AORTIC ARCH: Classic aortic arch anatomy with no significant stenosis at the origin of the great vessels.    NONVASCULAR STRUCTURES: Unremarkable.      Impression    IMPRESSION:     HEAD CTA:   1.  Normal CTA [place].    NECK CTA:  1.  Normal neck CTA.   CT Abdomen Pelvis w Contrast    Narrative    EXAM: CT ABDOMEN PELVIS W CONTRAST  LOCATION: [facility]  DATE: 2/28/2025    INDICATION: vomiting  COMPARISON: March 26, 2022.  TECHNIQUE: CT scan of the abdomen and pelvis was performed following injection of IV contrast. Multiplanar reformats were obtained. Dose reduction techniques  were used.  CONTRAST: 67 mL Isovue-370.    FINDINGS: Extensive breathing motion.  LOWER CHEST: Normal.    HEPATOBILIARY: Prior cholecystectomy. Normal hepatic parenchyma and biliary tree.    PANCREAS: Normal.    SPLEEN: Normal.    ADRENAL GLANDS: Normal.    KIDNEYS/BLADDER: Normal appearance of the renal parenchyma. No calculus, hydronephrosis, or perinephric stranding. Ureters and urinary bladder appear normal.    BOWEL: Normal stomach and small bowel. Incidentally noted duodenal diverticula are unchanged. Extensive colonic diverticulosis without left lower quadrant colostomy. Parastomal hernia defect measures about 65 mm wide. A few loops of normal-appearing   small bowel extending into the hernia. No associated mesenteric edema or evidence of obstruction. Prior appendectomy.    LYMPH NODES: Normal.    VASCULATURE: Atheromatous calcification with normal caliber aorta.    PELVIC ORGANS: Partly obscured by artifact from left hip arthroplasty, there appears to be massive enlargement of the prostate to almost 8 cm diameter.    MUSCULOSKELETAL: Left hip hemiarthroplasty with degenerative change along the acetabular side of the joint. Degenerative change L5-S1 and at the lower thoracic spine.       Impression    IMPRESSION:   1.  No acute abnormality is identified.  2.  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  3.  Massive enlargement of the prostate.     EKG 12-lead, tracing only   Result Value Ref Range    Systolic Blood Pressure  mmHg    Diastolic Blood Pressure  mmHg    Ventricular Rate 57 BPM    Atrial Rate 57 BPM    UT Interval 260 ms    QRS Duration 92 ms     ms    QTc 459 ms    P Axis 68 degrees    R AXIS -7 degrees    T Axis 37 degrees    Interpretation ECG       Sinus bradycardia with 1st degree A-V block  Otherwise normal ECG  When compared with ECG of 08-Nov-2023 14:12,  No significant change was found         Medications   acetaminophen (TYLENOL)  tablet 975 mg (975 mg Oral $Given 2/27/25 1951)   sodium chloride 0.9% BOLUS 1,000 mL (0 mLs Intravenous Stopped 2/28/25 0020)   iopamidol (ISOVUE-370) solution 67 mL (67 mLs Intravenous $Given 2/28/25 0206)   sodium chloride (PF) 0.9% PF flush 100 mL (100 mLs Intravenous $Given 2/28/25 0206)       Assessments & Plan (with Medical Decision Making)     Patient presents with headache upon awakening that he described as nonsevere, however, did not go away with aspirin as it usually does.  Head CT as well as CTA of the head neck is negative for acute findings.  Seems unlikely to be due to acute subarachnoid hemorrhage given the nonsevere nature of the headache with normal neuroexam.  Neuroexam is not suggestive of acute stroke, however, patient is now reporting significant dizziness with nausea and he comes close to vomiting when he sits up.  If he stays in that position, symptoms do miranda.  He does not feel he is able to go home on his own because of significant dizziness and nausea.  I spoke with Dr. Morales about admission and she is agreeable.  Will plan for observation status admission and possible MRI tomorrow for further evaluation.  Patient does not have chest pain, shortness of breath, other cardiac symptoms.  EKG shows sinus bradycardia which is not new.  Troponin and BNP were added on to the initial labs and are negative.    I have reviewed the nursing notes.    I have reviewed the findings, diagnosis, plan and need for follow up with the patient.           Medical Decision Making  The patient's presentation was of high complexity (an acute health issue posing potential threat to life or bodily function).    The patient's evaluation involved:  ordering and/or review of 3+ test(s) in this encounter (see separate area of note for details)    The patient's management necessitated high risk (a decision regarding hospitalization).        New Prescriptions    No medications on file       Final diagnoses:   Acute  nonintractable headache, unspecified headache type   Dizziness       2/27/2025   [facility]       Jessica Amador MD  02/28/25 [id number]

## 2025-02-28 NOTE — H&P
"[facility]    History and Physical - Hospitalist Service       Date of Admission:  2/27/2025    Assessment & Plan      James Grant is a 89 year old male admitted on 2/27/2025. He has a headache and dizziness.    DIZZINESS  CT of head is good and labs all normal.  Will continue to monitor on tele and hold medicines not absolutely needed.    HEADACHE  CT of the head is good.  No obvious cause for this.  No obvious infection, hypoxia, or cardiac issue  Will continue to monitor.    HYPERTENSION  BP is still high on home meds and will continue to monitor and will add PRN hydralazine.    COLOSTOMY  Working well.    PROSTATE ENLARGEMENT  Voiding well and PVR was about 160.    DNR  Daughter is his POA.       Observation Goals: -diagnostic tests and consults completed and resulted, -vital signs normal or at patient baseline, -tolerating oral intake to maintain hydration, -returns to baseline functional status, -safe disposition plan has been identified, Nurse to notify provider when observation goals have been met and patient is ready for discharge.  Diet: Regular Diet Adult    DVT Prophylaxis: Enoxaparin (Lovenox) SQ  Prater Catheter: Not present  Lines: None     Cardiac Monitoring: None  Code Status: No CPR- Do NOT Intubate      Clinically Significant Risk Factors Present on Admission                 # Drug Induced Platelet Defect: home medication list includes an antiplatelet medication   # Hypertension: Noted on problem list           # Overweight: Estimated body mass index is 28.84 kg/m  as calculated from the following:    Height as of this encounter: 1.803 m (5' 11\").    Weight as of this encounter: 93.8 kg (206 lb 12.7 oz).              Disposition Plan     Medically Ready for Discharge: Anticipated Tomorrow           Melody Morales MD  Hospitalist Service  [facility]  Securely message with [employer] (more info)  Text page via [facility] Paging/Directory "     ______________________________________________________________________    Chief Complaint   dizziness    History is obtained from the patient    History of Present Illness   James Grant is a 89 year old male who comes in with dizziness and HA.  Unclear reason as no URI symptoms, no chest pain, no dyspnea/hypoxia, no abd pain with vomiting.  Some nausea.  No dysuria or urine retention.  Labs and imaging studies normal.  Will continue to monitor on tele      Past Medical History    Past Medical History:   Diagnosis Date    Benign localized hyperplasia of prostate without urinary obstruction and other lower urinary tract symptoms (LUTS) 11/26/2010    CHD (coronary heart disease) 11/26/2010    Colostomy present (H) 11/08/2023    Dyslipidemia 11/26/2010    GERD (gastroesophageal reflux disease) 11/26/2010    HTN (hypertension) 11/26/2010    Old myocardial infarction 11/26/2010    Other symptoms involving digestive system(787.99) 10/20/2006       Past Surgical History   Past Surgical History:   Procedure Laterality Date    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      CHOLECYSTECTOMY      CYSTOSCOPY, FULGURATE BLEEDERS, EVACUATE CLOT(S), COMBINED N/A 2/17/2020    Procedure: Clot Evacuation;  Surgeon: Andrzej Olivia MD;  Location:  OR    CYSTOSCOPY, RETROGRADES, COMBINED Bilateral 10/22/2019    Procedure: Bilateral Retrograde Pyelograms;  Surgeon: Andrzej Olivia MD;  Location:  OR    CYSTOSCOPY, TRANSURETHRAL RESECTION (TUR) TUMOR BLADDER, COMBINED N/A 10/22/2019    Procedure: Transurethral Resection of Bladder Tumor and transurethral resection of prostate and bladder stone removal;  Surgeon: Andrzej Olivia MD;  Location:  OR    HERNIA REPAIR      LAPAROTOMY EXPLORATORY N/A 7/19/2021    Procedure: Exploratory  laparotomy with sigmoid colectomy and creation of colostomy, appendectomy;  Surgeon: Sukumar Chavez MD;  Location: [facility] OR    left arm surgery         Prior to Admission Medications   Prior to Admission  Medications   Prescriptions Last Dose Informant Patient Reported? Taking?   HYDROmorphone (DILAUDID) 2 MG tablet   Yes No   Sig: Take 1 mg by mouth every 4 hours as needed for severe pain   aspirin (ASA) 325 MG EC tablet   Yes No   Sig: Take 0.5 tablets by mouth daily   famotidine (PEPCID) 20 MG tablet   No No   Sig: TAKE ONE TABLET BY MOUTH EVERY DAY IN THE MORNING   fluticasone (FLONASE) 50 MCG/ACT nasal spray   No No   Sig: Spray 1 spray into both nostrils daily.   ipratropium (ATROVENT) 0.06 % nasal spray   Yes No   Sig: Spray 2 sprays into both nostrils 3 times daily   losartan (COZAAR) 25 MG tablet   No No   Sig: TAKE ONE TABLET BY MOUTH EVERY DAY IN THE MORNING   metoprolol succinate ER (TOPROL XL) 25 MG 24 hr tablet   No No   Sig: TAKE ONE-HALF TABLET BY MOUTH EVERY DAY AT BEDTIME   miconazole (MICATIN) 2 % external cream   No No   Sig: Apply topically 2 times daily.   nitroGLYcerin (NITROSTAT) 0.4 MG sublingual tablet   No No   Sig: DISSOLVE 1 TABLET UNDER THE TONGUE EVERY 5 MINUTES AS NEEDED FOR CHEST PAIN. DO NOT EXCEED A TOTAL OF 3 DOSES IN 15 MINUTES.   simvastatin (ZOCOR) 20 MG tablet   No No   Sig: TAKE ONE TABLET BY MOUTH EVERY DAY AT BEDTIME      Facility-Administered Medications: None        Review of Systems    The 10 point Review of Systems is negative other than noted in the HPI or here.     Social History   I have reviewed this patient's social history and updated it with pertinent information if needed.  Social History     Tobacco Use    Smoking status: Former     Current packs/day: 0.00     Average packs/day: 1 pack/day for 13.6 years (13.6 ttl pk-yrs)     Types: Cigarettes     Start date: 1949     Quit date: 1962     Years since quittin.6    Smokeless tobacco: Never   Vaping Use    Vaping status: Never Used   Substance Use Topics    Alcohol use: Not Currently     Comment: 3 Drinks (BEER & LIQUOR) ~ OCCASIONALLY (QUIT IN )    Drug use: No         Family History   I have  reviewed this patient's family history and updated it with pertinent information if needed.  Family History   Problem Relation Age of Onset    Heart Failure Mother 86        Congestive - Cause of Death    Cerebrovascular Disease Father     C.A.D. Sister 91         Allergies   Allergies   Allergen Reactions    Lisinopril Cough    Morphine Other (See Comments) and Visual Disturbance     confusion        Physical Exam   Vital Signs: Temp: 97.6  F (36.4  C) Temp src: Tympanic BP: (!) 177/91 Pulse: 54   Resp: 20 SpO2: 99 % O2 Device: None (Room air)    Weight: 206 lbs 12.66 oz    General Appearance: Wdwn man in nad, A&O. [place]  Respiratory: no dyspnea, lcta  Cardiovascular: rrr, good perfusion  GI: no distension and colostomy working  Skin: no rash or lesions on exposed skin      Medical Decision Making             Data     I have personally reviewed the following data over the past 24 hrs:    7.5  \   14.5   / 241     137 102 18.5 /  152 (H)   4.4 23 0.77 \     ALT: 19 AST: 25 AP: 65 TBILI: 0.3   ALB: 4.1 TOT PROTEIN: 7.1 LIPASE: 35     Trop: 18 BNP: 187       Imaging results reviewed over the past 24 hrs:   Recent Results (from the past 24 hours)   Head CT w/o contrast    Narrative    EXAM: CT HEAD W/O CONTRAST  LOCATION: [facility]  DATE: 2/27/2025    INDICATION: headache  COMPARISON: MRI brain 11/30/2020  TECHNIQUE: Routine CT Head without IV contrast. Multiplanar reformats. Dose reduction techniques were used.    FINDINGS:  INTRACRANIAL CONTENTS: No intracranial hemorrhage, extraaxial collection, or mass effect.  No CT evidence of acute infarct. Mild presumed chronic small vessel ischemic changes. Mild generalized volume loss. No hydrocephalus. There is intracranial   atherosclerosis.     VISUALIZED ORBITS/SINUSES/MASTOIDS: Prior bilateral cataract surgery. Visualized portions of the orbits are otherwise unremarkable. No paranasal sinus mucosal disease. No middle ear or mastoid effusion.    BONES/SOFT  TISSUES: No acute abnormality.      Impression    IMPRESSION:  1.  Normal head CT.   CTA Head Neck with Contrast    Narrative    EXAM: CTA HEAD NECK W CONTRAST  LOCATION: [facility]  DATE: 2/28/2025    INDICATION: dizziness  COMPARISON: CT head 2/27/2025 and CTA head and neck from 11/30/2020  CONTRAST: Isovue 370 67ml  TECHNIQUE: Head and neck CT angiogram with IV contrast. Axial helical CT images of the head and neck vessels obtained during the arterial phase of intravenous contrast administration. Axial 2D reconstructed images and multiplanar 3D MIP reconstructed   images of the head and neck vessels were performed by the technologist. Dose reduction techniques were used. All stenosis measurements made according to NASCET criteria unless otherwise specified.    FINDINGS:     HEAD CTA:  ANTERIOR CIRCULATION: No stenosis/occlusion, aneurysm, or high flow vascular malformation. Standard [place] anatomy.    POSTERIOR CIRCULATION: No stenosis/occlusion, aneurysm, or high flow vascular malformation. Dominant left vertebral artery supplies a normal basilar artery. 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    DURAL VENOUS SINUSES: Expected enhancement of the major dural venous sinuses.    NECK CTA:  RIGHT CAROTID: No measurable stenosis or dissection.    LEFT CAROTID: No measurable stenosis or dissection.    VERTEBRAL ARTERIES: No focal stenosis or dissection. Balanced vertebral arteries.    AORTIC ARCH: Classic aortic arch anatomy with no significant stenosis at the origin of the great vessels.    NONVASCULAR STRUCTURES: Unremarkable.      Impression    IMPRESSION:     HEAD CTA:   1.  Normal CTA [place].    NECK CTA:  1.  Normal neck CTA.   CT Abdomen Pelvis w Contrast    Narrative    EXAM: CT ABDOMEN PELVIS W CONTRAST  LOCATION: [facility]  DATE: 2/28/2025    INDICATION: vomiting  COMPARISON: March 26, 2022.  TECHNIQUE:  CT scan of the abdomen and pelvis was performed following injection of IV contrast. Multiplanar reformats were obtained. Dose reduction techniques were used.  CONTRAST: 67 mL Isovue-370.    FINDINGS: Extensive breathing motion.  LOWER CHEST: Normal.    HEPATOBILIARY: Prior cholecystectomy. Normal hepatic parenchyma and biliary tree.    PANCREAS: Normal.    SPLEEN: Normal.    ADRENAL GLANDS: Normal.    KIDNEYS/BLADDER: Normal appearance of the renal parenchyma. No calculus, hydronephrosis, or perinephric stranding. Ureters and urinary bladder appear normal.    BOWEL: Normal stomach and small bowel. Incidentally noted duodenal diverticula are unchanged. Extensive colonic diverticulosis without left lower quadrant colostomy. Parastomal hernia defect measures about 65 mm wide. A few loops of normal-appearing   small bowel extending into the hernia. No associated mesenteric edema or evidence of obstruction. Prior appendectomy.    LYMPH NODES: Normal.    VASCULATURE: Atheromatous calcification with normal caliber aorta.    PELVIC ORGANS: Partly obscured by artifact from left hip arthroplasty, there appears to be massive enlargement of the prostate to almost 8 cm diameter.    MUSCULOSKELETAL: Left hip hemiarthroplasty with degenerative change along the acetabular side of the joint. Degenerative change L5-S1 and at the lower thoracic spine.       Impression    IMPRESSION:   1.  No acute abnormality is identified.  2.  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  3.  Massive enlargement of the prostate.     XR Chest Port 1 View    Narrative    EXAM: XR CHEST PORT 1 VIEW  LOCATION: [facility]  DATE: 2/28/2025    INDICATION: dizziness  COMPARISON: Abdomen pelvis CT acquired a few hours ago and chest x-ray March 25, 2022.      Impression    IMPRESSION: Negative chest.     As the provider for the telehealth service, I attest that I introduced myself to the patient and  provided my credentials. Based on review of the patient s chart and/or a discussion with members of the patient s treatment team, I determined that telemedicine via a real-time, two-way, interactive audio and video platform is an appropriate means of providing this service. The patient and I mutually agreed that this visit is appropriate for telemedicine as well.  The RN, or tech on duty in the ER, assisted me with the patient.  The patient was located at [facility] and I am located in [place], [place].  The video portion of the visit was approximately 20 mins.

## 2025-03-01 ENCOUNTER — APPOINTMENT (OUTPATIENT)
Dept: ULTRASOUND IMAGING | Facility: HOSPITAL | Age: OVER 89
End: 2025-03-01
Attending: EMERGENCY MEDICINE
Payer: MEDICARE

## 2025-03-01 ENCOUNTER — HOSPITAL ENCOUNTER (EMERGENCY)
Facility: HOSPITAL | Age: OVER 89
Discharge: HOME | End: 2025-03-01
Attending: EMERGENCY MEDICINE
Payer: MEDICARE

## 2025-03-01 VITALS
RESPIRATION RATE: 18 BRPM | HEART RATE: 67 BPM | SYSTOLIC BLOOD PRESSURE: 180 MMHG | OXYGEN SATURATION: 96 % | DIASTOLIC BLOOD PRESSURE: 94 MMHG

## 2025-03-01 DIAGNOSIS — H53.8 BLURRED VISION, LEFT EYE: ICD-10-CM

## 2025-03-01 LAB
ANION GAP SERPL CALCULATED.3IONS-SCNC: 14 MMOL/L (ref 7–15)
BASOPHILS # BLD AUTO: 0 10E3/UL (ref 0–0.2)
BASOPHILS NFR BLD AUTO: 1 %
BUN SERPL-MCNC: 15.8 MG/DL (ref 8–23)
CALCIUM SERPL-MCNC: 8.7 MG/DL (ref 8.8–10.4)
CHLORIDE SERPL-SCNC: 104 MMOL/L (ref 98–107)
CREAT SERPL-MCNC: 0.71 MG/DL (ref 0.67–1.17)
CRP SERPL-MCNC: <3 MG/L
EGFRCR SERPLBLD CKD-EPI 2021: 83 ML/MIN/1.73M2
EOSINOPHIL # BLD AUTO: 0.3 10E3/UL (ref 0–0.7)
EOSINOPHIL NFR BLD AUTO: 4 %
ERYTHROCYTE [DISTWIDTH] IN BLOOD BY AUTOMATED COUNT: 13.8 % (ref 10–15)
ERYTHROCYTE [SEDIMENTATION RATE] IN BLOOD BY WESTERGREN METHOD: 9 MM/HR (ref 0–20)
GLUCOSE BLDC GLUCOMTR-MCNC: 144 MG/DL (ref 70–99)
GLUCOSE SERPL-MCNC: 134 MG/DL (ref 70–99)
HCO3 SERPL-SCNC: 22 MMOL/L (ref 22–29)
HCT VFR BLD AUTO: 42.9 % (ref 40–53)
HGB BLD-MCNC: 14.5 G/DL (ref 13.3–17.7)
HOLD SPECIMEN: NORMAL
IMM GRANULOCYTES # BLD: 0 10E3/UL
IMM GRANULOCYTES NFR BLD: 1 %
LYMPHOCYTES # BLD AUTO: 2.3 10E3/UL (ref 0.8–5.3)
LYMPHOCYTES NFR BLD AUTO: 30 %
MCH RBC QN AUTO: 31.3 PG (ref 26.5–33)
MCHC RBC AUTO-ENTMCNC: 33.8 G/DL (ref 31.5–36.5)
MCV RBC AUTO: 93 FL (ref 78–100)
MONOCYTES # BLD AUTO: 0.7 10E3/UL (ref 0–1.3)
MONOCYTES NFR BLD AUTO: 10 %
NEUTROPHILS # BLD AUTO: 4.2 10E3/UL (ref 1.6–8.3)
NEUTROPHILS NFR BLD AUTO: 56 %
NRBC # BLD AUTO: 0 10E3/UL
NRBC BLD AUTO-RTO: 0 /100
PLATELET # BLD AUTO: 245 10E3/UL (ref 150–450)
POTASSIUM SERPL-SCNC: 4 MMOL/L (ref 3.4–5.3)
RBC # BLD AUTO: 4.64 10E6/UL (ref 4.4–5.9)
SODIUM SERPL-SCNC: 140 MMOL/L (ref 135–145)
WBC # BLD AUTO: 7.6 10E3/UL (ref 4–11)

## 2025-03-01 PROCEDURE — 82565 ASSAY OF CREATININE: CPT | Performed by: EMERGENCY MEDICINE

## 2025-03-01 PROCEDURE — 86140 C-REACTIVE PROTEIN: CPT | Performed by: EMERGENCY MEDICINE

## 2025-03-01 PROCEDURE — 99284 EMERGENCY DEPT VISIT MOD MDM: CPT | Mod: 25 | Performed by: EMERGENCY MEDICINE

## 2025-03-01 PROCEDURE — 250N000009 HC RX 250: Performed by: EMERGENCY MEDICINE

## 2025-03-01 PROCEDURE — 99284 EMERGENCY DEPT VISIT MOD MDM: CPT | Mod: 25

## 2025-03-01 PROCEDURE — 36415 COLL VENOUS BLD VENIPUNCTURE: CPT | Performed by: EMERGENCY MEDICINE

## 2025-03-01 PROCEDURE — 76512 OPH US DX B-SCAN: CPT | Mod: TC,LT

## 2025-03-01 PROCEDURE — 80048 BASIC METABOLIC PNL TOTAL CA: CPT | Performed by: EMERGENCY MEDICINE

## 2025-03-01 PROCEDURE — 82962 GLUCOSE BLOOD TEST: CPT

## 2025-03-01 PROCEDURE — 85652 RBC SED RATE AUTOMATED: CPT | Performed by: EMERGENCY MEDICINE

## 2025-03-01 PROCEDURE — 76512 OPH US DX B-SCAN: CPT | Mod: 26 | Performed by: EMERGENCY MEDICINE

## 2025-03-01 PROCEDURE — 82310 ASSAY OF CALCIUM: CPT | Performed by: EMERGENCY MEDICINE

## 2025-03-01 PROCEDURE — 85025 COMPLETE CBC W/AUTO DIFF WBC: CPT | Performed by: EMERGENCY MEDICINE

## 2025-03-01 RX ORDER — TETRACAINE HYDROCHLORIDE 5 MG/ML
1-2 SOLUTION OPHTHALMIC ONCE
Status: COMPLETED | OUTPATIENT
Start: 2025-03-01 | End: 2025-03-01

## 2025-03-01 RX ADMIN — TETRACAINE HYDROCHLORIDE 2 DROP: 5 SOLUTION OPHTHALMIC at 13:14

## 2025-03-01 ASSESSMENT — VISUAL ACUITY
OS: 20/30
OD: 20/20
OU: 20/25

## 2025-03-01 ASSESSMENT — ACTIVITIES OF DAILY LIVING (ADL)
ADLS_ACUITY_SCORE: 57
ADLS_ACUITY_SCORE: 57

## 2025-03-01 NOTE — ED NOTES
POC US SOFT TISSUE    Date/Time: 3/1/2025 1:39 PM    Performed by: Broderick Leija MD  Authorized by: Broderick Leija MD    Procedure Details & Findings:      Left eye ocular ultrasound.    Indications: Blurred vision  Views obtained: Multiple planes, longitudinal and transverse.    Findings: There is no evidence of a retinal detachment.  No obvious severe hemorrhage in the posterior chamber.           Broderick Leija MD  03/01/25 [id number]

## 2025-03-01 NOTE — PLAN OF CARE
Patient discharged at 1800 PM via wheel chair accompanied by daughter and staff. Prescriptions - None ordered for discharge. All belongings sent with patient.     Discharge instructions reviewed with pt/daughter. Listed belongings gathered and returned to patient. yes    Patient discharged to [facility].   Report called to [facility]    Surgical Patient   Surgical Procedures during stay: No  Did patient receive discharge instruction on wound care and recognition of infection symptoms? N/A    MISC  Follow up appointment made:  Yes  Home medications returned to patient: N/A  Patient reports pain was well managed at discharge: Yes    Pt is A&O, SBA, VSS, afebrile, room air.  Assessment otherwise as charted.  Pt denies dizziness, no c/o headache or pain.  Colostomy changed twice by writer this shift, dribbling of urine, brief in place.  Good appetite, needs encouragement to drink more fluids.  Alarms actived, call light within reach, makes needs known.  Free from falls.    Pt discharged back to [facility] in [place] where he lives independently and manages his own medications.  Daughter transported pt in family vehicle.  Writer sent to [facility] print-outs, for pt and facility.  [facility] alerting facility of his return home.

## 2025-03-01 NOTE — ED TRIAGE NOTES
Patient presents with c/o vision being cloudy. Patient reports left eye is all cloudy. LKW 1000. Borught back to room    BEFAST-positive neuro eval called.

## 2025-03-01 NOTE — ED PROVIDER NOTES
EMERGENCY DEPARTMENT ENCOUNTER      NAME: James Grant  AGE: 89 year old male  YOB: 1927  MRN: [id number]  EVALUATION DATE & TIME: 3/1/2025 12:55 PM    PCP: Lisa Marrero    ED PROVIDER: Broderick Leija M.D.      Chief Complaint   Patient presents with    Eye Injury         FINAL IMPRESSION:  1. Blurred vision, left eye          ED COURSE & MEDICAL DECISION MAKIN year old male presents to the Emergency Department for evaluation of left eye blurred vision.  Patient presenting with acute onset of blurred vision in the left eye that started this morning.  He was just hospitalized for headache and elevated blood pressure with reassuring CT of the head and CT angiogram at that time.  He was discharged yesterday in stable condition and this morning developed a new isolated left eye monocular blurred vision.  He describes this as like trying to look through a hazy sheet.  He says this was not present in the hospital.  He denies any associated headache.  He is again a bit hypertensive here.  I was asked to evaluate him as a rapid neurological assessment.  He has an otherwise completely reassuring neurological exam with normal cranial nerves, normal mental status, normal speech, and no strength or sensory deficit.  His isolated monocular vision loss would seemingly go against an acute central process.  With a reassuring CTA yesterday and head CT, I seriously doubt this would be anything like amaurosis fugax.  Considered vitreous hemorrhage or retinal detachment.  Patient's description does not seem as likely to represent a retinal detachment.  I performed a limited ocular ultrasound without convincing evidence of retinal detachment.  Case discussed over the phone with Dr. Terrell, ophthalmology covering [facility] which is the closest referral center.  We discussed obtaining an ESR and CRP which were reassuring and normal, ruling out giant cell arteritis.  There remains a large differential of  primarily intraocular processes.  His eye intraocular pressure is normal.  He has no eye pain.  His visual acuity is actually fairly preserved in the left eye at 20/40  corrected versus 20/20 in the unaffected right eye.  With stable symptoms all day, I think it would be reasonable to have him await follow-up for a comprehensive eye exam here at the local eye clinic on Monday versus immediate transfer to a referral center although I did discuss this option with him.  Patient and family were in agreement with this approach.  The patient himself actually was preferring to wait rather than be evaluated in the emergency department but his daughter convinced him to come here today.  We did discuss that if anything significantly worsens with his vision he should consider returning to our hospital or presenting directly to our referral center in [place] to continue his evaluation.  Patient will follow-up with ophthalmology on Monday.  Patient was discharged in stable condition.      At the conclusion of the encounter I discussed the results of all of the tests and the disposition. The questions were answered. The patient or family acknowledged understanding and was agreeable with the care plan.       MEDICATIONS GIVEN IN THE EMERGENCY:  Medications   tetracaine (PONTOCAINE) 0.5 % ophthalmic solution 1-2 drop (2 drops Both Eyes $Given by Other 3/1/25 1314)       NEW PRESCRIPTIONS STARTED AT TODAY'S ER VISIT  Discharge Medication List as of 3/1/2025  2:51 PM             =================================================================    HPI    Patient information was obtained from: Patient, Daughter      James Grant is a 89 year old male with a pertinent history of HTN who presents to this ED today for evaluation of left eye blurry vision.  Patient presenting with acute blurred vision in his left eye that started this morning around 7 AM, few hours prior to arrival.  He is somewhat hypertensive on arrival.  He was just  hospitalized and discharged yesterday after an admission for headache and blurred vision along with dizziness and vomiting.  He is doing well from a symptom management standpoint since discharge yesterday.  He denies any ongoing headache.  Reports his dizziness is much improved.  He denies any other acute neurological symptoms.  He describes his vision problem on the left is trying to look through a hazy sheet or curtain.  He denies any field cuts or flashes and floaters.  Denies any eye pain.  No history of similar symptoms.  He did have cataract surgery in the remote past on both eyes.  He does use glasses.      REVIEW OF SYSTEMS   All systems reviewed and negative except as noted in HPI.    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      CHOLECYSTECTOMY      CYSTOSCOPY, FULGURATE BLEEDERS, EVACUATE CLOT(S), COMBINED N/A 2/17/2020    Procedure: Clot Evacuation;  Surgeon: Andrzej Olivia MD;  Location:  OR    CYSTOSCOPY, RETROGRADES, COMBINED Bilateral 10/22/2019    Procedure: Bilateral Retrograde Pyelograms;  Surgeon: Andrzej Olivia MD;  Location:  OR    CYSTOSCOPY, TRANSURETHRAL RESECTION (TUR) TUMOR BLADDER, COMBINED N/A 10/22/2019    Procedure: Transurethral Resection of Bladder Tumor and transurethral resection of prostate and bladder stone removal;  Surgeon: Andrzej Olivia MD;  Location:  OR    HERNIA REPAIR      LAPAROTOMY EXPLORATORY N/A 7/19/2021     Procedure: Exploratory  laparotomy with sigmoid colectomy and creation of colostomy, appendectomy;  Surgeon: Sukumar Chavez MD;  Location: [facility] OR    left arm surgery             CURRENT MEDICATIONS:    No current facility-administered medications for this encounter.     Current Outpatient Medications   Medication Sig Dispense Refill    aspirin (ASA) 325 MG EC tablet Take 0.5 tablets by mouth daily      famotidine (PEPCID) 20 MG tablet TAKE ONE TABLET BY MOUTH EVERY DAY IN THE MORNING 90 tablet 3    fluticasone (FLONASE) 50 MCG/ACT nasal spray Spray 1 spray into both nostrils daily. (Patient taking differently: Spray 1 spray into both nostrils daily as needed.) 16 g 0    losartan (COZAAR) 25 MG tablet TAKE ONE TABLET BY MOUTH EVERY DAY IN THE MORNING 90 tablet 0    metoprolol succinate ER (TOPROL XL) 25 MG 24 hr tablet TAKE ONE-HALF TABLET BY MOUTH EVERY DAY AT BEDTIME 45 tablet 3    nitroGLYcerin (NITROSTAT) 0.4 MG sublingual tablet DISSOLVE 1 TABLET UNDER THE TONGUE EVERY 5 MINUTES AS NEEDED FOR CHEST PAIN. DO NOT EXCEED A TOTAL OF 3 DOSES IN 15 MINUTES. 0.1 tablet 0    simvastatin (ZOCOR) 20 MG tablet TAKE ONE TABLET BY MOUTH EVERY DAY AT BEDTIME 90 tablet 3         ALLERGIES:  Allergies   Allergen Reactions    Lisinopril Cough    Morphine Other (See Comments) and Visual Disturbance     confusion       FAMILY HISTORY:  Family History   Problem Relation Age of Onset    Heart Failure Mother 86        Congestive - Cause of Death    Cerebrovascular Disease Father     C.A.D. Sister 91       SOCIAL HISTORY:   Social History     Socioeconomic History    Marital status:    Occupational History    Occupation:  and      Employer: [employer]     Comment: 01/01/1987 - RETIRED ([employer])   Tobacco Use    Smoking status: Former     Current packs/day: 0.00     Average packs/day: 1 pack/day for 13.6 years (13.6 ttl pk-yrs)     Types: Cigarettes     Start date: 1/1/1949     Quit date:  1962     Years since quittin.6    Smokeless tobacco: Never   Vaping Use    Vaping status: Never Used   Substance and Sexual Activity    Alcohol use: Not Currently     Comment: 3 Drinks (BEER & LIQUOR) ~ OCCASIONALLY (QUIT IN )    Drug use: No    Sexual activity: Never   Other Topics Concern    Blood Transfusions Yes     Comment: PERMITS    Caffeine Concern Yes     Comment: COFFEE - 4 CUPS DAILY    Exercise Yes     Comment: WALKING / HOUSEWORK - OCCASIONAL    Parent/sibling w/ CABG, MI or angioplasty before 65F 55M? No     Social Drivers of Health     Financial Resource Strain: Low Risk  (2025)    Financial Resource Strain     Within the past 12 months, have you or your family members you live with been unable to get utilities (heat, electricity) when it was really needed?: No   Food Insecurity: Low Risk  (2025)    Food Insecurity     Within the past 12 months, did you worry that your food would run out before you got money to buy more?: No     Within the past 12 months, did the food you bought just not last and you didn t have money to get more?: No   Transportation Needs: Low Risk  (2025)    Transportation Needs     Within the past 12 months, has lack of transportation kept you from medical appointments, getting your medicines, non-medical meetings or appointments, work, or from getting things that you need?: No   Interpersonal Safety: Not At Risk (2023)    Received from [facility]     IP Custom IPV     Do you feel UNSAFE in any of your personal relationships with your family members or any other acquaintances?: No   Housing Stability: Low Risk  (2025)    Housing Stability     Do you have housing? : Yes     Are you worried about losing your housing?: No       VITALS:  BP (!) 180/94   Pulse 67   Resp 18   SpO2 96%     PHYSICAL EXAM    Constitutional: Well developed, Well nourished, NAD.  HENT: Normocephalic, Atraumatic. Neck  Supple.  Eyes: Visual acuity as charted.  Bilateral eye Lids and lashes are normal.  Conjunctiva clear bilaterally.  Pupils are midrange equal and symmetric bilaterally.  Extraocular movements intact without pain.  IOP on the right is 12, IOP on the left is 11  Respiratory: Breathing comfortably on room air. Speaks full sentences easily. Lungs clear to ascultation.  Cardiovascular: Normal heart rate, Regular rhythm. No peripheral edema.  Abdomen: Soft  Musculoskeletal: Good range of motion in all major joints. No major deformities noted.  Integument: Warm, Dry.  Neurologic: Fully awake, alert, oriented.  Face is symmetric.  Speech is normal.  Cranial nerves II through XII intact.  Strength is 5 out of 5 throughout bilateral upper and lower extremities.  Sensation to light touch intact x4.  Finger-nose-finger testing is normal.  Patient is ambulatory.  Psychiatric: Cooperative. Affect appropriate.     LAB:  All pertinent labs reviewed and interpreted.  Labs Ordered and Resulted from Time of ED Arrival to Time of ED Departure   GLUCOSE BY METER - Abnormal       Result Value    GLUCOSE BY METER POCT 144 (*)    BASIC METABOLIC PANEL - Abnormal    Sodium 140      Potassium 4.0      Chloride 104      Carbon Dioxide (CO2) 22      Anion Gap 14      Urea Nitrogen 15.8      Creatinine 0.71      GFR Estimate 83      Calcium 8.7 (*)     Glucose 134 (*)    ERYTHROCYTE SEDIMENTATION RATE AUTO - Normal    Erythrocyte Sedimentation Rate 9     CRP INFLAMMATION - Normal    CRP Inflammation <3.00     CBC WITH PLATELETS AND DIFFERENTIAL    WBC Count 7.6      RBC Count 4.64      Hemoglobin 14.5      Hematocrit 42.9      MCV 93      MCH 31.3      MCHC 33.8      RDW 13.8      Platelet Count 245      % Neutrophils 56      % Lymphocytes 30      % Monocytes 10      % Eosinophils 4      % Basophils 1      % Immature Granulocytes 1      NRBCs per 100 WBC 0      Absolute Neutrophils 4.2      Absolute Lymphocytes 2.3      Absolute Monocytes 0.7       Absolute Eosinophils 0.3      Absolute Basophils 0.0      Absolute Immature Granulocytes 0.0      Absolute NRBCs 0.0         RADIOLOGY:  Reviewed all pertinent imaging. Please see official radiology report.  POC US SOFT TISSUE   Final Result            Broderick Leija M.D.  Emergency Medicine  [facility]  [address] [place] [id number] [phone]  Dept: [phone]       Broderick Leija MD  03/01/25 6530

## 2025-03-01 NOTE — DISCHARGE INSTRUCTIONS
You were seen in the emergency department for left eye blurred vision.  Your evaluation included a review of your imaging from your recent admission as well as some additional lab testing.  We did discuss your case briefly with an on-call ophthalmologist.  Your visual acuity is reassuringly fairly preserved.  We are less suspicious that about an acute reversible ophthalmologic emergency that would require immediate transfer to a higher level of care.  We think it is okay to continue monitoring things carefully over the weekend and following up early this week with the local ophthalmology clinic.  If you do have severe worsening blurred vision or other neurological symptom we should reevaluate you in the emergency department or you can consider proceeding directly to the hospital at [facility] in [place] for continued evaluation.

## 2025-03-06 NOTE — PROGRESS NOTES
{PROVIDER CHARTING PREFERENCE:714546}    Subjective   Adrian is a 89 year old, presenting for the following health issues:  No chief complaint on file.  {(!) Visit Details have not yet been documented.  Please enter Visit Details and then use this list to pull in documentation. (Optional):934760}  [facility]      ED/ Followup:    Facility:  [facility]  Date of visit: 03/01/2025  Reason for visit: Left eye- Blurred vision  Current Status: ***    {additonal problems for provider to add (Optional):115200}    {ROS Picklists (Optional):877565}      Objective    There were no vitals taken for this visit.  There is no height or weight on file to calculate BMI.  Physical Exam   {Exam List (Optional):063498}    {Diagnostic Test Results (Optional):272713}        Signed Electronically by: Lisa Marrero MD  {Email feedback regarding this note to [email or web address]   :426700}

## 2025-03-07 ENCOUNTER — OFFICE VISIT (OUTPATIENT)
Dept: FAMILY MEDICINE | Facility: OTHER | Age: OVER 89
End: 2025-03-07
Attending: STUDENT IN AN ORGANIZED HEALTH CARE EDUCATION/TRAINING PROGRAM
Payer: MEDICARE

## 2025-03-07 VITALS
HEART RATE: 66 BPM | HEIGHT: 71 IN | RESPIRATION RATE: 16 BRPM | SYSTOLIC BLOOD PRESSURE: 130 MMHG | OXYGEN SATURATION: 96 % | BODY MASS INDEX: 29 KG/M2 | DIASTOLIC BLOOD PRESSURE: 70 MMHG | TEMPERATURE: 98.4 F | WEIGHT: 207.13 LBS

## 2025-03-07 DIAGNOSIS — I10 SEVERE HYPERTENSION: Primary | ICD-10-CM

## 2025-03-07 PROCEDURE — 1111F DSCHRG MED/CURRENT MED MERGE: CPT | Performed by: STUDENT IN AN ORGANIZED HEALTH CARE EDUCATION/TRAINING PROGRAM

## 2025-03-07 PROCEDURE — G0463 HOSPITAL OUTPT CLINIC VISIT: HCPCS | Mod: 25

## 2025-03-07 PROCEDURE — 99213 OFFICE O/P EST LOW 20 MIN: CPT | Performed by: STUDENT IN AN ORGANIZED HEALTH CARE EDUCATION/TRAINING PROGRAM

## 2025-03-07 PROCEDURE — 1126F AMNT PAIN NOTED NONE PRSNT: CPT | Performed by: STUDENT IN AN ORGANIZED HEALTH CARE EDUCATION/TRAINING PROGRAM

## 2025-03-07 PROCEDURE — 3078F DIAST BP <80 MM HG: CPT | Performed by: STUDENT IN AN ORGANIZED HEALTH CARE EDUCATION/TRAINING PROGRAM

## 2025-03-07 PROCEDURE — 3075F SYST BP GE 130 - 139MM HG: CPT | Performed by: STUDENT IN AN ORGANIZED HEALTH CARE EDUCATION/TRAINING PROGRAM

## 2025-03-07 PROCEDURE — G0463 HOSPITAL OUTPT CLINIC VISIT: HCPCS

## 2025-03-07 ASSESSMENT — PATIENT HEALTH QUESTIONNAIRE - PHQ9
10. IF YOU CHECKED OFF ANY PROBLEMS, HOW DIFFICULT HAVE THESE PROBLEMS MADE IT FOR YOU TO DO YOUR WORK, TAKE CARE OF THINGS AT HOME, OR GET ALONG WITH OTHER PEOPLE: NOT DIFFICULT AT ALL
SUM OF ALL RESPONSES TO PHQ QUESTIONS 1-9: 8
SUM OF ALL RESPONSES TO PHQ QUESTIONS 1-9: 8

## 2025-03-07 ASSESSMENT — PAIN SCALES - GENERAL: PAINLEVEL_OUTOF10: NO PAIN (0)

## 2025-03-07 NOTE — PROGRESS NOTES
"  {PROVIDER CHARTING PREFERENCE:401586}    Subjective   Adrian is a 89 year old, presenting for the following health issues:  Hospital F/U        3/7/2025     1:06 PM   Additional Questions   Roomed by Lacey Mauro   Accompanied by Daughter         3/7/2025     1:06 PM   Patient Reported Additional Medications   Patient reports taking the following new medications None     HPI      **Patient was seen in ER on 3/1/2025 as well for blurred vision of the left eye   Woke up with a headache and later asked nurse to take BP and it was 203/93        Hospital Follow-up Visit:    Hospital/Nursing Home/IP Rehab Facility: [facility]  Date of Admission: 2/27/2025  Date of Discharge: 2/28/2025  Reason(s) for Admission: acute non intractable headache; dizziness - pt reports high blood pressure as well   Was the patient in the ICU or did the patient experience delirium during hospitalization?  No  Do you have any other stressors you would like to discuss with your provider? No    Problems taking medications regularly:  None  Medication changes since discharge: None  Problems adhering to non-medication therapy:  None    Summary of hospitalization:  {:873037}  Diagnostic Tests/Treatments reviewed.  Follow up needed: {:201679:}  Other Healthcare Providers Involved in Patient s Care:         {those currently involved after discharge:077299::\"None\"}  Update since discharge: {:436809} {TIP  Include information from family/caregivers, SNF, Care Coordination :480685}        Plan of care communicated with {:442990}     {Reference  Coding guidelines- Moderate Complexity F2F/Video within 7 - 14 days of discharge 6083687, High Complexity F2F/Video within 7 days 3847348 or [id number] days 9797330 :613744}      {additonal problems for provider to add (Optional):508960}  {additonal problems for provider to add (Optional):412708}    {ROS Picklists (Optional):909108}      Objective    /70 (BP Location: Right arm, Patient " "Position: Sitting, Cuff Size: Adult Large)   Pulse 66   Temp 98.4  F (36.9  C) (Tympanic)   Resp 16   Ht 1.803 m (5' 11\")   Wt 94 kg (207 lb 2 oz)   SpO2 96%   BMI 28.89 kg/m    Body mass index is 28.89 kg/m .  Physical Exam   {Exam List (Optional):813640}    {Diagnostic Test Results (Optional):515042}        Signed Electronically by: Lisa Marrero MD  {Email feedback regarding this note to [email or web address]   :311448}  Answers submitted by the patient for this visit:  Patient Health Questionnaire (Submitted on 3/7/2025)  If you checked off any problems, how difficult have these problems made it for you to do your work, take care of things at home, or get along with other people?: Not difficult at all  PHQ9 TOTAL SCORE: 8    "

## 2025-03-16 ENCOUNTER — HOSPITAL ENCOUNTER (EMERGENCY)
Facility: HOSPITAL | Age: OVER 89
Discharge: HOME | End: 2025-03-16
Attending: PHYSICIAN ASSISTANT
Payer: MEDICARE

## 2025-03-16 VITALS
SYSTOLIC BLOOD PRESSURE: 161 MMHG | HEART RATE: 66 BPM | TEMPERATURE: 98.9 F | OXYGEN SATURATION: 95 % | DIASTOLIC BLOOD PRESSURE: 81 MMHG | RESPIRATION RATE: 16 BRPM

## 2025-03-16 DIAGNOSIS — I10 EPISODE OF HYPERTENSION: ICD-10-CM

## 2025-03-16 PROCEDURE — 99282 EMERGENCY DEPT VISIT SF MDM: CPT | Performed by: PHYSICIAN ASSISTANT

## 2025-03-16 PROCEDURE — 99283 EMERGENCY DEPT VISIT LOW MDM: CPT

## 2025-03-16 ASSESSMENT — ENCOUNTER SYMPTOMS
DIZZINESS: 0
HEADACHES: 0
ABDOMINAL PAIN: 0

## 2025-03-16 ASSESSMENT — ACTIVITIES OF DAILY LIVING (ADL): ADLS_ACUITY_SCORE: 57

## 2025-03-17 ENCOUNTER — TELEPHONE (OUTPATIENT)
Dept: FAMILY MEDICINE | Facility: OTHER | Age: OVER 89
End: 2025-03-17

## 2025-03-17 DIAGNOSIS — I10 ESSENTIAL (PRIMARY) HYPERTENSION: ICD-10-CM

## 2025-03-17 NOTE — TELEPHONE ENCOUNTER
Syed Rock, I do not recommend any changes to the blood pressure regimen at this time.  We will review on 3/21.  I recommend continued close monitoring of BP.  If BP goes up very high and has headaches or vision changes or CP, SOB then needs to go to the ER.

## 2025-03-17 NOTE — TELEPHONE ENCOUNTER
Cozaar       Last Written Prescription Date:  12/19/2024  Last Fill Quantity: 90,   # refills: 0  Last Office Visit: 3/07/2025  Future Office visit:    Next 5 appointments (look out 90 days)      Mar 21, 2025 3:00 PM  (Arrive by 2:45 PM)  Provider Visit with Lisa Marrero MD  [facility] [place] ([facility] [place] ) [address]  [place] [address]  [phone]

## 2025-03-17 NOTE — TELEPHONE ENCOUNTER
Pt with daughter Uzma and gave verbal okay to discuss medical issues/concerns with her.  Pt was seen in the ER for episode of hypertension and per daughter the ER doctor told them that if this happened again that he could another pill and that it will not hurt him.  Daughter is concerned and is wondering if pt could take another pill if blood pressure elevates until his appointment on Friday 03/21/2025 with PCP.   Pt reported his blood pressure this morning to be 127/76.  Pt is  having no symptoms at time of conversation but reports.    Pt reported that he only had the high blood pressure when he went in to the ER.  Pt denies any other symptoms.      Pt did give the verbal okay to discuss any new or medication changes with his daughter Uzma at [phone].    Pt would like any new or changed prescriptions to go to [facility] in [place] .

## 2025-03-17 NOTE — DISCHARGE INSTRUCTIONS
Please follow-up with your primary physician to discuss other possible treatment for elevated blood pressures at home.      Please return here for any other questions or concerns.

## 2025-03-17 NOTE — TELEPHONE ENCOUNTER
Pt daughter called and was updated regarding message below per PCP.   Verbal okay to talk to daughter was given at earlier conversation-see below.

## 2025-03-17 NOTE — ED TRIAGE NOTES
Patient arrives via EMS with complaints of hypertension and shaking. Is not shaking at this time.

## 2025-03-17 NOTE — ED NOTES
Patient is discharged home in stable condition. Discharge instructions were given with no questions

## 2025-03-17 NOTE — ED TRIAGE NOTES
Report received from Sary RN on call nurse for [facility] /84 - pt has not had his BP medications tonight. Pt is having blurred vision in his left eye.

## 2025-03-17 NOTE — ED PROVIDER NOTES
History     Chief Complaint   Patient presents with    Hypertension     The history is provided by the patient.     James Grant is a 89 year old male who presented to the emergency department via EMS for evaluation of an episode of hypertension.  No other concerns.  He reports taking his night blood pressure medication just prior to leaving via EMS.  No headaches or chest pain.  Multiple admissions recently for similar.    Allergies:  Allergies   Allergen Reactions    Lisinopril Cough    Morphine Other (See Comments) and Visual Disturbance     confusion       Problem List:    Patient Active Problem List    Diagnosis Date Noted    Dizziness 02/28/2025     Priority: Medium    Acute nonintractable headache, unspecified headache type 02/28/2025     Priority: Medium    Headache 02/28/2025     Priority: Medium    Nursing Home Visit 11/27/2023     Priority: Medium    Closed fracture of left hip, initial encounter (H) 11/08/2023     Priority: Medium    Colostomy present (H) 11/08/2023     Priority: Medium    Femur fracture, left (H) 11/08/2023     Priority: Medium    Febrile illness 03/26/2022     Priority: Medium    Urinary tract infection without hematuria, site unspecified 03/26/2022     Priority: Medium    Diverticulitis of large intestine with perforation and abscess 07/27/2021     Priority: Medium    Sigmoid diverticulitis 07/15/2021     Priority: Medium    Diverticulitis of large intestine with abscess 07/15/2021     Priority: Medium    Abdominal pain, right lower quadrant 07/15/2021     Priority: Medium     Added automatically from request for surgery [id number]      TIA (transient ischemic attack) 11/30/2020     Priority: Medium    Malignant neoplasm of overlapping sites of bladder (H) 11/22/2019     Priority: Medium    History of bladder cancer 10/28/2019     Priority: Medium    Venous stasis dermatitis of both lower extremities 03/16/2017     Priority: Medium    ACP (advance care planning) 06/06/2016      Priority: Medium     Advance Care Planning 6/6/2016: ACP Review of Chart / Resources Provided:  Reviewed chart for advance care plan.  James Grant has no plan or code status on file. Discussed available resources and provided with information. Confirmed code status reflects current choices pending further ACP discussions.  Confirmed/documented legally designated decision makers.  Added by Yani Anderson            BPH (benign prostatic hyperplasia) 01/08/2014     Priority: Medium    SNHL (sensorineural hearing loss) 07/17/2013     Priority: Medium    ETD (eustachian tube dysfunction) 07/17/2013     Priority: Medium    Dyslipidemia 11/26/2010     Priority: Medium    GERD (gastroesophageal reflux disease) 11/26/2010     Priority: Medium    CHD (coronary heart disease) 11/26/2010     Priority: Medium     07/2010 S/P inferior wall MI  Angioplasty and stenting X 2 RCA  08/2010 angioplasty and stenting (LIDIA) LAD      Essential hypertension, benign 11/08/2010     Priority: Medium     Overview:   IMO Update 10/11      Coronary atherosclerosis of native coronary artery 11/08/2010     Priority: Medium     Formatting of this note might be different from the original.  IMO Update 10/11          Past Medical History:    Past Medical History:   Diagnosis Date    Benign localized hyperplasia of prostate without urinary obstruction and other lower urinary tract symptoms (LUTS) 11/26/2010    CHD (coronary heart disease) 11/26/2010    Colostomy present (H) 11/08/2023    Dyslipidemia 11/26/2010    GERD (gastroesophageal reflux disease) 11/26/2010    HTN (hypertension) 11/26/2010    Old myocardial infarction 11/26/2010    Other symptoms involving digestive system(787.99) 10/20/2006       Past Surgical History:    Past Surgical History:   Procedure Laterality Date    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      CHOLECYSTECTOMY      CYSTOSCOPY, FULGURATE BLEEDERS, EVACUATE CLOT(S), COMBINED N/A 2/17/2020    Procedure: Clot Evacuation;   Surgeon: Andrzej Olivia MD;  Location: [facility]    CYSTOSCOPY, RETROGRADES, COMBINED Bilateral 10/22/2019    Procedure: Bilateral Retrograde Pyelograms;  Surgeon: Andrzej Olivia MD;  Location: [facility]    CYSTOSCOPY, TRANSURETHRAL RESECTION (TUR) TUMOR BLADDER, COMBINED N/A 10/22/2019    Procedure: Transurethral Resection of Bladder Tumor and transurethral resection of prostate and bladder stone removal;  Surgeon: Andrzej Olivia MD;  Location: [facility] OR    HERNIA REPAIR      LAPAROTOMY EXPLORATORY N/A 2021    Procedure: Exploratory  laparotomy with sigmoid colectomy and creation of colostomy, appendectomy;  Surgeon: Sukumar Chavez MD;  Location: [facility] OR    left arm surgery         Family History:    Family History   Problem Relation Age of Onset    Heart Failure Mother 86        Congestive - Cause of Death    Cerebrovascular Disease Father     C.A.D. Sister 91       Social History:  Marital Status:   [5]  Social History     Tobacco Use    Smoking status: Former     Current packs/day: 0.00     Average packs/day: 1 pack/day for 13.6 years (13.6 ttl pk-yrs)     Types: Cigarettes     Start date: 1949     Quit date: 1962     Years since quittin.6    Smokeless tobacco: Never   Vaping Use    Vaping status: Never Used   Substance Use Topics    Alcohol use: Not Currently     Comment: 3 Drinks (BEER & LIQUOR) ~ OCCASIONALLY (QUIT IN )    Drug use: No        Medications:    aspirin (ASA) 325 MG EC tablet  famotidine (PEPCID) 20 MG tablet  losartan (COZAAR) 25 MG tablet  metoprolol succinate ER (TOPROL XL) 25 MG 24 hr tablet  simvastatin (ZOCOR) 20 MG tablet  fluticasone (FLONASE) 50 MCG/ACT nasal spray  nitroGLYcerin (NITROSTAT) 0.4 MG sublingual tablet          Review of Systems   Cardiovascular:  Negative for chest pain.   Gastrointestinal:  Negative for abdominal pain.   Neurological:  Negative for dizziness and headaches.       Physical Exam   BP: (!) 191/95  Pulse: 74  Temp: 98.9  F (37.2   C)  Resp: 16  SpO2: 95 %      Physical Exam  Vitals and nursing note reviewed.   Constitutional:       General: He is not in acute distress.     Appearance: Normal appearance. He is not ill-appearing, toxic-appearing or diaphoretic.      Comments: Pleasant and talkative 89-year-old male found in no distress   Cardiovascular:      Rate and Rhythm: Normal rate and regular rhythm.   Pulmonary:      Effort: Pulmonary effort is normal.   Skin:     General: Skin is warm and dry.      Capillary Refill: Capillary refill takes less than 2 seconds.   Neurological:      General: No focal deficit present.      Mental Status: He is alert and oriented to person, place, and time.         ED Course        Procedures              Critical Care time:  none     None         No results found for this or any previous visit (from the past 24 hours).    Medications - No data to display    Assessments & Plan (with Medical Decision Making)   89-year-old male who had an episode of elevated blood pressure at a local assisted living and took his night blood pressure medication prior to coming to the emergency department.  On arrival his blood pressure was improving.  He has no evidence or concerning features of endorgan damage.  There is no reasonable indication for emergent laboratory evaluation or imaging.  Long and exhaustive conversation with daughter and patient.  Blood pressure within reasonable limits after brief observation in the emergency department.  He can be safely discharged with clinic follow-up.    This document was prepared using a combination of typing and voice generated software.  While every attempt was made for accuracy, spelling and grammatical errors may exist.     I have reviewed the nursing notes.    I have reviewed the findings, diagnosis, plan and need for follow up with the patient.           Medical Decision Making  The patient's presentation was of moderate complexity (a chronic illness mild to moderate  exacerbation, progression, or side effect of treatment).    The patient's evaluation involved:  review of 3+ test result(s) ordered prior to this encounter (multiple labs and images from previous visits)    The patient's management necessitated only low risk treatment.        Discharge Medication List as of 3/16/2025  9:38 PM          Final diagnoses:   Episode of hypertension       3/16/2025   [facility]       Clarke Atkins PA-C  03/16/25 [id number]

## 2025-03-17 NOTE — TELEPHONE ENCOUNTER
Symptom or reason needing to speak to RN: Daughter, Shannan Hand (NO Consent to communicate signed on file) wants to schedule an ER follow up. Daughter wants to speak with nurse re: something that was said at ER. Daughter didn't elaborate on what specifically she wants to talk about.     Best number to return call: [phone]      Best time to return call: Anytime

## 2025-03-18 RX ORDER — LOSARTAN POTASSIUM 25 MG/1
TABLET ORAL
Qty: 90 TABLET | Refills: 3 | Status: SHIPPED | OUTPATIENT
Start: 2025-03-18

## 2025-03-18 NOTE — TELEPHONE ENCOUNTER
Angiotensin-II Receptors [id number]/17/2025 03:25 PM   Protocol Details Most recent blood pressure under 140/90 in past 12 months       BP Readings from Last 3 Encounters:   03/16/25 (!) 161/81   03/07/25 130/70   03/01/25 (!) 180/94

## 2025-03-29 ENCOUNTER — HEALTH MAINTENANCE LETTER (OUTPATIENT)
Age: OVER 89
End: 2025-03-29

## 2025-06-23 ENCOUNTER — ANESTHESIA EVENT (OUTPATIENT)
Dept: SURGERY | Facility: HOSPITAL | Age: OVER 89
End: 2025-06-23
Payer: MEDICARE

## 2025-06-23 ENCOUNTER — ANESTHESIA (OUTPATIENT)
Dept: SURGERY | Facility: HOSPITAL | Age: OVER 89
End: 2025-06-23
Payer: MEDICARE

## 2025-06-23 ENCOUNTER — HOSPITAL ENCOUNTER (INPATIENT)
Facility: HOSPITAL | Age: OVER 89
End: 2025-06-23
Attending: PHYSICIAN ASSISTANT | Admitting: SURGERY
Payer: MEDICARE

## 2025-06-23 ENCOUNTER — APPOINTMENT (OUTPATIENT)
Dept: CT IMAGING | Facility: HOSPITAL | Age: OVER 89
End: 2025-06-23
Attending: PHYSICIAN ASSISTANT
Payer: MEDICARE

## 2025-06-23 ENCOUNTER — APPOINTMENT (OUTPATIENT)
Dept: ULTRASOUND IMAGING | Facility: HOSPITAL | Age: OVER 89
End: 2025-06-23
Attending: NURSE ANESTHETIST, CERTIFIED REGISTERED
Payer: MEDICARE

## 2025-06-23 ENCOUNTER — APPOINTMENT (OUTPATIENT)
Dept: GENERAL RADIOLOGY | Facility: HOSPITAL | Age: OVER 89
End: 2025-06-23
Attending: STUDENT IN AN ORGANIZED HEALTH CARE EDUCATION/TRAINING PROGRAM
Payer: MEDICARE

## 2025-06-23 DIAGNOSIS — K56.609 SMALL BOWEL OBSTRUCTION (H): ICD-10-CM

## 2025-06-23 LAB
ABO + RH BLD: NORMAL
ALBUMIN SERPL BCG-MCNC: 4.3 G/DL (ref 3.5–5.2)
ALP SERPL-CCNC: 66 U/L (ref 40–150)
ALT SERPL W P-5'-P-CCNC: 20 U/L (ref 0–70)
ANION GAP SERPL CALCULATED.3IONS-SCNC: 13 MMOL/L (ref 7–15)
AST SERPL W P-5'-P-CCNC: 24 U/L (ref 0–45)
BASOPHILS # BLD AUTO: 0 10E3/UL (ref 0–0.2)
BASOPHILS NFR BLD AUTO: 0 %
BILIRUB SERPL-MCNC: 0.6 MG/DL
BLD GP AB SCN SERPL QL: NEGATIVE
BUN SERPL-MCNC: 17.7 MG/DL (ref 8–23)
CALCIUM SERPL-MCNC: 9.6 MG/DL (ref 8.8–10.4)
CHLORIDE SERPL-SCNC: 101 MMOL/L (ref 98–107)
CREAT SERPL-MCNC: 0.79 MG/DL (ref 0.67–1.17)
EGFRCR SERPLBLD CKD-EPI 2021: 80 ML/MIN/1.73M2
EOSINOPHIL # BLD AUTO: 0.1 10E3/UL (ref 0–0.7)
EOSINOPHIL NFR BLD AUTO: 1 %
ERYTHROCYTE [DISTWIDTH] IN BLOOD BY AUTOMATED COUNT: 13.2 % (ref 10–15)
GLUCOSE SERPL-MCNC: 116 MG/DL (ref 70–99)
HCO3 SERPL-SCNC: 25 MMOL/L (ref 22–29)
HCT VFR BLD AUTO: 46.1 % (ref 40–53)
HGB BLD-MCNC: 15.5 G/DL (ref 13.3–17.7)
HOLD SPECIMEN: NORMAL
IMM GRANULOCYTES # BLD: 0 10E3/UL
IMM GRANULOCYTES NFR BLD: 0 %
LYMPHOCYTES # BLD AUTO: 2.9 10E3/UL (ref 0.8–5.3)
LYMPHOCYTES NFR BLD AUTO: 25 %
MCH RBC QN AUTO: 30.5 PG (ref 26.5–33)
MCHC RBC AUTO-ENTMCNC: 33.6 G/DL (ref 31.5–36.5)
MCV RBC AUTO: 91 FL (ref 78–100)
MONOCYTES # BLD AUTO: 1.2 10E3/UL (ref 0–1.3)
MONOCYTES NFR BLD AUTO: 11 %
NEUTROPHILS # BLD AUTO: 7.2 10E3/UL (ref 1.6–8.3)
NEUTROPHILS NFR BLD AUTO: 63 %
NRBC # BLD AUTO: 0 10E3/UL
NRBC BLD AUTO-RTO: 0 /100
PLATELET # BLD AUTO: 252 10E3/UL (ref 150–450)
POTASSIUM SERPL-SCNC: 4.3 MMOL/L (ref 3.4–5.3)
PROT SERPL-MCNC: 7.7 G/DL (ref 6.4–8.3)
RBC # BLD AUTO: 5.08 10E6/UL (ref 4.4–5.9)
SODIUM SERPL-SCNC: 139 MMOL/L (ref 135–145)
SPECIMEN EXP DATE BLD: NORMAL
WBC # BLD AUTO: 11.6 10E3/UL (ref 4–11)

## 2025-06-23 PROCEDURE — 250N000011 HC RX IP 250 OP 636: Performed by: SURGERY

## 2025-06-23 PROCEDURE — 360N000076 HC SURGERY LEVEL 3, PER MIN: Performed by: SURGERY

## 2025-06-23 PROCEDURE — 200N000001 HC R&B ICU

## 2025-06-23 PROCEDURE — 370N000017 HC ANESTHESIA TECHNICAL FEE, PER MIN: Performed by: SURGERY

## 2025-06-23 PROCEDURE — 96374 THER/PROPH/DIAG INJ IV PUSH: CPT

## 2025-06-23 PROCEDURE — 99285 EMERGENCY DEPT VISIT HI MDM: CPT | Mod: 25

## 2025-06-23 PROCEDURE — 250N000009 HC RX 250: Performed by: NURSE ANESTHETIST, CERTIFIED REGISTERED

## 2025-06-23 PROCEDURE — 36415 COLL VENOUS BLD VENIPUNCTURE: CPT | Performed by: PHYSICIAN ASSISTANT

## 2025-06-23 PROCEDURE — 74019 RADEX ABDOMEN 2 VIEWS: CPT

## 2025-06-23 PROCEDURE — 250N000011 HC RX IP 250 OP 636: Performed by: PHYSICIAN ASSISTANT

## 2025-06-23 PROCEDURE — 74177 CT ABD & PELVIS W/CONTRAST: CPT | Mod: 26 | Performed by: RADIOLOGY

## 2025-06-23 PROCEDURE — 74019 RADEX ABDOMEN 2 VIEWS: CPT | Mod: 26 | Performed by: RADIOLOGY

## 2025-06-23 PROCEDURE — 258N000003 HC RX IP 258 OP 636: Performed by: NURSE ANESTHETIST, CERTIFIED REGISTERED

## 2025-06-23 PROCEDURE — 0DN80ZZ RELEASE SMALL INTESTINE, OPEN APPROACH: ICD-10-PCS | Performed by: SURGERY

## 2025-06-23 PROCEDURE — 86901 BLOOD TYPING SEROLOGIC RH(D): CPT | Performed by: NURSE ANESTHETIST, CERTIFIED REGISTERED

## 2025-06-23 PROCEDURE — 99285 EMERGENCY DEPT VISIT HI MDM: CPT | Performed by: PHYSICIAN ASSISTANT

## 2025-06-23 PROCEDURE — 85025 COMPLETE CBC W/AUTO DIFF WBC: CPT | Performed by: PHYSICIAN ASSISTANT

## 2025-06-23 PROCEDURE — 272N000001 HC OR GENERAL SUPPLY STERILE: Performed by: SURGERY

## 2025-06-23 PROCEDURE — 0WQF0ZZ REPAIR ABDOMINAL WALL, OPEN APPROACH: ICD-10-PCS | Performed by: SURGERY

## 2025-06-23 PROCEDURE — 80053 COMPREHEN METABOLIC PANEL: CPT | Performed by: PHYSICIAN ASSISTANT

## 2025-06-23 PROCEDURE — 250N000011 HC RX IP 250 OP 636: Performed by: NURSE ANESTHETIST, CERTIFIED REGISTERED

## 2025-06-23 PROCEDURE — 49621 RPR PARASTOMAL HERNIA RDC: CPT | Performed by: SURGERY

## 2025-06-23 PROCEDURE — 74177 CT ABD & PELVIS W/CONTRAST: CPT

## 2025-06-23 PROCEDURE — 250N000024 HC ISOFLURANE, PER MIN: Performed by: SURGERY

## 2025-06-23 RX ORDER — CEFAZOLIN SODIUM 2 G/50ML
2 SOLUTION INTRAVENOUS SEE ADMIN INSTRUCTIONS
Status: DISCONTINUED | OUTPATIENT
Start: 2025-06-23 | End: 2025-06-24

## 2025-06-23 RX ORDER — LABETALOL HYDROCHLORIDE 5 MG/ML
10 INJECTION, SOLUTION INTRAVENOUS
Status: DISCONTINUED | OUTPATIENT
Start: 2025-06-23 | End: 2025-06-23 | Stop reason: HOSPADM

## 2025-06-23 RX ORDER — CEFAZOLIN SODIUM 2 G/50ML
2 SOLUTION INTRAVENOUS
Status: COMPLETED | OUTPATIENT
Start: 2025-06-23 | End: 2025-06-23

## 2025-06-23 RX ORDER — ONDANSETRON 2 MG/ML
4 INJECTION INTRAMUSCULAR; INTRAVENOUS EVERY 30 MIN PRN
Status: DISCONTINUED | OUTPATIENT
Start: 2025-06-23 | End: 2025-06-23 | Stop reason: HOSPADM

## 2025-06-23 RX ORDER — FENTANYL CITRATE 50 UG/ML
50 INJECTION, SOLUTION INTRAMUSCULAR; INTRAVENOUS ONCE
Refills: 0 | Status: COMPLETED | OUTPATIENT
Start: 2025-06-23 | End: 2025-06-23

## 2025-06-23 RX ORDER — SODIUM CHLORIDE, SODIUM LACTATE, POTASSIUM CHLORIDE, CALCIUM CHLORIDE 600; 310; 30; 20 MG/100ML; MG/100ML; MG/100ML; MG/100ML
INJECTION, SOLUTION INTRAVENOUS CONTINUOUS
Status: DISCONTINUED | OUTPATIENT
Start: 2025-06-23 | End: 2025-06-23 | Stop reason: HOSPADM

## 2025-06-23 RX ORDER — DEXAMETHASONE SODIUM PHOSPHATE 4 MG/ML
INJECTION, SOLUTION INTRA-ARTICULAR; INTRALESIONAL; INTRAMUSCULAR; INTRAVENOUS; SOFT TISSUE PRN
Status: DISCONTINUED | OUTPATIENT
Start: 2025-06-23 | End: 2025-06-24

## 2025-06-23 RX ORDER — HYDROMORPHONE HYDROCHLORIDE 1 MG/ML
0.2 INJECTION, SOLUTION INTRAMUSCULAR; INTRAVENOUS; SUBCUTANEOUS EVERY 5 MIN PRN
Status: DISCONTINUED | OUTPATIENT
Start: 2025-06-23 | End: 2025-06-23 | Stop reason: HOSPADM

## 2025-06-23 RX ORDER — NALOXONE HYDROCHLORIDE 0.4 MG/ML
0.1 INJECTION, SOLUTION INTRAMUSCULAR; INTRAVENOUS; SUBCUTANEOUS
Status: DISCONTINUED | OUTPATIENT
Start: 2025-06-23 | End: 2025-06-23 | Stop reason: HOSPADM

## 2025-06-23 RX ORDER — FENTANYL CITRATE 50 UG/ML
INJECTION, SOLUTION INTRAMUSCULAR; INTRAVENOUS PRN
Status: DISCONTINUED | OUTPATIENT
Start: 2025-06-23 | End: 2025-06-24

## 2025-06-23 RX ORDER — HYDROMORPHONE HYDROCHLORIDE 1 MG/ML
0.4 INJECTION, SOLUTION INTRAMUSCULAR; INTRAVENOUS; SUBCUTANEOUS EVERY 5 MIN PRN
Status: DISCONTINUED | OUTPATIENT
Start: 2025-06-23 | End: 2025-06-23 | Stop reason: HOSPADM

## 2025-06-23 RX ORDER — LIDOCAINE 40 MG/G
CREAM TOPICAL
Status: DISCONTINUED | OUTPATIENT
Start: 2025-06-23 | End: 2025-06-24

## 2025-06-23 RX ORDER — DEXAMETHASONE SODIUM PHOSPHATE 10 MG/ML
4 INJECTION, SOLUTION INTRAMUSCULAR; INTRAVENOUS
Status: DISCONTINUED | OUTPATIENT
Start: 2025-06-23 | End: 2025-06-23 | Stop reason: HOSPADM

## 2025-06-23 RX ORDER — ALBUTEROL SULFATE 0.83 MG/ML
2.5 SOLUTION RESPIRATORY (INHALATION) EVERY 4 HOURS PRN
Status: DISCONTINUED | OUTPATIENT
Start: 2025-06-23 | End: 2025-06-23 | Stop reason: HOSPADM

## 2025-06-23 RX ORDER — BUPIVACAINE HYDROCHLORIDE 2.5 MG/ML
INJECTION, SOLUTION EPIDURAL; INFILTRATION; INTRACAUDAL; PERINEURAL
Status: COMPLETED | OUTPATIENT
Start: 2025-06-23 | End: 2025-06-23

## 2025-06-23 RX ORDER — HYDRALAZINE HYDROCHLORIDE 20 MG/ML
2.5-5 INJECTION INTRAMUSCULAR; INTRAVENOUS EVERY 10 MIN PRN
Status: DISCONTINUED | OUTPATIENT
Start: 2025-06-23 | End: 2025-06-23 | Stop reason: HOSPADM

## 2025-06-23 RX ORDER — ONDANSETRON 2 MG/ML
INJECTION INTRAMUSCULAR; INTRAVENOUS PRN
Status: DISCONTINUED | OUTPATIENT
Start: 2025-06-23 | End: 2025-06-24

## 2025-06-23 RX ORDER — FENTANYL CITRATE 50 UG/ML
25 INJECTION, SOLUTION INTRAMUSCULAR; INTRAVENOUS EVERY 5 MIN PRN
Status: DISCONTINUED | OUTPATIENT
Start: 2025-06-23 | End: 2025-06-23 | Stop reason: HOSPADM

## 2025-06-23 RX ORDER — IOPAMIDOL 755 MG/ML
102 INJECTION, SOLUTION INTRAVASCULAR ONCE
Status: COMPLETED | OUTPATIENT
Start: 2025-06-23 | End: 2025-06-23

## 2025-06-23 RX ORDER — KETAMINE HYDROCHLORIDE 10 MG/ML
INJECTION INTRAMUSCULAR; INTRAVENOUS PRN
Status: DISCONTINUED | OUTPATIENT
Start: 2025-06-23 | End: 2025-06-24

## 2025-06-23 RX ORDER — FENTANYL CITRATE 50 UG/ML
50 INJECTION, SOLUTION INTRAMUSCULAR; INTRAVENOUS EVERY 5 MIN PRN
Status: DISCONTINUED | OUTPATIENT
Start: 2025-06-23 | End: 2025-06-23 | Stop reason: HOSPADM

## 2025-06-23 RX ORDER — METRONIDAZOLE 500 MG/100ML
INJECTION, SOLUTION INTRAVENOUS
Status: DISCONTINUED
Start: 2025-06-23 | End: 2025-06-24 | Stop reason: HOSPADM

## 2025-06-23 RX ORDER — LABETALOL 20 MG/4 ML (5 MG/ML) INTRAVENOUS SYRINGE
PRN
Status: DISCONTINUED | OUTPATIENT
Start: 2025-06-23 | End: 2025-06-24

## 2025-06-23 RX ORDER — ONDANSETRON 4 MG/1
4 TABLET, ORALLY DISINTEGRATING ORAL EVERY 30 MIN PRN
Status: DISCONTINUED | OUTPATIENT
Start: 2025-06-23 | End: 2025-06-23 | Stop reason: HOSPADM

## 2025-06-23 RX ORDER — SODIUM CHLORIDE, SODIUM LACTATE, POTASSIUM CHLORIDE, CALCIUM CHLORIDE 600; 310; 30; 20 MG/100ML; MG/100ML; MG/100ML; MG/100ML
INJECTION, SOLUTION INTRAVENOUS CONTINUOUS
Status: DISCONTINUED | OUTPATIENT
Start: 2025-06-23 | End: 2025-06-25

## 2025-06-23 RX ORDER — EPHEDRINE SULFATE 50 MG/ML
INJECTION, SOLUTION INTRAMUSCULAR; INTRAVENOUS; SUBCUTANEOUS PRN
Status: DISCONTINUED | OUTPATIENT
Start: 2025-06-23 | End: 2025-06-24

## 2025-06-23 RX ORDER — SODIUM CHLORIDE, SODIUM LACTATE, POTASSIUM CHLORIDE, CALCIUM CHLORIDE 600; 310; 30; 20 MG/100ML; MG/100ML; MG/100ML; MG/100ML
INJECTION, SOLUTION INTRAVENOUS CONTINUOUS PRN
Status: DISCONTINUED | OUTPATIENT
Start: 2025-06-23 | End: 2025-06-24

## 2025-06-23 RX ORDER — METRONIDAZOLE 500 MG/100ML
500 INJECTION, SOLUTION INTRAVENOUS
Status: COMPLETED | OUTPATIENT
Start: 2025-06-23 | End: 2025-06-23

## 2025-06-23 RX ORDER — PROPOFOL 10 MG/ML
INJECTION, EMULSION INTRAVENOUS PRN
Status: DISCONTINUED | OUTPATIENT
Start: 2025-06-23 | End: 2025-06-24

## 2025-06-23 RX ADMIN — BUPIVACAINE HYDROCHLORIDE 15 ML: 2.5 INJECTION, SOLUTION EPIDURAL; INFILTRATION; INTRACAUDAL at 23:18

## 2025-06-23 RX ADMIN — Medication 10 MG: at 22:40

## 2025-06-23 RX ADMIN — FENTANYL CITRATE 50 MCG: 50 INJECTION INTRAMUSCULAR; INTRAVENOUS at 21:43

## 2025-06-23 RX ADMIN — IOPAMIDOL 102 ML: 755 INJECTION, SOLUTION INTRAVENOUS at 18:50

## 2025-06-23 RX ADMIN — BUPIVACAINE 10 ML: 13.3 INJECTION, SUSPENSION, LIPOSOMAL INFILTRATION at 23:18

## 2025-06-23 RX ADMIN — PHENYLEPHRINE HYDROCHLORIDE 150 MCG: 10 INJECTION INTRAVENOUS at 21:57

## 2025-06-23 RX ADMIN — LABETALOL 20 MG/4 ML (5 MG/ML) INTRAVENOUS SYRINGE 5 MG: at 23:10

## 2025-06-23 RX ADMIN — Medication 25 MG: at 21:43

## 2025-06-23 RX ADMIN — SODIUM CHLORIDE, SODIUM LACTATE, POTASSIUM CHLORIDE, AND CALCIUM CHLORIDE: .6; .31; .03; .02 INJECTION, SOLUTION INTRAVENOUS at 21:38

## 2025-06-23 RX ADMIN — PROPOFOL 20 MG: 10 INJECTION, EMULSION INTRAVENOUS at 22:51

## 2025-06-23 RX ADMIN — LABETALOL 20 MG/4 ML (5 MG/ML) INTRAVENOUS SYRINGE 5 MG: at 23:39

## 2025-06-23 RX ADMIN — SODIUM CHLORIDE, SODIUM LACTATE, POTASSIUM CHLORIDE, AND CALCIUM CHLORIDE: .6; .31; .03; .02 INJECTION, SOLUTION INTRAVENOUS at 21:46

## 2025-06-23 RX ADMIN — PROPOFOL 80 MG: 10 INJECTION, EMULSION INTRAVENOUS at 21:43

## 2025-06-23 RX ADMIN — BUPIVACAINE 10 ML: 13.3 INJECTION, SUSPENSION, LIPOSOMAL INFILTRATION at 23:23

## 2025-06-23 RX ADMIN — SODIUM CHLORIDE, SODIUM LACTATE, POTASSIUM CHLORIDE, AND CALCIUM CHLORIDE: .6; .31; .03; .02 INJECTION, SOLUTION INTRAVENOUS at 21:24

## 2025-06-23 RX ADMIN — LABETALOL 20 MG/4 ML (5 MG/ML) INTRAVENOUS SYRINGE 5 MG: at 23:34

## 2025-06-23 RX ADMIN — FENTANYL CITRATE 25 MCG: 50 INJECTION INTRAMUSCULAR; INTRAVENOUS at 23:09

## 2025-06-23 RX ADMIN — FENTANYL CITRATE 50 MCG: 50 INJECTION INTRAMUSCULAR; INTRAVENOUS at 22:18

## 2025-06-23 RX ADMIN — FENTANYL CITRATE 25 MCG: 50 INJECTION INTRAMUSCULAR; INTRAVENOUS at 22:15

## 2025-06-23 RX ADMIN — FENTANYL CITRATE 50 MCG: 50 INJECTION INTRAMUSCULAR; INTRAVENOUS at 21:40

## 2025-06-23 RX ADMIN — ROCURONIUM BROMIDE 50 MG: 10 INJECTION INTRAVENOUS at 21:50

## 2025-06-23 RX ADMIN — Medication 200 MG: at 23:13

## 2025-06-23 RX ADMIN — ROCURONIUM BROMIDE 20 MG: 10 INJECTION INTRAVENOUS at 22:51

## 2025-06-23 RX ADMIN — BUPIVACAINE HYDROCHLORIDE 15 ML: 2.5 INJECTION, SOLUTION EPIDURAL; INFILTRATION; INTRACAUDAL at 23:23

## 2025-06-23 RX ADMIN — FENTANYL CITRATE 25 MCG: 50 INJECTION INTRAMUSCULAR; INTRAVENOUS at 22:12

## 2025-06-23 RX ADMIN — METRONIDAZOLE 500 MG: 500 INJECTION, SOLUTION INTRAVENOUS at 22:11

## 2025-06-23 RX ADMIN — DEXAMETHASONE SODIUM PHOSPHATE 6 MG: 4 INJECTION, SOLUTION INTRA-ARTICULAR; INTRALESIONAL; INTRAMUSCULAR; INTRAVENOUS; SOFT TISSUE at 22:46

## 2025-06-23 RX ADMIN — FENTANYL CITRATE 50 MCG: 50 INJECTION INTRAMUSCULAR; INTRAVENOUS at 18:12

## 2025-06-23 RX ADMIN — Medication 100 MG: at 21:43

## 2025-06-23 RX ADMIN — ONDANSETRON 4 MG: 2 INJECTION INTRAMUSCULAR; INTRAVENOUS at 22:48

## 2025-06-23 RX ADMIN — DEXMEDETOMIDINE HYDROCHLORIDE 8 MCG: 100 INJECTION, SOLUTION INTRAVENOUS at 22:30

## 2025-06-23 RX ADMIN — PROPOFOL 20 MG: 10 INJECTION, EMULSION INTRAVENOUS at 22:52

## 2025-06-23 RX ADMIN — DEXMEDETOMIDINE HYDROCHLORIDE 8 MCG: 100 INJECTION, SOLUTION INTRAVENOUS at 22:24

## 2025-06-23 RX ADMIN — PROPOFOL 20 MG: 10 INJECTION, EMULSION INTRAVENOUS at 21:46

## 2025-06-23 RX ADMIN — CEFAZOLIN SODIUM 2 G: 2 SOLUTION INTRAVENOUS at 21:30

## 2025-06-23 RX ADMIN — PHENYLEPHRINE HYDROCHLORIDE 100 MCG: 10 INJECTION INTRAVENOUS at 21:55

## 2025-06-23 ASSESSMENT — ENCOUNTER SYMPTOMS: ROS GI COMMENTS: SEE HPI

## 2025-06-23 ASSESSMENT — COLUMBIA-SUICIDE SEVERITY RATING SCALE - C-SSRS
2. HAVE YOU ACTUALLY HAD ANY THOUGHTS OF KILLING YOURSELF IN THE PAST MONTH?: NO
1. IN THE PAST MONTH, HAVE YOU WISHED YOU WERE DEAD OR WISHED YOU COULD GO TO SLEEP AND NOT WAKE UP?: NO
6. HAVE YOU EVER DONE ANYTHING, STARTED TO DO ANYTHING, OR PREPARED TO DO ANYTHING TO END YOUR LIFE?: NO

## 2025-06-23 ASSESSMENT — ACTIVITIES OF DAILY LIVING (ADL)
ADLS_ACUITY_SCORE: 57

## 2025-06-23 NOTE — ED TRIAGE NOTES
"Patient states he woke up today and his stomach was \"Talking to him\" States he took some Miralax because he thought he was constipated. States it did not help. He has an ostomy and has not had out put in 2 days. He is passing gas out of his bag.     Triage Assessment (Adult)       Row Name 06/23/25 4845          Triage Assessment    Airway WDL WDL        Respiratory WDL    Respiratory WDL WDL        Skin Circulation/Temperature WDL    Skin Circulation/Temperature WDL WDL        Cardiac WDL    Cardiac WDL WDL        Peripheral/Neurovascular WDL    Peripheral Neurovascular WDL WDL        Cognitive/Neuro/Behavioral WDL    Cognitive/Neuro/Behavioral WDL WDL                     "

## 2025-06-23 NOTE — ED PROVIDER NOTES
History     Chief Complaint   Patient presents with    Abdominal Pain    Constipation     The history is provided by the patient.     James Grant is a 89 year old male who presented to the emergency department ambulatory for evaluation of a 2-day history of abdominal pain.  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  Reports he has had no output from his colostomy in the last 2 days however he is passing gas through the colostomy.  No vomiting.  No fevers.    Allergies:  Allergies   Allergen Reactions    Lisinopril Cough    Morphine Other (See Comments) and Visual Disturbance     confusion       Problem List:    Patient Active Problem List    Diagnosis Date Noted    Small bowel obstruction (H) 06/23/2025     Priority: Medium    Dizziness 02/28/2025     Priority: Medium    Acute nonintractable headache, unspecified headache type 02/28/2025     Priority: Medium    Headache 02/28/2025     Priority: Medium    Nursing Home Visit 11/27/2023     Priority: Medium    Closed fracture of left hip, initial encounter (H) 11/08/2023     Priority: Medium    Colostomy present (H) 11/08/2023     Priority: Medium    Femur fracture, left (H) 11/08/2023     Priority: Medium    Febrile illness 03/26/2022     Priority: Medium    Urinary tract infection without hematuria, site unspecified 03/26/2022     Priority: Medium    Diverticulitis of large intestine with perforation and abscess 07/27/2021     Priority: Medium    Sigmoid diverticulitis 07/15/2021     Priority: Medium    Diverticulitis of large intestine with abscess 07/15/2021     Priority: Medium    Abdominal pain, right lower quadrant 07/15/2021     Priority: Medium     Added automatically from request for surgery [id number]      TIA (transient ischemic attack) 11/30/2020     Priority: Medium    Malignant neoplasm of overlapping sites of bladder (H) 11/22/2019     Priority: Medium    History of bladder cancer  10/28/2019     Priority: Medium    Venous stasis dermatitis of both lower extremities 03/16/2017     Priority: Medium    ACP (advance care planning) 06/06/2016     Priority: Medium     Advance Care Planning 6/6/2016: ACP Review of Chart / Resources Provided:  Reviewed chart for advance care plan.  James Grant has no plan or code status on file. Discussed available resources and provided with information. Confirmed code status reflects current choices pending further ACP discussions.  Confirmed/documented legally designated decision makers.  Added by Yani Anderson            BPH (benign prostatic hyperplasia) 01/08/2014     Priority: Medium    SNHL (sensorineural hearing loss) 07/17/2013     Priority: Medium    ETD (eustachian tube dysfunction) 07/17/2013     Priority: Medium    Dyslipidemia 11/26/2010     Priority: Medium    GERD (gastroesophageal reflux disease) 11/26/2010     Priority: Medium    CHD (coronary heart disease) 11/26/2010     Priority: Medium     07/2010 S/P inferior wall MI  Angioplasty and stenting X 2 RCA  08/2010 angioplasty and stenting (LIDIA) LAD      Essential hypertension, benign 11/08/2010     Priority: Medium     Overview:   IMO Update 10/11      Coronary atherosclerosis of native coronary artery 11/08/2010     Priority: Medium     Formatting of this note might be different from the original.  IMO Update 10/11          Past Medical History:    Past Medical History:   Diagnosis Date    Benign localized hyperplasia of prostate without urinary obstruction and other lower urinary tract symptoms (LUTS) 11/26/2010    CHD (coronary heart disease) 11/26/2010    Colostomy present (H) 11/08/2023    Dyslipidemia 11/26/2010    GERD (gastroesophageal reflux disease) 11/26/2010    HTN (hypertension) 11/26/2010    Old myocardial infarction 11/26/2010    Other symptoms involving digestive system(787.99) 10/20/2006       Past Surgical History:    Past Surgical History:   Procedure Laterality Date    2  finger amputation > LEFT      CHOLECYSTECTOMY      CYSTOSCOPY, FULGURATE BLEEDERS, EVACUATE CLOT(S), COMBINED N/A 2020    Procedure: Clot Evacuation;  Surgeon: Andrzej Olivia MD;  Location: [facility]    CYSTOSCOPY, RETROGRADES, COMBINED Bilateral 10/22/2019    Procedure: Bilateral Retrograde Pyelograms;  Surgeon: Andrzej Olivia MD;  Location: [facility]    CYSTOSCOPY, TRANSURETHRAL RESECTION (TUR) TUMOR BLADDER, COMBINED N/A 10/22/2019    Procedure: Transurethral Resection of Bladder Tumor and transurethral resection of prostate and bladder stone removal;  Surgeon: Andrzej Olivia MD;  Location: [facility]    HERNIA REPAIR      LAPAROTOMY EXPLORATORY N/A 2021    Procedure: Exploratory  laparotomy with sigmoid colectomy and creation of colostomy, appendectomy;  Surgeon: Sukumar Chavez MD;  Location: [facility]    left arm surgery         Family History:    Family History   Problem Relation Age of Onset    Heart Failure Mother 86        Congestive - Cause of Death    Cerebrovascular Disease Father     C.A.D. Sister 91       Social History:  Marital Status:   [5]  Social History     Tobacco Use    Smoking status: Former     Current packs/day: 0.00     Average packs/day: 1 pack/day for 13.6 years (13.6 ttl pk-yrs)     Types: Cigarettes     Start date: 1949     Quit date: 1962     Years since quittin.9    Smokeless tobacco: Never   Vaping Use    Vaping status: Never Used   Substance Use Topics    Alcohol use: Not Currently     Comment: 3 Drinks (BEER & LIQUOR) ~ OCCASIONALLY (QUIT IN )    Drug use: No        Medications:    aspirin (ASA) 325 MG EC tablet  famotidine (PEPCID) 20 MG tablet  fluticasone (FLONASE) 50 MCG/ACT nasal spray  losartan (COZAAR) 50 MG tablet  metoprolol succinate ER (TOPROL XL) 25 MG 24 hr tablet  nitroGLYcerin (NITROSTAT) 0.4 MG sublingual tablet  simvastatin (ZOCOR) 20 MG tablet          Review of Systems   Gastrointestinal:         See HPI       Physical Exam   BP:  "132/77  Pulse: 71  Temp: 98  F (36.7  C)  Resp: 18  Height: 180.3 cm (5' 11\")  Weight: 94.4 kg (208 lb 1.6 oz)  SpO2: 96 %      Physical Exam  Vitals and nursing note reviewed.   Constitutional:       Comments: Pleasant and talkative 89-year-old male found semireclined in no distress   Cardiovascular:      Rate and Rhythm: Normal rate and regular rhythm.   Pulmonary:      Effort: Pulmonary effort is normal.   Abdominal:      General: There is distension.      Palpations: Abdomen is soft.      Tenderness: There is abdominal tenderness.      Comments: Tenderness and distention around the colostomy   Neurological:      General: No focal deficit present.      Mental Status: He is alert.         ED Course     ED Course as of 06/23/25 1932 Mon Jun 23, 2025 1753 My independent review of the abdominal x-ray shows concerns of a possible bowel obstruction.   1913 Call to Dr. Chavez prior to results.    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  Procedure                               Abnormality         Status                     ---------                               -----------         ------                     CBC with platelets and ...[[id number]]  Abnormal            Final result                 Please view results for these tests on the individual orders.   Comprehensive metabolic panel   Result Value Ref Range    Sodium 139 135 - 145 mmol/L    Potassium 4.3 3.4 - 5.3 mmol/L    Carbon Dioxide (CO2) 25 22 - 29 mmol/L    Anion Gap 13 7 - 15 mmol/L    Urea Nitrogen 17.7 8.0 - 23.0 " mg/dL    Creatinine 0.79 0.67 - 1.17 mg/dL    GFR Estimate 80 >60 mL/min/1.73m2    Calcium 9.6 8.8 - 10.4 mg/dL    Chloride 101 98 - 107 mmol/L    Glucose 116 (H) 70 - 99 mg/dL    Alkaline Phosphatase 66 40 - 150 U/L    AST 24 0 - 45 U/L    ALT 20 0 - 70 U/L    Protein Total 7.7 6.4 - 8.3 g/dL    Albumin 4.3 3.5 - 5.2 g/dL    Bilirubin Total 0.6 <=1.2 mg/dL   [place] Draw    Narrative    The following orders were created for panel order [place] Draw.  Procedure                               Abnormality         Status                     ---------                               -----------         ------                     Extra Red Top Tube[[id number]]                              Final result               Extra Green Top (Lithiu...[[id number]]                      Final result               Extra Purple Top Tube[[id number]]                           Final result                 Please view results for these tests on the individual orders.   CBC with platelets and differential   Result Value Ref Range    WBC Count 11.6 (H) 4.0 - 11.0 10e3/uL    RBC Count 5.08 4.40 - 5.90 10e6/uL    Hemoglobin 15.5 13.3 - 17.7 g/dL    Hematocrit 46.1 40.0 - 53.0 %    MCV 91 78 - 100 fL    MCH 30.5 26.5 - 33.0 pg    MCHC 33.6 31.5 - 36.5 g/dL    RDW 13.2 10.0 - 15.0 %    Platelet Count 252 150 - 450 10e3/uL    % Neutrophils 63 %    % Lymphocytes 25 %    % Monocytes 11 %    % Eosinophils 1 %    % Basophils 0 %    % Immature Granulocytes 0 %    NRBCs per 100 WBC 0 <1 /100    Absolute Neutrophils 7.2 1.6 - 8.3 10e3/uL    Absolute Lymphocytes 2.9 0.8 - 5.3 10e3/uL    Absolute Monocytes 1.2 0.0 - 1.3 10e3/uL    Absolute Eosinophils 0.1 0.0 - 0.7 10e3/uL    Absolute Basophils 0.0 0.0 - 0.2 10e3/uL    Absolute Immature Granulocytes 0.0 <=0.4 10e3/uL    Absolute NRBCs 0.0 10e3/uL   Extra Red Top Tube   Result Value Ref Range    Hold Specimen JIC    Extra Green Top (Lithium Heparin) Tube   Result Value Ref Range    Hold Specimen JIC    Extra  Purple Top Tube   Result Value Ref Range    Hold Specimen JIC        Medications   fentaNYL (PF) (SUBLIMAZE) injection 50 mcg (50 mcg Intravenous $Given 6/23/25 1812)   iopamidol (ISOVUE-370) solution 102 mL (102 mLs Intravenous $Given 6/23/25 1850)   sodium chloride (PF) 0.9% PF flush 50 mL (50 mLs Intravenous $Given 6/23/25 1850)       Assessments & Plan (with Medical Decision Making)   89-year-old male presents the emergency department for evaluation of a 2-day history of abdominal pain.  No vomiting.  No output from his colostomy.  Broad differential considered.  Initial plain films reveal concerns for possible underlying obstructive process.  Cross-sectional imaging of the abdomen and pelvis confirms.  Discussion with Dr. Chavez prior to official results.  He will come in for definitive therapy.    This document was prepared using a combination of typing and voice generated software.  While every attempt was made for accuracy, spelling and grammatical errors may exist.     I have reviewed the nursing notes.    I have reviewed the findings, diagnosis, plan and need for follow up with the patient.           Medical Decision Making  The patient's presentation was of high complexity (an acute health issue posing potential threat to life or bodily function).    The patient's evaluation involved:  ordering and/or review of 3+ test(s) in this encounter (labs and images)  discussion of management or test interpretation with another health professional (Dr. Chavez)    The patient's management necessitated moderate risk (prescription drug management including medications given in the ED), moderate risk (IV contrast administration), high risk (a parenteral controlled substance), and high risk (a decision regarding hospitalization).        New Prescriptions    No medications on file       Final diagnoses:   Small bowel obstruction (H)       6/23/2025   [facility]       Clarke Atkins PA-C  06/23/25 1932

## 2025-06-24 ENCOUNTER — APPOINTMENT (OUTPATIENT)
Dept: PHYSICAL THERAPY | Facility: HOSPITAL | Age: OVER 89
End: 2025-06-24
Attending: SURGERY
Payer: MEDICARE

## 2025-06-24 ENCOUNTER — APPOINTMENT (OUTPATIENT)
Dept: OCCUPATIONAL THERAPY | Facility: HOSPITAL | Age: OVER 89
End: 2025-06-24
Attending: SURGERY
Payer: MEDICARE

## 2025-06-24 LAB
ALBUMIN SERPL BCG-MCNC: 3.8 G/DL (ref 3.5–5.2)
ALP SERPL-CCNC: 59 U/L (ref 40–150)
ALT SERPL W P-5'-P-CCNC: 16 U/L (ref 0–70)
ANION GAP SERPL CALCULATED.3IONS-SCNC: 13 MMOL/L (ref 7–15)
AST SERPL W P-5'-P-CCNC: 22 U/L (ref 0–45)
BASOPHILS # BLD AUTO: 0 10E3/UL (ref 0–0.2)
BASOPHILS NFR BLD AUTO: 0 %
BILIRUB SERPL-MCNC: 0.5 MG/DL
BUN SERPL-MCNC: 18.2 MG/DL (ref 8–23)
CALCIUM SERPL-MCNC: 8.8 MG/DL (ref 8.8–10.4)
CHLORIDE SERPL-SCNC: 101 MMOL/L (ref 98–107)
CREAT SERPL-MCNC: 0.67 MG/DL (ref 0.67–1.17)
EGFRCR SERPLBLD CKD-EPI 2021: 84 ML/MIN/1.73M2
EOSINOPHIL # BLD AUTO: 0 10E3/UL (ref 0–0.7)
EOSINOPHIL NFR BLD AUTO: 0 %
ERYTHROCYTE [DISTWIDTH] IN BLOOD BY AUTOMATED COUNT: 13.2 % (ref 10–15)
GLUCOSE BLDC GLUCOMTR-MCNC: 147 MG/DL (ref 70–99)
GLUCOSE BLDC GLUCOMTR-MCNC: 162 MG/DL (ref 70–99)
GLUCOSE BLDC GLUCOMTR-MCNC: 187 MG/DL (ref 70–99)
GLUCOSE BLDC GLUCOMTR-MCNC: 213 MG/DL (ref 70–99)
GLUCOSE SERPL-MCNC: 194 MG/DL (ref 70–99)
HCO3 SERPL-SCNC: 24 MMOL/L (ref 22–29)
HCT VFR BLD AUTO: 46.3 % (ref 40–53)
HGB BLD-MCNC: 15.8 G/DL (ref 13.3–17.7)
IMM GRANULOCYTES # BLD: 0.1 10E3/UL
IMM GRANULOCYTES NFR BLD: 0 %
LYMPHOCYTES # BLD AUTO: 0.8 10E3/UL (ref 0.8–5.3)
LYMPHOCYTES NFR BLD AUTO: 5 %
MAGNESIUM SERPL-MCNC: 2 MG/DL (ref 1.7–2.3)
MCH RBC QN AUTO: 30.8 PG (ref 26.5–33)
MCHC RBC AUTO-ENTMCNC: 34.1 G/DL (ref 31.5–36.5)
MCV RBC AUTO: 90 FL (ref 78–100)
MONOCYTES # BLD AUTO: 1.5 10E3/UL (ref 0–1.3)
MONOCYTES NFR BLD AUTO: 9 %
NEUTROPHILS # BLD AUTO: 14.2 10E3/UL (ref 1.6–8.3)
NEUTROPHILS NFR BLD AUTO: 86 %
NRBC # BLD AUTO: 0 10E3/UL
NRBC BLD AUTO-RTO: 0 /100
PHOSPHATE SERPL-MCNC: 3.6 MG/DL (ref 2.5–4.5)
PLATELET # BLD AUTO: 245 10E3/UL (ref 150–450)
POTASSIUM SERPL-SCNC: 4.1 MMOL/L (ref 3.4–5.3)
PROT SERPL-MCNC: 6.4 G/DL (ref 6.4–8.3)
RBC # BLD AUTO: 5.13 10E6/UL (ref 4.4–5.9)
SODIUM SERPL-SCNC: 138 MMOL/L (ref 135–145)
WBC # BLD AUTO: 16.5 10E3/UL (ref 4–11)

## 2025-06-24 PROCEDURE — 84100 ASSAY OF PHOSPHORUS: CPT | Performed by: SURGERY

## 2025-06-24 PROCEDURE — 82040 ASSAY OF SERUM ALBUMIN: CPT | Performed by: SURGERY

## 2025-06-24 PROCEDURE — 250N000011 HC RX IP 250 OP 636: Mod: JZ | Performed by: SURGERY

## 2025-06-24 PROCEDURE — 97165 OT EVAL LOW COMPLEX 30 MIN: CPT | Mod: GO

## 2025-06-24 PROCEDURE — 250N000009 HC RX 250: Performed by: SURGERY

## 2025-06-24 PROCEDURE — 200N000001 HC R&B ICU

## 2025-06-24 PROCEDURE — 36415 COLL VENOUS BLD VENIPUNCTURE: CPT | Performed by: SURGERY

## 2025-06-24 PROCEDURE — 97162 PT EVAL MOD COMPLEX 30 MIN: CPT | Mod: GP | Performed by: PHYSICAL THERAPIST

## 2025-06-24 PROCEDURE — 83735 ASSAY OF MAGNESIUM: CPT | Performed by: SURGERY

## 2025-06-24 PROCEDURE — 999N000157 HC STATISTIC RCP TIME EA 10 MIN

## 2025-06-24 PROCEDURE — 250N000013 HC RX MED GY IP 250 OP 250 PS 637: Performed by: SURGERY

## 2025-06-24 PROCEDURE — 85025 COMPLETE CBC W/AUTO DIFF WBC: CPT | Performed by: SURGERY

## 2025-06-24 PROCEDURE — 258N000003 HC RX IP 258 OP 636: Performed by: SURGERY

## 2025-06-24 RX ORDER — NALOXONE HYDROCHLORIDE 0.4 MG/ML
0.2 INJECTION, SOLUTION INTRAMUSCULAR; INTRAVENOUS; SUBCUTANEOUS
Status: DISCONTINUED | OUTPATIENT
Start: 2025-06-24 | End: 2025-07-02 | Stop reason: HOSPADM

## 2025-06-24 RX ORDER — ACETAMINOPHEN 325 MG/1
975 TABLET ORAL EVERY 8 HOURS
Status: DISCONTINUED | OUTPATIENT
Start: 2025-06-24 | End: 2025-06-27

## 2025-06-24 RX ORDER — HYDROMORPHONE HCL IN WATER/PF 6 MG/30 ML
0.2 PATIENT CONTROLLED ANALGESIA SYRINGE INTRAVENOUS
Status: DISCONTINUED | OUTPATIENT
Start: 2025-06-24 | End: 2025-06-28

## 2025-06-24 RX ORDER — FLUTICASONE PROPIONATE 50 MCG
1 SPRAY, SUSPENSION (ML) NASAL DAILY
Status: DISCONTINUED | OUTPATIENT
Start: 2025-06-24 | End: 2025-06-24

## 2025-06-24 RX ORDER — LIDOCAINE 40 MG/G
CREAM TOPICAL
Status: DISCONTINUED | OUTPATIENT
Start: 2025-06-24 | End: 2025-07-02 | Stop reason: HOSPADM

## 2025-06-24 RX ORDER — FLUTICASONE PROPIONATE 50 MCG
1 SPRAY, SUSPENSION (ML) NASAL DAILY PRN
Status: DISCONTINUED | OUTPATIENT
Start: 2025-06-24 | End: 2025-07-02 | Stop reason: HOSPADM

## 2025-06-24 RX ORDER — DIPHENHYDRAMINE HCL 12.5MG/5ML
12.5 LIQUID (ML) ORAL EVERY 6 HOURS PRN
Status: DISCONTINUED | OUTPATIENT
Start: 2025-06-24 | End: 2025-06-28

## 2025-06-24 RX ORDER — DIPHENHYDRAMINE HYDROCHLORIDE 50 MG/ML
12.5 INJECTION, SOLUTION INTRAMUSCULAR; INTRAVENOUS EVERY 6 HOURS PRN
Status: DISCONTINUED | OUTPATIENT
Start: 2025-06-24 | End: 2025-06-28

## 2025-06-24 RX ORDER — HYDROMORPHONE HCL IN WATER/PF 6 MG/30 ML
0.4 PATIENT CONTROLLED ANALGESIA SYRINGE INTRAVENOUS
Status: DISCONTINUED | OUTPATIENT
Start: 2025-06-24 | End: 2025-06-28

## 2025-06-24 RX ORDER — PROCHLORPERAZINE MALEATE 5 MG/1
5 TABLET ORAL EVERY 6 HOURS PRN
Status: DISCONTINUED | OUTPATIENT
Start: 2025-06-24 | End: 2025-07-02 | Stop reason: HOSPADM

## 2025-06-24 RX ORDER — LOSARTAN POTASSIUM 25 MG/1
25 TABLET ORAL DAILY
COMMUNITY
Start: 2025-06-13

## 2025-06-24 RX ORDER — SODIUM CHLORIDE, SODIUM LACTATE, POTASSIUM CHLORIDE, CALCIUM CHLORIDE 600; 310; 30; 20 MG/100ML; MG/100ML; MG/100ML; MG/100ML
INJECTION, SOLUTION INTRAVENOUS CONTINUOUS
Status: DISCONTINUED | OUTPATIENT
Start: 2025-06-24 | End: 2025-06-28

## 2025-06-24 RX ORDER — NALOXONE HYDROCHLORIDE 0.4 MG/ML
0.4 INJECTION, SOLUTION INTRAMUSCULAR; INTRAVENOUS; SUBCUTANEOUS
Status: DISCONTINUED | OUTPATIENT
Start: 2025-06-24 | End: 2025-07-02 | Stop reason: HOSPADM

## 2025-06-24 RX ORDER — METOPROLOL TARTRATE 1 MG/ML
5 INJECTION, SOLUTION INTRAVENOUS EVERY 6 HOURS
Status: DISCONTINUED | OUTPATIENT
Start: 2025-06-24 | End: 2025-06-25

## 2025-06-24 RX ORDER — FAMOTIDINE 20 MG/1
20 TABLET, FILM COATED ORAL 2 TIMES DAILY
Status: DISCONTINUED | OUTPATIENT
Start: 2025-06-24 | End: 2025-06-28

## 2025-06-24 RX ORDER — ONDANSETRON 4 MG/1
4 TABLET, ORALLY DISINTEGRATING ORAL EVERY 6 HOURS PRN
Status: DISCONTINUED | OUTPATIENT
Start: 2025-06-24 | End: 2025-07-02 | Stop reason: HOSPADM

## 2025-06-24 RX ORDER — ONDANSETRON 2 MG/ML
4 INJECTION INTRAMUSCULAR; INTRAVENOUS EVERY 6 HOURS PRN
Status: DISCONTINUED | OUTPATIENT
Start: 2025-06-24 | End: 2025-07-02 | Stop reason: HOSPADM

## 2025-06-24 RX ADMIN — SODIUM CHLORIDE, POTASSIUM CHLORIDE, SODIUM LACTATE AND CALCIUM CHLORIDE: 600; 310; 30; 20 INJECTION, SOLUTION INTRAVENOUS at 05:03

## 2025-06-24 RX ADMIN — HYDROMORPHONE HYDROCHLORIDE 0.2 MG: 0.2 INJECTION, SOLUTION INTRAMUSCULAR; INTRAVENOUS; SUBCUTANEOUS at 02:43

## 2025-06-24 RX ADMIN — HYDROMORPHONE HYDROCHLORIDE 0.2 MG: 0.2 INJECTION, SOLUTION INTRAMUSCULAR; INTRAVENOUS; SUBCUTANEOUS at 16:35

## 2025-06-24 RX ADMIN — FAMOTIDINE 20 MG: 10 INJECTION, SOLUTION INTRAVENOUS at 20:08

## 2025-06-24 RX ADMIN — SODIUM CHLORIDE, POTASSIUM CHLORIDE, SODIUM LACTATE AND CALCIUM CHLORIDE: 600; 310; 30; 20 INJECTION, SOLUTION INTRAVENOUS at 14:29

## 2025-06-24 RX ADMIN — METOPROLOL TARTRATE 5 MG: 1 INJECTION, SOLUTION INTRAVENOUS at 16:35

## 2025-06-24 RX ADMIN — METOPROLOL TARTRATE 5 MG: 1 INJECTION, SOLUTION INTRAVENOUS at 08:11

## 2025-06-24 RX ADMIN — ACETAMINOPHEN 975 MG: 325 TABLET ORAL at 05:11

## 2025-06-24 RX ADMIN — ACETAMINOPHEN 975 MG: 325 TABLET ORAL at 16:38

## 2025-06-24 RX ADMIN — FAMOTIDINE 20 MG: 10 INJECTION, SOLUTION INTRAVENOUS at 08:10

## 2025-06-24 RX ADMIN — METOPROLOL TARTRATE 5 MG: 1 INJECTION, SOLUTION INTRAVENOUS at 22:42

## 2025-06-24 RX ADMIN — FAMOTIDINE 20 MG: 10 INJECTION, SOLUTION INTRAVENOUS at 02:43

## 2025-06-24 RX ADMIN — HYDROMORPHONE HYDROCHLORIDE 0.2 MG: 0.2 INJECTION, SOLUTION INTRAMUSCULAR; INTRAVENOUS; SUBCUTANEOUS at 09:04

## 2025-06-24 ASSESSMENT — ACTIVITIES OF DAILY LIVING (ADL)
ADLS_ACUITY_SCORE: 62
ADLS_ACUITY_SCORE: 62
ADLS_ACUITY_SCORE: 61
ADLS_ACUITY_SCORE: 62
PREVIOUS_RESPONSIBILITIES: MEDICATION MANAGEMENT
ADLS_ACUITY_SCORE: 62
ADLS_ACUITY_SCORE: 61
ADLS_ACUITY_SCORE: 62

## 2025-06-24 NOTE — PROGRESS NOTES
"   06/24/25 1036   Appointment Info   Signing Clinician's Name / Credentials (PT) Emma Merino DPYUNIOR   Living Environment   People in Home facility resident   Current Living Arrangements assisted living   Home Accessibility no concerns   Transportation Anticipated car, drives self;family or friend will provide   Living Environment Comments Pt is a resident of [facility] assisted living   Self-Care   Usual Activity Tolerance good   Current Activity Tolerance fair   Equipment Currently Used at Home walker, rolling  (states \"I drag it along with me but don't really use it\")   Fall history within last six months no   Activity/Exercise/Self-Care Comment Pt states he is independent with all ADLs, ambulates by \"dragging\" the walker along side him   General Information   Onset of Illness/Injury or Date of Surgery 06/23/25   Referring Physician Dr. Chavez   Patient/Family Therapy Goals Statement (PT) to be able to return to [facility], does not want to go to rehab   Pertinent History of Current Problem (include personal factors and/or comorbidities that impact the POC) Pt 1 Day Post-Op from Procedure(s):  LAPAROTOMY Extensive  Lysis of adhesions reduction of peristomal hernia   Existing Precautions/Restrictions abdominal   General Observations Pt resting in bed, son present.  NG in place   Cognition   Affect/Mental Status (Cognition) WFL   Orientation Status (Cognition) oriented x 3   Follows Commands (Cognition) WFL   Posture    Posture Forward head position;Protracted shoulders   Range of Motion (ROM)   Range of Motion ROM is WFL   Strength (Manual Muscle Testing)   Strength (Manual Muscle Testing) Deficits observed during functional mobility   Bed Mobility   Bed Mobility supine-sit   Supine-Sit [place] (Bed Mobility) moderate assist (50% patient effort)   Bed Mobility Limitations impaired ability to control trunk for mobility   Impairments Contributing to Impaired Bed Mobility pain   Assistive Device (Bed " "Mobility) bed rails   Comment, (Bed Mobility) Attempted to instruct pt in logroll for sup>sit however pt insisted on doing it \"his own way\".   Transfers   Transfers sit-stand transfer   Sit-Stand Transfer   Sit-Stand [place] (Transfers) minimum assist (75% patient effort)   Assistive Device (Sit-Stand Transfers) walker, front-wheeled   Comment, (Sit-Stand Transfer) cues for hand placement   Gait/Stairs (Locomotion)   [place] Level (Gait) minimum assist (75% patient effort)   Assistive Device (Gait) walker, front-wheeled   Distance in Feet (Gait) 2'   Pattern (Gait) step-to   Balance   Balance Comments fair+ with support from walker   Clinical Impression   Criteria for Skilled Therapeutic Intervention Yes, treatment indicated   PT Diagnosis (PT) gait disturbance   Influenced by the following impairments pain, decreased strength, decreased activity tolerance   Functional limitations due to impairments decreased tolerance for functional mobility and ADLs   Clinical Presentation (PT Evaluation Complexity) evolving   Clinical Presentation Rationale clinical judgement   Clinical Decision Making (Complexity) moderate complexity   Planned Therapy Interventions (PT) balance training;bed mobility training;gait training;neuromuscular re-education;patient/family education;strengthening;transfer training;progressive activity/exercise;risk factor education   Risk & Benefits of therapy have been explained evaluation/treatment results reviewed;care plan/treatment goals reviewed;risks/benefits reviewed;participants included;patient;son   Clinical Impression Comments PT evaluation completed s/p Post-Op from Procedure(s): LAPAROTOMY Extensive Lysis of adhesions reduction of peristomal hernia.  Today's mobility limited by presence of NG tube but pt was able to get out of bed and into a chair with min A and tolerated fairly well.  Anticipate if pt progresses without complications he likely will be able to return to assisted " living but will have to continue to assess over the coming days and see how pt's mobility improves.  Will see during acute care stay to progress activity as well as assist with discharge planning.   PT Total Evaluation Time   PT Eval, Moderate Complexity Minutes (40796) 17   Physical Therapy Goals   PT Frequency 6x/week   PT Predicted Duration/Target Date for Goal Attainment 07/15/25   PT Goals Bed Mobility;Transfers;Gait   PT: Bed Mobility Modified independent;Supine to/from sit;Within precautions   PT: Transfers Supervision/stand-by assist;Sit to/from stand;Bed to/from chair;Assistive device   PT: Gait Supervision/stand-by assist;Rolling walker;Greater than 200 feet   PT Discharge Planning   PT Plan Progress mobility as tolerated   PT Discharge Recommendation (DC Rec) home with assist;home with home care physical therapy;Transitional Care Facility   PT Rationale for DC Rec PT evaluation completed s/p Post-Op from Procedure(s): LAPAROTOMY Extensive Lysis of adhesions reduction of peristomal hernia. Today's mobility limited by presence of NG tube but pt was able to get out of bed and into a chair with min A and tolerated fairly well. Anticipate if pt progresses without complications he likely will be able to return to assisted living but will have to continue to assess over the coming days and see how pt's mobility improves. Will see during acute care stay to progress activity as well as assist with discharge planning.   PT Brief overview of current status sup>sit mod A, sit>stand min A, ambulated 2' from bed>chair with FWW min A   PT Total Distance Amb During Session (feet) 2   Physical Therapy Time and Intention   Total Session Time (sum of timed and untimed services) 17

## 2025-06-24 NOTE — OP NOTE
REPORT OF OPERATION  DATE OF PROCEDURE: 6/23/2025    PATIENT: James Grant    SURGERY PERFORMED: [place] laparotomy.  Extensive lysis of adhesions.  Reduction of parastomal hernia.  Closure of parastomal hernia.    PREOPERATIVE DIAGNOSIS: Small bowel obstruction with closed-loop obstruction through parastomal hernia    POSTOPERATIVE DIAGNOSIS: Same along with extensive adhesive disease    SURGEON: Sukumar Chavez MD    ASSISTANTS: Lida Gill CNP, Her assistance was required because of the technical aspects of the case    ANESTHESIA: General Endotracheal Anesthesia    COMPLICATIONS: None apparent    ESTIMATED BLOOD LOSS: 100 cc     TRANSFUSIONS: None    TISSUE TO PATHOLOGY: None    FINDINGS: Extensive adhesions of small bowel to small bowel into the abdominal wall.  Closed-loop parastomal hernia consisting of a loop of small bowel through a parastomal defect.  All bowel appeared viable.    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  Patient presented to the emergency room this evening with complaint of abdominal pain and no stool output.  CT was obtained which was consistent with a parastomal hernia containing a closed-loop obstruction of small bowel.  Patient will take the operating room for exploratory laparotomy and reduction of parastomal hernia and any other indicated procedures.    DESCRIPTIONS OF PROCEDURE IN DETAIL: After consent was obtained the patient was taken to the operative suite and tamiko in the supine position.  The patient was identified and the correct patient was confirmed.  Monitored Anesthesia Care was administered by anesthesia.  The patient was sterilely prepped and draped in the usual fashion.  A time out was performed verifying the correct patient and the correct procedure.  The entire operative team was in agreement.  All  necessary equipment and supplies were in the room.    Through a midline incision the skin was sharply entered dissection was carried down dissection to the fascia.  The fascia was opened and entered the abdomen was accomplished.  At this point small bowel was found adhesed to the abdominal wall this was carefully taken down using sharp dissection.  This was continued until the abdominal wall was freed up and a full midline incision was accomplished.  At this point there was a definite obstruction this was followed distally along the small bowel until the parastomal hernia was encountered.  Prior to being able to reduce the small bowel back into the abdomen and extensive adhesiolysis was necessary to be done and this was undertaken.  This freed up all the small bowel from interloop adhesions and adhesions to the abdominal wall allowing for the reduction of the parastomal hernia to be accomplished.  Once this was done the bowel was run from the ligament of Treitz all the way to the ileocecal valve and any adhesions were taken down make sure there were no other points of obstruction.  The bowel was again run checking for any mucosal injuries and any mucosal injuries were oversewn with Lembert stitches of 3-0 silk sutures.  The bowel was run several times from the ligament of Treitz to the cecal valve and in the reverse direction verifying no other bowel injuries and no other adhesions that need to be taken down.  Investigation of the hernial defect yielded a fairly large defect and this was brought together with a single stitch of 0 Prolene suture tightening up to the ostomy defect.  The bowels were placed back into the abdomen and Seprafilm was placed.  The abdomen was then closed with running looped #1 PDS the skin was closed extensive staples.  Sterile dressings were applied.  The patient was extubated in the operating room and taken to the ICU in stable condition tolerated procedure well.    All needle,  instrument  and sponge counts were correct x2.

## 2025-06-24 NOTE — ANESTHESIA PROCEDURE NOTES
Airway         Procedure Start/Stop Times: 6/23/2025 9:44 PM  Staff -        CRNA: Celestina Mohr APRN CRNA       Performed By: CRNA  Consent for Airway        Urgency: emergent       Consent: No emergent situation. Verbal consent obtained. Written consent obtained.       Consent given by: patient       Risks and benefits: risks, benefits and alternatives were discussed       Patient identity confirmed: verbally with patient, arm band and hospital-assigned identification number  Indications and Patient Condition       Indications for airway management: mie-procedural       Induction type:RSI       Mask difficulty assessment: 0 - not attempted    Final Airway Details       Final airway type: endotracheal airway       Successful airway: ETT - single  Endotracheal Airway Details        ETT size (mm): 7.5       Cuffed: yes       Successful intubation technique: video laryngoscopy       VL Blade Size: Glidescope 3       Grade View of Cords: 1       Adjucts: stylet       Position: Right       Measured from: lips       Secured at (cm): 23       Bite block used: None    Post intubation assessment        Placement verified by: capnometry, equal breath sounds and chest rise        Number of attempts at approach: 1       Secured with: commercial tube dodson and tape       Ease of procedure: easy       Dentition: Intact and Unchanged    Medication(s) Administered   Medication Administration Time: 6/23/2025 9:44 PM

## 2025-06-24 NOTE — PLAN OF CARE
Plan of Care Reviewed With: patient    Overall Patient Progress: progressing     PIVs x2. LR infusing at 100 mL/hr. Tele reading SR 60s with 1st degree AVB. Pain to abd/throat managed with scheduled tylenol and IV dilaudid x1. Reports increased pain with coughing - encouraged to splint. Drowsy, but oriented x4. Rested comfortably during the night. Sats 92-94% on 2 LPM Oxymask. RR shallow. NG at 55 cm to right nare - LIS with total output 300 mLs of bile output. Diet - NPO with ice chips. Midline incision with small amount of shadowing to bottom of dressing. LLQ colostomy - stoma red with no output - increased edema to abd. BSs are audible. Prater in place. Output slowly decreased throughout the shift. Bed in lowest position. Call light in reach. ID band in place. Makes need known.     Face to face report given with opportunity to observe patient.    Report given to SARA Becerra RN   6/24/2025  7:00 AM

## 2025-06-24 NOTE — PROGRESS NOTES
Chart reviewed and pt discussed in interdisciplinary rounds. No anticipated discharge needs at this time. CTS team will continue to monitor daily in interdisciplinary rounds and will intervene as needed/appropriate.     Checked in with Patient and his daughter.  Patient and daughter deny questions or concerns at this time. Patient is a resident at [facility], but is independent with most of his ADLs.  CTS will continue to remain available for support and resources.

## 2025-06-24 NOTE — ED NOTES
Face to face report given with opportunity to observe patient.    Report given to Rossana Connell RN   6/23/2025  7:03 PM

## 2025-06-24 NOTE — ANESTHESIA PROCEDURE NOTES
TAP Procedure Note    Pre-Procedure   Staff -        Resident/Fellow: Eugenia Ham       CRNA: Celestina Mohr APRN CRNA       Performed By: CRNA and JOSEPH       Location: OR       Procedure Start/Stop Times: 6/23/2025 11:18 PM and 6/23/2025 11:25 PM       Pre-Anesthestic Checklist: patient identified, IV checked, site marked, risks and benefits discussed, informed consent, monitors and equipment checked, pre-op evaluation, at physician/surgeon's request and post-op pain management  Timeout:       Correct Patient: Yes        Correct Procedure: Yes        Correct Site: Yes        Correct Position: Yes        Correct Laterality: Yes        Site Marked: Yes  Procedure Documentation  Procedure: TAP         Laterality: bilateral       Patient Position: supine       Skin prep: Chloraprep       Needle Type: insulated       Needle Gauge: 20.        Needle Length (Inches): 4        Ultrasound guided       1. Ultrasound was used to identify targeted nerve, plexus, vascular marker, or fascial plane and place a needle adjacent to it in real-time.       2. Ultrasound was used to visualize the spread of anesthetic in close proximity to the above referenced structure.       3. A permanent image is entered into the patient's record.       4. The visualized anatomic structures appeared normal.       5. There were no apparent abnormal pathologic findings.    Assessment/Narrative         The placement was negative for: blood aspirated, painful injection and site bleeding       Paresthesias: No.       Bolus given via needle. no blood aspirated via catheter.        Secured via.        Insertion/Infusion Method: Single Shot       Complications: none       Injection made incrementally with aspirations every 5 mL.    Medication(s) Administered   Bupivacaine 0.25% PF (Infiltration) - Infiltration   15 mL - 6/23/2025 11:18:00 PM   15 mL - 6/23/2025 11:23:00 PM  Bupivacaine liposome (Exparel) 1.3% LA inj susp (Infiltration) -  "Infiltration   10 mL - 6/23/2025 11:18:00 PM   10 mL - 6/23/2025 11:23:00 PM  Medication Administration Time: 6/23/2025 11:18 PM      FOR [facility] ([facility]) ONLY:   Pain Team Contact information: please page the Pain Team Via [employer]. Search \"Pain\". During daytime hours, please page the attending first. At night please page the resident first.      "

## 2025-06-24 NOTE — PROGRESS NOTES
INPATIENT ROUNDING NOTE  6/24/2025    Patient: James Grant    Physician of Record: Sukumar Chavez MD    Admitting diagnosis: Small bowel obstruction (H) [K56.609]    Procedure(s):  LAPAROTOMY Extensive  Lysis of adhesions reduction of peristomal hernia     POD: 1 Day Post-Op    Current Diet: NPO    CURRENT MEDICATIONS:  Continuous Medications:  Current Facility-Administered Medications   Medication Dose Route Frequency Provider Last Rate Last Admin    acetaminophen (TYLENOL) tablet 975 mg  975 mg Oral Q8H Sukumar Chavez MD   975 mg at 06/24/25 0511    benzocaine-menthol (CEPACOL) 15-3.6 MG lozenge 1 lozenge  1 lozenge Buccal Q1H PRN Sukumar Chavez MD        ceFAZolin (ANCEF) 2 g in dextrose 50 mL intermittent infusion  2 g Intravenous See Admin Instructions Sukumar Chavez MD        diphenhydrAMINE (BENADRYL) elixir 12.5 mg  12.5 mg Oral Q6H PRN Sukumar Chavez MD        Or    diphenhydrAMINE (BENADRYL) injection 12.5 mg  12.5 mg Intravenous Q6H PRN Sukumar Chavez MD        famotidine (PEPCID) tablet 20 mg  20 mg Oral BID Sukumar Chavez MD        Or    famotidine (PEPCID) injection 20 mg  20 mg Intravenous BID Sukumar Chavez MD   20 mg at 06/24/25 0810    fluticasone (FLONASE) 50 MCG/ACT spray 1 spray  1 spray Both Nostrils Daily PRN Sukumar Chavez MD        HYDROmorphone (DILAUDID) injection 0.2 mg  0.2 mg Intravenous Q2H PRN Sukumar Chavez MD   0.2 mg at 06/24/25 0904    Or    HYDROmorphone (DILAUDID) injection 0.4 mg  0.4 mg Intravenous Q2H PRSukumar Echeverria MD        lactated ringers infusion   Intravenous Continuous Sukumar Chavez  mL/hr at 06/24/25 0503 New Bag at 06/24/25 0503    lactated ringers infusion   Intravenous Continuous Sukumar Chavez MD   New Bag at 06/23/25 2138    lidocaine (LMX4) cream   Topical Q1H PRN Sukumar Chavez MD        lidocaine (LMX4) cream   Topical Q1H PRN Sukumar Chavez  MD Jagjit        lidocaine 1 % 0.1-1 mL  0.1-1 mL Other Q1H PRN Sukumar Chavez MD        lidocaine 1 % 0.1-1 mL  0.1-1 mL Other Q1H PRN Sukumar Chavez MD        metoprolol (LOPRESSOR) injection 5 mg  5 mg Intravenous Q6H Sukumar Chavez MD   5 mg at 06/24/25 0811    naloxone (NARCAN) injection 0.2 mg  0.2 mg Intravenous Q2 Min PRSukumar Echeverria MD        Or    naloxone (NARCAN) injection 0.4 mg  0.4 mg Intravenous Q2 Min PRSukumar Echeverria MD        Or    naloxone (NARCAN) injection 0.2 mg  0.2 mg Intramuscular Q2 Min PRSukumar Echeverria MD        Or    naloxone (NARCAN) injection 0.4 mg  0.4 mg Intramuscular Q2 Min PRSukumar Echeverria MD        ondansetron (ZOFRAN ODT) ODT tab 4 mg  4 mg Oral Q6H PRN Sukumar Chavez MD        Or    ondansetron (ZOFRAN) injection 4 mg  4 mg Intravenous Q6H PRN Sukumar Chavez MD        prochlorperazine (COMPAZINE) injection 5 mg  5 mg Intravenous Q6H PRSukumar Echeverria MD        Or    prochlorperazine (COMPAZINE) tablet 5 mg  5 mg Oral Q6H PRSukumar Echeverria MD        sodium chloride (PF) 0.9% PF flush 3 mL  3 mL Intracatheter Q8H [facility] Sukumar Chavez MD        sodium chloride (PF) 0.9% PF flush 3 mL  3 mL Intracatheter q1 min prSukumar Echeverria MD        sodium chloride (PF) 0.9% PF flush 3 mL  3 mL Intracatheter Q8H Sukumar Chavez MD   3 mL at 06/24/25 0244    sodium chloride (PF) 0.9% PF flush 3 mL  3 mL Intracatheter q1 min Sukumar Brink MD           Scheduled Medications:  Current Facility-Administered Medications   Medication Dose Route Frequency Provider Last Rate Last Admin    acetaminophen (TYLENOL) tablet 975 mg  975 mg Oral Q8H Sukumar Chavez MD   975 mg at 06/24/25 0511    benzocaine-menthol (CEPACOL) 15-3.6 MG lozenge 1 lozenge  1 lozenge Buccal Q1H PRN Sukumar Chavez MD        ceFAZolin (ANCEF) 2 g in dextrose 50 mL intermittent  infusion  2 g Intravenous See Admin Instructions Sukumar Chavez MD        diphenhydrAMINE (BENADRYL) elixir 12.5 mg  12.5 mg Oral Q6H PRN Sukumar Chavez MD        Or    diphenhydrAMINE (BENADRYL) injection 12.5 mg  12.5 mg Intravenous Q6H PRN Sukumar Chavez MD        famotidine (PEPCID) tablet 20 mg  20 mg Oral BID Sukumar Chavez MD        Or    famotidine (PEPCID) injection 20 mg  20 mg Intravenous BID Sukumar Chavez MD   20 mg at 06/24/25 0810    fluticasone (FLONASE) 50 MCG/ACT spray 1 spray  1 spray Both Nostrils Daily PRN Sukumar Chavez MD        HYDROmorphone (DILAUDID) injection 0.2 mg  0.2 mg Intravenous Q2H PRN Sukumar Chavez MD   0.2 mg at 06/24/25 0904    Or    HYDROmorphone (DILAUDID) injection 0.4 mg  0.4 mg Intravenous Q2H PRSukumar Echeverria MD        lactated ringers infusion   Intravenous Continuous Sukumar Chavez  mL/hr at 06/24/25 0503 New Bag at 06/24/25 0503    lactated ringers infusion   Intravenous Continuous Sukumar Chavez MD   New Bag at 06/23/25 2138    lidocaine (LMX4) cream   Topical Q1H PRSukumar Echeverria MD        lidocaine (LMX4) cream   Topical Q1H PRSukumar Echeverria MD        lidocaine 1 % 0.1-1 mL  0.1-1 mL Other Q1H PRSukumar Echeverria MD        lidocaine 1 % 0.1-1 mL  0.1-1 mL Other Q1H PRSukumar Echeverria MD        metoprolol (LOPRESSOR) injection 5 mg  5 mg Intravenous Q6H Sukumar Chavez MD   5 mg at 06/24/25 0811    naloxone (NARCAN) injection 0.2 mg  0.2 mg Intravenous Q2 Min PRSukumar Echeverria MD        Or    naloxone (NARCAN) injection 0.4 mg  0.4 mg Intravenous Q2 Min PRSukumar Echeverria MD        Or    naloxone (NARCAN) injection 0.2 mg  0.2 mg Intramuscular Q2 Min PRN Sukumar Chavez MD        Or    naloxone (NARCAN) injection 0.4 mg  0.4 mg Intramuscular Q2 Min PRN Sukumar Chavez MD        ondansetron (ZOFRAN ODT) ODT  tab 4 mg  4 mg Oral Q6H PRN Sukumar Chavez MD        Or    ondansetron (ZOFRAN) injection 4 mg  4 mg Intravenous Q6H PRN Sukumar Chavez MD        prochlorperazine (COMPAZINE) injection 5 mg  5 mg Intravenous Q6H PRN Sukumar Chavez MD        Or    prochlorperazine (COMPAZINE) tablet 5 mg  5 mg Oral Q6H PRN Sukumar Chavez MD        sodium chloride (PF) 0.9% PF flush 3 mL  3 mL Intracatheter Q8H [facility] Sukumar Chavez MD        sodium chloride (PF) 0.9% PF flush 3 mL  3 mL Intracatheter q1 min prn Sukumar Chavez MD        sodium chloride (PF) 0.9% PF flush 3 mL  3 mL Intracatheter Q8H Sukumar Chavez MD   3 mL at 06/24/25 0244    sodium chloride (PF) 0.9% PF flush 3 mL  3 mL Intracatheter q1 min prn Sukumar Chavez MD           PRN Medications:  Current Facility-Administered Medications   Medication Dose Route Frequency Provider Last Rate Last Admin    acetaminophen (TYLENOL) tablet 975 mg  975 mg Oral Q8H Sukumar Chavez MD   975 mg at 06/24/25 0511    benzocaine-menthol (CEPACOL) 15-3.6 MG lozenge 1 lozenge  1 lozenge Buccal Q1H PRN Sukumar Chavez MD        ceFAZolin (ANCEF) 2 g in dextrose 50 mL intermittent infusion  2 g Intravenous See Admin Instructions Sukumar Chavez MD        diphenhydrAMINE (BENADRYL) elixir 12.5 mg  12.5 mg Oral Q6H PRN Sukumar Chavez MD        Or    diphenhydrAMINE (BENADRYL) injection 12.5 mg  12.5 mg Intravenous Q6H PRN Sukumar Chavez MD        famotidine (PEPCID) tablet 20 mg  20 mg Oral BID Sukumar Chavez MD        Or    famotidine (PEPCID) injection 20 mg  20 mg Intravenous BID Sukumar Chavez MD   20 mg at 06/24/25 0810    fluticasone (FLONASE) 50 MCG/ACT spray 1 spray  1 spray Both Nostrils Daily PRN Sukumar Chavez MD        HYDROmorphone (DILAUDID) injection 0.2 mg  0.2 mg Intravenous Q2H PRN Sukumar Chavez MD   0.2 mg at 06/24/25 0904    Or    HYDROmorphone  (DILAUDID) injection 0.4 mg  0.4 mg Intravenous Q2H PRN Sukumar Chavez MD        lactated ringers infusion   Intravenous Continuous Sukumar Chavez  mL/hr at 06/24/25 0503 New Bag at 06/24/25 0503    lactated ringers infusion   Intravenous Continuous Sukumar Chavez MD   New Bag at 06/23/25 2138    lidocaine (LMX4) cream   Topical Q1H PRSukumar Echeverria MD        lidocaine (LMX4) cream   Topical Q1H PRSukumar Echeverria MD        lidocaine 1 % 0.1-1 mL  0.1-1 mL Other Q1H PRN Sukumar Chavez MD        lidocaine 1 % 0.1-1 mL  0.1-1 mL Other Q1H PRSukumar Echeverria MD        metoprolol (LOPRESSOR) injection 5 mg  5 mg Intravenous Q6H Sukumar Chavez MD   5 mg at 06/24/25 0811    naloxone (NARCAN) injection 0.2 mg  0.2 mg Intravenous Q2 Min PRSukumar Echeverria MD        Or    naloxone (NARCAN) injection 0.4 mg  0.4 mg Intravenous Q2 Min PRSukumar Echeverria MD        Or    naloxone (NARCAN) injection 0.2 mg  0.2 mg Intramuscular Q2 Min PRSukumar Echeverria MD        Or    naloxone (NARCAN) injection 0.4 mg  0.4 mg Intramuscular Q2 Min PRSukumar Echeverria MD        ondansetron (ZOFRAN ODT) ODT tab 4 mg  4 mg Oral Q6H PRSukumar Echeverria MD        Or    ondansetron (ZOFRAN) injection 4 mg  4 mg Intravenous Q6H PRSukumar Echeverria MD        prochlorperazine (COMPAZINE) injection 5 mg  5 mg Intravenous Q6H PRSukumar Echeverria MD        Or    prochlorperazine (COMPAZINE) tablet 5 mg  5 mg Oral Q6H PRSukumar Echeverria MD        sodium chloride (PF) 0.9% PF flush 3 mL  3 mL Intracatheter Q8H [facility] Sukumar Chavez MD        sodium chloride (PF) 0.9% PF flush 3 mL  3 mL Intracatheter q1 min prSukumar Echeverria MD        sodium chloride (PF) 0.9% PF flush 3 mL  3 mL Intracatheter Q8H Sukumar Chavez MD   3 mL at 06/24/25 0244    sodium chloride (PF) 0.9% PF flush 3 mL  3 mL Intracatheter q1 min  "Sukumar Brink MD           SUBJECTIVE:   Nausea: No. Vomiting: No.--NG tube intact. Fever: No. Chills: No. Excessive burping: No. Flatus: No. BM: No. Pain control: good.     PHYSICAL EXAM:   Vital signs: /65   Pulse 68   Temp 99.9  F (37.7  C) (Tympanic)   Resp 12   Ht 1.803 m (5' 11\")   Wt 96.8 kg (213 lb 6.5 oz)   SpO2 93%   BMI 29.76 kg/m     BMI: Body mass index is 29.76 kg/m .   General: Normal, healthy, cooperative, in no acute distress, alert   Lungs: respirations are non-labored   Abdominal: non-distended; ostomy with healthy red tissue   Wound: dressing clean, dry, intact   Extremities: No cyanosis, clubbing or edema noted bilaterally in Upper and Lower Extremities   Neurological: without deficit    INPUT/OUTPUT:      Intake/Output Summary (Last 24 hours) at 6/24/2025 1028  Last data filed at 6/24/2025 1000  Gross per 24 hour   Intake 1912 ml   Output 2615 ml   Net -703 ml       I/O last 3 completed shifts:  In: 1912 [I.V.:1912]  Out: 2505 [Urine:855; Emesis/NG output:300; Other:1250; Blood:100]    LABS:    Last CBC Rrsults:   Recent Labs   Lab Test 06/24/25  0438 06/23/25  1708 03/01/25  1414   WBC 16.5* 11.6* 7.6   RBC 5.13 5.08 4.64   HGB 15.8 15.5 14.5   HCT 46.3 46.1 42.9   MCV 90 91 93   MCH 30.8 30.5 31.3   MCHC 34.1 33.6 33.8   RDW 13.2 13.2 13.8    252 245       Last Comprehensive Metabolic panel:  Recent Labs   Lab Test 06/24/25  0816 06/24/25  0438 06/24/25  0024 06/23/25  1708 03/01/25  1414 02/27/25  2207 02/27/25  1728   NA  --  138  --  139 140  --  137   POTASSIUM  --  4.1  --  4.3 4.0  --  4.4   CHLORIDE  --  101  --  101 104  --  102   CO2  --  24  --  25 22  --  23   ANIONGAP  --  13  --  13 14  --  12   * 194* 187* 116* 134*   < > 115*   BUN  --  18.2  --  17.7 15.8  --  18.5   CR  --  0.67  --  0.79 0.71  --  0.77   GFRESTIMATED  --  84  --  80 83  --  81   VANIA  --  8.8  --  9.6 8.7*  --  9.0   BILITOTAL  --  0.5  --  0.6  --   --  0.3   ALKPHOS  " --  59  --  66  --   --  65   ALT  --  16  --  20  --   --  19   AST  --  22  --  24  --   --  25    < > = values in this interval not displayed.       Recent Labs   Lab Test 06/24/25  0438 06/23/25  1708 02/27/25  1728 03/26/22  0518 03/25/22  2233 07/28/21  0621   MAG 2.0  --   --  2.0  --  2.2   ALBUMIN 3.8 4.3 4.1 3.0*   < >  --    PHOS 3.6  --   --  2.9  --  2.7    < > = values in this interval not displayed.       ASSESSMENT:    1 Day Post-Op from Procedure(s):  LAPAROTOMY Extensive  Lysis of adhesions reduction of peristomal hernia.    PLAN:   Up in chair and ambulating with assist

## 2025-06-24 NOTE — ANESTHESIA POSTPROCEDURE EVALUATION
Patient: James Grant    Procedure: Procedure(s):  LAPAROTOMY Extensive  Lysis of adhesions reduction of peristomal hernia       Anesthesia Type:  General    Note:  Disposition: ICU            ICU Sign Out: Unable to perform physician to physician sign out (TELE ICU, SURGEON CONSULTED WITH HOSPITALIST ON CALL)   Postop Pain Control: Uneventful            Sign Out: Well controlled pain   PONV: No   Neuro/Psych: Uneventful            Sign Out: Acceptable/Baseline neuro status   Airway/Respiratory:    CV/Hemodynamics: Uneventful            Sign Out: Acceptable CV status; No obvious hypovolemia; No obvious fluid overload (BASELINE BPs)   Other NRE: NONE   DID A NON-ROUTINE EVENT OCCUR? No           Last vitals:  Vitals:    06/23/25 2000 06/23/25 2030 06/23/25 2100   BP: (!) 144/88 (!) 155/95 (!) 162/87   Pulse: 67 68 66   Resp:      Temp:      SpO2: 95% 96% 92%       Electronically Signed By: PRATIMA Castro CRNA  June 24, 2025  12:26 AM

## 2025-06-24 NOTE — ANESTHESIA CARE TRANSFER NOTE
Patient: James Grant    Procedure: Procedure(s):  LAPAROTOMY Extensive  Lysis of adhesions reduction of peristomal hernia       Diagnosis: Small bowel obstruction (H) [K56.609]  Diagnosis Additional Information: No value filed.    Anesthesia Type:   General     Note:    Oropharynx: oropharynx clear of all foreign objects and spontaneously breathing  Level of Consciousness: awake  Oxygen Supplementation: face mask  Level of Supplemental Oxygen (L/min / FiO2): 4  Independent Airway: airway patency satisfactory and stable  Dentition: dentition unchanged  Vital Signs Stable: post-procedure vital signs reviewed and stable  Report to RN Given: handoff report given  Patient transferred to: ICU (IN icu LOCATION FOR RECOVERY, ICU PATIENT)    ICU Handoff: Call for PAUSE to initiate/utilize ICU HANDOFF, Identified Patient, Identified Responsible Provider, Reviewed the Pertinent Medical History, Discussed Surgical Course, Reviewed Intra-OP Anesthesia Management and Issues during Anesthesia, Set Expectations for Post Procedure Period and Allowed Opportunity for Questions and Acknowledgement of Understanding      Vitals:  Vitals Value Taken Time   /93 06/23/25 23:55   Temp     Pulse 66 06/24/25 00:00   Resp 13 06/24/25 00:00   SpO2 97 % 06/24/25 00:00   Vitals shown include unfiled device data.    Electronically Signed By: PRATIMA Castro CRNA  June 24, 2025  12:00 AM

## 2025-06-24 NOTE — PLAN OF CARE
Plan of Care Reviewed With: Patient and son    Overall Patient Progress: Progressing    VS and assessments as charted. Alert, oriented and able to make needs known. [place]. PRN Dilaudid given x2 this shift for report of RLQ abdominal pain. RA. Respirations shallow. TCDB encouraged. SR with 1st degree AVB. NPO. NG tube to LIS; 55 cm at the nare; 100 ml green/brown output this shift. BS audible. No stool/output in colostomy bag. No flatus. Prater catheter patent; urine output adequate. Midline abdominal incision with intact dressing; small amount bloody drainage noted at bottom of dressing. LR infusing at 100 ml/hr. Up in bedside chair x2 and ambulated in the kirk with A-2, gait belt and walker. Free from falls and/or injury this shift. Bed locked and low. Call light within reach. Alarm on at all times.    Face to face report given with opportunity to observe patient.  Report given to SARA Jara.    Clayton Caban RN  6/24/2025, 7:27 PM

## 2025-06-24 NOTE — PLAN OF CARE
[facility] Inpatient Admission Note:    Patient admitted to 3124/3124-1 at approximately 2350 via wheel chair accompanied by transport tech and nurse from surgery . Report received from WALTER Oscar in SBAR format at 2350 via face to face in room. Patient is drowsy, but oriented X 3, denies pain; rates at 0 on 0-10 scale.  Patient oriented to room, unit, hourly rounding, and plan of care. Explained admission packet and patient handbook with patient bill of rights brochure. Will continue to monitor and document as needed.     Inpatient Nursing criteria listed below was met:    Health care directives status obtained and documented: Yes    Patient identifies a surrogate decision maker: No     If initial lactic acid greater than 2.0, repeat lactic acid drawn within one hour of arrival to unit: NA.     Clergy visit ordered if patient requests: N/A    Skin issues/needs documented: Yes    Isolation Patient: no   Fall Prevention Yes: Care plan updated, education given and documented, sticker and magnet in place: Yes    Care Plan initiated: Yes    Education Documented (including assessment): Yes    Patient has discharge needs : Yes If yes, please explain: not yet determined

## 2025-06-24 NOTE — ANESTHESIA PREPROCEDURE EVALUATION
Anesthesia Pre-Procedure Evaluation    Patient: James Grant   MRN: [id number] : 3/5/1927          Procedure : Procedure(s):  LAPAROTOMY         Past Medical History:   Diagnosis Date    Benign localized hyperplasia of prostate without urinary obstruction and other lower urinary tract symptoms (LUTS) 2010    CHD (coronary heart disease) 2010    Colostomy present (H) 2023    Dyslipidemia 2010    GERD (gastroesophageal reflux disease) 2010    HTN (hypertension) 2010    Old myocardial infarction 2010    Other symptoms involving digestive system(787.99) 10/20/2006      Past Surgical History:   Procedure Laterality Date    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      CHOLECYSTECTOMY      CYSTOSCOPY, FULGURATE BLEEDERS, EVACUATE CLOT(S), COMBINED N/A 2020    Procedure: Clot Evacuation;  Surgeon: Andrzej Olivia MD;  Location:  OR    CYSTOSCOPY, RETROGRADES, COMBINED Bilateral 10/22/2019    Procedure: Bilateral Retrograde Pyelograms;  Surgeon: Andrzej Olivia MD;  Location:  OR    CYSTOSCOPY, TRANSURETHRAL RESECTION (TUR) TUMOR BLADDER, COMBINED N/A 10/22/2019    Procedure: Transurethral Resection of Bladder Tumor and transurethral resection of prostate and bladder stone removal;  Surgeon: Andrzej Olivia MD;  Location:  OR    HERNIA REPAIR      LAPAROTOMY EXPLORATORY N/A 2021    Procedure: Exploratory  laparotomy with sigmoid colectomy and creation of colostomy, appendectomy;  Surgeon: Sukumar Chavez MD;  Location: [facility] OR    left arm surgery        Allergies   Allergen Reactions    Lisinopril Cough    Morphine Other (See Comments) and Visual Disturbance     confusion      Social History     Tobacco Use    Smoking status: Former     Current packs/day: 0.00     Average packs/day: 1 pack/day for 13.6 years (13.6 ttl pk-yrs)     Types: Cigarettes     Start date: 1949     Quit date: 1962     Years since quittin.9    Smokeless tobacco: Never   Substance Use  Topics    Alcohol use: Not Currently     Comment: 3 Drinks ([employer]QU[employer]) ~ OCCASIONALLY (QUIT IN 2000)      Wt Readings from Last 1 Encounters:   06/23/25 94.4 kg (208 lb 1.6 oz)        Anesthesia Evaluation   Pt has had prior anesthetic. Type: General.    No history of anesthetic complications       ROS/MED HX  ENT/Pulmonary:  - neg pulmonary ROS     Neurologic:  - neg neurologic ROS     Cardiovascular:     (+)  hypertension- -  CAD - past MI - stent-                                 Previous cardiac testing   Echo: Date: Results:    Stress Test:  Date: Results:    ECG Reviewed:  Date: 2/28/25 Results:  SB 1st degree AV block  Cath:  Date: Results:      METS/Exercise Tolerance: 3 - Able to walk 1-2 blocks without stopping    Hematologic:  - neg hematologic  ROS     Musculoskeletal:  - neg musculoskeletal ROS     GI/Hepatic: Comment: Sigmoid colectomy colostomy 2021    CT 6/23/25  Closed-loop mechanical small bowel obstruction with adjacent transition points at the neck of the large left lower quadrant parastomal hernia. There is mild mesenteric edema and trace ascites along the closed loop within the hernia sac, and   developing bowel ischemia is possible. Recommend surgical consultation.     2.  Markedly enlarged prostate with changes of the bladder from chronic bladder outlet obstruction.     3.  Small and large bowel diverticulosis. No inflamed diverticuli.    Lung bases clear on abdominal xray    6am nausea    (+) GERD, Asymptomatic on medication,                  Renal/Genitourinary:     (+)        BPH,      Endo:  - neg endo ROS     Psychiatric/Substance Use:  - neg psychiatric ROS     Infectious Disease:  - neg infectious disease ROS     Malignancy:   (+) Malignancy, History of Other.Other CA bladder Remission status post Surgery.    Other: Comment: [facility]             Physical Exam  Airway  Mallampati: II  TM distance: >3 FB  Neck ROM: limited  Mouth opening: >= 4 cm    Cardiovascular -  normal exam  Rhythm: regular  Rate: normal rate     Dental   (+) Modest Abnormalities - crowns, retainers, 1 or 2 missing teeth      Pulmonary Breath sounds clear to auscultation  Comments: Course bases      Neurological - normal exam  He appears awake, alert and oriented x3.    Other Findings 8am last meal      OUTSIDE LABS:  CBC:   Lab Results   Component Value Date    WBC 11.6 (H) 06/23/2025    WBC 7.6 03/01/2025    HGB 15.5 06/23/2025    HGB 14.5 03/01/2025    HCT 46.1 06/23/2025    HCT 42.9 03/01/2025     06/23/2025     03/01/2025     BMP:   Lab Results   Component Value Date     06/23/2025     03/01/2025    POTASSIUM 4.3 06/23/2025    POTASSIUM 4.0 03/01/2025    CHLORIDE 101 06/23/2025    CHLORIDE 104 03/01/2025    CO2 25 06/23/2025    CO2 22 03/01/2025    BUN 17.7 06/23/2025    BUN 15.8 03/01/2025    CR 0.79 06/23/2025    CR 0.71 03/01/2025     (H) 06/23/2025     (H) 03/01/2025     COAGS:   Lab Results   Component Value Date    PTT 33 02/08/2014    INR 1.01 11/08/2023     POC:   Lab Results   Component Value Date    BGM 67 (L) 01/02/2017     HEPATIC:   Lab Results   Component Value Date    ALBUMIN 4.3 06/23/2025    PROTTOTAL 7.7 06/23/2025    ALT 20 06/23/2025    AST 24 06/23/2025    ALKPHOS 66 06/23/2025    BILITOTAL 0.6 06/23/2025     OTHER:   Lab Results   Component Value Date    LACT 1.0 03/26/2022    VANIA 9.6 06/23/2025    PHOS 2.9 03/26/2022    MAG 2.0 03/26/2022    LIPASE 35 02/27/2025    TSH 2.26 07/14/2022    CRP 3.8 03/25/2022    SED 9 03/01/2025       Anesthesia Plan    ASA Status:  4, emergent      NPO Status: ELEVATED Aspiration Risk/Unknown   Anesthesia Type: General.  Induction: intravenous, rapid sequence.  Maintenance: Balanced.   Techniques and Equipment:       - Monitoring Plan: standard ASA monitoring     Consents    Anesthesia Plan(s) and associated risks, benefits, and realistic alternatives discussed. Questions answered and  "patient/representative(s) expressed understanding.     - Discussed: surgeon     - Discussed with:  Patient, family (nephew and grand daughter)        - Pt is DNR/DNI Status: DNR     - Suspend during perioperative period? Yes   Blood Consent:      - Discussed with: patient.     - Consented: consented to blood products     Postoperative Care    Pain management: peripheral nerve block.     Comments:    Other Comments: Discussed risks and benefits with patient for general anesthesia including sore throat, prolonged ventilation with ICU admission, nausea, vomiting, aspiration, dental damage, loss of airway, CV complications, stroke, MI, death. Pt wishes to proceed.     Paper consent obtained for anesthesia and TAP block due to ER limitations    Agreed to bilateral transabdominus plane block and to rescind code status               PRATIMA Castro CRNA    I have reviewed the pertinent notes and labs in the chart from the past 30 days and (re)examined the patient.  Any updates or changes from those notes are reflected in this note.    Clinically Significant Risk Factors Present on Admission                 # Drug Induced Platelet Defect: home medication list includes an antiplatelet medication   # Hypertension: Noted on problem list           # Overweight: Estimated body mass index is 29.02 kg/m  as calculated from the following:    Height as of this encounter: 1.803 m (5' 11\").    Weight as of this encounter: 94.4 kg (208 lb 1.6 oz).                    "

## 2025-06-24 NOTE — PROGRESS NOTES
06/24/25 1100   Appointment Info   Signing Clinician's Name / Credentials (OT) Saumya Chavez OTR/L   Living Environment   People in Home facility resident   Current Living Arrangements assisted living   Home Accessibility no concerns   Transportation Anticipated family or friend will provide   Living Environment Comments Pt is a resident of [facility]. He uses a walk in shower with grab bars. There are also grab bars by the toilet.   Self-Care   Usual Activity Tolerance good   Current Activity Tolerance fair   Equipment Currently Used at Home walker, rolling;grab bar, toilet;grab bar, tub/shower;dressing device  (sock aid)   Fall history within last six months no   Activity/Exercise/Self-Care Comment Pt reports he is independent with ADLs at baseline and uses a 4WW for mobility.   Instrumental Activities of Daily Living (IADL)   Previous Responsibilities medication management   IADL Comments Staff/family help with the other IADLs   General Information   Onset of Illness/Injury or Date of Surgery 06/23/25   Referring Physician Dr. Chavez   Additional Occupational Profile Info/Pertinent History of Current Problem Pt day 1 s/p joya laparotomy due to SBO with closed-loop obstruction through parastomal hernia   Existing Precautions/Restrictions abdominal   Cognitive Status Examination   Orientation Status orientation to person, place and time   Affect/Mental Status (Cognitive) WNL   Follows Commands WNL   Pain Assessment   Patient Currently in Pain No   Range of Motion Comprehensive   General Range of Motion bilateral upper extremity ROM WFL   Strength Comprehensive (MMT)   Comment, General Manual Muscle Testing (MMT) Assessment MMT deferred today due to surgery   Bed Mobility   Comment (Bed Mobility) NT, pt already up in the chair upon OT arrival   Transfers   Transfer Comments Pt had just got to the chair with PT and was able to transfer with Buddy   Activities of Daily Living   BADL Assessment/Intervention  grooming;lower body dressing   Lower Body Dressing Assessment/Training   Comment, (Lower Body Dressing) Pt uses a sock aid - will further assess with AE in subsequent sessions   Grooming Assessment/Training   [place] Level (Grooming) grooming skills;set up   Position (Grooming) unsupported sitting   Clinical Impression   Criteria for Skilled Therapeutic Interventions Met (OT) Yes, treatment indicated   OT Diagnosis self-care deficits   Influenced by the following impairments post op precautions/pain   Assessment of Occupational Performance 1-3 Performance Deficits   Identified Performance Deficits ADLs/mobility   Planned Therapy Interventions (OT) ADL retraining;risk factor education;progressive activity/exercise;strengthening   Clinical Decision Making Complexity (OT) problem focused assessment/low complexity   Risk & Benefits of therapy have been explained evaluation/treatment results reviewed;care plan/treatment goals reviewed;risks/benefits reviewed;current/potential barriers reviewed;participants voiced agreement with care plan;participants included;patient   Clinical Impression Comments OT evaluation completed. Pt from an [facility] but was independent in ADLs/mobility. Pt currently limited in ADLs due to surgery but anticipate with ongoing medical management and further skilled rehab services, pt will improve enough to be able to return to the [facility] at discharge pending no complications. If pt does not improve by the time he is medically ready to discharge, pt will require SNF.   OT Total Evaluation Time   OT Eval, Low Complexity Minutes (14805) 12   OT Goals   Therapy Frequency (OT) 5 times/week   OT Predicted Duration/Target Date for Goal Attainment 07/15/25   OT Goals Hygiene/Grooming;Lower Body Dressing;Toilet Transfer/Toileting   OT: Hygiene/Grooming modified independent   OT: Lower Body Dressing Modified independent;using adaptive equipment   OT: Toilet Transfer/Toileting Modified independent   OT Discharge  Planning   OT Plan Progress ADLs, strength, and activity tolerance   OT Discharge Recommendation (DC Rec) home with assist;home with home care occupational therapy;Transitional Care Facility   OT Rationale for DC Rec OT evaluation completed. Pt from an [facility] but was independent in ADLs/mobility. Pt currently limited in ADLs due to surgery but anticipate with ongoing medical management and further skilled rehab services, pt will improve enough [facility] pending no complications. If pt does not improve by the time he is medically ready to discharge, pt will require SNF.   OT Brief overview of current status set up grooming; Silas transfer   Total Session Time   Total Session Time (sum of timed and untimed services) 12

## 2025-06-24 NOTE — MEDICATION SCRIBE - ADMISSION MEDICATION HISTORY
Medication Scribe Admission Medication History    Admission medication history is complete. The information provided in this note is only as accurate as the sources available at the time of the update.    Information Source(s): Patient and CareEverywhere/SureScripts via in-person    Pertinent Information:   Patient resides at [facility] but manages his own medications. He is unfamiliar with famotidine but has a consistent fill hx.     Changes made to PTA medication list:  Added: None  Deleted: None  Changed:   Flonase- takes PRN and does not use often  Losartan- increased in March from 25 mg to 50 mg, pt reports he took the increased dose but his BP dropped too much so he resumed lower dosing (25 mg).     Allergies reviewed with patient and updates made in EHR: no    Medication History Completed By: Stacy Govea 6/24/2025 9:20 AM    PTA Med List   Medication Sig Last Dose/Taking    aspirin (ASA) 325 MG EC tablet Take 0.5 tablets by mouth daily 6/23/2025 Morning    famotidine (PEPCID) 20 MG tablet TAKE ONE TABLET BY MOUTH EVERY DAY IN THE MORNING Taking    fluticasone (FLONASE) 50 MCG/ACT nasal spray Spray 1 spray into both nostrils daily. (Patient taking differently: Spray 1 spray into both nostrils daily as needed.) More than a month    losartan (COZAAR) 25 MG tablet Take 25 mg by mouth daily. 6/23/2025 Morning    metoprolol succinate ER (TOPROL XL) 25 MG 24 hr tablet TAKE ONE-HALF TABLET BY MOUTH EVERY DAY AT BEDTIME 6/22/2025 Bedtime    nitroGLYcerin (NITROSTAT) 0.4 MG sublingual tablet DISSOLVE 1 TABLET UNDER THE TONGUE EVERY 5 MINUTES AS NEEDED FOR CHEST PAIN. DO NOT EXCEED A TOTAL OF 3 DOSES IN 15 MINUTES. Unknown    simvastatin (ZOCOR) 20 MG tablet TAKE ONE TABLET BY MOUTH EVERY DAY AT BEDTIME 6/22/2025 Bedtime

## 2025-06-24 NOTE — H&P
Admission History and Physical  6/23/2025    Patient: James Grant    Admitting Physician: Sukumar Chavez MD  Consulting Physician: Sukumar Chavez MD, MD    Admitting diagnosis: Abdominal Pain.  Parastomal hernia with possible closed-loop obstruction    Expected length of stay is greater than 2 days    This is a 89 year old male with a known parastomal hernia who presented to the emergency room today with a history of 2 days of no ostomy output.  Also with increasing abdominal pain especially in his left upper quadrant.  On the emergency room he had a CT of the abdomen pelvis which showed a parastomal hernia with probable closed-loop obstruction.  General surgery was called.  Patient does give a history of no output from his ostomy x 2 days.  Abdominal pain.  No nausea.  No vomiting.  No fever.  No chills.  He is status post perforated diverticulitis approximately 4 years ago with a diverting colostomy.  He has a known parastomal hernia.  Multiple attempts were made to reduce the hernia and I was unable to.    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      CHOLECYSTECTOMY      CYSTOSCOPY, FULGURATE BLEEDERS, EVACUATE CLOT(S), COMBINED N/A 2/17/2020    Procedure: Clot Evacuation;  Surgeon: Andrzej Olivia MD;  Location:  OR    CYSTOSCOPY, RETROGRADES, COMBINED Bilateral 10/22/2019    Procedure: Bilateral Retrograde Pyelograms;  Surgeon: Andrzej Olivia MD;   Location: [facility] OR    CYSTOSCOPY, TRANSURETHRAL RESECTION (TUR) TUMOR BLADDER, COMBINED N/A 10/22/2019    Procedure: Transurethral Resection of Bladder Tumor and transurethral resection of prostate and bladder stone removal;  Surgeon: Andrzej Olivia MD;  Location: [facility] OR    HERNIA REPAIR      LAPAROTOMY EXPLORATORY N/A 7/19/2021    Procedure: Exploratory  laparotomy with sigmoid colectomy and creation of colostomy, appendectomy;  Surgeon: Sukumar Chavez MD;  Location: [facility] OR    left arm surgery         Family History History:  Family History   Problem Relation Age of Onset    Heart Failure Mother 86        Congestive - Cause of Death    Cerebrovascular Disease Father     C.A.D. Sister 91       History of Tobacco Use:  History   Smoking Status    Former    Types: Cigarettes   Smokeless Tobacco    Never       Current Medications:  Current Outpatient Medications   Medication Sig Dispense Refill    aspirin (ASA) 325 MG EC tablet Take 0.5 tablets by mouth daily      famotidine (PEPCID) 20 MG tablet TAKE ONE TABLET BY MOUTH EVERY DAY IN THE MORNING 90 tablet 3    fluticasone (FLONASE) 50 MCG/ACT nasal spray Spray 1 spray into both nostrils daily. 16 g 0    losartan (COZAAR) 50 MG tablet Take 1 tablet (50 mg) by mouth daily. 30 tablet 1    metoprolol succinate ER (TOPROL XL) 25 MG 24 hr tablet TAKE ONE-HALF TABLET BY MOUTH EVERY DAY AT BEDTIME 45 tablet 3    nitroGLYcerin (NITROSTAT) 0.4 MG sublingual tablet DISSOLVE 1 TABLET UNDER THE TONGUE EVERY 5 MINUTES AS NEEDED FOR CHEST PAIN. DO NOT EXCEED A TOTAL OF 3 DOSES IN 15 MINUTES. (Patient not taking: Reported on 3/21/2025) 0.1 tablet 0    simvastatin (ZOCOR) 20 MG tablet TAKE ONE TABLET BY MOUTH EVERY DAY AT BEDTIME 90 tablet 3       Allergies:  Allergies   Allergen Reactions    Lisinopril Cough    Morphine Other (See Comments) and Visual Disturbance     confusion       ROS:  Pertinent items are noted in HPI.  All other systems are negative.    PHYSICAL EXAM:     Vital  "signs: BP (!) 144/88   Pulse 67   Temp 97.9  F (36.6  C) (Oral)   Resp 18   Ht 1.803 m (5' 11\")   Wt 94.4 kg (208 lb 1.6 oz)   SpO2 95%   BMI 29.02 kg/m     Weight: [unfilled]   BMI: Body mass index is 29.02 kg/m .   General: Normal, healthy, cooperative, in no acute distress, alert   Skin: no jaundice   HEENT: PERRLA and EOMI   Neck: supple   Lungs: clear to auscultation   CV: Regular rate and rhythm without murmer   Abdominal: No peritoneal signs.  Tenderness left upper quadrant.  Obvious parastomal hernia.  Unable to reduce the parastomal hernia.  Attempts to reduce the parastomal hernia is very tender.   Extremities: No cyanosis, clubbing or edema noted bilaterally in Upper and Lower Extremities   Neurological: without deficit    LABORATORY:  CBC Results:   Recent Labs   Lab Test 06/23/25  1708   WBC 11.6*   RBC 5.08   HGB 15.5   HCT 46.1   MCV 91   MCH 30.5   MCHC 33.6   RDW 13.2          Last Comprehensive Metabolic Panel:  Sodium   Date Value Ref Range Status   06/23/2025 139 135 - 145 mmol/L Final   11/30/2020 141 133 - 144 mmol/L Final     Potassium   Date Value Ref Range Status   06/23/2025 4.3 3.4 - 5.3 mmol/L Final   03/26/2022 4.0 3.4 - 5.3 mmol/L Final   11/30/2020 3.9 3.4 - 5.3 mmol/L Final     Chloride   Date Value Ref Range Status   06/23/2025 101 98 - 107 mmol/L Final   03/26/2022 109 94 - 109 mmol/L Final   11/30/2020 110 (H) 94 - 109 mmol/L Final     Carbon Dioxide   Date Value Ref Range Status   11/30/2020 27 20 - 32 mmol/L Final     Carbon Dioxide (CO2)   Date Value Ref Range Status   06/23/2025 25 22 - 29 mmol/L Final   03/26/2022 26 20 - 32 mmol/L Final     Anion Gap   Date Value Ref Range Status   06/23/2025 13 7 - 15 mmol/L Final   03/26/2022 6 3 - 14 mmol/L Final   11/30/2020 4 3 - 14 mmol/L Final     Glucose   Date Value Ref Range Status   06/23/2025 116 (H) 70 - 99 mg/dL Final   03/26/2022 116 (H) 70 - 99 mg/dL Final   11/30/2020 121 (H) 70 - 99 mg/dL Final     GLUCOSE BY " METER POCT   Date Value Ref Range Status   03/01/2025 144 (H) 70 - 99 mg/dL Final     Urea Nitrogen   Date Value Ref Range Status   06/23/2025 17.7 8.0 - 23.0 mg/dL Final   03/26/2022 18 7 - 30 mg/dL Final   11/30/2020 19 7 - 30 mg/dL Final     Creatinine   Date Value Ref Range Status   06/23/2025 0.79 0.67 - 1.17 mg/dL Final   11/30/2020 0.82 0.66 - 1.25 mg/dL Final     GFR Estimate   Date Value Ref Range Status   06/23/2025 80 >60 mL/min/1.73m2 Final     Comment:     eGFR calculated using 2021 CKD-EPI equation.   11/30/2020 76 >60 mL/min/[1.73_m2] Final     Comment:     Non  GFR Calc  Starting 12/18/2018, serum creatinine based estimated GFR (eGFR) will be   calculated using the Chronic Kidney Disease Epidemiology Collaboration   (CKD-EPI) equation.       Calcium   Date Value Ref Range Status   06/23/2025 9.6 8.8 - 10.4 mg/dL Final   11/30/2020 8.7 8.5 - 10.1 mg/dL Final     Bilirubin Total   Date Value Ref Range Status   06/23/2025 0.6 <=1.2 mg/dL Final   12/29/2019 0.3 0.2 - 1.3 mg/dL Final     Alkaline Phosphatase   Date Value Ref Range Status   06/23/2025 66 40 - 150 U/L Final   12/29/2019 69 40 - 150 U/L Final     ALT   Date Value Ref Range Status   06/23/2025 20 0 - 70 U/L Final   12/29/2019 28 0 - 70 U/L Final     AST   Date Value Ref Range Status   06/23/2025 24 0 - 45 U/L Final   12/29/2019 20 0 - 45 U/L Final       RADIOLOGY:    I have reviewed the patients CT of the abdomen and pelvis and agree with its findings.    ASSESSMENT: This is a 89 year old male with a small bowel obstruction with CT consistent with closed-loop obstruction at the parastomal hernia.      PLAN: I had a long discussion with the patient and the patient's family.  Given the patient's age the patient is a high risk for any procedure.  Given the possibility of a closed-loop obstruction with bowel at risk not doing anything would most likely result in bowel necrosis perforation sepsis and death.  Explained to the  patient and to the family that he is high risk given his age of a surgical procedure and he may or may not survive.  I also explained that his road to recovery will be extended.  He voices understanding and the patient's family voices understanding and they do understand his age versus the process.  At the end of that discussion they desire to proceed with surgical intervention.      The risks, benefits, and alternatives to the planned procedure were fully discussed with the patient and/or the patient's representative(s). The risks of bleeding, infection, death, missing pathology, the need for additional procedures intra-operatively, the possible need for intra-operative consults, the possible need for transfusion therapy, cardiopulmonary compromise, the possible need for additional surgery for a complication were discussed with the patient and/or the patient's representative(s). The patient's and/or patient's representative(s) questions were addressed and answered. Informed consent was obtained from the patient and/or the patient's representative(s). The patient and/or the patient's representative(s) consent to proceed.

## 2025-06-25 ENCOUNTER — APPOINTMENT (OUTPATIENT)
Dept: OCCUPATIONAL THERAPY | Facility: HOSPITAL | Age: OVER 89
End: 2025-06-25
Payer: MEDICARE

## 2025-06-25 ENCOUNTER — APPOINTMENT (OUTPATIENT)
Dept: PHYSICAL THERAPY | Facility: HOSPITAL | Age: OVER 89
End: 2025-06-25
Payer: MEDICARE

## 2025-06-25 LAB
ALBUMIN SERPL BCG-MCNC: 3.3 G/DL (ref 3.5–5.2)
ALP SERPL-CCNC: 52 U/L (ref 40–150)
ALT SERPL W P-5'-P-CCNC: 12 U/L (ref 0–70)
ANION GAP SERPL CALCULATED.3IONS-SCNC: 12 MMOL/L (ref 7–15)
AST SERPL W P-5'-P-CCNC: 21 U/L (ref 0–45)
BASOPHILS # BLD AUTO: 0 10E3/UL (ref 0–0.2)
BASOPHILS NFR BLD AUTO: 0 %
BILIRUB SERPL-MCNC: 0.7 MG/DL
BUN SERPL-MCNC: 16.2 MG/DL (ref 8–23)
CALCIUM SERPL-MCNC: 8.6 MG/DL (ref 8.8–10.4)
CHLORIDE SERPL-SCNC: 100 MMOL/L (ref 98–107)
CREAT SERPL-MCNC: 0.67 MG/DL (ref 0.67–1.17)
EGFRCR SERPLBLD CKD-EPI 2021: 84 ML/MIN/1.73M2
EOSINOPHIL # BLD AUTO: 0 10E3/UL (ref 0–0.7)
EOSINOPHIL NFR BLD AUTO: 0 %
ERYTHROCYTE [DISTWIDTH] IN BLOOD BY AUTOMATED COUNT: 13.6 % (ref 10–15)
GLUCOSE BLDC GLUCOMTR-MCNC: 110 MG/DL (ref 70–99)
GLUCOSE BLDC GLUCOMTR-MCNC: 113 MG/DL (ref 70–99)
GLUCOSE BLDC GLUCOMTR-MCNC: 125 MG/DL (ref 70–99)
GLUCOSE BLDC GLUCOMTR-MCNC: 137 MG/DL (ref 70–99)
GLUCOSE BLDC GLUCOMTR-MCNC: 141 MG/DL (ref 70–99)
GLUCOSE SERPL-MCNC: 139 MG/DL (ref 70–99)
HCO3 SERPL-SCNC: 21 MMOL/L (ref 22–29)
HCT VFR BLD AUTO: 45.2 % (ref 40–53)
HGB BLD-MCNC: 15 G/DL (ref 13.3–17.7)
IMM GRANULOCYTES # BLD: 0.1 10E3/UL
IMM GRANULOCYTES NFR BLD: 0 %
LYMPHOCYTES # BLD AUTO: 2.4 10E3/UL (ref 0.8–5.3)
LYMPHOCYTES NFR BLD AUTO: 14 %
MAGNESIUM SERPL-MCNC: 2 MG/DL (ref 1.7–2.3)
MCH RBC QN AUTO: 30.7 PG (ref 26.5–33)
MCHC RBC AUTO-ENTMCNC: 33.2 G/DL (ref 31.5–36.5)
MCV RBC AUTO: 93 FL (ref 78–100)
MONOCYTES # BLD AUTO: 1.9 10E3/UL (ref 0–1.3)
MONOCYTES NFR BLD AUTO: 12 %
NEUTROPHILS # BLD AUTO: 12.1 10E3/UL (ref 1.6–8.3)
NEUTROPHILS NFR BLD AUTO: 73 %
NRBC # BLD AUTO: 0 10E3/UL
NRBC BLD AUTO-RTO: 0 /100
PHOSPHATE SERPL-MCNC: 2.1 MG/DL (ref 2.5–4.5)
PLAT MORPH BLD: NORMAL
PLATELET # BLD AUTO: 203 10E3/UL (ref 150–450)
POTASSIUM SERPL-SCNC: 4.3 MMOL/L (ref 3.4–5.3)
PROT SERPL-MCNC: 6.2 G/DL (ref 6.4–8.3)
RBC # BLD AUTO: 4.88 10E6/UL (ref 4.4–5.9)
RBC MORPH BLD: NORMAL
SODIUM SERPL-SCNC: 133 MMOL/L (ref 135–145)
WBC # BLD AUTO: 16.5 10E3/UL (ref 4–11)

## 2025-06-25 PROCEDURE — 36415 COLL VENOUS BLD VENIPUNCTURE: CPT | Performed by: SURGERY

## 2025-06-25 PROCEDURE — 250N000013 HC RX MED GY IP 250 OP 250 PS 637: Performed by: INTERNAL MEDICINE

## 2025-06-25 PROCEDURE — 200N000001 HC R&B ICU

## 2025-06-25 PROCEDURE — 999N000157 HC STATISTIC RCP TIME EA 10 MIN

## 2025-06-25 PROCEDURE — 250N000009 HC RX 250: Performed by: INTERNAL MEDICINE

## 2025-06-25 PROCEDURE — 83735 ASSAY OF MAGNESIUM: CPT | Performed by: SURGERY

## 2025-06-25 PROCEDURE — 97530 THERAPEUTIC ACTIVITIES: CPT | Mod: GO

## 2025-06-25 PROCEDURE — 250N000013 HC RX MED GY IP 250 OP 250 PS 637: Performed by: SURGERY

## 2025-06-25 PROCEDURE — 85018 HEMOGLOBIN: CPT | Performed by: SURGERY

## 2025-06-25 PROCEDURE — 258N000003 HC RX IP 258 OP 636: Performed by: SURGERY

## 2025-06-25 PROCEDURE — 250N000011 HC RX IP 250 OP 636: Performed by: SURGERY

## 2025-06-25 PROCEDURE — 84100 ASSAY OF PHOSPHORUS: CPT | Performed by: SURGERY

## 2025-06-25 PROCEDURE — 97530 THERAPEUTIC ACTIVITIES: CPT | Mod: GP | Performed by: PHYSICAL THERAPIST

## 2025-06-25 PROCEDURE — 84132 ASSAY OF SERUM POTASSIUM: CPT | Performed by: SURGERY

## 2025-06-25 PROCEDURE — 250N000009 HC RX 250: Performed by: SURGERY

## 2025-06-25 RX ORDER — LOSARTAN POTASSIUM 25 MG/1
25 TABLET ORAL DAILY
Status: DISCONTINUED | OUTPATIENT
Start: 2025-06-25 | End: 2025-07-02 | Stop reason: HOSPADM

## 2025-06-25 RX ORDER — ENOXAPARIN SODIUM 100 MG/ML
40 INJECTION SUBCUTANEOUS EVERY 24 HOURS
Status: DISCONTINUED | OUTPATIENT
Start: 2025-06-25 | End: 2025-07-02 | Stop reason: HOSPADM

## 2025-06-25 RX ORDER — METOPROLOL TARTRATE 1 MG/ML
5 INJECTION, SOLUTION INTRAVENOUS EVERY 6 HOURS
Status: COMPLETED | OUTPATIENT
Start: 2025-06-25 | End: 2025-06-25

## 2025-06-25 RX ADMIN — ENOXAPARIN SODIUM 40 MG: 40 INJECTION SUBCUTANEOUS at 10:46

## 2025-06-25 RX ADMIN — METOPROLOL SUCCINATE 12.5 MG: 25 TABLET, EXTENDED RELEASE ORAL at 21:37

## 2025-06-25 RX ADMIN — LOSARTAN POTASSIUM 25 MG: 25 TABLET, FILM COATED ORAL at 10:49

## 2025-06-25 RX ADMIN — SODIUM PHOSPHATE, MONOBASIC, MONOHYDRATE AND SODIUM PHOSPHATE, DIBASIC, ANHYDROUS 15 MMOL: 142; 276 INJECTION, SOLUTION INTRAVENOUS at 14:53

## 2025-06-25 RX ADMIN — METOPROLOL TARTRATE 5 MG: 1 INJECTION, SOLUTION INTRAVENOUS at 04:53

## 2025-06-25 RX ADMIN — FAMOTIDINE 20 MG: 10 INJECTION, SOLUTION INTRAVENOUS at 08:42

## 2025-06-25 RX ADMIN — METOPROLOL TARTRATE 5 MG: 1 INJECTION, SOLUTION INTRAVENOUS at 10:43

## 2025-06-25 RX ADMIN — METOPROLOL TARTRATE 5 MG: 1 INJECTION, SOLUTION INTRAVENOUS at 16:39

## 2025-06-25 RX ADMIN — SODIUM CHLORIDE, POTASSIUM CHLORIDE, SODIUM LACTATE AND CALCIUM CHLORIDE: 600; 310; 30; 20 INJECTION, SOLUTION INTRAVENOUS at 10:18

## 2025-06-25 RX ADMIN — FAMOTIDINE 20 MG: 20 TABLET, FILM COATED ORAL at 21:37

## 2025-06-25 RX ADMIN — ACETAMINOPHEN 975 MG: 325 TABLET ORAL at 08:46

## 2025-06-25 RX ADMIN — ACETAMINOPHEN 975 MG: 325 TABLET ORAL at 16:42

## 2025-06-25 ASSESSMENT — ACTIVITIES OF DAILY LIVING (ADL)
ADLS_ACUITY_SCORE: 62
ADLS_ACUITY_SCORE: 61

## 2025-06-25 NOTE — PLAN OF CARE
Goal Outcome Evaluation:      Plan of Care Reviewed With: patient    Overall Patient Progress: no changeOverall Patient Progress: no change      Pt is alert and oriented, VS and assessment as charted, afebrile. C/o mild abdominal pain, declines medication. 2L oxymask placed at HS. SR 70's 1st degree AVB. NG 55 cm at the nare to LIS. 500mL out. Hypoactive bowl sounds, no stool or gas in ostomy. Prater catheter intact, Clear yellow output. Able to make needs known, call light within reach.      Face to face report given with opportunity to observe patient.    Report given to SARA Becerra RN   6/25/2025  7:19 AM

## 2025-06-25 NOTE — PLAN OF CARE
Plan of Care Reviewed With: Patient    Overall Patient Progress: Progressing    VS and assessments as charted. Pt remains alert, oriented and able to make needs known. Pain rated 0-1/10 at rest and 6/10 with movement and/or coughing. Receiving scheduled Tylenol. Declined offers of additional pain medication. RA. SR with 1st degree AVB. Remains NPO (except for medications and ice chips). -137-125. NG to LIS; 300 ml green/brown drainage. No N/V. Gas noted in ostomy bag this afternoon/evening. No stool. Prater catheter patent; urine output quantity sufficient. Free from falls and/or injury this shift. Call light within reach. Bed locked and low. Alarms on at all times.    Face to face report given with opportunity to observe patient.  Report given to SARA Jara.    Clayton Caban RN  6/25/2025, 7:14 PM

## 2025-06-25 NOTE — CONSULTS
"[facility]    Hospitalist Consult note    Date of Service (when I saw the patient): 06/25/2025    Assessment & Plan   James Grant is a 89 year old male who was admitted on 6/23/2025.    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  Patient also with history of essential hypertension: Patient is somewhat hypertensive here postoperatively.  - Will restart his losartan and metoprolol    Hyperlipidemia: Patient is on statin at home  - Ok to restart with advancing diet    FEN: Diet per surgery    Clinically Significant Risk Factors         # Hyponatremia: Lowest Na = 133 mmol/L in last 2 days, will monitor as appropriate   # Hypocalcemia: Lowest Ca = 8.6 mg/dL in last 2 days, will monitor and replace as appropriate     # Hypoalbuminemia: Lowest albumin = 3.3 g/dL at 6/25/2025  4:35 AM, will monitor as appropriate     # Hypertension: Noted on problem list            # Overweight: Estimated body mass index is 28.2 kg/m  as calculated from the following:    Height as of this encounter: 1.803 m (5' 11\").    Weight as of this encounter: 91.7 kg (202 lb 2.6 oz)., PRESENT ON ADMISSION            DVT Prophylaxis: Defer to primary service    Code Status: No CPR- Do NOT Intubate    Disposition: Per surgery.  Patient is stable from medical standpoint.  Okay to continue home medications on discharge.  IM will sign off at this time, but will be available for questions.      Angelica Gallegos MD, MD    Interval History   Patient seen in room.  Patient doing well, operative pain is controlled.  Patient denies any chest pain, shortness of breath, nausea, vomiting.  No passing of flatus or stool as of yet.  NG tube still in place.    -Data reviewed today: I reviewed all new labs and imaging results over the last 24 hours. " I personally reviewed imaging reports.    Physical Exam   Temp: 99.3  F (37.4  C) Temp src: Temporal BP: (!) 158/83 Pulse: 76   Resp: 16 SpO2: 93 % O2 Device: None (Room air)    Vitals:    06/23/25 1639 06/23/25 2355 06/25/25 0456   Weight: 94.4 kg (208 lb 1.6 oz) 96.8 kg (213 lb 6.5 oz) 91.7 kg (202 lb 2.6 oz)     Vital Signs with Ranges  Temp:  [98.5  F (36.9  C)-99.3  F (37.4  C)] 99.3  F (37.4  C)  Pulse:  [67-86] 76  Resp:  [12-16] 16  BP: (102-160)/(61-93) 158/83  SpO2:  [90 %-96 %] 93 %    Intake/Output Summary (Last 24 hours) at 6/25/2025 0933  Last data filed at 6/25/2025 0559  Gross per 24 hour   Intake 2311.67 ml   Output 1570 ml   Net 741.67 ml       Peripheral IV 06/24/25 Anterior;Left Lower forearm (Active)   Site Assessment WDL 06/25/25 0445   Line Status Infusing 06/25/25 0445   Dressing Transparent 06/25/25 0445   Dressing Status clean;dry;intact 06/24/25 2000   Line Intervention Flushed 06/24/25 1635   Line Necessity Yes, meets criteria 06/25/25 0445   Phlebitis Scale 0-->no symptoms 06/25/25 0445   Infiltration? no 06/24/25 1835   Number of days: 1       Peripheral IV 06/24/25 Anterior;Right Upper arm (Active)   Number of days: 1       Colostomy (Active)   Stomal Appliance 2 piece 06/25/25 0445   Stoma Assessment Red 06/25/25 0445   Peristomal Assessment UTV 06/25/25 0445   Stool Amount Other (Comment) 06/24/25 1622   Output (ml) 0 ml 06/25/25 0445   Number of days: 1437       GI Enteral Access Device Mouth, center Gastric 18 fr (Active)   Status Suction-low intermittent 06/25/25 0445   Placement Confirmation [place] unchanged 06/25/25 0445   Surrounding Skin Assessment WDL 06/25/25 0445   Insertion Site Assessment WDL 06/25/25 0445   [place] (cm marking) at nare/mouth 55 cm 06/25/25 0445   [place] (visual) [place] at entry site (nare, mouth, etc.) 06/24/25 2000   Securement Device Tape 06/25/25 0445   Drainage Green;Brown 06/25/25 0445   Output (mL) 500 ml 06/25/25 0559   Number of days: 2        Urinary Drain 06/23/25 (Active)   Number of days: 2       Urinary Drain Urethral Catheter Latex 14 fr (Active)   Tube Description Positional 06/25/25 0445   Catheter Care Catheter wipes 06/25/25 0000   Perineal Skin Assessment Intact 06/25/25 0445   Collection Container Standard 06/25/25 0445   Securement Method Commercial securement device 06/25/25 0445   Rationale for Continued Use Surgical procedure 06/25/25 0445   Urine Output 50 mL 06/25/25 0559   Number of days:        Wound (Active)   Number of days:        Incision/Surgical Site 07/19/21 Abdomen (Active)   Number of days: 1437       Incision/Surgical Site Upper Abdomen (Active)   Incision Assessment UTV 06/25/25 0000   Dressing Foam 06/24/25 2000   Madisyn-Incision Assessment [facility] 06/24/25 2000   Closure DAVID 06/24/25 1622   Incision Drainage Amount UTV 06/25/25 0000   Drainage Description [facility] 06/25/25 0000   Dressing Intervention Dried drainage 06/25/25 0000   Number of days:      No line/device    Constitutional - AA, NAD  HEENT - NG tube still in place, atraumatic, normocephalic  Neck - supple, no masses, no JVD  CVS - S1 S2 RRR, no murmurs, rubs, gallops  Respiratory - CTA b/l  GI - soft, NT, ND, + bowel sounds, no organomegaly  Musculoskeletal - no LE edema, no lesions  Neuro - oriented x 3, no gross focal deficits      Medications   Current Facility-Administered Medications   Medication Dose Route Frequency Provider Last Rate Last Admin    lactated ringers infusion   Intravenous Continuous Sukumar Chavez  mL/hr at 06/24/25 1429 New Bag at 06/24/25 1429    lactated ringers infusion   Intravenous Continuous Sukumar Chavez MD   New Bag at 06/23/25 2138     Current Facility-Administered Medications   Medication Dose Route Frequency Provider Last Rate Last Admin    acetaminophen (TYLENOL) tablet 975 mg  975 mg Oral Q8H Sukumar Chavez MD   975 mg at 06/25/25 0846    famotidine (PEPCID) tablet 20 mg  20 mg Oral BID Sukumar Chavez  MD Jagjit        Or    famotidine (PEPCID) injection 20 mg  20 mg Intravenous BID Sukumar Chavez MD   20 mg at 06/25/25 0842    metoprolol (LOPRESSOR) injection 5 mg  5 mg Intravenous Q6H Sukumar Chavez MD   5 mg at 06/25/25 0453    sodium chloride (PF) 0.9% PF flush 3 mL  3 mL Intracatheter Q8H [facility] Sukumar Chavez MD   3 mL at 06/24/25 2243    sodium chloride (PF) 0.9% PF flush 3 mL  3 mL Intracatheter Q8H Sukumar Chavez MD   3 mL at 06/24/25 1235       Data   Recent Labs   Lab 06/25/25  0828 06/25/25  0435 06/25/25  0030 06/24/25  0816 06/24/25  0438 06/24/25  0024 06/23/25  1708   WBC  --  16.5*  --   --  16.5*  --  11.6*   HGB  --  15.0  --   --  15.8  --  15.5   MCV  --  93  --   --  90  --  91   PLT  --  203  --   --  245  --  252   NA  --  133*  --   --  138  --  139   POTASSIUM  --  4.3  --   --  4.1  --  4.3   CHLORIDE  --  100  --   --  101  --  101   CO2  --  21*  --   --  24  --  25   BUN  --  16.2  --   --  18.2  --  17.7   CR  --  0.67  --   --  0.67  --  0.79   ANIONGAP  --  12  --   --  13  --  13   VANIA  --  8.6*  --   --  8.8  --  9.6   * 139* 113*   < > 194*   < > 116*   ALBUMIN  --  3.3*  --   --  3.8  --  4.3   PROTTOTAL  --  6.2*  --   --  6.4  --  7.7   BILITOTAL  --  0.7  --   --  0.5  --  0.6   ALKPHOS  --  52  --   --  59  --  66   ALT  --  12  --   --  16  --  20   AST  --  21  --   --  22  --  24    < > = values in this interval not displayed.     Lactic Acid   Date Value Ref Range Status   03/26/2022 1.0 0.7 - 2.0 mmol/L Final   03/25/2022 1.9 0.7 - 2.0 mmol/L Final   07/15/2021 1.6 0.7 - 2.0 mmol/L Final   02/16/2019 1.5 0.7 - 2.0 mmol/L Final   02/16/2019 2.6 (H) 0.7 - 2.0 mmol/L Final     Comment:     Critical Value called to and read back by  ROD [employer] 1751 2  Critical Value called to and read back by  ROD [employer] 1751 2/16/19 JU  Significant value called to and read back by   ROD [employer] 1751 2/16/19 JU     08/23/2015 1.6 0.4 - 2.0  mmol/L Final       No results found for this or any previous visit (from the past 24 hours).    Angelica Gallegos MD

## 2025-06-25 NOTE — PROGRESS NOTES
06/25/25 1300   Appointment Info   Signing Clinician's Name / Credentials (OT) Saumya Chavez OTR/L   Interventions   Interventions Quick Adds Therapeutic Activity   Therapeutic Activities   Therapeutic Activity Minutes (44885) 13   Symptoms noted during/after treatment fatigue   Treatment Detail/Skilled Intervention Pt sleeping in the chair upon OT arrival. He reports having an off day. He recently got to the bed and had help completing a sponge bath and changing his gown. Pt declined working on functional mobility or completing exercises. Pt brushed his teeth with set up. He became slightly naseous but mostly spitting up. Pt had emesis bag. Pt was left resting in the chair, call light within reach and clip alert attached.   OT Discharge Planning   OT Plan Progress ADLs, strength, and activity tolerance   OT Discharge Recommendation (DC Rec) home with assist;home with home care occupational therapy;Transitional Care Facility   OT Rationale for DC Rec Pt from an [facility] but was independent in ADLs/mobility. Pt currently limited in ADLs due to surgery but hopefully with ongoing medical management and further skilled rehab services, pt will improve enough to be able to return to the [facility] at discharge pending no complications. If pt does not improve by the time he is medically ready to discharge, pt will require SNF.   OT Brief overview of current status Pt sleeping in the chair upon OT arrival. He reports having an off day. He recently got to the bed and had help completing a sponge bath and changing his gown. Pt declined working on functional mobility or completing exercises. Pt brushed his teeth with set up. He became slightly naseous but mostly spitting up. Pt had emesis bag. Pt was left resting in the chair, call light within reach and clip alert attached.   Total Session Time   Timed Code Treatment Minutes 13   Total Session Time (sum of timed and untimed services) 13

## 2025-06-25 NOTE — PROGRESS NOTES
06/25/25 1156   Appointment Info   Signing Clinician's Name / Credentials (PT) Emma Merino DPT   Interventions   Interventions Quick Adds Therapeutic Activity   Therapeutic Activity   Therapeutic Activities: dynamic activities to improve functional performance Minutes (12467) 16   Symptoms Noted During/After Treatment Fatigue;Increased pain   Treatment Detail/Skilled Intervention Pt up in chair upon PT arrival, agreeable to PT but frustrated that he isn't doing better at this time.  Provided reassurance that he is doing well for POD #2.  Pt transferred sit>stand CGA from chair with slow transition due to pain.  Pt ambulated 140' with FWW CGA, slow but steady on feet.  Upon returning to room, pt requesting to get back into bed to rest, declined exercises.  Pt transferred sit>sup min A with LEs.  Nursing aware that BP cuff was not on and NG needed to be hooked back up.  Pt left resting in bed with call light in reach and bed alarm on.   PT Discharge Planning   PT Plan Progress mobility as tolerated   PT Discharge Recommendation (DC Rec) home with assist;home with home care physical therapy;Transitional Care Facility   PT Rationale for DC Rec pt continues to tolerate activity and mobility well, if progress continues, anticipate pt will be able to return to [facility] without issues, need for home vs outpatient PT will be dependent upon his functional status at time of discharge.   PT Brief overview of current status sit<>stand CGA, ambulated 140' with FWW CGA   PT Total Distance Amb During Session (feet) 140   Physical Therapy Time and Intention   Timed Code Treatment Minutes 16   Total Session Time (sum of timed and untimed services) 16

## 2025-06-25 NOTE — PROGRESS NOTES
INPATIENT ROUNDING NOTE  6/25/2025    Patient: James Grant    Physician of Record: Sukumar Chavez MD    Admitting diagnosis: Small bowel obstruction (H) [K56.609]    Procedure(s):  LAPAROTOMY Extensive  Lysis of adhesions reduction of peristomal hernia     POD: 2 Days Post-Op    Current Diet: NPO    CURRENT MEDICATIONS:  Continuous Medications:  Current Facility-Administered Medications   Medication Dose Route Frequency Provider Last Rate Last Admin    acetaminophen (TYLENOL) tablet 975 mg  975 mg Oral Q8H Sukumar Chavez MD   975 mg at 06/25/25 0846    benzocaine-menthol (CEPACOL) 15-3.6 MG lozenge 1 lozenge  1 lozenge Buccal Q1H PRN Sukumar Chavez MD        diphenhydrAMINE (BENADRYL) elixir 12.5 mg  12.5 mg Oral Q6H PRN Sukumar Chavez MD        Or    diphenhydrAMINE (BENADRYL) injection 12.5 mg  12.5 mg Intravenous Q6H PRN Sukumar Chavez MD        famotidine (PEPCID) tablet 20 mg  20 mg Oral BID Sukumar Chavez MD        Or    famotidine (PEPCID) injection 20 mg  20 mg Intravenous BID Sukumar Chavez MD   20 mg at 06/25/25 0842    fluticasone (FLONASE) 50 MCG/ACT spray 1 spray  1 spray Both Nostrils Daily PRN Sukumar Chavez MD        HYDROmorphone (DILAUDID) injection 0.2 mg  0.2 mg Intravenous Q2H PRN Sukumar Chavez MD   0.2 mg at 06/24/25 1635    Or    HYDROmorphone (DILAUDID) injection 0.4 mg  0.4 mg Intravenous Q2H PRN Sukumar Chavez MD        lactated ringers infusion   Intravenous Continuous Sukumar Chavez  mL/hr at 06/24/25 1429 New Bag at 06/24/25 1429    lactated ringers infusion   Intravenous Continuous Sukumar Chavez MD   New Bag at 06/23/25 2138    lidocaine (LMX4) cream   Topical Q1H PRN Sukumar Chavez MD        lidocaine 1 % 0.1-1 mL  0.1-1 mL Other Q1H PRN Sukumar Chavez MD        metoprolol (LOPRESSOR) injection 5 mg  5 mg Intravenous Q6H Sukumar Chavez MD   5 mg at 06/25/25 1492     naloxone (NARCAN) injection 0.2 mg  0.2 mg Intravenous Q2 Min PRSukumar Echeverria MD        Or    naloxone (NARCAN) injection 0.4 mg  0.4 mg Intravenous Q2 Min PRSukumar Echeverria MD        Or    naloxone (NARCAN) injection 0.2 mg  0.2 mg Intramuscular Q2 Min PRSukumar Echeverria MD        Or    naloxone (NARCAN) injection 0.4 mg  0.4 mg Intramuscular Q2 Min PRSukumar Echeverria MD        ondansetron (ZOFRAN ODT) ODT tab 4 mg  4 mg Oral Q6H PRN Sukumar Chavez MD        Or    ondansetron (ZOFRAN) injection 4 mg  4 mg Intravenous Q6H PRSukumar Echeverria MD        prochlorperazine (COMPAZINE) injection 5 mg  5 mg Intravenous Q6H PRN Sukumar Chavez MD        Or    prochlorperazine (COMPAZINE) tablet 5 mg  5 mg Oral Q6H PRSukumar Echeverria MD        sodium chloride (PF) 0.9% PF flush 3 mL  3 mL Intracatheter Q8H [facility] Sukumar Chavez MD   3 mL at 06/24/25 2243    sodium chloride (PF) 0.9% PF flush 3 mL  3 mL Intracatheter q1 min prSukumar Echeverria MD        sodium chloride (PF) 0.9% PF flush 3 mL  3 mL Intracatheter Q8H Sukumar Chavez MD   3 mL at 06/24/25 1235    sodium chloride (PF) 0.9% PF flush 3 mL  3 mL Intracatheter q1 min prSukumar Echeverria MD           Scheduled Medications:  Current Facility-Administered Medications   Medication Dose Route Frequency Provider Last Rate Last Admin    acetaminophen (TYLENOL) tablet 975 mg  975 mg Oral Q8H Sukumar Chavez MD   975 mg at 06/25/25 0846    benzocaine-menthol (CEPACOL) 15-3.6 MG lozenge 1 lozenge  1 lozenge Buccal Q1H PRSukumar Echeverria MD        diphenhydrAMINE (BENADRYL) elixir 12.5 mg  12.5 mg Oral Q6H PRSukumar Echeverria MD        Or    diphenhydrAMINE (BENADRYL) injection 12.5 mg  12.5 mg Intravenous Q6H PRN Sukumar Chaevz MD        famotidine (PEPCID) tablet 20 mg  20 mg Oral BID Sukumar Chavez MD        Or    famotidine (PEPCID) injection 20  mg  20 mg Intravenous BID Sukumar Chavez MD   20 mg at 06/25/25 0842    fluticasone (FLONASE) 50 MCG/ACT spray 1 spray  1 spray Both Nostrils Daily PRN Sukumar Chavez MD        HYDROmorphone (DILAUDID) injection 0.2 mg  0.2 mg Intravenous Q2H PRN Sukumar Chavez MD   0.2 mg at 06/24/25 1635    Or    HYDROmorphone (DILAUDID) injection 0.4 mg  0.4 mg Intravenous Q2H PRN Sukumar Chavez MD        lactated ringers infusion   Intravenous Continuous Sukumar Chavze  mL/hr at 06/24/25 1429 New Bag at 06/24/25 1429    lactated ringers infusion   Intravenous Continuous Sukumar Chavez MD   New Bag at 06/23/25 2138    lidocaine (LMX4) cream   Topical Q1H PRN Sukumar Chavez MD        lidocaine 1 % 0.1-1 mL  0.1-1 mL Other Q1H PRN Sukumar Chavez MD        metoprolol (LOPRESSOR) injection 5 mg  5 mg Intravenous Q6H Sukumar Chavez MD   5 mg at 06/25/25 0453    naloxone (NARCAN) injection 0.2 mg  0.2 mg Intravenous Q2 Min PRSukumar Echeverria MD        Or    naloxone (NARCAN) injection 0.4 mg  0.4 mg Intravenous Q2 Min PRSukumar Echeverria MD        Or    naloxone (NARCAN) injection 0.2 mg  0.2 mg Intramuscular Q2 Min PRN Sukumar Chavez MD        Or    naloxone (NARCAN) injection 0.4 mg  0.4 mg Intramuscular Q2 Min PRSukumar Echeverria MD        ondansetron (ZOFRAN ODT) ODT tab 4 mg  4 mg Oral Q6H PRSukumar Echeverria MD        Or    ondansetron (ZOFRAN) injection 4 mg  4 mg Intravenous Q6H PRSukumar Echeverria MD        prochlorperazine (COMPAZINE) injection 5 mg  5 mg Intravenous Q6H PRSukumar Echeverria MD        Or    prochlorperazine (COMPAZINE) tablet 5 mg  5 mg Oral Q6H PRN Sukumar Chavez MD        sodium chloride (PF) 0.9% PF flush 3 mL  3 mL Intracatheter Q8H [facility] Sukumar Chavez MD   3 mL at 06/24/25 2243    sodium chloride (PF) 0.9% PF flush 3 mL  3 mL Intracatheter q1 min prn [place]  Sukumar Danielson MD        sodium chloride (PF) 0.9% PF flush 3 mL  3 mL Intracatheter Q8H Sukumar Chavez MD   3 mL at 06/24/25 1235    sodium chloride (PF) 0.9% PF flush 3 mL  3 mL Intracatheter q1 min prn Sukumar Chavez MD           PRN Medications:  Current Facility-Administered Medications   Medication Dose Route Frequency Provider Last Rate Last Admin    acetaminophen (TYLENOL) tablet 975 mg  975 mg Oral Q8H Sukumar Chavez MD   975 mg at 06/25/25 0846    benzocaine-menthol (CEPACOL) 15-3.6 MG lozenge 1 lozenge  1 lozenge Buccal Q1H PRN Sukumar Chavez MD        diphenhydrAMINE (BENADRYL) elixir 12.5 mg  12.5 mg Oral Q6H PRN Sukumar Chavez MD        Or    diphenhydrAMINE (BENADRYL) injection 12.5 mg  12.5 mg Intravenous Q6H PRN Sukumar Chavez MD        famotidine (PEPCID) tablet 20 mg  20 mg Oral BID Sukumar Chavez MD        Or    famotidine (PEPCID) injection 20 mg  20 mg Intravenous BID Sukumar Chavez MD   20 mg at 06/25/25 0842    fluticasone (FLONASE) 50 MCG/ACT spray 1 spray  1 spray Both Nostrils Daily PRN Sukumar Chavez MD        HYDROmorphone (DILAUDID) injection 0.2 mg  0.2 mg Intravenous Q2H PRN Sukumar Chavez MD   0.2 mg at 06/24/25 1635    Or    HYDROmorphone (DILAUDID) injection 0.4 mg  0.4 mg Intravenous Q2H PRN Sukumar Chavez MD        lactated ringers infusion   Intravenous Continuous Sukumar Chavez  mL/hr at 06/24/25 1429 New Bag at 06/24/25 1429    lactated ringers infusion   Intravenous Continuous Sukumar Chavez MD   New Bag at 06/23/25 2138    lidocaine (LMX4) cream   Topical Q1H PRN Sukumar Chavez MD        lidocaine 1 % 0.1-1 mL  0.1-1 mL Other Q1H PRN Sukumar Chavez MD        metoprolol (LOPRESSOR) injection 5 mg  5 mg Intravenous Q6H Sukumar Chavez MD   5 mg at 06/25/25 0453    naloxone (NARCAN) injection 0.2 mg  0.2 mg Intravenous Q2 Min PRN Smith, Sukumar  "MD Jagjit        Or    naloxone (NARCAN) injection 0.4 mg  0.4 mg Intravenous Q2 Min PRSukumar Echeverria MD        Or    naloxone (NARCAN) injection 0.2 mg  0.2 mg Intramuscular Q2 Min PRSukumar Echeverria MD        Or    naloxone (NARCAN) injection 0.4 mg  0.4 mg Intramuscular Q2 Min PRSukumar Echeverria MD        ondansetron (ZOFRAN ODT) ODT tab 4 mg  4 mg Oral Q6H PRN Sukumar Chavez MD        Or    ondansetron (ZOFRAN) injection 4 mg  4 mg Intravenous Q6H PRSukumar Echeverria MD        prochlorperazine (COMPAZINE) injection 5 mg  5 mg Intravenous Q6H PRN Sukumar Chavez MD        Or    prochlorperazine (COMPAZINE) tablet 5 mg  5 mg Oral Q6H PRSukumar Echeverria MD        sodium chloride (PF) 0.9% PF flush 3 mL  3 mL Intracatheter Q8H [facility] Sukumar Chavez MD   3 mL at 06/24/25 2243    sodium chloride (PF) 0.9% PF flush 3 mL  3 mL Intracatheter q1 min prSukumar Echeverria MD        sodium chloride (PF) 0.9% PF flush 3 mL  3 mL Intracatheter Q8H Sukumar Chavez MD   3 mL at 06/24/25 1235    sodium chloride (PF) 0.9% PF flush 3 mL  3 mL Intracatheter q1 min Sukumar Brink MD           SUBJECTIVE:   Nausea: No. Vomiting: No--NG intact. Fever: No. Chills: No. Excessive burping: No. Flatus: No. BM: No. Pain control: good.     PHYSICAL EXAM:   Vital signs: BP (!) 158/83   Pulse 76   Temp 99.3  F (37.4  C) (Temporal)   Resp 16   Ht 1.803 m (5' 11\")   Wt 91.7 kg (202 lb 2.6 oz)   SpO2 93%   BMI 28.20 kg/m     BMI: Body mass index is 28.2 kg/m .   General: Normal, healthy, cooperative, in no acute distress, alert   Lungs: respirations are non-labored   Abdominal: non-distended, stoma with healthy red tissue   Wound: intact with some shadowing to the base   Extremities: No cyanosis, clubbing or edema noted bilaterally in Upper and Lower Extremities   Neurological: without deficit    INPUT/OUTPUT:      Intake/Output Summary (Last 24 hours) at " 6/25/2025 0921  Last data filed at 6/25/2025 0559  Gross per 24 hour   Intake 2311.67 ml   Output 1570 ml   Net 741.67 ml       I/O last 3 completed shifts:  In: 2311.67 [I.V.:2311.67]  Out: 1620 [Urine:1020; Emesis/NG output:600]    LABS:    Last CBC Rrsults:   Recent Labs   Lab Test 06/25/25 0435 06/24/25 0438 06/23/25  1708   WBC 16.5* 16.5* 11.6*   RBC 4.88 5.13 5.08   HGB 15.0 15.8 15.5   HCT 45.2 46.3 46.1   MCV 93 90 91   MCH 30.7 30.8 30.5   MCHC 33.2 34.1 33.6   RDW 13.6 13.2 13.2    245 252       Last Comprehensive Metabolic panel:  Recent Labs   Lab Test 06/25/25 0828 06/25/25 0435 06/25/25  0030 06/24/25 0816 06/24/25 0438 06/24/25  0024 06/23/25  1708   NA  --  133*  --   --  138  --  139   POTASSIUM  --  4.3  --   --  4.1  --  4.3   CHLORIDE  --  100  --   --  101  --  101   CO2  --  21*  --   --  24  --  25   ANIONGAP  --  12  --   --  13  --  13   * 139* 113*   < > 194*   < > 116*   BUN  --  16.2  --   --  18.2  --  17.7   CR  --  0.67  --   --  0.67  --  0.79   GFRESTIMATED  --  84  --   --  84  --  80   VANIA  --  8.6*  --   --  8.8  --  9.6   BILITOTAL  --  0.7  --   --  0.5  --  0.6   ALKPHOS  --  52  --   --  59  --  66   ALT  --  12  --   --  16  --  20   AST  --  21  --   --  22  --  24    < > = values in this interval not displayed.       Recent Labs   Lab Test 06/25/25 0435 06/24/25 0438 06/23/25 1708 02/27/25  1728 03/26/22  0518   MAG 2.0 2.0  --   --  2.0   ALBUMIN 3.3* 3.8 4.3   < > 3.0*   PHOS 2.1* 3.6  --   --  2.9    < > = values in this interval not displayed.         ASSESSMENT:    2 Days Post-Op from Procedure(s):  LAPAROTOMY Extensive  Lysis of adhesions reduction of peristomal hernia.      PLAN:   Up ambulating with assist

## 2025-06-26 ENCOUNTER — APPOINTMENT (OUTPATIENT)
Dept: OCCUPATIONAL THERAPY | Facility: HOSPITAL | Age: OVER 89
End: 2025-06-26
Payer: MEDICARE

## 2025-06-26 ENCOUNTER — APPOINTMENT (OUTPATIENT)
Dept: PHYSICAL THERAPY | Facility: HOSPITAL | Age: OVER 89
End: 2025-06-26
Payer: MEDICARE

## 2025-06-26 VITALS
SYSTOLIC BLOOD PRESSURE: 165 MMHG | OXYGEN SATURATION: 88 % | RESPIRATION RATE: 14 BRPM | DIASTOLIC BLOOD PRESSURE: 88 MMHG | HEIGHT: 71 IN | TEMPERATURE: 98.9 F | BODY MASS INDEX: 28.49 KG/M2 | WEIGHT: 203.48 LBS | HEART RATE: 76 BPM

## 2025-06-26 LAB
ALBUMIN SERPL BCG-MCNC: 3.4 G/DL (ref 3.5–5.2)
ALP SERPL-CCNC: 55 U/L (ref 40–150)
ALT SERPL W P-5'-P-CCNC: 11 U/L (ref 0–70)
ANION GAP SERPL CALCULATED.3IONS-SCNC: 15 MMOL/L (ref 7–15)
AST SERPL W P-5'-P-CCNC: 23 U/L (ref 0–45)
BASOPHILS # BLD AUTO: 0 10E3/UL (ref 0–0.2)
BASOPHILS NFR BLD AUTO: 0 %
BILIRUB SERPL-MCNC: 0.6 MG/DL
BUN SERPL-MCNC: 18.4 MG/DL (ref 8–23)
CALCIUM SERPL-MCNC: 8.6 MG/DL (ref 8.8–10.4)
CHLORIDE SERPL-SCNC: 101 MMOL/L (ref 98–107)
CREAT SERPL-MCNC: 0.61 MG/DL (ref 0.67–1.17)
EGFRCR SERPLBLD CKD-EPI 2021: 87 ML/MIN/1.73M2
EOSINOPHIL # BLD AUTO: 0.1 10E3/UL (ref 0–0.7)
EOSINOPHIL NFR BLD AUTO: 1 %
ERYTHROCYTE [DISTWIDTH] IN BLOOD BY AUTOMATED COUNT: 13.4 % (ref 10–15)
GLUCOSE BLDC GLUCOMTR-MCNC: 100 MG/DL (ref 70–99)
GLUCOSE BLDC GLUCOMTR-MCNC: 111 MG/DL (ref 70–99)
GLUCOSE BLDC GLUCOMTR-MCNC: 116 MG/DL (ref 70–99)
GLUCOSE BLDC GLUCOMTR-MCNC: 98 MG/DL (ref 70–99)
GLUCOSE SERPL-MCNC: 116 MG/DL (ref 70–99)
HCO3 SERPL-SCNC: 21 MMOL/L (ref 22–29)
HCT VFR BLD AUTO: 43.3 % (ref 40–53)
HGB BLD-MCNC: 14.5 G/DL (ref 13.3–17.7)
IMM GRANULOCYTES # BLD: 0.1 10E3/UL
IMM GRANULOCYTES NFR BLD: 0 %
LYMPHOCYTES # BLD AUTO: 1.9 10E3/UL (ref 0.8–5.3)
LYMPHOCYTES NFR BLD AUTO: 13 %
MAGNESIUM SERPL-MCNC: 2 MG/DL (ref 1.7–2.3)
MCH RBC QN AUTO: 30.9 PG (ref 26.5–33)
MCHC RBC AUTO-ENTMCNC: 33.5 G/DL (ref 31.5–36.5)
MCV RBC AUTO: 92 FL (ref 78–100)
MONOCYTES # BLD AUTO: 1.6 10E3/UL (ref 0–1.3)
MONOCYTES NFR BLD AUTO: 11 %
NEUTROPHILS # BLD AUTO: 10.6 10E3/UL (ref 1.6–8.3)
NEUTROPHILS NFR BLD AUTO: 74 %
NRBC # BLD AUTO: 0 10E3/UL
NRBC BLD AUTO-RTO: 0 /100
PHOSPHATE SERPL-MCNC: 2.4 MG/DL (ref 2.5–4.5)
PLAT MORPH BLD: NORMAL
PLATELET # BLD AUTO: 217 10E3/UL (ref 150–450)
POTASSIUM SERPL-SCNC: 3.9 MMOL/L (ref 3.4–5.3)
PROT SERPL-MCNC: 6.1 G/DL (ref 6.4–8.3)
RBC # BLD AUTO: 4.69 10E6/UL (ref 4.4–5.9)
RBC MORPH BLD: NORMAL
SODIUM SERPL-SCNC: 137 MMOL/L (ref 135–145)
WBC # BLD AUTO: 14.3 10E3/UL (ref 4–11)

## 2025-06-26 PROCEDURE — 36415 COLL VENOUS BLD VENIPUNCTURE: CPT | Performed by: SURGERY

## 2025-06-26 PROCEDURE — 82310 ASSAY OF CALCIUM: CPT | Performed by: SURGERY

## 2025-06-26 PROCEDURE — 250N000009 HC RX 250: Performed by: SURGERY

## 2025-06-26 PROCEDURE — 200N000001 HC R&B ICU

## 2025-06-26 PROCEDURE — 250N000013 HC RX MED GY IP 250 OP 250 PS 637: Performed by: INTERNAL MEDICINE

## 2025-06-26 PROCEDURE — 85025 COMPLETE CBC W/AUTO DIFF WBC: CPT | Performed by: SURGERY

## 2025-06-26 PROCEDURE — 84100 ASSAY OF PHOSPHORUS: CPT | Performed by: SURGERY

## 2025-06-26 PROCEDURE — 258N000003 HC RX IP 258 OP 636: Performed by: SURGERY

## 2025-06-26 PROCEDURE — 250N000011 HC RX IP 250 OP 636: Performed by: SURGERY

## 2025-06-26 PROCEDURE — 97530 THERAPEUTIC ACTIVITIES: CPT | Mod: GO

## 2025-06-26 PROCEDURE — 83735 ASSAY OF MAGNESIUM: CPT | Performed by: SURGERY

## 2025-06-26 PROCEDURE — 250N000013 HC RX MED GY IP 250 OP 250 PS 637: Performed by: SURGERY

## 2025-06-26 PROCEDURE — 97530 THERAPEUTIC ACTIVITIES: CPT | Mod: GP

## 2025-06-26 RX ORDER — HYDRALAZINE HYDROCHLORIDE 20 MG/ML
10 INJECTION INTRAMUSCULAR; INTRAVENOUS EVERY 6 HOURS PRN
Status: DISCONTINUED | OUTPATIENT
Start: 2025-06-26 | End: 2025-07-02 | Stop reason: HOSPADM

## 2025-06-26 RX ADMIN — LOSARTAN POTASSIUM 25 MG: 25 TABLET, FILM COATED ORAL at 08:44

## 2025-06-26 RX ADMIN — SODIUM PHOSPHATE, MONOBASIC, MONOHYDRATE AND SODIUM PHOSPHATE, DIBASIC, ANHYDROUS 15 MMOL: 142; 276 INJECTION, SOLUTION INTRAVENOUS at 08:38

## 2025-06-26 RX ADMIN — FAMOTIDINE 20 MG: 20 TABLET, FILM COATED ORAL at 08:44

## 2025-06-26 RX ADMIN — FAMOTIDINE 20 MG: 20 TABLET, FILM COATED ORAL at 21:27

## 2025-06-26 RX ADMIN — BENZOCAINE AND MENTHOL 1 LOZENGE: 15; 3.6 LOZENGE ORAL at 01:53

## 2025-06-26 RX ADMIN — METOPROLOL SUCCINATE 12.5 MG: 25 TABLET, EXTENDED RELEASE ORAL at 21:27

## 2025-06-26 RX ADMIN — ACETAMINOPHEN 975 MG: 325 TABLET ORAL at 08:44

## 2025-06-26 RX ADMIN — ENOXAPARIN SODIUM 40 MG: 40 INJECTION SUBCUTANEOUS at 11:24

## 2025-06-26 RX ADMIN — SODIUM CHLORIDE, SODIUM LACTATE, POTASSIUM CHLORIDE, AND CALCIUM CHLORIDE 1000 ML: .6; .31; .03; .02 INJECTION, SOLUTION INTRAVENOUS at 17:44

## 2025-06-26 ASSESSMENT — ACTIVITIES OF DAILY LIVING (ADL)
ADLS_ACUITY_SCORE: 62
ADLS_ACUITY_SCORE: 62
ADLS_ACUITY_SCORE: 66
ADLS_ACUITY_SCORE: 66
ADLS_ACUITY_SCORE: 62
ADLS_ACUITY_SCORE: 62
ADLS_ACUITY_SCORE: 66
ADLS_ACUITY_SCORE: 62
ADLS_ACUITY_SCORE: 66
ADLS_ACUITY_SCORE: 66
ADLS_ACUITY_SCORE: 62
ADLS_ACUITY_SCORE: 66
ADLS_ACUITY_SCORE: 62
ADLS_ACUITY_SCORE: 66
ADLS_ACUITY_SCORE: 62
ADLS_ACUITY_SCORE: 66
ADLS_ACUITY_SCORE: 62
ADLS_ACUITY_SCORE: 66
ADLS_ACUITY_SCORE: 66
ADLS_ACUITY_SCORE: 62
ADLS_ACUITY_SCORE: 66

## 2025-06-26 NOTE — PROGRESS NOTES
06/26/25 1000   Appointment Info   Signing Clinician's Name / Credentials (OT) Saumya Chavez OTR/L   Therapeutic Activities   Therapeutic Activity Minutes (43033) 15   Symptoms noted during/after treatment fatigue   Treatment Detail/Skilled Intervention Pt up in the chair upon OT arrival, agreeable to ambulating after nursing encouraged pt to move around. Pt transferred sit<>stand from the chair with SBA. He ambulated in the hallway ~225 ft using FWW with SBA. Pt motivated during the walk, asking to go further. Pt returned to his room and was left resting in the chair with the call light within reach and clip alert attached.   OT Discharge Planning   OT Plan Progress ADLs, strength, and activity tolerance   OT Discharge Recommendation (DC Rec) home with assist;home with home care occupational therapy;Transitional Care Facility   OT Rationale for DC Rec Pt slowly improving and tolerated activity well this morning. Anticipate as long as pt continues to improve, he will be able to return to the detention at discharge. Will continue to progress pt as able.   OT Brief overview of current status SBA sit<>stand from the chair and ambulating in the hallway ~225 ft using FWW   Total Session Time   Timed Code Treatment Minutes 15   Total Session Time (sum of timed and untimed services) 15

## 2025-06-26 NOTE — PROGRESS NOTES
06/26/25 1300   Appointment Info   Signing Clinician's Name / Credentials (PT) Jazmine Coyle DPT   Therapeutic Activity   Therapeutic Activities: dynamic activities to improve functional performance Minutes (31371) 15   Symptoms Noted During/After Treatment Fatigue;Increased pain   Treatment Detail/Skilled Intervention Pt seated in recliner at start of session and agreeable to PT. Completed sit to stand c FWW and CGA. Pt ambulated 200' c FWW and CGA c WC follow for safety. Pt steady and safe with FWW. Pt c/o moderate fatigue following ambulation and transferred into recliner c CGA. Pt took short seated rest break and then and practiced mini squats x10 c FWW. Pt resting in recliner at end of session c call button in lap.   PT Discharge Planning   PT Plan Progress mobility as tolerated   PT Discharge Recommendation (DC Rec) home with assist;home with home care physical therapy;Transitional Care Facility   PT Rationale for DC Rec Anticipate pt will be able to return to skilled nursing without issues, need for home vs outpatient PT will be dependent upon his functional status at time of discharge.   PT Brief overview of current status sit<>stand CGA, ambulated 200' with FWW CGA   PT Total Distance Amb During Session (feet) 200   Physical Therapy Time and Intention   Timed Code Treatment Minutes 15   Total Session Time (sum of timed and untimed services) 15

## 2025-06-26 NOTE — PROGRESS NOTES
INPATIENT ROUNDING NOTE  6/26/2025    Patient: James Grant    Physician of Record: Sukumar Chavez MD    Admitting diagnosis: Small bowel obstruction (H) [K56.609]    Procedure(s):  LAPAROTOMY Extensive  Lysis of adhesions reduction of peristomal hernia     POD: 3 Days Post-Op    Current Diet: NPO and NGT    CURRENT MEDICATIONS:  Continuous Medications:  Current Facility-Administered Medications   Medication Dose Route Frequency Provider Last Rate Last Admin    acetaminophen (TYLENOL) tablet 975 mg  975 mg Oral Q8H Sukumar Chavez MD   975 mg at 06/26/25 0844    benzocaine-menthol (CEPACOL) 15-3.6 MG lozenge 1 lozenge  1 lozenge Buccal Q1H PRN Sukumar Chavez MD   1 lozenge at 06/26/25 0153    diphenhydrAMINE (BENADRYL) elixir 12.5 mg  12.5 mg Oral Q6H PRN Sukumar Chavez MD        Or    diphenhydrAMINE (BENADRYL) injection 12.5 mg  12.5 mg Intravenous Q6H PRN Sukumar Chavez MD        enoxaparin ANTICOAGULANT (LOVENOX) injection 40 mg  40 mg Subcutaneous Q24H Sukumar Chavez MD   40 mg at 06/26/25 1124    famotidine (PEPCID) tablet 20 mg  20 mg Oral BID Sukumar Chavez MD   20 mg at 06/26/25 0844    Or    famotidine (PEPCID) injection 20 mg  20 mg Intravenous BID Sukumar Chavez MD   20 mg at 06/25/25 0842    fluticasone (FLONASE) 50 MCG/ACT spray 1 spray  1 spray Both Nostrils Daily PRN Sukumar Chavez MD        hydrALAZINE (APRESOLINE) injection 10 mg  10 mg Intravenous Q6H PRN Angelica Gallegos MD        HYDROmorphone (DILAUDID) injection 0.2 mg  0.2 mg Intravenous Q2H PRN Sukumar Chavez MD   0.2 mg at 06/24/25 1635    Or    HYDROmorphone (DILAUDID) injection 0.4 mg  0.4 mg Intravenous Q2H PRN Sukumar Chavez MD        lactated ringers infusion   Intravenous Continuous Sukumar Chavez  mL/hr at 06/25/25 1018 New Bag at 06/25/25 1018    lidocaine (LMX4) cream   Topical Q1H PRN Sukumar Chavez MD        lidocaine 1 % 0.1-1  mL  0.1-1 mL Other Q1H PRN Sukumar Chavez MD        losartan (COZAAR) tablet 25 mg  25 mg Oral Daily Angelica Gallegos MD   25 mg at 06/26/25 0844    metoprolol succinate ER (TOPROL-XL) 24 hr half-tab 12.5 mg  12.5 mg Oral At Bedtime Angelica Gallegos MD   12.5 mg at 06/25/25 2137    naloxone (NARCAN) injection 0.2 mg  0.2 mg Intravenous Q2 Min PRN Sukumar Chavez MD        Or    naloxone (NARCAN) injection 0.4 mg  0.4 mg Intravenous Q2 Min PRSukumar Echeverria MD        Or    naloxone (NARCAN) injection 0.2 mg  0.2 mg Intramuscular Q2 Min PRSukumar Echeverria MD        Or    naloxone (NARCAN) injection 0.4 mg  0.4 mg Intramuscular Q2 Min PRSukumar Echeverria MD        ondansetron (ZOFRAN ODT) ODT tab 4 mg  4 mg Oral Q6H PRN Sukumar Chavez MD        Or    ondansetron (ZOFRAN) injection 4 mg  4 mg Intravenous Q6H PRSukumar Echeverria MD        prochlorperazine (COMPAZINE) injection 5 mg  5 mg Intravenous Q6H PRN Sukumar Chavez MD        Or    prochlorperazine (COMPAZINE) tablet 5 mg  5 mg Oral Q6H PRSukumar Echeverria MD        sodium chloride (PF) 0.9% PF flush 3 mL  3 mL Intracatheter Q8H [facility] Sukumar Chavez MD   3 mL at 06/25/25 2146    sodium chloride (PF) 0.9% PF flush 3 mL  3 mL Intracatheter q1 min prSukumar Echeverria MD        sodium phosphate 15 mmol in NS 250mL intermittent infusion  15 mmol Intravenous Once Sukumar Chavez MD   15 mmol at 06/26/25 0838       Scheduled Medications:  Current Facility-Administered Medications   Medication Dose Route Frequency Provider Last Rate Last Admin    acetaminophen (TYLENOL) tablet 975 mg  975 mg Oral Q8H Sukumar Chavez MD   975 mg at 06/26/25 0844    benzocaine-menthol (CEPACOL) 15-3.6 MG lozenge 1 lozenge  1 lozenge Buccal Q1H PRN Sukumar Chavez MD   1 lozenge at 06/26/25 0153    diphenhydrAMINE (BENADRYL) elixir 12.5 mg  12.5 mg Oral Q6H PRN Sukumar Chavez MD         Or    diphenhydrAMINE (BENADRYL) injection 12.5 mg  12.5 mg Intravenous Q6H PRN Sukumar Chavez MD        enoxaparin ANTICOAGULANT (LOVENOX) injection 40 mg  40 mg Subcutaneous Q24H Sukumar Chavez MD   40 mg at 06/26/25 1124    famotidine (PEPCID) tablet 20 mg  20 mg Oral BID Sukumar Chavez MD   20 mg at 06/26/25 0844    Or    famotidine (PEPCID) injection 20 mg  20 mg Intravenous BID Sukumar Chavez MD   20 mg at 06/25/25 0842    fluticasone (FLONASE) 50 MCG/ACT spray 1 spray  1 spray Both Nostrils Daily PRN Sukumar Chavez MD        hydrALAZINE (APRESOLINE) injection 10 mg  10 mg Intravenous Q6H PRN Angelica Gallegos MD        HYDROmorphone (DILAUDID) injection 0.2 mg  0.2 mg Intravenous Q2H PRN Sukumar Chavez MD   0.2 mg at 06/24/25 1635    Or    HYDROmorphone (DILAUDID) injection 0.4 mg  0.4 mg Intravenous Q2H PRN Sukumar Chavez MD        lactated ringers infusion   Intravenous Continuous Sukumar Chavez  mL/hr at 06/25/25 1018 New Bag at 06/25/25 1018    lidocaine (LMX4) cream   Topical Q1H PRN Sukumar Chavez MD        lidocaine 1 % 0.1-1 mL  0.1-1 mL Other Q1H PRN Sukumar Chavez MD        losartan (COZAAR) tablet 25 mg  25 mg Oral Daily Angelica Gallegos MD   25 mg at 06/26/25 0844    metoprolol succinate ER (TOPROL-XL) 24 hr half-tab 12.5 mg  12.5 mg Oral At Bedtime Angelica Gallegos MD   12.5 mg at 06/25/25 2137    naloxone (NARCAN) injection 0.2 mg  0.2 mg Intravenous Q2 Min PRSukumar Echeverria MD        Or    naloxone (NARCAN) injection 0.4 mg  0.4 mg Intravenous Q2 Min PRSukumar Echeverria MD        Or    naloxone (NARCAN) injection 0.2 mg  0.2 mg Intramuscular Q2 Min PRN Sukumar Chavez MD        Or    naloxone (NARCAN) injection 0.4 mg  0.4 mg Intramuscular Q2 Min PRN Sukumar Chavez MD        ondansetron (ZOFRAN ODT) ODT tab 4 mg  4 mg Oral Q6H PRN Sukumar Chavez MD        Or    ondansetron  (ZOFRAN) injection 4 mg  4 mg Intravenous Q6H PRN Sukumar Chavez MD        prochlorperazine (COMPAZINE) injection 5 mg  5 mg Intravenous Q6H PRN Sukumar Chavez MD        Or    prochlorperazine (COMPAZINE) tablet 5 mg  5 mg Oral Q6H PRN Sukumar Chavez MD        sodium chloride (PF) 0.9% PF flush 3 mL  3 mL Intracatheter Q8H S[facility] Sukumar Chavez MD   3 mL at 06/25/25 2146    sodium chloride (PF) 0.9% PF flush 3 mL  3 mL Intracatheter q1 min prn Sukumar Chavez MD        sodium phosphate 15 mmol in NS 250mL intermittent infusion  15 mmol Intravenous Once Sukumar Chavez MD   15 mmol at 06/26/25 0838       PRN Medications:  Current Facility-Administered Medications   Medication Dose Route Frequency Provider Last Rate Last Admin    acetaminophen (TYLENOL) tablet 975 mg  975 mg Oral Q8H Sukumar Chavez MD   975 mg at 06/26/25 0844    benzocaine-menthol (CEPACOL) 15-3.6 MG lozenge 1 lozenge  1 lozenge Buccal Q1H PRN Sukumar Chavez MD   1 lozenge at 06/26/25 0153    diphenhydrAMINE (BENADRYL) elixir 12.5 mg  12.5 mg Oral Q6H PRN Sukumar Chavez MD        Or    diphenhydrAMINE (BENADRYL) injection 12.5 mg  12.5 mg Intravenous Q6H PRN Sukumar Chavez MD        enoxaparin ANTICOAGULANT (LOVENOX) injection 40 mg  40 mg Subcutaneous Q24H Sukumar Chavez MD   40 mg at 06/26/25 1124    famotidine (PEPCID) tablet 20 mg  20 mg Oral BID Sukumar Chavez MD   20 mg at 06/26/25 0844    Or    famotidine (PEPCID) injection 20 mg  20 mg Intravenous BID Sukumar Chavez MD   20 mg at 06/25/25 0842    fluticasone (FLONASE) 50 MCG/ACT spray 1 spray  1 spray Both Nostrils Daily PRN Sukumar Chavez MD        hydrALAZINE (APRESOLINE) injection 10 mg  10 mg Intravenous Q6H PRN Angelica Gallegos MD        HYDROmorphone (DILAUDID) injection 0.2 mg  0.2 mg Intravenous Q2H PRN Sukumar Chavez MD   0.2 mg at 06/24/25 2235    Or    HYDROmorphone  (DILAUDID) injection 0.4 mg  0.4 mg Intravenous Q2H PRSukumar Echeverria MD        lactated ringers infusion   Intravenous Continuous Sukumar Chavez  mL/hr at 06/25/25 1018 New Bag at 06/25/25 1018    lidocaine (LMX4) cream   Topical Q1H PRN Sukumar Chavez MD        lidocaine 1 % 0.1-1 mL  0.1-1 mL Other Q1H PRSukumar Echeverria MD        losartan (COZAAR) tablet 25 mg  25 mg Oral Daily Angelica Gallegos MD   25 mg at 06/26/25 0844    metoprolol succinate ER (TOPROL-XL) 24 hr half-tab 12.5 mg  12.5 mg Oral At Bedtime Angelica Gallegos MD   12.5 mg at 06/25/25 2137    naloxone (NARCAN) injection 0.2 mg  0.2 mg Intravenous Q2 Min PRSukumar Echeverria MD        Or    naloxone (NARCAN) injection 0.4 mg  0.4 mg Intravenous Q2 Min PRSukumar Echeverria MD        Or    naloxone (NARCAN) injection 0.2 mg  0.2 mg Intramuscular Q2 Min PRSukumar Echeverria MD        Or    naloxone (NARCAN) injection 0.4 mg  0.4 mg Intramuscular Q2 Min PRSukumar Echeverria MD        ondansetron (ZOFRAN ODT) ODT tab 4 mg  4 mg Oral Q6H PRSukumar Echeverria MD        Or    ondansetron (ZOFRAN) injection 4 mg  4 mg Intravenous Q6H PRSukumar Echeverria MD        prochlorperazine (COMPAZINE) injection 5 mg  5 mg Intravenous Q6H PRSukumar Echeverria MD        Or    prochlorperazine (COMPAZINE) tablet 5 mg  5 mg Oral Q6H PRSukumar Echeverria MD        sodium chloride (PF) 0.9% PF flush 3 mL  3 mL Intracatheter Q8H SABA Sukumar hCavez MD   3 mL at 06/25/25 2146    sodium chloride (PF) 0.9% PF flush 3 mL  3 mL Intracatheter q1 min prSukumar Echeverria MD        sodium phosphate 15 mmol in NS 250mL intermittent infusion  15 mmol Intravenous Once Sukumar Chavez MD   15 mmol at 06/26/25 0838       SUBJECTIVE:   Nausea: No. Vomiting: No. Fever: No. Chills: No. Excessive burping: No. Flatus: Yes. BM (Scant): Yes. Pain is 3/10. Pain control: good.     PHYSICAL  "EXAM:   Vital signs: /73   Pulse 78   Temp 98.9  F (37.2  C) (Temporal)   Resp 16   Ht 1.803 m (5' 11\")   Wt 92.3 kg (203 lb 7.8 oz)   SpO2 95%   BMI 28.38 kg/m     Weight: [unfilled]   BMI: Body mass index is 28.38 kg/m .   General: Normal, healthy, cooperative, in no acute distress, alert   HEENT: PERRLA and EOMI   Neck: supple   Lungs: clear to auscultation   CV: Regular rate and rhythm without murmer   Abdominal: Abdomen soft, non-tender. BS normal. No masses, organomegaly   Wound: Closed wound. Clean, dry and intact. Healing well.   Extremities: No cyanosis, clubbing or edema noted bilaterally in Upper and Lower Extremities   Neurological: without deficit    INPUT/OUTPUT:      Intake/Output Summary (Last 24 hours) at 6/26/2025 1232  Last data filed at 6/26/2025 1115  Gross per 24 hour   Intake 638 ml   Output 1550 ml   Net -912 ml       I/O last 3 completed shifts:  In: 498 [I.V.:428; NG/GT:70]  Out: 1750 [Urine:1000; Emesis/NG output:750]    LABS:    Last CBC Rrsults:   Recent Labs   Lab Test 06/26/25 0438 06/25/25 0435 06/24/25 0438   WBC 14.3* 16.5* 16.5*   RBC 4.69 4.88 5.13   HGB 14.5 15.0 15.8   HCT 43.3 45.2 46.3   MCV 92 93 90   MCH 30.9 30.7 30.8   MCHC 33.5 33.2 34.1   RDW 13.4 13.6 13.2    203 245       Last Comprehensive Metabolic panel:  Recent Labs   Lab Test 06/26/25  0836 06/26/25  0438 06/26/25  0036 06/25/25  0828 06/25/25  0435 06/24/25  0816 06/24/25 0438   NA  --  137  --   --  133*  --  138   POTASSIUM  --  3.9  --   --  4.3  --  4.1   CHLORIDE  --  101  --   --  100  --  101   CO2  --  21*  --   --  21*  --  24   ANIONGAP  --  15  --   --  12  --  13   * 116* 116*   < > 139*   < > 194*   BUN  --  18.4  --   --  16.2  --  18.2   CR  --  0.61*  --   --  0.67  --  0.67   GFRESTIMATED  --  87  --   --  84  --  84   VANIA  --  8.6*  --   --  8.6*  --  8.8   BILITOTAL  --  0.6  --   --  0.7  --  0.5   ALKPHOS  --  55  --   --  52  --  59   ALT  --  11  --   --  12  --  " 16   AST  --  23  --   --  21  --  22    < > = values in this interval not displayed.       Recent Labs   Lab Test 06/26/25  0438 06/25/25  0435 06/24/25  0438   MAG 2.0 2.0 2.0   ALBUMIN 3.4* 3.3* 3.8   PHOS 2.4* 2.1* 3.6     ASSESSMENT:    3 Days Post-Op from Procedure(s):  LAPAROTOMY Extensive  Lysis of adhesions reduction of peristomal hernia.     Overall doing well.  Bowels starting to function.    PLAN: Will go ahead and continue the NGT.  Will keep n.p.o. for now.  Continue IV fluids and electrolyte replacement.

## 2025-06-26 NOTE — PLAN OF CARE
Goal Outcome Evaluation:      Plan of Care Reviewed With: patient    Overall Patient Progress: improvingOverall Patient Progress: improving    Pt is alert and oriented. On RA, IV saline locked. NG 55 cm at nare to LIS 450mL out this shift. C/o some throat irritation, Lozenge given with some relief. Colostomy intact with gas and scant amount of stool. BS hypoactive. Minimal abdominal discomfort reported, declined intervention.  IV saline locked. Prater catheter draining wesley urine. Able to make needs known, call light remains within reach.     Face to face report given with opportunity to observe patient.    Report given to SARA Becerra RN   6/26/2025  7:16 AM

## 2025-06-26 NOTE — PLAN OF CARE
Plan of Care Reviewed With: Patient    Overall Patient Progress: Progressing    VS and assessments as charted. Pt remains alert, oriented and able to make needs known. Rating pain in abdomen 1-2/10. Declined 1630 scheduled dose of Tylenol. Remains on RA. Lung sounds coarse. Educated pt regarding TCDB. SR 70s with 1st degree AVB. Remains NPO. NG tube removed this afternoon; output for the shift 250 ml at time of discontinuation. BS active. No N/V. Passing gas. Scant amount of brown stool in ostomy bag. Prater catheter patent; urine output quantity insufficient. 1L LR bolus infusing at present. Transfers and ambulates with A-1, gait belt and walker. Up in bedside chair all morning and much of the afternoon. Ambulated on the unit with PT/OT x2. Free from falls and/or injury this shift. Call light within reach. Bed locked and low. Bed/chair alarms on at all times.    Face to face report given with opportunity to observe patient.    Report given to SARA Mi RN   6/26/2025  7:07 PM

## 2025-06-26 NOTE — PROVIDER NOTIFICATION
Decreased urine output (205 ml) for the last approximately 12 hours. Provider notified. Order received for LR 1 liter bolus x1.

## 2025-06-27 ENCOUNTER — APPOINTMENT (OUTPATIENT)
Dept: OCCUPATIONAL THERAPY | Facility: HOSPITAL | Age: OVER 89
End: 2025-06-27
Payer: MEDICARE

## 2025-06-27 ENCOUNTER — APPOINTMENT (OUTPATIENT)
Dept: PHYSICAL THERAPY | Facility: HOSPITAL | Age: OVER 89
End: 2025-06-27
Payer: MEDICARE

## 2025-06-27 LAB
ALBUMIN SERPL BCG-MCNC: 3 G/DL (ref 3.5–5.2)
ALP SERPL-CCNC: 45 U/L (ref 40–150)
ALT SERPL W P-5'-P-CCNC: 9 U/L (ref 0–70)
ANION GAP SERPL CALCULATED.3IONS-SCNC: 12 MMOL/L (ref 7–15)
AST SERPL W P-5'-P-CCNC: 16 U/L (ref 0–45)
BASOPHILS # BLD AUTO: 0 10E3/UL (ref 0–0.2)
BASOPHILS NFR BLD AUTO: 0 %
BILIRUB SERPL-MCNC: 0.5 MG/DL
BUN SERPL-MCNC: 18.7 MG/DL (ref 8–23)
CALCIUM SERPL-MCNC: 8.2 MG/DL (ref 8.8–10.4)
CHLORIDE SERPL-SCNC: 103 MMOL/L (ref 98–107)
CREAT SERPL-MCNC: 0.55 MG/DL (ref 0.67–1.17)
EGFRCR SERPLBLD CKD-EPI 2021: 90 ML/MIN/1.73M2
EOSINOPHIL # BLD AUTO: 0.4 10E3/UL (ref 0–0.7)
EOSINOPHIL NFR BLD AUTO: 4 %
ERYTHROCYTE [DISTWIDTH] IN BLOOD BY AUTOMATED COUNT: 13.4 % (ref 10–15)
GLUCOSE BLDC GLUCOMTR-MCNC: 80 MG/DL (ref 70–99)
GLUCOSE SERPL-MCNC: 91 MG/DL (ref 70–99)
HCO3 SERPL-SCNC: 23 MMOL/L (ref 22–29)
HCT VFR BLD AUTO: 38.7 % (ref 40–53)
HGB BLD-MCNC: 13.1 G/DL (ref 13.3–17.7)
IMM GRANULOCYTES # BLD: 0 10E3/UL
IMM GRANULOCYTES NFR BLD: 0 %
LYMPHOCYTES # BLD AUTO: 2.1 10E3/UL (ref 0.8–5.3)
LYMPHOCYTES NFR BLD AUTO: 22 %
MAGNESIUM SERPL-MCNC: 1.8 MG/DL (ref 1.7–2.3)
MCH RBC QN AUTO: 31 PG (ref 26.5–33)
MCHC RBC AUTO-ENTMCNC: 33.9 G/DL (ref 31.5–36.5)
MCV RBC AUTO: 92 FL (ref 78–100)
MONOCYTES # BLD AUTO: 1.2 10E3/UL (ref 0–1.3)
MONOCYTES NFR BLD AUTO: 12 %
NEUTROPHILS # BLD AUTO: 5.8 10E3/UL (ref 1.6–8.3)
NEUTROPHILS NFR BLD AUTO: 61 %
NRBC # BLD AUTO: 0 10E3/UL
NRBC BLD AUTO-RTO: 0 /100
PHOSPHATE SERPL-MCNC: 2 MG/DL (ref 2.5–4.5)
PLATELET # BLD AUTO: 221 10E3/UL (ref 150–450)
POTASSIUM SERPL-SCNC: 3.4 MMOL/L (ref 3.4–5.3)
PROT SERPL-MCNC: 5.5 G/DL (ref 6.4–8.3)
RBC # BLD AUTO: 4.23 10E6/UL (ref 4.4–5.9)
SODIUM SERPL-SCNC: 138 MMOL/L (ref 135–145)
WBC # BLD AUTO: 9.5 10E3/UL (ref 4–11)

## 2025-06-27 PROCEDURE — 97530 THERAPEUTIC ACTIVITIES: CPT | Mod: GP

## 2025-06-27 PROCEDURE — 120N000001 HC R&B MED SURG/OB

## 2025-06-27 PROCEDURE — 250N000009 HC RX 250: Performed by: SURGERY

## 2025-06-27 PROCEDURE — 258N000003 HC RX IP 258 OP 636: Performed by: SURGERY

## 2025-06-27 PROCEDURE — 36415 COLL VENOUS BLD VENIPUNCTURE: CPT | Performed by: SURGERY

## 2025-06-27 PROCEDURE — 250N000013 HC RX MED GY IP 250 OP 250 PS 637: Performed by: SURGERY

## 2025-06-27 PROCEDURE — 250N000013 HC RX MED GY IP 250 OP 250 PS 637: Performed by: INTERNAL MEDICINE

## 2025-06-27 PROCEDURE — 84100 ASSAY OF PHOSPHORUS: CPT | Performed by: SURGERY

## 2025-06-27 PROCEDURE — 83735 ASSAY OF MAGNESIUM: CPT | Performed by: SURGERY

## 2025-06-27 PROCEDURE — 85048 AUTOMATED LEUKOCYTE COUNT: CPT | Performed by: SURGERY

## 2025-06-27 PROCEDURE — 250N000011 HC RX IP 250 OP 636: Performed by: SURGERY

## 2025-06-27 PROCEDURE — 97530 THERAPEUTIC ACTIVITIES: CPT | Mod: GO

## 2025-06-27 PROCEDURE — 82040 ASSAY OF SERUM ALBUMIN: CPT | Performed by: SURGERY

## 2025-06-27 PROCEDURE — 250N000013 HC RX MED GY IP 250 OP 250 PS 637: Performed by: STUDENT IN AN ORGANIZED HEALTH CARE EDUCATION/TRAINING PROGRAM

## 2025-06-27 RX ORDER — ASPIRIN 325 MG
0.5 TABLET ORAL DAILY
Status: ON HOLD | COMMUNITY
End: 2025-07-02

## 2025-06-27 RX ORDER — CALCIUM CARBONATE 500 MG/1
500 TABLET, CHEWABLE ORAL DAILY PRN
Status: DISCONTINUED | OUTPATIENT
Start: 2025-06-27 | End: 2025-06-27

## 2025-06-27 RX ORDER — CALCIUM CARBONATE 500 MG/1
500 TABLET, CHEWABLE ORAL 3 TIMES DAILY PRN
Status: DISCONTINUED | OUTPATIENT
Start: 2025-06-27 | End: 2025-07-02 | Stop reason: HOSPADM

## 2025-06-27 RX ORDER — ASPIRIN 325 MG
162.5 TABLET ORAL DAILY
Status: DISCONTINUED | OUTPATIENT
Start: 2025-06-27 | End: 2025-07-02 | Stop reason: HOSPADM

## 2025-06-27 RX ORDER — ACETAMINOPHEN 325 MG/1
975 TABLET ORAL EVERY 8 HOURS PRN
Status: DISCONTINUED | OUTPATIENT
Start: 2025-06-27 | End: 2025-07-02 | Stop reason: HOSPADM

## 2025-06-27 RX ORDER — SIMVASTATIN 20 MG
20 TABLET ORAL AT BEDTIME
Status: DISCONTINUED | OUTPATIENT
Start: 2025-06-27 | End: 2025-07-02 | Stop reason: HOSPADM

## 2025-06-27 RX ORDER — TAMSULOSIN HYDROCHLORIDE 0.4 MG/1
0.4 CAPSULE ORAL 2 TIMES DAILY
Status: DISCONTINUED | OUTPATIENT
Start: 2025-06-27 | End: 2025-07-02 | Stop reason: HOSPADM

## 2025-06-27 RX ADMIN — SODIUM PHOSPHATE, MONOBASIC, MONOHYDRATE AND SODIUM PHOSPHATE, DIBASIC, ANHYDROUS 15 MMOL: 142; 276 INJECTION, SOLUTION INTRAVENOUS at 07:37

## 2025-06-27 RX ADMIN — ASPIRIN 325 MG ORAL TABLET 162.5 MG: 325 PILL ORAL at 12:59

## 2025-06-27 RX ADMIN — PROCHLORPERAZINE EDISYLATE 5 MG: 5 INJECTION INTRAMUSCULAR; INTRAVENOUS at 20:30

## 2025-06-27 RX ADMIN — ONDANSETRON 4 MG: 2 INJECTION INTRAMUSCULAR; INTRAVENOUS at 19:41

## 2025-06-27 RX ADMIN — CALCIUM CARBONATE (ANTACID) CHEW TAB 500 MG 500 MG: 500 CHEW TAB at 21:11

## 2025-06-27 RX ADMIN — CALCIUM CARBONATE (ANTACID) CHEW TAB 500 MG 500 MG: 500 CHEW TAB at 20:06

## 2025-06-27 RX ADMIN — ENOXAPARIN SODIUM 40 MG: 40 INJECTION SUBCUTANEOUS at 10:23

## 2025-06-27 RX ADMIN — SODIUM CHLORIDE, POTASSIUM CHLORIDE, SODIUM LACTATE AND CALCIUM CHLORIDE: 600; 310; 30; 20 INJECTION, SOLUTION INTRAVENOUS at 15:35

## 2025-06-27 RX ADMIN — SODIUM CHLORIDE, POTASSIUM CHLORIDE, SODIUM LACTATE AND CALCIUM CHLORIDE: 600; 310; 30; 20 INJECTION, SOLUTION INTRAVENOUS at 04:03

## 2025-06-27 RX ADMIN — FAMOTIDINE 20 MG: 20 TABLET, FILM COATED ORAL at 09:14

## 2025-06-27 RX ADMIN — LOSARTAN POTASSIUM 25 MG: 25 TABLET, FILM COATED ORAL at 09:14

## 2025-06-27 RX ADMIN — ACETAMINOPHEN 975 MG: 325 TABLET ORAL at 09:14

## 2025-06-27 RX ADMIN — FAMOTIDINE 20 MG: 10 INJECTION, SOLUTION INTRAVENOUS at 20:30

## 2025-06-27 RX ADMIN — TAMSULOSIN HYDROCHLORIDE 0.4 MG: 0.4 CAPSULE ORAL at 14:13

## 2025-06-27 ASSESSMENT — ACTIVITIES OF DAILY LIVING (ADL)
ADLS_ACUITY_SCORE: 66
ADLS_ACUITY_SCORE: 67
ADLS_ACUITY_SCORE: 66
ADLS_ACUITY_SCORE: 61
ADLS_ACUITY_SCORE: 66
ADLS_ACUITY_SCORE: 61
ADLS_ACUITY_SCORE: 63
ADLS_ACUITY_SCORE: 67
ADLS_ACUITY_SCORE: 61
ADLS_ACUITY_SCORE: 67
ADLS_ACUITY_SCORE: 61
ADLS_ACUITY_SCORE: 63
ADLS_ACUITY_SCORE: 61
ADLS_ACUITY_SCORE: 67
ADLS_ACUITY_SCORE: 61
ADLS_ACUITY_SCORE: 63

## 2025-06-27 NOTE — PLAN OF CARE
Goal Outcome Evaluation:      Plan of Care Reviewed With: patient    Overall Patient Progress: improvingOverall Patient Progress: improving    Pt A&Ox4. Makes needs known. Tele SR. LSC t/o. IS up to 1750. BS positive all 4 quads. Flatus and stool present in colostomy. Stoma red moist. Prater draining wesley urine. Denies nausea. Tolerating ice chips.   1100 - diet advanced to clears  1615- Pt ambulated without difficulty. Inc of small amount of urine. Up in chair.   1745 - Voided 100cc. Colostomy appliance changed 2 piece. Had med formed BM today. Dressing changed to midline incision - staples intact. No drainage. Areas of redness noted with dried drainage.   Tolerating clear liquids.     Face to face report given with opportunity to observe patient.    Report given to Dagoberto Martinez RN   6/27/2025  7:01 PM

## 2025-06-27 NOTE — PROGRESS NOTES
INPATIENT ROUNDING NOTE  6/27/2025    Patient: James Grant    Physician of Record: Sukumar Chavez MD    Admitting diagnosis: Small bowel obstruction (H) [K56.609]    Procedure(s):  LAPAROTOMY Extensive  Lysis of adhesions reduction of peristomal hernia     POD: 4 Days Post-Op    Current Diet: NPO    CURRENT MEDICATIONS:  Continuous Medications:  Current Facility-Administered Medications   Medication Dose Route Frequency Provider Last Rate Last Admin    acetaminophen (TYLENOL) tablet 975 mg  975 mg Oral Q8H Sukumar Chavez MD   975 mg at 06/27/25 0914    aspirin tablet 162.5 mg  162.5 mg Oral Daily Sukumar Chavez MD        benzocaine-menthol (CEPACOL) 15-3.6 MG lozenge 1 lozenge  1 lozenge Buccal Q1H PRN Sukumar Chavez MD   1 lozenge at 06/26/25 0153    diphenhydrAMINE (BENADRYL) elixir 12.5 mg  12.5 mg Oral Q6H PRN Sukumar Chavez MD        Or    diphenhydrAMINE (BENADRYL) injection 12.5 mg  12.5 mg Intravenous Q6H PRN Sukumar Chavez MD        enoxaparin ANTICOAGULANT (LOVENOX) injection 40 mg  40 mg Subcutaneous Q24H Sukumar Chavez MD   40 mg at 06/27/25 1023    famotidine (PEPCID) tablet 20 mg  20 mg Oral BID Sukumar Chavez MD   20 mg at 06/27/25 0914    Or    famotidine (PEPCID) injection 20 mg  20 mg Intravenous BID Sukumar Chavez MD   20 mg at 06/25/25 0842    fluticasone (FLONASE) 50 MCG/ACT spray 1 spray  1 spray Both Nostrils Daily PRN Sukumar Chavez MD        hydrALAZINE (APRESOLINE) injection 10 mg  10 mg Intravenous Q6H PRN Angelica Gallegos MD        HYDROmorphone (DILAUDID) injection 0.2 mg  0.2 mg Intravenous Q2H PRN Sukumar Chavez MD   0.2 mg at 06/24/25 1635    Or    HYDROmorphone (DILAUDID) injection 0.4 mg  0.4 mg Intravenous Q2H PRN Sukumar Chavez MD        lactated ringers infusion   Intravenous Continuous Sukumar Chavez  mL/hr at 06/27/25 0403 New Bag at 06/27/25 0403    lidocaine (LMX4) cream    Topical Q1H PRSukumar Echeverria MD        lidocaine 1 % 0.1-1 mL  0.1-1 mL Other Q1H PRSukumar Echeverria MD        losartan (COZAAR) tablet 25 mg  25 mg Oral Daily Angelica Gallegos MD   25 mg at 06/27/25 0914    metoprolol succinate ER (TOPROL-XL) 24 hr half-tab 12.5 mg  12.5 mg Oral At Bedtime Angelica Gallegos MD   12.5 mg at 06/26/25 2127    naloxone (NARCAN) injection 0.2 mg  0.2 mg Intravenous Q2 Min PRSukumar Echeverria MD        Or    naloxone (NARCAN) injection 0.4 mg  0.4 mg Intravenous Q2 Min PRSukumar Echeverria MD        Or    naloxone (NARCAN) injection 0.2 mg  0.2 mg Intramuscular Q2 Min PRSukumar Echeverria MD        Or    naloxone (NARCAN) injection 0.4 mg  0.4 mg Intramuscular Q2 Min Sukumar Brink MD        ondansetron (ZOFRAN ODT) ODT tab 4 mg  4 mg Oral Q6H PRSukumar Echeverria MD        Or    ondansetron (ZOFRAN) injection 4 mg  4 mg Intravenous Q6H PRSukumar Echeverria MD        prochlorperazine (COMPAZINE) injection 5 mg  5 mg Intravenous Q6H PRSukumar Echeverria MD        Or    prochlorperazine (COMPAZINE) tablet 5 mg  5 mg Oral Q6H PRSukumar Echeverria MD        simvastatin (ZOCOR) tablet 20 mg  20 mg Oral At Bedtime Sukumar Chavez MD        sodium chloride (PF) 0.9% PF flush 3 mL  3 mL Intracatheter Q8H [facility] Sukumar Chavez MD   3 mL at 06/26/25 2127    sodium chloride (PF) 0.9% PF flush 3 mL  3 mL Intracatheter q1 min Sukumar Brink MD           Scheduled Medications:  Current Facility-Administered Medications   Medication Dose Route Frequency Provider Last Rate Last Admin    acetaminophen (TYLENOL) tablet 975 mg  975 mg Oral Q8H Sukumar Chavez MD   975 mg at 06/27/25 0914    aspirin tablet 162.5 mg  162.5 mg Oral Daily Sukumar Chavez MD        benzocaine-menthol (CEPACOL) 15-3.6 MG lozenge 1 lozenge  1 lozenge Buccal Q1H PRN Sukumar Chavez MD   1 lozenge at 06/26/25 0153     diphenhydrAMINE (BENADRYL) elixir 12.5 mg  12.5 mg Oral Q6H PRN Sukumar Chavez MD        Or    diphenhydrAMINE (BENADRYL) injection 12.5 mg  12.5 mg Intravenous Q6H PRN Sukumar Chavez MD        enoxaparin ANTICOAGULANT (LOVENOX) injection 40 mg  40 mg Subcutaneous Q24H Sukumar Chavez MD   40 mg at 06/27/25 1023    famotidine (PEPCID) tablet 20 mg  20 mg Oral BID Sukumar Chavez MD   20 mg at 06/27/25 0914    Or    famotidine (PEPCID) injection 20 mg  20 mg Intravenous BID Sukumar Chavez MD   20 mg at 06/25/25 0842    fluticasone (FLONASE) 50 MCG/ACT spray 1 spray  1 spray Both Nostrils Daily PRN Sukumar Chavez MD        hydrALAZINE (APRESOLINE) injection 10 mg  10 mg Intravenous Q6H PRN Angelica Gallegos MD        HYDROmorphone (DILAUDID) injection 0.2 mg  0.2 mg Intravenous Q2H PRN Sukumar Chavez MD   0.2 mg at 06/24/25 1635    Or    HYDROmorphone (DILAUDID) injection 0.4 mg  0.4 mg Intravenous Q2H PRN Sukumar Chavez MD        lactated ringers infusion   Intravenous Continuous Sukumar Chavez  mL/hr at 06/27/25 0403 New Bag at 06/27/25 0403    lidocaine (LMX4) cream   Topical Q1H PRN Sukumar Chavez MD        lidocaine 1 % 0.1-1 mL  0.1-1 mL Other Q1H PRN Sukumar Chavez MD        losartan (COZAAR) tablet 25 mg  25 mg Oral Daily Angelica Gallegos MD   25 mg at 06/27/25 0914    metoprolol succinate ER (TOPROL-XL) 24 hr half-tab 12.5 mg  12.5 mg Oral At Bedtime Angelica Gallegos MD   12.5 mg at 06/26/25 2127    naloxone (NARCAN) injection 0.2 mg  0.2 mg Intravenous Q2 Min PRN Sukumar Chavez MD        Or    naloxone (NARCAN) injection 0.4 mg  0.4 mg Intravenous Q2 Min PRN Sukumar Chavez MD        Or    naloxone (NARCAN) injection 0.2 mg  0.2 mg Intramuscular Q2 Min PRSukumar Echeverria MD        Or    naloxone (NARCAN) injection 0.4 mg  0.4 mg Intramuscular Q2 Min PRSukumar Echeverria MD        ondansetron  (ZOFRAN ODT) ODT tab 4 mg  4 mg Oral Q6H PRN Sukumar Chavez MD        Or    ondansetron (ZOFRAN) injection 4 mg  4 mg Intravenous Q6H PRN Sukumar Chavez MD        prochlorperazine (COMPAZINE) injection 5 mg  5 mg Intravenous Q6H PRN Sukumar Chavez MD        Or    prochlorperazine (COMPAZINE) tablet 5 mg  5 mg Oral Q6H PRN Sukumar Chavez MD        simvastatin (ZOCOR) tablet 20 mg  20 mg Oral At Bedtime Sukumar Chavez MD        sodium chloride (PF) 0.9% PF flush 3 mL  3 mL Intracatheter Q8H SABA Sukumar Chavez MD   3 mL at 06/26/25 2127    sodium chloride (PF) 0.9% PF flush 3 mL  3 mL Intracatheter q1 min prn Sukumar Chavez MD           PRN Medications:  Current Facility-Administered Medications   Medication Dose Route Frequency Provider Last Rate Last Admin    acetaminophen (TYLENOL) tablet 975 mg  975 mg Oral Q8H Sukumar Chavez MD   975 mg at 06/27/25 0914    aspirin tablet 162.5 mg  162.5 mg Oral Daily Sukumar Chavez MD        benzocaine-menthol (CEPACOL) 15-3.6 MG lozenge 1 lozenge  1 lozenge Buccal Q1H PRN Sukumar Chavez MD   1 lozenge at 06/26/25 0153    diphenhydrAMINE (BENADRYL) elixir 12.5 mg  12.5 mg Oral Q6H PRN Sukumar Chavez MD        Or    diphenhydrAMINE (BENADRYL) injection 12.5 mg  12.5 mg Intravenous Q6H PRN Sukumar Chavez MD        enoxaparin ANTICOAGULANT (LOVENOX) injection 40 mg  40 mg Subcutaneous Q24H Sukumar Chavez MD   40 mg at 06/27/25 1023    famotidine (PEPCID) tablet 20 mg  20 mg Oral BID Sukumar Chavez MD   20 mg at 06/27/25 0914    Or    famotidine (PEPCID) injection 20 mg  20 mg Intravenous BID Sukumar Chavez MD   20 mg at 06/25/25 0842    fluticasone (FLONASE) 50 MCG/ACT spray 1 spray  1 spray Both Nostrils Daily PRN Sukumar Chavez MD        hydrALAZINE (APRESOLINE) injection 10 mg  10 mg Intravenous Q6H PRN Angelica Gallegos MD        HYDROmorphone (DILAUDID)  injection 0.2 mg  0.2 mg Intravenous Q2H PRN Sukumar Chavez MD   0.2 mg at 06/24/25 1635    Or    HYDROmorphone (DILAUDID) injection 0.4 mg  0.4 mg Intravenous Q2H PRSukumar Echeverria MD        lactated ringers infusion   Intravenous Continuous Sukumar Chavez  mL/hr at 06/27/25 0403 New Bag at 06/27/25 0403    lidocaine (LMX4) cream   Topical Q1H PRN Sukumar Chavez MD        lidocaine 1 % 0.1-1 mL  0.1-1 mL Other Q1H PRSukumar Echeverria MD        losartan (COZAAR) tablet 25 mg  25 mg Oral Daily Angelica Gallegos MD   25 mg at 06/27/25 0914    metoprolol succinate ER (TOPROL-XL) 24 hr half-tab 12.5 mg  12.5 mg Oral At Bedtime Angelica Gallegos MD   12.5 mg at 06/26/25 2127    naloxone (NARCAN) injection 0.2 mg  0.2 mg Intravenous Q2 Min PRSukumar Echeverria MD        Or    naloxone (NARCAN) injection 0.4 mg  0.4 mg Intravenous Q2 Min PRSukumar Echeverria MD        Or    naloxone (NARCAN) injection 0.2 mg  0.2 mg Intramuscular Q2 Min Sukumar Peres MD        Or    naloxone (NARCAN) injection 0.4 mg  0.4 mg Intramuscular Q2 Min Sukumar Peres MD        ondansetron (ZOFRAN ODT) ODT tab 4 mg  4 mg Oral Q6H PRSukumar Echeverria MD        Or    ondansetron (ZOFRAN) injection 4 mg  4 mg Intravenous Q6H PRSukumar Echeverria MD        prochlorperazine (COMPAZINE) injection 5 mg  5 mg Intravenous Q6H PRSukumar Echeverria MD        Or    prochlorperazine (COMPAZINE) tablet 5 mg  5 mg Oral Q6H PRSukumar Echeverria MD        simvastatin (ZOCOR) tablet 20 mg  20 mg Oral At Bedtime Sukumar Chavez MD        sodium chloride (PF) 0.9% PF flush 3 mL  3 mL Intracatheter Q8H [facility] Sukumar Chavez MD   3 mL at 06/26/25 2127    sodium chloride (PF) 0.9% PF flush 3 mL  3 mL Intracatheter q1 min prn Sukumar Cahvez MD           SUBJECTIVE:   Nausea: No. Vomiting: No. Fever: No. Chills: No. Excessive burping: No. Flatus:  "Yes. BM: Yes. Pain is 2/10. Pain control: excellent. Tolerating current diet: Currently n.p.o.  NG tube was discontinued yesterday.      PHYSICAL EXAM:   Vital signs: BP (!) 137/93   Pulse 69   Temp 98.3  F (36.8  C) (Temporal)   Resp 16   Ht 1.803 m (5' 11\")   Wt 92.3 kg (203 lb 7.8 oz)   SpO2 94%   BMI 28.38 kg/m     Weight: [unfilled]   BMI: Body mass index is 28.38 kg/m .   General: Normal, healthy, cooperative, in no acute distress, alert   HEENT: PERRLA and EOMI   Neck: supple   Lungs: clear to auscultation   CV: Regular rate and rhythm without murmer   Abdominal: Abdomen soft, non-tender. BS normal. No masses, organomegaly.  Minor incisional tenderness.  Ostomy pink and viable.  Air and stool are in the bag.   Wound: Closed wound. Clean, dry and intact. Healing well.   Extremities: No cyanosis, clubbing or edema noted bilaterally in Upper and Lower Extremities   Neurological: without deficit    INPUT/OUTPUT:      Intake/Output Summary (Last 24 hours) at 6/27/2025 1237  Last data filed at 6/27/2025 1220  Gross per 24 hour   Intake 544 ml   Output 1130 ml   Net -586 ml       I/O last 3 completed shifts:  In: 684 [I.V.:544; NG/GT:140]  Out: 1030 [Urine:780; Emesis/NG output:250]    LABS:    Last CBC Rrsults:   Recent Labs   Lab Test 06/27/25  0359 06/26/25  0438 06/25/25  0435   WBC 9.5 14.3* 16.5*   RBC 4.23* 4.69 4.88   HGB 13.1* 14.5 15.0   HCT 38.7* 43.3 45.2   MCV 92 92 93   MCH 31.0 30.9 30.7   MCHC 33.9 33.5 33.2   RDW 13.4 13.4 13.6    217 203       Last Comprehensive Metabolic panel:  Recent Labs   Lab Test 06/27/25  0647 06/27/25  0359 06/26/25  1629 06/26/25  0836 06/26/25  0438 06/25/25  0828 06/25/25  0435   NA  --  138  --   --  137  --  133*   POTASSIUM  --  3.4  --   --  3.9  --  4.3   CHLORIDE  --  103  --   --  101  --  100   CO2  --  23  --   --  21*  --  21*   ANIONGAP  --  12  --   --  15  --  12   GLC 80 91 100*   < > 116*   < > 139*   BUN  --  18.7  --   --  18.4  --  16.2   CR  " --  0.55*  --   --  0.61*  --  0.67   GFRESTIMATED  --  90  --   --  87  --  84   VANIA  --  8.2*  --   --  8.6*  --  8.6*   BILITOTAL  --  0.5  --   --  0.6  --  0.7   ALKPHOS  --  45  --   --  55  --  52   ALT  --  9  --   --  11  --  12   AST  --  16  --   --  23  --  21    < > = values in this interval not displayed.       Recent Labs   Lab Test 06/27/25  0359 06/26/25  0438 06/25/25  0435   MAG 1.8 2.0 2.0   ALBUMIN 3.0* 3.4* 3.3*   PHOS 2.0* 2.4* 2.1*     ASSESSMENT:    4 Days Post-Op from Procedure(s):  LAPAROTOMY Extensive  Lysis of adhesions reduction of peristomal hernia.     Overall doing well.  Bowel function is returning.    PLAN: Will go ahead and advance the diet to clear liquid today.  If he does with clears today would advance regular diet tomorrow.  Will go ahead and initiate all of his home meds today.  Will discontinue his Prater and placed on Flomax.    Will move to stepdown with telemetry monitoring.  Anticipate discharge to assisted living early next week.    Will have Dr. Prado see the patient over the weekend.

## 2025-06-27 NOTE — PROGRESS NOTES
06/27/25 1300   Appointment Info   Signing Clinician's Name / Credentials (OT) Saumya Chavez OTR/L   Interventions   Interventions Quick Adds Therapeutic Activity   Therapeutic Activities   Therapeutic Activity Minutes (09018) 15   Symptoms noted during/after treatment fatigue   Treatment Detail/Skilled Intervention Pt up in the chair upon OT arrival, agreeable to tx. Nursing in providing meds, which pt was able to take independently. One got stuck in his throat as he is used to taking his meds with food but is NPO at this time. OT decided to give pt some time and return later. When OT returned, pt engaged in ADLs. He completed grooming with set up. Pt required MaxA to doff/don his socks. Encouraged pt to participate in UB exercises, he completed a couple reps of shoulder flexion and expressed frustration due to them being too easy. Pt educated that OT could bring theraband next session. Pt was left resting in the chair, call light within reach and clip alert attached.   OT Discharge Planning   OT Plan Progress ADLs, strength, and activity tolerance   OT Discharge Recommendation (DC Rec) home with assist;home with home care occupational therapy;Transitional Care Facility   OT Rationale for DC Rec Pt slowly improving and tolerating activity well. Anticipate as long as pt continues to improve, he will be able to return to the assisted at discharge. Will continue to progress pt as able.   OT Brief overview of current status Pt up in the chair upon OT arrival, agreeable to tx. Nursing in providing meds, which pt was able to take independently. One got stuck in his throat as he is used to taking his meds with food but is NPO at this time. OT decided to give pt some time and return later. When OT returned, pt engaged in ADLs. He completed grooming with set up. Pt required MaxA to doff/don his socks. Encouraged pt to participate in UB exercises, he completed a couple reps of shoulder flexion and expressed frustration due to  them being too easy. Pt educated that OT could bring theraband next session. Pt was left resting in the chair, call light within reach and clip alert attached.   Total Session Time   Timed Code Treatment Minutes 15   Total Session Time (sum of timed and untimed services) 15

## 2025-06-27 NOTE — PROGRESS NOTES
06/27/25 1300   Appointment Info   Signing Clinician's Name / Credentials (PT) Jazmine Coyle DPT   Therapeutic Activity   Therapeutic Activities: dynamic activities to improve functional performance Minutes (98312) 15   Symptoms Noted During/After Treatment Fatigue   Treatment Detail/Skilled Intervention Pt seated in recliner at start of session and agreeable to PT. Completed sit to stand c FWW and CGA. Pt ambulated 300' c FWW and CGA c WC follow for safety. Pt steady and safe with FWW. Pt very fatigued following ambulation and transferred into recliner c CGA.  Pt resting in recliner at end of session c call button in lap.   PT Discharge Planning   PT Plan Progress mobility as tolerated   PT Discharge Recommendation (DC Rec) home with assist;home with home care physical therapy;Transitional Care Facility   PT Rationale for DC Rec Anticipate pt will be [facility] issues, need for home vs outpatient PT will be dependent upon his functional status at time of discharge.   PT Brief overview of current status sit<>stand CGA, ambulated 300' with FWW CGA   PT Total Distance Amb During Session (feet) 300   Physical Therapy Time and Intention   Timed Code Treatment Minutes 15   Total Session Time (sum of timed and untimed services) 15

## 2025-06-27 NOTE — PROVIDER NOTIFICATION
Spoke with Dr. Chavez regarding pt BS+, flatus and stool in bag. Pt to advance to a clear liquid diet.

## 2025-06-27 NOTE — PLAN OF CARE
Plan of Care Reviewed With: patient     Overall Patient Progress: progressing    Pt A&O x 4. Pleasant. Extremely [place], bilateral HA's, removed prior to bedtime and placed in cup. Pt POD 4. BS remain hypoactive, flatus present. Scant amount of stool present in colostomy. Pt denies pain, refused dose of scheduled tylenol. LS clear, 2L NC placed on during night d/t de-sats while asleep otherwise RA. Sats maintaining with O2 per parameters ordered. Intermittent, productive cough present. HS regular, pt remains SR 60-70's w/ 1st deg AVB. BP's remain elevated, but no PRN hydralazine necessary. Prater remains in place with minimal output, but improving since bolus given last evening. Continues on LR at 100 ml/hr. Remains NPO w/ meds and ice chips ok. Repositions self in bed. Slept well. Remains free of inj all shift. Bed in low position, call light in reach, alarms activated and audible.      Face to face report given with opportunity to observe patient.    Report given to Deb Shaffer RN   6/27/2025  7:25 AM

## 2025-06-28 ENCOUNTER — APPOINTMENT (OUTPATIENT)
Dept: PHYSICAL THERAPY | Facility: HOSPITAL | Age: OVER 89
DRG: 336 | End: 2025-06-28
Payer: MEDICARE

## 2025-06-28 LAB
ALBUMIN SERPL BCG-MCNC: 2.8 G/DL (ref 3.5–5.2)
ALP SERPL-CCNC: 42 U/L (ref 40–150)
ALT SERPL W P-5'-P-CCNC: 10 U/L (ref 0–70)
ANION GAP SERPL CALCULATED.3IONS-SCNC: 12 MMOL/L (ref 7–15)
AST SERPL W P-5'-P-CCNC: 18 U/L (ref 0–45)
BASOPHILS # BLD AUTO: 0 10E3/UL (ref 0–0.2)
BASOPHILS NFR BLD AUTO: 0 %
BILIRUB SERPL-MCNC: 0.4 MG/DL
BUN SERPL-MCNC: 15.9 MG/DL (ref 8–23)
CALCIUM SERPL-MCNC: 8 MG/DL (ref 8.8–10.4)
CHLORIDE SERPL-SCNC: 103 MMOL/L (ref 98–107)
CREAT SERPL-MCNC: 0.53 MG/DL (ref 0.67–1.17)
EGFRCR SERPLBLD CKD-EPI 2021: >90 ML/MIN/1.73M2
EOSINOPHIL # BLD AUTO: 0.3 10E3/UL (ref 0–0.7)
EOSINOPHIL NFR BLD AUTO: 4 %
ERYTHROCYTE [DISTWIDTH] IN BLOOD BY AUTOMATED COUNT: 13.2 % (ref 10–15)
GLUCOSE SERPL-MCNC: 103 MG/DL (ref 70–99)
HCO3 SERPL-SCNC: 22 MMOL/L (ref 22–29)
HCT VFR BLD AUTO: 38 % (ref 40–53)
HGB BLD-MCNC: 12.5 G/DL (ref 13.3–17.7)
IMM GRANULOCYTES # BLD: 0 10E3/UL
IMM GRANULOCYTES NFR BLD: 0 %
LYMPHOCYTES # BLD AUTO: 1.8 10E3/UL (ref 0.8–5.3)
LYMPHOCYTES NFR BLD AUTO: 23 %
MAGNESIUM SERPL-MCNC: 1.7 MG/DL (ref 1.7–2.3)
MCH RBC QN AUTO: 30.6 PG (ref 26.5–33)
MCHC RBC AUTO-ENTMCNC: 32.9 G/DL (ref 31.5–36.5)
MCV RBC AUTO: 93 FL (ref 78–100)
MONOCYTES # BLD AUTO: 1 10E3/UL (ref 0–1.3)
MONOCYTES NFR BLD AUTO: 12 %
NEUTROPHILS # BLD AUTO: 4.9 10E3/UL (ref 1.6–8.3)
NEUTROPHILS NFR BLD AUTO: 61 %
NRBC # BLD AUTO: 0 10E3/UL
NRBC BLD AUTO-RTO: 0 /100
PHOSPHATE SERPL-MCNC: 2.5 MG/DL (ref 2.5–4.5)
PLATELET # BLD AUTO: 215 10E3/UL (ref 150–450)
POTASSIUM SERPL-SCNC: 3.4 MMOL/L (ref 3.4–5.3)
PROT SERPL-MCNC: 5.1 G/DL (ref 6.4–8.3)
RBC # BLD AUTO: 4.08 10E6/UL (ref 4.4–5.9)
SODIUM SERPL-SCNC: 137 MMOL/L (ref 135–145)
WBC # BLD AUTO: 8 10E3/UL (ref 4–11)

## 2025-06-28 PROCEDURE — 258N000003 HC RX IP 258 OP 636: Performed by: SURGERY

## 2025-06-28 PROCEDURE — 84100 ASSAY OF PHOSPHORUS: CPT | Performed by: SURGERY

## 2025-06-28 PROCEDURE — 85041 AUTOMATED RBC COUNT: CPT | Performed by: SURGERY

## 2025-06-28 PROCEDURE — 250N000013 HC RX MED GY IP 250 OP 250 PS 637: Performed by: INTERNAL MEDICINE

## 2025-06-28 PROCEDURE — 97530 THERAPEUTIC ACTIVITIES: CPT | Mod: GP

## 2025-06-28 PROCEDURE — 120N000001 HC R&B MED SURG/OB

## 2025-06-28 PROCEDURE — 83735 ASSAY OF MAGNESIUM: CPT | Performed by: SURGERY

## 2025-06-28 PROCEDURE — 250N000013 HC RX MED GY IP 250 OP 250 PS 637: Performed by: SURGERY

## 2025-06-28 PROCEDURE — 250N000011 HC RX IP 250 OP 636: Performed by: SURGERY

## 2025-06-28 PROCEDURE — 36415 COLL VENOUS BLD VENIPUNCTURE: CPT | Performed by: SURGERY

## 2025-06-28 PROCEDURE — 82040 ASSAY OF SERUM ALBUMIN: CPT | Performed by: SURGERY

## 2025-06-28 PROCEDURE — 250N000013 HC RX MED GY IP 250 OP 250 PS 637: Performed by: STUDENT IN AN ORGANIZED HEALTH CARE EDUCATION/TRAINING PROGRAM

## 2025-06-28 RX ORDER — POTASSIUM CHLORIDE 1500 MG/1
40 TABLET, EXTENDED RELEASE ORAL ONCE
Status: COMPLETED | OUTPATIENT
Start: 2025-06-28 | End: 2025-06-28

## 2025-06-28 RX ORDER — PANTOPRAZOLE SODIUM 40 MG/1
40 TABLET, DELAYED RELEASE ORAL DAILY
Status: DISCONTINUED | OUTPATIENT
Start: 2025-06-28 | End: 2025-06-29

## 2025-06-28 RX ORDER — MAGNESIUM OXIDE 400 MG/1
400 TABLET ORAL EVERY 4 HOURS
Status: DISCONTINUED | OUTPATIENT
Start: 2025-06-28 | End: 2025-06-28

## 2025-06-28 RX ORDER — POTASSIUM CHLORIDE 20MEQ/15ML
40 LIQUID (ML) ORAL ONCE
Status: COMPLETED | OUTPATIENT
Start: 2025-06-28 | End: 2025-06-28

## 2025-06-28 RX ORDER — OXYCODONE HYDROCHLORIDE 5 MG/1
5 TABLET ORAL EVERY 4 HOURS PRN
Refills: 0 | Status: DISCONTINUED | OUTPATIENT
Start: 2025-06-28 | End: 2025-07-02 | Stop reason: HOSPADM

## 2025-06-28 RX ORDER — POLYETHYLENE GLYCOL 3350 17 G/17G
17 POWDER, FOR SOLUTION ORAL DAILY PRN
Status: DISCONTINUED | OUTPATIENT
Start: 2025-06-28 | End: 2025-07-02 | Stop reason: HOSPADM

## 2025-06-28 RX ADMIN — PANTOPRAZOLE SODIUM 40 MG: 40 TABLET, DELAYED RELEASE ORAL at 10:27

## 2025-06-28 RX ADMIN — POTASSIUM & SODIUM PHOSPHATES POWDER PACK 280-160-250 MG 1 PACKET: 280-160-250 PACK at 08:08

## 2025-06-28 RX ADMIN — SIMVASTATIN 20 MG: 20 TABLET, FILM COATED ORAL at 20:11

## 2025-06-28 RX ADMIN — MAGNESIUM OXIDE TAB 400 MG (241.3 MG ELEMENTAL MG) 400 MG: 400 (241.3 MG) TAB at 08:09

## 2025-06-28 RX ADMIN — TAMSULOSIN HYDROCHLORIDE 0.4 MG: 0.4 CAPSULE ORAL at 08:08

## 2025-06-28 RX ADMIN — METOPROLOL SUCCINATE 12.5 MG: 25 TABLET, EXTENDED RELEASE ORAL at 20:11

## 2025-06-28 RX ADMIN — LOSARTAN POTASSIUM 25 MG: 25 TABLET, FILM COATED ORAL at 08:09

## 2025-06-28 RX ADMIN — FAMOTIDINE 20 MG: 20 TABLET, FILM COATED ORAL at 08:09

## 2025-06-28 RX ADMIN — SODIUM CHLORIDE, POTASSIUM CHLORIDE, SODIUM LACTATE AND CALCIUM CHLORIDE: 600; 310; 30; 20 INJECTION, SOLUTION INTRAVENOUS at 06:37

## 2025-06-28 RX ADMIN — TAMSULOSIN HYDROCHLORIDE 0.4 MG: 0.4 CAPSULE ORAL at 20:11

## 2025-06-28 RX ADMIN — POTASSIUM CHLORIDE 40 MEQ: 20 SOLUTION ORAL at 08:15

## 2025-06-28 RX ADMIN — POTASSIUM & SODIUM PHOSPHATES POWDER PACK 280-160-250 MG 1 PACKET: 280-160-250 PACK at 11:57

## 2025-06-28 RX ADMIN — ENOXAPARIN SODIUM 40 MG: 40 INJECTION SUBCUTANEOUS at 11:59

## 2025-06-28 ASSESSMENT — ACTIVITIES OF DAILY LIVING (ADL)
ADLS_ACUITY_SCORE: 64
ADLS_ACUITY_SCORE: 61
ADLS_ACUITY_SCORE: 61
ADLS_ACUITY_SCORE: 64
ADLS_ACUITY_SCORE: 61
ADLS_ACUITY_SCORE: 64
ADLS_ACUITY_SCORE: 64
ADLS_ACUITY_SCORE: 61
ADLS_ACUITY_SCORE: 64
ADLS_ACUITY_SCORE: 64
ADLS_ACUITY_SCORE: 61
ADLS_ACUITY_SCORE: 64
ADLS_ACUITY_SCORE: 61
ADLS_ACUITY_SCORE: 64

## 2025-06-28 NOTE — PLAN OF CARE
Goal Outcome Evaluation:      Plan of Care Reviewed With: patient    Overall Patient Progress: improvingOverall Patient Progress: improving    VSS & assessments as charted. Denies nausea. Diet upgraded to Regular. Tolerating well. Midline incision staples intact, no drainage -ABD in place. Colostomy patent. Stoma red. Having BM from formed to liquid consistency. IVL    Face to face report given with opportunity to observe patient.  Report given to SARA Arenas.    Deb Martinez RN  6/28/2025, 7:03 PM

## 2025-06-28 NOTE — PLAN OF CARE
Pt A&Ox4. Afebrile, VSS. Denies pain. LR running at 100 mL/hr. LS clear, on RA. HRR SR 60's-70's. BS audible to all 4 quadrants. Loose brown stool present in colostomy, stoma red and moist. Midline dressing CDI. Patient complaint of heart burn and nausea beginning of shift. PRN tums, zofran and compazine given. Refusing PO medications. Voiding without difficulty. Patient up x1 from chair to bed.  Alarms on, call light within reach.    Face to face report given with opportunity to observe patient.    Report given to Deb Jenkins RN   6/28/2025  7:01 AM

## 2025-06-28 NOTE — PROGRESS NOTES
06/28/25 1007   Appointment Info   Signing Clinician's Name / Credentials (PT) Singh Chávez DPT   Therapeutic Activity   Therapeutic Activities: dynamic activities to improve functional performance Minutes (96141) 25   Symptoms Noted During/After Treatment Fatigue   Treatment Detail/Skilled Intervention Pt seated in recliner at start of session and agreeable to PT. Completed sit to stand c FWW and SBA. Pt ambulated 300' c FWW and CGA c WC follow for safety with CNA able to pull W/C as PT managed his IV pole. Pt notes his colostomy site is tender with ambulation but otherwise he feels well. Pt steady and safe with FWW. Pt fatigued following ambulation and transferred into recliner c CGA.  Pt resting in recliner at end of session c call button in lap and chair alarm on.   PT Discharge Planning   PT Plan Progress mobility as tolerated   PT Discharge Recommendation (DC Rec) home with assist;home with home care physical therapy;Transitional Care Facility   PT Rationale for DC Rec Anticipate pt will be able to return to detention without issues, need for home vs outpatient PT will be dependent upon his functional status at time of discharge.   PT Brief overview of current status sit<>stand CGA, ambulated 300' with FWW CGA   PT Total Distance Amb During Session (feet) 300   Physical Therapy Time and Intention   Timed Code Treatment Minutes 25   Total Session Time (sum of timed and untimed services) 25

## 2025-06-28 NOTE — PROGRESS NOTES
"[facility] General Surgery Progress Note    James Grant MRN# [id number]   YOB: 1927 Age: 89 year old            Assessment and Plan:   89yM POD#5 s/p ex lap with enterolysis and reduction of parastomal hernia with concern for closed loop obstruction.  Progressing well    PLAN:  Okay to advance diet as tolerated regular diet  Ordered Protonix for significant heartburn symptoms yesterday in addition to Tums  Discontinued IVF  VTE prophylaxis with Lovenox 40 mg daily  Pain control with Tylenol, oxycodone as needed  IV hydralazine 10 mg as needed systolic blood pressure greater than 180  PTA aspirin 162 mg daily  PTA metoprolol succ 12.5 mg daily  PTA simvastatin 20 mg daily  PTA Flomax 0.4 mg twice daily           Subjective/Interval   Patient reports some nausea and heartburn yesterday after eating beef broth which responded to medications and has not recurred.  Continues to have bowel function.  Had some issues with his colostomy last night, the bag was not sealed on the bottom and there was some spillage.  He reports some mild discomfort around his ostomy after walking the length of the kirk this morning but is otherwise doing well.  Denies any nausea or vomiting.          Physical Exam:   Blood pressure (!) 145/69, pulse 69, temperature 98.2  F (36.8  C), temperature source Temporal, resp. rate 16, height 1.803 m (5' 11\"), weight 92.3 kg (203 lb 7.8 oz), SpO2 93%.  203 lbs 7.75 oz  General: Awake and alert, no acute distress, pleasant  Psych: Alert and Oriented.  Normal affect  Neurological: grossly intact  Eyes: Sclera clear  Respiratory:  nonlabored breathing  Cardiovascular:  Regular rate and rhythm   Abdomen: Soft, minimally tender around incisions and stoma, staples at laparotomy incision intact with minimal focal areas of ecchymosis.  Integumentary:  No rashes           Current Medications:         Current Facility-Administered Medications   Medication Dose Route Frequency " Provider Last Rate Last Admin    aspirin tablet 162.5 mg  162.5 mg Oral Daily Sukmuar Chavez MD   162.5 mg at 06/27/25 1259    enoxaparin ANTICOAGULANT (LOVENOX) injection 40 mg  40 mg Subcutaneous Q24H Sukumar Chavez MD   40 mg at 06/27/25 1023    losartan (COZAAR) tablet 25 mg  25 mg Oral Daily Angelica Gallegos MD   25 mg at 06/28/25 0809    magnesium oxide (MAG-OX) tablet 400 mg  400 mg Oral Q4H Sukumar Chavez MD   400 mg at 06/28/25 0809    metoprolol succinate ER (TOPROL-XL) 24 hr half-tab 12.5 mg  12.5 mg Oral At Bedtime Angelica Gallegos MD   12.5 mg at 06/26/25 2127    pantoprazole (PROTONIX) EC tablet 40 mg  40 mg Oral Daily Josue Prado MD        potassium & sodium phosphates (NEUTRA-PHOS) Packet 1 packet  1 packet Oral or Feeding Tube Q4H Sukumar Chavez MD   1 packet at 06/28/25 0808    simvastatin (ZOCOR) tablet 20 mg  20 mg Oral At Bedtime Sukumar Chavez MD        sodium chloride (PF) 0.9% PF flush 3 mL  3 mL Intracatheter Q8H SABA Sukumar Chavez MD   3 mL at 06/27/25 2120    tamsulosin (FLOMAX) capsule 0.4 mg  0.4 mg Oral BID Sukumar Chavez MD   0.4 mg at 06/28/25 0808       Current Facility-Administered Medications   Medication Dose Route Frequency Provider Last Rate Last Admin    acetaminophen (TYLENOL) tablet 975 mg  975 mg Oral Q8H PRN Sukumar Chavez MD        benzocaine-menthol (CEPACOL) 15-3.6 MG lozenge 1 lozenge  1 lozenge Buccal Q1H PRN Sukumar Chavez MD   1 lozenge at 06/26/25 0153    calcium carbonate (TUMS) chewable tablet 500 mg  500 mg Oral TID PRN Reagan Schneider MD   500 mg at 06/27/25 2111    diphenhydrAMINE (BENADRYL) elixir 12.5 mg  12.5 mg Oral Q6H PRN Sukumar Chavez MD        Or    diphenhydrAMINE (BENADRYL) injection 12.5 mg  12.5 mg Intravenous Q6H PRN Sukumar Chavez MD        fluticasone (FLONASE) 50 MCG/ACT spray 1 spray  1 spray Both Nostrils Daily PRN Sukumar Chavez MD         hydrALAZINE (APRESOLINE) injection 10 mg  10 mg Intravenous Q6H PRN Angelica Gallegos MD        HYDROmorphone (DILAUDID) injection 0.2 mg  0.2 mg Intravenous Q2H PRN Sukumar Chavez MD   0.2 mg at 06/24/25 1635    Or    HYDROmorphone (DILAUDID) injection 0.4 mg  0.4 mg Intravenous Q2H PRN Sukumar Chavez MD        lidocaine (LMX4) cream   Topical Q1H PRN Sukumar Chavez MD        lidocaine 1 % 0.1-1 mL  0.1-1 mL Other Q1H PRN Sukumar Chavez MD        naloxone (NARCAN) injection 0.2 mg  0.2 mg Intravenous Q2 Min PRSukumar Echeverria MD        Or    naloxone (NARCAN) injection 0.4 mg  0.4 mg Intravenous Q2 Min PRSukumar Echeverria MD        Or    naloxone (NARCAN) injection 0.2 mg  0.2 mg Intramuscular Q2 Min PRN Sukumar Chavez MD        Or    naloxone (NARCAN) injection 0.4 mg  0.4 mg Intramuscular Q2 Min PRSukumar Echeverria MD        ondansetron (ZOFRAN ODT) ODT tab 4 mg  4 mg Oral Q6H PRN Sukumar Chavez MD        Or    ondansetron (ZOFRAN) injection 4 mg  4 mg Intravenous Q6H PRSukumar Echeverria MD   4 mg at 06/27/25 1941    prochlorperazine (COMPAZINE) injection 5 mg  5 mg Intravenous Q6H PRSukumar Echeverria MD   5 mg at 06/27/25 2030    Or    prochlorperazine (COMPAZINE) tablet 5 mg  5 mg Oral Q6H PRN Sukumar Chavez MD        sodium chloride (PF) 0.9% PF flush 3 mL  3 mL Intracatheter q1 min prSukumar Echeverria MD                  Labs/Imaging      Latest Reference Range & Units 06/27/25 03:59 06/27/25 06:47 06/28/25 05:58   Sodium 135 - 145 mmol/L 138  137   Potassium 3.4 - 5.3 mmol/L 3.4  3.4   Chloride 98 - 107 mmol/L 103  103   Carbon Dioxide (CO2) 22 - 29 mmol/L 23  22   Urea Nitrogen 8.0 - 23.0 mg/dL 18.7  15.9   Creatinine 0.67 - 1.17 mg/dL 0.55 (L)  0.53 (L)   GFR Estimate >60 mL/min/1.73m2 90  >90   Calcium 8.8 - 10.4 mg/dL 8.2 (L)  8.0 (L)   Anion Gap 7 - 15 mmol/L 12  12   Magnesium 1.7 - 2.3 mg/dL 1.8  1.7    Phosphorus 2.5 - 4.5 mg/dL 2.0 (L)  2.5   Albumin 3.5 - 5.2 g/dL 3.0 (L)  2.8 (L)   Protein Total 6.4 - 8.3 g/dL 5.5 (L)  5.1 (L)   Alkaline Phosphatase 40 - 150 U/L 45  42   ALT 0 - 70 U/L 9  10   AST 0 - 45 U/L 16  18   Bilirubin Total <=1.2 mg/dL 0.5  0.4   Glucose 70 - 99 mg/dL 91  103 (H)   GLUCOSE BY METER POCT 70 - 99 mg/dL  80    WBC 4.0 - 11.0 10e3/uL 9.5  8.0   Hemoglobin 13.3 - 17.7 g/dL 13.1 (L)  12.5 (L)   Hematocrit 40.0 - 53.0 % 38.7 (L)  38.0 (L)   Platelet Count 150 - 450 10e3/uL 221  215   RBC Count 4.40 - 5.90 10e6/uL 4.23 (L)  4.08 (L)   MCV 78 - 100 fL 92  93   (L): Data is abnormally low  (H): Data is abnormally high  All new lab and imaging data was reviewed.   I have personally reviewed the imaging studies         Josue Prado MD

## 2025-06-29 ENCOUNTER — APPOINTMENT (OUTPATIENT)
Dept: CT IMAGING | Facility: HOSPITAL | Age: OVER 89
End: 2025-06-29
Attending: STUDENT IN AN ORGANIZED HEALTH CARE EDUCATION/TRAINING PROGRAM
Payer: MEDICARE

## 2025-06-29 LAB
ALBUMIN SERPL BCG-MCNC: 3.4 G/DL (ref 3.5–5.2)
ALP SERPL-CCNC: 52 U/L (ref 40–150)
ALT SERPL W P-5'-P-CCNC: 17 U/L (ref 0–70)
ANION GAP SERPL CALCULATED.3IONS-SCNC: 15 MMOL/L (ref 7–15)
AST SERPL W P-5'-P-CCNC: 27 U/L (ref 0–45)
BASOPHILS # BLD AUTO: 0 10E3/UL (ref 0–0.2)
BASOPHILS NFR BLD AUTO: 0 %
BILIRUB SERPL-MCNC: 0.5 MG/DL
BUN SERPL-MCNC: 12.4 MG/DL (ref 8–23)
CALCIUM SERPL-MCNC: 9 MG/DL (ref 8.8–10.4)
CHLORIDE SERPL-SCNC: 100 MMOL/L (ref 98–107)
CREAT SERPL-MCNC: 0.57 MG/DL (ref 0.67–1.17)
EGFRCR SERPLBLD CKD-EPI 2021: 89 ML/MIN/1.73M2
EOSINOPHIL # BLD AUTO: 0.6 10E3/UL (ref 0–0.7)
EOSINOPHIL NFR BLD AUTO: 6 %
ERYTHROCYTE [DISTWIDTH] IN BLOOD BY AUTOMATED COUNT: 13 % (ref 10–15)
GLUCOSE BLDC GLUCOMTR-MCNC: 109 MG/DL (ref 70–99)
GLUCOSE SERPL-MCNC: 161 MG/DL (ref 70–99)
HCO3 SERPL-SCNC: 23 MMOL/L (ref 22–29)
HCT VFR BLD AUTO: 42.5 % (ref 40–53)
HGB BLD-MCNC: 14.6 G/DL (ref 13.3–17.7)
HOLD SPECIMEN: NORMAL
IMM GRANULOCYTES # BLD: 0 10E3/UL
IMM GRANULOCYTES NFR BLD: 0 %
LACTATE SERPL-SCNC: 1.3 MMOL/L (ref 0.7–2)
LYMPHOCYTES # BLD AUTO: 2.7 10E3/UL (ref 0.8–5.3)
LYMPHOCYTES NFR BLD AUTO: 27 %
MAGNESIUM SERPL-MCNC: 1.8 MG/DL (ref 1.7–2.3)
MCH RBC QN AUTO: 30.9 PG (ref 26.5–33)
MCHC RBC AUTO-ENTMCNC: 34.4 G/DL (ref 31.5–36.5)
MCV RBC AUTO: 90 FL (ref 78–100)
MONOCYTES # BLD AUTO: 1.2 10E3/UL (ref 0–1.3)
MONOCYTES NFR BLD AUTO: 12 %
NEUTROPHILS # BLD AUTO: 5.6 10E3/UL (ref 1.6–8.3)
NEUTROPHILS NFR BLD AUTO: 56 %
NRBC # BLD AUTO: 0 10E3/UL
NRBC BLD AUTO-RTO: 0 /100
PLATELET # BLD AUTO: 276 10E3/UL (ref 150–450)
POTASSIUM SERPL-SCNC: 3.4 MMOL/L (ref 3.4–5.3)
PROT SERPL-MCNC: 6.1 G/DL (ref 6.4–8.3)
RBC # BLD AUTO: 4.72 10E6/UL (ref 4.4–5.9)
SODIUM SERPL-SCNC: 138 MMOL/L (ref 135–145)
TROPONIN T SERPL HS-MCNC: 20 NG/L
WBC # BLD AUTO: 10 10E3/UL (ref 4–11)

## 2025-06-29 PROCEDURE — 250N000009 HC RX 250: Performed by: INTERNAL MEDICINE

## 2025-06-29 PROCEDURE — 93010 ELECTROCARDIOGRAM REPORT: CPT | Performed by: INTERNAL MEDICINE

## 2025-06-29 PROCEDURE — 93005 ELECTROCARDIOGRAM TRACING: CPT

## 2025-06-29 PROCEDURE — 85025 COMPLETE CBC W/AUTO DIFF WBC: CPT | Performed by: STUDENT IN AN ORGANIZED HEALTH CARE EDUCATION/TRAINING PROGRAM

## 2025-06-29 PROCEDURE — 250N000011 HC RX IP 250 OP 636: Performed by: SURGERY

## 2025-06-29 PROCEDURE — 74177 CT ABD & PELVIS W/CONTRAST: CPT | Mod: 26 | Performed by: RADIOLOGY

## 2025-06-29 PROCEDURE — 250N000009 HC RX 250: Performed by: STUDENT IN AN ORGANIZED HEALTH CARE EDUCATION/TRAINING PROGRAM

## 2025-06-29 PROCEDURE — 258N000003 HC RX IP 258 OP 636: Performed by: STUDENT IN AN ORGANIZED HEALTH CARE EDUCATION/TRAINING PROGRAM

## 2025-06-29 PROCEDURE — 250N000011 HC RX IP 250 OP 636: Performed by: INTERNAL MEDICINE

## 2025-06-29 PROCEDURE — 83735 ASSAY OF MAGNESIUM: CPT | Performed by: STUDENT IN AN ORGANIZED HEALTH CARE EDUCATION/TRAINING PROGRAM

## 2025-06-29 PROCEDURE — 80053 COMPREHEN METABOLIC PANEL: CPT | Performed by: STUDENT IN AN ORGANIZED HEALTH CARE EDUCATION/TRAINING PROGRAM

## 2025-06-29 PROCEDURE — 120N000001 HC R&B MED SURG/OB

## 2025-06-29 PROCEDURE — 250N000013 HC RX MED GY IP 250 OP 250 PS 637: Performed by: STUDENT IN AN ORGANIZED HEALTH CARE EDUCATION/TRAINING PROGRAM

## 2025-06-29 PROCEDURE — 74177 CT ABD & PELVIS W/CONTRAST: CPT

## 2025-06-29 PROCEDURE — 250N000011 HC RX IP 250 OP 636: Mod: JZ | Performed by: STUDENT IN AN ORGANIZED HEALTH CARE EDUCATION/TRAINING PROGRAM

## 2025-06-29 PROCEDURE — 83605 ASSAY OF LACTIC ACID: CPT | Performed by: STUDENT IN AN ORGANIZED HEALTH CARE EDUCATION/TRAINING PROGRAM

## 2025-06-29 PROCEDURE — 250N000011 HC RX IP 250 OP 636: Performed by: STUDENT IN AN ORGANIZED HEALTH CARE EDUCATION/TRAINING PROGRAM

## 2025-06-29 PROCEDURE — 84484 ASSAY OF TROPONIN QUANT: CPT | Performed by: STUDENT IN AN ORGANIZED HEALTH CARE EDUCATION/TRAINING PROGRAM

## 2025-06-29 PROCEDURE — 36415 COLL VENOUS BLD VENIPUNCTURE: CPT | Performed by: STUDENT IN AN ORGANIZED HEALTH CARE EDUCATION/TRAINING PROGRAM

## 2025-06-29 RX ORDER — METOCLOPRAMIDE HYDROCHLORIDE 5 MG/ML
5 INJECTION INTRAMUSCULAR; INTRAVENOUS ONCE
Status: COMPLETED | OUTPATIENT
Start: 2025-06-29 | End: 2025-06-29

## 2025-06-29 RX ORDER — SUCRALFATE 1 G/1
1 TABLET ORAL
Status: DISCONTINUED | OUTPATIENT
Start: 2025-06-29 | End: 2025-06-29

## 2025-06-29 RX ORDER — HYDROMORPHONE HCL IN WATER/PF 6 MG/30 ML
.2-.4 PATIENT CONTROLLED ANALGESIA SYRINGE INTRAVENOUS
Status: DISCONTINUED | OUTPATIENT
Start: 2025-06-29 | End: 2025-07-02 | Stop reason: HOSPADM

## 2025-06-29 RX ORDER — SODIUM CHLORIDE, SODIUM LACTATE, POTASSIUM CHLORIDE, CALCIUM CHLORIDE 600; 310; 30; 20 MG/100ML; MG/100ML; MG/100ML; MG/100ML
INJECTION, SOLUTION INTRAVENOUS CONTINUOUS
Status: DISCONTINUED | OUTPATIENT
Start: 2025-06-29 | End: 2025-06-30

## 2025-06-29 RX ORDER — IOPAMIDOL 755 MG/ML
100 INJECTION, SOLUTION INTRAVASCULAR ONCE
Status: COMPLETED | OUTPATIENT
Start: 2025-06-29 | End: 2025-06-29

## 2025-06-29 RX ORDER — METOPROLOL TARTRATE 1 MG/ML
5 INJECTION, SOLUTION INTRAVENOUS EVERY 6 HOURS
Status: DISCONTINUED | OUTPATIENT
Start: 2025-06-29 | End: 2025-06-30

## 2025-06-29 RX ADMIN — SODIUM CHLORIDE, POTASSIUM CHLORIDE, SODIUM LACTATE AND CALCIUM CHLORIDE: 600; 310; 30; 20 INJECTION, SOLUTION INTRAVENOUS at 14:55

## 2025-06-29 RX ADMIN — HYDRALAZINE HYDROCHLORIDE 10 MG: 20 INJECTION INTRAMUSCULAR; INTRAVENOUS at 03:43

## 2025-06-29 RX ADMIN — ONDANSETRON 4 MG: 2 INJECTION INTRAMUSCULAR; INTRAVENOUS at 05:30

## 2025-06-29 RX ADMIN — CALCIUM CARBONATE (ANTACID) CHEW TAB 500 MG 500 MG: 500 CHEW TAB at 01:46

## 2025-06-29 RX ADMIN — HYDROMORPHONE HYDROCHLORIDE 0.2 MG: 0.2 INJECTION, SOLUTION INTRAMUSCULAR; INTRAVENOUS; SUBCUTANEOUS at 09:26

## 2025-06-29 RX ADMIN — METOPROLOL TARTRATE 5 MG: 1 INJECTION, SOLUTION INTRAVENOUS at 14:53

## 2025-06-29 RX ADMIN — IOPAMIDOL 100 ML: 755 INJECTION, SOLUTION INTRAVENOUS at 05:04

## 2025-06-29 RX ADMIN — SODIUM CHLORIDE, POTASSIUM CHLORIDE, SODIUM LACTATE AND CALCIUM CHLORIDE: 600; 310; 30; 20 INJECTION, SOLUTION INTRAVENOUS at 05:30

## 2025-06-29 RX ADMIN — ONDANSETRON 4 MG: 2 INJECTION INTRAMUSCULAR; INTRAVENOUS at 10:26

## 2025-06-29 RX ADMIN — PANTOPRAZOLE SODIUM 40 MG: 40 INJECTION, POWDER, FOR SOLUTION INTRAVENOUS at 08:41

## 2025-06-29 RX ADMIN — ENOXAPARIN SODIUM 40 MG: 40 INJECTION SUBCUTANEOUS at 12:46

## 2025-06-29 RX ADMIN — METOCLOPRAMIDE HYDROCHLORIDE 5 MG: 5 INJECTION INTRAMUSCULAR; INTRAVENOUS at 10:17

## 2025-06-29 RX ADMIN — BENZOCAINE 0.5 ML: 200 SPRAY DENTAL; ORAL; PERIODONTAL at 09:46

## 2025-06-29 RX ADMIN — METOPROLOL TARTRATE 5 MG: 1 INJECTION, SOLUTION INTRAVENOUS at 20:29

## 2025-06-29 RX ADMIN — PROCHLORPERAZINE EDISYLATE 5 MG: 5 INJECTION INTRAMUSCULAR; INTRAVENOUS at 08:38

## 2025-06-29 RX ADMIN — METOPROLOL TARTRATE 5 MG: 1 INJECTION, SOLUTION INTRAVENOUS at 08:55

## 2025-06-29 ASSESSMENT — ACTIVITIES OF DAILY LIVING (ADL)
ADLS_ACUITY_SCORE: 66
ADLS_ACUITY_SCORE: 53
ADLS_ACUITY_SCORE: 49
ADLS_ACUITY_SCORE: 64
ADLS_ACUITY_SCORE: 49
ADLS_ACUITY_SCORE: 49
ADLS_ACUITY_SCORE: 66
ADLS_ACUITY_SCORE: 64
ADLS_ACUITY_SCORE: 66
ADLS_ACUITY_SCORE: 49
ADLS_ACUITY_SCORE: 66
ADLS_ACUITY_SCORE: 64
ADLS_ACUITY_SCORE: 66
ADLS_ACUITY_SCORE: 49
ADLS_ACUITY_SCORE: 64
ADLS_ACUITY_SCORE: 53
ADLS_ACUITY_SCORE: 49
ADLS_ACUITY_SCORE: 49
ADLS_ACUITY_SCORE: 64
ADLS_ACUITY_SCORE: 53
ADLS_ACUITY_SCORE: 64

## 2025-06-29 NOTE — PLAN OF CARE
"Plan of Care Reviewed With: Patient    Overall Patient Progress: Not Progressing       Pt A&Ox4. Afebrile, VSS. Denies pain. PIV SL. LS clear, on RA. HR 70's. BS audible to all 4 quadrants. Loose brown stool present in colostomy, stoma red and moist. Midline dressing CDI. Voiding without difficulty. Patient up x1 from chair to bed. Alarms on, call light within reach.    Around 0300 patient complaining of \"heart burn\", later stating 9/10 pain to upper midline epigastric area. EKG, Trop ordered per Dr. Schneider. SBP in the 180's PRN Hydralazine given. Patient started having multiple episodes of emesis, green/brown in color. PRN Zofran given with relief. Dr. Schneider and Dr. Prado aware. CT Abd, and labs ordered.    Face to face report given with opportunity to observe patient.    Report given to Deb RUIZ.    Dagoberto Jenkins RN   6/29/2025  7:12 AM   "
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
## 2025-06-29 NOTE — PLAN OF CARE
Goal Outcome Evaluation:      Plan of Care Reviewed With: patient    Overall Patient Progress: decliningOverall Patient Progress: declining    Pt NPO. Afebrile. Placed on telemetry. IVF increased to 100cc/hr c/o nausea Compazine 5mg IV at 0838. No emesis. Attempted to place NG x4 after interventions of pain and numbing relief. BS active all 4 quads.Stool present in colostomy bag. Midline incision staples intact with ecchymosis areas. No drainage noted.   1245 - Pt up in chair. Denies pain and nausea. Family visiting.   1745- Pt up in chair for couple hours today. Denies pain. No nausea. Colostomy emptied 100cc liquid brown colored. T 99(T)   1830 - Bladder scanned 156cc. Pt voices no urge to void.     Face to face report given with opportunity to observe patient.  Report given to Dagoberto RUIZ.    Deb Martinez RN  6/29/2025, 7:02 PM

## 2025-06-29 NOTE — PROVIDER NOTIFICATION
Attempted to place NG R) nare x3 without success. Updated Surgeon. Per surgeon ok to let rest and attempt to place. Pt did has scant amout of bleeding from nare after attempts. Pt upright.

## 2025-06-29 NOTE — PROGRESS NOTES
"[facility] General Surgery Progress Note    James Grant MRN# [id number]   YOB: 1927 Age: 89 year old            Assessment and Plan:   89yM POD#6 s/p ex lap with enterolysis and reduction of parastomal hernia with concern for closed loop obstruction.  Overnight 6/29 he had onset of epigastric pain and heartburn.      PLAN:  N.p.o.  Replace NG to LIS  IV Protonix 40 mg daily  LR at 100  IV Zofran, Compazine as needed  VTE prophylaxis with Lovenox 40 mg daily  Pain control with Tylenol, oxycodone as needed  IV hydralazine 10 mg as needed systolic blood pressure greater than 180  PTA aspirin 162 mg daily held  PTA metoprolol succ 12.5 mg daily   PTA simvastatin 20 mg daily held  PTA Flomax 0.4 mg twice daily held           Subjective/Interval   The overnight nurse contacted the nocturnist who ordered a cardiac workup which was negative.  Following this, the nocturnist also ordered a CT scan which did not demonstrate evidence of recurrent obstruction but did show significant distention of the stomach along with some thickening of the distal esophagus consistent with GERD.  This morning he continues to have nausea and feels as though he may vomit.  Due to the amount of distention of the CT scan I discussed that we need to replace his nasogastric tube.          Physical Exam:   Blood pressure (!) 154/69, pulse 73, temperature 98.1  F (36.7  C), temperature source Tympanic, resp. rate 16, height 1.803 m (5' 11\"), weight 92.3 kg (203 lb 7.8 oz), SpO2 92%.  203 lbs 7.75 oz  General: Awake and alert, no acute distress, pleasant  Psych: Alert and Oriented.  Normal affect  Neurological: grossly intact  Eyes: Sclera clear  Respiratory:  nonlabored breathing  Cardiovascular:  Regular rate and rhythm   Abdomen: Soft, minimally tender around incisions and stoma, staples at laparotomy incision intact with minimal focal areas of ecchymosis.  Integumentary:  No rashes           Current Medications:     "     Current Facility-Administered Medications   Medication Dose Route Frequency Provider Last Rate Last Admin    [Held by provider] aspirin tablet 162.5 mg  162.5 mg Oral Daily Sukumar Chavez MD   162.5 mg at 06/27/25 1259    enoxaparin ANTICOAGULANT (LOVENOX) injection 40 mg  40 mg Subcutaneous Q24H Sukumar Chavez MD   40 mg at 06/28/25 1159    losartan (COZAAR) tablet 25 mg  25 mg Oral Daily Angelica Gallegos MD   25 mg at 06/28/25 0809    metoprolol succinate ER (TOPROL-XL) 24 hr half-tab 12.5 mg  12.5 mg Oral At Bedtime Angelica Gallegos MD   12.5 mg at 06/28/25 2011    pantoprazole (PROTONIX) injection 40 mg  40 mg Intravenous Once Josue Prado MD        [Held by provider] simvastatin (ZOCOR) tablet 20 mg  20 mg Oral At Bedtime Sukumar Chavez MD   20 mg at 06/28/25 2011    sodium chloride (PF) 0.9% PF flush 3 mL  3 mL Intracatheter Q8H SABA Sukumar hCavez MD   3 mL at 06/29/25 0531    [Held by provider] tamsulosin (FLOMAX) capsule 0.4 mg  0.4 mg Oral BID Sukumar Chavez MD   0.4 mg at 06/28/25 2011       Current Facility-Administered Medications   Medication Dose Route Frequency Provider Last Rate Last Admin    acetaminophen (TYLENOL) tablet 975 mg  975 mg Oral Q8H PRN Sukumar Chavez MD        benzocaine-menthol (CEPACOL) 15-3.6 MG lozenge 1 lozenge  1 lozenge Buccal Q1H PRN Sukumar Chavez MD   1 lozenge at 06/26/25 0153    calcium carbonate (TUMS) chewable tablet 500 mg  500 mg Oral TID PRN Reagan Schneider MD   500 mg at 06/29/25 0146    fluticasone (FLONASE) 50 MCG/ACT spray 1 spray  1 spray Both Nostrils Daily PRN Sukumar Chavez MD        hydrALAZINE (APRESOLINE) injection 10 mg  10 mg Intravenous Q6H PRN Angelica Gallegos MD   10 mg at 06/29/25 0343    lidocaine (LMX4) cream   Topical Q1H PRN Sukumar Chavez MD        lidocaine 1 % 0.1-1 mL  0.1-1 mL Other Q1H PRN Sukumar Chavez MD        naloxone (NARCAN) injection 0.2 mg  0.2  mg Intravenous Q2 Min PRSukumar Echeverria MD        Or    naloxone (NARCAN) injection 0.4 mg  0.4 mg Intravenous Q2 Min PRSukumar Echeverria MD        Or    naloxone (NARCAN) injection 0.2 mg  0.2 mg Intramuscular Q2 Min PRSukumar Echeverria MD        Or    naloxone (NARCAN) injection 0.4 mg  0.4 mg Intramuscular Q2 Min PRSukumar Echeverria MD        ondansetron (ZOFRAN ODT) ODT tab 4 mg  4 mg Oral Q6H PRSukumar Echeverria MD        Or    ondansetron (ZOFRAN) injection 4 mg  4 mg Intravenous Q6H PRSukumar Echeverria MD   4 mg at 06/29/25 0530    oxyCODONE (ROXICODONE) tablet 5 mg  5 mg Oral Q4H PRN Josue Prado MD        polyethylene glycol (MIRALAX) Packet 17 g  17 g Oral Daily PRN Josue Prado MD        prochlorperazine (COMPAZINE) injection 5 mg  5 mg Intravenous Q6H PRN Sukumar Chavez MD   5 mg at 06/27/25 2030    Or    prochlorperazine (COMPAZINE) tablet 5 mg  5 mg Oral Q6H PRSukumar Echeverria MD        sodium chloride (PF) 0.9% PF flush 3 mL  3 mL Intracatheter q1 min prSukumar Echeverria MD                  Labs/Imaging      Latest Reference Range & Units 06/29/25 03:34 06/29/25 03:37   Sodium 135 - 145 mmol/L 138    Potassium 3.4 - 5.3 mmol/L 3.4    Chloride 98 - 107 mmol/L 100    Carbon Dioxide (CO2) 22 - 29 mmol/L 23    Urea Nitrogen 8.0 - 23.0 mg/dL 12.4    Creatinine 0.67 - 1.17 mg/dL 0.57 (L)    GFR Estimate >60 mL/min/1.73m2 89    Calcium 8.8 - 10.4 mg/dL 9.0    Anion Gap 7 - 15 mmol/L 15    Magnesium 1.7 - 2.3 mg/dL 1.8    Albumin 3.5 - 5.2 g/dL 3.4 (L)    Protein Total 6.4 - 8.3 g/dL 6.1 (L)    Alkaline Phosphatase 40 - 150 U/L 52    ALT 0 - 70 U/L 17    AST 0 - 45 U/L 27    Bilirubin Total <=1.2 mg/dL 0.5    Glucose 70 - 99 mg/dL 161 (H)    Lactic Acid 0.7 - 2.0 mmol/L 1.3    Troponin T, High Sensitivity <=22 ng/L 20    WBC 4.0 - 11.0 10e3/uL  10.0   Hemoglobin 13.3 - 17.7 g/dL  14.6   Hematocrit 40.0 - 53.0 %  42.5   Platelet  Count 150 - 450 10e3/uL  276   (L): Data is abnormally low  (H): Data is abnormally high  Narrative & Impression   EXAM: CT ABDOMEN PELVIS W CONTRAST  LOCATION: [facility]  DATE: 6/29/2025     INDICATION: Status post exploratory laparotomy with enterolysis and reduction of parastomal hernia, with concern for closed loop obstruction.  COMPARISON: 6/23/2025; 7/15/2021  TECHNIQUE: CT scan of the abdomen and pelvis was performed following injection of IV contrast. Multiplanar reformats were obtained. Dose reduction techniques were used.  CONTRAST: Isovue 370 100 ml     FINDINGS:   LOWER CHEST: Scattered atelectasis in the lung bases. No consolidation or pleural effusion. Moderate wall thickening seen in the distal esophagus, new from prior.     HEPATOBILIARY: Normal liver. Gallbladder is surgically absent.     PANCREAS: Diffuse atrophy without pancreatic ductal dilatation or peripancreatic fluid collection. A 7 mm cystic lesion is seen in the pancreatic tail, unchanged dating back to 2021.     SPLEEN: Normal.     ADRENAL GLANDS: Normal.     KIDNEYS/BLADDER: No urinary stones or hydroureteronephrosis. Bilateral too small to characterize cystic lesions are visually benign and do not require follow-up. Urinary bladder is normal. Intraluminal gas in the bladder likely from recent Prater   instrumentation. However, there is also gas within the bilateral urinary bladder wall, new from prior.     BOWEL: Postsurgical changes of partial colectomy with Walker's pouch formation and left lower quadrant end colostomy redemonstrated. A long segment of fluid-filled distended small bowel again seen in the left abdomen with fluid column extending to the   stomach, which is also moderately distended. A left lower quadrant parastomal hernia containing a segment of small bowel is redemonstrated. The herniated small bowel loop contains a small amount of fluid/gas mottled stool but does not appear   significantly distended. Small  bowel loop immediately proximal to this hernia is also not distended. Multiple diverticula again seen in the duodenal sweep, proximal small bowel and residual colon without acute diverticulitis. Appendix is surgically   absent. Small amount of free fluid in the abdomen and parastomal hernia sac is likely postoperative in nature. No intraperitoneal free air. A subcentimeter lipoma again seen in the Walker's pouch.     LYMPH NODES: Normal.     VASCULATURE: Moderate aortoiliac atherosclerotic calcifications. No abdominal aortic aneurysm. Circumaortic left renal vein.     PELVIC ORGANS: Stable prostate enlargement.     MUSCULOSKELETAL: A left total hip arthroplasty is intact. Stable spinal degenerative changes including moderate L5/S1 degenerative disc space narrowing. No suspicious osseous lesions or acute fractures. A small fat-containing left inguinal hernia is   unchanged.                                                                         IMPRESSION:      1.  Postsurgical changes of partial colectomy with Walker's formation and left lower quadrant end colostomy.      2.  A left lower quadrant parastomal hernia is again seen containing a short segment small bowel. Compared to prior study, the herniated small bowel loop is significantly decreased in degree of distention.     3.  Fluid distention of a long segment small bowel in the left abdomen. The small bowel segment immediately proximal to the parastomal hernia is nondistended. This configuration is more compatible with postoperative ileus. However, fluid column extends   to the stomach which is moderately fluid distended. In addition, the distal esophagus is moderately thickened, new from prior study and indicative of reflux esophagitis. Nasogastric tube decompression may be warranted for aspiration precaution.     4.  Stable 7 mm cystic lesion in the pancreatic tail dating back to 7/15/2021. Recommend further surveillance with pancreatic protocol MRI in  one year to complete 5 year follow-up.     5.  Redemonstration of small bowel/colonic diverticulosis and prostate enlargement.     6.  Punctate gas bubbles that are apparently within the bilateral urinary bladder wall, new from prior study.  It is unclear if these represent intraluminal gas trapped within small bladder folds versus emphysematous cystitis.  Recommend clinical   correlation with urinalysis.     All new lab and imaging data was reviewed.   I have personally reviewed the imaging studies         Josue Prado MD

## 2025-06-29 NOTE — PROGRESS NOTES
"Nutrition Assessment - Length of Stay    Pt is a 89 yom POD #6 s/p exploratory lap, lysis of adhesions, partial colectomy, hernia reduction.      Ht-71\", Wt-204#.  IBWR is 155-189#.  BMI is 28.  Wt Readings from Last 10 Encounters:   06/26/25 92.3 kg (203 lb 7.8 oz)   03/21/25 93.7 kg (206 lb 8 oz)   03/07/25 94 kg (207 lb 2 oz)   02/28/25 93.8 kg (206 lb 12.7 oz)   09/13/23 86.2 kg (190 lb)   06/13/23 88.2 kg (194 lb 8 oz)   12/20/22 86.2 kg (190 lb)   12/12/22 85.3 kg (188 lb)   09/15/22 87.1 kg (192 lb)   07/14/22 89.4 kg (197 lb)      Pt is NPO.  Diet advance was tried but unsuccessful.  Pt became nauseated with abdominal pain.  NG placement was attempted today but unable to place.      Labs/meds reviewed.      Estimated nutritional needs are:  2580 calories (30 kcal/kg), 103-129 gm protein (1.2-1.5 gm/kg).      Pt does not meet criteria for malnutrition but NPO status prolonged at this time.  If unable to advance diet, nutritional support should be considered.    RD will follow medical plan.    "

## 2025-06-29 NOTE — PROGRESS NOTES
This is a 89-year-old male POD#5 s/p ex lap with enterolysis and reduction of parastomal hernia with concern for closed loop obstruction.    I was informed by his nurse Dagoberto at 0400, that he was having increasing abdominal pain and vomiting.  I was informed that the patient's diet was advanced today.  Initial concerns of cardiac etiology was to be ruled out, troponin was taken which was normal, EKG was taken which did not show any acute ischemic findings.     The following interventions were ordered    -Hold diet, start LR at 75 mL/h  -Started sucralfate  -Blood pressure control  -Follow-up CT scan of the abdomen and pelvis   -Follow-up CBC, BMP, lactate        Reagan Schneider MD  , Hospitalist  [facility]

## 2025-06-30 ENCOUNTER — APPOINTMENT (OUTPATIENT)
Dept: OCCUPATIONAL THERAPY | Facility: HOSPITAL | Age: OVER 89
DRG: 336 | End: 2025-06-30
Payer: MEDICARE

## 2025-06-30 ENCOUNTER — APPOINTMENT (OUTPATIENT)
Dept: PHYSICAL THERAPY | Facility: HOSPITAL | Age: OVER 89
DRG: 336 | End: 2025-06-30
Payer: MEDICARE

## 2025-06-30 LAB
ATRIAL RATE - MUSE: 84 BPM
DIASTOLIC BLOOD PRESSURE - MUSE: NORMAL MMHG
HOLD SPECIMEN: NORMAL
INTERPRETATION ECG - MUSE: NORMAL
MAGNESIUM SERPL-MCNC: 1.9 MG/DL (ref 1.7–2.3)
P AXIS - MUSE: 68 DEGREES
POTASSIUM SERPL-SCNC: 3.6 MMOL/L (ref 3.4–5.3)
PR INTERVAL - MUSE: 196 MS
QRS DURATION - MUSE: 94 MS
QT - MUSE: 458 MS
QTC - MUSE: 541 MS
R AXIS - MUSE: 13 DEGREES
SYSTOLIC BLOOD PRESSURE - MUSE: NORMAL MMHG
T AXIS - MUSE: 85 DEGREES
VENTRICULAR RATE- MUSE: 84 BPM

## 2025-06-30 PROCEDURE — 250N000011 HC RX IP 250 OP 636: Performed by: SURGERY

## 2025-06-30 PROCEDURE — 250N000013 HC RX MED GY IP 250 OP 250 PS 637: Performed by: NURSE PRACTITIONER

## 2025-06-30 PROCEDURE — 250N000009 HC RX 250: Performed by: INTERNAL MEDICINE

## 2025-06-30 PROCEDURE — 83735 ASSAY OF MAGNESIUM: CPT | Performed by: SURGERY

## 2025-06-30 PROCEDURE — 120N000001 HC R&B MED SURG/OB

## 2025-06-30 PROCEDURE — 97530 THERAPEUTIC ACTIVITIES: CPT | Mod: GP | Performed by: PHYSICAL THERAPIST

## 2025-06-30 PROCEDURE — 84132 ASSAY OF SERUM POTASSIUM: CPT | Performed by: SURGERY

## 2025-06-30 PROCEDURE — 97530 THERAPEUTIC ACTIVITIES: CPT | Mod: GO

## 2025-06-30 PROCEDURE — 36415 COLL VENOUS BLD VENIPUNCTURE: CPT | Performed by: SURGERY

## 2025-06-30 RX ORDER — PANTOPRAZOLE SODIUM 40 MG/1
40 FOR SUSPENSION ORAL
Status: DISCONTINUED | OUTPATIENT
Start: 2025-07-01 | End: 2025-07-02 | Stop reason: HOSPADM

## 2025-06-30 RX ADMIN — SIMVASTATIN 20 MG: 20 TABLET, FILM COATED ORAL at 21:19

## 2025-06-30 RX ADMIN — TAMSULOSIN HYDROCHLORIDE 0.4 MG: 0.4 CAPSULE ORAL at 21:19

## 2025-06-30 RX ADMIN — ENOXAPARIN SODIUM 40 MG: 40 INJECTION SUBCUTANEOUS at 12:17

## 2025-06-30 RX ADMIN — METOPROLOL TARTRATE 5 MG: 1 INJECTION, SOLUTION INTRAVENOUS at 04:02

## 2025-06-30 RX ADMIN — METOPROLOL TARTRATE 5 MG: 1 INJECTION, SOLUTION INTRAVENOUS at 08:30

## 2025-06-30 RX ADMIN — METOPROLOL SUCCINATE 12.5 MG: 25 TABLET, EXTENDED RELEASE ORAL at 17:19

## 2025-06-30 ASSESSMENT — ACTIVITIES OF DAILY LIVING (ADL)
ADLS_ACUITY_SCORE: 53

## 2025-06-30 NOTE — PLAN OF CARE
Plan of Care Reviewed With: Patient and daughter    Overall Patient Progress: progressing    Pt continues to be A&O. Denies pain. BP elevated in the afternoon and night time dose of metoprolol was given early, per Lida Gill. All other VS stable this shift. Diet advanced to regular diet thi shift. Tolerating diet well so far. Denies any increase of pain after eating or nausea. Has had stool output in colostomy this shift as documented. Ambulated in the hallway x3. Encouraged fluid intake. IV saline locked. Ambulating with SBA, gait belt, and walker. Call light remains within reach and pt makes needs known.          Face to face report given with opportunity to observe patient.    Report given to Morgan Hinojosa RN   6/30/2025  6:55 PM

## 2025-06-30 NOTE — PROGRESS NOTES
06/30/25 1004   Appointment Info   Signing Clinician's Name / Credentials (PT) Emma Merino DPT   Interventions   Interventions Quick Adds Therapeutic Activity   Therapeutic Activity   Therapeutic Activities: dynamic activities to improve functional performance Minutes (86260) 10   Symptoms Noted During/After Treatment Fatigue   Treatment Detail/Skilled Intervention Pt up in chair upon PT arrival, had just ambulated with OT shortly before PT arrival, initially asked if PT could just come back tomorrow but encouraged pt to participate.  Pt transferred sit>stand SBA from chair.  Pt ambulated 275' with FWW SBA, ambulates at good place, able to make turns with no LOB or instability.  Pt declined further exercises at this time due to being tired.  Pt left sitting in chair with clip alarm on and call light in reach.   PT Discharge Planning   PT Plan Progress ambulation and activity as tolerated   PT Discharge Recommendation (DC Rec) home with assist;home with home care physical therapy;Transitional Care Facility   PT Rationale for DC Rec Pt tolerating mobility well and should be able to return to [facility].  May not require additional PT needs if he continues to progress well   PT Brief overview of current status sit<>stand SBA, ambulated 275' with FWW SBA   PT Total Distance Amb During Session (feet) 275   Physical Therapy Time and Intention   Timed Code Treatment Minutes 10   Total Session Time (sum of timed and untimed services) 10        5-16-19:WCC 3.3, RBC 3.65, BUN 23 Crit 1.38 called Dr. Caldera's office and spoke to Carol and informed.

## 2025-06-30 NOTE — PLAN OF CARE
Doctor Chavez notified of pt having a 11 beat run shortly followed by 8 beat run of v tach. Potassium and Magnesium Lab ordered.

## 2025-06-30 NOTE — PLAN OF CARE
Plan of Care Reviewed With: Patient     Overall Patient Progress: Progressing     Pt A&Ox4. Afebrile, VSS. Denies pain. Denies nausea/vomiting. Denies heartburn. LR running at 100 mL/hr. LS clear, on RA. HRR SR 60's-70's. BS audible to all 4 quadrants. Formed brown stool present in colostomy, stoma red and moist. Midline incision staples intact with areas of ecchymosis. No drainage noted. Voiding without difficulty. Up x1. NPO. Alarms on, call light within reach.     Face to face report given with opportunity to observe patient.    Report given to Stacy Jenkins RN   6/30/2025  7:00 AM

## 2025-06-30 NOTE — PROGRESS NOTES
06/30/25 0900   Appointment Info   Signing Clinician's Name / Credentials (OT) Saumya Chavez OTR/L   Interventions   Interventions Quick Adds Therapeutic Activity   Therapeutic Activities   Therapeutic Activity Minutes (02634) 15   Symptoms noted during/after treatment fatigue   Treatment Detail/Skilled Intervention Pt up in the chair upon OT arrival. His nursing aide had just exited his room and reports already completing a sponge bath/dressing. Pt educated in the difference of PT vs OT but he still wanted to go for a walk as he felt this was the most beneficial strengthening activity. Pt transferred sit<>stand from the chair and ambulated out in the hallway using FWW ~ 250 ft. Pt completed mobility with SBA-CGA. Pt returned to his chair and brushed his teeth and combed his hair with set up. Pt was left resting in the chair, call light within reach and clip alert attached.   OT Discharge Planning   OT Plan Progress ADLs, strength, and activity tolerance   OT Discharge Recommendation (DC Rec) home with assist;home with home care occupational therapy   OT Rationale for DC Rec Pt slowly improving and tolerating activity well. Anticipate pt will be able to return to the [facility] at discharge.   OT Brief overview of current status SBA-CGA transfers and ambulation ~ 250 ft using FWW; set up grooming in sitting   Total Session Time   Timed Code Treatment Minutes 15   Total Session Time (sum of timed and untimed services) 15

## 2025-07-01 ENCOUNTER — APPOINTMENT (OUTPATIENT)
Dept: PHYSICAL THERAPY | Facility: HOSPITAL | Age: OVER 89
DRG: 336 | End: 2025-07-01
Payer: MEDICARE

## 2025-07-01 ENCOUNTER — APPOINTMENT (OUTPATIENT)
Dept: OCCUPATIONAL THERAPY | Facility: HOSPITAL | Age: OVER 89
DRG: 336 | End: 2025-07-01
Payer: MEDICARE

## 2025-07-01 LAB
ALBUMIN SERPL BCG-MCNC: 3.2 G/DL (ref 3.5–5.2)
ALP SERPL-CCNC: 43 U/L (ref 40–150)
ALT SERPL W P-5'-P-CCNC: 42 U/L (ref 0–70)
ANION GAP SERPL CALCULATED.3IONS-SCNC: 10 MMOL/L (ref 7–15)
AST SERPL W P-5'-P-CCNC: 35 U/L (ref 0–45)
BILIRUB SERPL-MCNC: 0.4 MG/DL
BUN SERPL-MCNC: 11.5 MG/DL (ref 8–23)
CALCIUM SERPL-MCNC: 8.3 MG/DL (ref 8.8–10.4)
CHLORIDE SERPL-SCNC: 102 MMOL/L (ref 98–107)
CREAT SERPL-MCNC: 0.63 MG/DL (ref 0.67–1.17)
EGFRCR SERPLBLD CKD-EPI 2021: 86 ML/MIN/1.73M2
ERYTHROCYTE [DISTWIDTH] IN BLOOD BY AUTOMATED COUNT: 13.2 % (ref 10–15)
GLUCOSE SERPL-MCNC: 105 MG/DL (ref 70–99)
HCO3 SERPL-SCNC: 25 MMOL/L (ref 22–29)
HCT VFR BLD AUTO: 37.8 % (ref 40–53)
HGB BLD-MCNC: 13.1 G/DL (ref 13.3–17.7)
MAGNESIUM SERPL-MCNC: 1.9 MG/DL (ref 1.7–2.3)
MCH RBC QN AUTO: 31.2 PG (ref 26.5–33)
MCHC RBC AUTO-ENTMCNC: 34.7 G/DL (ref 31.5–36.5)
MCV RBC AUTO: 90 FL (ref 78–100)
PLATELET # BLD AUTO: 275 10E3/UL (ref 150–450)
POTASSIUM SERPL-SCNC: 3.4 MMOL/L (ref 3.4–5.3)
PROT SERPL-MCNC: 5.5 G/DL (ref 6.4–8.3)
RBC # BLD AUTO: 4.2 10E6/UL (ref 4.4–5.9)
SODIUM SERPL-SCNC: 137 MMOL/L (ref 135–145)
WBC # BLD AUTO: 7.9 10E3/UL (ref 4–11)

## 2025-07-01 PROCEDURE — 250N000013 HC RX MED GY IP 250 OP 250 PS 637: Performed by: NURSE PRACTITIONER

## 2025-07-01 PROCEDURE — 82374 ASSAY BLOOD CARBON DIOXIDE: CPT | Performed by: SURGERY

## 2025-07-01 PROCEDURE — 120N000001 HC R&B MED SURG/OB

## 2025-07-01 PROCEDURE — 36415 COLL VENOUS BLD VENIPUNCTURE: CPT | Performed by: SURGERY

## 2025-07-01 PROCEDURE — 97530 THERAPEUTIC ACTIVITIES: CPT | Mod: GP

## 2025-07-01 PROCEDURE — 97530 THERAPEUTIC ACTIVITIES: CPT | Mod: GO

## 2025-07-01 PROCEDURE — 83735 ASSAY OF MAGNESIUM: CPT | Performed by: SURGERY

## 2025-07-01 PROCEDURE — 85018 HEMOGLOBIN: CPT | Performed by: SURGERY

## 2025-07-01 RX ADMIN — METOPROLOL SUCCINATE 12.5 MG: 25 TABLET, EXTENDED RELEASE ORAL at 21:59

## 2025-07-01 RX ADMIN — TAMSULOSIN HYDROCHLORIDE 0.4 MG: 0.4 CAPSULE ORAL at 09:49

## 2025-07-01 RX ADMIN — TAMSULOSIN HYDROCHLORIDE 0.4 MG: 0.4 CAPSULE ORAL at 21:59

## 2025-07-01 RX ADMIN — SIMVASTATIN 20 MG: 20 TABLET, FILM COATED ORAL at 21:59

## 2025-07-01 RX ADMIN — PANTOPRAZOLE SODIUM 40 MG: 40 GRANULE, DELAYED RELEASE ORAL at 09:49

## 2025-07-01 RX ADMIN — LOSARTAN POTASSIUM 25 MG: 25 TABLET, FILM COATED ORAL at 09:49

## 2025-07-01 RX ADMIN — ASPIRIN 325 MG ORAL TABLET 162.5 MG: 325 PILL ORAL at 09:49

## 2025-07-01 ASSESSMENT — ACTIVITIES OF DAILY LIVING (ADL)
ADLS_ACUITY_SCORE: 53

## 2025-07-01 NOTE — PLAN OF CARE
Uneventful shift for pt. He continues to improve. Denies pain and nausea this shift. Eating a regular diet and tolerating it well. Passing gas and has had a small amount of stool in his colostomy bag. Voiding without difficulty. Needs a lot of encouragement to drink liquids. Ambulated x3 in the hallway this shift. VSS. Call light remains within reach and pt is able to make needs known.             Face to face report given with opportunity to observe patient.    Report given to Morgan Hinojosa RN   7/1/2025  6:59 PM

## 2025-07-01 NOTE — PROGRESS NOTES
07/01/25 1100   Appointment Info   Signing Clinician's Name / Credentials (PT) aJzmine Coyle DPT   Therapeutic Activity   Therapeutic Activities: dynamic activities to improve functional performance Minutes (93033) 15   Symptoms Noted During/After Treatment Fatigue   Treatment Detail/Skilled Intervention Pt seated in recliner at start of session and agreeable to PT. Completed sit to stand c FWW and CGA. Pt ambulated 300' c FWW and CGA c WC follow for safety. Pt steady and safe with FWW. Pt very fatigued following ambulation and transferred into recliner c CGA. Pt resting in recliner at end of session c call button in lap.   PT Discharge Planning   PT Plan Progress ambulation and activity as tolerated   PT Discharge Recommendation (DC Rec) home with assist;home with home care physical therapy;Transitional Care Facility   PT Rationale for DC Rec Pt tolerating mobility well and should be able to return to [facility].  May not require additional PT needs if he continues to progress well   PT Brief overview of current status sit<>stand SBA, ambulated 300' with FWW SBA   PT Total Distance Amb During Session (feet) 300   Physical Therapy Time and Intention   Timed Code Treatment Minutes 15   Total Session Time (sum of timed and untimed services) 15

## 2025-07-01 NOTE — PROGRESS NOTES
07/01/25 1200   Appointment Info   Signing Clinician's Name / Credentials (OT) Saumya Chavez OTR/L   Interventions   Interventions Quick Adds Therapeutic Activity   Therapeutic Activities   Therapeutic Activity Minutes (84265) 15   Symptoms noted during/after treatment fatigue   Treatment Detail/Skilled Intervention Pt up in the chair upon OT arrival, wanting to go for a walk to progress his strength and activity tolerance. Pt transferred sit<>stand from the chair wiht SBA. He ambulated in the hallway ~300 ft using FWW with SBA-CGA. Pt returned to his chair and washed his hands with set up due to reports of them feeling sticky. Pt was left resting in the chair, call light within reach and clip alert attached.   OT Discharge Planning   OT Plan Progress ADLs, strength, and activity tolerance   OT Discharge Recommendation (DC Rec) home with assist;home with home care occupational therapy   OT Rationale for DC Rec Pt slowly improving and tolerating activity well. Anticipate pt will be able to return to the senior care at discharge.   OT Brief overview of current status Pt transferred sit<>stand from the chair wiht SBA. He ambulated in the hallway ~300 ft using FWW with SBA-CGA. Pt returned to his chair and washed his hands with set up.   Total Session Time   Timed Code Treatment Minutes 15   Total Session Time (sum of timed and untimed services) 15

## 2025-07-01 NOTE — PLAN OF CARE
Plan of Care Reviewed With: patient    Overall Patient Progress: progressing    No IV access. No significant changes overnight. Denies pain and nausea. Midline incision open to air with no drainage noted. Colostomy with 50 mL of output. Burped as needed. Bed in lowest position. Call light in reach. ID band in place. Makes needs known.     Face to face report given with opportunity to observe patient.    Report given to SARA Salgado RN   7/1/2025  7:31 AM

## 2025-07-02 ENCOUNTER — TELEPHONE (OUTPATIENT)
Dept: FAMILY MEDICINE | Facility: OTHER | Age: OVER 89
End: 2025-07-02

## 2025-07-02 VITALS
HEIGHT: 71 IN | DIASTOLIC BLOOD PRESSURE: 73 MMHG | RESPIRATION RATE: 20 BRPM | OXYGEN SATURATION: 94 % | TEMPERATURE: 98 F | BODY MASS INDEX: 28.49 KG/M2 | SYSTOLIC BLOOD PRESSURE: 139 MMHG | WEIGHT: 203.48 LBS | HEART RATE: 78 BPM

## 2025-07-02 PROCEDURE — 250N000013 HC RX MED GY IP 250 OP 250 PS 637: Performed by: NURSE PRACTITIONER

## 2025-07-02 RX ORDER — ASPIRIN 325 MG
162.5 TABLET ORAL DAILY
COMMUNITY
Start: 2025-07-02

## 2025-07-02 RX ORDER — ACETAMINOPHEN 325 MG/1
650 TABLET ORAL EVERY 8 HOURS PRN
Qty: 30 TABLET | Refills: 0 | Status: SHIPPED | OUTPATIENT
Start: 2025-07-02

## 2025-07-02 RX ADMIN — PANTOPRAZOLE SODIUM 40 MG: 40 GRANULE, DELAYED RELEASE ORAL at 09:08

## 2025-07-02 RX ADMIN — TAMSULOSIN HYDROCHLORIDE 0.4 MG: 0.4 CAPSULE ORAL at 09:08

## 2025-07-02 RX ADMIN — LOSARTAN POTASSIUM 25 MG: 25 TABLET, FILM COATED ORAL at 09:08

## 2025-07-02 RX ADMIN — ASPIRIN 325 MG ORAL TABLET 162.5 MG: 325 PILL ORAL at 09:08

## 2025-07-02 ASSESSMENT — ACTIVITIES OF DAILY LIVING (ADL)
ADLS_ACUITY_SCORE: 47
ADLS_ACUITY_SCORE: 46
ADLS_ACUITY_SCORE: 47
ADLS_ACUITY_SCORE: 47
ADLS_ACUITY_SCORE: 53
ADLS_ACUITY_SCORE: 46
ADLS_ACUITY_SCORE: 46
ADLS_ACUITY_SCORE: 47
ADLS_ACUITY_SCORE: 46
ADLS_ACUITY_SCORE: 53
ADLS_ACUITY_SCORE: 46
ADLS_ACUITY_SCORE: 46
ADLS_ACUITY_SCORE: 53

## 2025-07-02 NOTE — PLAN OF CARE
Occupational Therapy Discharge Summary    Reason for therapy discharge:    Discharged to home/[facility].    Progress towards therapy goal(s). See goals on Care Plan in [facility] electronic health record for goal details.  Goals partially met.  Barriers to achieving goals:   discharge from facility.    Therapy recommendation(s):    No further therapy is recommended.    Goal Outcome Evaluation:

## 2025-07-02 NOTE — DISCHARGE SUMMARY
INPATIENT DISCHARGE SUMMARY  7/2/2025    Patient'S Name: James Grant    Admitting Physician of Record: Sukumar Chavez MD    Discharging Physician: Sukumar Chavez MD/Lida Gill NP    Date of admission: 6/23/2025     Date of discharge: 7/2/2025    Admitting diagnosis: Small bowel obstruction (H) [K56.609]    Discharge diagnosis: Same    Procedures: Procedure(s):  LAPAROTOMY Extensive  Lysis of adhesions reduction of peristomal hernia    Consultants: Medicine    Hospital course: The patient was admitted to the hospital and taken to the operating room and underwent an Procedure(s):  LAPAROTOMY Extensive  Lysis of adhesions reduction of peristomal hernia.  The patient tolerated the procedure(s) well and was transferred to the [facility].  His postoperative course has been completely unremarkable.  At the time of discharge he is eating a Regular diet, is having good pain control on oral medications, and is passing gas and is having bowel movements.  The patient will be discharged home in good condition.    Discharge instructions include:    Patient will be discharged to Home   Diet: Current Diet:  Active Diet and Nourishment Order   Procedures    Regular Diet Adult         Activity : no liting over 10 pounds for 6 weeks   Follow-up:     The patient will follow up with his primary care provider in 1 weeks.      The patient will follow up with me in 1 weeks.   Medications include:    All prior medications

## 2025-07-02 NOTE — PLAN OF CARE
Plan of Care Reviewed With: patient    Overall Patient Progress: progressing    No IV access. No significant changes overnight. Denies pain and nausea. Midline incision open to air with no drainage noted. Colostomy with 50 mL of output. Burped as needed. Bed in lowest position. Call light in reach. ID band in place. Makes needs known.     Face to face report given with opportunity to observe patient.    Report given to SARA Salgado RN   7/2/2025  7:00 AM

## 2025-07-02 NOTE — PLAN OF CARE
A&O, SBA, regular diet. VSS, afebrile, room air, denies pain.  Incision well approximated, JOVANY.  Colostomy intact.  Small voids this morning, up w/ SBA ambulating in hallway, tolerated well.  Assessment otherwise as charted.  Plan for pt to discharge home to [facility] this date.  No IV access.  Brakes locked, call light within reach, makes needs known.  Free from falls, alarms on.      Face to face report given with opportunity to observe patient.    Report given to SARA Lechuga RN   7/2/2025  10:51 AM

## 2025-07-02 NOTE — PLAN OF CARE
Physical Therapy Discharge Summary    Reason for therapy discharge:    Discharged to home.    Progress towards therapy goal(s). See goals on Care Plan in [facility] electronic health record for goal details.  Goals partially met.  Barriers to achieving goals:   discharge from facility.    Therapy recommendation(s):    Continued therapy is recommended.  Rationale/Recommendations:  pt tolerating mobility well.  May benefit from ongoing outpatient physical therapy for work on strength and conditioning if pt does not feel he is at his known baseline. .    Goal Outcome Evaluation:

## 2025-07-02 NOTE — PLAN OF CARE
Goal Outcome Evaluation:       Patient discharged at 1:08 PM via wheel chair accompanied by other:family member and staff. Prescriptions sent to patients preferred pharmacy. All belongings sent with patient.     Discharge instructions reviewed with patient. Listed belongings gathered and returned to patient.     Patient discharged to [facility].   Report called to [facility], Spoke with Eugenie.     Surgical Patient   Surgical Procedures during stay: LAPAROTOMY Extensive Lysis of adhesions reduction of peristomal hernia   Did patient receive discharge instruction on wound care and recognition of infection symptoms? Yes    MISC  Follow up appointment made:  Yes  Home medications returned to patient: N/A  Patient reports pain was well managed at discharge: Yes

## 2025-07-02 NOTE — PROGRESS NOTES
INPATIENT ROUNDING NOTE  7/2/2025    Patient: James Grant    Physician of Record: Sukumar Chavez MD    Admitting diagnosis: Small bowel obstruction (H) [K56.609]    Procedure(s):  LAPAROTOMY Extensive  Lysis of adhesions reduction of peristomal hernia     POD: 9 Days Post-Op    Current Diet: Regular    CURRENT MEDICATIONS:  Continuous Medications:  Current Facility-Administered Medications   Medication Dose Route Frequency Provider Last Rate Last Admin    acetaminophen (TYLENOL) tablet 975 mg  975 mg Oral Q8H PRN Sukumar Chavez MD        aspirin tablet 162.5 mg  162.5 mg Oral Daily Lida Gill NP   162.5 mg at 07/01/25 0949    benzocaine 20% (HURRICAINE/TOPEX) 20 % spray 0.5-1 mL  1-2 spray Mouth/Throat Q3H PRN Josue Prado MD   0.5 mL at 06/29/25 0946    benzocaine-menthol (CEPACOL) 15-3.6 MG lozenge 1 lozenge  1 lozenge Buccal Q1H PRN Sukumar Chavez MD   1 lozenge at 06/26/25 0153    calcium carbonate (TUMS) chewable tablet 500 mg  500 mg Oral TID PRN Reagan Schneider MD   500 mg at 06/29/25 0146    enoxaparin ANTICOAGULANT (LOVENOX) injection 40 mg  40 mg Subcutaneous Q24H Sukumar Chavez MD   40 mg at 06/30/25 1217    fluticasone (FLONASE) 50 MCG/ACT spray 1 spray  1 spray Both Nostrils Daily PRN Sukumar Chavez MD        hydrALAZINE (APRESOLINE) injection 10 mg  10 mg Intravenous Q6H PRN Angelica Gallegos MD   10 mg at 06/29/25 0343    HYDROmorphone (DILAUDID) injection 0.2-0.4 mg  0.2-0.4 mg Intravenous Q3H PRN Josue Prado MD   0.2 mg at 06/29/25 0926    lidocaine (LMX4) cream   Topical Q1H PRN Sukumar Chavez MD        lidocaine 1 % 0.1-1 mL  0.1-1 mL Other Q1H PRN Sukumar Chavez MD        losartan (COZAAR) tablet 25 mg  25 mg Oral Daily Lida Gill NP   25 mg at 07/01/25 0949    metoprolol succinate ER (TOPROL-XL) 24 hr half-tab 12.5 mg  12.5 mg Oral At Bedtime Lida Gill NP   12.5 mg at 07/01/25 1369    naloxone  (NARCAN) injection 0.2 mg  0.2 mg Intravenous Q2 Min PRN Sukumar Chavez MD        Or    naloxone (NARCAN) injection 0.4 mg  0.4 mg Intravenous Q2 Min PRN Sukumar Chavez MD        Or    naloxone (NARCAN) injection 0.2 mg  0.2 mg Intramuscular Q2 Min PRN Sukumar Chavez MD        Or    naloxone (NARCAN) injection 0.4 mg  0.4 mg Intramuscular Q2 Min PRN Sukumar Chavez MD        ondansetron (ZOFRAN ODT) ODT tab 4 mg  4 mg Oral Q6H PRN Sukumar Chavez MD        Or    ondansetron (ZOFRAN) injection 4 mg  4 mg Intravenous Q6H PRN Sukumar Chavez MD   4 mg at 06/29/25 1026    oxyCODONE (ROXICODONE) tablet 5 mg  5 mg Oral Q4H PRN Josue Prado MD        pantoprazole sodium (PROTONIX) packet 40 mg  40 mg Oral QAM AC Lida Gill, NP   40 mg at 07/01/25 0949    polyethylene glycol (MIRALAX) Packet 17 g  17 g Oral Daily PRN Josue Prado MD        prochlorperazine (COMPAZINE) injection 5 mg  5 mg Intravenous Q6H PRN Sukumar Chavez MD   5 mg at 06/29/25 0838    Or    prochlorperazine (COMPAZINE) tablet 5 mg  5 mg Oral Q6H PRN Sukumar Chavez MD        simvastatin (ZOCOR) tablet 20 mg  20 mg Oral At Bedtime Lida Gill, NP   20 mg at 07/01/25 2159    sodium chloride (PF) 0.9% PF flush 3 mL  3 mL Intracatheter Q8H SABA Sukumar Chavez MD   3 mL at 06/29/25 2056    sodium chloride (PF) 0.9% PF flush 3 mL  3 mL Intracatheter q1 min prSukumar De La Torre MD        tamsulosin (FLOMAX) capsule 0.4 mg  0.4 mg Oral BID Lida Gill, NP   0.4 mg at 07/01/25 2159       Scheduled Medications:  Current Facility-Administered Medications   Medication Dose Route Frequency Provider Last Rate Last Admin    acetaminophen (TYLENOL) tablet 975 mg  975 mg Oral Q8H PRN Sukumar Chavez MD        aspirin tablet 162.5 mg  162.5 mg Oral Daily Lida Gill NP   162.5 mg at 07/01/25 0949    benzocaine 20% (HURRICAINE/TOPEX) 20 % spray  0.5-1 mL  1-2 spray Mouth/Throat Q3H PRN Josue Prado MD   0.5 mL at 06/29/25 0946    benzocaine-menthol (CEPACOL) 15-3.6 MG lozenge 1 lozenge  1 lozenge Buccal Q1H PRN Sukumar Chavez MD   1 lozenge at 06/26/25 0153    calcium carbonate (TUMS) chewable tablet 500 mg  500 mg Oral TID PRN Reagan Schneider MD   500 mg at 06/29/25 0146    enoxaparin ANTICOAGULANT (LOVENOX) injection 40 mg  40 mg Subcutaneous Q24H Sukumar Chavez MD   40 mg at 06/30/25 1217    fluticasone (FLONASE) 50 MCG/ACT spray 1 spray  1 spray Both Nostrils Daily PRN Sukumar Chavez MD        hydrALAZINE (APRESOLINE) injection 10 mg  10 mg Intravenous Q6H PRN Angeliac Gallegos MD   10 mg at 06/29/25 0343    HYDROmorphone (DILAUDID) injection 0.2-0.4 mg  0.2-0.4 mg Intravenous Q3H PRN Josue Prado MD   0.2 mg at 06/29/25 0926    lidocaine (LMX4) cream   Topical Q1H PRN Sukumar Chavez MD        lidocaine 1 % 0.1-1 mL  0.1-1 mL Other Q1H PRN Sukumar Chavez MD        losartan (COZAAR) tablet 25 mg  25 mg Oral Daily Lida Gill NP   25 mg at 07/01/25 0949    metoprolol succinate ER (TOPROL-XL) 24 hr half-tab 12.5 mg  12.5 mg Oral At Bedtime Lida Gill NP   12.5 mg at 07/01/25 2152    naloxone (NARCAN) injection 0.2 mg  0.2 mg Intravenous Q2 Min PRN Sukumar Chavez MD        Or    naloxone (NARCAN) injection 0.4 mg  0.4 mg Intravenous Q2 Min PRN Sukumar Chavez MD        Or    naloxone (NARCAN) injection 0.2 mg  0.2 mg Intramuscular Q2 Min PRN Sukumar Chavez MD        Or    naloxone (NARCAN) injection 0.4 mg  0.4 mg Intramuscular Q2 Min PRN Sukumar Chavez MD        ondansetron (ZOFRAN ODT) ODT tab 4 mg  4 mg Oral Q6H PRN Sukumar Chavez MD        Or    ondansetron (ZOFRAN) injection 4 mg  4 mg Intravenous Q6H PRN Sukumar Chavez MD   4 mg at 06/29/25 1026    oxyCODONE (ROXICODONE) tablet 5 mg  5 mg Oral Q4H PRN Josue Prado MD         pantoprazole sodium (PROTONIX) packet 40 mg  40 mg Oral QAM AC Lida Gill, NP   40 mg at 07/01/25 0949    polyethylene glycol (MIRALAX) Packet 17 g  17 g Oral Daily PRN Josue Prado MD        prochlorperazine (COMPAZINE) injection 5 mg  5 mg Intravenous Q6H PRN Sukumar Chavez MD   5 mg at 06/29/25 0838    Or    prochlorperazine (COMPAZINE) tablet 5 mg  5 mg Oral Q6H PRN Sukumar Chavez MD        simvastatin (ZOCOR) tablet 20 mg  20 mg Oral At Bedtime Lida Gill, NP   20 mg at 07/01/25 2159    sodium chloride (PF) 0.9% PF flush 3 mL  3 mL Intracatheter Q8H SABA Sukumar Chavez MD   3 mL at 06/29/25 2056    sodium chloride (PF) 0.9% PF flush 3 mL  3 mL Intracatheter q1 min prn Sukumar Chavez MD        tamsulosin (FLOMAX) capsule 0.4 mg  0.4 mg Oral BID Lida Gill, NP   0.4 mg at 07/01/25 2159       PRN Medications:  Current Facility-Administered Medications   Medication Dose Route Frequency Provider Last Rate Last Admin    acetaminophen (TYLENOL) tablet 975 mg  975 mg Oral Q8H PRN Sukumar Chavez MD        aspirin tablet 162.5 mg  162.5 mg Oral Daily Lida Gill, NP   162.5 mg at 07/01/25 0949    benzocaine 20% (HURRICAINE/TOPEX) 20 % spray 0.5-1 mL  1-2 spray Mouth/Throat Q3H PRN Josue Prado MD   0.5 mL at 06/29/25 0946    benzocaine-menthol (CEPACOL) 15-3.6 MG lozenge 1 lozenge  1 lozenge Buccal Q1H PRN Sukumar Chavez MD   1 lozenge at 06/26/25 0153    calcium carbonate (TUMS) chewable tablet 500 mg  500 mg Oral TID PRN Reagan Schneider MD   500 mg at 06/29/25 0146    enoxaparin ANTICOAGULANT (LOVENOX) injection 40 mg  40 mg Subcutaneous Q24H Sukumar Chavez MD   40 mg at 06/30/25 1217    fluticasone (FLONASE) 50 MCG/ACT spray 1 spray  1 spray Both Nostrils Daily PRN Sukumar Chavez MD        hydrALAZINE (APRESOLINE) injection 10 mg  10 mg Intravenous Q6H PRN Angelica Gallegos MD   10 mg at 06/29/25 1916     HYDROmorphone (DILAUDID) injection 0.2-0.4 mg  0.2-0.4 mg Intravenous Q3H PRN Josue Prado MD   0.2 mg at 06/29/25 0926    lidocaine (LMX4) cream   Topical Q1H PRN Sukumar Chavez MD        lidocaine 1 % 0.1-1 mL  0.1-1 mL Other Q1H PRN Sukumar Chavez MD        losartan (COZAAR) tablet 25 mg  25 mg Oral Daily Lida Gill, NP   25 mg at 07/01/25 0949    metoprolol succinate ER (TOPROL-XL) 24 hr half-tab 12.5 mg  12.5 mg Oral At Bedtime Lida Gill, NP   12.5 mg at 07/01/25 2159    naloxone (NARCAN) injection 0.2 mg  0.2 mg Intravenous Q2 Min PRN Sukumar Chavez MD        Or    naloxone (NARCAN) injection 0.4 mg  0.4 mg Intravenous Q2 Min PRN Sukumar Chavez MD        Or    naloxone (NARCAN) injection 0.2 mg  0.2 mg Intramuscular Q2 Min PRN Sukumar Chavez MD        Or    naloxone (NARCAN) injection 0.4 mg  0.4 mg Intramuscular Q2 Min PRSukumar Echeverria MD        ondansetron (ZOFRAN ODT) ODT tab 4 mg  4 mg Oral Q6H PRN Sukumar Chavez MD        Or    ondansetron (ZOFRAN) injection 4 mg  4 mg Intravenous Q6H PRN Sukumar Chavez MD   4 mg at 06/29/25 1026    oxyCODONE (ROXICODONE) tablet 5 mg  5 mg Oral Q4H PRN Josue Prado MD        pantoprazole sodium (PROTONIX) packet 40 mg  40 mg Oral QAM AC Lida Gill, NP   40 mg at 07/01/25 0949    polyethylene glycol (MIRALAX) Packet 17 g  17 g Oral Daily PRN Josue Prado MD        prochlorperazine (COMPAZINE) injection 5 mg  5 mg Intravenous Q6H PRN Sukumar Chavez MD   5 mg at 06/29/25 0838    Or    prochlorperazine (COMPAZINE) tablet 5 mg  5 mg Oral Q6H PRN Sukumar Chavez MD        simvastatin (ZOCOR) tablet 20 mg  20 mg Oral At Bedtime Lida Gill NP   20 mg at 07/01/25 2159    sodium chloride (PF) 0.9% PF flush 3 mL  3 mL Intracatheter Q8H SABA Sukumar Chavez MD   3 mL at 06/29/25 2056    sodium chloride (PF) 0.9% PF flush 3 mL  3 mL  "Intracatheter q1 min jackn Sukumar Chavez MD        tamsulosin (FLOMAX) capsule 0.4 mg  0.4 mg Oral BID iLda Gill, NP   0.4 mg at 07/01/25 2159       SUBJECTIVE:   Nausea: No. Vomiting: No. Fever: No. Chills: No. Excessive burping: No. Flatus: Yes. BM: Yes. Pain control: good. Tolerating current diet: Yes.      PHYSICAL EXAM:   Vital signs: BP (!) 168/81   Pulse 78   Temp 97.2  F (36.2  C) (Temporal)   Resp 16   Ht 1.803 m (5' 11\")   Wt 92.3 kg (203 lb 7.8 oz)   SpO2 96%   BMI 28.38 kg/m     BMI: Body mass index is 28.38 kg/m .   General: Normal, healthy, cooperative, in no acute distress, alert   Lungs: respirations are non-labored   Abdominal: non-distended; ostomy with healthy tissue   Wound: clean, dry, intact with staples   Extremities: No cyanosis, clubbing or edema noted bilaterally in Upper and Lower Extremities   Neurological: without deficit    INPUT/OUTPUT:      Intake/Output Summary (Last 24 hours) at 7/2/2025 0852  Last data filed at 7/2/2025 0551  Gross per 24 hour   Intake 780 ml   Output 1150 ml   Net -370 ml       I/O last 3 completed shifts:  In: 780 [P.O.:780]  Out: 1150 [Urine:1050; Stool:100]    LABS:    Last CBC Rrsults:   Recent Labs   Lab Test 07/01/25  0616 06/29/25  0337 06/28/25  0558   WBC 7.9 10.0 8.0   RBC 4.20* 4.72 4.08*   HGB 13.1* 14.6 12.5*   HCT 37.8* 42.5 38.0*   MCV 90 90 93   MCH 31.2 30.9 30.6   MCHC 34.7 34.4 32.9   RDW 13.2 13.0 13.2    276 215       Last Comprehensive Metabolic panel:  Recent Labs   Lab Test 07/01/25  0616 06/30/25  1553 06/29/25  1452 06/29/25  0334 06/28/25  0558     --   --  138 137   POTASSIUM 3.4 3.6  --  3.4 3.4   CHLORIDE 102  --   --  100 103   CO2 25  --   --  23 22   ANIONGAP 10  --   --  15 12   *  --  109* 161* 103*   BUN 11.5  --   --  12.4 15.9   CR 0.63*  --   --  0.57* 0.53*   GFRESTIMATED 86  --   --  89 >90   VANIA 8.3*  --   --  9.0 8.0*   BILITOTAL 0.4  --   --  0.5 0.4   ALKPHOS 43  --   --  52 " 42   ALT 42  --   --  17 10   AST 35  --   --  27 18       Recent Labs   Lab Test 07/01/25  0616 06/30/25  1553 06/29/25  0334 06/28/25  0558 06/27/25  0359 06/26/25  0438   MAG 1.9 1.9 1.8 1.7 1.8 2.0   ALBUMIN 3.2*  --  3.4* 2.8* 3.0* 3.4*   PHOS  --   --   --  2.5 2.0* 2.4*       ASSESSMENT:    9 Days Post-Op from Procedure(s):  LAPAROTOMY Extensive  Lysis of adhesions reduction of peristomal hernia.      PLAN:   Discharge home today

## 2025-07-02 NOTE — PLAN OF CARE
Goal Outcome Evaluation:     Nurse to nurse report given to Bel at [facility].

## 2025-07-02 NOTE — TELEPHONE ENCOUNTER
Hosp called to set up an appointment for follow up. Please review appointment and have [facility] call to reschedule if needed.     Appointment :  7/11/2025 11:00 AM Lisa Marrero MD   Date of admission: 6/23/2025      Date of discharge: 7/2/2025     Admitting diagnosis: Small bowel obstruction (H) [K56.609]

## 2025-07-02 NOTE — PLAN OF CARE
Face to face report given with opportunity to observe patient.    Report given to Brenna Hinojosa RN   7/2/2025  9:00 AM

## 2025-07-07 DIAGNOSIS — I10 BENIGN ESSENTIAL HYPERTENSION: ICD-10-CM

## 2025-07-07 DIAGNOSIS — K21.9 GASTROESOPHAGEAL REFLUX DISEASE WITHOUT ESOPHAGITIS: ICD-10-CM

## 2025-07-07 DIAGNOSIS — E78.2 MIXED HYPERLIPIDEMIA: ICD-10-CM

## 2025-07-08 ENCOUNTER — OFFICE VISIT (OUTPATIENT)
Dept: SURGERY | Facility: OTHER | Age: OVER 89
End: 2025-07-08
Attending: SURGERY
Payer: MEDICARE

## 2025-07-08 VITALS
RESPIRATION RATE: 18 BRPM | DIASTOLIC BLOOD PRESSURE: 60 MMHG | HEART RATE: 63 BPM | TEMPERATURE: 97.9 F | SYSTOLIC BLOOD PRESSURE: 132 MMHG | OXYGEN SATURATION: 98 %

## 2025-07-08 DIAGNOSIS — K43.3 PARASTOMAL HERNIA WITH OBSTRUCTION AND WITHOUT GANGRENE: Primary | ICD-10-CM

## 2025-07-08 PROCEDURE — G0463 HOSPITAL OUTPT CLINIC VISIT: HCPCS

## 2025-07-08 RX ORDER — METOPROLOL SUCCINATE 25 MG/1
TABLET, EXTENDED RELEASE ORAL
Qty: 45 TABLET | Refills: 3 | Status: SHIPPED | OUTPATIENT
Start: 2025-07-08

## 2025-07-08 RX ORDER — SIMVASTATIN 20 MG
20 TABLET ORAL AT BEDTIME
Qty: 90 TABLET | Refills: 3 | Status: SHIPPED | OUTPATIENT
Start: 2025-07-08

## 2025-07-08 RX ORDER — FAMOTIDINE 20 MG/1
20 TABLET, FILM COATED ORAL EVERY MORNING
Qty: 90 TABLET | Refills: 3 | Status: SHIPPED | OUTPATIENT
Start: 2025-07-08

## 2025-07-08 ASSESSMENT — PAIN SCALES - GENERAL: PAINLEVEL_OUTOF10: NO PAIN (0)

## 2025-07-08 NOTE — PROGRESS NOTES
CLINIC NOTE - POST-OP SURGERY  7/8/2025    Patient:James Grant    Procedure: Exploratory laparotomy with reduction of parastomal hernia    This is a 89 year old male who is 2 weeks s/p exploratory laparotomy with reduction of parastomal hernia.  The patient has no complaints today.  Is stooling well.    Current Medications:  Current Outpatient Medications   Medication Sig Dispense Refill    acetaminophen (TYLENOL) 325 MG tablet Take 2 tablets (650 mg) by mouth every 8 hours as needed for mild pain or fever. 30 tablet 0    aspirin (ASA) 325 MG tablet Take 0.5 tablets (162.5 mg) by mouth daily.      famotidine (PEPCID) 20 MG tablet TAKE ONE TABLET BY MOUTH EVERY DAY IN THE MORNING 90 tablet 3    fluticasone (FLONASE) 50 MCG/ACT nasal spray Spray 1 spray into both nostrils daily. (Patient taking differently: Spray 1 spray into both nostrils daily as needed.) 16 g 0    losartan (COZAAR) 25 MG tablet Take 25 mg by mouth daily.      metoprolol succinate ER (TOPROL XL) 25 MG 24 hr tablet TAKE ONE-HALF TABLET BY MOUTH EVERY DAY AT BEDTIME 45 tablet 3    nitroGLYcerin (NITROSTAT) 0.4 MG sublingual tablet DISSOLVE 1 TABLET UNDER THE TONGUE EVERY 5 MINUTES AS NEEDED FOR CHEST PAIN. DO NOT EXCEED A TOTAL OF 3 DOSES IN 15 MINUTES. 0.1 tablet 0    simvastatin (ZOCOR) 20 MG tablet TAKE ONE TABLET BY MOUTH EVERY DAY AT BEDTIME 90 tablet 3       Allergies:  Allergies   Allergen Reactions    Lisinopril Cough    Morphine Other (See Comments) and Visual Disturbance     confusion       PHYSICAL EXAM:   Vital signs: /60 (BP Location: Left arm, Cuff Size: Adult Regular)   Pulse 63   Temp 97.9  F (36.6  C) (Tympanic)   Resp 18   SpO2 98%    Weight: [unfilled]   BMI: There is no height or weight on file to calculate BMI.   General: Normal, healthy, cooperative, in no acute distress, alert   Abdominal: abdomen is soft without significant tenderness, masses, organomegaly or guarding   Wounds:  Well healed surgical scars consistent  with his operation.    ASSESSMENT:    89 year old male who is 2 weeks s/p exploratory laparotomy with reduction of parastomal hernia.  Doing well.     PLAN:   Overall doing well.  Especially for her age.  Will go ahead and leave the staples in another week.    Follow-up 1 week      Restrictions : Will continue lifting restrictions of no more than 10 pounds until 6 weeks after surgery

## 2025-07-15 ENCOUNTER — OFFICE VISIT (OUTPATIENT)
Dept: SURGERY | Facility: OTHER | Age: OVER 89
End: 2025-07-15
Attending: NURSE PRACTITIONER
Payer: MEDICARE

## 2025-07-15 VITALS
DIASTOLIC BLOOD PRESSURE: 78 MMHG | HEART RATE: 84 BPM | TEMPERATURE: 98.1 F | RESPIRATION RATE: 16 BRPM | SYSTOLIC BLOOD PRESSURE: 138 MMHG | OXYGEN SATURATION: 98 %

## 2025-07-15 DIAGNOSIS — Z98.890 STATUS POST LAPAROTOMY: Primary | ICD-10-CM

## 2025-07-15 PROCEDURE — G0463 HOSPITAL OUTPT CLINIC VISIT: HCPCS

## 2025-07-15 ASSESSMENT — PAIN SCALES - GENERAL: PAINLEVEL_OUTOF10: NO PAIN (0)

## 2025-07-15 NOTE — PROGRESS NOTES
CLINIC NOTE - POST-OP SURGERY  7/15/2025    Patient:James Grant    Procedure: laparotomy.  Extensive lysis of adhesions.  Reduction of parastomal hernia.  Closure of parastomal hernia.       This is a 89 year old male who is 3 weeks s/p laparotomy.  Extensive lysis of adhesions.  Reduction of parastomal hernia.  Closure of parastomal hernia.   .  The patient has no complaints today.  He is eating and stooling without problems.    Current Medications:  Current Outpatient Medications   Medication Sig Dispense Refill    acetaminophen (TYLENOL) 325 MG tablet Take 2 tablets (650 mg) by mouth every 8 hours as needed for mild pain or fever. 30 tablet 0    aspirin (ASA) 325 MG tablet Take 0.5 tablets (162.5 mg) by mouth daily.      famotidine (PEPCID) 20 MG tablet TAKE ONE TABLET BY MOUTH EVERY DAY IN THE MORNING 90 tablet 3    fluticasone (FLONASE) 50 MCG/ACT nasal spray Spray 1 spray into both nostrils daily. (Patient taking differently: Spray 1 spray into both nostrils daily as needed.) 16 g 0    losartan (COZAAR) 25 MG tablet Take 25 mg by mouth daily.      metoprolol succinate ER (TOPROL XL) 25 MG 24 hr tablet TAKE ONE-HALF TABLET BY MOUTH EVERY DAY AT BEDTIME 45 tablet 3    nitroGLYcerin (NITROSTAT) 0.4 MG sublingual tablet DISSOLVE 1 TABLET UNDER THE TONGUE EVERY 5 MINUTES AS NEEDED FOR CHEST PAIN. DO NOT EXCEED A TOTAL OF 3 DOSES IN 15 MINUTES. 0.1 tablet 0    simvastatin (ZOCOR) 20 MG tablet TAKE ONE TABLET BY MOUTH EVERY DAY AT BEDTIME 90 tablet 3       Allergies:  Allergies   Allergen Reactions    Lisinopril Cough    Morphine Other (See Comments) and Visual Disturbance     confusion       PHYSICAL EXAM:   Vital signs: /78   Pulse 84   Temp 98.1  F (36.7  C)   Resp 16   SpO2 98%    BMI: There is no height or weight on file to calculate BMI.   General: Normal, healthy, cooperative, in no acute distress, alert   Lungs: respirations are non-labored   Abdominal: non-distended   Wounds:  Well healed  surgical scars consistent with his operation.     PATHOLOGY:  Case Report   Date Value Ref Range Status   07/19/2021   Final    Surgical Pathology Report                         Case: [id number]                                  Authorizing Provider:  Sukumar Chavez MD  Collected:           07/19/2021 03:04 PM          Ordering Location:     [facility]     Received:            07/20/2021 08:53 AM          Pathologist:           Tom Philippe MD                                                                           Specimens:   A) - Large Intestine, Colon, Descending/Sigmoid                                                     B) - Appendix                                                                               Final Diagnosis   Date Value Ref Range Status   07/19/2021   Final    A.  Colon, descending/sigmoid, resection-Diverticulosis with focal diverticulitis, no evidence of malignancy    B.  Appendix, resection-Mild acute and chronic appendicitis with periappendicitis (see description)          ASSESSMENT:    89 year old male who is 3 weeks s/p laparotomy.  Extensive lysis of adhesions.  Reduction of parastomal hernia.  Closure of parastomal hernia.   .  Doing well.     PLAN:   Staples were removed without problems    Follow-up as needed      Restrictions : Will continue lifting restrictions of no more than 10 pounds until 6 weeks after surgery

## (undated) DEVICE — SLEEVE SCD EXPRESS KNEE LENGTH MED 9529

## (undated) DEVICE — SU VICRYL 3-0 SH 27" UND J416H

## (undated) DEVICE — VISCERA RETAINER-FISH

## (undated) DEVICE — BARRIER SEPRAFILM 5X6" SINGLE SHEET 4301-02

## (undated) DEVICE — SUTURE-0 PDS II 30" VIOLET TP-1

## (undated) DEVICE — IRRIGATION-NACL 1000ML

## (undated) DEVICE — DRSG-NON ADHERING 3 X 8 TELFA

## (undated) DEVICE — LIGASURE-IMPACT SEALER/DIVIDER

## (undated) DEVICE — CAUTERY PAD-POLYHESIVE II ADULT

## (undated) DEVICE — SPECIMEN CONTAINER 4OZ

## (undated) DEVICE — SYR 50ML CATH TIP W/O NDL 309620

## (undated) DEVICE — CAUTERY-MEGADYNE TIP

## (undated) DEVICE — PACK BASIN SET UP SUTCNBSBBA

## (undated) DEVICE — SOL WATER 1500ML

## (undated) DEVICE — PACK-CLOSING-CUSTOM

## (undated) DEVICE — TAPE MEDIPORE 4"X2YD 2864

## (undated) DEVICE — TUBE-DUAL SUMP 20"

## (undated) DEVICE — BAG URINARY DRAIN LEG MEDIUM 19OZ LF 150719

## (undated) DEVICE — Device

## (undated) DEVICE — SUTURE-VICRYL 2-0 SH J417H

## (undated) DEVICE — PACK-LAPAROTOMY-CUSTOM

## (undated) DEVICE — APPLICATOR-CHLORAPREP 26ML TINTED CHG 2%+ 70% IPA-SURGICAL

## (undated) DEVICE — GLV-8.0 PROTEXIS PI CLASSIC LF/PF

## (undated) DEVICE — SPONGE RAY-TEC 4X4" 7317

## (undated) DEVICE — STAPLER-SKIN 35 WIDE STAPLES

## (undated) DEVICE — LINEAR CUTTER-BLUE 75MM PROXIMATE

## (undated) DEVICE — CONNECTOR-TUBING PLASTIC 360

## (undated) DEVICE — TRAY SKIN PREP POVIDONE/IODINE DYND70372

## (undated) DEVICE — ESU ELEC LOOP 24FR 20750G

## (undated) DEVICE — SUTURE-SILK 3-0 SH K832H

## (undated) DEVICE — CATH TRAY-16FR METER W/STATLOCK LATEX]

## (undated) DEVICE — DEVICE CATH STABILIZATION STATLOCK FOLEY 3-WAY FOL0105

## (undated) DEVICE — RELOAD-BLUE 75MM PROXIMATE

## (undated) DEVICE — PACK LAPAROTOMY CUSTOM SBA32LPMBG

## (undated) DEVICE — PREVENA 14-DAY UNIT

## (undated) DEVICE — CATH HEMATRUIG 22FR LUBRICA 2551H22

## (undated) DEVICE — GLOVE PROTEXIS POWDER FREE SMT 8.5 2D72PT85X

## (undated) DEVICE — SUTURE-VICRYL 3-0 SH J416H

## (undated) DEVICE — TUBE-DUAL SUMP 20" 111196

## (undated) DEVICE — SLEEVE COMPRESSION SCD KNEE MED 74022

## (undated) DEVICE — GLV-8.5 PROTEXIS PI CLASSIC LF/PF

## (undated) DEVICE — LIGHT HANDLE COVER FOR SKYTRON LIGHTS

## (undated) DEVICE — SCD SLEEVE-KNEE REG.

## (undated) DEVICE — DRSG TELFA 3X8" 1238

## (undated) DEVICE — PACK-BASIN SET-UP

## (undated) DEVICE — ESU GROUND PAD ADULT W/CORD E7507

## (undated) DEVICE — ADPT 5 IN 1 360

## (undated) DEVICE — CATH TRAY 16FR METER W/STATLOCK LATEX] A902916

## (undated) DEVICE — BLANKET-BAIR UPPER BODY

## (undated) DEVICE — LABEL-STERILE PREPRINTED FOR OR

## (undated) DEVICE — PAD CHUX UNDERPAD 30X36" P3036C

## (undated) DEVICE — COVER LT HANDLE 2/PK 5160-2FG

## (undated) DEVICE — SUTURE-SILK 2-0 TIE A305H

## (undated) DEVICE — BAG URINARY DRAIN 2000ML LF 154002

## (undated) DEVICE — DRSG-SPONGE STERILE 4 X 4

## (undated) DEVICE — DRSG-SPONGE STERILE 8 X 4

## (undated) DEVICE — ESU ELEC LOOP 27FR 27050F

## (undated) DEVICE — GLOVE 8.5 PROTEXIS PI CLASSIC 2D72PL85X

## (undated) DEVICE — TUBING-SUCTION 20FT

## (undated) DEVICE — DRSG SPONGE STERILE 8 X 4 2259

## (undated) DEVICE — DRAPE-IOBAN 2 35CM X 35CM

## (undated) DEVICE — BLANKET BAIR HUGGER UPPER BODY 42268

## (undated) DEVICE — SPONGE-LAPAROTOMY PADS 18 X 18

## (undated) DEVICE — SUTURE-SILK 3-0 SH POP-OFF C013D

## (undated) DEVICE — TEASPOON METAL STERILE 17506/24

## (undated) DEVICE — TUBING EXTENSION FOLEY CATH W/CONNECTOR 9346

## (undated) DEVICE — TUBING SUCTION 10'X3/16" N510

## (undated) DEVICE — LABEL STERILE PREPRINTED FOR OR FRRH01-2M

## (undated) DEVICE — CATH URETERAL 5FRX70CM OPEN END FLEX TIP G14521

## (undated) DEVICE — TUBING SUCTION 20FT N620A

## (undated) DEVICE — CANISTER SUCTION MEDI-VAC GUARDIAN 2000ML 90D 65651-220

## (undated) DEVICE — PACK CYSTO SBA15CSFCA

## (undated) DEVICE — CANISTER-SUCTION 2000CC

## (undated) DEVICE — SPONGE LAP 18X18" X8435

## (undated) DEVICE — DRAIN-JP 19F ROUND W/TROCAR

## (undated) DEVICE — TAPE-MEDIPORE 4" X 10YD

## (undated) DEVICE — SOL NACL 0.9% IRRIG 1000ML BOTTLE 2F7124

## (undated) DEVICE — DEVICE CATH STABILIZATION STATLOCK FOLEY 2-WAY FOL0102

## (undated) DEVICE — SU PROLENE 1 CT-1 30" 8425H

## (undated) DEVICE — STAPLER-BLUE CONTOUR CURVED CUTTER

## (undated) DEVICE — DRAIN-JP BULB RESERVOIR 100CC

## (undated) DEVICE — OSTOMY-OR SET-70MM/2.75" BLUE

## (undated) DEVICE — DRESSING MEPILEX AG SILVER 4X8 395890

## (undated) RX ORDER — FENTANYL CITRATE 50 UG/ML
INJECTION, SOLUTION INTRAMUSCULAR; INTRAVENOUS
Status: DISPENSED
Start: 2019-10-22

## (undated) RX ORDER — FENTANYL CITRATE 50 UG/ML
INJECTION, SOLUTION INTRAMUSCULAR; INTRAVENOUS
Status: DISPENSED
Start: 2025-06-23

## (undated) RX ORDER — EPHEDRINE SULFATE 50 MG/ML
INJECTION, SOLUTION INTRAMUSCULAR; INTRAVENOUS; SUBCUTANEOUS
Status: DISPENSED
Start: 2025-06-23

## (undated) RX ORDER — DEXAMETHASONE SODIUM PHOSPHATE 10 MG/ML
INJECTION, SOLUTION INTRAMUSCULAR; INTRAVENOUS
Status: DISPENSED
Start: 2021-07-19

## (undated) RX ORDER — PROPOFOL 10 MG/ML
INJECTION, EMULSION INTRAVENOUS
Status: DISPENSED
Start: 2025-06-23

## (undated) RX ORDER — ONDANSETRON 2 MG/ML
INJECTION INTRAMUSCULAR; INTRAVENOUS
Status: DISPENSED
Start: 2021-07-19

## (undated) RX ORDER — PROPOFOL 10 MG/ML
INJECTION, EMULSION INTRAVENOUS
Status: DISPENSED
Start: 2019-10-22

## (undated) RX ORDER — ATROPA BELLADONNA AND OPIUM 16.2; 3 MG/1; MG/1
SUPPOSITORY RECTAL
Status: DISPENSED
Start: 2019-10-22

## (undated) RX ORDER — KETAMINE HCL IN NACL, ISO-OSM 100MG/10ML
SYRINGE (ML) INJECTION
Status: DISPENSED
Start: 2021-07-19

## (undated) RX ORDER — CEFTRIAXONE SODIUM 1 G/50ML
INJECTION, SOLUTION INTRAVENOUS
Status: DISPENSED
Start: 2020-02-17

## (undated) RX ORDER — LIDOCAINE HYDROCHLORIDE 20 MG/ML
JELLY TOPICAL
Status: DISPENSED
Start: 2020-02-17

## (undated) RX ORDER — FENTANYL CITRATE 50 UG/ML
INJECTION, SOLUTION INTRAMUSCULAR; INTRAVENOUS
Status: DISPENSED
Start: 2020-02-17

## (undated) RX ORDER — ONDANSETRON 2 MG/ML
INJECTION INTRAMUSCULAR; INTRAVENOUS
Status: DISPENSED
Start: 2019-10-22

## (undated) RX ORDER — LIDOCAINE HYDROCHLORIDE 20 MG/ML
INJECTION, SOLUTION EPIDURAL; INFILTRATION; INTRACAUDAL; PERINEURAL
Status: DISPENSED
Start: 2019-10-22

## (undated) RX ORDER — EPHEDRINE SULFATE 50 MG/ML
INJECTION, SOLUTION INTRAVENOUS
Status: DISPENSED
Start: 2019-10-22

## (undated) RX ORDER — DEXAMETHASONE SODIUM PHOSPHATE 4 MG/ML
INJECTION, SOLUTION INTRA-ARTICULAR; INTRALESIONAL; INTRAMUSCULAR; INTRAVENOUS; SOFT TISSUE
Status: DISPENSED
Start: 2019-10-22

## (undated) RX ORDER — LIDOCAINE HYDROCHLORIDE 20 MG/ML
INJECTION, SOLUTION EPIDURAL; INFILTRATION; INTRACAUDAL; PERINEURAL
Status: DISPENSED
Start: 2021-07-19

## (undated) RX ORDER — PROPOFOL 10 MG/ML
INJECTION, EMULSION INTRAVENOUS
Status: DISPENSED
Start: 2020-02-17

## (undated) RX ORDER — DEXMEDETOMIDINE HYDROCHLORIDE 4 UG/ML
INJECTION, SOLUTION INTRAVENOUS
Status: DISPENSED
Start: 2025-06-23

## (undated) RX ORDER — PROPOFOL 10 MG/ML
INJECTION, EMULSION INTRAVENOUS
Status: DISPENSED
Start: 2021-07-19

## (undated) RX ORDER — CEFTRIAXONE SODIUM 1 G/50ML
INJECTION, SOLUTION INTRAVENOUS
Status: DISPENSED
Start: 2019-10-22

## (undated) RX ORDER — LABETALOL HYDROCHLORIDE 5 MG/ML
INJECTION, SOLUTION INTRAVENOUS
Status: DISPENSED
Start: 2025-06-23